# Patient Record
Sex: MALE | Race: WHITE | NOT HISPANIC OR LATINO | Employment: OTHER | URBAN - METROPOLITAN AREA
[De-identification: names, ages, dates, MRNs, and addresses within clinical notes are randomized per-mention and may not be internally consistent; named-entity substitution may affect disease eponyms.]

---

## 2017-05-02 ENCOUNTER — OFFICE VISIT (OUTPATIENT)
Dept: CARDIOLOGY | Facility: CLINIC | Age: 77
End: 2017-05-02

## 2017-05-02 VITALS
HEIGHT: 73 IN | DIASTOLIC BLOOD PRESSURE: 70 MMHG | SYSTOLIC BLOOD PRESSURE: 108 MMHG | WEIGHT: 229.6 LBS | BODY MASS INDEX: 30.43 KG/M2 | HEART RATE: 89 BPM

## 2017-05-02 DIAGNOSIS — I25.10 CORONARY ARTERY DISEASE INVOLVING NATIVE CORONARY ARTERY OF NATIVE HEART WITHOUT ANGINA PECTORIS: ICD-10-CM

## 2017-05-02 DIAGNOSIS — I48.0 PAF (PAROXYSMAL ATRIAL FIBRILLATION) (HCC): Primary | ICD-10-CM

## 2017-05-02 DIAGNOSIS — R00.2 PALPITATIONS: ICD-10-CM

## 2017-05-02 PROCEDURE — 99213 OFFICE O/P EST LOW 20 MIN: CPT | Performed by: INTERNAL MEDICINE

## 2017-05-02 RX ORDER — FLECAINIDE ACETATE 100 MG/1
100 TABLET ORAL 2 TIMES DAILY
Qty: 180 TABLET | Refills: 2 | Status: SHIPPED | OUTPATIENT
Start: 2017-05-02 | End: 2018-02-09 | Stop reason: SDUPTHER

## 2017-05-10 ENCOUNTER — HOSPITAL ENCOUNTER (OUTPATIENT)
Dept: GENERAL RADIOLOGY | Facility: HOSPITAL | Age: 77
Discharge: HOME | End: 2017-05-10
Attending: INTERNAL MEDICINE | Admitting: INTERNAL MEDICINE

## 2017-05-10 ENCOUNTER — TRANSCRIBE ORDERS (OUTPATIENT)
Dept: ADMINISTRATIVE | Facility: HOSPITAL | Age: 77
End: 2017-05-10

## 2017-05-10 DIAGNOSIS — M25.552 LEFT HIP PAIN: Primary | ICD-10-CM

## 2017-05-10 DIAGNOSIS — M25.552 LEFT HIP PAIN: ICD-10-CM

## 2017-05-10 PROCEDURE — 73502 X-RAY EXAM HIP UNI 2-3 VIEWS: CPT

## 2017-06-09 ENCOUNTER — TELEPHONE (OUTPATIENT)
Dept: URGENT CARE | Facility: CLINIC | Age: 77
End: 2017-06-09

## 2017-06-09 NOTE — TELEPHONE ENCOUNTER
Appointment made with [facility] on Monday June 12th At 8:45 AM with Dr. You Marrero.   Patient and his wife aware of appointment.

## 2017-06-26 ENCOUNTER — CONSULT (OUTPATIENT)
Dept: CARDIOLOGY | Facility: CLINIC | Age: 77
End: 2017-06-26

## 2017-06-26 VITALS
BODY MASS INDEX: 30.35 KG/M2 | SYSTOLIC BLOOD PRESSURE: 129 MMHG | WEIGHT: 229 LBS | HEIGHT: 73 IN | HEART RATE: 90 BPM | DIASTOLIC BLOOD PRESSURE: 96 MMHG

## 2017-06-26 DIAGNOSIS — I25.10 CORONARY ARTERY DISEASE INVOLVING NATIVE CORONARY ARTERY OF NATIVE HEART WITHOUT ANGINA PECTORIS: Primary | ICD-10-CM

## 2017-06-26 DIAGNOSIS — I48.0 PAF (PAROXYSMAL ATRIAL FIBRILLATION) (HCC): ICD-10-CM

## 2017-06-26 DIAGNOSIS — I10 ESSENTIAL HYPERTENSION: ICD-10-CM

## 2017-06-26 PROCEDURE — 99214 OFFICE O/P EST MOD 30 MIN: CPT | Performed by: INTERNAL MEDICINE

## 2017-06-26 RX ORDER — PRAVASTATIN SODIUM 20 MG
20 TABLET ORAL DAILY
Qty: 90 TABLET | Refills: 3 | Status: SHIPPED | OUTPATIENT
Start: 2017-06-26 | End: 2017-10-21 | Stop reason: HOSPADM

## 2017-06-26 RX ORDER — METOPROLOL SUCCINATE 25 MG/1
25 TABLET, EXTENDED RELEASE ORAL DAILY
Qty: 90 TABLET | Refills: 3 | Status: SHIPPED | OUTPATIENT
Start: 2017-06-26 | End: 2017-10-02

## 2017-06-26 NOTE — PROGRESS NOTES
[facility] AT [address], [address]  [place], [place], [address] [phone]  [email or web address]           OUTPATIENT CLINIC CONSULTATION NOTE    Encounter Date:06/26/2017    Patient Care Team:  Patient Care Team:  Mitchel Culver MD as PCP - General (Family Medicine)  Mitchel Culver MD as PCP - Family Medicine    Subjective:   Reason for consultation:   Chief Complaint   Patient presents with   • Coronary Artery Disease   • Hypertension       HPI:    Guicho Blanco is a 76 y.o. male.  History of Present Illness  The patient has a history of CAD with a history of severe anterior wall ischemia status post ostial RCA stenosis status post PTCA and placement of a Cypher drug-eluting stent by Dr. alejo in 2009, atrial fibrillation and atrial flutter status post ablation of the flutter and fibrillation in 3/2012 by Dr. Daley, hypertension, hyperlipidemia, smoking exposure due to being a  and having subsequent COPD, muscle cramps with statins, who presents to establish care.    The patient states he's been having leg weakness and muscle cramps primarily in the upper thighs.  He has sharp/tingling/numbness-like feelings in the lower legs.  He has a history of an amputated toe on the right foot after a prolonged infection.  He has switched his pravastatin to 40 mg every other day recently and has taken his alongside his coQ10, his cramps have improved.    He also endorses mild, chest tightness that is generally more left-sided than central.  Not necessarily associated with exertion or associated dyspnea.  The discomfort is chronic.    PFSH:  Patient Active Problem List   Diagnosis   • Osteomyelitis of toe of right foot   • Osteomyelitis of right foot   • PAF (paroxysmal atrial fibrillation)   • Coronary artery disease involving native coronary artery of native heart without angina pectoris   • Gastroesophageal reflux disease without esophagitis    • Benign non-nodular prostatic hyperplasia without lower urinary tract symptoms   • S/P appy   • Essential hypertension   • Simple chronic bronchitis   • Dyslipidemia   • Palpitations   • COPD (chronic obstructive pulmonary disease)   • BPH (benign prostatic hypertrophy)   • Hearing loss         Current Outpatient Prescriptions:   •  acetaminophen (TYLENOL) 500 MG tablet, Take 500 mg by mouth every 6 (six) hours as needed for mild pain (1-3)., Disp: , Rfl:   •  aspirin 81 MG EC tablet, Take 81 mg by mouth daily., Disp: , Rfl:   •  B Complex Vitamins (VITAMIN B COMPLEX) capsule capsule, Take  by mouth daily., Disp: , Rfl:   •  Cholecalciferol (VITAMIN D3) 5000 UNITS capsule capsule, Take 5,000 Units by mouth daily., Disp: , Rfl:   •  coenzyme Q10 100 MG capsule, Take 100 mg by mouth daily., Disp: , Rfl:   •  cycloSPORINE (RESTASIS) 0.05 % ophthalmic emulsion, Administer 1 drop to both eyes 2 (two) times a day., Disp: , Rfl:   •  flecainide (TAMBOCOR) 100 MG tablet, Take 1 tablet by mouth 2 (Two) Times a Day., Disp: 180 tablet, Rfl: 2  •  omeprazole (PriLOSEC) 20 MG capsule, Take 20 mg by mouth daily., Disp: , Rfl:   •  pravastatin (PRAVACHOL) 40 MG tablet, Take 40 mg by mouth daily., Disp: , Rfl:   •  traMADol (ULTRAM) 50 MG tablet, Take 50 mg by mouth 2 (Two) Times a Day As Needed for Moderate Pain (4-6)., Disp: , Rfl:     Allergies   Allergen Reactions   • Cymbalta [Duloxetine Hcl]        Social History     Social History   • Marital status:      Spouse name: N/A   • Number of children: N/A   • Years of education: N/A     Occupational History   • [employer]      Retired     Social History Main Topics   • Smoking status: Never Smoker   • Smokeless tobacco: Never Used   • Alcohol use No   • Drug use: No   • Sexual activity: Defer     Other Topics Concern   • None     Social History Narrative    Pt does not consume caffeine.      Family History   Problem Relation Age of Onset   • Atrial fibrillation  "Mother    • Cancer Father        Review of Systems:  Positive for muscle cramps, lower extremity discomfort, chest pain  Negative for dyspnea with exertion, orthopnea, PND, lower extremity edema, palpitations, lightheadedness, syncope.   Review of Systems  All other systems reviewed and are negative.    Objective:   Blood pressure 129/96, pulse 90, height 73\" (185.4 cm), weight 229 lb (104 kg).  CONSTITUTIONAL: Well-nourished. In no acute distress. Hard of hearing  SKIN: Warm and dry. No rashes noted  HEENT: Head is normocephalic and atraumatic.  Mucous membranes are pink and moist.   NECK: Supple without masses or thyromegaly. There is no jugular venous distention at 30°.  LUNGS: Normal effort. Clear to auscultation bilaterally without wheezing, rhonchi, or rales noted.   CARDIOVASCULAR: The heart has a regular rate with a normal S1 and S2. There is no murmur, gallop, rub, or click appreciated.   PERIPHERAL VASCULAR: Carotid upstroke is 2+ bilaterally and without bruits. Radial pulses are 2+ bilaterally.  2+ right lower extremity pulses, 0-1+ left lower extremity pulses There is no lower extremity edema.   ABDOMEN: Soft with no tenderness with palpitation. No hepatosplenomegaly  MUSCULOSKELETAL: No digital cyanosis abnormal gait due to left foot toe fracture.   NEUROLOGICAL: CN II - XII grossly intact, except for being hard of hearing  PSYCHIATRIC: Alert, orientated x 3, appropriate affect     Labs:  Lab Results   Component Value Date    ALT 19 05/03/2016    AST 19 05/03/2016     Lab Results   Component Value Date    CREATININE 1.10 08/26/2016       Diagnostic Data:    Procedures   TTE 9/2015  1. The estimated ejection fraction is 55-60%.   2. Mild aortic cusp sclerosis is present.  3. There is a trace of mitral regurgitation.  4. There is mild tricuspid regurgitation.       Assessment and Plan:   Guicho was seen today for coronary artery disease and hypertension.    Diagnoses and all orders for this " visit:    Coronary artery disease involving native coronary artery of native heart without angina pectoris  -History of TAMMI to ostial RCA by Dr. sapna Figueroa  -CCS class II currently if his chest tightness are manifestations of angina  -Recommend Stress Test With Myocardial Perfusion  -Continue aspirin, decreased dose of pravastatin to 20 mg daily (as opposed to 40 mg every other night), addition of Toprol 25 mg daily (no clear history of being on a beta blocker in the past nor known side effects from being on a beta blocker)  -LDL 87, AST 24, ALT 23, 11/2016 at PCP office    PAF (paroxysmal atrial fibrillation)  -Continue flecanide  -Has had multiple discussions with electrophysiology for anticoagulation.  He has a history of being on Coumadin.  He is declining anticoagulation at this time.  Continue aspirin  -We will consider increasing his aspirin to 325 mg daily at his next visit    Essential hypertension  -Mildly elevated diastolic pressure, recommend starting Toprol    Decreased left leg pulse  Peripheral neuropathy  Right toe infection/amputation  -Followed by Dr. Polanco  -No clear history of ABIs, CT of the legs or peripheral angiogram  -If the continued lower dose of the statin does not help, we'll evaluate for peripheral vascular disease with ABIs.    - Dietary and exercise counseling was provided during this visit  - Return in about 6 months (around 12/26/2017).    Hector Urrutia MD, MSc, [facility]  Interventional Cardiology  [facility] at [facility]

## 2017-07-07 ENCOUNTER — TELEPHONE (OUTPATIENT)
Dept: CARDIOLOGY | Facility: CLINIC | Age: 77
End: 2017-07-07

## 2017-07-07 NOTE — TELEPHONE ENCOUNTER
After speaking with MJS I advised that he has recommended the patient switch to isosorbide 30 mg daily.  Spouse was educated on the headache that may ensue, and that it may last up to a couple of weeks as it is due to the increased bloodflow to his brain.  She verbalized understanding and all questions and concerns were addressed at this time.

## 2017-07-07 NOTE — TELEPHONE ENCOUNTER
The patient's wife called stating that since starting metoprolol he has been lethargic, very fatigued, and dizzy.  I advised that I will ask MJS if he would like to change his medication, and that he should hold his metoprolol in the meantime as the patient is unsure if he wants to take a beta blocker anymore at all.  /54 HR 64.  Understanding verbalized.

## 2017-07-10 ENCOUNTER — HOSPITAL ENCOUNTER (OUTPATIENT)
Dept: CARDIOLOGY | Facility: HOSPITAL | Age: 77
Discharge: HOME | End: 2017-07-10
Attending: INTERNAL MEDICINE

## 2017-07-10 ENCOUNTER — HOSPITAL ENCOUNTER (OUTPATIENT)
Dept: CARDIOLOGY | Facility: HOSPITAL | Age: 77
Discharge: HOME | End: 2017-07-10
Attending: INTERNAL MEDICINE | Admitting: INTERNAL MEDICINE

## 2017-07-10 VITALS
WEIGHT: 225 LBS | SYSTOLIC BLOOD PRESSURE: 126 MMHG | HEART RATE: 73 BPM | DIASTOLIC BLOOD PRESSURE: 70 MMHG | BODY MASS INDEX: 29.82 KG/M2 | HEIGHT: 73 IN

## 2017-07-10 DIAGNOSIS — I10 ESSENTIAL HYPERTENSION: ICD-10-CM

## 2017-07-10 DIAGNOSIS — I48.0 PAF (PAROXYSMAL ATRIAL FIBRILLATION) (HCC): ICD-10-CM

## 2017-07-10 DIAGNOSIS — I25.10 CORONARY ARTERY DISEASE INVOLVING NATIVE CORONARY ARTERY OF NATIVE HEART WITHOUT ANGINA PECTORIS: ICD-10-CM

## 2017-07-10 LAB
BH CV STRESS BP STAGE 1: NORMAL
BH CV STRESS BP STAGE 3: NORMAL
BH CV STRESS BP STAGE 4: NORMAL
BH CV STRESS COMMENTS STAGE 1: NORMAL
BH CV STRESS DOSE REGADENOSON STAGE 1: 0.4
BH CV STRESS DURATION MIN STAGE 1: 1
BH CV STRESS DURATION MIN STAGE 2: 1
BH CV STRESS DURATION MIN STAGE 3: 1
BH CV STRESS DURATION MIN STAGE 4: 1
BH CV STRESS DURATION SEC STAGE 2: 0
BH CV STRESS HR STAGE 1: 83
BH CV STRESS HR STAGE 2: 88
BH CV STRESS HR STAGE 3: 83
BH CV STRESS HR STAGE 4: 82
BH CV STRESS PROTOCOL 1: NORMAL
BH CV STRESS RECOVERY BP: NORMAL MMHG
BH CV STRESS RECOVERY HR: 79 BPM
BH CV STRESS STAGE 1: 1
BH CV STRESS STAGE 2: 2
BH CV STRESS STAGE 3: 3
BH CV STRESS STAGE 4: 4
LV EF NUC BP: 74 %
MAXIMAL PREDICTED HEART RATE: 144 BPM
PERCENT MAX PREDICTED HR: 64.58 %
STRESS BASELINE BP: NORMAL MMHG
STRESS BASELINE HR: 72 BPM
STRESS PERCENT HR: 76 %
STRESS POST PEAK BP: NORMAL MMHG
STRESS POST PEAK HR: 93 BPM
STRESS TARGET HR: 122 BPM

## 2017-07-10 PROCEDURE — 93017 CV STRESS TEST TRACING ONLY: CPT

## 2017-07-10 PROCEDURE — A9500 TC99M SESTAMIBI: HCPCS | Performed by: INTERNAL MEDICINE

## 2017-07-10 PROCEDURE — 78452 HT MUSCLE IMAGE SPECT MULT: CPT

## 2017-07-10 PROCEDURE — 93018 CV STRESS TEST I&R ONLY: CPT | Performed by: INTERNAL MEDICINE

## 2017-07-10 PROCEDURE — 78452 HT MUSCLE IMAGE SPECT MULT: CPT | Performed by: INTERNAL MEDICINE

## 2017-07-10 PROCEDURE — 0 TECHNETIUM SESTAMIBI: Performed by: INTERNAL MEDICINE

## 2017-07-10 PROCEDURE — 25010000002 REGADENOSON 0.4 MG/5ML SOLUTION: Performed by: INTERNAL MEDICINE

## 2017-07-10 RX ADMIN — REGADENOSON 0.4 MG: 0.08 INJECTION, SOLUTION INTRAVENOUS at 14:42

## 2017-07-10 RX ADMIN — Medication 1 DOSE: at 12:15

## 2017-07-10 RX ADMIN — Medication 1 DOSE: at 14:45

## 2017-07-13 ENCOUNTER — TELEPHONE (OUTPATIENT)
Dept: CARDIOLOGY | Facility: CLINIC | Age: 77
End: 2017-07-13

## 2017-07-13 NOTE — TELEPHONE ENCOUNTER
Results of stress test reviewed with patient's spouse.  All questions and concerns addressed at this time.  Understanding verbalized.

## 2017-09-26 ENCOUNTER — APPOINTMENT (OUTPATIENT)
Dept: GENERAL RADIOLOGY | Facility: HOSPITAL | Age: 77
End: 2017-09-26

## 2017-09-26 ENCOUNTER — HOSPITAL ENCOUNTER (EMERGENCY)
Facility: HOSPITAL | Age: 77
Discharge: HOME | End: 2017-09-26
Attending: EMERGENCY MEDICINE | Admitting: EMERGENCY MEDICINE

## 2017-09-26 ENCOUNTER — APPOINTMENT (OUTPATIENT)
Dept: CT IMAGING | Facility: HOSPITAL | Age: 77
End: 2017-09-26

## 2017-09-26 VITALS
HEART RATE: 66 BPM | DIASTOLIC BLOOD PRESSURE: 79 MMHG | HEIGHT: 73 IN | SYSTOLIC BLOOD PRESSURE: 142 MMHG | WEIGHT: 225 LBS | RESPIRATION RATE: 16 BRPM | OXYGEN SATURATION: 100 % | BODY MASS INDEX: 29.82 KG/M2 | TEMPERATURE: 97.4 F

## 2017-09-26 DIAGNOSIS — R42 VERTIGO: Primary | ICD-10-CM

## 2017-09-26 DIAGNOSIS — R11.14 BILIOUS VOMITING WITH NAUSEA: ICD-10-CM

## 2017-09-26 LAB
ALBUMIN SERPL-MCNC: 4.3 G/DL (ref 3.2–4.8)
ALBUMIN/GLOB SERPL: 1.7 G/DL (ref 1.5–2.5)
ALP SERPL-CCNC: 113 U/L (ref 25–100)
ALT SERPL W P-5'-P-CCNC: 28 U/L (ref 7–40)
ANION GAP SERPL CALCULATED.3IONS-SCNC: 9 MMOL/L (ref 3–11)
AST SERPL-CCNC: 29 U/L (ref 0–33)
BASOPHILS # BLD AUTO: 0.03 10*3/MM3 (ref 0–0.2)
BASOPHILS NFR BLD AUTO: 0.3 % (ref 0–1)
BILIRUB SERPL-MCNC: 0.9 MG/DL (ref 0.3–1.2)
BILIRUB UR QL STRIP: NEGATIVE
BUN BLD-MCNC: 10 MG/DL (ref 9–23)
BUN/CREAT SERPL: 10 (ref 7–25)
CALCIUM SPEC-SCNC: 9.1 MG/DL (ref 8.7–10.4)
CHLORIDE SERPL-SCNC: 106 MMOL/L (ref 99–109)
CLARITY UR: CLEAR
CO2 SERPL-SCNC: 22 MMOL/L (ref 20–31)
COLOR UR: YELLOW
CREAT BLD-MCNC: 1 MG/DL (ref 0.6–1.3)
DEPRECATED RDW RBC AUTO: 41.7 FL (ref 37–54)
EOSINOPHIL # BLD AUTO: 0.16 10*3/MM3 (ref 0–0.3)
EOSINOPHIL NFR BLD AUTO: 1.6 % (ref 0–3)
ERYTHROCYTE [DISTWIDTH] IN BLOOD BY AUTOMATED COUNT: 13.2 % (ref 11.3–14.5)
GFR SERPL CREATININE-BSD FRML MDRD: 73 ML/MIN/1.73
GLOBULIN UR ELPH-MCNC: 2.5 GM/DL
GLUCOSE BLD-MCNC: 157 MG/DL (ref 70–100)
GLUCOSE UR STRIP-MCNC: NEGATIVE MG/DL
HCT VFR BLD AUTO: 41.4 % (ref 38.9–50.9)
HGB BLD-MCNC: 14 G/DL (ref 13.1–17.5)
HGB UR QL STRIP.AUTO: NEGATIVE
HOLD SPECIMEN: NORMAL
HOLD SPECIMEN: NORMAL
IMM GRANULOCYTES # BLD: 0.03 10*3/MM3 (ref 0–0.03)
IMM GRANULOCYTES NFR BLD: 0.3 % (ref 0–0.6)
KETONES UR QL STRIP: ABNORMAL
LEUKOCYTE ESTERASE UR QL STRIP.AUTO: NEGATIVE
LYMPHOCYTES # BLD AUTO: 1.97 10*3/MM3 (ref 0.6–4.8)
LYMPHOCYTES NFR BLD AUTO: 19.6 % (ref 24–44)
MAGNESIUM SERPL-MCNC: 2 MG/DL (ref 1.3–2.7)
MCH RBC QN AUTO: 29.2 PG (ref 27–31)
MCHC RBC AUTO-ENTMCNC: 33.8 G/DL (ref 32–36)
MCV RBC AUTO: 86.4 FL (ref 80–99)
MONOCYTES # BLD AUTO: 0.69 10*3/MM3 (ref 0–1)
MONOCYTES NFR BLD AUTO: 6.9 % (ref 0–12)
NEUTROPHILS # BLD AUTO: 7.16 10*3/MM3 (ref 1.5–8.3)
NEUTROPHILS NFR BLD AUTO: 71.3 % (ref 41–71)
NITRITE UR QL STRIP: NEGATIVE
PH UR STRIP.AUTO: 7 [PH] (ref 5–8)
PLATELET # BLD AUTO: 175 10*3/MM3 (ref 150–450)
PMV BLD AUTO: 9.8 FL (ref 6–12)
POTASSIUM BLD-SCNC: 3.9 MMOL/L (ref 3.5–5.5)
PROT SERPL-MCNC: 6.8 G/DL (ref 5.7–8.2)
PROT UR QL STRIP: NEGATIVE
RBC # BLD AUTO: 4.79 10*6/MM3 (ref 4.2–5.76)
SODIUM BLD-SCNC: 137 MMOL/L (ref 132–146)
SP GR UR STRIP: 1.02 (ref 1–1.03)
TROPONIN I SERPL-MCNC: 0 NG/ML (ref 0–0.07)
UROBILINOGEN UR QL STRIP: ABNORMAL
WBC NRBC COR # BLD: 10.04 10*3/MM3 (ref 3.5–10.8)
WHOLE BLOOD HOLD SPECIMEN: NORMAL
WHOLE BLOOD HOLD SPECIMEN: NORMAL

## 2017-09-26 PROCEDURE — 80053 COMPREHEN METABOLIC PANEL: CPT | Performed by: EMERGENCY MEDICINE

## 2017-09-26 PROCEDURE — 85025 COMPLETE CBC W/AUTO DIFF WBC: CPT | Performed by: EMERGENCY MEDICINE

## 2017-09-26 PROCEDURE — 84484 ASSAY OF TROPONIN QUANT: CPT

## 2017-09-26 PROCEDURE — 96374 THER/PROPH/DIAG INJ IV PUSH: CPT

## 2017-09-26 PROCEDURE — 83735 ASSAY OF MAGNESIUM: CPT | Performed by: EMERGENCY MEDICINE

## 2017-09-26 PROCEDURE — 93005 ELECTROCARDIOGRAM TRACING: CPT

## 2017-09-26 PROCEDURE — 96361 HYDRATE IV INFUSION ADD-ON: CPT

## 2017-09-26 PROCEDURE — 71010 HC CHEST PA OR AP: CPT

## 2017-09-26 PROCEDURE — 25010000002 ONDANSETRON PER 1 MG

## 2017-09-26 PROCEDURE — 70450 CT HEAD/BRAIN W/O DYE: CPT

## 2017-09-26 PROCEDURE — 99284 EMERGENCY DEPT VISIT MOD MDM: CPT

## 2017-09-26 PROCEDURE — 81003 URINALYSIS AUTO W/O SCOPE: CPT | Performed by: EMERGENCY MEDICINE

## 2017-09-26 RX ORDER — ONDANSETRON 2 MG/ML
4 INJECTION INTRAMUSCULAR; INTRAVENOUS ONCE
Status: COMPLETED | OUTPATIENT
Start: 2017-09-26 | End: 2017-09-26

## 2017-09-26 RX ORDER — MECLIZINE HYDROCHLORIDE 25 MG/1
25 TABLET ORAL ONCE
Status: COMPLETED | OUTPATIENT
Start: 2017-09-26 | End: 2017-09-26

## 2017-09-26 RX ORDER — MECLIZINE HYDROCHLORIDE 25 MG/1
25 TABLET ORAL 3 TIMES DAILY PRN
Qty: 12 TABLET | Refills: 0 | Status: ON HOLD | OUTPATIENT
Start: 2017-09-26 | End: 2017-10-05

## 2017-09-26 RX ORDER — MECLIZINE HYDROCHLORIDE 25 MG/1
25 TABLET ORAL ONCE
Status: DISCONTINUED | OUTPATIENT
Start: 2017-09-26 | End: 2017-09-26 | Stop reason: HOSPADM

## 2017-09-26 RX ORDER — ONDANSETRON 4 MG/1
4 TABLET, FILM COATED ORAL EVERY 6 HOURS PRN
Qty: 15 TABLET | Refills: 0 | Status: ON HOLD | OUTPATIENT
Start: 2017-09-26 | End: 2017-10-05

## 2017-09-26 RX ORDER — SODIUM CHLORIDE 0.9 % (FLUSH) 0.9 %
10 SYRINGE (ML) INJECTION AS NEEDED
Status: DISCONTINUED | OUTPATIENT
Start: 2017-09-26 | End: 2017-09-26 | Stop reason: HOSPADM

## 2017-09-26 RX ADMIN — SODIUM CHLORIDE 500 ML: 9 INJECTION, SOLUTION INTRAVENOUS at 12:35

## 2017-09-26 RX ADMIN — MECLIZINE 25 MG: 25 TABLET ORAL at 10:23

## 2017-09-26 RX ADMIN — SODIUM CHLORIDE 1000 ML: 9 INJECTION, SOLUTION INTRAVENOUS at 10:22

## 2017-09-26 RX ADMIN — MECLIZINE 25 MG: 25 TABLET ORAL at 12:35

## 2017-09-26 RX ADMIN — ONDANSETRON 4 MG: 2 INJECTION INTRAMUSCULAR; INTRAVENOUS at 09:19

## 2017-09-28 ENCOUNTER — TELEPHONE (OUTPATIENT)
Dept: CARDIOLOGY | Facility: CLINIC | Age: 77
End: 2017-09-28

## 2017-09-28 NOTE — TELEPHONE ENCOUNTER
Dr. Jimenez's office called requesting that the patient be seen today or tomorrow by YUNIER for CP.  As the patient has a recent negative stress test, negative troponin and NSR EKG 2 days ago, and a history of chronic CP I called the patient to see if his s/s had changed and how he is feeling.  His wife states that his CP is constant, radiates to his left shoulder and arm now, and is accompanied by dyspnea, the latter two being new for him.  She wanted the patient to be seen today or tomorrow by either YUNIER or TRINITY.  After speaking with them I advised that they are unable to see him in clinic today or tomorrow, but we can add him on to the schedule Monday with YUNIER at 1015.  She was amenable to this and verbalized understanding at this time.

## 2017-10-02 ENCOUNTER — OFFICE VISIT (OUTPATIENT)
Dept: CARDIOLOGY | Facility: CLINIC | Age: 77
End: 2017-10-02

## 2017-10-02 ENCOUNTER — PREP FOR SURGERY (OUTPATIENT)
Dept: OTHER | Facility: HOSPITAL | Age: 77
End: 2017-10-02

## 2017-10-02 VITALS
HEART RATE: 75 BPM | SYSTOLIC BLOOD PRESSURE: 120 MMHG | HEIGHT: 73 IN | WEIGHT: 230.6 LBS | BODY MASS INDEX: 30.56 KG/M2 | DIASTOLIC BLOOD PRESSURE: 74 MMHG

## 2017-10-02 DIAGNOSIS — I48.0 PAF (PAROXYSMAL ATRIAL FIBRILLATION) (HCC): ICD-10-CM

## 2017-10-02 DIAGNOSIS — R07.9 CHEST PAIN, UNSPECIFIED TYPE: ICD-10-CM

## 2017-10-02 DIAGNOSIS — E78.5 DYSLIPIDEMIA: ICD-10-CM

## 2017-10-02 DIAGNOSIS — R00.2 PALPITATIONS: ICD-10-CM

## 2017-10-02 DIAGNOSIS — R42 LIGHT HEADEDNESS: Primary | ICD-10-CM

## 2017-10-02 DIAGNOSIS — M79.603 PAIN OF UPPER EXTREMITY, UNSPECIFIED LATERALITY: ICD-10-CM

## 2017-10-02 DIAGNOSIS — I25.111 ATHEROSCLEROSIS OF NATIVE CORONARY ARTERY OF NATIVE HEART WITH ANGINA PECTORIS WITH DOCUMENTED SPASM (HCC): ICD-10-CM

## 2017-10-02 DIAGNOSIS — I25.10 CORONARY ARTERY DISEASE INVOLVING NATIVE CORONARY ARTERY OF NATIVE HEART WITHOUT ANGINA PECTORIS: ICD-10-CM

## 2017-10-02 DIAGNOSIS — I10 ESSENTIAL HYPERTENSION: ICD-10-CM

## 2017-10-02 DIAGNOSIS — I20.9 ANGINA PECTORIS (HCC): Primary | ICD-10-CM

## 2017-10-02 PROCEDURE — 99214 OFFICE O/P EST MOD 30 MIN: CPT | Performed by: INTERNAL MEDICINE

## 2017-10-02 RX ORDER — NITROGLYCERIN 0.4 MG/1
0.4 TABLET SUBLINGUAL
Status: CANCELLED | OUTPATIENT
Start: 2017-10-02

## 2017-10-02 RX ORDER — ONDANSETRON 2 MG/ML
4 INJECTION INTRAMUSCULAR; INTRAVENOUS EVERY 6 HOURS PRN
Status: CANCELLED | OUTPATIENT
Start: 2017-10-02

## 2017-10-02 RX ORDER — ACETAMINOPHEN 325 MG/1
650 TABLET ORAL EVERY 4 HOURS PRN
Status: CANCELLED | OUTPATIENT
Start: 2017-10-02

## 2017-10-02 RX ORDER — SODIUM CHLORIDE 0.9 % (FLUSH) 0.9 %
1-10 SYRINGE (ML) INJECTION AS NEEDED
Status: CANCELLED | OUTPATIENT
Start: 2017-10-02

## 2017-10-02 RX ORDER — ASPIRIN 325 MG
325 TABLET ORAL ONCE
Status: CANCELLED | OUTPATIENT
Start: 2017-10-02 | End: 2017-10-02

## 2017-10-02 RX ORDER — ASPIRIN 325 MG
325 TABLET, DELAYED RELEASE (ENTERIC COATED) ORAL DAILY
Status: CANCELLED | OUTPATIENT
Start: 2017-10-03

## 2017-10-02 NOTE — PROGRESS NOTES
[facility] AT [address], [address]  [place], [place], [address]    [phone]  WWW[facility]           OUTPATIENT CLINIC HISTORY AND PHYSICAL NOTE    Encounter Date:06/26/2017    Patient Care Team:  Patient Care Team:  Mitchel Culver MD as PCP - General (Family Medicine)  Mitchel Culver MD as PCP - Family Medicine    Subjective:   Reason for consultation:   Chief Complaint   Patient presents with   • Coronary Artery Disease       HPI:    Guicho Blanco is a 76 y.o. male.  Coronary Artery Disease       The patient has a history of CAD with a history of severe anterior wall ischemia status post ostial RCA stenosis status post PTCA and placement of a Cypher drug-eluting stent by Dr. Sue in 2009, atrial fibrillation and atrial flutter status post ablation of the flutter and fibrillation in 3/2012 by Dr. Daley, hypertension, hyperlipidemia, Prior infection and right toe amputation by Dr. Polanco, smoking exposure due to being a  and having subsequent COPD, muscle cramps with statins which has improved with lowering his pravastatin dose from 40 mg to 20 mg but is still persistent, who presents for follow-up.    Recently the patient had severe lightheadedness/dizziness with associated emesis and was treated in the emergency room.  He still has chronic mild persistent lightheadedness.  He has intermittent left-sided chest pain which can occur with exertion or rest that is sharp, lasts seconds to minutes, is not associated with dyspnea.  He has increasing exertional fatigue which may be an anginal equivalent.    The patient's leg weakness and upper thigh muscle cramps have improved partially with decreasing pravastatin from 40 mg to 20 mg daily.  He is a chronic persistent numbness of the lower legs.  Has been told previously that his pulses in his feet are normal    PFSH:  Patient Active Problem List   Diagnosis   • Osteomyelitis of toe of  right foot   • Osteomyelitis of right foot   • PAF (paroxysmal atrial fibrillation)   • Coronary artery disease involving native coronary artery of native heart without angina pectoris   • Gastroesophageal reflux disease without esophagitis   • Benign non-nodular prostatic hyperplasia without lower urinary tract symptoms   • S/P appy   • Essential hypertension   • Simple chronic bronchitis   • Dyslipidemia   • Palpitations   • COPD (chronic obstructive pulmonary disease)   • BPH (benign prostatic hypertrophy)   • Hearing loss         Current Outpatient Prescriptions:   •  acetaminophen (TYLENOL) 500 MG tablet, Take 500 mg by mouth every 6 (six) hours as needed for mild pain (1-3)., Disp: , Rfl:   •  aspirin 81 MG EC tablet, Take 325 mg by mouth Daily., Disp: , Rfl:   •  B Complex Vitamins (VITAMIN B COMPLEX) capsule capsule, Take  by mouth daily., Disp: , Rfl:   •  Cholecalciferol (VITAMIN D3) 5000 UNITS capsule capsule, Take 5,000 Units by mouth daily., Disp: , Rfl:   •  coenzyme Q10 100 MG capsule, Take 100 mg by mouth daily., Disp: , Rfl:   •  cycloSPORINE (RESTASIS) 0.05 % ophthalmic emulsion, Administer 1 drop to both eyes 2 (two) times a day., Disp: , Rfl:   •  flecainide (TAMBOCOR) 100 MG tablet, Take 1 tablet by mouth 2 (Two) Times a Day., Disp: 180 tablet, Rfl: 2  •  meclizine (ANTIVERT) 25 MG tablet, Take 1 tablet by mouth 3 (Three) Times a Day As Needed for dizziness., Disp: 12 tablet, Rfl: 0  •  omeprazole (PriLOSEC) 20 MG capsule, Take 20 mg by mouth daily., Disp: , Rfl:   •  ondansetron (ZOFRAN) 4 MG tablet, Take 1 tablet by mouth Every 6 (Six) Hours As Needed for Nausea or Vomiting., Disp: 15 tablet, Rfl: 0  •  pravastatin (PRAVACHOL) 20 MG tablet, Take 1 tablet by mouth Daily., Disp: 90 tablet, Rfl: 3  •  traMADol (ULTRAM) 50 MG tablet, Take 50 mg by mouth 2 (Two) Times a Day As Needed for Moderate Pain (4-6)., Disp: , Rfl:     Allergies   Allergen Reactions   • Cymbalta [Duloxetine Hcl]    •  "Metoprolol Other (See Comments)     Lethargy & fatigue   • Neurontin [Gabapentin]        Social History     Social History   • Marital status:      Spouse name: N/A   • Number of children: N/A   • Years of education: N/A     Occupational History   • [employer]      Retired     Social History Main Topics   • Smoking status: Never Smoker   • Smokeless tobacco: Never Used   • Alcohol use No   • Drug use: No   • Sexual activity: Defer     Other Topics Concern   • None     Social History Narrative    Pt does not consume caffeine.      Family History   Problem Relation Age of Onset   • Atrial fibrillation Mother    • Cancer Father        Review of Systems:  Positive for muscle cramps, lower extremity discomfort, chest pain, lightheadedness, palpitations  Negative for dyspnea with exertion, orthopnea, PND, lower extremity edema, syncope.   Review of Systems  All other systems reviewed and are negative.    Objective:   Blood pressure 120/74, pulse 75, height 73\" (185.4 cm), weight 230 lb 9.6 oz (105 kg).  CONSTITUTIONAL: Well-nourished. In no acute distress. Hard of hearing  LUNGS: Normal effort. Clear to auscultation bilaterally without wheezing, rhonchi, or rales noted.   CARDIOVASCULAR: The heart has a regular rate with a normal S1 and S2. There is no murmur, gallop, rub, or click appreciated.   PERIPHERAL VASCULAR: Carotid upstroke is 2+ bilaterally and without bruits. Radial pulses are 2+ bilaterally.  2+ right PT pulse, 1+ left lower PT pulse. There is no lower extremity edema.       Labs:  Lab Results   Component Value Date    ALT 28 09/26/2017    AST 29 09/26/2017     Lab Results   Component Value Date    CREATININE 1.00 09/26/2017       Diagnostic Data:    Procedures   TTE 9/2015  1. The estimated ejection fraction is 55-60%.   2. Mild aortic cusp sclerosis is present.  3. There is a trace of mitral regurgitation.  4. There is mild tricuspid regurgitation.     Stress test 7/2017  · Left ventricular " ejection fraction is hyperdynamic (Calculated EF > 70%).  · Myocardial perfusion imaging indicates a mild, very small-sized infarct located in the apex with no significant ischemia noted.  · Impressions are consistent with a low risk study.    Assessment and Plan:   Guicho was seen today for coronary artery disease and hypertension.    Diagnoses and all orders for this visit:    Coronary artery disease involving native coronary artery of native heart without angina pectoris  -History of TAMMI to ostial RCA by Dr. Sue 2009  -CCS class II currently if his chest tightness are manifestations of angina  -Despite low risk stress test, he has persistent anginal-like symptoms.  He has a mild fixed perfusion defect in the apex.  Prior anterior ischemia is what prompted his initial left heart catheterization which revealed an 80% RCA stenosis.  As such we'll repeat left heart catheterization via the right radial approach  -Continue aspirin; did not tolerate metoprolol   -Continue decreased dose of pravastatin at 20 mg daily.  This is helped his myalgias.  In the future can consider a trial of no statin to see if this helps.  If so, the risk of being on statin may outweigh the benefit.    -LDL 87, AST 24, ALT 23, 11/2016 at PCP office  -Repeat lipid panel soon with PCP    PAF (paroxysmal atrial fibrillation)  -Continue flecanide  -Increase aspirin to 325 mg daily  -He has rare occasional palpitations, we'll place a ZIO Patch after his heart catheter to look for breakthrough A. Fib    Essential hypertension  -At goal without metoprolol    Lightheadedness  -Carotid duplex ultrasound at time of left heart catheterization  -Arterial Duplex to rule out subclavian artery stenosis    Decreased left leg pulse  Peripheral neuropathy  Right toe infection/amputation ~2016  -Previously followed by Dr. Polanco  -2+ right PT pulse, 1+ left PT pulse  -No clear history of ABIs, CT of the legs or peripheral angiogram  -If the continued lower  dose of the statin does not help, we'll evaluate for peripheral vascular disease with ABIs.    - Dietary and exercise counseling was provided during this visit  - Return for Follow-up pending left heart catheterization results.    Hector Urrutia MD, MSc, [facility]  Interventional Cardiology  [facility] at [facility]

## 2017-10-04 PROBLEM — R07.9 CHEST PAIN: Status: ACTIVE | Noted: 2017-10-04

## 2017-10-05 ENCOUNTER — APPOINTMENT (OUTPATIENT)
Dept: CARDIOLOGY | Facility: HOSPITAL | Age: 77
End: 2017-10-05
Attending: INTERNAL MEDICINE

## 2017-10-05 ENCOUNTER — HOSPITAL ENCOUNTER (OUTPATIENT)
Facility: HOSPITAL | Age: 77
Discharge: HOME | End: 2017-10-05
Attending: INTERNAL MEDICINE | Admitting: INTERNAL MEDICINE

## 2017-10-05 VITALS
RESPIRATION RATE: 16 BRPM | BODY MASS INDEX: 30.15 KG/M2 | HEART RATE: 70 BPM | SYSTOLIC BLOOD PRESSURE: 122 MMHG | HEIGHT: 73 IN | WEIGHT: 227.51 LBS | TEMPERATURE: 97.6 F | OXYGEN SATURATION: 97 % | DIASTOLIC BLOOD PRESSURE: 68 MMHG

## 2017-10-05 DIAGNOSIS — R07.9 CHEST PAIN, UNSPECIFIED TYPE: ICD-10-CM

## 2017-10-05 LAB
ALBUMIN SERPL-MCNC: 4.2 G/DL (ref 3.2–4.8)
ALBUMIN/GLOB SERPL: 1.6 G/DL (ref 1.5–2.5)
ALP SERPL-CCNC: 109 U/L (ref 25–100)
ALT SERPL W P-5'-P-CCNC: 33 U/L (ref 7–40)
ANION GAP SERPL CALCULATED.3IONS-SCNC: 9 MMOL/L (ref 3–11)
ARTICHOKE IGE QN: 91 MG/DL (ref 0–130)
AST SERPL-CCNC: 34 U/L (ref 0–33)
BASOPHILS # BLD AUTO: 0.03 10*3/MM3 (ref 0–0.2)
BASOPHILS NFR BLD AUTO: 0.4 % (ref 0–1)
BH CV ECHO MEAS - AO ROOT AREA (BSA CORRECTED): 1.3
BH CV ECHO MEAS - AO ROOT AREA: 7.1 CM^2
BH CV ECHO MEAS - AO ROOT DIAM: 3 CM
BH CV ECHO MEAS - BSA(HAYCOCK): 2.3 M^2
BH CV ECHO MEAS - BSA: 2.3 M^2
BH CV ECHO MEAS - BZI_BMI: 29.9 KILOGRAMS/M^2
BH CV ECHO MEAS - BZI_METRIC_HEIGHT: 185.4 CM
BH CV ECHO MEAS - BZI_METRIC_WEIGHT: 103 KG
BH CV ECHO MEAS - CONTRAST EF (2CH): 54.7 ML/M^2
BH CV ECHO MEAS - CONTRAST EF 4CH: 73.2 ML/M^2
BH CV ECHO MEAS - EDV(CUBED): 57.1 ML
BH CV ECHO MEAS - EDV(MOD-SP2): 64 ML
BH CV ECHO MEAS - EDV(MOD-SP4): 41 ML
BH CV ECHO MEAS - EDV(TEICH): 63.9 ML
BH CV ECHO MEAS - EF(CUBED): 72 %
BH CV ECHO MEAS - EF(MOD-SP2): 54.7 %
BH CV ECHO MEAS - EF(MOD-SP4): 64 %
BH CV ECHO MEAS - EF(TEICH): 64.4 %
BH CV ECHO MEAS - ESV(CUBED): 16 ML
BH CV ECHO MEAS - ESV(MOD-SP2): 29 ML
BH CV ECHO MEAS - ESV(MOD-SP4): 11 ML
BH CV ECHO MEAS - ESV(TEICH): 22.8 ML
BH CV ECHO MEAS - FS: 34.5 %
BH CV ECHO MEAS - IVS/LVPW: 1
BH CV ECHO MEAS - IVSD: 1.2 CM
BH CV ECHO MEAS - LA DIMENSION: 3.5 CM
BH CV ECHO MEAS - LA/AO: 1.2
BH CV ECHO MEAS - LAT PEAK E' VEL: 8.9 CM/SEC
BH CV ECHO MEAS - LV DIASTOLIC VOL/BSA (35-75): 18.1 ML/M^2
BH CV ECHO MEAS - LV MASS(C)D: 158.2 GRAMS
BH CV ECHO MEAS - LV MASS(C)DI: 69.7 GRAMS/M^2
BH CV ECHO MEAS - LV SYSTOLIC VOL/BSA (12-30): 4.8 ML/M^2
BH CV ECHO MEAS - LVIDD: 3.9 CM
BH CV ECHO MEAS - LVIDS: 2.5 CM
BH CV ECHO MEAS - LVLD AP2: 6.4 CM
BH CV ECHO MEAS - LVLD AP4: 5.9 CM
BH CV ECHO MEAS - LVLS AP2: 6.2 CM
BH CV ECHO MEAS - LVLS AP4: 5.3 CM
BH CV ECHO MEAS - LVPWD: 1.2 CM
BH CV ECHO MEAS - MED PEAK E' VEL: 6.3 CM/SEC
BH CV ECHO MEAS - MV A MAX VEL: 56.8 CM/SEC
BH CV ECHO MEAS - MV DEC SLOPE: 191 CM/SEC^2
BH CV ECHO MEAS - MV DEC TIME: 0.23 SEC
BH CV ECHO MEAS - MV E MAX VEL: 53.3 CM/SEC
BH CV ECHO MEAS - MV E/A: 0.94
BH CV ECHO MEAS - PA ACC SLOPE: 579 CM/SEC^2
BH CV ECHO MEAS - PA ACC TIME: 0.13 SEC
BH CV ECHO MEAS - PA PR(ACCEL): 18.7 MMHG
BH CV ECHO MEAS - RAP SYSTOLE: 5 MMHG
BH CV ECHO MEAS - RVDD: 2.4 CM
BH CV ECHO MEAS - RVSP: 15.9 MMHG
BH CV ECHO MEAS - SI(CUBED): 18.1 ML/M^2
BH CV ECHO MEAS - SI(MOD-SP2): 15.4 ML/M^2
BH CV ECHO MEAS - SI(MOD-SP4): 13.2 ML/M^2
BH CV ECHO MEAS - SI(TEICH): 18.1 ML/M^2
BH CV ECHO MEAS - SV(CUBED): 41.1 ML
BH CV ECHO MEAS - SV(MOD-SP2): 35 ML
BH CV ECHO MEAS - SV(MOD-SP4): 30 ML
BH CV ECHO MEAS - SV(TEICH): 41.1 ML
BH CV ECHO MEAS - TAPSE (>1.6): 2.1 CM2
BH CV ECHO MEAS - TR MAX VEL: 165 CM/SEC
BH CV VAS BP RIGHT ARM: NORMAL MMHG
BH CV XLRA - RV BASE: 3.4 CM
BH CV XLRA - RV LENGTH: 5.9 CM
BH CV XLRA - RV MID: 3.3 CM
BH CV XLRA MEAS LEFT CCA RATIO VEL: 94.3 CM/SEC
BH CV XLRA MEAS LEFT DIST CCA EDV: 19.6 CM/SEC
BH CV XLRA MEAS LEFT DIST CCA PSV: 95.1 CM/SEC
BH CV XLRA MEAS LEFT DIST ICA EDV: 24.3 CM/SEC
BH CV XLRA MEAS LEFT DIST ICA PSV: 89.9 CM/SEC
BH CV XLRA MEAS LEFT ICA RATIO VEL: 77.2 CM/SEC
BH CV XLRA MEAS LEFT ICA/CCA RATIO: 0.82
BH CV XLRA MEAS LEFT MID ICA EDV: 29.8 CM/SEC
BH CV XLRA MEAS LEFT MID ICA PSV: 77.7 CM/SEC
BH CV XLRA MEAS LEFT PROX CCA EDV: 22 CM/SEC
BH CV XLRA MEAS LEFT PROX CCA PSV: 102.1 CM/SEC
BH CV XLRA MEAS LEFT PROX ECA PSV: 97 CM/SEC
BH CV XLRA MEAS LEFT PROX ICA EDV: 21 CM/SEC
BH CV XLRA MEAS LEFT PROX ICA PSV: 70.6 CM/SEC
BH CV XLRA MEAS LEFT PROX SCLA PSV: 146.9 CM/SEC
BH CV XLRA MEAS LEFT VERTEBRAL A EDV: 19.3 CM/SEC
BH CV XLRA MEAS LEFT VERTEBRAL A PSV: 64 CM/SEC
BH CV XLRA MEAS RIGHT CCA RATIO VEL: 89.6 CM/SEC
BH CV XLRA MEAS RIGHT DIST CCA EDV: 20.4 CM/SEC
BH CV XLRA MEAS RIGHT DIST CCA PSV: 90.4 CM/SEC
BH CV XLRA MEAS RIGHT DIST ICA EDV: 23.1 CM/SEC
BH CV XLRA MEAS RIGHT DIST ICA PSV: 91.7 CM/SEC
BH CV XLRA MEAS RIGHT ICA RATIO VEL: 75.1 CM/SEC
BH CV XLRA MEAS RIGHT ICA/CCA RATIO: 0.84
BH CV XLRA MEAS RIGHT MID ICA EDV: 24.9 CM/SEC
BH CV XLRA MEAS RIGHT MID ICA PSV: 75.5 CM/SEC
BH CV XLRA MEAS RIGHT PROX CCA EDV: 21.2 CM/SEC
BH CV XLRA MEAS RIGHT PROX CCA PSV: 95.9 CM/SEC
BH CV XLRA MEAS RIGHT PROX ECA EDV: 21.2 CM/SEC
BH CV XLRA MEAS RIGHT PROX ECA PSV: 88.8 CM/SEC
BH CV XLRA MEAS RIGHT PROX ICA EDV: 15.3 CM/SEC
BH CV XLRA MEAS RIGHT PROX ICA PSV: 68.1 CM/SEC
BH CV XLRA MEAS RIGHT PROX SCLA PSV: 129.6 CM/SEC
BILIRUB SERPL-MCNC: 0.7 MG/DL (ref 0.3–1.2)
BUN BLD-MCNC: 10 MG/DL (ref 9–23)
BUN/CREAT SERPL: 9.1 (ref 7–25)
CALCIUM SPEC-SCNC: 9.5 MG/DL (ref 8.7–10.4)
CHLORIDE SERPL-SCNC: 108 MMOL/L (ref 99–109)
CHOLEST SERPL-MCNC: 142 MG/DL (ref 0–200)
CO2 SERPL-SCNC: 21 MMOL/L (ref 20–31)
CREAT BLD-MCNC: 1.1 MG/DL (ref 0.6–1.3)
DEPRECATED RDW RBC AUTO: 41.8 FL (ref 37–54)
E/E' RATIO: 7.6
EOSINOPHIL # BLD AUTO: 0.2 10*3/MM3 (ref 0–0.3)
EOSINOPHIL NFR BLD AUTO: 2.8 % (ref 0–3)
ERYTHROCYTE [DISTWIDTH] IN BLOOD BY AUTOMATED COUNT: 13.1 % (ref 11.3–14.5)
GFR SERPL CREATININE-BSD FRML MDRD: 65 ML/MIN/1.73
GLOBULIN UR ELPH-MCNC: 2.7 GM/DL
GLUCOSE BLD-MCNC: 102 MG/DL (ref 70–100)
HBA1C MFR BLD: 5.3 % (ref 4.8–5.6)
HCT VFR BLD AUTO: 42.7 % (ref 38.9–50.9)
HDLC SERPL-MCNC: 43 MG/DL (ref 40–60)
HGB BLD-MCNC: 14 G/DL (ref 13.1–17.5)
IMM GRANULOCYTES # BLD: 0.04 10*3/MM3 (ref 0–0.03)
IMM GRANULOCYTES NFR BLD: 0.6 % (ref 0–0.6)
LEFT ATRIUM VOLUME INDEX: 17.2 ML/M2
LYMPHOCYTES # BLD AUTO: 2.27 10*3/MM3 (ref 0.6–4.8)
LYMPHOCYTES NFR BLD AUTO: 32.3 % (ref 24–44)
MCH RBC QN AUTO: 28.6 PG (ref 27–31)
MCHC RBC AUTO-ENTMCNC: 32.8 G/DL (ref 32–36)
MCV RBC AUTO: 87.3 FL (ref 80–99)
MONOCYTES # BLD AUTO: 0.8 10*3/MM3 (ref 0–1)
MONOCYTES NFR BLD AUTO: 11.4 % (ref 0–12)
NEUTROPHILS # BLD AUTO: 3.68 10*3/MM3 (ref 1.5–8.3)
NEUTROPHILS NFR BLD AUTO: 52.5 % (ref 41–71)
PLATELET # BLD AUTO: 169 10*3/MM3 (ref 150–450)
PMV BLD AUTO: 10.1 FL (ref 6–12)
POTASSIUM BLD-SCNC: 4.1 MMOL/L (ref 3.5–5.5)
PROT SERPL-MCNC: 6.9 G/DL (ref 5.7–8.2)
RBC # BLD AUTO: 4.89 10*6/MM3 (ref 4.2–5.76)
SODIUM BLD-SCNC: 138 MMOL/L (ref 132–146)
TRIGL SERPL-MCNC: 116 MG/DL (ref 0–150)
WBC NRBC COR # BLD: 7.02 10*3/MM3 (ref 3.5–10.8)

## 2017-10-05 PROCEDURE — C1894 INTRO/SHEATH, NON-LASER: HCPCS | Performed by: INTERNAL MEDICINE

## 2017-10-05 PROCEDURE — 25010000002 FENTANYL CITRATE (PF) 100 MCG/2ML SOLUTION: Performed by: INTERNAL MEDICINE

## 2017-10-05 PROCEDURE — 93880 EXTRACRANIAL BILAT STUDY: CPT | Performed by: INTERNAL MEDICINE

## 2017-10-05 PROCEDURE — 80053 COMPREHEN METABOLIC PANEL: CPT | Performed by: INTERNAL MEDICINE

## 2017-10-05 PROCEDURE — 93306 TTE W/DOPPLER COMPLETE: CPT

## 2017-10-05 PROCEDURE — 0 IOPAMIDOL PER 1 ML: Performed by: INTERNAL MEDICINE

## 2017-10-05 PROCEDURE — 80061 LIPID PANEL: CPT | Performed by: PHYSICIAN ASSISTANT

## 2017-10-05 PROCEDURE — 85025 COMPLETE CBC W/AUTO DIFF WBC: CPT | Performed by: INTERNAL MEDICINE

## 2017-10-05 PROCEDURE — 83036 HEMOGLOBIN GLYCOSYLATED A1C: CPT | Performed by: PHYSICIAN ASSISTANT

## 2017-10-05 PROCEDURE — 36415 COLL VENOUS BLD VENIPUNCTURE: CPT

## 2017-10-05 PROCEDURE — 93306 TTE W/DOPPLER COMPLETE: CPT | Performed by: INTERNAL MEDICINE

## 2017-10-05 PROCEDURE — 25010000002 HEPARIN (PORCINE) PER 1000 UNITS: Performed by: INTERNAL MEDICINE

## 2017-10-05 PROCEDURE — 93880 EXTRACRANIAL BILAT STUDY: CPT

## 2017-10-05 PROCEDURE — C1769 GUIDE WIRE: HCPCS | Performed by: INTERNAL MEDICINE

## 2017-10-05 PROCEDURE — 93458 L HRT ARTERY/VENTRICLE ANGIO: CPT | Performed by: INTERNAL MEDICINE

## 2017-10-05 PROCEDURE — 25010000002 MIDAZOLAM PER 1 MG: Performed by: INTERNAL MEDICINE

## 2017-10-05 RX ORDER — SODIUM CHLORIDE 9 MG/ML
INJECTION, SOLUTION INTRAVENOUS CONTINUOUS PRN
Status: DISCONTINUED | OUTPATIENT
Start: 2017-10-05 | End: 2017-10-05 | Stop reason: HOSPADM

## 2017-10-05 RX ORDER — NITROGLYCERIN 0.4 MG/1
0.4 TABLET SUBLINGUAL
Status: DISCONTINUED | OUTPATIENT
Start: 2017-10-05 | End: 2017-10-05 | Stop reason: HOSPADM

## 2017-10-05 RX ORDER — ASPIRIN 325 MG
325 TABLET, DELAYED RELEASE (ENTERIC COATED) ORAL DAILY
Status: DISCONTINUED | OUTPATIENT
Start: 2017-10-06 | End: 2017-10-05 | Stop reason: HOSPADM

## 2017-10-05 RX ORDER — MIDAZOLAM HYDROCHLORIDE 1 MG/ML
INJECTION INTRAMUSCULAR; INTRAVENOUS AS NEEDED
Status: DISCONTINUED | OUTPATIENT
Start: 2017-10-05 | End: 2017-10-05 | Stop reason: HOSPADM

## 2017-10-05 RX ORDER — ISOSORBIDE MONONITRATE 30 MG/1
30 TABLET, EXTENDED RELEASE ORAL DAILY
Qty: 30 TABLET | Refills: 11 | Status: SHIPPED | OUTPATIENT
Start: 2017-10-05 | End: 2018-09-16 | Stop reason: SDUPTHER

## 2017-10-05 RX ORDER — LIDOCAINE HYDROCHLORIDE 10 MG/ML
INJECTION, SOLUTION INFILTRATION; PERINEURAL AS NEEDED
Status: DISCONTINUED | OUTPATIENT
Start: 2017-10-05 | End: 2017-10-05 | Stop reason: HOSPADM

## 2017-10-05 RX ORDER — SODIUM CHLORIDE 0.9 % (FLUSH) 0.9 %
1-10 SYRINGE (ML) INJECTION AS NEEDED
Status: DISCONTINUED | OUTPATIENT
Start: 2017-10-05 | End: 2017-10-05 | Stop reason: HOSPADM

## 2017-10-05 RX ORDER — ONDANSETRON 2 MG/ML
4 INJECTION INTRAMUSCULAR; INTRAVENOUS EVERY 6 HOURS PRN
Status: DISCONTINUED | OUTPATIENT
Start: 2017-10-05 | End: 2017-10-05 | Stop reason: HOSPADM

## 2017-10-05 RX ORDER — FENTANYL CITRATE 50 UG/ML
INJECTION, SOLUTION INTRAMUSCULAR; INTRAVENOUS AS NEEDED
Status: DISCONTINUED | OUTPATIENT
Start: 2017-10-05 | End: 2017-10-05 | Stop reason: HOSPADM

## 2017-10-05 RX ORDER — ASPIRIN 325 MG
325 TABLET ORAL ONCE
Status: COMPLETED | OUTPATIENT
Start: 2017-10-05 | End: 2017-10-05

## 2017-10-05 RX ORDER — ACETAMINOPHEN 325 MG/1
650 TABLET ORAL EVERY 4 HOURS PRN
Status: DISCONTINUED | OUTPATIENT
Start: 2017-10-05 | End: 2017-10-05 | Stop reason: HOSPADM

## 2017-10-05 RX ADMIN — ASPIRIN 325 MG ORAL TABLET 325 MG: 325 PILL ORAL at 08:52

## 2017-10-05 NOTE — PROGRESS NOTES
Brief Progress Note  -There is mild diffuse atherosclerosis with some small vessel disease on his left heart catheterization but no flow-limiting stenoses  -Carotid Doppler ultrasound results pending, will also send patient home with in event monitor  -Orthostatic blood pressure measurements after the patient's TR band is removed and sedation has worn off  -If carotid Doppler ultrasound is negative and event monitor is unremarkable, trial of isosorbide mononitrate 30 mg daily for his chest pain  -If lightheadedness persists, will consider tilt table test  -Follow up with Dr. Urrutia in 6-8 weeks    Hector Urrutia MD, MSc, FACC  Interventional Cardiology  [facility] at [facility]

## 2017-10-05 NOTE — H&P (VIEW-ONLY)
[facility] AT [address], [address]  [place], [place], [address]    [phone]  WWW.[facility]           OUTPATIENT CLINIC HISTORY AND PHYSICAL NOTE    Encounter Date:06/26/2017    Patient Care Team:  Patient Care Team:  Mitchel Culver MD as PCP - General (Family Medicine)  Mitchel Culver MD as PCP - Family Medicine    Subjective:   Reason for consultation:   Chief Complaint   Patient presents with   • Coronary Artery Disease       HPI:    Guicho Blanco is a 76 y.o. male.  Coronary Artery Disease       The patient has a history of CAD with a history of severe anterior wall ischemia status post ostial RCA stenosis status post PTCA and placement of a Cypher drug-eluting stent by Dr. Sue in 2009, atrial fibrillation and atrial flutter status post ablation of the flutter and fibrillation in 3/2012 by Dr. Daley, hypertension, hyperlipidemia, Prior infection and right toe amputation by Dr. Polanco, smoking exposure due to being a  and having subsequent COPD, muscle cramps with statins which has improved with lowering his pravastatin dose from 40 mg to 20 mg but is still persistent, who presents for follow-up.    Recently the patient had severe lightheadedness/dizziness with associated emesis and was treated in the emergency room.  He still has chronic mild persistent lightheadedness.  He has intermittent left-sided chest pain which can occur with exertion or rest that is sharp, lasts seconds to minutes, is not associated with dyspnea.  He has increasing exertional fatigue which may be an anginal equivalent.    The patient's leg weakness and upper thigh muscle cramps have improved partially with decreasing pravastatin from 40 mg to 20 mg daily.  He is a chronic persistent numbness of the lower legs.  Has been told previously that his pulses in his feet are normal    PFSH:  Patient Active Problem List   Diagnosis   • Osteomyelitis of toe of  right foot   • Osteomyelitis of right foot   • PAF (paroxysmal atrial fibrillation)   • Coronary artery disease involving native coronary artery of native heart without angina pectoris   • Gastroesophageal reflux disease without esophagitis   • Benign non-nodular prostatic hyperplasia without lower urinary tract symptoms   • S/P appy   • Essential hypertension   • Simple chronic bronchitis   • Dyslipidemia   • Palpitations   • COPD (chronic obstructive pulmonary disease)   • BPH (benign prostatic hypertrophy)   • Hearing loss         Current Outpatient Prescriptions:   •  acetaminophen (TYLENOL) 500 MG tablet, Take 500 mg by mouth every 6 (six) hours as needed for mild pain (1-3)., Disp: , Rfl:   •  aspirin 81 MG EC tablet, Take 325 mg by mouth Daily., Disp: , Rfl:   •  B Complex Vitamins (VITAMIN B COMPLEX) capsule capsule, Take  by mouth daily., Disp: , Rfl:   •  Cholecalciferol (VITAMIN D3) 5000 UNITS capsule capsule, Take 5,000 Units by mouth daily., Disp: , Rfl:   •  coenzyme Q10 100 MG capsule, Take 100 mg by mouth daily., Disp: , Rfl:   •  cycloSPORINE (RESTASIS) 0.05 % ophthalmic emulsion, Administer 1 drop to both eyes 2 (two) times a day., Disp: , Rfl:   •  flecainide (TAMBOCOR) 100 MG tablet, Take 1 tablet by mouth 2 (Two) Times a Day., Disp: 180 tablet, Rfl: 2  •  meclizine (ANTIVERT) 25 MG tablet, Take 1 tablet by mouth 3 (Three) Times a Day As Needed for dizziness., Disp: 12 tablet, Rfl: 0  •  omeprazole (PriLOSEC) 20 MG capsule, Take 20 mg by mouth daily., Disp: , Rfl:   •  ondansetron (ZOFRAN) 4 MG tablet, Take 1 tablet by mouth Every 6 (Six) Hours As Needed for Nausea or Vomiting., Disp: 15 tablet, Rfl: 0  •  pravastatin (PRAVACHOL) 20 MG tablet, Take 1 tablet by mouth Daily., Disp: 90 tablet, Rfl: 3  •  traMADol (ULTRAM) 50 MG tablet, Take 50 mg by mouth 2 (Two) Times a Day As Needed for Moderate Pain (4-6)., Disp: , Rfl:     Allergies   Allergen Reactions   • Cymbalta [Duloxetine Hcl]    •  "Metoprolol Other (See Comments)     Lethargy & fatigue   • Neurontin [Gabapentin]        Social History     Social History   • Marital status:      Spouse name: N/A   • Number of children: N/A   • Years of education: N/A     Occupational History   • [employer]      Retired     Social History Main Topics   • Smoking status: Never Smoker   • Smokeless tobacco: Never Used   • Alcohol use No   • Drug use: No   • Sexual activity: Defer     Other Topics Concern   • None     Social History Narrative    Pt does not consume caffeine.      Family History   Problem Relation Age of Onset   • Atrial fibrillation Mother    • Cancer Father        Review of Systems:  Positive for muscle cramps, lower extremity discomfort, chest pain, lightheadedness, palpitations  Negative for dyspnea with exertion, orthopnea, PND, lower extremity edema, syncope.   Review of Systems  All other systems reviewed and are negative.    Objective:   Blood pressure 120/74, pulse 75, height 73\" (185.4 cm), weight 230 lb 9.6 oz (105 kg).  CONSTITUTIONAL: Well-nourished. In no acute distress. Hard of hearing  LUNGS: Normal effort. Clear to auscultation bilaterally without wheezing, rhonchi, or rales noted.   CARDIOVASCULAR: The heart has a regular rate with a normal S1 and S2. There is no murmur, gallop, rub, or click appreciated.   PERIPHERAL VASCULAR: Carotid upstroke is 2+ bilaterally and without bruits. Radial pulses are 2+ bilaterally.  2+ right PT pulse, 1+ left lower PT pulse. There is no lower extremity edema.       Labs:  Lab Results   Component Value Date    ALT 28 09/26/2017    AST 29 09/26/2017     Lab Results   Component Value Date    CREATININE 1.00 09/26/2017       Diagnostic Data:    Procedures   TTE 9/2015  1. The estimated ejection fraction is 55-60%.   2. Mild aortic cusp sclerosis is present.  3. There is a trace of mitral regurgitation.  4. There is mild tricuspid regurgitation.     Stress test 7/2017  · Left ventricular " ejection fraction is hyperdynamic (Calculated EF > 70%).  · Myocardial perfusion imaging indicates a mild, very small-sized infarct located in the apex with no significant ischemia noted.  · Impressions are consistent with a low risk study.    Assessment and Plan:   Guicho was seen today for coronary artery disease and hypertension.    Diagnoses and all orders for this visit:    Coronary artery disease involving native coronary artery of native heart without angina pectoris  -History of TAMMI to ostial RCA by Dr. Sue 2009  -CCS class II currently if his chest tightness are manifestations of angina  -Despite low risk stress test, he has persistent anginal-like symptoms.  He has a mild fixed perfusion defect in the apex.  Prior anterior ischemia is what prompted his initial left heart catheterization which revealed an 80% RCA stenosis.  As such we'll repeat left heart catheterization via the right radial approach  -Continue aspirin; did not tolerate metoprolol   -Continue decreased dose of pravastatin at 20 mg daily.  This is helped his myalgias.  In the future can consider a trial of no statin to see if this helps.  If so, the risk of being on statin may outweigh the benefit.    -LDL 87, AST 24, ALT 23, 11/2016 at PCP office  -Repeat lipid panel soon with PCP    PAF (paroxysmal atrial fibrillation)  -Continue flecanide  -Increase aspirin to 325 mg daily  -He has rare occasional palpitations, we'll place a ZIO Patch after his heart catheter to look for breakthrough A. Fib    Essential hypertension  -At goal without metoprolol    Lightheadedness  -Carotid duplex ultrasound at time of left heart catheterization  -Arterial Duplex to rule out subclavian artery stenosis    Decreased left leg pulse  Peripheral neuropathy  Right toe infection/amputation ~2016  -Previously followed by Dr. Polanco  -2+ right PT pulse, 1+ left PT pulse  -No clear history of ABIs, CT of the legs or peripheral angiogram  -If the continued lower  dose of the statin does not help, we'll evaluate for peripheral vascular disease with ABIs.    - Dietary and exercise counseling was provided during this visit  - Return for Follow-up pending left heart catheterization results.    Hector Urrutia MD, MSc, [facility]  Interventional Cardiology  [facility] at [facility]

## 2017-10-05 NOTE — INTERVAL H&P NOTE
H&P reviewed. The patient was examined and there are no changes to the H&P.     Hector Urrutia MD, MSc, [facility]  Interventional Cardiology  [facility] at [facility]

## 2017-10-13 ENCOUNTER — TELEPHONE (OUTPATIENT)
Dept: CARDIOLOGY | Facility: CLINIC | Age: 77
End: 2017-10-13

## 2017-10-13 NOTE — TELEPHONE ENCOUNTER
Results of carotid US reviewed with the patient's wife.  All questions and concerns addressed at this time.  Understanding verbalized.

## 2017-10-18 ENCOUNTER — HOSPITAL ENCOUNTER (INPATIENT)
Facility: HOSPITAL | Age: 77
LOS: 3 days | Discharge: HOME | End: 2017-10-21
Attending: EMERGENCY MEDICINE | Admitting: FAMILY MEDICINE

## 2017-10-18 ENCOUNTER — APPOINTMENT (OUTPATIENT)
Dept: MRI IMAGING | Facility: HOSPITAL | Age: 77
End: 2017-10-18

## 2017-10-18 ENCOUNTER — APPOINTMENT (OUTPATIENT)
Dept: CT IMAGING | Facility: HOSPITAL | Age: 77
End: 2017-10-18

## 2017-10-18 ENCOUNTER — APPOINTMENT (OUTPATIENT)
Dept: GENERAL RADIOLOGY | Facility: HOSPITAL | Age: 77
End: 2017-10-18

## 2017-10-18 DIAGNOSIS — Z74.09 IMPAIRED FUNCTIONAL MOBILITY, BALANCE, GAIT, AND ENDURANCE: ICD-10-CM

## 2017-10-18 DIAGNOSIS — I63.9 CEREBROVASCULAR ACCIDENT (CVA), UNSPECIFIED MECHANISM (HCC): Primary | ICD-10-CM

## 2017-10-18 DIAGNOSIS — Z74.09 IMPAIRED MOBILITY AND ADLS: ICD-10-CM

## 2017-10-18 DIAGNOSIS — Z78.9 IMPAIRED MOBILITY AND ADLS: ICD-10-CM

## 2017-10-18 PROBLEM — R42 VERTIGO: Status: ACTIVE | Noted: 2017-10-18

## 2017-10-18 PROBLEM — Z86.79 HISTORY OF ATRIAL FIBRILLATION: Status: ACTIVE | Noted: 2017-10-18

## 2017-10-18 LAB
ALBUMIN SERPL-MCNC: 4.3 G/DL (ref 3.2–4.8)
ALBUMIN/GLOB SERPL: 1.7 G/DL (ref 1.5–2.5)
ALP SERPL-CCNC: 114 U/L (ref 25–100)
ALT SERPL W P-5'-P-CCNC: 27 U/L (ref 7–40)
ANION GAP SERPL CALCULATED.3IONS-SCNC: 7 MMOL/L (ref 3–11)
AST SERPL-CCNC: 28 U/L (ref 0–33)
BASOPHILS # BLD AUTO: 0.03 10*3/MM3 (ref 0–0.2)
BASOPHILS NFR BLD AUTO: 0.4 % (ref 0–1)
BILIRUB SERPL-MCNC: 0.6 MG/DL (ref 0.3–1.2)
BILIRUB UR QL STRIP: NEGATIVE
BUN BLD-MCNC: 10 MG/DL (ref 9–23)
BUN/CREAT SERPL: 10 (ref 7–25)
CALCIUM SPEC-SCNC: 9.2 MG/DL (ref 8.7–10.4)
CHLORIDE SERPL-SCNC: 103 MMOL/L (ref 99–109)
CLARITY UR: CLEAR
CO2 SERPL-SCNC: 27 MMOL/L (ref 20–31)
COLOR UR: YELLOW
CREAT BLD-MCNC: 1 MG/DL (ref 0.6–1.3)
DEPRECATED RDW RBC AUTO: 41 FL (ref 37–54)
EOSINOPHIL # BLD AUTO: 0.15 10*3/MM3 (ref 0–0.3)
EOSINOPHIL NFR BLD AUTO: 1.9 % (ref 0–3)
ERYTHROCYTE [DISTWIDTH] IN BLOOD BY AUTOMATED COUNT: 13 % (ref 11.3–14.5)
GFR SERPL CREATININE-BSD FRML MDRD: 73 ML/MIN/1.73
GLOBULIN UR ELPH-MCNC: 2.6 GM/DL
GLUCOSE BLD-MCNC: 125 MG/DL (ref 70–100)
GLUCOSE UR STRIP-MCNC: NEGATIVE MG/DL
HCT VFR BLD AUTO: 42.1 % (ref 38.9–50.9)
HGB BLD-MCNC: 13.8 G/DL (ref 13.1–17.5)
HGB UR QL STRIP.AUTO: NEGATIVE
HOLD SPECIMEN: NORMAL
HOLD SPECIMEN: NORMAL
IMM GRANULOCYTES # BLD: 0.04 10*3/MM3 (ref 0–0.03)
IMM GRANULOCYTES NFR BLD: 0.5 % (ref 0–0.6)
KETONES UR QL STRIP: NEGATIVE
LEUKOCYTE ESTERASE UR QL STRIP.AUTO: NEGATIVE
LIPASE SERPL-CCNC: 36 U/L (ref 6–51)
LYMPHOCYTES # BLD AUTO: 1.88 10*3/MM3 (ref 0.6–4.8)
LYMPHOCYTES NFR BLD AUTO: 23.9 % (ref 24–44)
MAGNESIUM SERPL-MCNC: 1.9 MG/DL (ref 1.3–2.7)
MCH RBC QN AUTO: 28.2 PG (ref 27–31)
MCHC RBC AUTO-ENTMCNC: 32.8 G/DL (ref 32–36)
MCV RBC AUTO: 86.1 FL (ref 80–99)
MONOCYTES # BLD AUTO: 0.57 10*3/MM3 (ref 0–1)
MONOCYTES NFR BLD AUTO: 7.2 % (ref 0–12)
NEUTROPHILS # BLD AUTO: 5.2 10*3/MM3 (ref 1.5–8.3)
NEUTROPHILS NFR BLD AUTO: 66.1 % (ref 41–71)
NITRITE UR QL STRIP: NEGATIVE
PH UR STRIP.AUTO: 6 [PH] (ref 5–8)
PLATELET # BLD AUTO: 182 10*3/MM3 (ref 150–450)
PMV BLD AUTO: 10.1 FL (ref 6–12)
POTASSIUM BLD-SCNC: 4 MMOL/L (ref 3.5–5.5)
PROT SERPL-MCNC: 6.9 G/DL (ref 5.7–8.2)
PROT UR QL STRIP: NEGATIVE
RBC # BLD AUTO: 4.89 10*6/MM3 (ref 4.2–5.76)
SODIUM BLD-SCNC: 137 MMOL/L (ref 132–146)
SP GR UR STRIP: 1.02 (ref 1–1.03)
TROPONIN I SERPL-MCNC: 0 NG/ML (ref 0–0.07)
TROPONIN I SERPL-MCNC: 0 NG/ML (ref 0–0.07)
UROBILINOGEN UR QL STRIP: NORMAL
WBC NRBC COR # BLD: 7.87 10*3/MM3 (ref 3.5–10.8)
WHOLE BLOOD HOLD SPECIMEN: NORMAL
WHOLE BLOOD HOLD SPECIMEN: NORMAL

## 2017-10-18 PROCEDURE — 99285 EMERGENCY DEPT VISIT HI MDM: CPT

## 2017-10-18 PROCEDURE — 84484 ASSAY OF TROPONIN QUANT: CPT

## 2017-10-18 PROCEDURE — 25010000002 ONDANSETRON PER 1 MG: Performed by: NURSE PRACTITIONER

## 2017-10-18 PROCEDURE — 80053 COMPREHEN METABOLIC PANEL: CPT | Performed by: EMERGENCY MEDICINE

## 2017-10-18 PROCEDURE — 83690 ASSAY OF LIPASE: CPT | Performed by: NURSE PRACTITIONER

## 2017-10-18 PROCEDURE — G0378 HOSPITAL OBSERVATION PER HR: HCPCS

## 2017-10-18 PROCEDURE — 71010 HC CHEST PA OR AP: CPT

## 2017-10-18 PROCEDURE — 70549 MR ANGIOGRAPH NECK W/O&W/DYE: CPT

## 2017-10-18 PROCEDURE — 25010000002 LORAZEPAM PER 2 MG: Performed by: EMERGENCY MEDICINE

## 2017-10-18 PROCEDURE — 81003 URINALYSIS AUTO W/O SCOPE: CPT | Performed by: EMERGENCY MEDICINE

## 2017-10-18 PROCEDURE — 83735 ASSAY OF MAGNESIUM: CPT | Performed by: EMERGENCY MEDICINE

## 2017-10-18 PROCEDURE — 93005 ELECTROCARDIOGRAM TRACING: CPT

## 2017-10-18 PROCEDURE — 70551 MRI BRAIN STEM W/O DYE: CPT

## 2017-10-18 PROCEDURE — 85025 COMPLETE CBC W/AUTO DIFF WBC: CPT | Performed by: EMERGENCY MEDICINE

## 2017-10-18 PROCEDURE — 70450 CT HEAD/BRAIN W/O DYE: CPT

## 2017-10-18 PROCEDURE — 93005 ELECTROCARDIOGRAM TRACING: CPT | Performed by: EMERGENCY MEDICINE

## 2017-10-18 PROCEDURE — 99222 1ST HOSP IP/OBS MODERATE 55: CPT | Performed by: FAMILY MEDICINE

## 2017-10-18 RX ORDER — ASPIRIN 81 MG/1
324 TABLET, CHEWABLE ORAL ONCE
Status: COMPLETED | OUTPATIENT
Start: 2017-10-18 | End: 2017-10-18

## 2017-10-18 RX ORDER — LORAZEPAM 2 MG/ML
0.25 INJECTION INTRAMUSCULAR ONCE
Status: COMPLETED | OUTPATIENT
Start: 2017-10-18 | End: 2017-10-18

## 2017-10-18 RX ORDER — SODIUM CHLORIDE 0.9 % (FLUSH) 0.9 %
10 SYRINGE (ML) INJECTION AS NEEDED
Status: DISCONTINUED | OUTPATIENT
Start: 2017-10-18 | End: 2017-10-21 | Stop reason: HOSPADM

## 2017-10-18 RX ORDER — ASPIRIN 300 MG/1
300 SUPPOSITORY RECTAL DAILY
Status: DISCONTINUED | OUTPATIENT
Start: 2017-10-19 | End: 2017-10-21 | Stop reason: HOSPADM

## 2017-10-18 RX ORDER — ONDANSETRON 2 MG/ML
INJECTION INTRAMUSCULAR; INTRAVENOUS
Status: DISPENSED
Start: 2017-10-18 | End: 2017-10-19

## 2017-10-18 RX ORDER — ONDANSETRON 4 MG/1
4 TABLET, FILM COATED ORAL EVERY 8 HOURS PRN
COMMUNITY
End: 2019-02-04

## 2017-10-18 RX ORDER — ACETAMINOPHEN 325 MG/1
650 TABLET ORAL EVERY 4 HOURS PRN
Status: DISCONTINUED | OUTPATIENT
Start: 2017-10-18 | End: 2017-10-21 | Stop reason: HOSPADM

## 2017-10-18 RX ORDER — ASPIRIN 325 MG
325 TABLET ORAL DAILY
Status: DISCONTINUED | OUTPATIENT
Start: 2017-10-19 | End: 2017-10-21 | Stop reason: HOSPADM

## 2017-10-18 RX ORDER — MECLIZINE HYDROCHLORIDE 25 MG/1
25 TABLET ORAL ONCE
Status: COMPLETED | OUTPATIENT
Start: 2017-10-18 | End: 2017-10-18

## 2017-10-18 RX ORDER — ACETAMINOPHEN 650 MG/1
650 SUPPOSITORY RECTAL EVERY 4 HOURS PRN
Status: DISCONTINUED | OUTPATIENT
Start: 2017-10-18 | End: 2017-10-21 | Stop reason: HOSPADM

## 2017-10-18 RX ORDER — ONDANSETRON 2 MG/ML
4 INJECTION INTRAMUSCULAR; INTRAVENOUS ONCE
Status: DISCONTINUED | OUTPATIENT
Start: 2017-10-18 | End: 2017-10-18

## 2017-10-18 RX ORDER — MECLIZINE HYDROCHLORIDE 25 MG/1
25 TABLET ORAL 3 TIMES DAILY PRN
COMMUNITY
End: 2019-02-04

## 2017-10-18 RX ORDER — SODIUM CHLORIDE 0.9 % (FLUSH) 0.9 %
1-10 SYRINGE (ML) INJECTION AS NEEDED
Status: DISCONTINUED | OUTPATIENT
Start: 2017-10-18 | End: 2017-10-21 | Stop reason: HOSPADM

## 2017-10-18 RX ORDER — ATORVASTATIN CALCIUM 40 MG/1
80 TABLET, FILM COATED ORAL NIGHTLY
Status: DISCONTINUED | OUTPATIENT
Start: 2017-10-18 | End: 2017-10-20

## 2017-10-18 RX ADMIN — SODIUM CHLORIDE 500 ML: 9 INJECTION, SOLUTION INTRAVENOUS at 16:18

## 2017-10-18 RX ADMIN — MECLIZINE 25 MG: 25 TABLET ORAL at 17:13

## 2017-10-18 RX ADMIN — ASPIRIN 81 MG 324 MG: 81 TABLET ORAL at 20:49

## 2017-10-18 RX ADMIN — LORAZEPAM 0.25 MG: 2 INJECTION INTRAMUSCULAR; INTRAVENOUS at 18:24

## 2017-10-18 RX ADMIN — ONDANSETRON: 2 INJECTION INTRAMUSCULAR; INTRAVENOUS at 14:43

## 2017-10-18 RX ADMIN — MECLIZINE 25 MG: 25 TABLET ORAL at 16:15

## 2017-10-18 NOTE — ED PROVIDER NOTES
Subjective   HPI Comments: Pt is a 76-year-old white male that presents emergency Department with complaints of headache, dizziness.  Patient reports that the room is spinning.  Patient reports that he has a history of vertigo and believes that this is similar to his symptoms September 26,2017.   Pt was seen in our emergency department had a negative CAT scan at this time.  Patient was discharged home and encouraged to follow up with ENT.  Patient saw ENT on the 28th and was told everything looked good.  Patient since has had a cardiac catheterization.  Patient advises today that he woke up he was experiencing pain on the left side of his head as well as nausea, vomiting and the room spinning.  Patient denies any weakness at this time.  Patient does report he has been unsteady on his feet for the past few months.  Patient advises is not new.  Patient reports he has a history of neuropathy.  Patient denies any focal weakness.  Patient denies any numbness, tingling.  Pt denies any chest pain, shortness of breath.  Patient is currently vomiting.    Patient is a 76 y.o. male presenting with dizziness.   History provided by:  Patient  Dizziness   Quality:  Room spinning and head spinning  Severity:  Moderate  Timing:  Constant  Progression:  Worsening  Context: head movement and inactivity    Relieved by:  Nothing  Worsened by:  Nothing  Ineffective treatments:  None tried  Associated symptoms: headaches, nausea and vomiting    Associated symptoms: no chest pain and no diarrhea        Review of Systems   Constitutional: Negative for chills, fatigue and fever.   Eyes: Negative for visual disturbance.   Cardiovascular: Negative for chest pain.   Gastrointestinal: Positive for nausea and vomiting. Negative for diarrhea.   Neurological: Positive for dizziness and headaches.   All other systems reviewed and are negative.      Past Medical History:   Diagnosis Date   • Arthritis    • BPH (benign prostatic hypertrophy)    •  Cellulitis    • COPD (chronic obstructive pulmonary disease)    • Coronary artery disease    • GERD (gastroesophageal reflux disease)    • Hearing loss    • Hiatal hernia    • Neuropathy    • Peripheral neuropathy    • Peripheral vascular disease        Allergies   Allergen Reactions   • Cymbalta [Duloxetine Hcl] Mental Status Change     Depression, thoughts of Suicide.    • Metoprolol Other (See Comments)     UNKNOWN. Pt does not remember this being allergy   • Neurontin [Gabapentin] Other (See Comments)     Lethargy & fatigue       Past Surgical History:   Procedure Laterality Date   • ABLATION OF DYSRHYTHMIC FOCUS     • APPENDECTOMY     • CARDIAC CATHETERIZATION N/A 10/5/2017    Procedure: Left Heart Cath;  Surgeon: Hector Urrutia MD;  Location: [facility] CATH INVASIVE LOC[facility];  Service:    • CATARACT EXTRACTION Bilateral    • CHOLECYSTECTOMY      Remote   • CORONARY ANGIOPLASTY     • GALLBLADDER SURGERY     • KNEE ARTHROSCOPY Right     Right knee ACL repair.   • PROSTATE SURGERY     • SHOULDER SURGERY Right    • TOE AMPUTATION Right     2nd toe - Right Foot       Family History   Problem Relation Age of Onset   • Atrial fibrillation Mother    • Cancer Father        Social History     Social History   • Marital status:      Spouse name: N/A   • Number of children: N/A   • Years of education: N/A     Occupational History   • [employer]      Retired     Social History Main Topics   • Smoking status: Never Smoker   • Smokeless tobacco: Never Used   • Alcohol use No   • Drug use: No   • Sexual activity: Defer     Other Topics Concern   • None     Social History Narrative    Pt does not consume caffeine.            Objective   Physical Exam   Constitutional: He is oriented to person, place, and time. He appears well-developed and well-nourished.   Actively vomiting   HENT:   Head: Normocephalic and atraumatic.   Right Ear: External ear normal.   Left Ear: External ear normal.   Nose: Nose normal.    Mouth/Throat: Oropharynx is clear and moist. No oropharyngeal exudate.   Eyes: Conjunctivae and EOM are normal. Pupils are equal, round, and reactive to light.   Neck: Normal range of motion. Neck supple. No spinous process tenderness and no muscular tenderness present. Normal range of motion present.   Cardiovascular: Normal rate, regular rhythm, normal heart sounds and intact distal pulses.    Pulmonary/Chest: Effort normal and breath sounds normal. No respiratory distress. He has no wheezes.   Abdominal: Soft. Bowel sounds are normal. He exhibits no distension. There is no tenderness.   Musculoskeletal: Normal range of motion. He exhibits no edema or tenderness.   Neurological: He is alert and oriented to person, place, and time. He has normal strength. No cranial nerve deficit or sensory deficit. Coordination normal. GCS eye subscore is 4. GCS verbal subscore is 5. GCS motor subscore is 6.   Pt appears to have a facial droop, wife advises that she does not anything different    Skin: Skin is warm and dry.   Psychiatric: He has a normal mood and affect.       Procedures         ED Course  ED Course   Comment By Time   2040  spoke with Dr. Lock regarding patient presentation.  Pt will be admitted to the hospital for further evaluation, workup. Pt agrees and verb understanding. Ros Henry, LEEROY 10/18 2217   2100 hospitalist agrees to admit the patient to tele. Ros Henry, APRN 10/18 2217        Recent Results (from the past 24 hour(s))   Comprehensive Metabolic Panel    Collection Time: 10/18/17  1:51 PM   Result Value Ref Range    Glucose 125 (H) 70 - 100 mg/dL    BUN 10 9 - 23 mg/dL    Creatinine 1.00 0.60 - 1.30 mg/dL    Sodium 137 132 - 146 mmol/L    Potassium 4.0 3.5 - 5.5 mmol/L    Chloride 103 99 - 109 mmol/L    CO2 27.0 20.0 - 31.0 mmol/L    Calcium 9.2 8.7 - 10.4 mg/dL    Total Protein 6.9 5.7 - 8.2 g/dL    Albumin 4.30 3.20 - 4.80 g/dL    ALT (SGPT) 27 7 - 40 U/L    AST (SGOT) 28 0 -  33 U/L    Alkaline Phosphatase 114 (H) 25 - 100 U/L    Total Bilirubin 0.6 0.3 - 1.2 mg/dL    eGFR Non African Amer 73 >60 mL/min/1.73    Globulin 2.6 gm/dL    A/G Ratio 1.7 1.5 - 2.5 g/dL    BUN/Creatinine Ratio 10.0 7.0 - 25.0    Anion Gap 7.0 3.0 - 11.0 mmol/L   Magnesium    Collection Time: 10/18/17  1:51 PM   Result Value Ref Range    Magnesium 1.9 1.3 - 2.7 mg/dL   Light Blue Top    Collection Time: 10/18/17  1:51 PM   Result Value Ref Range    Extra Tube hold for add-on    Green Top (Gel)    Collection Time: 10/18/17  1:51 PM   Result Value Ref Range    Extra Tube Hold for add-ons.    Lavender Top    Collection Time: 10/18/17  1:51 PM   Result Value Ref Range    Extra Tube hold for add-on    Gold Top - SST    Collection Time: 10/18/17  1:51 PM   Result Value Ref Range    Extra Tube Hold for add-ons.    CBC Auto Differential    Collection Time: 10/18/17  1:51 PM   Result Value Ref Range    WBC 7.87 3.50 - 10.80 10*3/mm3    RBC 4.89 4.20 - 5.76 10*6/mm3    Hemoglobin 13.8 13.1 - 17.5 g/dL    Hematocrit 42.1 38.9 - 50.9 %    MCV 86.1 80.0 - 99.0 fL    MCH 28.2 27.0 - 31.0 pg    MCHC 32.8 32.0 - 36.0 g/dL    RDW 13.0 11.3 - 14.5 %    RDW-SD 41.0 37.0 - 54.0 fl    MPV 10.1 6.0 - 12.0 fL    Platelets 182 150 - 450 10*3/mm3    Neutrophil % 66.1 41.0 - 71.0 %    Lymphocyte % 23.9 (L) 24.0 - 44.0 %    Monocyte % 7.2 0.0 - 12.0 %    Eosinophil % 1.9 0.0 - 3.0 %    Basophil % 0.4 0.0 - 1.0 %    Immature Grans % 0.5 0.0 - 0.6 %    Neutrophils, Absolute 5.20 1.50 - 8.30 10*3/mm3    Lymphocytes, Absolute 1.88 0.60 - 4.80 10*3/mm3    Monocytes, Absolute 0.57 0.00 - 1.00 10*3/mm3    Eosinophils, Absolute 0.15 0.00 - 0.30 10*3/mm3    Basophils, Absolute 0.03 0.00 - 0.20 10*3/mm3    Immature Grans, Absolute 0.04 (H) 0.00 - 0.03 10*3/mm3   Lipase    Collection Time: 10/18/17  1:51 PM   Result Value Ref Range    Lipase 36 6 - 51 U/L   POC Troponin, Rapid    Collection Time: 10/18/17  1:54 PM   Result Value Ref Range    Troponin  I 0.00 0.00 - 0.07 ng/mL   POC Troponin, Rapid    Collection Time: 10/18/17  4:11 PM   Result Value Ref Range    Troponin I 0.00 0.00 - 0.07 ng/mL   Urinalysis With / Culture If Indicated - Urine, Clean Catch    Collection Time: 10/18/17  7:32 PM   Result Value Ref Range    Color, UA Yellow Yellow, Straw    Appearance, UA Clear Clear    pH, UA 6.0 5.0 - 8.0    Specific Gravity, UA 1.019 1.001 - 1.030    Glucose, UA Negative Negative    Ketones, UA Negative Negative    Bilirubin, UA Negative Negative    Blood, UA Negative Negative    Protein, UA Negative Negative    Leuk Esterase, UA Negative Negative    Nitrite, UA Negative Negative    Urobilinogen, UA 1.0 E.U./dL 0.2 - 1.0 E.U./dL     Note: In addition to lab results from this visit, the labs listed above may include labs taken at another facility or during a different encounter within the last 24 hours. Please correlate lab times with ED admission and discharge times for further clarification of the services performed during this visit.    MRI Brain Without Contrast   Final Result   Addendum 1 of 1   THIS REPORT CONTAINS FINDINGS THAT MAY BE CRITICAL TO PATIENT CARE. The    findings were verbally communicated via telephone conference with    [RIAZ PICKENS] at 8:24 PM EDT on 10/18/2017. The findings were acknowledged and    understood.      THIS DOCUMENT HAS BEEN ELECTRONICALLY SIGNED BY RADHA FERRIS MD      Final     ACUTE LACUNAR ISCHEMIC INFARCTS in the RIGHT centrum semiovale without    evidence of intracranial hemorrhage or hemorrhagic transformation.          THIS DOCUMENT HAS BEEN ELECTRONICALLY SIGNED BY RADHA FERRIS MD      CT Head Without Contrast   Final Result   Age-related findings. There are no acute abnormalities.       D:  10/18/2017   E:  10/18/2017               This report was finalized on 10/18/2017 4:17 PM by Dr. Long Waite MD.          XR Chest 1 View   Final Result   No active disease.       D:  10/18/2017   E:  10/18/2017       This  report was finalized on 10/18/2017 3:08 PM by Dr. Long Waite MD.            Vitals:    10/18/17 1800 10/18/17 2030 10/18/17 2046 10/18/17 2100   BP: 131/74 134/78  152/89   Patient Position:       Pulse: 72      Resp:       Temp:       TempSrc:       SpO2: 97% 96% 97% 96%   Weight:       Height:         Medications   sodium chloride 0.9 % flush 10 mL (not administered)   ondansetron (ZOFRAN) 8 mg in sodium chloride 0.9 % 50 mL ( Intravenous Stopped 10/18/17 1500)   meclizine (ANTIVERT) tablet 25 mg (25 mg Oral Given 10/18/17 1615)   sodium chloride 0.9 % bolus 500 mL (0 mL Intravenous Stopped 10/18/17 1645)   meclizine (ANTIVERT) tablet 25 mg (25 mg Oral Given 10/18/17 1713)   LORazepam (ATIVAN) injection 0.25 mg (0.25 mg Intravenous Given 10/18/17 1824)   aspirin chewable tablet 324 mg (324 mg Oral Given 10/18/17 2049)     ECG/EMG Results (last 24 hours)     Procedure Component Value Units Date/Time    ECG 12 Lead [[id number]] Collected:  10/18/17 1339     Updated:  10/18/17 1431    ECG 12 Lead [[id number]] Collected:  10/18/17 1644     Updated:  10/18/17 1701                NIHSS (NIH Stroke Scale/Score) reviewed and/or performed as part of the patient evaluation and treatment planning process.  The result associated with this review/performance is: 0       MDM    Final diagnoses:   Cerebrovascular accident (CVA), unspecified mechanism            Ros AGNIESZKA Henry, APRMICHAELLE  10/18/17 0655

## 2017-10-18 NOTE — ED NOTES
Patient states he is still unable to urinate due to the scar tissue     Rene Simmons  10/18/17 3910

## 2017-10-18 NOTE — ED NOTES
Helped situate patient with urinal between his legs.  Patient states he has scar tissue around his prostate and it takes him awhile to pee.  Will check with patient in 15 minutes per his request     Rene Simmons  10/18/17 0205

## 2017-10-18 NOTE — ED NOTES
Patient's family states patient's batteries on his hearing aids are low and he is struggling to hear.  Wife states she cannot make the long walk to the public pharmacy and doesn't understand why they cant just bring them here.  I offered to go get the batteries for her.  Wife gave me a $10 bill.  Returned with correct change, receipt and size 13 hearing aid batteries as requested.     Rene Simmons  10/18/17 7262

## 2017-10-19 ENCOUNTER — APPOINTMENT (OUTPATIENT)
Dept: GENERAL RADIOLOGY | Facility: HOSPITAL | Age: 77
End: 2017-10-19
Attending: INTERNAL MEDICINE

## 2017-10-19 ENCOUNTER — APPOINTMENT (OUTPATIENT)
Dept: MRI IMAGING | Facility: HOSPITAL | Age: 77
End: 2017-10-19
Attending: PSYCHIATRY & NEUROLOGY

## 2017-10-19 LAB
ALBUMIN SERPL-MCNC: 4.2 G/DL (ref 3.2–4.8)
ALBUMIN/GLOB SERPL: 1.7 G/DL (ref 1.5–2.5)
ALP SERPL-CCNC: 106 U/L (ref 25–100)
ALT SERPL W P-5'-P-CCNC: 31 U/L (ref 7–40)
ANION GAP SERPL CALCULATED.3IONS-SCNC: 7 MMOL/L (ref 3–11)
ARTICHOKE IGE QN: 83 MG/DL (ref 0–130)
AST SERPL-CCNC: 29 U/L (ref 0–33)
BILIRUB SERPL-MCNC: 1 MG/DL (ref 0.3–1.2)
BUN BLD-MCNC: 11 MG/DL (ref 9–23)
BUN/CREAT SERPL: 10 (ref 7–25)
CALCIUM SPEC-SCNC: 9.3 MG/DL (ref 8.7–10.4)
CHLORIDE SERPL-SCNC: 105 MMOL/L (ref 99–109)
CHOLEST SERPL-MCNC: 128 MG/DL (ref 0–200)
CO2 SERPL-SCNC: 28 MMOL/L (ref 20–31)
CREAT BLD-MCNC: 1.1 MG/DL (ref 0.6–1.3)
DEPRECATED RDW RBC AUTO: 41.4 FL (ref 37–54)
ERYTHROCYTE [DISTWIDTH] IN BLOOD BY AUTOMATED COUNT: 13 % (ref 11.3–14.5)
GFR SERPL CREATININE-BSD FRML MDRD: 65 ML/MIN/1.73
GLOBULIN UR ELPH-MCNC: 2.5 GM/DL
GLUCOSE BLD-MCNC: 97 MG/DL (ref 70–100)
HBA1C MFR BLD: 5.4 % (ref 4.8–5.6)
HCT VFR BLD AUTO: 39.7 % (ref 38.9–50.9)
HDLC SERPL-MCNC: 38 MG/DL (ref 40–60)
HGB BLD-MCNC: 12.9 G/DL (ref 13.1–17.5)
MCH RBC QN AUTO: 28 PG (ref 27–31)
MCHC RBC AUTO-ENTMCNC: 32.5 G/DL (ref 32–36)
MCV RBC AUTO: 86.3 FL (ref 80–99)
PLATELET # BLD AUTO: 169 10*3/MM3 (ref 150–450)
PMV BLD AUTO: 10 FL (ref 6–12)
POTASSIUM BLD-SCNC: 3.9 MMOL/L (ref 3.5–5.5)
PROT SERPL-MCNC: 6.7 G/DL (ref 5.7–8.2)
RBC # BLD AUTO: 4.6 10*6/MM3 (ref 4.2–5.76)
SODIUM BLD-SCNC: 140 MMOL/L (ref 132–146)
TRIGL SERPL-MCNC: 77 MG/DL (ref 0–150)
TSH SERPL DL<=0.05 MIU/L-ACNC: 1.74 MIU/ML (ref 0.35–5.35)
WBC NRBC COR # BLD: 8.03 10*3/MM3 (ref 3.5–10.8)

## 2017-10-19 PROCEDURE — A9577 INJ MULTIHANCE: HCPCS | Performed by: FAMILY MEDICINE

## 2017-10-19 PROCEDURE — 92611 MOTION FLUOROSCOPY/SWALLOW: CPT

## 2017-10-19 PROCEDURE — 0 GADOBENATE DIMEGLUMINE 529 MG/ML SOLUTION: Performed by: FAMILY MEDICINE

## 2017-10-19 PROCEDURE — 97165 OT EVAL LOW COMPLEX 30 MIN: CPT

## 2017-10-19 PROCEDURE — 25010000002 HEPARIN (PORCINE) PER 1000 UNITS: Performed by: INTERNAL MEDICINE

## 2017-10-19 PROCEDURE — G0378 HOSPITAL OBSERVATION PER HR: HCPCS

## 2017-10-19 PROCEDURE — 97162 PT EVAL MOD COMPLEX 30 MIN: CPT

## 2017-10-19 PROCEDURE — G8978 MOBILITY CURRENT STATUS: HCPCS

## 2017-10-19 PROCEDURE — 92523 SPEECH SOUND LANG COMPREHEN: CPT

## 2017-10-19 PROCEDURE — 97116 GAIT TRAINING THERAPY: CPT

## 2017-10-19 PROCEDURE — 74230 X-RAY XM SWLNG FUNCJ C+: CPT

## 2017-10-19 PROCEDURE — 80053 COMPREHEN METABOLIC PANEL: CPT | Performed by: FAMILY MEDICINE

## 2017-10-19 PROCEDURE — 99223 1ST HOSP IP/OBS HIGH 75: CPT | Performed by: PSYCHIATRY & NEUROLOGY

## 2017-10-19 PROCEDURE — 84443 ASSAY THYROID STIM HORMONE: CPT | Performed by: FAMILY MEDICINE

## 2017-10-19 PROCEDURE — 99233 SBSQ HOSP IP/OBS HIGH 50: CPT | Performed by: INTERNAL MEDICINE

## 2017-10-19 PROCEDURE — G8979 MOBILITY GOAL STATUS: HCPCS

## 2017-10-19 PROCEDURE — 85027 COMPLETE CBC AUTOMATED: CPT | Performed by: FAMILY MEDICINE

## 2017-10-19 PROCEDURE — 97110 THERAPEUTIC EXERCISES: CPT

## 2017-10-19 PROCEDURE — 99222 1ST HOSP IP/OBS MODERATE 55: CPT | Performed by: INTERNAL MEDICINE

## 2017-10-19 PROCEDURE — 83036 HEMOGLOBIN GLYCOSYLATED A1C: CPT | Performed by: FAMILY MEDICINE

## 2017-10-19 PROCEDURE — 92610 EVALUATE SWALLOWING FUNCTION: CPT

## 2017-10-19 PROCEDURE — 70544 MR ANGIOGRAPHY HEAD W/O DYE: CPT

## 2017-10-19 PROCEDURE — 80061 LIPID PANEL: CPT | Performed by: FAMILY MEDICINE

## 2017-10-19 RX ORDER — HEPARIN SODIUM 5000 [USP'U]/ML
5000 INJECTION, SOLUTION INTRAVENOUS; SUBCUTANEOUS EVERY 12 HOURS SCHEDULED
Status: DISCONTINUED | OUTPATIENT
Start: 2017-10-19 | End: 2017-10-21 | Stop reason: HOSPADM

## 2017-10-19 RX ORDER — ACETAMINOPHEN 500 MG
500 TABLET ORAL EVERY 6 HOURS PRN
Status: DISCONTINUED | OUTPATIENT
Start: 2017-10-19 | End: 2017-10-19 | Stop reason: SDUPTHER

## 2017-10-19 RX ORDER — ISOSORBIDE MONONITRATE 30 MG/1
30 TABLET, EXTENDED RELEASE ORAL DAILY
Status: DISCONTINUED | OUTPATIENT
Start: 2017-10-19 | End: 2017-10-21 | Stop reason: HOSPADM

## 2017-10-19 RX ORDER — FLECAINIDE ACETATE 50 MG/1
100 TABLET ORAL EVERY 12 HOURS SCHEDULED
Status: DISCONTINUED | OUTPATIENT
Start: 2017-10-19 | End: 2017-10-21 | Stop reason: HOSPADM

## 2017-10-19 RX ORDER — PANTOPRAZOLE SODIUM 40 MG/1
40 TABLET, DELAYED RELEASE ORAL EVERY MORNING
Status: DISCONTINUED | OUTPATIENT
Start: 2017-10-19 | End: 2017-10-21 | Stop reason: HOSPADM

## 2017-10-19 RX ORDER — CYCLOSPORINE 0.5 MG/ML
1 EMULSION OPHTHALMIC 2 TIMES DAILY
Status: DISCONTINUED | OUTPATIENT
Start: 2017-10-19 | End: 2017-10-21 | Stop reason: HOSPADM

## 2017-10-19 RX ORDER — TRAMADOL HYDROCHLORIDE 50 MG/1
50 TABLET ORAL EVERY 12 HOURS PRN
Status: DISCONTINUED | OUTPATIENT
Start: 2017-10-19 | End: 2017-10-21 | Stop reason: HOSPADM

## 2017-10-19 RX ADMIN — Medication 5000 UNITS: at 09:40

## 2017-10-19 RX ADMIN — ASPIRIN 325 MG ORAL TABLET 325 MG: 325 PILL ORAL at 09:40

## 2017-10-19 RX ADMIN — HEPARIN SODIUM 5000 UNITS: 5000 INJECTION, SOLUTION INTRAVENOUS; SUBCUTANEOUS at 20:40

## 2017-10-19 RX ADMIN — ISOSORBIDE MONONITRATE 30 MG: 30 TABLET, EXTENDED RELEASE ORAL at 09:40

## 2017-10-19 RX ADMIN — ATORVASTATIN CALCIUM 80 MG: 40 TABLET, FILM COATED ORAL at 02:12

## 2017-10-19 RX ADMIN — FLECAINIDE ACETATE 100 MG: 50 TABLET ORAL at 09:37

## 2017-10-19 RX ADMIN — Medication 5 MG: at 21:52

## 2017-10-19 RX ADMIN — PANTOPRAZOLE SODIUM 40 MG: 40 TABLET, DELAYED RELEASE ORAL at 09:40

## 2017-10-19 RX ADMIN — GADOBENATE DIMEGLUMINE 19 ML: 529 INJECTION, SOLUTION INTRAVENOUS at 01:00

## 2017-10-19 RX ADMIN — FLECAINIDE ACETATE 100 MG: 50 TABLET ORAL at 20:46

## 2017-10-19 RX ADMIN — ATORVASTATIN CALCIUM 80 MG: 40 TABLET, FILM COATED ORAL at 20:41

## 2017-10-19 RX ADMIN — BARIUM SULFATE 55 ML: 0.81 POWDER, FOR SUSPENSION ORAL at 11:37

## 2017-10-19 RX ADMIN — CYCLOSPORINE 1 DROP: 0.5 EMULSION OPHTHALMIC at 18:14

## 2017-10-19 RX ADMIN — BARIUM SULFATE 20 ML: 400 PASTE ORAL at 11:36

## 2017-10-19 RX ADMIN — CYCLOSPORINE 1 DROP: 0.5 EMULSION OPHTHALMIC at 09:41

## 2017-10-19 NOTE — THERAPY EVALUATION
Acute Care - Occupational Therapy Initial Evaluation  [facility]     Patient Name: Guicho Blanco  : 1940  MRN: [id number]  Today's Date: 10/19/2017  Onset of Illness/Injury or Date of Surgery Date: 10/18/17  Date of Referral to OT: 10/18/17  Referring Physician: LEEROY Bill    Admit Date: 10/18/2017       ICD-10-CM ICD-9-CM   1. Cerebrovascular accident (CVA), unspecified mechanism I63.9 434.91   2. Impaired mobility and ADLs Z74.09 799.89     Patient Active Problem List   Diagnosis   • Osteomyelitis of toe of right foot   • Osteomyelitis of right foot   • PAF (paroxysmal atrial fibrillation)   • Coronary artery disease involving native coronary artery of native heart without angina pectoris   • Gastroesophageal reflux disease without esophagitis   • Benign non-nodular prostatic hyperplasia without lower urinary tract symptoms   • S/P appy   • Essential hypertension   • Simple chronic bronchitis   • Dyslipidemia   • Palpitations   • COPD (chronic obstructive pulmonary disease)   • BPH (benign prostatic hypertrophy)   • Hearing loss   • Chest pain   • CVA (cerebral vascular accident)   • Vertigo     Past Medical History:   Diagnosis Date   • Arthritis    • BPH (benign prostatic hypertrophy)    • Cellulitis    • COPD (chronic obstructive pulmonary disease)    • Coronary artery disease    • GERD (gastroesophageal reflux disease)    • Hearing loss    • Hiatal hernia    • Neuropathy    • Peripheral neuropathy    • Peripheral vascular disease      Past Surgical History:   Procedure Laterality Date   • ABLATION OF DYSRHYTHMIC FOCUS     • APPENDECTOMY     • CARDIAC CATHETERIZATION N/A 10/5/2017    Procedure: Left Heart Cath;  Surgeon: Hector Urrutia MD;  Location: [facility] INVASIVE LOCATION;  Service:    • CATARACT EXTRACTION Bilateral    • CHOLECYSTECTOMY      Remote   • CORONARY ANGIOPLASTY     • GALLBLADDER SURGERY     • KNEE ARTHROSCOPY Right     Right knee ACL repair.   • PROSTATE SURGERY     •  SHOULDER SURGERY Right    • TOE AMPUTATION Right     2nd toe - Right Foot          OT ASSESSMENT FLOWSHEET (last 72 hours)      OT Evaluation       10/19/17 0908 10/19/17 0840 10/19/17 0100          Rehab Evaluation    Document Type evaluation  -AN evaluation  -SM       Subjective Information no complaints;agree to therapy  -AN agree to therapy  -SM       Patient Effort, Rehab Treatment good  -AN        Symptoms Noted During/After Treatment none  -AN        General Information    Patient Profile Review yes  -AN        Onset of Illness/Injury or Date of Surgery Date 10/18/17  -AN        Referring Physician LEEROY Bill  -AN        General Observations Pt. supine in bed, SCDS donned  -AN        Pertinent History Of Current Problem Pt admitted with L HA, dizziness, n/v. MRi shows R infarct of centrum semiovale. Pt weaing a heart monitoring system  -AN        Precautions/Limitations fall precautions;other (see comments)   monitor vitals  -AN        Prior Level of Function independent:;all household mobility;community mobility;gait;transfer;ADL's  -AN        Equipment Currently Used at Home cane, straight;prosthesis   [employer] hearing aides  -AN  cane, straight  -JT      Plans/Goals Discussed With patient;agreed upon  -AN        Risks Reviewed patient:;LOB;change in vital signs;increased discomfort;dizziness  -AN        Benefits Reviewed patient:;improve function;increase independence;increase balance;increase strength  -AN        Barriers to Rehab hearing deficit;medically complex  -AN        Living Environment    Lives With spouse  -AN  spouse  -JT      Living Arrangements house  -AN  house  -JT      Home Accessibility stairs to enter home;tub/shower is not walk in  -AN  no concerns  -JT      Number of Stairs to Enter Home 3  -AN        Stair Railings at Home none  -AN  none  -JT      Type of Financial/Environmental Concern   none  -JT      Transportation Available car;family or friend will provide  -AN  car  -JT       Living Environment Comment unsure of spouse's physical status  -AN        Clinical Impression    Date of Referral to OT 10/18/17  -AN        OT Diagnosis Decreased ADL I  -AN        Impairments Found (describe specific impairments) aerobic capacity/endurance;gait, locomotion, and balance;muscle performance  -AN        Patient/Family Goals Statement Pt. agreeable to eval and tx in hospital  -AN        Criteria for Skilled Therapeutic Interventions Met yes;treatment indicated  -AN        Rehab Potential good, to achieve stated therapy goals  -AN        Anticipated Equipment Needs At Discharge shower chair   grab bar  -AN        Anticipated Discharge Disposition home with assist;home with outpatient services  -AN        Functional Level Prior    Ambulation   0-->independent  -JT      Transferring   0-->independent  -JT      Toileting   0-->independent  -JT      Bathing   0-->independent  -JT      Dressing   0-->independent  -JT      Eating   0-->independent  -JT      Communication   0-->understands/communicates without difficulty  -JT      Swallowing   0-->swallows foods/liquids without difficulty  -JT      Vital Signs    Pre Systolic BP Rehab 141  -AN        Pre Treatment Diastolic BP 77  -AN        Post Systolic BP Rehab 140  -AN        Post Treatment Diastolic BP 84  -AN        Pretreatment Heart Rate (beats/min) 86  -AN        Intratreatment Heart Rate (beats/min) 126  -AN        Posttreatment Heart Rate (beats/min) 98  -AN        Pre SpO2 (%) 96  -AN        O2 Delivery Pre Treatment room air  -AN        Post SpO2 (%) 95  -AN        O2 Delivery Post Treatment room air  -AN        Pain Assessment    Pain Assessment 0-10  -AN No/denies pain  -SM       Pain Score 3  -AN        Post Pain Score 0  -AN        Pain Location Head  -AN        Pain Intervention(s) Ambulation/increased activity  -AN        Response to Interventions tolerated  -AN        Vision Assessment/Intervention    Visual Impairment WFL with corrective  lenses  -AN        Cognitive Assessment/Intervention    Current Cognitive/Communication Assessment functional  -AN functional  -SM       Orientation Status oriented x 4  -AN oriented x 4  -SM       Follows Commands/Answers Questions 100% of the time  -% of the time  -SM       Short/Long Term Memory  intact long term memory;mild impairment, short term memory   baseline  -       Additional Documentation  Cognitive Assessment Intervention (Group)  -       Cognitive Assessment Intervention    Behavior/Mood Observations  alert;cooperative  -       Attention  WNL/WFL  -SM       Pragmatics  WNL/WFL  -SM       Problem Solving  WNL/WFL  -SM       ROM (Range of Motion)    General ROM no range of motion deficits identified  -AN        MMT (Manual Muscle Testing)    General MMT Assessment Detail L 4/5, R 4+/5  -AN        Bed Mobility, Assessment/Treatment    Bed Mobility, Assistive Device head of bed elevated  -AN        Bed Mob, Supine to Sit, [place] conditional independence  -AN        Transfer Assessment/Treatment    Transfers, Sit-Stand [place] contact guard assist  -AN        Transfers, Stand-Sit [place] contact guard assist  -AN        Toilet Transfer, [place] supervision required  -AN        Transfer, Safety Issues weight-shifting ability decreased  -AN        Functional Mobility    Functional Mobility- Ind. Level contact guard assist  -AN        Functional Mobility-Distance (Feet) 200  -AN        Lower Body Bathing Assessment/Training    LB Bathing Assess/Train, Comment simulated set up sitting, min standing  -AN        Lower Body Dressing Assessment/Training    LB Dressing Assess/Train, Clothing Type donning:;shoes  -AN        LB Dressing Assess/Train, Position sitting;edge of bed  -AN        LB Dressing Assess/Train, [place] minimum assist (75% patient effort)  -AN        LB Dressing Assess/Train, Comment needed assist tying shoes  -AN        Grooming Assessment/Training     Grooming Assess/Train, Position standing;sink side  -AN        Grooming Assess/Train, Indepen Level set up required  -AN        Grooming Assess/Train, Comment wash hands  -AN        Motor Skills/Interventions    Additional Documentation Balance Skills Training (Group);Fine Motor Coordination Training (Group);Gross Motor Coordination Training (Group)  -AN        Balance Skills Training    Sitting-Level of Assistance Distant supervision  -AN        Sitting-Balance Support Feet supported  -AN        Standing-Level of Assistance Contact guard  -AN        Standing-Balance Activities Weight Shift R-L;Weight Shift A-P;Lateral lean;Tandem Stance  -AN        Gross Motor Coordination Training    Gross Motor Skill, Impairments Detail slight discrepancy L finger to nose  -AN        Fine Motor Coordination Training    Opposition Left:;impaired  -AN        Sensory Assessment/Intervention    Light Touch LUE;RUE  -AN        LUE Light Touch WNL  -AN        Positioning and Restraints    Pre-Treatment Position in bed  -AN        Post Treatment Position chair  -AN        In Chair sitting;call light within reach;encouraged to call for assist;exit alarm on;with PT  -AN          User Key  (r) = Recorded By, (t) = Taken By, (c) = Cosigned By    Initials Name Effective Dates    FREDY Fisher, MS CCC-SLP 06/22/15 -     LUIS Varghese OT 06/22/15 -     AGNIESZKA Fernando RN 06/16/16 -            Occupational Therapy Education     Title: PT OT SLP Therapies (Active)     Topic: Occupational Therapy (Active)     Point: ADL training (Done)    Description: Instruct learner(s) on proper safety adaptation and remediation techniques during self care or transfers.   Instruct in proper use of assistive devices.    Learning Progress Summary    Learner Readiness Method Response Comment Documented by Status   Patient Acceptance E,D GUY,NR,BRIGHT Educated pt on found deficits and assist needed currently for ADL's. AN 10/19/17 1026 Done                       User Key     Initials Effective Dates Name Provider Type Discipline    AN 06/22/15 -  Valeria Varghese OT Occupational Therapist OT                  OT Recommendation and Plan  Anticipated Equipment Needs At Discharge: shower chair (grab bar)  Anticipated Discharge Disposition: home with assist, home with outpatient services  Plan of Care Review  Plan Of Care Reviewed With: patient  Outcome Summary/Follow up Plan: Pt with minimal L sided strength and coordination deficits. Pt has decreased standing balance which affects ADL I. Pt would benefit from outpt services upon discharge.          OT Goals       10/19/17 1027          Transfer Training OT LTG    Transfer Training OT LTG, Date Established 10/19/17  -AN      Transfer Training OT LTG, Time to Achieve 1 wk  -AN      Transfer Training OT LTG, Activity Type all transfers  -AN      Transfer Training OT LTG, [place] Level set up required  -AN      Transfer Training OT LTG, Additional Goal with DME as needed.  -AN      Dynamic Standing Balance OT LTG    Dynamic Standing Balance OT LTG, Date Established 10/19/17  -AN      Dynamic Standing Balance OT LTG, Time to Achieve 1 wk  -AN      Dynamic Standing Balance OT LTG, [place] Level set up required  -AN      Dynamic Standing Balance OT LTG, Assist Device assistive Device  -AN      Bathing OT LTG    Bathing Goal OT LTG, Date Established 10/19/17  -AN      Bathing Goal OT LTG, Time to Achieve 1 wk  -AN      Bathing Goal OT LTG, Activity Type simulates;upper body bathing;lower body bathing  -AN      Bathing Goal OT LTG, [place] Level set up required  -AN      Bathing Goal OT LTG, Assist Device shower chair  -AN      Coordination OT LTG    Coordination OT LTG, Date Established 10/19/17  -AN      Coordination OT LTG, Time to Achieve 1 wk  -AN      Coordination OT LTG, Activity Type FM task;GM task  -AN      Coordination OT LTG, [place] Level independent  -AN        User Key  (r) = Recorded By, (t) =  Taken By, (c) = Cosigned By    Initials Name Provider Type    LUIS Varghese OT Occupational Therapist                Outcome Measures       10/19/17 0934          How much help from another is currently needed...    Putting on and taking off regular lower body clothing? 3  -AN      Bathing (including washing, rinsing, and drying) 3  -AN      Toileting (which includes using toilet bed pan or urinal) 4  -AN      Putting on and taking off regular upper body clothing 4  -AN      Taking care of personal grooming (such as brushing teeth) 4  -AN      Eating meals 4  -AN      Score 22  -AN      Modified [place] Scale    Modified [place] Scale 2 - Slight disability.  Unable to carry out all previous activities but able to look after own affairs without assistance.  -AN      Functional Assessment    Outcome Measure Options AM-PAC 6 Clicks Daily Activity (OT);Modified [place]  -AN        User Key  (r) = Recorded By, (t) = Taken By, (c) = Cosigned By    Initials Name Provider Type    LUIS Varghese OT Occupational Therapist          Time Calculation:   OT Start Time: 0834    Therapy Charges for Today     Code Description Service Date Service Provider Modifiers Qty    [id number]  OT EVAL LOW COMPLEXITY 4 10/19/2017 Valeria Varghese OT GO 1               Valeria Varghese OT  10/19/2017

## 2017-10-19 NOTE — CONSULTS
[facility]        Date of Hospital Visit: 10/19/17      Place of Service: [facility]    Patient Name: Guicho Blanco  :1940    Referral Provider: Hospitalist  Primary Care Provider: Mitchel Culver MD    Chief complaint/Reason for Consultation:  stroke         Problem List:  Problem   Cva (Cerebral Vascular Accident)   Paf (Paroxysmal Atrial Fibrillation)    s/p PVA typical flutter RFA 3/2012    1. Atrial fibrillation/atrial flutter:  a. Status post EP study and catheter ablation for typical atrial flutter and pulmonary vein ablation for atrial fibrillation, 2012.   b. History of diagnosis of atrial fibrillation, approximately  - initiation of Rythmol therapy, status post cardioversion with Dr. Sue.  c. Chronic Coumadin therapy.   d. Recent paroxysmal atrial flutter, 2011/titration of Rythmol therapy to 325 mg b.i.d.  e. ECV by Dr. PIERRE Vicente on 01/10/2012.   f. Return to atrial flutter, symptomatic with palpitations/dizziness.  g. EKG on 2012 revealing atrial flutter, most likely typical counter-clockwise, right atrial flutter.  h. Echo 10-5-17: Normal left atrial size and volume noted.       Coronary Artery Disease Involving Native Coronary Artery of Native Heart Without Angina Pectoris    2. Coronary artery disease:-  a. Chest pain x2-3 months.  b. Reported normal catheterization in  with no significant stenosis and ejection fraction of 45%.  c. Recurrent chest pain in 2009/abnormal Cardiolite stress test revealing moderate to severe anterior wall ischemia with ejection fraction of 60%.  d. Left heart catheterization by Dr. Sue on 2009 revealing ostial RCA stenosis treated with PTA and placement of CYPHER drug-eluting stent.  No significant stenosis of the left main, circumflex, or LAD.  Ejection fraction estimated at 60%.  e. Cardiac catheterization, 2010:  i. LVEF 55% with severe  focal mid-anterior wall hypokinesis; no MR/MVP.  ii. No obstructive CAD.-    F. [facility] 10-5-17: There is mild diffuse atherosclerosis with no flow limiting stenoses.       Essential Hypertension   Dyslipidemia       History of Present Illness:  This 76-year-old hypertensive dyslipidemic male with known coronary artery disease and history of atrial fibrillation.  He had recent cardiac catheterization showing patent stents and no significant stenosis with normal LV systolic function.  He also had a recent echocardiogram showing normal LV systolic function, normal valvular morphology and normal left atrial size.  He was discharged to home wearing a Zio to screen for occult arrhythmias.  He had been experiencing vertigo-like symptoms and have been previously been evaluated by ENT who recommended cardiac evaluation.  Patient was prescribed meclizine which she had not taken until yesterday's onset of symptoms.  He says that yesterday he had sudden onset of vertigo which persisted for several hours and was associated with nausea and vomiting.  Says not relieved by meclizine or Zofran.  He presented to the [facility] emergency Department for evaluation.  He had an MRI which was diagnostic for acute acute lacunar ischemic infarcts in the right centrum semiovale without evidence of intracranial hemorrhage or hemorrhagic transformation.  He denies any awareness of tachycardia arrhythmias.          Past Medical History:   Diagnosis Date   • Arthritis    • BPH (benign prostatic hypertrophy)    • Cellulitis    • COPD (chronic obstructive pulmonary disease)    • Coronary artery disease    • GERD (gastroesophageal reflux disease)    • Hearing loss    • Hiatal hernia    • Neuropathy    • Peripheral neuropathy    • Peripheral vascular disease        Past Surgical History:   Procedure Laterality Date   • ABLATION OF DYSRHYTHMIC FOCUS     • APPENDECTOMY     • CARDIAC CATHETERIZATION N/A 10/5/2017    Procedure: Left Heart Cath;  Surgeon:  Hector Urrutia MD;  Location:  [facility] INVASIVE LOCATION;  Service:    • CATARACT EXTRACTION Bilateral    • CHOLECYSTECTOMY      Remote   • CORONARY ANGIOPLASTY     • GALLBLADDER SURGERY     • KNEE ARTHROSCOPY Right     Right knee ACL repair.   • PROSTATE SURGERY     • SHOULDER SURGERY Right    • TOE AMPUTATION Right     2nd toe - Right Foot       Allergies   Allergen Reactions   • Cymbalta [Duloxetine Hcl] Mental Status Change     Depression, thoughts of Suicide.    • Metoprolol Other (See Comments)     UNKNOWN. Pt does not remember this being allergy   • Neurontin [Gabapentin] Other (See Comments)     Lethargy & fatigue     Prescriptions Prior to Admission   Medication Sig Dispense Refill Last Dose   • acetaminophen (TYLENOL) 500 MG tablet Take 500 mg by mouth every 6 (six) hours as needed for mild pain (1-3).   10/18/2017   • aspirin 81 MG EC tablet Take 81 mg by mouth Daily.   10/17/2017   • B Complex Vitamins (VITAMIN B COMPLEX) capsule capsule Take 1 capsule by mouth Daily.   10/18/2017   • Cholecalciferol (VITAMIN D3) 5000 UNITS capsule capsule Take 5,000 Units by mouth daily.   10/18/2017   • coenzyme Q10 100 MG capsule Take 100 mg by mouth daily.   10/18/2017   • cycloSPORINE (RESTASIS) 0.05 % ophthalmic emulsion Administer 1 drop to both eyes 2 (two) times a day.   10/18/2017   • flecainide (TAMBOCOR) 100 MG tablet Take 1 tablet by mouth 2 (Two) Times a Day. 180 tablet 2 10/18/2017   • isosorbide mononitrate (IMDUR) 30 MG 24 hr tablet Take 1 tablet by mouth Daily. 30 tablet 11 10/18/2017   • meclizine (ANTIVERT) 25 MG tablet Take 25 mg by mouth 3 (Three) Times a Day As Needed for dizziness.   10/18/2017   • omeprazole (PriLOSEC) 20 MG capsule Take 20 mg by mouth daily.   10/18/2017   • ondansetron (ZOFRAN) 4 MG tablet Take 4 mg by mouth Every 8 (Eight) Hours As Needed for Nausea or Vomiting.   10/18/2017   • pravastatin (PRAVACHOL) 20 MG tablet Take 1 tablet by mouth Daily. 90 tablet 3 10/17/2017   •  traMADol (ULTRAM) 50 MG tablet Take 50 mg by mouth 2 (Two) Times a Day As Needed for Moderate Pain (4-6).   Past Week at Unknown time       Current Facility-Administered Medications:   •  acetaminophen (TYLENOL) tablet 650 mg, 650 mg, Oral, Q4H PRN **OR** acetaminophen (TYLENOL) suppository 650 mg, 650 mg, Rectal, Q4H PRN, Cassidy Kumari MD  •  aspirin tablet 325 mg, 325 mg, Oral, Daily, 325 mg at 10/19/17 0940 **OR** aspirin suppository 300 mg, 300 mg, Rectal, Daily, Cassidy Kumari MD  •  atorvastatin (LIPITOR) tablet 80 mg, 80 mg, Oral, Nightly, Cassidy Kumari MD, 80 mg at 10/19/17 0212  •  cycloSPORINE (RESTASIS) 0.05 % ophthalmic emulsion 1 drop, 1 drop, Both Eyes, BID, Cassidy Kumari MD, 1 drop at 10/19/17 0941  •  flecainide (TAMBOCOR) tablet 100 mg, 100 mg, Oral, Q12H, Cassidy Kumari MD, 100 mg at 10/19/17 0937  •  isosorbide mononitrate (IMDUR) 24 hr tablet 30 mg, 30 mg, Oral, Daily, Cassidy Kumari MD, 30 mg at 10/19/17 0940  •  pantoprazole (PROTONIX) EC tablet 40 mg, 40 mg, Oral, QAM, Cassidy Kumari MD, 40 mg at 10/19/17 0940  •  sodium chloride 0.9 % flush 1-10 mL, 1-10 mL, Intravenous, PRN, Cassidy Kumari MD  •  sodium chloride 0.9 % flush 10 mL, 10 mL, Intravenous, PRN, Pablito Robbins MD  •  traMADol (ULTRAM) tablet 50 mg, 50 mg, Oral, Q12H PRN, Cassidy Kumari MD  •  vitamin D3 capsule 5,000 Units, 5,000 Units, Oral, Daily, Cassidy Kumari MD, 5,000 Units at 10/19/17 0940      Social History     Social History   • Marital status:      Spouse name: N/A   • Number of children: N/A   • Years of education: N/A     Occupational History   • [employer]      Retired     Social History Main Topics   • Smoking status: Never Smoker   • Smokeless tobacco: Never Used   • Alcohol use No   • Drug use: No   • Sexual activity: Defer     Other Topics Concern   • Not on file     Social History Narrative    Pt does not consume caffeine.        Family History   Problem Relation Age of  "Onset   • Atrial fibrillation Mother    • Cancer Father        REVIEW OF SYSTEMS:   Review of Systems   Constitution: Negative.   HENT: Negative.    Eyes: Negative.    Cardiovascular: Negative for chest pain, claudication, cyanosis, dyspnea on exertion, irregular heartbeat, leg swelling, near-syncope, orthopnea, palpitations, paroxysmal nocturnal dyspnea and syncope.   Respiratory: Negative.    Endocrine: Negative.    Hematologic/Lymphatic: Negative.    Skin: Negative.    Musculoskeletal: Negative.    Gastrointestinal: Positive for vomiting.   Genitourinary: Negative.    Neurological: Positive for dizziness.   Psychiatric/Behavioral: Negative.    Allergic/Immunologic: Negative.    All other systems reviewed and are negative.           Objective:     Vitals:    10/18/17 2334 10/19/17 0400 10/19/17 0737 10/19/17 0937   BP:  123/75 141/77 138/71   BP Location: Right arm Left arm Left arm    Patient Position: Lying Lying Lying    Pulse:  75 74 83   Resp:  16 18    Temp:  98.7 °F (37.1 °C) 98.2 °F (36.8 °C)    TempSrc:  Oral Oral    SpO2:   96%    Weight:       Height:         Body mass index is 29.88 kg/(m^2).  Flowsheet Rows         First Filed Value    Admission Height  73\" (185.4 cm) Documented at 10/18/2017 1342    Admission Weight  225 lb (102 kg) Documented at 10/18/2017 1342          Intake/Output Summary (Last 24 hours) at 10/19/17 0944  Last data filed at 10/18/17 1645   Gross per 24 hour   Intake              500 ml   Output                0 ml   Net              500 ml       Physical Exam   General: No acute distress, well-developed and well-nourished.    Skin: Skin is warm and dry. No obvious cyanosis, erythema or pallor.   HEENT: Atraumatic, normocephalic, no conjunctival pallor, no scleral icterus.   Neck: Supple, no JVD. Normal carotid upstrokes, no bruits.    Chest:No respiratory distres No chest wall tenderness. Breath sounds are normal. No wheezes,  rhonchi or rales.  Cardiovascular: Normal S1 and S2, " no murmer, gallop or rub. PMI is not displaced.    Pulses:Radial and pedal pulses are 2+ and symmetric.    Abdomen: Soft, non tender, normal bowel sounds.   Musculoskeletal/Extremities:  No clubbing, cyanosis or edema. No gross deformity.   Neurological: Alert and oriented to person, place, and time, no gross focal deficits.   Psychiatric: Normal mood and affect.Speech and behavior is normal.    Lab Review:                  Results from last 7 days  Lab Units 10/19/17  0741   SODIUM mmol/L 140   POTASSIUM mmol/L 3.9   CHLORIDE mmol/L 105   CO2 mmol/L 28.0   BUN mg/dL 11   CREATININE mg/dL 1.10   GLUCOSE mg/dL 97   CALCIUM mg/dL 9.3           Results from last 7 days  Lab Units 10/19/17  0536   WBC 10*3/mm3 8.03   HEMOGLOBIN g/dL 12.9*   HEMATOCRIT % 39.7   PLATELETS 10*3/mm3 169         Results from last 7 days  Lab Units 10/18/17  1351   MAGNESIUM mg/dL 1.9       Results from last 7 days  Lab Units 10/19/17  0741   CHOLESTEROL mg/dL 128   TRIGLYCERIDES mg/dL 77   HDL CHOL mg/dL 38*   LDL CHOL mg/dL 83       EKG: CARD EKG FINDINGS: normal EKG, normal sinus rhythm, unchanged from previous tracings      CHADS-VASc Risk Assessment            6       Total Score        1 Hypertension    2 Age >/= 75    1 Vascular Disease    2   cva    Criteria that do not apply:    CHF    DM        Age 65-74    Sex: Female        MRA:  IMPRESSION:    No significant narrowing, no evidence of occlusion, aneurysm or dissection of   the carotid and vertebral arteries in the neck.    MRI:  IMPRESSION:    ACUTE LACUNAR ISCHEMIC INFARCTS in the RIGHT centrum semiovale without   evidence of intracranial hemorrhage or hemorrhagic transformation.            Assessment:   · Acute ischemic CVA with no evidence of Carotid disease  · History of Atrial fibrillation s/p ablation with no clear evidence of recurrance  · CAD, stable      Plan:   -Patient's wife will bring in Zio patch; it was taken off for the MRI; patient's wife may have it returned to  our office. It will take some time for the company to interrogate and return results to us. In the meantime, will continue telemetry to monitor for recurrent AFib. With that said, there has not been recurrent evidence of AFib.  -Discussed CVA with neuro; concern more for small vessel disease and not a cardioembolic source. As such, will defer on a WILLIAM and anticoagulation for now.     Scribed for Hector Urrutia MD by Henry Alcantara PA-C. 10/19/2017  9:44 AM    I, Hector Urrutia MD, personally performed the services as scribed by the above named individual. I have made any necessary edits and it is both accurate and complete.     Hector Urrutia MD, MSc, Located within [facility]  Interventional Cardiology  [facility]

## 2017-10-19 NOTE — PLAN OF CARE
Problem: Patient Care Overview (Adult)  Goal: Plan of Care Review  Outcome: Ongoing (interventions implemented as appropriate)    10/19/17 6171   Outcome Evaluation   Outcome Summary/Follow up Plan MBS completed. Functional swallow. No penetration, aspiration, or significant residue. Pt c/o sticking/coating intermittently throughout - no residual coating noted. Pt feels likely related to drainage/mucus. Recommend: Continue regular diet and thin liquids, no dysphagia needs.    Coping/Psychosocial Response Interventions   Plan Of Care Reviewed With patient

## 2017-10-19 NOTE — THERAPY EVALUATION
Acute Care - Speech Language Pathology Initial Evaluation  [facility]   Cognitive-Communication Evaluation  Clinical Swallow Evaluation     Patient Name: Guicho Blanco  : 1940  MRN: [id number]  Today's Date: 10/19/2017               Admit Date: 10/18/2017     Visit Dx:    ICD-10-CM ICD-9-CM   1. Cerebrovascular accident (CVA), unspecified mechanism I63.9 434.91     Patient Active Problem List   Diagnosis   • Osteomyelitis of toe of right foot   • Osteomyelitis of right foot   • PAF (paroxysmal atrial fibrillation)   • Coronary artery disease involving native coronary artery of native heart without angina pectoris   • Gastroesophageal reflux disease without esophagitis   • Benign non-nodular prostatic hyperplasia without lower urinary tract symptoms   • S/P appy   • Essential hypertension   • Simple chronic bronchitis   • Dyslipidemia   • Palpitations   • COPD (chronic obstructive pulmonary disease)   • BPH (benign prostatic hypertrophy)   • Hearing loss   • Chest pain   • CVA (cerebral vascular accident)   • Vertigo     Past Medical History:   Diagnosis Date   • Arthritis    • BPH (benign prostatic hypertrophy)    • Cellulitis    • COPD (chronic obstructive pulmonary disease)    • Coronary artery disease    • GERD (gastroesophageal reflux disease)    • Hearing loss    • Hiatal hernia    • Neuropathy    • Peripheral neuropathy    • Peripheral vascular disease      Past Surgical History:   Procedure Laterality Date   • ABLATION OF DYSRHYTHMIC FOCUS     • APPENDECTOMY     • CARDIAC CATHETERIZATION N/A 10/5/2017    Procedure: Left Heart Cath;  Surgeon: Hector Urrutia MD;  Location: [facility] INVASIVE LOCATION;  Service:    • CATARACT EXTRACTION Bilateral    • CHOLECYSTECTOMY      Remote   • CORONARY ANGIOPLASTY     • GALLBLADDER SURGERY     • KNEE ARTHROSCOPY Right     Right knee ACL repair.   • PROSTATE SURGERY     • SHOULDER SURGERY Right    • TOE AMPUTATION Right     2nd toe - Right Foot          SLP  "EVALUATION (last 72 hours)      SLP Evaluation       10/19/17 0840                Cognitive Assessment/Intervention    Short/Long Term Memory intact long term memory;mild impairment, short term memory   baseline  -        Additional Documentation Cognitive Assessment Intervention (Group)  -        Cognitive Assessment Intervention    Behavior/Mood Observations alert;cooperative  -        Attention WNL/WFL  -        Pragmatics WNL/WFL  -        Problem Solving WNL/WFL  -        Communication Assessment/Intervention    Additional Documentation Auditory Comprehen Assess/Intervention (Group);Reading Assessment/Intervention (Group);Verbal Expression Assess/Intervention (Group);Motor Speech Assessment/Intervention (Group)  -        Auditory Comprehen Assess/Intervention    Auditory Comprehension WNL/WFL  -        Verbal Expression Assess/Intervention    Conversational Speech WNL/WFL  -        Reading Assessment/Intervention    Reading Skills WNL/WFL  -        Motor Speech Assess/Intervention    Motor Speech- Apraxia WNL/WFL  -        Motor Speech- Dysarthria WNL/WFL  -          User Key  (r) = Recorded By, (t) = Taken By, (c) = Cosigned By    Initials Name Effective Dates     Tiffany Fisher MS CCC-SLP 06/22/15 -            EDUCATION  The patient has been educated in the following areas:   Cognitive Impairment Communication Impairment.    SLP Recommendation and Plan                               Plan of Care Review  Plan Of Care Reviewed With: patient  Outcome Summary/Follow up Plan: Dysphagia Eval: Pt reports some chronic concern for dysphagia with occasional difficulty with food passing through throat, \"lots of drainage\", and wet/raspy vocal quality with meals, has worsened over past few months. Pt has assumed related to allergies, which very well could be though does cross over with aspiration signs.  Recommend: Continue diet as ordered for now given no acute change.  General aspiration " precautions.  MBS to further assess pharyngeal function.  Cog-Comm Eval:  Baseline mild decreased recall, though did quite well with today's assessment. Functional communication, functional simple level cognition.  Suspect no higher-level difficulties though will further assess (e.g., executive functioning, complex thought organization and reasoning) following MBS.             Time Calculation:         Time Calculation- SLP       10/19/17 0935          Time Calculation- SLP    SLP Start Time 0840  -      SLP Received On 10/19/17  -        User Key  (r) = Recorded By, (t) = Taken By, (c) = Cosigned By    Initials Name Provider Type     Tiffany Fisher MS CCC-SLP Speech and Language Pathologist          Therapy Charges for Today     Code Description Service Date Service Provider Modifiers Qty    60226360136 HC ST EVAL ORAL PHARYNG SWALLOW 3 10/19/2017 Tiffany Fisher MS CCC-SLP GN 1    81080901748 HC ST EVAL SPEECH AND PROD W LANG  2 10/19/2017 Tiffany Fisher MS CCC-SLP GN 1                     Tiffany Fisher MS CCC-SLP  10/19/2017 and Acute Care - Speech Language Pathology   Swallow Initial Evaluation [facility]     Patient Name: Guicho Blanco  : 1940  MRN: [id number]  Today's Date: 10/19/2017               Admit Date: 10/18/2017    Visit Dx:     ICD-10-CM ICD-9-CM   1. Cerebrovascular accident (CVA), unspecified mechanism I63.9 434.91     Patient Active Problem List   Diagnosis   • Osteomyelitis of toe of right foot   • Osteomyelitis of right foot   • PAF (paroxysmal atrial fibrillation)   • Coronary artery disease involving native coronary artery of native heart without angina pectoris   • Gastroesophageal reflux disease without esophagitis   • Benign non-nodular prostatic hyperplasia without lower urinary tract symptoms   • S/P appy   • Essential hypertension   • Simple chronic bronchitis   • Dyslipidemia   • Palpitations   • COPD (chronic obstructive pulmonary disease)    • BPH (benign prostatic hypertrophy)   • Hearing loss   • Chest pain   • CVA (cerebral vascular accident)   • Vertigo     Past Medical History:   Diagnosis Date   • Arthritis    • BPH (benign prostatic hypertrophy)    • Cellulitis    • COPD (chronic obstructive pulmonary disease)    • Coronary artery disease    • GERD (gastroesophageal reflux disease)    • Hearing loss    • Hiatal hernia    • Neuropathy    • Peripheral neuropathy    • Peripheral vascular disease      Past Surgical History:   Procedure Laterality Date   • ABLATION OF DYSRHYTHMIC FOCUS     • APPENDECTOMY     • CARDIAC CATHETERIZATION N/A 10/5/2017    Procedure: Left Heart Cath;  Surgeon: Hector Urrutia MD;  Location:  [facility] INVASIVE LOCATION;  Service:    • CATARACT EXTRACTION Bilateral    • CHOLECYSTECTOMY      Remote   • CORONARY ANGIOPLASTY     • GALLBLADDER SURGERY     • KNEE ARTHROSCOPY Right     Right knee ACL repair.   • PROSTATE SURGERY     • SHOULDER SURGERY Right    • TOE AMPUTATION Right     2nd toe - Right Foot          SWALLOW EVALUATION (last 72 hours)      Swallow Evaluation       10/19/17 0840                Rehab Evaluation    Document Type evaluation  -SM        Subjective Information agree to therapy  -SM        General Information    Patient Profile Review yes  -SM        Subjective Patient Observations Alert and cooperative  -SM        Pertinent History Of Current Problem R CVA  -SM        Current Diet Limitations regular solid;thin liquids  -SM        Precautions/Limitations, Vision WFL with corrective lenses  -SM        Precautions/Limitations, Hearing hearing aid, bilaterally  -SM        Prior Level of Function- Communication functional in all spheres  -SM        Prior Level of Function- Swallowing other (comment)   some chronic concern for dysphagia, coughing/sticking  -        Plans/Goals Discussed With patient;agreed upon  -        Barriers to Rehab none identified  -SM        Clinical Impression    Patient's Goals  "For Discharge return to all previous roles/activities  -        SLP Diet Recommendation regular textures;thin liquids   [place] [facility] [place]        Recommended Diagnostics VFSS (MBS)  -        Recommended Feeding/Eating Techniques maintain upright posture during/after eating for 30 mins;small sips/bites  -        SLP Rec. for Method of Medication Administration meds whole with thin liquid  -        Pain Assessment    Pain Assessment No/denies pain  -        Cognitive Assessment/Intervention    Current Cognitive/Communication Assessment functional  -        Orientation Status oriented x 4  -        Follows Commands/Answers Questions 100% of the time  -        Oral Motor Structure and Function    Oral Musculature General Assessment oral labial impairment  -        Oral Labial Strength and Mobility right labial droop;other (see comments)   though not appreciated ROM or strength difficulties  -        Clinical Swallow Exam    Oral Phase Results intact oral phase without signs of dysfunction  -        Pharyngeal Phase Results sign/symptoms of pharyngeal impairment;throat clear;susupected impaired pharyngeal phase of swallowing  -        Summary of Clinical Exam Dysphagia Eval: Pt reports some chronic concern for dysphagia with occasional difficulty with food passing through throat, \"lots of drainage\", and wet/raspy vocal quality with meals, has worsened over past few months. Pt has assumed related to allergies, which very well could be though does cross over with aspiration signs.  Recommend: Continue diet as ordered for now given no acute change.  General aspiration precautions.  MBS to further assess pharyngeal function.   -          User Key  (r) = Recorded By, (t) = Taken By, (c) = Cosigned By    Initials Name Effective Dates     Tiffany Fisher, MS CCC-SLP 06/22/15 -         EDUCATION  The patient has been educated in the following areas:   Dysphagia (Swallowing " "Impairment) Modified Diet Instruction.    SLP Recommendation and Plan     SLP Diet Recommendation: regular textures, thin liquids (may continue as ordered until MBS)  Recommended Feeding/Eating Techniques: maintain upright posture during/after eating for 30 mins, small sips/bites  SLP Rec. for Method of Medication Administration: meds whole with thin liquid     Recommended Diagnostics: VFSS (MBS)                         Plan of Care Review  Plan Of Care Reviewed With: patient  Outcome Summary/Follow up Plan: Dysphagia Eval: Pt reports some chronic concern for dysphagia with occasional difficulty with food passing through throat, \"lots of drainage\", and wet/raspy vocal quality with meals, has worsened over past few months. Pt has assumed related to allergies, which very well could be though does cross over with aspiration signs.  Recommend: Continue diet as ordered for now given no acute change.  General aspiration precautions.  MBS to further assess pharyngeal function.  Cog-Comm Eval:  Baseline mild decreased recall, though did quite well with today's assessment. Functional communication, functional simple level cognition.  Suspect no higher-level difficulties though will further assess (e.g., executive functioning, complex thought organization and reasoning) following MBS.              Time Calculation:         Time Calculation- SLP       10/19/17 0935          Time Calculation- SLP    SLP Start Time 0840  -      SLP Received On 10/19/17  -        User Key  (r) = Recorded By, (t) = Taken By, (c) = Cosigned By    Initials Name Provider Type     Tiffany Fisher MS CCC-SLP Speech and Language Pathologist          Therapy Charges for Today     Code Description Service Date Service Provider Modifiers Qty    67592619417 HC ST EVAL ORAL PHARYNG SWALLOW 3 10/19/2017 Tiffany Fisher MS CCC-SLP GN 1    [id number] HC ST EVAL SPEECH AND PROD W LANG  2 10/19/2017 Tiffany Fisher MS CCC-SLP GN 1           "     Tiffany Fisher, MS CCC-SLP  10/19/2017

## 2017-10-19 NOTE — MBS/VFSS/FEES
Acute Care - Speech Language Pathology   Swallow Initial Evaluation [facility]   Modified Barium Swallow Study (MBS)     Patient Name: Guicho Blanco  : 1940  MRN: [id number]  Today's Date: 10/19/2017  Onset of Illness/Injury or Date of Surgery Date: 10/18/17            Admit Date: 10/18/2017    Visit Dx:     ICD-10-CM ICD-9-CM   1. Cerebrovascular accident (CVA), unspecified mechanism I63.9 434.91   2. Impaired mobility and ADLs Z74.09 799.89   3. Impaired functional mobility, balance, gait, and endurance Z74.09 V49.89     Patient Active Problem List   Diagnosis   • Osteomyelitis of toe of right foot   • Osteomyelitis of right foot   • PAF (paroxysmal atrial fibrillation)   • Coronary artery disease involving native coronary artery of native heart without angina pectoris   • Gastroesophageal reflux disease without esophagitis   • Benign non-nodular prostatic hyperplasia without lower urinary tract symptoms   • S/P appy   • Essential hypertension   • Simple chronic bronchitis   • Dyslipidemia   • Palpitations   • COPD (chronic obstructive pulmonary disease)   • BPH (benign prostatic hypertrophy)   • Hearing loss   • Chest pain   • CVA (cerebral vascular accident)   • Vertigo     Past Medical History:   Diagnosis Date   • Arthritis    • BPH (benign prostatic hypertrophy)    • Cellulitis    • COPD (chronic obstructive pulmonary disease)    • Coronary artery disease    • GERD (gastroesophageal reflux disease)    • Hearing loss    • Hiatal hernia    • Neuropathy    • Peripheral neuropathy    • Peripheral vascular disease      Past Surgical History:   Procedure Laterality Date   • ABLATION OF DYSRHYTHMIC FOCUS     • APPENDECTOMY     • CARDIAC CATHETERIZATION N/A 10/5/2017    Procedure: Left Heart Cath;  Surgeon: Hector Urrutia MD;  Location: [facility] INVASIVE LOCATION;  Service:    • CATARACT EXTRACTION Bilateral    • CHOLECYSTECTOMY      Remote   • CORONARY ANGIOPLASTY     • GALLBLADDER SURGERY     •  KNEE ARTHROSCOPY Right     Right knee ACL repair.   • PROSTATE SURGERY     • SHOULDER SURGERY Right    • TOE AMPUTATION Right     2nd toe - Right Foot          SWALLOW EVALUATION (last 72 hours)      Swallow Evaluation       10/19/17 1100 10/19/17 0840             Rehab Evaluation    Document Type evaluation  - evaluation  -       Subjective Information no complaints  -SM agree to therapy  -       General Information    Patient Profile Review  yes  -SM       Subjective Patient Observations Alert and cooperative  -SM Alert and cooperative  -       Pertinent History Of Current Problem  R CVA  -SM       Current Diet Limitations regular solid;thin liquids  -SM regular solid;thin liquids  -SM       Precautions/Limitations, Vision  WFL with corrective lenses  -SM       Precautions/Limitations, Hearing  hearing aid, bilaterally  -SM       Prior Level of Function- Communication  functional in all spheres  -SM       Prior Level of Function- Swallowing  other (comment)   some chronic concern for dysphagia, coughing/sticking  -       Plans/Goals Discussed With patient;agreed upon  -SM patient;agreed upon  -       Barriers to Rehab none identified  -SM none identified  -       Clinical Impression    Patient's Goals For Discharge return to all previous roles/activities  - return to all previous roles/activities  -       SLP Diet Recommendation regular textures;thin liquids  - regular textures;thin liquids   may continue as ordered until MBS  -       [place] Diagn[place] [place]  -       Recommended Feeding/Eating Techniques  maintain upright posture during/after eating for 30 mins;small sips/bites  -       SLP Rec. for Method of Medication Administration meds whole with thin liquid  -SM meds whole with thin liquid  -SM       Pain Assessment    Pain Assessment No/denies pain  - No/denies pain  -       Cognitive Assessment/Intervention    Current Cognitive/Communication Assessment  functional   "-       Orientation Status  oriented x 4  -       Follows Commands/Answers Questions  100% of the time  -       Oral Motor Structure and Function    Oral Musculature General Assessment  oral labial impairment  -       Oral Labial Strength and Mobility  right labial droop;other (see comments)   though not appreciated ROM or strength difficulties  -       Clinical Swallow Exam    Oral Phase Results  intact oral phase without signs of dysfunction  -       Pharyngeal Phase Results  sign/symptoms of pharyngeal impairment;throat clear;susupected impaired pharyngeal phase of swallowing  -       Summary of Clinical Exam  Dysphagia Eval: Pt reports some chronic concern for dysphagia with occasional difficulty with food passing through throat, \"lots of drainage\", and wet/raspy vocal quality with meals, has worsened over past few months. Pt has assumed related to allergies, which very well could be though does cross over with aspiration signs.  Recommend: Continue diet as ordered for now given no acute change.  General aspiration precautions.  MBS to further assess pharyngeal function.   -       Videofluoroscopic Swallowing Exam    Risks/Benefits Reviewed risks/benefits explained;agreed to eval;patient  -        VFSS    Pharyngeal Phase Results functional swallow  -        Summary of VFSS MBS completed.  Functional swallow.  No penetration, aspiration, or significant residue. Pt c/o sticking/coating intermittently throughout - no residual coating noted.  Pt feels likely related to drainage/mucus.  Recommend: Continue regular diet and thin liquids, no dysphagia needs.   -          User Key  (r) = Recorded By, (t) = Taken By, (c) = Cosigned By    Initials Name Effective Dates     Tiffany Fisher, MS CCC-SLP 06/22/15 -         EDUCATION  The patient has been educated in the following areas:   Dysphagia (Swallowing Impairment).    SLP Recommendation and Plan     SLP Diet Recommendation: regular " textures, thin liquids  Recommended Feeding/Eating Techniques: maintain upright posture during/after eating for 30 mins, small sips/bites  SLP Rec. for Method of Medication Administration: meds whole with thin liquid                              Plan of Care Review  Plan Of Care Reviewed With: patient  Outcome Summary/Follow up Plan: MBS completed.  Functional swallow.  No penetration, aspiration, or significant residue. Pt c/o sticking/coating intermittently throughout - no residual coating noted.  Pt feels likely related to drainage/mucus.  Recommend: Continue regular diet and thin liquids, no dysphagia needs.              Time Calculation:         Time Calculation- SLP       10/19/17 1329 10/19/17 0935       Time Calculation- SLP    SLP Start Time 1100  - 0840  -     SLP Received On 10/19/17  - 10/19/17  -       User Key  (r) = Recorded By, (t) = Taken By, (c) = Cosigned By    Initials Name Provider Type     Tiffany Fisher MS CCC-SLP Speech and Language Pathologist          Therapy Charges for Today     Code Description Service Date Service Provider Modifiers Qty    54059532256 HC ST EVAL ORAL PHARYNG SWALLOW 3 10/19/2017 Tiffany Fisher MS CCC-SLP GN 1    [id number] HC ST EVAL SPEECH AND PROD W LANG  2 10/19/2017 Tiffany Fisher MS CCC-SLP GN 1    98125260807 HC ST MOTION FLUORO EVAL SWALLOW 4 10/19/2017 Tiffany Fisher MS CCC-SLP GN 1               Tiffany Fisher MS CCC-SLP  10/19/2017

## 2017-10-19 NOTE — PLAN OF CARE
Problem: Patient Care Overview (Adult)  Goal: Plan of Care Review  Outcome: Ongoing (interventions implemented as appropriate)    10/19/17 0100 10/19/17 0232   Patient Care Overview   Progress --  no change   Outcome Evaluation   Outcome Summary/Follow up Plan --  patient admitted with stroke diagnosis that was confirmed by mri. patient has no obvious deficits and has scored an 0 on the nihss.   Coping/Psychosocial Response Interventions   Plan Of Care Reviewed With patient;spouse --          Problem: Stroke (Ischemic) (Adult)  Goal: Signs and Symptoms of Listed Potential Problems Will be Absent or Manageable (Stroke)  Outcome: Ongoing (interventions implemented as appropriate)

## 2017-10-19 NOTE — PROGRESS NOTES
[facility] Medicine Services  INPATIENT PROGRESS NOTE    Date of Admission: 10/18/2017  Length of Stay: 1  Primary Care Physician: Mitchel Culver MD    Subjective   CC: cva  HPI: denies current focal weakness. Not currently dizzy. No chest pain      Review Of Systems:   Review of Systems   Constitutional: Negative.    HENT: Negative.    Eyes: Negative.    Respiratory: Negative.    Cardiovascular: Negative.    Gastrointestinal: Negative.    Endocrine: Negative.    Genitourinary: Negative.    Musculoskeletal: Negative.    Skin: Negative.    Allergic/Immunologic: Negative.    Neurological: Negative.    Hematological: Negative.    Psychiatric/Behavioral: Negative.        Otherwise 10 system ROS negative except as noted pertinent positives above in HPI.     Objective      Temp:  [98.2 °F (36.8 °C)-98.7 °F (37.1 °C)] 98.2 °F (36.8 °C)  Heart Rate:  [67-83] 83  Resp:  [16-18] 18  BP: (111-152)/(68-90) 138/71  Physical Exam     Constitutional -no acute distress, non toxic, in bed, hard of hearing  HEENT-NCAT, mucous membranes moist  CV-RRR, S1 S2 normal, no m/r/g  Resp-CTAB, no wheezes, rhonchi or rales  Abd-soft, non-tender, non-distended, normo active bowel sounds  Ext-No lower extremity cyanosis, clubbing or edema bilaterally  Neuro-alert and oriented x 3, face symmetric, speech clear, moves all extremities, no drift  Psych-normal affect   Skin- No rash on exposed UE or LE bilaterally      Results Review:    I have reviewed the labs, radiology results and diagnostic studies.      Results from last 7 days  Lab Units 10/19/17  0536   WBC 10*3/mm3 8.03   HEMOGLOBIN g/dL 12.9*   PLATELETS 10*3/mm3 169       Results from last 7 days  Lab Units 10/19/17  0741   SODIUM mmol/L 140   POTASSIUM mmol/L 3.9   CHLORIDE mmol/L 105   CO2 mmol/L 28.0   BUN mg/dL 11   CREATININE mg/dL 1.10   GLUCOSE mg/dL 97   CALCIUM mg/dL 9.3       Culture Data: Cultures:         Radiology Data:     I have reviewed  the medications.    aspirin 325 mg Oral Daily   Or      aspirin 300 mg Rectal Daily   atorvastatin 80 mg Oral Nightly   cycloSPORINE 1 drop Both Eyes BID   flecainide 100 mg Oral Q12H   isosorbide mononitrate 30 mg Oral Daily   pantoprazole 40 mg Oral QAM   vitamin D3 5,000 Units Oral Daily         Assessment/Plan     Problem List  Hospital Problem List     * (Principal)CVA (cerebral vascular accident)    Coronary artery disease involving native coronary artery of native heart without angina pectoris    Overview Addendum 10/19/2017  9:44 AM by FREDO Michael     1. Coronary artery disease:-  a. Chest pain x2-3 months.  b. Reported normal catheterization in 2007 with no significant stenosis and ejection fraction of 45%.  c. Recurrent chest pain in August 2009/abnormal Cardiolite stress test revealing moderate to severe anterior wall ischemia with ejection fraction of 60%.  d. Left heart catheterization by Dr. Sue on 08/27/2009 revealing ostial RCA stenosis treated with PTA and placement of CYPHER drug-eluting stent.  No significant stenosis of the left main, circumflex, or LAD.  Ejection fraction estimated at 60%.  e. Cardiac catheterization, 08/24/2010:  i. LVEF 55% with severe focal mid-anterior wall hypokinesis; no MR/MVP.  ii. No obstructive CAD.-    F. LHC 10-5-17: There is mild diffuse atherosclerosis with no flow limiting stenoses.           Gastroesophageal reflux disease without esophagitis    Essential hypertension    Dyslipidemia    COPD (chronic obstructive pulmonary disease)    BPH (benign prostatic hypertrophy)    Vertigo               Assessment/Plan:    Acute CVA  - lacunar  - prior history of Afib s/p ablation - in sinus rhythm here.  Recent Zio patch - interrogation may take several days.  - continue asa and statin (note, patient has not tolerated statins well in the past due to myalgias).  - PT/OT    CAD  - recent LHC, mild diffuse disease, 30% RCA mid stent stenosis no flow  limiting    Dyslipidemia    COPD  - secondary to smoke exposure as a     HTN    GERD    Vertigo  - no clear etiology, has been seen by ENT    VTE Proph    Discharge Planning: I expect patient to be discharged to home in 1 days with outpatient PT/OT    Hakeem Garcia MD   10/19/17   3:39 PM

## 2017-10-19 NOTE — PROGRESS NOTES
Discharge Planning Assessment  [facility]     Patient Name: Guicho Blanco  MRN: [id number]  Today's Date: 10/19/2017    Admit Date: 10/18/2017          Discharge Needs Assessment       10/19/17 1416    Living Environment    Lives With spouse    Living Arrangements house    Home Accessibility stairs to enter home;tub/shower is not walk in    Stair Railings at Home none    Type of Financial/Environmental Concern none    Transportation Available car;family or friend will provide    Living Environment    Provides Primary Care For no one, unable/limited ability to care for self    Quality Of Family Relationships supportive    Able to Return to Prior Living Arrangements yes    Discharge Needs Assessment    Concerns To Be Addressed no discharge needs identified    Readmission Within The Last 30 Days current reason for admission unrelated to previous admission    Anticipated Changes Related to Illness none    Equipment Currently Used at Home cane, straight    Equipment Needed After Discharge none    Discharge Disposition home or self-care    Discharge Planning Comments Sandra (spouse) [phone]            Discharge Plan       10/19/17 1418    Case Management/Social Work Plan    Plan IDP    Patient/Family In Agreement With Plan yes    Additional Comments CM met w pt who lives in [facility] w wife. Pt has transportation home. Pt uses a cane and has no other DME, pt stated he does not have a history w falls. Pt was not using HH or PT/OT. Pt goal is to return home in car w wife. No other d/c needs at this time. CM will continue to follow.        Discharge Placement     No information found        Expected Discharge Date and Time     Expected Discharge Date Expected Discharge Time    Oct 21, 2017               Demographic Summary       10/19/17 1415    Referral Information    Admission Type inpatient    Arrived From admitted as an inpatient    Referral Source physician;nursing    Reason For Consult discharge planning     Record Reviewed history and physical    Contact Information    Permission Granted to Share Information With     Primary Care Physician Information    Name MINDY HARLEY            Functional Status       10/19/17 5403    Functional Status Prior    Ambulation 0-->independent    Transferring 0-->independent    Toileting 0-->independent    Bathing 0-->independent    Dressing 0-->independent    Eating 0-->independent    Communication 0-->understands/communicates without difficulty    Swallowing 0-->swallows foods/liquids without difficulty    IADL    Medications independent    Meal Preparation assistive person    Housekeeping assistive person    Laundry assistive person    Shopping assistive person    Oral Care assistive person    Activity Tolerance    Usual Activity Tolerance moderate    Employment/Financial    Financial Concerns none    Employment/Finance Comments Pt verified his insurance as [employer] rep. and stated it covers his medications            Psychosocial     None            Abuse/Neglect     None            Legal     None            Substance Abuse     None            Patient Forms     None          Krystina Frederick, RN

## 2017-10-19 NOTE — PLAN OF CARE
Problem: Patient Care Overview (Adult)  Goal: Plan of Care Review  Outcome: Ongoing (interventions implemented as appropriate)    10/19/17 1628   Patient Care Overview   Progress progress toward functional goals as expected   Outcome Evaluation   Outcome Summary/Follow up Plan MBS completed, VSS   Coping/Psychosocial Response Interventions   Plan Of Care Reviewed With patient         Problem: Stroke (Ischemic) (Adult)  Goal: Signs and Symptoms of Listed Potential Problems Will be Absent or Manageable (Stroke)  Outcome: Ongoing (interventions implemented as appropriate)    10/19/17 1628   Stroke (Ischemic)   Problems Assessed (Stroke (Ischemic)/TIA) all   Problems Present (Stroke (Ischemic)/TIA) none

## 2017-10-19 NOTE — PLAN OF CARE
Problem: Patient Care Overview (Adult)  Goal: Plan of Care Review  Outcome: Ongoing (interventions implemented as appropriate)    10/19/17 1054   Patient Care Overview   Progress progress toward functional goals as expected   Outcome Evaluation   Outcome Summary/Follow up Plan Presents w/ evolving sympt, incl. elev BP & HR w/ mobil, HA pain, LLE weakness/instabil., impaired coord/balance,& neuropathy in B feet req. adapted shoes; able to amb 400 ft w/ CGA & ON 10 steps,as well as perform gt. coord activ. w/ cueing, but elev. HR to 126 & HBP noted; recommend OPPT f/u to meet all goals & ret. to PLOF   Coping/Psychosocial Response Interventions   Plan Of Care Reviewed With patient         Problem: Stroke (Ischemic) (Adult)  Goal: Signs and Symptoms of Listed Potential Problems Will be Absent or Manageable (Stroke)  Outcome: Ongoing (interventions implemented as appropriate)    10/19/17 1054   Stroke (Ischemic)   Problems Assessed (Stroke (Ischemic)/TIA) motor/sensory impairment   Problems Present (Stroke (Ischemic)/TIA) motor/sensory impairment         10/19/17 1054   Stroke (Ischemic)   Problems Assessed (Stroke (Ischemic)/TIA) motor/sensory impairment   Problems Present (Stroke (Ischemic)/TIA) motor/sensory impairment         Problem: Inpatient Physical Therapy  Goal: Bed Mobility Goal STG- PT  Outcome: Ongoing (interventions implemented as appropriate)    10/19/17 1054   Bed Mobility PT STG   Bed Mobility PT STG, Date Established 10/19/17   Bed Mobility PT STG, Time to Achieve 5 days   Bed Mobility PT STG, Activity Type all bed mobility   Bed Mobility PT STG, [place] Level independent       Goal: Transfer Training Goal 1 STG- PT  Outcome: Ongoing (interventions implemented as appropriate)    10/19/17 1054   Transfer Training PT STG   Transfer Training PT STG, Date Established 10/19/17   Transfer Training PT STG, Time to Achieve 5 days   Transfer Training PT STG, Activity Type bed to chair /chair to bed;sit to  stand/stand to sit   Transfer Training PT STG, [place] Level independent       Goal: Gait Training Goal STG- PT  Outcome: Ongoing (interventions implemented as appropriate)    10/19/17 1054   Gait Training PT STG   Gait Training Goal PT STG, Date Established 10/19/17   Gait Training Goal PT STG, Time to Achieve 5 days   Gait Training Goal PT STG, [place] Level independent  (stable VS, no LOB)   Gait Training Goal PT STG, Distance to Achieve 600       Goal: Stair Training Goal STG- PT  Outcome: Ongoing (interventions implemented as appropriate)    10/19/17 1054   Stair Training PT STG   Stair Training Goal PT STG, Date Established 10/19/17   Stair Training Goal PT STG, Time to Achieve 5 days   Stair Training Goal PT STG, Number of Steps 14   Stair Training Goal PT STG, [place] Level independent  (STABLE VS)   Stair Training Goal PT STG, Assist Device 1 handrail

## 2017-10-19 NOTE — PLAN OF CARE
Problem: Patient Care Overview (Adult)  Goal: Plan of Care Review  Outcome: Ongoing (interventions implemented as appropriate)    10/19/17 1027   Outcome Evaluation   Outcome Summary/Follow up Plan Pt with minimal L sided strength and coordination deficits. Pt has decreased standing balance which affects ADL I. Pt would benefit from outpt services upon discharge.   Coping/Psychosocial Response Interventions   Plan Of Care Reviewed With patient         Problem: Stroke (Ischemic) (Adult)  Goal: Signs and Symptoms of Listed Potential Problems Will be Absent or Manageable (Stroke)  Outcome: Ongoing (interventions implemented as appropriate)    10/19/17 1027   Stroke (Ischemic)   Problems Assessed (Stroke (Ischemic)/TIA) cognitive impairment;communication impairment;motor/sensory impairment   Problems Present (Stroke (Ischemic)/TIA) motor/sensory impairment         Problem: Inpatient Occupational Therapy  Goal: Transfer Training Goal 1 LTG- OT  Outcome: Ongoing (interventions implemented as appropriate)    10/19/17 1027   Transfer Training OT LTG   Transfer Training OT LTG, Date Established 10/19/17   Transfer Training OT LTG, Time to Achieve 1 wk   Transfer Training OT LTG, Activity Type all transfers   Transfer Training OT LTG, [place] Level set up required   Transfer Training OT LTG, Additional Goal with DME as needed.       Goal: Dynamic Standing Balance Goal LTG-OT  Outcome: Ongoing (interventions implemented as appropriate)    10/19/17 1027   Dynamic Standing Balance OT LTG   Dynamic Standing Balance OT LTG, Date Established 10/19/17   Dynamic Standing Balance OT LTG, Time to Achieve 1 wk   Dynamic Standing Balance OT LTG, [place] Level set up required   Dynamic Standing Balance OT LTG, Assist Device assistive Device       Goal: Bathing Goal LTG- OT  Outcome: Ongoing (interventions implemented as appropriate)    10/19/17 1027   Bathing OT LTG   Bathing Goal OT LTG, Date Established 10/19/17   Bathing Goal  OT LTG, Time to Achieve 1 wk   Bathing Goal OT LTG, Activity Type simulates;upper body bathing;lower body bathing   Bathing Goal OT LTG, [place] Level set up required   Bathing Goal OT LTG, Assist Device shower chair       Goal: Coordination Goal LTG- OT  Outcome: Ongoing (interventions implemented as appropriate)    10/19/17 1027   Coordination OT LTG   Coordination OT LTG, Date Established 10/19/17   Coordination OT LTG, Time to Achieve 1 wk   Coordination OT LTG, Activity Type FM task;GM task   Coordination OT LTG, [place] Level independent

## 2017-10-19 NOTE — PLAN OF CARE
"Problem: Patient Care Overview (Adult)  Goal: Plan of Care Review  Outcome: Ongoing (interventions implemented as appropriate)    10/19/17 0931   Outcome Evaluation   Outcome Summary/Follow up Plan Dysphagia Eval: Pt reports some chronic concern for dysphagia with occasional difficulty with food passing through throat, \"lots of drainage\", and wet/raspy vocal quality with meals, has worsened over past few months. Pt has assumed related to allergies, which very well could be though does cross over with aspiration signs. Recommend: Continue diet as ordered for now given no acute change. General aspiration precautions. MBS to further assess pharyngeal function.      Cog-Comm Eval: Baseline mild decreased recall, though did quite well with today's assessment. Functional communication.  Pt is hard of hearing but does well with face-to-face communication in quiet environment.  Functional simple level cognition. Ox4, good awareness and insight. Suspect no higher-level difficulties though will further assess (e.g., executive functioning, complex thought organization and reasoning) following MBS.    Coping/Psychosocial Response Interventions   Plan Of Care Reviewed With patient           "

## 2017-10-19 NOTE — CONSULTS
Neurology    Referring Provider: Dr. Kumari    Reason for Consultation: stroke      Chief complaint: dizziness    Subjective .     History of present illness:  Mr. Blanco is a pleasant 76-year-old male with a past medical history significant for COPD, CAD, peripheral neuropathy is admitted to the hospitalist service for complaint of dizziness.  He states that he was diagnosed with vertigo in September 2017 and was tried on meclizine but feels that this is not causing significant improvement.  He states that the episodes of dizziness are intermittent and described more as a lightheadedness sensation rather than room spinning.  On the morning of presentation he complained of acute onset of more severe dizziness with associated nausea and vomiting but no extremity weakness or visual disturbance.  Of note he does have a history of A. fib in the past status post cardiac ablation and recent EKG showed normal sinus rhythm.    Review of Systems: Positive for dizziness.Otherwise complete review of systems was discussed with the patient and found to be negative except for that mentioned in history of present illness or in the initial H&P dated 10/18/2017    History  Past Medical History:   Diagnosis Date   • Arthritis    • BPH (benign prostatic hypertrophy)    • Cellulitis    • COPD (chronic obstructive pulmonary disease)    • Coronary artery disease    • GERD (gastroesophageal reflux disease)    • Hearing loss    • Hiatal hernia    • Neuropathy    • Peripheral neuropathy    • Peripheral vascular disease    ,   Past Surgical History:   Procedure Laterality Date   • ABLATION OF DYSRHYTHMIC FOCUS     • APPENDECTOMY     • CARDIAC CATHETERIZATION N/A 10/5/2017    Procedure: Left Heart Cath;  Surgeon: Hector Urrutia MD;  [facility];  Service:    • CATARACT EXTRACTION Bilateral    • CHOLECYSTECTOMY      Remote   • CORONARY ANGIOPLASTY     • GALLBLADDER SURGERY     • KNEE ARTHROSCOPY Right     Right knee  ACL repair.   • PROSTATE SURGERY     • SHOULDER SURGERY Right    • TOE AMPUTATION Right     2nd toe - Right Foot   ,   Family History   Problem Relation Age of Onset   • Atrial fibrillation Mother    • Cancer Father    ,   Social History   Substance Use Topics   • Smoking status: Never Smoker   • Smokeless tobacco: Never Used   • Alcohol use No   ,   Prescriptions Prior to Admission   Medication Sig Dispense Refill Last Dose   • acetaminophen (TYLENOL) 500 MG tablet Take 500 mg by mouth every 6 (six) hours as needed for mild pain (1-3).   10/18/2017   • aspirin 81 MG EC tablet Take 81 mg by mouth Daily.   10/17/2017   • B Complex Vitamins (VITAMIN B COMPLEX) capsule capsule Take 1 capsule by mouth Daily.   10/18/2017   • Cholecalciferol (VITAMIN D3) 5000 UNITS capsule capsule Take 5,000 Units by mouth daily.   10/18/2017   • coenzyme Q10 100 MG capsule Take 100 mg by mouth daily.   10/18/2017   • cycloSPORINE (RESTASIS) 0.05 % ophthalmic emulsion Administer 1 drop to both eyes 2 (two) times a day.   10/18/2017   • flecainide (TAMBOCOR) 100 MG tablet Take 1 tablet by mouth 2 (Two) Times a Day. 180 tablet 2 10/18/2017   • isosorbide mononitrate (IMDUR) 30 MG 24 hr tablet Take 1 tablet by mouth Daily. 30 tablet 11 10/18/2017   • meclizine (ANTIVERT) 25 MG tablet Take 25 mg by mouth 3 (Three) Times a Day As Needed for dizziness.   10/18/2017   • omeprazole (PriLOSEC) 20 MG capsule Take 20 mg by mouth daily.   10/18/2017   • ondansetron (ZOFRAN) 4 MG tablet Take 4 mg by mouth Every 8 (Eight) Hours As Needed for Nausea or Vomiting.   10/18/2017   • pravastatin (PRAVACHOL) 20 MG tablet Take 1 tablet by mouth Daily. 90 tablet 3 10/17/2017   • traMADol (ULTRAM) 50 MG tablet Take 50 mg by mouth 2 (Two) Times a Day As Needed for Moderate Pain (4-6).   Past Week at Unknown time    and Allergies:  Cymbalta [duloxetine hcl]; Metoprolol; and Neurontin [gabapentin]    Objective     Vital Signs   Blood pressure 138/71, pulse 83,  "temperature 98.2 °F (36.8 °C), temperature source Oral, resp. rate 18, height 73\" (185.4 cm), weight 226 lb 8 oz (103 kg), SpO2 96 %.    Physical Exam:      Gen: Lying in bed with eyes open. In NAD. Appears stated age   Eyes: PERRL, conjuntivae/lids normal   ENT: External canals normal bilaterally. Oropharynx normal.    Neck: Supple. No thyroid enlargement noted   Respiratory: CTA bilaterally. Respirations unlabored   CV: RRR, S1 and S2 nml. Radial pulses 2+ bilaterally.    Skin: No rashes noted on exposed skin. Normal tugor.   MSK: Normal bulk and tone. Nml ROM     Neurologic:   Mental status: Awake, alert, oriented x4. Follows commands. Speech fluent.    CN: PERRL, EOM intact, sensation intact in upper/mid/lower face bilaterally, facial movements symmetric, hearing intact to finger rub bilaterally, palate elevates symmetrically, tongue movements and SCMs strong bilaterally    Motor: Strength full (5/5) throughout in BUE and BLE    Reflexes: 2+ throughout in the upper and lower extremities    Sensory: Intact to LT throughout   Coordination: No dysmetria noted   Gait: Not tested        Results Reviewed:     Labs reviewed  MRI brain personally reviewed.  Small areas of restricted diffusion seen in the right centrum semiovale.  Agree with report  MRA neck personally reviewed.  No significant stenosis or occlusion seen.  Agree with report  Carotid duplex and TTE reports (10/05/2017) reviewed  EKG report reviewed                 Assessment/Plan     1.  Acute ischemic stroke = mechanism suspicious for small vessel disease. Recommend increasing his home ASA 81mg to 325mg daily. Continue atorvastatin. Rehab eval. Recent carotid duplex and TTE reports unremarkable.    2.  Hypertension = continue meds    3.  Hyperlipidemia = on atorvastatin    4.  Dizziness = unclear if truly vertigo as the patient denies any room spinning sensation.  We will send him for an MRA head to evaluate the intracranial vasculature.        Jagruti RESENDEZ" MD Judy  10/19/17  1:27 PM

## 2017-10-19 NOTE — THERAPY EVALUATION
Acute Care - Physical Therapy Initial Evaluation  [facility]     Patient Name: Guicho Blanco  : 1940  MRN: [id number]  Today's Date: 10/19/2017   Onset of Illness/Injury or Date of Surgery Date: 10/18/17  Date of Referral to PT: 10/18/17  Referring Physician: LEEROY Estrada      Admit Date: 10/18/2017     Visit Dx:    ICD-10-CM ICD-9-CM   1. Cerebrovascular accident (CVA), unspecified mechanism I63.9 434.91   2. Impaired mobility and ADLs Z74.09 799.89   3. Impaired functional mobility, balance, gait, and endurance Z74.09 V49.89     Patient Active Problem List   Diagnosis   • Osteomyelitis of toe of right foot   • Osteomyelitis of right foot   • PAF (paroxysmal atrial fibrillation)   • Coronary artery disease involving native coronary artery of native heart without angina pectoris   • Gastroesophageal reflux disease without esophagitis   • Benign non-nodular prostatic hyperplasia without lower urinary tract symptoms   • S/P appy   • Essential hypertension   • Simple chronic bronchitis   • Dyslipidemia   • Palpitations   • COPD (chronic obstructive pulmonary disease)   • BPH (benign prostatic hypertrophy)   • Hearing loss   • Chest pain   • CVA (cerebral vascular accident)   • Vertigo     Past Medical History:   Diagnosis Date   • Arthritis    • BPH (benign prostatic hypertrophy)    • Cellulitis    • COPD (chronic obstructive pulmonary disease)    • Coronary artery disease    • GERD (gastroesophageal reflux disease)    • Hearing loss    • Hiatal hernia    • Neuropathy    • Peripheral neuropathy    • Peripheral vascular disease      Past Surgical History:   Procedure Laterality Date   • ABLATION OF DYSRHYTHMIC FOCUS     • APPENDECTOMY     • CARDIAC CATHETERIZATION N/A 10/5/2017    Procedure: Left Heart Cath;  Surgeon: Hector Urrutia MD;  Location: [facility] INVASIVE LOCATION;  Service:    • CATARACT EXTRACTION Bilateral    • CHOLECYSTECTOMY      Remote   • CORONARY ANGIOPLASTY     • GALLBLADDER  SURGERY     • KNEE ARTHROSCOPY Right     Right knee ACL repair.   • PROSTATE SURGERY     • SHOULDER SURGERY Right    • TOE AMPUTATION Right     2nd toe - Right Foot          PT ASSESSMENT (last 72 hours)      PT Evaluation       10/19/17 1416 10/19/17 1100    Rehab Evaluation    Document Type  evaluation  -SM    Subjective Information  no complaints  -SM    General Information    Equipment Currently Used at Home cane, straight  -RF     Living Environment    Lives With spouse  -RF     Living Arrangements house  -RF     Home Accessibility stairs to enter home;tub/shower is not walk in  -RF     Stair Railings at Home none  -RF     Type of Financial/Environmental Concern none  -RF     Transportation Available car;family or friend will provide  -RF     Pain Assessment    Pain Assessment  No/denies pain  -SM      10/19/17 0931 10/19/17 0908    Rehab Evaluation    Document Type evaluation  -DM evaluation  -AN    Subjective Information agree to therapy;complains of;pain;weakness   Weaker thighs;Neuropathy(feet);specialShoes;PAassess;Req.BR  -DM no complaints;agree to therapy  -AN    Patient Effort, Rehab Treatment poor  -DM good  -AN    Symptoms Noted During/After Treatment fatigue;significant change in vital signs   HR to 125,then baseline  -DM none  -AN    Symptoms Noted Comment sl. dizziness/HA discomfort  -DM     General Information    Patient Profile Review yes  -DM yes  -AN    Onset of Illness/Injury or Date of Surgery Date 10/18/17  -DM 10/18/17  -AN    Referring Physician LEEROY Estrada  -LEEROY Stover  -LUIS    General Observations SUP. in tele.bed; RA;IV hep locked;SCUDS;nsg gave mult pills (2 sessions);cardiol in/long assessment;donned special shoes for foot neuropathy;Holter monitor was removed p/t MRI   nsg req. chair alarm (issued)  -DM Pt. supine in bed, SCDS donned  -AN    Pertinent History Of Current Problem Adm w/ HA, dizziness, N/V;recent dx vertigo 9/'17 & Meclazine prescribed, but didn't take till  "10-18;had [facility] 10-5: 30 % RESTENOSIS OF STENT; recently placed on Holter monitor; to ER 10-18; TRIED zofran & meclazine;Holter removed for MRI (SHOWED lacunar infarct R centrum  semi ovale;adm w/ dx of acute CVA   -DM Pt admitted with L HA, dizziness, n/v. MRi shows R infarct of centrum semiovale. Pt weaing a heart monitoring system  -AN    Precautions/Limitations fall precautions;other (see comments)   B [place] despite H.AIDS;(\"I also have decr. comprehension\")  -DM fall precautions;other (see comments)   monitor vitals  -AN    Prior Level of Function independent:;all household mobility;community mobility;gait;transfer;ADL's   indep gt w/o A.D.  -DM independent:;all household mobility;community mobility;gait;transfer;ADL's  -AN    Equipment Currently Used at Home cane, straight   \"has patch for heart,in wife's purse\";B hear.aids  -DM cane, straight;prosthesis   [place] hearing aides  -AN    Plans/Goals Discussed With patient;agreed upon  -DM patient;agreed upon  -AN    Risks Reviewed patient:;LOB;dizziness;increased discomfort;change in vital signs;lines disloged  -DM patient:;LOB;change in vital signs;increased discomfort;dizziness  -AN    Benefits Reviewed patient:;improve function;increase independence;increase strength;increase balance;decrease pain;increase knowledge  -DM patient:;improve function;increase independence;increase balance;increase strength  -AN    Barriers to Rehab medically complex;cognitive status;hearing deficit   states he has both hearing & comprehension deficit   -DM hearing deficit;medically complex  -AN    Living Environment    Lives With spouse  -DM spouse  -AN    Living Arrangements house  -DM house  -AN    Home Accessibility tub/shower is not walk in;stairs to enter home  -DM stairs to enter home;tub/shower is not walk in  -AN    Number of Stairs to Enter Home 3  -DM 3  -AN    Stair Railings at Home none  -DM none  -AN    Type of Financial/Environmental Concern none  -DM     Transportation " Available car;family or friend will provide  -DM car;family or friend will provide  -AN    Living Environment Comment spouse avail.PRN  -DM unsure of spouse's physical status  -AN    Clinical Impression    Date of Referral to PT 10/18/17  -DM     PT Diagnosis impaired funct mobil  -DM     Criteria for Skilled Therapeutic Interventions Met yes;treatment indicated  -DM     Pathology/Pathophysiology Noted (Describe Specifically for Each System) neuromuscular  -DM     Impairments Found (describe specific impairments) gait, locomotion, and balance  -DM     Rehab Potential good, to achieve stated therapy goals  -DM     Vital Signs    Pre Systolic BP Rehab 141  -  -AN    Pre Treatment Diastolic BP 77  -DM 77  -AN    Intra Systolic BP Rehab 138  -DM     Intra Treatment Diastolic BP 71  -DM     Post Systolic BP Rehab 140  -  -AN    Post Treatment Diastolic BP 84  -DM 84  -AN    Pretreatment Heart Rate (beats/min) 87  -DM 86  -AN    Intratreatment Heart Rate (beats/min) 126  -  -AN    Posttreatment Heart Rate (beats/min) 98  -DM 98  -AN    Pre SpO2 (%) 96  -DM 96  -AN    O2 Delivery Pre Treatment room air  -DM room air  -AN    Post SpO2 (%) 95  -DM 95  -AN    O2 Delivery Post Treatment room air  -DM room air  -AN    Pre Patient Position Supine  -DM     Intra Patient Position Standing  -DM     Post Patient Position Sitting  -DM     Pain Assessment    Pain Assessment 0-10  -DM 0-10  -AN    Pain Score 3  -DM 3  -AN    Post Pain Score 2  -DM 0  -AN    Pain Type Acute pain  -DM     Pain Location Head  -DM Head  -AN    Pain Descriptors Aching  -DM     Pain Frequency Intermittent  -DM     Patient's Stated Pain Goal No pain  -DM     Pain Intervention(s) Repositioned;Ambulation/increased activity  -DM Ambulation/increased activity  -AN    Response to Interventions tolerated  -DM tolerated  -AN    Vision Assessment/Intervention    Visual Impairment WFL with corrective lenses  -DM WFL with corrective lenses  -AN     Cognitive Assessment/Intervention    Current Cognitive/Communication Assessment functional  -DM functional  -AN    Orientation Status oriented x 4  -DM oriented x 4  -AN    Follows Commands/Answers Questions 100% of the time;able to follow single-step instructions;needs cueing;needs increased time;needs repetition  -% of the time  -AN    Personal Safety WNL/WFL  -DM     Personal Safety Interventions fall prevention program maintained;gait belt;nonskid shoes/slippers when out of bed  -DM     ROM (Range of Motion)    General ROM no range of motion deficits identified  -DM no range of motion deficits identified  -AN    MMT (Manual Muscle Testing)    General MMT Assessment lower extremity strength deficits identified   Lhip flex 4/5  -DM     General MMT Assessment Detail  L 4/5, R 4+/5  -AN    Bed Mobility, Assessment/Treatment    Bed Mobility, Assistive Device head of bed elevated  -DM head of bed elevated  -AN    Bed Mobility, Scoot/Bridge, [place] conditional independence  -DM     Bed Mob, Supine to Sit, [place] conditional independence   PAassessed,thenNsg gave mult pills in 2 sessions, p/t EOB  -DM conditional independence  -AN    Transfer Assessment/Treatment    Transfers, Sit-Stand [place] contact guard assist;verbal cues required  -DM contact guard assist  -AN    Transfers, Stand-Sit [place] contact guard assist  -DM contact guard assist  -AN    Transfers, Sit-Stand-Sit, Assist Device --   gt belt  -DM     Toilet Transfer, [place] supervision required   toBR;[id number];to sink/hygiene;restedEOB;stood forDynBal  -DM supervision required  -AN    Toilet Transfer, Assistive Device other (see comments)   grab bar  -DM     Transfer, Safety Issues weight-shifting ability decreased  -DM weight-shifting ability decreased  -AN    Transfer, Impairments strength decreased;impaired balance  -DM     Transfer, Comment cues for HP, WS over C of G  -DM     Gait Assessment/Treatment    Gait,  [place] Level contact guard assist;2 person assist required;verbal cues required  -DM     Gait, Assistive Device --   gt belt  -DM     Gait, Distance (Feet) 400   1 sit.&4 STAND.rests (ADL's@sink,read signs/room nos.in [place]  -DM     Gait, Gait Pattern Analysis swing-through gait  -DM     Gait, Gait Deviations harleen decreased;decreased heel strike;narrow base;step length decreased  -DM     Gait, Safety Issues step length decreased  -DM     Gait, Impairments strength decreased;impaired balance  -DM     Gait, Comment cues for trunk ext,incr step length  -DM     Stairs Assessment/Treatment    Number of Stairs 10  -DM     Stairs, Handrail Location left side (ascending)  -DM     Stairs, [place] Level contact guard assist  -DM     Stairs, Assistive Device --   gt belt;P.T. gave CGA L forearm on descent  -DM     Stairs, Technique Used step over step (ascending);step to step (descending)  -DM     Stairs, Safety Issues weight-shifting ability decreased  -DM     Stairs, Impairments strength decreased;impaired balance  -DM     Stairs, Comment cues for seq.  -DM     Motor Skills/Interventions    Additional Documentation Balance Skills Training (Group)  -DM Balance Skills Training (Group);Fine Motor Coordination Training (Group);Gross Motor Coordination Training (Group)  -AN    Balance Skills Training    Sitting-Level of Assistance Distant supervision  -DM Distant supervision  -AN    Sitting-Balance Support Feet supported  -DM Feet supported  -AN    Sitting-Balance Activities Trunk control activities;Reaching for objects;Forward lean   attempted to reach to don shoes, torey P.T. had to complete  -DM     Sitting # of Minutes 10  -DM     Standing-Level of Assistance Contact guard  -DM Contact guard  -AN    Static Standing Balance Support No upper extremity supported  -DM     Standing-Balance Activities Weight Shift A-P;Weight Shift R-L  -DM Weight Shift R-L;Weight Shift A-P;Lateral lean;Tandem Stance  -AN    Standing  Balance # of Minutes 5   at sink  -DM     Gait Balance-Level of Assistance Contact guard;x2  -DM     Gait Balance Support No upper extremity supported  -DM     Gait Balance Activities scanning environment R/L;side-stepping  -DM     Therapy Exercises    Bilateral Lower Extremities AROM:;10 reps;15 reps;supine;sitting;standing;ankle pumps/circles;glut sets;heel slides;heel raises;hip abduction/adduction;hip ER;hip flexion;hip IR;LAQ;quad sets;SAQ;SLR   HScurls,BKFORecip.heel toshin,opp toe/heel taps,MIP,bridging  -DM     Fine Motor Coordination Training    Opposition  Left:;impaired  -AN    Gross Motor Coordination Training    Gross Motor Skill, Impairments Detail  slight discrepancy L finger to nose  -AN    Sensory Assessment/Intervention    Light Touch  LUE;RUE  -AN    LUE Light Touch  WNL  -AN    Positioning and Restraints    Pre-Treatment Position in bed  -DM in bed  -AN    Post Treatment Position chair  -DM chair  -AN    In Chair notified nsg;reclined;call light within reach;encouraged to call for assist;exit alarm on;LUE elevated;waffle cushion;legs elevated  -DM sitting;call light within reach;encouraged to call for assist;exit alarm on;with PT  -AN      10/19/17 0840 10/19/17 0100    Rehab Evaluation    Document Type evaluation  -SM     Subjective Information agree to therapy  -SM     General Information    Equipment Currently Used at Home  cane, straight  -JT    Living Environment    Lives With  spouse  -JT    Living Arrangements  house  -JT    Home Accessibility  no concerns  -JT    Stair Railings at Home  none  -JT    Type of Financial/Environmental Concern  none  -JT    Transportation Available  car  -JT    Pain Assessment    Pain Assessment No/denies pain  -SM     Cognitive Assessment/Intervention    Current Cognitive/Communication Assessment functional  -SM     Orientation Status oriented x 4  -SM     Follows Commands/Answers Questions 100% of the time  -SM     Short/Long Term Memory intact long term  memory;mild impairment, short term memory   baseline  -     Additional Documentation Cognitive Assessment Intervention (Group)  -     Cognitive Assessment Intervention    Behavior/Mood Observations alert;cooperative  -     Attention WNL/WFL  -     Pragmatics WNL/WFL  -     Problem Solving WNL/WFL  -       User Key  (r) = Recorded By, (t) = Taken By, (c) = Cosigned By    Initials Name Provider Type    DM Amira Morris, EL Physical Therapist    FREDY Fisher, MS CCC-SLP Speech and Language Pathologist    LUIS Varghese, OT Occupational Therapist    SHELLEY Frederick RN Case Manager    JANGELLA Fernando RN Registered Nurse          Physical Therapy Education     Title: PT OT SLP Therapies (Active)     Topic: Physical Therapy (Active)     Point: Mobility training (Active)    Learning Progress Summary    Learner Readiness Method Response Comment Documented by Status   Patient Eager E,D NR  DM 10/19/17 1054 Active               Point: Home exercise program (Active)    Learning Progress Summary    Learner Readiness Method Response Comment Documented by Status   Patient Eager E,D NR  DM 10/19/17 1054 Active               Point: Body mechanics (Active)    Learning Progress Summary    Learner Readiness Method Response Comment Documented by Status   Patient Eager E,D NR  DM 10/19/17 1054 Active               Point: Precautions (Active)    Learning Progress Summary    Learner Readiness Method Response Comment Documented by Status   Patient Eager E,D NR  DM 10/19/17 1054 Active                      User Key     Initials Effective Dates Name Provider Type Discipline     06/19/15 -  Amira Morris, PT Physical Therapist PT                PT Recommendation and Plan  Anticipated Discharge Disposition: home with home health  PT Frequency: daily  Plan of Care Review  Plan Of Care Reviewed With: patient  Progress: progress toward functional goals as expected  Outcome Summary/Follow up Plan: Presents w/  evolving sympt, incl. elev BP & HR w/ mobil, HA pain, LLE weakness/instabil.,  impaired coord/balance,& neuropathy in B feet req. adapted shoes; able to amb 400 ft w/ CGA & ON 10 steps,as well as perform gt. coord activ.  w/ cueing, but elev. HR to 126 & HBP noted; recommend OPPT f/u to meet all goals & ret. to PLOF          IP PT Goals       10/19/17 1054          Bed Mobility PT STG    Bed Mobility PT STG, Date Established 10/19/17  -DM      Bed Mobility PT STG, Time to Achieve 5 days  -DM      Bed Mobility PT STG, Activity Type all bed mobility  -DM      Bed Mobility PT STG, [place] Level independent  -DM      Transfer Training PT STG    Transfer Training PT STG, Date Established 10/19/17  -DM      Transfer Training PT STG, Time to Achieve 5 days  -DM      Transfer Training PT STG, Activity Type bed to chair /chair to bed;sit to stand/stand to sit  -DM      Transfer Training PT STG, [place] Level independent  -DM      Gait Training PT STG    Gait Training Goal PT STG, Date Established 10/19/17  -DM      Gait Training Goal PT STG, Time to Achieve 5 days  -DM      Gait Training Goal PT STG, [place] Level independent   stable VS, no LOB  -DM      Gait Training Goal PT STG, Distance to Achieve 600  -DM      Stair Training PT STG    Stair Training Goal PT STG, Date Established 10/19/17  -DM      Stair Training Goal PT STG, Time to Achieve 5 days  -DM      Stair Training Goal PT STG, Number of Steps 14  -DM      Stair Training Goal PT STG, [place] Level independent   STABLE VS  -DM      Stair Training Goal PT STG, Assist Device 1 handrail  -DM        User Key  (r) = Recorded By, (t) = Taken By, (c) = Cosigned By    Initials Name Provider Type    DM Amira Morris, PT Physical Therapist                Outcome Measures       10/19/17 0934 10/19/17 0931       How much help from another person do you currently need...    Turning from your back to your side while in flat bed without using bedrails?  4   -DM     Moving from lying on back to sitting on the side of a flat bed without bedrails?  4  -DM     Moving to and from a bed to a chair (including a wheelchair)?  3  -DM     Standing up from a chair using your arms (e.g., wheelchair, bedside chair)?  3  -DM     Climbing 3-5 steps with a railing?  3  -DM     To walk in hospital room?  3  -DM     AM-PAC 6 Clicks Score  20  -DM     How much help from another is currently needed...    Putting on and taking off regular lower body clothing? 3  -AN      Bathing (including washing, rinsing, and drying) 3  -AN      Toileting (which includes using toilet bed pan or urinal) 4  -AN      Putting on and taking off regular upper body clothing 4  -AN      Taking care of personal grooming (such as brushing teeth) 4  -AN      Eating meals 4  -AN      Score 22  -AN      Modified [place] Scale    Modified [place] Scale 2 - Slight disability.  Unable to carry out all previous activities but able to look after own affairs without assistance.  -AN 2 - Slight disability.  Unable to carry out all previous activities but able to look after own affairs without assistance.  -DM     Functional Assessment    Outcome Measure Options AM-PAC 6 Clicks Daily Activity (OT);Modified [place]  -AN AM-PAC 6 Clicks Basic Mobility (PT);Modified [place]  -DM       User Key  (r) = Recorded By, (t) = Taken By, (c) = Cosigned By    Initials Name Provider Type    TRACY Morris, PT Physical Therapist    LUIS Varghese, OT Occupational Therapist           Time Calculation:         PT Charges       10/19/17 1054          Time Calculation    Start Time 0931  -DM      PT Received On 10/19/17  -DM      PT Goal Re-Cert Due Date 10/29/17  -DM      Time Calculation- PT    Total Timed Code Minutes- PT 23 minute(s)  -DM        User Key  (r) = Recorded By, (t) = Taken By, (c) = Cosigned By    Initials Name Provider Type    TRACY Morris, PT Physical Therapist          Therapy Charges for Today     Code Description  Service Date Service Provider Modifiers Qty    03627658156 HC PT MOBILITY CURRENT 10/19/2017 Amira Morris, PT GP, CJ 1    42740183381 HC PT MOBILITY PROJECTED 10/19/2017 Amira Morris, PT GP,  1    85274963996 HC PT EVAL MOD COMPLEXITY 4 10/19/2017 Amira Morris, PT GP 1    76876066520 HC PT THER PROC EA 15 MIN 10/19/2017 Amira Morris, PT GP 1    25904563226 HC GAIT TRAINING EA 15 MIN 10/19/2017 Amira Morris, PT GP 1          PT G-Codes  PT Professional Judgement Used?: Yes  Outcome Measure Options: AM-PAC 6 Clicks Basic Mobility (PT), Modified [place]  Score: 20  Functional Limitation: Mobility: Walking and moving around  Mobility: Walking and Moving Around Current Status (): At least 20 percent but less than 40 percent impaired, limited or restricted  Mobility: Walking and Moving Around Goal Status (): 0 percent impaired, limited or restricted      Amira Morris, PT  10/19/2017

## 2017-10-19 NOTE — H&P
"    [facility] Medicine Services  HISTORY AND PHYSICAL    Primary Care Physician: Mitchel Culver MD    Subjective     Chief Complaint:  dizziness    History of Present Illness:   This is a pleasant 76 year old male with a past medical history significant for newly diagnosed vertigo, essential hypertension, dyslipidemia, CAD, COPD, BPH and GERD who presents to the ED with complaints of dizziness.  Patient was diagnosed with vertigo in September of 2017 and was started on Meclizine but according to the patient he has only taken this medication once and this attempt was today.  Patient states he has seen ENT, Dr. RODRIGUEZ who ended up referring the patient to cardiology.  On 10-5-2017 patient underwent cardiac catheterization with no intervention. It was noted that there is a 30% discrete in-stent restenosis in the stents mid segment. Carotid duplex and ECHO subsequently performed. Patient has been wearing a holter monitor since.  Tonight patient states around 0900 this morning he had acute onset of dizziness with associated nausea, vomiting, and headache. His wife states that she called his PCP office whom advised to give zofran tablet and meclizine tablet but unfortunately that patient was unable to keep down. Wife proceeded to call EMS and the patient was transported to [facility] ED.  Patient tells me that additionally he had a headache in which he describes as sharp and around the back of his head.  He denies any new or worsening numbness, tingling, chest pain, shortness of air, fever, chills, changes in vision, changes in speech, syncope, or weakness. He does however note that his gait has been \"off\" for several days but denies any falls.   MRI performed showed acute lacunar ischemic infarcts in the right centrum semiovale.  Patient will be admitted to [facility] under the care of the Hospitalist for further evaluation and treatment.        Review of Systems   Constitutional: Negative for activity " change, appetite change, chills, diaphoresis, fatigue, fever and unexpected weight change.   Eyes: Negative for photophobia and visual disturbance.   Respiratory: Negative for cough, shortness of breath and wheezing.    Cardiovascular: Negative for chest pain, palpitations and leg swelling.   Gastrointestinal: Positive for nausea and vomiting. Negative for abdominal distention, abdominal pain, blood in stool, constipation and diarrhea.   Endocrine: Negative.    Genitourinary: Negative for difficulty urinating, dysuria, flank pain, frequency and hematuria.   Musculoskeletal: Positive for gait problem. Negative for arthralgias, back pain, myalgias, neck pain and neck stiffness.   Skin: Negative.    Allergic/Immunologic: Negative.    Neurological: Positive for dizziness, light-headedness and headaches. Negative for tremors, seizures, syncope, facial asymmetry, speech difficulty, weakness and numbness.   Psychiatric/Behavioral: Negative for agitation, behavioral problems and confusion. The patient is not nervous/anxious.       Otherwise complete 10 system ROS performed and negative except as mentioned in the HPI.    Past Medical History:   Diagnosis Date   • Arthritis    • BPH (benign prostatic hypertrophy)    • Cellulitis    • COPD (chronic obstructive pulmonary disease)    • Coronary artery disease    • GERD (gastroesophageal reflux disease)    • Hearing loss    • Hiatal hernia    • Neuropathy    • Peripheral neuropathy    • Peripheral vascular disease        Past Surgical History:   Procedure Laterality Date   • ABLATION OF DYSRHYTHMIC FOCUS     • APPENDECTOMY     • CARDIAC CATHETERIZATION N/A 10/5/2017    Procedure: Left Heart Cath;  Surgeon: Hector Urrutia MD;  Location: [facility] INVASIVE LOCATION;  Service:    • CATARACT EXTRACTION Bilateral    • CHOLECYSTECTOMY      Remote   • CORONARY ANGIOPLASTY     • GALLBLADDER SURGERY     • KNEE ARTHROSCOPY Right     Right knee ACL repair.   • PROSTATE SURGERY     •  "SHOULDER SURGERY Right    • TOE AMPUTATION Right     2nd toe - Right Foot       Family History   Problem Relation Age of Onset   • Atrial fibrillation Mother    • Cancer Father        Social History     Social History   • Marital status:      Spouse name: N/A   • Number of children: N/A   • Years of education: N/A     Occupational History   • [employer]      Retired     Social History Main Topics   • Smoking status: Never Smoker   • Smokeless tobacco: Never Used   • Alcohol use No   • Drug use: No   • Sexual activity: Defer     Other Topics Concern   • Not on file     Social History Narrative    Pt does not consume caffeine.        Medications:    (Not in a hospital admission)    Allergies:  Allergies   Allergen Reactions   • Cymbalta [Duloxetine Hcl] Mental Status Change     Depression, thoughts of Suicide.    • Metoprolol Other (See Comments)     UNKNOWN. Pt does not remember this being allergy   • Neurontin [Gabapentin] Other (See Comments)     Lethargy & fatigue         Objective     Physical Exam:  Vital Signs: /89  Pulse 72  Temp 97.5 °F (36.4 °C) (Oral)   Resp 18  Ht 73\" (185.4 cm)  Wt 225 lb (102 kg)  SpO2 96%  BMI 29.69 kg/m2  Physical Exam   Constitutional: He is oriented to person, place, and time. He appears well-developed and well-nourished. No distress.   Alert and oriented x3 pleasant cooperative.  [place].  Family at bedside.  Wife at bedside and is tearful. Patient appears comfortable and in no acute distress.    HENT:   Head: Normocephalic and atraumatic.   Eyes: Conjunctivae are normal. Pupils are equal, round, and reactive to light. Right eye exhibits no discharge. Left eye exhibits no discharge. No scleral icterus.   Mild nystagmus noted to right eye   Neck: Normal range of motion. Neck supple. No JVD present. No tracheal deviation present. No thyromegaly present.   Cardiovascular: Normal rate, regular rhythm, normal heart sounds and intact distal pulses.  Exam reveals " no gallop and no friction rub.    No murmur heard.  Pulmonary/Chest: Effort normal and breath sounds normal. No stridor. No respiratory distress. He has no wheezes. He has no rales. He exhibits no tenderness.   Abdominal: Soft. Bowel sounds are normal. He exhibits no distension and no mass. There is no tenderness. There is no rebound and no guarding. No hernia.   Musculoskeletal: Normal range of motion. He exhibits no edema, tenderness or deformity.   Lymphadenopathy:     He has no cervical adenopathy.   Neurological: He is alert and oriented to person, place, and time. He has normal reflexes.   Right facial droop noted.  equal, strength equal throughout.  Nystagmus noted to right eye. Speech clear.     Skin: Skin is warm and dry. No rash noted. He is not diaphoretic. No erythema. No pallor.   Psychiatric: He has a normal mood and affect. His behavior is normal. Judgment and thought content normal.   Vitals reviewed.          Results Reviewed:    Results from last 7 days  Lab Units 10/18/17  1351   WBC 10*3/mm3 7.87   HEMOGLOBIN g/dL 13.8   PLATELETS 10*3/mm3 182       Results from last 7 days  Lab Units 10/18/17  1351   SODIUM mmol/L 137   POTASSIUM mmol/L 4.0   CO2 mmol/L 27.0   CREATININE mg/dL 1.00   GLUCOSE mg/dL 125*   CALCIUM mg/dL 9.2     Imaging Results (last 24 hours)     Procedure Component Value Units Date/Time    XR Chest 1 View [[id number]] Collected:  10/18/17 1454     Updated:  10/18/17 1510    Narrative:       EXAMINATION: XR CHEST 1 VW-10/18/2017:      INDICATION: Weak/Dizzy/AMS, triage protocol.      COMPARISON: 09/26/2017.     FINDINGS: The cardiac silhouette is normal. There are stable chronic  pulmonary changes with no acute process.           Impression:       No active disease.     D:  10/18/2017  E:  10/18/2017     This report was finalized on 10/18/2017 3:08 PM by Dr. Long Waite MD.       CT Head Without Contrast [[id number]] Collected:  10/18/17 1608     Updated:  10/18/17 1613     Narrative:       EXAMINATION: CT HEAD WO CONTRAST-10/18/2017:      INDICATION: Dizziness, headache.         TECHNIQUE: CT scan of the head was performed without contrast.     The radiation dose reduction device was turned on for each scan per the  ALARA (As Low as Reasonably Achievable) protocol.     COMPARISON: 09/26/2017.     FINDINGS: There is no intracranial mass. There is no hemorrhage. There  is no midline shift or extra-axial fluid collection. There are age-  related changes.       Impression:       Age-related findings. There are no acute abnormalities.     D:  10/18/2017  E:  10/18/2017           This report was finalized on 10/18/2017 4:17 PM by Dr. Long Waite MD.       MRI Brain Without Contrast [[id number]] Collected:  10/18/17 1714     Updated:  10/18/17 2025    Addenda:        THIS REPORT CONTAINS FINDINGS THAT MAY BE CRITICAL TO PATIENT CARE. The   findings were verbally communicated via telephone conference with   [RIAZ PICKENS] at 8:24 PM EDT on 10/18/2017. The findings were acknowledged and   understood.    THIS DOCUMENT HAS BEEN ELECTRONICALLY SIGNED BY RADHA FERRIS MD  Signed:  10/18/17 2025 by Radha Ferris MD    Narrative:       EXAM:    MR Head Without Intravenous Contrast    CLINICAL HISTORY:    76 years old, male; Signs and symptoms; Dizziness; Patient HX: Headache,   dizziness. Patient reports that the room is spinning. Patient reports that he   has a history of vertigo and believes that this is similar to his symptoms   september 26,2017.    TECHNIQUE:    Magnetic resonance images of the head/brain without intravenous contrast in   multiple planes.    COMPARISON:    MR BRAIN WO CONTRAST 2003-12-02 11:17    FINDINGS:    Two small focal areas of restricted diffusion in the RIGHT centrum   semiovale/paramedian frontal lobe subcortical white matter measuring   approximately 8 mm and 5 mm without surrounding edema or mass effect and no   evidence of hemorrhage.        Confluent and focal  areas of abnormal T2 prolongation within the   periventricular and subcortical white matter of the supratentorial brain   without any other region of restricted diffusion.      Midline brain structures including the corpus callosum, pituitary gland,   pineal region, and craniovertebral junction are unremarkable. Ventricles and   sulci are mildly dilated without evidence of hydrocephalus. Intracranial   vessels demonstrate a normal flow-void.       Impression:         ACUTE LACUNAR ISCHEMIC INFARCTS in the RIGHT centrum semiovale without   evidence of intracranial hemorrhage or hemorrhagic transformation.       THIS DOCUMENT HAS BEEN ELECTRONICALLY SIGNED BY RADHA FERRIS MD          I have personally reviewed and interpreted available lab data, radiology studies and ECG obtained at time of admission.     Assessment / Plan     Problem List:   Hospital Problem List     * (Principal)CVA (cerebral vascular accident)    Coronary artery disease involving native coronary artery of native heart without angina pectoris    Overview Addendum 5/2/2017 11:03 AM by FREDO Ricks     1. Coronary artery disease:-  a. Chest pain x2-3 months.  b. Reported normal catheterization in 2007 with no significant stenosis and ejection fraction of 45%.  c. Recurrent chest pain in August 2009/abnormal Cardiolite stress test revealing moderate to severe anterior wall ischemia with ejection fraction of 60%.  d. Left heart catheterization by Dr. Sue on 08/27/2009 revealing ostial RCA stenosis treated with PTA and placement of CYPHER drug-eluting stent.  No significant stenosis of the left main, circumflex, or LAD.  Ejection fraction estimated at 60%.  e. Cardiac catheterization, 08/24/2010:  i. LVEF 55% with severe focal mid-anterior wall hypokinesis; no MR/MVP.  ii. No obstructive CAD.-         Gastroesophageal reflux disease without esophagitis    Essential hypertension    Dyslipidemia    COPD (chronic obstructive pulmonary disease)     BPH (benign prostatic hypertrophy)    History of atrial fibrillation          Assessment: 76 year old male who presents to the ED secondary to dizziness with associated headache, nausea and vomiting.     Plan:  1) Acute lacunar ischemic infarcts  -noted on MRI in right centrum semiovale with no evidence of intracranial hemorrhage or hemorrhagic transformation.   -CT of head negative  -patient recently had carotid duplex and echo on 10-5-2017.  Carotid duplex noted 0-49% stenosis of LCA with normal RCA.  Echo showed EF of 61-65% with grade 1 diastolic dysfunction consistent with impaired relaxation.  -will obtain MRA of neck tonight.    -NIH 0, repeat daily   -consult neurology to see in am.  -was given full dose ASA in ED  -obtain results of holter monitor recordings, ? Underlying arrhythmia  -start high dose statin  -check FLP, hgb a1c in am  -hold BP medications unless SBP > 200 or DBP > 110    2) History of atrial fibrillation   -s/p ablation with Dr. Daley 3-2012  -will consult cardiology to see in a m  -obtain results from holter monitor  -previously on coumadin, DC'd after ablation    3) CAD  -severe anterior wall ischemia s/p ostial RCA stenosis s/p PTCA and placement cypher TAMMI by Dr Gibbs in 2009.    -Cardiac catheterization 10-5-2017 with Dr. Urrutia with noted 30% discrete in-stent restenosis in the stents mid segment.   -consult cardiology to see in am    4) Non-oxygen dependent COPD  -life-long non smoker  -occupation:   -continue home inhalers  -prn duo-nebs  -continuous pulse ox   -keep sats ?> 90%    5) Essential Hypertension  -holding home medication due to #1  -monitor BP    6) GERD  -continue PPI    7) Vertigo  -diagnosed 9-2017  -has followed up with ENT who per patient, has no underlying cause of vertigo, was referred to cardiology  -will hold off on antivert for now  -unknown if truly has underlying vertigo or if this was all related to #1        DVT prophylaxis:teds/scds,  hold anticoagulation due to #1  Code Status: full code   Admission Status: INPATIENT status due to the need for care which can only be reasonably provided in an hospital setting such as aggressive/expedited ancillary services and/or consultation services, the necessity for IV medications, close physician monitoring and/or the possible need for procedures.  In such, I feel patient’s risk for adverse outcomes and need for care warrant INPATIENT evaluation and predict the patient’s care encounter to likely last beyond 2 midnights.       LEEROY Schmitt 10/18/17 9:42 PM      Brief Attending Admission Attestation     I have seen and examined the patient, performing an independent face-to-face diagnostic evaluation with plan of care reviewed and developed with the advanced practice clinician LEEROY Starks.      Brief Summary Statement/HPI:   Guicho Blanco is a 76 y.o. male presents to [facility] with acute vertigo, N/V today.  He has a PMH of essential hypertension, dyslipidemia, CAD, COPD, BPH, atrial fib s/p ablation in 2011, and GERD.  Last month the patient was treated for acute vertigo here in the ER and released home feeling much better.  However, he had an unrevealing workup at ENT and was further sent to follow up with his cardiologist.  He had LHC without intervention, ECHO and carotid duplex studies.  He has also been wearing a Holter monitor since then.  Then at 0900 today the patient experienced acute vertigo, N/V and headache.  Here in the ED, MRI shows 2 small acute right frontal infarcts.    Attending Physical Exam:  Constitutional: No acute distress, awake, alert, states his vertigo and nausea are improved after treatment  Eyes: PERRLA, sclerae anicteric, no conjunctival injection  HENT: NCAT, mucous membranes moist, [place]  Neck: Supple, no thyromegaly, no lymphadenopathy, trachea midline  Respiratory: Clear to auscultation bilaterally, nonlabored respirations   Cardiovascular: RRR (NSR on bedside  monitor) no murmurs, rubs, or gallops, palpable pedal pulses bilaterally  Gastrointestinal: Positive bowel sounds, soft, nontender, nondistended  Musculoskeletal: No bilateral ankle edema, no clubbing or cyanosis to bilateral lower extremities  Psychiatric: Oriented x 3, appropriate affect, cooperative  Neurologic: Strength symmetric in all extremities, Cranial Nerves grossly intact to confrontation, speech clear.  Really no neuro deficits at this time.  Skin: No rashes      Brief Assessment/Plan :  See above for further detailed assessment and plan developed with APC which I have reviewed and/or edited.    Acute right frontal stroke:  --ASA  --ECHO recent  --Carotid duplex recent, Added MRA head and neck  --Neuro consult in AM  --Q4 hour neuro checks  --Cardio consultation in AM given reasonable suspicion for atrial fib (PAF) - patient brought Holtor monitor for interrogation.    I believe this patient meets LEXINPT: Patient with acute stroke requiring advanced workup and multispecialty consultation.  His diabetes complicates his care.    Cassidy Kumari MD  10/19/17  1:23 AM

## 2017-10-20 ENCOUNTER — APPOINTMENT (OUTPATIENT)
Dept: NEUROLOGY | Facility: HOSPITAL | Age: 77
End: 2017-10-20
Attending: PSYCHIATRY & NEUROLOGY

## 2017-10-20 PROCEDURE — G9175 SPEECH LANG GOAL STATUS: HCPCS

## 2017-10-20 PROCEDURE — 99232 SBSQ HOSP IP/OBS MODERATE 35: CPT | Performed by: NURSE PRACTITIONER

## 2017-10-20 PROCEDURE — 25010000002 HEPARIN (PORCINE) PER 1000 UNITS: Performed by: INTERNAL MEDICINE

## 2017-10-20 PROCEDURE — 99232 SBSQ HOSP IP/OBS MODERATE 35: CPT | Performed by: INTERNAL MEDICINE

## 2017-10-20 PROCEDURE — 99232 SBSQ HOSP IP/OBS MODERATE 35: CPT | Performed by: PSYCHIATRY & NEUROLOGY

## 2017-10-20 PROCEDURE — 95819 EEG AWAKE AND ASLEEP: CPT

## 2017-10-20 PROCEDURE — 92523 SPEECH SOUND LANG COMPREHEN: CPT

## 2017-10-20 PROCEDURE — G9174 SPEECH LANG CURRENT STATUS: HCPCS

## 2017-10-20 PROCEDURE — 95819 EEG AWAKE AND ASLEEP: CPT | Performed by: PSYCHIATRY & NEUROLOGY

## 2017-10-20 PROCEDURE — G9176 SPEECH LANG D/C STATUS: HCPCS

## 2017-10-20 RX ORDER — ATORVASTATIN CALCIUM 10 MG/1
10 TABLET, FILM COATED ORAL NIGHTLY
Status: DISCONTINUED | OUTPATIENT
Start: 2017-10-20 | End: 2017-10-21 | Stop reason: HOSPADM

## 2017-10-20 RX ORDER — ATORVASTATIN CALCIUM 10 MG/1
10 TABLET, FILM COATED ORAL DAILY
Status: DISCONTINUED | OUTPATIENT
Start: 2017-10-20 | End: 2017-10-20

## 2017-10-20 RX ADMIN — ASPIRIN 325 MG ORAL TABLET 325 MG: 325 PILL ORAL at 08:27

## 2017-10-20 RX ADMIN — CYCLOSPORINE 1 DROP: 0.5 EMULSION OPHTHALMIC at 08:29

## 2017-10-20 RX ADMIN — Medication 5 MG: at 22:12

## 2017-10-20 RX ADMIN — HEPARIN SODIUM 5000 UNITS: 5000 INJECTION, SOLUTION INTRAVENOUS; SUBCUTANEOUS at 08:27

## 2017-10-20 RX ADMIN — FLECAINIDE ACETATE 100 MG: 50 TABLET ORAL at 20:13

## 2017-10-20 RX ADMIN — CYCLOSPORINE 1 DROP: 0.5 EMULSION OPHTHALMIC at 18:32

## 2017-10-20 RX ADMIN — ATORVASTATIN CALCIUM 10 MG: 10 TABLET, FILM COATED ORAL at 20:13

## 2017-10-20 RX ADMIN — HEPARIN SODIUM 5000 UNITS: 5000 INJECTION, SOLUTION INTRAVENOUS; SUBCUTANEOUS at 22:13

## 2017-10-20 RX ADMIN — PANTOPRAZOLE SODIUM 40 MG: 40 TABLET, DELAYED RELEASE ORAL at 05:43

## 2017-10-20 RX ADMIN — Medication 5000 UNITS: at 08:27

## 2017-10-20 RX ADMIN — ISOSORBIDE MONONITRATE 30 MG: 30 TABLET, EXTENDED RELEASE ORAL at 08:27

## 2017-10-20 RX ADMIN — FLECAINIDE ACETATE 100 MG: 50 TABLET ORAL at 08:27

## 2017-10-20 NOTE — PROGRESS NOTES
[facility] at [facility]    Inpatient Progress Note      Chief Complaint/Reason for service:    · Dizziness         Subjective:       No chest pain or palpitations overnight. Did have recurrent dizziness on way to MRI yesterday.    Past medical, surgical, social and family history reviewed in the patient's electronic medical record.    Review of Systems:   Positive for Dizziness  Negative for exertional chest pain, dyspnea with exertion, orthopnea, PND, lower extremity edema, palpitations, syncope.     Problem List  Active Hospital Problems (** Indicates Principal Problem)    Diagnosis Date Noted   • **CVA (cerebral vascular accident) [I63.9] 10/18/2017   • Vertigo [R42] 10/18/2017   • COPD (chronic obstructive pulmonary disease) [J44.9]    • BPH (benign prostatic hypertrophy) [N40.0]    • Coronary artery disease involving native coronary artery of native heart without angina pectoris [I25.10] 07/26/2016   • Dyslipidemia [E78.5] 07/26/2016   • Essential hypertension [I10] 07/26/2016   • Gastroesophageal reflux disease without esophagitis [K21.9] 07/26/2016      Resolved Hospital Problems    Diagnosis Date Noted Date Resolved   No resolved problems to display.            Objective:      Current Facility-Administered Medications:   •  acetaminophen (TYLENOL) tablet 650 mg, 650 mg, Oral, Q4H PRN **OR** acetaminophen (TYLENOL) suppository 650 mg, 650 mg, Rectal, Q4H PRN, Cassidy Kumari MD  •  aspirin tablet 325 mg, 325 mg, Oral, Daily, 325 mg at 10/20/17 0827 **OR** aspirin suppository 300 mg, 300 mg, Rectal, Daily, Cassidy Kumari MD  •  atorvastatin (LIPITOR) tablet 10 mg, 10 mg, Oral, Nightly, Hector Urrutia MD  •  cycloSPORINE (RESTASIS) 0.05 % ophthalmic emulsion 1 drop, 1 drop, Both Eyes, BID, Cassidy Kumari MD, 1 drop at 10/20/17 0829  •  flecainide (TAMBOCOR) tablet 100 mg, 100 mg, Oral, Q12H, Cassidy Kumari MD, 100 mg at 10/20/17 0827  •  heparin (porcine) 5000 UNIT/ML injection  5,000 Units, 5,000 Units, Subcutaneous, Q12H, Hakeem Garcia MD, 5,000 Units at 10/20/17 0827  •  isosorbide mononitrate (IMDUR) 24 hr tablet 30 mg, 30 mg, Oral, Daily, Cassidy Kumari MD, 30 mg at 10/20/17 0827  •  melatonin sublingual tablet 5 mg, 5 mg, Sublingual, Nightly PRN, Veda Pan, APRN, 5 mg at 10/19/17 2152  •  pantoprazole (PROTONIX) EC tablet 40 mg, 40 mg, Oral, QAM, Cassidy Kumari MD, 40 mg at 10/20/17 0543  •  sodium chloride 0.9 % flush 1-10 mL, 1-10 mL, Intravenous, PRN, Cassidy Kumari MD  •  sodium chloride 0.9 % flush 10 mL, 10 mL, Intravenous, PRN, Pablito Robbins MD  •  traMADol (ULTRAM) tablet 50 mg, 50 mg, Oral, Q12H PRN, Cassidy Kumair MD  •  vitamin D3 capsule 5,000 Units, 5,000 Units, Oral, Daily, Cassidy Kumari MD, 5,000 Units at 10/20/17 0827      Vital Sign Min/Max for last 24 hours  Temp  Min: 97.7 °F (36.5 °C)  Max: 98.1 °F (36.7 °C)   BP  Min: 114/57  Max: 145/77   Pulse  Min: 65  Max: 88   Resp  Min: 18  Max: 20   SpO2  Min: 94 %  Max: 96 %   No Data Recorded      Intake/Output Summary (Last 24 hours) at 10/20/17 1004  Last data filed at 10/19/17 1300   Gross per 24 hour   Intake              120 ml   Output                0 ml   Net              120 ml           CONSTITUTIONAL: No acute distress, normal affect  RESPIRATORY: Normal effort. Clear to auscultation bilaterally without wheezing or rales  CARDIOVASCULAR: Jugular venous pressure within normal limits. Regular rate and rhythm with normal S1 and S2. Without murmur, gallop or rub.  PERIPHERAL VASCULAR: Normal radial pulses bilaterally. There is no peripheral edema bilaterally.    Results Review:   I reviewed the patient's recent labs in the electronic medical record.      Tele:  NSR, no SVT    TTE 10/2017  · Left ventricular systolic function is normal. Calculated EF = 64%. Estimated EF appears to be in the range of 61 - 65%.  · Left ventricular wall thickness is consistent with mild concentric  hypertrophy.  · Left ventricular diastolic dysfunction (grade I) consistent with impaired relaxation.    [facility] 10/2017  · There is mild diffuse atherosclerosis with no flow limiting stenoses.  The previously placed stent in the ostial to proximal RCA is widely patent with [place]-3 distal flow.  There is a 30% discrete in-stent restenosis in the sten'ts mid segment.  · Normal left ventricular ejection fraction with mild hypokinesis of the basal anterolateral segment.    Carotid Duplex 10/2017  · Proximal right internal carotid artery is normal.  · Left internal carotid artery stenosis of 0-49%.          Assessment:   · Lacunar infarcts   · History of Atrial fibrillation s/p ablation with no clear evidence of recurrance  · CAD, stable        Plan:   -Event Monitor collected for processing; data will not be immediately available. That said, no AFib since admission. Low suspicion for recurrence as of now.   -Agree with increased ASA dose  -Decreased statin back to home dosing (equivalent Lipitor to home pravastatin) due to intolerance to high dose statins, unfortunately. Has myalgias. In fact, may have a component of myalgias already with pravastatin 20mg QHS.  -Will sign off for now  -Follow up with cardiology in clinic in 3months; if Event Monitor has a notable finding, we will contact patient sooner    Hector Urrutia MD, MSc, [facility]  Interventional Cardiology  [facility]  10/20/2017

## 2017-10-20 NOTE — THERAPY EVALUATION
Acute Care - Speech Language Pathology Initial Evaluation  [facility]   Cognitive-Communication Evaluation Continued     Patient Name: Guicho Blanco  : 1940  MRN: [id number]  Today's Date: 10/20/2017  Onset of Illness/Injury or Date of Surgery Date: 10/18/17            Admit Date: 10/18/2017     Visit Dx:    ICD-10-CM ICD-9-CM   1. Cerebrovascular accident (CVA), unspecified mechanism I63.9 434.91   2. Impaired mobility and ADLs Z74.09 799.89   3. Impaired functional mobility, balance, gait, and endurance Z74.09 V49.89     Patient Active Problem List   Diagnosis   • Osteomyelitis of toe of right foot   • Osteomyelitis of right foot   • PAF (paroxysmal atrial fibrillation)   • Coronary artery disease involving native coronary artery of native heart without angina pectoris   • Gastroesophageal reflux disease without esophagitis   • Benign non-nodular prostatic hyperplasia without lower urinary tract symptoms   • S/P appy   • Essential hypertension   • Simple chronic bronchitis   • Dyslipidemia   • Palpitations   • COPD (chronic obstructive pulmonary disease)   • BPH (benign prostatic hypertrophy)   • Hearing loss   • Chest pain   • CVA (cerebral vascular accident)   • Vertigo     Past Medical History:   Diagnosis Date   • Arthritis    • BPH (benign prostatic hypertrophy)    • Cellulitis    • COPD (chronic obstructive pulmonary disease)    • Coronary artery disease    • GERD (gastroesophageal reflux disease)    • Hearing loss    • Hiatal hernia    • Neuropathy    • Peripheral neuropathy    • Peripheral vascular disease      Past Surgical History:   Procedure Laterality Date   • ABLATION OF DYSRHYTHMIC FOCUS     • APPENDECTOMY     • CARDIAC CATHETERIZATION N/A 10/5/2017    Procedure: Left Heart Cath;  Surgeon: Hector Urrutia MD;  Location: [facility] INVASIVE LOCATION;  Service:    • CATARACT EXTRACTION Bilateral    • CHOLECYSTECTOMY      Remote   • CORONARY ANGIOPLASTY     • GALLBLADDER SURGERY     •  KNEE ARTHROSCOPY Right     Right knee ACL repair.   • PROSTATE SURGERY     • SHOULDER SURGERY Right    • TOE AMPUTATION Right     2nd toe - Right Foot          SLP EVALUATION (last 72 hours)      SLP Evaluation       10/20/17 0910 10/19/17 0840             Rehab Evaluation    Document Type evaluation   continued from yesterday  -        Subjective Information no complaints  -        General Information    Subjective Patient Observations Alert and cooperative  -        Plans/Goals Discussed With patient and family;agreed upon  -        Barriers to Rehab none identified  -        Clinical Impression    Criteria for Skilled Therapeutic Interventions Met no problems identified which require skilled intervention  -        Pain Assessment    Pain Assessment No/denies pain  -        Cognitive Assessment/Intervention    Current Cognitive/Communication Assessment functional  -        Short/Long Term Memory  intact long term memory;mild impairment, short term memory   baseline  -       Additional Documentation  Cognitive Assessment Intervention (Group)  -       Cognitive Assessment Intervention    Behavior/Mood Observations  alert;cooperative  -       Attention  WNL/WFL  -       Pragmatics  WNL/WFL  -       Problem Solving  WNL/WFL  -       Executive Function Skills WNL/WFL  -        Reasoning WNL/WFL  -        Organization WNL/WFL  -        Communication Assessment/Intervention    Additional Documentation Writing Assessment/Intervention (Group)  - Auditory Comprehen Assess/Intervention (Group);Reading Assessment/Intervention (Group);Verbal Expression Assess/Intervention (Group);Motor Speech Assessment/Intervention (Group)  -       Auditory Comprehen Assess/Intervention    Auditory Comprehension  WNL/WFL  -       Verbal Expression Assess/Intervention    Conversational Speech  WNL/WFL  -       Reading Assessment/Intervention    Reading Skills  WNL/WFL  -       Writing  Assessment/Intervention    Writing Skills WNL/WFL  -        Motor Speech Assess/Intervention    Motor Speech- Apraxia  WNL/WFL  -SM       Motor Speech- Dysarthria WNL/WFL  -SM WNL/WFL  -         User Key  (r) = Recorded By, (t) = Taken By, (c) = Cosigned By    Initials Name Effective Dates    FREDY FREITAS Hannaryder MS CCC-SLP 06/22/15 -            EDUCATION  The patient has been educated in the following areas:   Cognitive Impairment Communication Impairment.    SLP Recommendation and Plan           Criteria for Skilled Therapeutic Interventions Met: no problems identified which require skilled intervention                   Plan of Care Review  Plan Of Care Reviewed With: patient, family  Outcome Summary/Follow up Plan: Finished high-level cognitive eval (executive funcitoning, complex reasoning, thought organization).  All areas WFL.  No SLP needs.             SLP Outcome Measures (last 72 hours)      SLP Outcome Measures       10/20/17 0900          SLP Outcome Measures    Outcome Measure Used? Adult NOMS  -      FCM Scores    FCM Chosen Problem Solving  -      Problem Solving FCM Score 7  -        User Key  (r) = Recorded By, (t) = Taken By, (c) = Cosigned By    Initials Name Effective Dates    FREDY FREITAS Natalia MS CCC-SLP 06/22/15 -           Time Calculation:         Time Calculation- SLP       10/20/17 0937          Time Calculation- [facility]    SLP Start Time 0910  -      SLP Received On 10/20/17  -        User Key  (r) = Recorded By, (t) = Taken By, (c) = Cosigned By    Initials Name Provider Type     Tiffany Fisher MS CCC-SLP Speech and Language Pathologist          Therapy Charges for Today     Code Description Service Date Service Provider Modifiers Qty    49877235693 HC ST EVAL ORAL PHARYNG SWALLOW 3 10/19/2017 Tiffnay Fisher MS CCC-SLP GN 1    91583314978 HC ST EVAL SPEECH AND PROD W LANG  2 10/19/2017 Tiffany Fisher MS CCC-SLP GN 1    63606664312 HC ST MOTION FLUORO  EVAL SWALLOW 4 10/19/2017 Tiffany ZENA Natalia MS CCC-SLP GN 1    57611783107 HC ST OTHER FUNCT LIMIT CURRENT 10/20/2017 Tiffany M Natalia MS CCC-SLP GN,  1    82053168851 HC ST OTHER FUNCT LIMIT PROJECTED 10/20/2017 Tiffany ZENA Natalia MS CCC-SLP GN, CH 1    26868751123 HC ST OTHER FUNCT LIMIT DISCHARGE 10/20/2017 Tiffany M Natalia MS CCC-SLP GN,  1    94020747792 HC ST EVAL SPEECH AND PROD W LANG  3 10/20/2017 Tiffany ZENA Natalia, MS CCC-SLP GN 1             ADULT NOMS (last 72 hours)      Adult NOMS       10/20/17 0900                FCM Scores    FCM Chosen Problem Solving  -        Problem Solving FCM Score 7  -SM          User Key  (r) = Recorded By, (t) = Taken By, (c) = Cosigned By    Initials Name Effective Dates     Tiffany Fisher MS CCC-SLP 06/22/15 -         SLP G-Codes  SLP NOMS Used?: Yes  Functional Limitations: Other Speech Language Pathology (Problem Solving)  Other Speech-Language Pathology Functional Limitation Current Status (): 0 percent impaired, limited or restricted  Other Speech-Language Pathology Functional Limitation Goal Status (): 0 percent impaired, limited or restricted  Other Speech-Language Pathology Functional Limitation Discharge Status (): 0 percent impaired, limited or restricted      Tiffany Fisher MS CCC-SLP  10/20/2017

## 2017-10-20 NOTE — PROGRESS NOTES
"    [facility] Medicine Services  PROGRESS NOTE    Patient Name: Guicho Blanco  : 1940  MRN: [id number]    Date of Admission: 10/18/2017  Length of Stay: 2  Primary Care Physician: Mitchel Culver MD    Subjective   Subjective     CC: Intermittent dizziness with assoc nausea     HPI:  Pt is seen at 1000 am resting upright in bed with wife and daughter at bs.  Pt is very [place].  Having occasional dizziness spells with associated nausea.  Had associated vomiting outpt. Today pt reports tolerating diet well.  Currently denies n/v, abd pain, cp or soa. Wife and pt with lots of questions regarding pts s/s.  Insisted on full history of all of pts recent outpt/inpt symptoms being explained to me on attempted exam.  Wife very intense.  Not ready for dc home.  Wants more workup and answers.  No acute current distress noted. Very lengthy discussion.      Review of Systems   Gen- No fevers, chills  CV- No chest pain, palpitations  Resp- No cough, dyspnea  GI- No N/V/D, abd pain  Neuro- occ dizziness (with associated nausea and often vomitting).      Otherwise ROS is negative except as mentioned in the HPI.    Objective   Objective     Vital Signs: Temp:  [97.7 °F (36.5 °C)-98.1 °F (36.7 °C)] 98.1 °F (36.7 °C)  Heart Rate:  [65-88] 88  Resp:  [18-20] 18  BP: (114-145)/(57-77) 145/77     Physical Exam:  Constitutional: No acute distress, awake, alert, sitting upright in bed with wife and daughter at bs.  HENT: NCAT, mucous membranes moist  Respiratory: Clear to auscultation bilaterally A/P, respiratory effort normal.  No cough  Cardiovascular: RRR, no murmurs, rubs, or gallops, palpable pedal pulses bilaterally  Gastrointestinal: Positive bowel sounds, soft, nontender, nondistended  Musculoskeletal: No bilateral ankle edema. YUEN spont  Psychiatric: Oriented x 3, appropriate affect, cooperative, mild anxious.  Pt and glynn Wife at bs \"intense with questions\".  Neurologic: Strength symmetric " in all extremities, CN grossly intact to confrontation, speech is clear.  Very mild nystagmus noted to EOM exam to extreme peripheral gaze movements.  No reproduced nausea/vomiting or dizziness associated.   Skin: No rashes, intact.      Results Reviewed:  I have personally reviewed current lab, radiology, and data and agree.      Results from last 7 days  Lab Units 10/19/17  0536 10/18/17  1351   WBC 10*3/mm3 8.03 7.87   HEMOGLOBIN g/dL 12.9* 13.8   HEMATOCRIT % 39.7 42.1   PLATELETS 10*3/mm3 169 182       Results from last 7 days  Lab Units 10/19/17  0741 10/18/17  1351   SODIUM mmol/L 140 137   POTASSIUM mmol/L 3.9 4.0   CHLORIDE mmol/L 105 103   CO2 mmol/L 28.0 27.0   BUN mg/dL 11 10   CREATININE mg/dL 1.10 1.00   GLUCOSE mg/dL 97 125*   CALCIUM mg/dL 9.3 9.2   ALT (SGPT) U/L 31 27   AST (SGOT) U/L 29 28     No results found for: BNP  No results found for: PHART    Cultures:       Imaging Results (last 24 hours)     ** No results found for the last 24 hours. **        Results for orders placed during the hospital encounter of 10/05/17   Adult Transthoracic Echo Complete W/ Cont if Necessary Per Protocol    Addendum · Left ventricular systolic function is normal. Calculated EF = 64%.  Estimated EF appears to be in the range of 61 - 65%. · Left ventricular wall thickness is consistent with mild concentric  hypertrophy. · Left ventricular diastolic dysfunction (grade I) consistent with  impaired relaxation.        Hector Urrutia MD 10/5/2017  5:43 PM          Narrative · Left ventricular systolic function is normal. Calculated EF = 64%.   Estimated EF appears to be in the range of 61 - 65%.  · Left ventricular diastolic dysfunction (grade I) consistent with   impaired relaxation.          I have reviewed the medications.    Assessment/Plan   Assessment / Plan     Hospital Problem List     * (Principal)CVA (cerebral vascular accident)    Coronary artery disease involving native coronary artery of native heart without  angina pectoris    Overview Addendum 10/19/2017  9:44 AM by FREDO Michael     1. Coronary artery disease:-  a. Chest pain x2-3 months.  b. Reported normal catheterization in 2007 with no significant stenosis and ejection fraction of 45%.  c. Recurrent chest pain in August 2009/abnormal Cardiolite stress test revealing moderate to severe anterior wall ischemia with ejection fraction of 60%.  d. Left heart catheterization by Dr. Sue on 08/27/2009 revealing ostial RCA stenosis treated with PTA and placement of CYPHER drug-eluting stent.  No significant stenosis of the left main, circumflex, or LAD.  Ejection fraction estimated at 60%.  e. Cardiac catheterization, 08/24/2010:  i. LVEF 55% with severe focal mid-anterior wall hypokinesis; no MR/MVP.  ii. No obstructive CAD.-    F. [facility] 10-5-17: There is mild diffuse atherosclerosis with no flow limiting stenoses.           Gastroesophageal reflux disease without esophagitis    Essential hypertension    Dyslipidemia    COPD (chronic obstructive pulmonary disease)    BPH (benign prostatic hypertrophy)    Vertigo             Brief Hospital Course to date:  Guicho Blanco is a 76 y.o. male 's medical history significant for essential hypertension, dyslipidemia, CAD, COPD, BPH, GERD and newly diagnosed vertigo.  Diagnosed in September of this year and started on meclizine.  Seen by ENT, Dr. RODRIGUEZ outpatient.  Underwent heart catheter 10/5 to rule out cardiac event due to history of A. fib.  Prevention.  Outpt Holter monitor.  Removed this hospital course for interrogation/evaluation which is currently pending.  Since to ER with dizziness associated headache, nausea and vomiting.  Found to have acute lacunar ischemic infarcts.  Neurology and cardiology following.  Continues to have intermittent dizziness with associated nausea.      Assessment & Plan:    1) Acute lacunar ischemic infarcts  Right centrum semiovale lacunar infarct. Mechanism more suspicious for small  vessel disease per neuro, less likely embolic.   On ASA 325mg daily and atorvastatin.        2) History of atrial fibrillation   -s/p ablation with Dr. Daley 3-2012  -consulted cardiology   -obtain results from holter monitor (processing event monitor data- cards will notify pt)  -previously on coumadin, DC'd after ablation  -Dr Urrutia was following but today signed off.  No further recs, will f/u with pt in clinic in 3 months ---notes NO afib since admission.  Low suspicion for recurrence as of now.   -agree with continued ASA at full dose  -decreased stating back to home dosing due to intolerance of high dose statins unfortunately.  Has myalgias.        3) CAD  -severe anterior wall ischemia s/p ostial RCA stenosis s/p PTCA and placement cypher TAMMI by Dr Gibbs in 2009.    -Cardiac catheterization 10-5-2017 with Dr. Urrutia with noted 30% discrete in-stent restenosis in the stents mid segment.   -consulted cardiology      4) Non-oxygen dependent COPD  -life-long non smoker  -occupation:   -continue home inhalers  -prn duo-nebs  -continuous pulse ox   -keep sats ?> 90%     5) Essential Hypertension  -holding home medication due to #1  -monitor BP     6) GERD  -continue PPI     7) Vertigo/Dizziness  -diagnosed 9-2017  -has followed up with ENT (Dr Manley outpt) who per patient, has found no underlying cause of vertigo, was referred to cardiology  -will hold off on antivert for now  -unknown if truly has underlying vertigo or if this was all related to #1  -MRA head from last night unremarkable.  Neurology checked EEG today to rule out epileptiform activity which was reportedly negative.  Patient with continued nausea experiences, GI evaluation consideration per neuro (will consider if no improvement)  --due to patient's ongoing dizziness/vertigo, mild nystagmus noted on exam this morning, and associated nausea and vomiting, patient and wife feel further workup is indicated.  They were seen by ENT  "outpatient treated with meclizine and Zofran.  Requesting further workup at this time.  -will consult Dr Saldaña in am--r/o BPPV underlying issue/ vs s/e from CVA.  -PT consult/ ??consider vestibular rehab if indicated.   --consider nasal steroid     NOTE:   Spent numerous episodes with patient and family today.  Numerous telephone calls regarding questions.  Wife is insisting on further workup.  Cardiology and neurology following.  No further cardiac recommendations.  Wife was discussing possibility of WILLIAM for which cardiology is declining at this time.  Neurology check EEG to rule out seizure activity today which was reportedly negative.  Wife does NOT want patient to go home until we have a clear source for his symptoms.   --Reports of \"lots of fluid in his ears\" reported by patient and wife outpatient, as well as muffled hearing, and associated current intermittent dizziness with associated nausea, fill it is best to consult ENT to further evaluate patient versus GI at this time.  Symptoms appear likely more related to side effect from his stroke versus BPPV.  Await ENT and neuro for further eval.  --PT following.      DVT prophylaxis:teds/scds, hold anticoagulation due to #1  Code Status: full code     Disposition: I expect the patient to be discharged TBD    Janie Kelley, APRN  10/20/17  4:21 PM        "

## 2017-10-20 NOTE — PLAN OF CARE
Problem: Patient Care Overview (Adult)  Goal: Plan of Care Review  Outcome: Ongoing (interventions implemented as appropriate)    10/20/17 0222   Patient Care Overview   Progress progress toward functional goals as expected   Outcome Evaluation   Outcome Summary/Follow up Plan VSS, pt resting well without complaints   Coping/Psychosocial Response Interventions   Plan Of Care Reviewed With patient         Problem: Stroke (Ischemic) (Adult)  Goal: Signs and Symptoms of Listed Potential Problems Will be Absent or Manageable (Stroke)  Outcome: Ongoing (interventions implemented as appropriate)

## 2017-10-20 NOTE — PLAN OF CARE
Problem: Patient Care Overview (Adult)  Goal: Plan of Care Review  Outcome: Ongoing (interventions implemented as appropriate)    10/20/17 0935   Outcome Evaluation   Outcome Summary/Follow up Plan Finished high-level cognitive eval (executive funcitoning, complex reasoning, thought organization). All areas WFL. No SLP needs.    Coping/Psychosocial Response Interventions   Plan Of Care Reviewed With patient;family         Problem: Stroke (Ischemic) (Adult)  Goal: Signs and Symptoms of Listed Potential Problems Will be Absent or Manageable (Stroke)    10/20/17 0935   Stroke (Ischemic)   Problems Assessed (Stroke (Ischemic)/TIA) cognitive impairment;communication impairment;eating/swallowing impairment   Problems Present (Stroke (Ischemic)/TIA) none

## 2017-10-20 NOTE — PROGRESS NOTES
Daily Progress Note  Neurology     LOS: 2 days     Subjective     Chief Complaint: Dizziness    Interval History:  Patient had recurrent brief episode of dizziness on the way to his MRA yesterday afternoon.  Doing well this morning and eating lunch today.    ROS: Negative for fever    Objective     Vital signs in last 24 hours:  Temp:  [97.7 °F (36.5 °C)-98.1 °F (36.7 °C)] 98.1 °F (36.7 °C)  Heart Rate:  [65-88] 88  Resp:  [18-20] 18  BP: (114-145)/(57-77) 145/77      Physical Exam:   General: Lying in bed with eyes open. In NAD.     Respiratory: Respirations unlabored   CV: RRR       Neurologic Exam:   Mental status: Awake, alert, Follows commands. Speech fluent   CN: II-XII intact                       Results from last 7 days  Lab Units 10/19/17  0536   WBC 10*3/mm3 8.03   HEMOGLOBIN g/dL 12.9*   HEMATOCRIT % 39.7   PLATELETS 10*3/mm3 169           Results Review:  Labs reviewed  MRA head personally reviewed.  No significant stenosis or occlusion seen.  Agree with the report      Assessment/Plan     Principal Problem:    CVA (cerebral vascular accident)  Active Problems:    Coronary artery disease involving native coronary artery of native heart without angina pectoris    Gastroesophageal reflux disease without esophagitis    Essential hypertension    Dyslipidemia    COPD (chronic obstructive pulmonary disease)    BPH (benign prostatic hypertrophy)    Vertigo        1.  Acute ischemic stroke = right centrum semiovale lacunar infarct. Mechanism more suspicious for small vessel disease, less likely embolic. On ASA 325mg daily and atorvastatin.      2.  Hypertension = continue meds     3.  Hyperlipidemia = on atorvastatin     4.  Dizziness = unclear if truly vertigo as the patient denies any room spinning sensation. His MRA head was unremarkable. No evidence of posterior circulation insufficiency. Will check routine EEG to eval for any epileptiform activity, though suspect this will be unremarkable. Given the  associated nausea he experiences, recommend GI evaluation.       Addendum =  Routine EEG negative for epileptiform activity.     Okay from neuro standpoint for discharge home when okay with primary team. Follow up in neurology clinic with Veda Ryan PA-C, first available appointment      Jagruti Kim MD  10/20/17  2:22 PM

## 2017-10-21 VITALS
DIASTOLIC BLOOD PRESSURE: 81 MMHG | HEIGHT: 73 IN | WEIGHT: 226.8 LBS | HEART RATE: 93 BPM | SYSTOLIC BLOOD PRESSURE: 134 MMHG | BODY MASS INDEX: 30.06 KG/M2 | TEMPERATURE: 98.5 F | OXYGEN SATURATION: 97 % | RESPIRATION RATE: 16 BRPM

## 2017-10-21 LAB
ANION GAP SERPL CALCULATED.3IONS-SCNC: 4 MMOL/L (ref 3–11)
BASOPHILS # BLD AUTO: 0.03 10*3/MM3 (ref 0–0.2)
BASOPHILS NFR BLD AUTO: 0.4 % (ref 0–1)
BUN BLD-MCNC: 11 MG/DL (ref 9–23)
BUN/CREAT SERPL: 9.2 (ref 7–25)
CALCIUM SPEC-SCNC: 9.5 MG/DL (ref 8.7–10.4)
CHLORIDE SERPL-SCNC: 110 MMOL/L (ref 99–109)
CO2 SERPL-SCNC: 25 MMOL/L (ref 20–31)
CREAT BLD-MCNC: 1.2 MG/DL (ref 0.6–1.3)
CRP SERPL-MCNC: 0.77 MG/DL (ref 0–1)
DEPRECATED RDW RBC AUTO: 41.8 FL (ref 37–54)
EOSINOPHIL # BLD AUTO: 0.31 10*3/MM3 (ref 0–0.3)
EOSINOPHIL NFR BLD AUTO: 4.1 % (ref 0–3)
ERYTHROCYTE [DISTWIDTH] IN BLOOD BY AUTOMATED COUNT: 13 % (ref 11.3–14.5)
ERYTHROCYTE [SEDIMENTATION RATE] IN BLOOD: 24 MM/HR (ref 0–20)
GFR SERPL CREATININE-BSD FRML MDRD: 59 ML/MIN/1.73
GLUCOSE BLD-MCNC: 104 MG/DL (ref 70–100)
HCT VFR BLD AUTO: 40.9 % (ref 38.9–50.9)
HGB BLD-MCNC: 13.8 G/DL (ref 13.1–17.5)
IMM GRANULOCYTES # BLD: 0.03 10*3/MM3 (ref 0–0.03)
IMM GRANULOCYTES NFR BLD: 0.4 % (ref 0–0.6)
LYMPHOCYTES # BLD AUTO: 2.74 10*3/MM3 (ref 0.6–4.8)
LYMPHOCYTES NFR BLD AUTO: 36.7 % (ref 24–44)
MCH RBC QN AUTO: 29.4 PG (ref 27–31)
MCHC RBC AUTO-ENTMCNC: 33.7 G/DL (ref 32–36)
MCV RBC AUTO: 87 FL (ref 80–99)
MONOCYTES # BLD AUTO: 0.8 10*3/MM3 (ref 0–1)
MONOCYTES NFR BLD AUTO: 10.7 % (ref 0–12)
NEUTROPHILS # BLD AUTO: 3.56 10*3/MM3 (ref 1.5–8.3)
NEUTROPHILS NFR BLD AUTO: 47.7 % (ref 41–71)
PLATELET # BLD AUTO: 174 10*3/MM3 (ref 150–450)
PMV BLD AUTO: 10.1 FL (ref 6–12)
POTASSIUM BLD-SCNC: 4.1 MMOL/L (ref 3.5–5.5)
RBC # BLD AUTO: 4.7 10*6/MM3 (ref 4.2–5.76)
SODIUM BLD-SCNC: 139 MMOL/L (ref 132–146)
WBC NRBC COR # BLD: 7.47 10*3/MM3 (ref 3.5–10.8)

## 2017-10-21 PROCEDURE — 80048 BASIC METABOLIC PNL TOTAL CA: CPT | Performed by: NURSE PRACTITIONER

## 2017-10-21 PROCEDURE — 86140 C-REACTIVE PROTEIN: CPT | Performed by: NURSE PRACTITIONER

## 2017-10-21 PROCEDURE — 99239 HOSP IP/OBS DSCHRG MGMT >30: CPT | Performed by: INTERNAL MEDICINE

## 2017-10-21 PROCEDURE — 97530 THERAPEUTIC ACTIVITIES: CPT

## 2017-10-21 PROCEDURE — 25010000002 HEPARIN (PORCINE) PER 1000 UNITS: Performed by: INTERNAL MEDICINE

## 2017-10-21 PROCEDURE — 85025 COMPLETE CBC W/AUTO DIFF WBC: CPT | Performed by: NURSE PRACTITIONER

## 2017-10-21 PROCEDURE — 85652 RBC SED RATE AUTOMATED: CPT | Performed by: NURSE PRACTITIONER

## 2017-10-21 RX ORDER — ONDANSETRON 4 MG/1
4 TABLET, FILM COATED ORAL EVERY 6 HOURS PRN
Status: DISCONTINUED | OUTPATIENT
Start: 2017-10-21 | End: 2017-10-21 | Stop reason: HOSPADM

## 2017-10-21 RX ORDER — ATORVASTATIN CALCIUM 10 MG/1
10 TABLET, FILM COATED ORAL NIGHTLY
Qty: 30 TABLET | Refills: 0 | Status: SHIPPED | OUTPATIENT
Start: 2017-10-21 | End: 2017-11-20

## 2017-10-21 RX ORDER — ASPIRIN 325 MG
325 TABLET ORAL DAILY
Qty: 30 TABLET | Refills: 2 | Status: SHIPPED | OUTPATIENT
Start: 2017-10-22 | End: 2018-01-20

## 2017-10-21 RX ADMIN — PANTOPRAZOLE SODIUM 40 MG: 40 TABLET, DELAYED RELEASE ORAL at 05:25

## 2017-10-21 RX ADMIN — FLECAINIDE ACETATE 100 MG: 50 TABLET ORAL at 08:50

## 2017-10-21 RX ADMIN — Medication 5000 UNITS: at 08:50

## 2017-10-21 RX ADMIN — CYCLOSPORINE 1 DROP: 0.5 EMULSION OPHTHALMIC at 08:50

## 2017-10-21 RX ADMIN — ASPIRIN 325 MG ORAL TABLET 325 MG: 325 PILL ORAL at 08:50

## 2017-10-21 RX ADMIN — ISOSORBIDE MONONITRATE 30 MG: 30 TABLET, EXTENDED RELEASE ORAL at 08:50

## 2017-10-21 RX ADMIN — HEPARIN SODIUM 5000 UNITS: 5000 INJECTION, SOLUTION INTRAVENOUS; SUBCUTANEOUS at 08:50

## 2017-10-21 NOTE — PLAN OF CARE
Problem: Patient Care Overview (Adult)  Goal: Plan of Care Review  Outcome: Outcome(s) achieved Date Met:  10/21/17    10/21/17 1600   Outcome Evaluation   Outcome Summary/Follow up Plan Pt met 3 of 4 LTGs this stay; set to d/c home with family support. Pt strength and FMC fx appears to have returned to baseline. OT to d/c at this time.   Coping/Psychosocial Response Interventions   Plan Of Care Reviewed With patient;spouse;daughter         Problem: Inpatient Occupational Therapy  Goal: Transfer Training Goal 1 LTG- OT  Outcome: Outcome(s) achieved Date Met:  10/21/17    10/19/17 1027 10/21/17 1600   Transfer Training OT LTG   Transfer Training OT LTG, Date Established 10/19/17 --    Transfer Training OT LTG, Time to Achieve 1 wk --    Transfer Training OT LTG, Activity Type all transfers --    Transfer Training OT LTG, [place] Level set up required --    Transfer Training OT LTG, Additional Goal with DME as needed. --    Transfer Training OT LTG, Outcome --  goal met       Goal: Dynamic Standing Balance Goal LTG-OT  Outcome: Outcome(s) achieved Date Met:  10/21/17    10/19/17 1027 10/21/17 1600   Dynamic Standing Balance OT LTG   Dynamic Standing Balance OT LTG, Date Established 10/19/17 --    Dynamic Standing Balance OT LTG, Time to Achieve 1 wk --    Dynamic Standing Balance OT LTG, [place] Level set up required --    Dynamic Standing Balance OT LTG, Assist Device assistive Device --    Dynamic Standing Balance OT LTG, Outcome --  goal met       Goal: Bathing Goal LTG- OT  Outcome: Unable to achieve outcome(s) by discharge Date Met:  10/21/17    10/19/17 1027 10/21/17 1600   Bathing OT LTG   Bathing Goal OT LTG, Date Established 10/19/17 --    Bathing Goal OT LTG, Time to Achieve 1 wk --    Bathing Goal OT LTG, Activity Type simulates;upper body bathing;lower body bathing --    Bathing Goal OT LTG, [place] Level set up required --    Bathing Goal OT LTG, Assist Device shower chair --    Bathing  Goal OT LTG, Outcome --  goal not met  (Not assessed day of d/c)       Goal: Coordination Goal LTG- OT  Outcome: Outcome(s) achieved Date Met:  10/21/17    10/19/17 1027 10/21/17 1600   Coordination OT LTG   Coordination OT LTG, Date Established 10/19/17 --    Coordination OT LTG, Time to Achieve 1 wk --    Coordination OT LTG, Activity Type FM task;GM task --    Coordination OT LTG, [place] Level independent --    Coordination OT LTG, Outcome --  goal met

## 2017-10-21 NOTE — PLAN OF CARE
Problem: Patient Care Overview (Adult)  Goal: Plan of Care Review  Outcome: Ongoing (interventions implemented as appropriate)    10/21/17 7031   Patient Care Overview   Progress improving   Outcome Evaluation   Outcome Summary/Follow up Plan Pt asking about dc. pt ready to go home. No complaints at this time. pt ambulated 750 ft in [place] today with assistance    Coping/Psychosocial Response Interventions   Plan Of Care Reviewed With patient         Problem: Stroke (Ischemic) (Adult)  Goal: Signs and Symptoms of Listed Potential Problems Will be Absent or Manageable (Stroke)  Outcome: Ongoing (interventions implemented as appropriate)

## 2017-10-21 NOTE — PROGRESS NOTES
[facility] Medicine Services  PROGRESS NOTE    Patient Name: Guicho Blanco  : 1940  MRN: [id number]    Date of Admission: 10/18/2017  Length of Stay: 3  Primary Care Physician: Mitchel Culver MD    Subjective   Subjective     CC: dizziness with nausea    HPI: No acute events overnight, patient slept well, did not have any nausea or dizziness    Review of Systems  Gen- No fevers, chills  CV- No chest pain, palpitations  Resp- No cough, dyspnea  GI- No N/V/D, abd pain    Otherwise ROS is negative except as mentioned in the HPI.    Objective   Objective     Vital Signs: Temp:  [97.9 °F (36.6 °C)-98.1 °F (36.7 °C)] 97.9 °F (36.6 °C)  Heart Rate:  [74-91] 75  Resp:  [18] 18  BP: (114-145)/(63-85) 139/85     Physical Exam:  Constitutional: No acute distress, awake, alert  Eyes: PERRLA, sclerae anicteric, no conjunctival injection  HENT: NCAT, mucous membranes moist  Neck: Supple, no thyromegaly, no lymphadenopathy, trachea midline  Respiratory: Clear to auscultation bilaterally, nonlabored respirations   Cardiovascular: RRR, no murmurs, rubs, or gallops, palpable pedal pulses bilaterally  Gastrointestinal: Positive bowel sounds, soft, nontender, nondistended  Musculoskeletal: No bilateral ankle edema, no clubbing or cyanosis to bilateral lower extremities  Psychiatric: Oriented x 3, appropriate affect, cooperative  Neurologic: Strength symmetric in all extremities, Cranial Nerves grossly intact to confrontation, speech clear  Skin: No rashes    Results Reviewed:  I have personally reviewed current lab, radiology, and data and agree.      Results from last 7 days  Lab Units 10/21/17  0506 10/19/17  0536 10/18/17  1351   WBC 10*3/mm3 7.47 8.03 7.87   HEMOGLOBIN g/dL 13.8 12.9* 13.8   HEMATOCRIT % 40.9 39.7 42.1   PLATELETS 10*3/mm3 174 169 182       Results from last 7 days  Lab Units 10/21/17  0506 10/19/17  0741 10/18/17  1351   SODIUM mmol/L 139 140 137   POTASSIUM mmol/L  4.1 3.9 4.0   CHLORIDE mmol/L 110* 105 103   CO2 mmol/L 25.0 28.0 27.0   BUN mg/dL 11 11 10   CREATININE mg/dL 1.20 1.10 1.00   GLUCOSE mg/dL 104* 97 125*   CALCIUM mg/dL 9.5 9.3 9.2   ALT (SGPT) U/L  --  31 27   AST (SGOT) U/L  --  29 28     No results found for: BNP  No results found for: PHART       Results for orders placed during the hospital encounter of 10/05/17   Adult Transthoracic Echo Complete W/ Cont if Necessary Per Protocol    Addendum · Left ventricular systolic function is normal. Calculated EF = 64%.  Estimated EF appears to be in the range of 61 - 65%. · Left ventricular wall thickness is consistent with mild concentric  hypertrophy. · Left ventricular diastolic dysfunction (grade I) consistent with  impaired relaxation.        Hector Urrutia MD 10/5/2017  5:43 PM          Narrative · Left ventricular systolic function is normal. Calculated EF = 64%.   Estimated EF appears to be in the range of 61 - 65%.  · Left ventricular diastolic dysfunction (grade I) consistent with   impaired relaxation.        I have reviewed the medications.    Assessment/Plan   Assessment / Plan     Hospital Problem List     * (Principal)CVA (cerebral vascular accident)    Coronary artery disease involving native coronary artery of native heart without angina pectoris    Overview Addendum 10/19/2017  9:44 AM by FREDO Michael     1. Coronary artery disease:-  a. Chest pain x2-3 months.  b. Reported normal catheterization in 2007 with no significant stenosis and ejection fraction of 45%.  c. Recurrent chest pain in August 2009/abnormal Cardiolite stress test revealing moderate to severe anterior wall ischemia with ejection fraction of 60%.  d. Left heart catheterization by Dr. Sue on 08/27/2009 revealing ostial RCA stenosis treated with PTA and placement of CYPHER drug-eluting stent.  No significant stenosis of the left main, circumflex, or LAD.  Ejection fraction estimated at 60%.  e. Cardiac catheterization,  08/24/2010:  i. LVEF 55% with severe focal mid-anterior wall hypokinesis; no MR/MVP.  ii. No obstructive CAD.-    F. [facility] 10-5-17: There is mild diffuse atherosclerosis with no flow limiting stenoses.           Gastroesophageal reflux disease without esophagitis    Essential hypertension    Dyslipidemia    COPD (chronic obstructive pulmonary disease)    BPH (benign prostatic hypertrophy)    Vertigo             Brief Hospital Course to date:  Guicho Blanco is a 76 y.o. male with a history significant for essential hypertension, dyslipidemia, CAD, COPD, BPH, GERD and newly diagnosed vertigo.  Diagnosed in September of this year and started on meclizine.  Seen by ENT, Dr. RODRIGUEZ outpatient.  Underwent heart catheter 10/5 to rule out cardiac event due to history of A. fib.  Prevention.  Outpt Holter monitor.  Removed this hospital course for interrogation/evaluation which is currently pending.  Since to ER with dizziness associated headache, nausea and vomiting.  Found to have acute lacunar ischemic infarcts.  Neurology and cardiology following.  Continues to have intermittent dizziness with associated nausea.    Assessment & Plan:    1) Acute lacunar ischemic infarcts  Right centrum semiovale lacunar infarct. Mechanism more suspicious for small vessel disease per neuro, less likely embolic.   On ASA 325mg daily and atorvastatin he is intolerant to high dose statins       2) History of atrial fibrillation   -s/p ablation with Dr. Daley 3-2012  -consulted cardiology   -obtain results from holter monitor (processing event monitor data- cards will notify pt)  -previously on coumadin, DC'd after ablation  -Dr Urrutia was following but today signed off.  No further recs, will f/u with pt in clinic in 3 months ---notes NO afib since admission.  Low suspicion for recurrence as of now, no plan for WILLIAM  -agree with continued ASA at full dose      3) CAD  -severe anterior wall ischemia s/p ostial RCA stenosis s/p PTCA and  placement cypher TAMMI by Dr Gibbs in 2009.    -Cardiac catheterization 10-5-2017 with Dr. Urrutia with noted 30% discrete in-stent restenosis in the stents mid segment.       4) Non-oxygen dependent COPD  -life-long non smoker, former   -continue home inhalers, prn duo-nebs  -keep sats ?> 90%      5) Essential Hypertension  -holding home medication due to #1  -monitor BP      6) GERD- continue PPI      7) Vertigo/Dizziness  -chronic issue since 9-2017, followed by ENT (Dr Manley outpt) who per patient, has found no underlying cause of vertigo, was referred to cardiology  -will hold off on antivert for now  -unknown if truly has underlying vertigo or if this was all related to #1  -MRA head from last night unremarkable.  Neurology checked EEG today to rule out epileptiform activity which was reportedly negative.  Patient with continued nausea experiences, GI evaluation consideration per neuro (will consider if no improvement)  - due to patient's ongoing dizziness/vertigo, mild nystagmus noted on exam this morning, and associated nausea and vomiting, patient and wife feel further workup is indicated.  They were seen by ENT outpatient treated with meclizine and Zofran, will consult Dr Saldaña in am--r/o BPPV underlying issue/ vs s/e from CVA.  -PT consult/ ??consider vestibular rehab if indicated.   --consider nasal steroid     DVT Prophylaxis:TEDs/SCDs, hold AC sec to #1    CODE STATUS: Full    Disposition:anticiptate d/c in 1 day, awaiting ENT input.    Hugo Duncan MD  10/21/17  7:18 AM

## 2017-10-21 NOTE — THERAPY DISCHARGE NOTE
Acute Care - Occupational Therapy Treatment Note/Discharge  [facility]     Patient Name: Guicho Blanco  : 1940  MRN: [id number]  Today's Date: 10/21/2017  Onset of Illness/Injury or Date of Surgery Date: 10/18/17  Date of Referral to OT: 10/18/17  Referring Physician: LEEROY Estrada      Admit Date: 10/18/2017    Visit Dx:     ICD-10-CM ICD-9-CM   1. Cerebrovascular accident (CVA), unspecified mechanism I63.9 434.91   2. Impaired mobility and ADLs Z74.09 799.89   3. Impaired functional mobility, balance, gait, and endurance Z74.09 V49.89     Patient Active Problem List   Diagnosis   • Osteomyelitis of toe of right foot   • Osteomyelitis of right foot   • PAF (paroxysmal atrial fibrillation)   • Coronary artery disease involving native coronary artery of native heart without angina pectoris   • Gastroesophageal reflux disease without esophagitis   • Benign non-nodular prostatic hyperplasia without lower urinary tract symptoms   • S/P appy   • Essential hypertension   • Simple chronic bronchitis   • Dyslipidemia   • Palpitations   • COPD (chronic obstructive pulmonary disease)   • BPH (benign prostatic hypertrophy)   • Hearing loss   • Chest pain   • CVA (cerebral vascular accident)   • Vertigo             Adult Rehabilitation Note       10/21/17 1500          Rehab Assessment/Intervention    Discipline occupational therapist  -TB      Document Type therapy note (daily note)  -TB      Subjective Information no complaints;agree to therapy  -TB      Patient Effort, Rehab Treatment excellent  -TB      Symptoms Noted During/After Treatment none  -TB      Precautions/Limitations fall precautions  -TB      Specific Treatment Considerations L side weakness and FMC deficits appear to have resolved  -TB      Recorded by [TB] Keesha Melo OT      Vital Signs    Pre Systolic BP Rehab --   stable  -TB      O2 Delivery Post Treatment room air  -TB      Pre Patient Position Sitting  -TB      Intra Patient  Position Standing  -TB      Post Patient Position Sitting  -TB      Recorded by [TB] Keesha Melo OT      Pain Assessment    Pain Assessment No/denies pain  -TB      Recorded by [TB] Keesha Melo OT      Cognitive Assessment/Intervention    Current Cognitive/Communication Assessment functional  -TB      Orientation Status oriented x 4  -TB      Follows Commands/Answers Questions 100% of the time  -TB      Personal Safety WNL/WFL  -TB      Personal Safety Interventions fall prevention program maintained  -TB      Recorded by [TB] Keesha Melo OT      ROM (Range of Motion)    General ROM no range of motion deficits identified  -TB      General ROM Detail B UE WNL  -TB      Recorded by [TB] Keesha Melo OT      MMT (Manual Muscle Testing)    General MMT Assessment no strength deficits identified  -TB      General MMT Assessment Detail R 4+/5 to 5/5; L 4+/5  -TB      Recorded by [TB] Keesha Melo OT      Bed Mobility, Assessment/Treatment    Bed Mobility, Scoot/Bridge, [place] not tested  -TB      Bed Mobility, Comment Pt rec'vd UIC  -TB      Recorded by [TB] Keesha Melo OT      Transfer Assessment/Treatment    Transfers, Sit-Stand [place] supervision required  -TB      Transfers, Stand-Sit [place] supervision required  -TB      Toilet Transfer, [place] conditional independence  -TB      Recorded by [TB] Keesha Melo OT      Functional Mobility    Functional Mobility- Ind. Level supervision required  -TB      Functional Mobility- Comment No AD, good safety, No LOB  -TB      Recorded by [TB] Keesha Melo OT      Upper Body Dressing Assessment/Training    UB Dressing Assess/Train, Comment Pt dressed for d/c - pt/family report independence  -TB      Recorded by [TB] Keesha Melo OT      Lower Body Dressing Assessment/Training    LB Dressing Assess/Train, Comment Pt dressed for d/c - pt/family report independence   -TB      Recorded by [TB] Keesha Melo, MICA      Balance Skills Training    Sitting-Level of Assistance Independent  -TB      Standing-Level of Assistance Close supervision  -TB      Recorded by [TB] Keesha Melo, MICA      Positioning and Restraints    Pre-Treatment Position sitting in chair/recliner  -TB      Post Treatment Position chair  -TB      In Chair sitting;call light within reach;encouraged to call for assist;with family/caregiver;with nsg  -TB      Recorded by [TB] Keesha Melo, OT        User Key  (r) = Recorded By, (t) = Taken By, (c) = Cosigned By    Initials Name Effective Dates    TB Keesha Melo OT 06/22/15 -                 OT Goals       10/21/17 1600 10/19/17 1027       Transfer Training OT LTG    Transfer Training OT LTG, Date Established  10/19/17  -AN     Transfer Training OT LTG, Time to Achieve  1 wk  -AN     Transfer Training OT LTG, Activity Type  all transfers  -AN     Transfer Training OT LTG, [place] Level  set up required  -AN     Transfer Training OT LTG, Additional Goal  with DME as needed.  -AN     Transfer Training OT LTG, Outcome goal met  -TB      Dynamic Standing Balance OT LTG    Dynamic Standing Balance OT LTG, Date Established  10/19/17  -AN     Dynamic Standing Balance OT LTG, Time to Achieve  1 wk  -AN     Dynamic Standing Balance OT LTG, [place] Level  set up required  -AN     Dynamic Standing Balance OT LTG, Assist Device  assistive Device  -AN     Dynamic Standing Balance OT LTG, Outcome goal met  -TB      Bathing OT LTG    Bathing Goal OT LTG, Date Established  10/19/17  -AN     Bathing Goal OT LTG, Time to Achieve  1 wk  -AN     Bathing Goal OT LTG, Activity Type  simulates;upper body bathing;lower body bathing  -AN     Bathing Goal OT LTG, [place] Level  set up required  -AN     Bathing Goal OT LTG, Assist Device  shower chair  -AN     Bathing Goal OT LTG, Outcome goal not met   Not assessed day of d/c  -TB       Coordination OT LTG    Coordination OT LTG, Date Established  10/19/17  -AN     Coordination OT LTG, Time to Achieve  1 wk  -AN     Coordination OT LTG, Activity Type  FM task;GM task  -AN     Coordination OT LTG, [place] Level  independent  -AN     Coordination OT LTG, Outcome goal met  -        User Key  (r) = Recorded By, (t) = Taken By, (c) = Cosigned By    Initials Name Provider Type    TB Keesha Melo, OT Occupational Therapist    LUIS Varghese, OT Occupational Therapist          Occupational Therapy Education     Title: PT OT SLP Therapies (Active)     Topic: Occupational Therapy (Active)     Point: ADL training (Done)    Description: Instruct learner(s) on proper safety adaptation and remediation techniques during self care or transfers.   Instruct in proper use of assistive devices.    Learning Progress Summary    Learner Readiness Method Response Comment Documented by Status   Patient Acceptance E VU,DU Education reinforced for home safety, HEP and awareness of neuro deficits and need for immediate medical attention TB 10/21/17 1559 Done    Acceptance E,D VU,NR,DU Educated pt on found deficits and assist needed currently for ADL's. AN 10/19/17 1026 Done   Family Acceptance E VU,DU Education reinforced for home safety, HEP and awareness of neuro deficits and need for immediate medical attention TB 10/21/17 1559 Done                      User Key     Initials Effective Dates Name Provider Type Discipline     06/22/15 -  Keesha Melo, OT Occupational Therapist OT    AN 06/22/15 -  Valeria Varghese OT Occupational Therapist OT                OT Recommendation and Plan  Anticipated Equipment Needs At Discharge: shower chair (grab bar)  Anticipated Discharge Disposition: home  Plan of Care Review  Plan Of Care Reviewed With: patient, spouse, daughter  Outcome Summary/Follow up Plan: Pt met 3 of 4 LTGs this stay; set to d/c home with family support. Pt strength and FMC fx appears to  have returned to baseline.  OT to d/c at this time.          Outcome Measures       10/21/17 1500 10/19/17 0934 10/19/17 0931    How much help from another person do you currently need...    Turning from your back to your side while in flat bed without using bedrails?   4  -DM    Moving from lying on back to sitting on the side of a flat bed without bedrails?   4  -DM    Moving to and from a bed to a chair (including a wheelchair)?   3  -DM    Standing up from a chair using your arms (e.g., wheelchair, bedside chair)?   3  -DM    Climbing 3-5 steps with a railing?   3  -DM    To walk in hospital room?   3  -DM    AM-PAC 6 Clicks Score   20  -DM    How much help from another is currently needed...    Putting on and taking off regular lower body clothing? 4  -TB 3  -AN     Bathing (including washing, rinsing, and drying) 3  -TB 3  -AN     Toileting (which includes using toilet bed pan or urinal) 4  -TB 4  -AN     Putting on and taking off regular upper body clothing 4  -TB 4  -AN     Taking care of personal grooming (such as brushing teeth) 4  -TB 4  -AN     Eating meals 4  -TB 4  -AN     Score 23  -TB 22  -AN     Modified Gil Scale    Modified [place] Scale 1 - No significant disability despite symptoms.  Able to carry out all usual duties and activities.  -TB 2 - Slight disability.  Unable to carry out all previous activities but able to look after own affairs without assistance.  -AN 2 - Slight disability.  Unable to carry out all previous activities but able to look after own affairs without assistance.  -DM    Functional Assessment    Outcome Measure Options AM-PAC 6 Clicks Daily Activity (OT)  -TB AM-PAC 6 Clicks Daily Activity (OT);Modified Gil  -AN AM-PAC 6 Clicks Basic Mobility (PT);Modified [place]  -DM      User Key  (r) = Recorded By, (t) = Taken By, (c) = Cosigned By    Initials Name Provider Type    KAREN Melo, OT Occupational Therapist    TRACY Morris, PT Physical Therapist    AN  Valeria Varghese, OT Occupational Therapist           Time Calculation:          Time Calculation- OT       10/21/17 1604          Time Calculation- OT    OT Start Time 1500  -TB      Total Timed Code Minutes- OT 15 minute(s)  -TB      OT Received On 10/21/17  -TB        User Key  (r) = Recorded By, (t) = Taken By, (c) = Cosigned By    Initials Name Provider Type    TB Keesha Melo OT Occupational Therapist          Therapy Charges for Today     Code Description Service Date Service Provider Modifiers Qty    [id number]  OT THERAPEUTIC ACT EA 15 MIN 10/21/2017 Keesha Melo OT GO 1               OT Discharge Summary  Anticipated Discharge Disposition: home  Reason for Discharge: Discharge from facility, At baseline function  Outcomes Achieved: Able to achieve all goals within established timeline  Discharge Destination: Home    Keesha Melo OT  10/21/2017

## 2017-10-21 NOTE — CONSULTS
"                                                             ENT Consult Note    Guicho Blanco  [id number]  1940  Date of Consult: 10/21/2017    Referring Provider:   Reason for Consultation: Dizziness        Chief complaint: I had a stroke      History of present illness:   I was asked to see this pleasant 76-year-old gentleman for the evaluation of dizziness associated with a severe headache upon admission.  He describes the dizziness as his head was expanding and felt that it \"was about to bust.\"  This was associated with nausea and vomiting not controlled by Zofran.  He has a history of hearing loss and wears hearing aids.  He denies any acute changes in his hearing or tinnitus during the spells.  He had a prior episode of headache along with nausea and vomiting in September.  He was told he had fluid in his ears and was treated with meclizine.  He was seen as an outpatient where no fluid was identified and there was no evidence of nystagmus or vertigo.  He does note that when he has the headache if he moves his head quickly it will make him feel lightheaded and under pressure.  He has had problems with peripheral neuropathy and a retinal issue on the left eye.  At this time, he denies any nausea vomiting or vertigo; his headache is much improved as well.  His MRI on admission showed 2 small focal areas of infarct.  An MRA was done that showed no compromise of the intra- cranial vessels.      Review of Systems  Review of Systems - History obtained from the patient    Pertinent items are noted in HPI    Neurologic: No numbness, tingling or weakness of extremities.  Cardiac: No chest pain,  shortness of breath or dyspnea on exertion    History  Past Medical History:   Diagnosis Date   • Arthritis    • BPH (benign prostatic hypertrophy)    • Cellulitis    • COPD (chronic obstructive pulmonary disease)    • Coronary artery disease    • GERD (gastroesophageal reflux disease)    • Hearing loss    • Hiatal " hernia    • Neuropathy    • Peripheral neuropathy    • Peripheral vascular disease    , Past Surgical History:   Procedure Laterality Date   • ABLATION OF DYSRHYTHMIC FOCUS     • APPENDECTOMY     • CARDIAC CATHETERIZATION N/A 10/5/2017    Procedure: Left Heart Cath;  Surgeon: Hector Urrutia MD;  Location: [facility] INVASIVE LOCATION;  Service:    • CATARACT EXTRACTION Bilateral    • CHOLECYSTECTOMY      Remote   • CORONARY ANGIOPLASTY     • GALLBLADDER SURGERY     • KNEE ARTHROSCOPY Right     Right knee ACL repair.   • PROSTATE SURGERY     • SHOULDER SURGERY Right    • TOE AMPUTATION Right     2nd toe - Right Foot   , Family History   Problem Relation Age of Onset   • Atrial fibrillation Mother    • Cancer Father    , Social History   Substance Use Topics   • Smoking status: Never Smoker   • Smokeless tobacco: Never Used   • Alcohol use No   , Prescriptions Prior to Admission   Medication Sig Dispense Refill Last Dose   • acetaminophen (TYLENOL) 500 MG tablet Take 500 mg by mouth every 6 (six) hours as needed for mild pain (1-3).   10/18/2017   • aspirin 81 MG EC tablet Take 81 mg by mouth Daily.   10/17/2017   • B Complex Vitamins (VITAMIN B COMPLEX) capsule capsule Take 1 capsule by mouth Daily.   10/18/2017   • Cholecalciferol (VITAMIN D3) 5000 UNITS capsule capsule Take 5,000 Units by mouth daily.   10/18/2017   • coenzyme Q10 100 MG capsule Take 100 mg by mouth daily.   10/18/2017   • cycloSPORINE (RESTASIS) 0.05 % ophthalmic emulsion Administer 1 drop to both eyes 2 (two) times a day.   10/18/2017   • flecainide (TAMBOCOR) 100 MG tablet Take 1 tablet by mouth 2 (Two) Times a Day. 180 tablet 2 10/18/2017   • isosorbide mononitrate (IMDUR) 30 MG 24 hr tablet Take 1 tablet by mouth Daily. 30 tablet 11 10/18/2017   • meclizine (ANTIVERT) 25 MG tablet Take 25 mg by mouth 3 (Three) Times a Day As Needed for dizziness.   10/18/2017   • omeprazole (PriLOSEC) 20 MG capsule Take 20 mg by mouth daily.   10/18/2017   •  "ondansetron (ZOFRAN) 4 MG tablet Take 4 mg by mouth Every 8 (Eight) Hours As Needed for Nausea or Vomiting.   10/18/2017   • pravastatin (PRAVACHOL) 20 MG tablet Take 1 tablet by mouth Daily. 90 tablet 3 10/17/2017   • traMADol (ULTRAM) 50 MG tablet Take 50 mg by mouth 2 (Two) Times a Day As Needed for Moderate Pain (4-6).   Past Week at Unknown time    and Allergies:  Cymbalta [duloxetine hcl]; Metoprolol; and Neurontin [gabapentin]        Physical Exam:    Vital Signs   /81  Pulse 93  Temp 98.5 °F (36.9 °C) (Oral)   Resp 16  Ht 73\" (185.4 cm)  Wt 226 lb 12.8 oz (103 kg)  SpO2 97%  BMI 29.92 kg/m2        General Appearance: Alert, cooperative, in no acute distress, poor hearing but he does wear hearing aids   Head:    Normocephalic, without obvious abnormality, atraumatic   Eyes:     Conjunctivae and sclerae normal, PERRLA,No nystagmus- patient does have a history of a retinal problem with the left eye   Ears:    Ears appear intact with no abnormalities noted   Nose:  Nasal septum straight, mucosa normal, turbinates normal   Throat:  No oral lesions, no thrush, oral mucosa moist   Neck: No adenopathy, supple, trachea midline, no thyromegaly, no carotid bruit   Pulses:  Neck pulses palpable and equal bilaterally   Skin:  No bleeding, bruising or rash   Lymph nodes:  No palpable adenopathy   Neurologic:  Cranial nerves II - XII grossly intact       Results Review:   I reviewed the patient's new clinical results.    Results from last 7 days  Lab Units 10/21/17  0506   WBC 10*3/mm3 7.47   HEMOGLOBIN g/dL 13.8   HEMATOCRIT % 40.9   PLATELETS 10*3/mm3 174       Results from last 7 days  Lab Units 10/21/17  0506 10/19/17  0741   SODIUM mmol/L 139 140   POTASSIUM mmol/L 4.1 3.9   CHLORIDE mmol/L 110* 105   CO2 mmol/L 25.0 28.0   BUN mg/dL 11 11   CREATININE mg/dL 1.20 1.10   CALCIUM mg/dL 9.5 9.3   BILIRUBIN mg/dL  --  1.0   ALK PHOS U/L  --  106*   ALT (SGPT) U/L  --  31   AST (SGOT) U/L  --  29   GLUCOSE " mg/dL 104* 97       Imaging:  Imaging Results (last 72 hours)     Procedure Component Value Units Date/Time    XR Chest 1 View [[id number]] Collected:  10/18/17 1454     Updated:  10/18/17 1510    Narrative:       EXAMINATION: XR CHEST 1 VW-10/18/2017:      INDICATION: Weak/Dizzy/AMS, triage protocol.      COMPARISON: 09/26/2017.     FINDINGS: The cardiac silhouette is normal. There are stable chronic  pulmonary changes with no acute process.           Impression:       No active disease.     D:  10/18/2017  E:  10/18/2017     This report was finalized on 10/18/2017 3:08 PM by Dr. Long aWite MD.       CT Head Without Contrast [[id number]] Collected:  10/18/17 1608     Updated:  10/18/17 1619    Narrative:       EXAMINATION: CT HEAD WO CONTRAST-10/18/2017:      INDICATION: Dizziness, headache.         TECHNIQUE: CT scan of the head was performed without contrast.     The radiation dose reduction device was turned on for each scan per the  ALARA (As Low as Reasonably Achievable) protocol.     COMPARISON: 09/26/2017.     FINDINGS: There is no intracranial mass. There is no hemorrhage. There  is no midline shift or extra-axial fluid collection. There are age-  related changes.       Impression:       Age-related findings. There are no acute abnormalities.     D:  10/18/2017  E:  10/18/2017           This report was finalized on 10/18/2017 4:17 PM by Dr. Long Waite MD.       MRI Brain Without Contrast [[id number]] Collected:  10/18/17 1714     Updated:  10/18/17 2025    Addenda:        THIS REPORT CONTAINS FINDINGS THAT MAY BE CRITICAL TO PATIENT CARE. The   findings were verbally communicated via telephone conference with   [RIAZ PICKENS] at 8:24 PM EDT on 10/18/2017. The findings were acknowledged and   understood.    THIS DOCUMENT HAS BEEN ELECTRONICALLY SIGNED BY RADHA FERRIS MD  Signed:  10/18/17 2025 by Radha Ferris MD    Narrative:       EXAM:    MR Head Without Intravenous Contrast    CLINICAL HISTORY:     76 years old, male; Signs and symptoms; Dizziness; Patient HX: Headache,   dizziness. Patient reports that the room is spinning. Patient reports that he   has a history of vertigo and believes that this is similar to his symptoms   september 26,2017.    TECHNIQUE:    Magnetic resonance images of the head/brain without intravenous contrast in   multiple planes.    COMPARISON:    MR BRAIN WO CONTRAST 2003-12-02 11:17    FINDINGS:    Two small focal areas of restricted diffusion in the RIGHT centrum   semiovale/paramedian frontal lobe subcortical white matter measuring   approximately 8 mm and 5 mm without surrounding edema or mass effect and no   evidence of hemorrhage.        Confluent and focal areas of abnormal T2 prolongation within the   periventricular and subcortical white matter of the supratentorial brain   without any other region of restricted diffusion.      Midline brain structures including the corpus callosum, pituitary gland,   pineal region, and craniovertebral junction are unremarkable. Ventricles and   sulci are mildly dilated without evidence of hydrocephalus. Intracranial   vessels demonstrate a normal flow-void.       Impression:         ACUTE LACUNAR ISCHEMIC INFARCTS in the RIGHT centrum semiovale without   evidence of intracranial hemorrhage or hemorrhagic transformation.       THIS DOCUMENT HAS BEEN ELECTRONICALLY SIGNED BY RADHA FERRIS MD    MRI Angiogram Neck With & Without Contrast [[id number]] Collected:  10/18/17 2310     Updated:  10/19/17 0035    Narrative:       EXAM:    MR Angiography Neck Without and With Intravenous Contrast    CLINICAL HISTORY:    76 years old, male; Cerebral infarction, unspecified; Signs and symptoms;   Other: Headache, dizziness; Patient HX: Stroke headache, dizziness 1.0 creat 72   gfr 19 ml multihance administered    TECHNIQUE:    Magnetic resonance angiography images of the neck without and with   intravenous contrast.    CONTRAST:    19 mL of Multihance  administered intravenously.    COMPARISON:      No relevant comparison exams are available.    FINDINGS:    THORACIC VESSELS: Visualized aortic arch is unremarkable.  No significant   narrowing of the origin of the neck vessels.           CAROTID VESSELS:       Common carotid arteries: Common carotid arteries are without flow limiting   stenosis.      Cervical internal CAROTID arteries: No flow-limiting stenosis of the cervical   internal carotid arteries. No evidence of an aneurysm or a dissection.            VERTEBRAL VESSELS:       VERTEBRAL arteries: Cervical vertebral arteries are without flow limiting   stenosis.      Impression:         No significant narrowing, no evidence of occlusion, aneurysm or dissection of   the carotid and vertebral arteries in the neck.      NOTE: Carotid stenosis evaluation is based on NASCET (North American   Symptomatic Carotid Endarterectomy Trial).    THIS DOCUMENT HAS BEEN ELECTRONICALLY SIGNED BY RADHA FERRIS MD    FL Video Swallow With Speech [[id number]] Collected:  10/19/17 1456     Updated:  10/19/17 1610    Narrative:       EXAMINATION: FL VIDEO SWALLOW W SPEECH-     INDICATION: dysphagia; I63.9-Cerebral infarction, unspecified;  Z74.09-Other reduced mobility; Z74.09-Other reduced mobility         TECHNIQUE: 42 seconds of fluoroscopic time was used for this exam. 1  associated image was saved. The patient was evaluated in the seated  lateral position while taking a variety of consistencies of barium by  mouth under the direction of speech pathology.     COMPARISON: NONE     FINDINGS: There was no penetration and no aspiration with any of the  media ingested.          Impression:       Fluoroscopy provided for a modified barium swallow. Please  see speech therapy report for full details and recommendations.         This report was finalized on 10/19/2017 4:08 PM by Dr. Christina Mittal MD.       MRI Angiogram Head Without Contrast [[id number]] Collected:  10/19/17 8172      Updated:  10/19/17 1612    Narrative:       EXAMINATION: MRI ANGIOGRAM HEAD WO CONTRAST-10/19/2017:      INDICATION: Stroke, dizziness; I63.9-Cerebral infarction, unspecified;  Z74.09-Other reduced mobility; Z74.09-Other reduced mobility, dizziness,  stroke.     TECHNIQUE: 3-D time-of-flight MR angiographic imaging was obtained of  the intracranial vessels centered at the [place] without the  administration of Gadolinium contrast. Source and maximum intensity  projection images are available for evaluation.     Stenosis measurement was performed by the NASCET or similar method.     The radiation dose reduction device was turned on for each scan per the  ALARA (As Low as Reasonably Achievable) protocol.     COMPARISON: NONE     FINDINGS: Anterior posterior circulation is intact and unremarkable in  appearance. No aneurysmal dilatation, vascular malformation or  significant stenosis. There is a slightly dominant left vertebral  artery. The visualized parenchyma is grossly unremarkable.       Impression:       Unremarkable MRA of the brain.     D:  10/19/2017  E:  10/19/2017     This report was finalized on 10/19/2017 4:09 PM by Dr. Christina Mittal MD.                 Assessment:  Dizziness associated with headache and CVA changes on MRI-The presentation is almost that of a vascular headache with associated dizziness but there has been some changes on the recent MRI.  Fortunately, his MRA suggested no evidence of any vascular occlusion.      Principal Problem:    CVA (cerebral vascular accident)  Active Problems:    Coronary artery disease involving native coronary artery of native heart without angina pectoris    Gastroesophageal reflux disease without esophagitis    Essential hypertension    Dyslipidemia    COPD (chronic obstructive pulmonary disease)    BPH (benign prostatic hypertrophy)   Hearing loss-aided      Plan:  Supportive care as prescribed  Agree that vestibular rehabilitation may be helpful.  " This would help normally from  recovery from a stroke but also would help with his balance issues.  As he has never had \"true vertigo\", I feel that the increased intracranial pressure from the headache seems to be causative of the balance issue.  Neurology follow-up will be important to monitor this issue.    I discussed the patients findings and my recommendations with patient and family.    Duncan Gilliam MD  10/21/17  11:25 AM        "

## 2017-10-21 NOTE — NURSING NOTE
Dr. Duncan notified regarding Stroke Quality Measure requirements regarding intensive high dose statin at discharge and regarding anticoagulantation therapy when pt has a PMH of Atrial fibrillation. Dr. Duncan stated this was documented in the notes.

## 2017-10-21 NOTE — PLAN OF CARE
Problem: Patient Care Overview (Adult)  Goal: Plan of Care Review  Outcome: Ongoing (interventions implemented as appropriate)    10/21/17 0304   Patient Care Overview   Progress progress toward functional goals as expected   Coping/Psychosocial Response Interventions   Plan Of Care Reviewed With patient         Problem: Stroke (Ischemic) (Adult)  Goal: Signs and Symptoms of Listed Potential Problems Will be Absent or Manageable (Stroke)  Outcome: Ongoing (interventions implemented as appropriate)

## 2017-10-21 NOTE — DISCHARGE SUMMARY
[facility] Medicine Services  DISCHARGE SUMMARY    Patient Name: Guicho Blanco  : 1940  MRN: [id number]    Date of Admission: 10/18/2017  Date of Discharge:    Length of Stay: 3  Primary Care Physician: Mitchel Culver MD    Consults     Date and Time Order Name Status Description    10/20/2017 1704 Inpatient Consult to ENT Completed     10/18/2017 2309 Inpatient Consult to Neurology Completed     10/18/2017 2309 Inpatient Consult to Cardiology Completed           Hospital Course     Presenting Problem:   Cerebrovascular accident (CVA), unspecified mechanism [I63.9]      Active Hospital Problems (** Indicates Principal Problem)    Diagnosis Date Noted   • **CVA (cerebral vascular accident) [I63.9] 10/18/2017   • Vertigo [R42] 10/18/2017   • COPD (chronic obstructive pulmonary disease) [J44.9]    • BPH (benign prostatic hypertrophy) [N40.0]    • Coronary artery disease involving native coronary artery of native heart without angina pectoris [I25.10] 2016   • Dyslipidemia [E78.5] 2016   • Essential hypertension [I10] 2016   • Gastroesophageal reflux disease without esophagitis [K21.9] 2016      Resolved Hospital Problems    Diagnosis Date Noted Date Resolved   No resolved problems to display.      Hospital Course:  Guicho Blanco is a 76 y.o. male past medical history significant for newly diagnosed vertigo, essential hypertension, dyslipidemia, CAD, COPD, BPH and GERD who presents to the ED with complaints of dizziness associated with HA n/v.  Patient was diagnosed with vertigo in 2017 and was started on Meclizine for which according to the patient he had only taken this medication once prior to the day of admission. On arrival here, w/u included a MRI that noted acute lacunar ischemic infarcts in right centrum semi ovale with no evidence of intracranial hemorrhage. Neurology was consulted recommended increasing home asa to 325mg,  and statin. Patient had a hx of a fib s/p ablation, he had recently had a Holter monitor this was collected for data processing, cardiology was consulted, no further w/u was indicated, he was asked to f/u in cardiology clinic in 3 months.  In regards to his dizziness it was unclear if it was truly vertigo, MRA head  And EEG was obtained that were all unrevealing. Patient had previously been seen by  with ENT, we asked ENT to re-evaluate and  Their impression was that his presentation was that likely that of a vascular HA with associated dizziness, though MRA showed no vascular occlusion. They agree with vestibular rehab and neurology f/u.        Day of Discharge     HPI: dizziness    Review of Systems  Gen- No fevers, chills  CV- No chest pain, palpitations  Resp- No cough, dyspnea  GI- No N/V/D, abd pain    Otherwise complete ROS is negative except as mentioned in the HPI.    Vital Signs: Temp:  [97.9 °F (36.6 °C)-98.5 °F (36.9 °C)] 98.5 °F (36.9 °C)  Heart Rate:  [74-93] 93  Resp:  [16-18] 16  BP: (114-139)/(63-85) 134/81     Physical Exam:  Constitutional: No acute distress, awake, alert  HENT: NCAT, mucous membranes moist  Respiratory: Clear to auscultation bilaterally, respiratory effort normal   Cardiovascular: RRR, no murmurs, rubs, or gallops, palpable pedal pulses bilaterally  Gastrointestinal: Positive bowel sounds, soft, nontender, nondistended  Musculoskeletal: No bilateral ankle edema  Psychiatric: Oriented x 3, appropriate affect, cooperative  Neurologic: Strength symmetric in all extremities, CN grossly intact to confrontation, speech is clear  Skin: No rashes      Pertinent  and/or Most Recent Results         Results from last 7 days  Lab Units 10/21/17  0506 10/19/17  0741 10/19/17  0536 10/18/17  1351   WBC 10*3/mm3 7.47  --  8.03 7.87   HEMOGLOBIN g/dL 13.8  --  12.9* 13.8   HEMATOCRIT % 40.9  --  39.7 42.1   PLATELETS 10*3/mm3 174  --  169 182   SODIUM mmol/L 139 140  --  137   POTASSIUM  mmol/L 4.1 3.9  --  4.0   CHLORIDE mmol/L 110* 105  --  103   CO2 mmol/L 25.0 28.0  --  27.0   BUN mg/dL 11 11  --  10   CREATININE mg/dL 1.20 1.10  --  1.00   GLUCOSE mg/dL 104* 97  --  125*   CALCIUM mg/dL 9.5 9.3  --  9.2       Results from last 7 days  Lab Units 10/19/17  0741 10/18/17  1351   BILIRUBIN mg/dL 1.0 0.6   ALK PHOS U/L 106* 114*   ALT (SGPT) U/L 31 27   AST (SGOT) U/L 29 28        Results from last 7 days  Lab Units 10/19/17  0741   CHOLESTEROL mg/dL 128   TRIGLYCERIDES mg/dL 77   HDL CHOL mg/dL 38*   LDL CHOL mg/dL 83       Results from last 7 days  Lab Units 10/19/17  0741 10/19/17  0536   TSH mIU/mL 1.742  --    HEMOGLOBIN A1C %  --  5.40     Brief Urine Lab Results  (Last result in the past 365 days)      Color   Clarity   Blood   Leuk Est   Nitrite   Protein   CREAT   Urine HCG        10/18/17 1932 Yellow Clear Negative Negative Negative Negative                 Imaging Results (all)     Procedure Component Value Units Date/Time    XR Chest 1 View [[id number]] Collected:  10/18/17 1454     Updated:  10/18/17 1510    Narrative:       EXAMINATION: XR CHEST 1 VW-10/18/2017:      INDICATION: Weak/Dizzy/AMS, triage protocol.      COMPARISON: 09/26/2017.     FINDINGS: The cardiac silhouette is normal. There are stable chronic  pulmonary changes with no acute process.           Impression:       No active disease.     D:  10/18/2017  E:  10/18/2017     This report was finalized on 10/18/2017 3:08 PM by Dr. Long Waite MD.       CT Head Without Contrast [[id number]] Collected:  10/18/17 1608     Updated:  10/18/17 1619    Narrative:       EXAMINATION: CT HEAD WO CONTRAST-10/18/2017:      INDICATION: Dizziness, headache.         TECHNIQUE: CT scan of the head was performed without contrast.     The radiation dose reduction device was turned on for each scan per the  ALARA (As Low as Reasonably Achievable) protocol.     COMPARISON: 09/26/2017.     FINDINGS: There is no intracranial mass. There is no  hemorrhage. There  is no midline shift or extra-axial fluid collection. There are age-  related changes.       Impression:       Age-related findings. There are no acute abnormalities.     D:  10/18/2017  E:  10/18/2017           This report was finalized on 10/18/2017 4:17 PM by Dr. Long Waite MD.       MRI Brain Without Contrast [[id number]] Collected:  10/18/17 1714     Updated:  10/18/17 2025    Addenda:        THIS REPORT CONTAINS FINDINGS THAT MAY BE CRITICAL TO PATIENT CARE. The   findings were verbally communicated via telephone conference with   [RIAZ PICKENS] at 8:24 PM EDT on 10/18/2017. The findings were acknowledged and   understood.    THIS DOCUMENT HAS BEEN ELECTRONICALLY SIGNED BY RADHA FERRIS MD  Signed:  10/18/17 2025 by Radha Ferris MD    Narrative:       EXAM:    MR Head Without Intravenous Contrast    CLINICAL HISTORY:    76 years old, male; Signs and symptoms; Dizziness; Patient HX: Headache,   dizziness. Patient reports that the room is spinning. Patient reports that he   has a history of vertigo and believes that this is similar to his symptoms   september 26,2017.    TECHNIQUE:    Magnetic resonance images of the head/brain without intravenous contrast in   multiple planes.    COMPARISON:    MR BRAIN WO CONTRAST 2003-12-02 11:17    FINDINGS:    Two small focal areas of restricted diffusion in the RIGHT centrum   semiovale/paramedian frontal lobe subcortical white matter measuring   approximately 8 mm and 5 mm without surrounding edema or mass effect and no   evidence of hemorrhage.        Confluent and focal areas of abnormal T2 prolongation within the   periventricular and subcortical white matter of the supratentorial brain   without any other region of restricted diffusion.      Midline brain structures including the corpus callosum, pituitary gland,   pineal region, and craniovertebral junction are unremarkable. Ventricles and   sulci are mildly dilated without evidence of  hydrocephalus. Intracranial   vessels demonstrate a normal flow-void.       Impression:         ACUTE LACUNAR ISCHEMIC INFARCTS in the RIGHT centrum semiovale without   evidence of intracranial hemorrhage or hemorrhagic transformation.       THIS DOCUMENT HAS BEEN ELECTRONICALLY SIGNED BY RADHA FERRIS MD    MRI Angiogram Neck With & Without Contrast [[id number]] Collected:  10/18/17 2310     Updated:  10/19/17 0035    Narrative:       EXAM:    MR Angiography Neck Without and With Intravenous Contrast    CLINICAL HISTORY:    76 years old, male; Cerebral infarction, unspecified; Signs and symptoms;   Other: Headache, dizziness; Patient HX: Stroke headache, dizziness 1.0 creat 72   gfr 19 ml multihance administered    TECHNIQUE:    Magnetic resonance angiography images of the neck without and with   intravenous contrast.    CONTRAST:    19 mL of Multihance administered intravenously.    COMPARISON:      No relevant comparison exams are available.    FINDINGS:    THORACIC VESSELS: Visualized aortic arch is unremarkable.  No significant   narrowing of the origin of the neck vessels.           CAROTID VESSELS:       Common carotid arteries: Common carotid arteries are without flow limiting   stenosis.      Cervical internal CAROTID arteries: No flow-limiting stenosis of the cervical   internal carotid arteries. No evidence of an aneurysm or a dissection.            VERTEBRAL VESSELS:       VERTEBRAL arteries: Cervical vertebral arteries are without flow limiting   stenosis.      Impression:         No significant narrowing, no evidence of occlusion, aneurysm or dissection of   the carotid and vertebral arteries in the neck.      NOTE: Carotid stenosis evaluation is based on NASCET (North American   Symptomatic Carotid Endarterectomy Trial).    THIS DOCUMENT HAS BEEN ELECTRONICALLY SIGNED BY RADHA FERRIS MD    FL Video Swallow With Speech [[id number]] Collected:  10/19/17 1456     Updated:  10/19/17 1610    Narrative:        EXAMINATION: FL VIDEO SWALLOW W SPEECH-     INDICATION: dysphagia; I63.9-Cerebral infarction, unspecified;  Z74.09-Other reduced mobility; Z74.09-Other reduced mobility         TECHNIQUE: 42 seconds of fluoroscopic time was used for this exam. 1  associated image was saved. The patient was evaluated in the seated  lateral position while taking a variety of consistencies of barium by  mouth under the direction of speech pathology.     COMPARISON: NONE     FINDINGS: There was no penetration and no aspiration with any of the  media ingested.          Impression:       Fluoroscopy provided for a modified barium swallow. Please  see speech therapy report for full details and recommendations.         This report was finalized on 10/19/2017 4:08 PM by Dr. Christina Mittal MD.       MRI Angiogram Head Without Contrast [[id number]] Collected:  10/19/17 1544     Updated:  10/19/17 1612    Narrative:       EXAMINATION: MRI ANGIOGRAM HEAD WO CONTRAST-10/19/2017:      INDICATION: Stroke, dizziness; I63.9-Cerebral infarction, unspecified;  Z74.09-Other reduced mobility; Z74.09-Other reduced mobility, dizziness,  stroke.     TECHNIQUE: 3-D time-of-flight MR angiographic imaging was obtained of  the intracranial vessels centered at the [place] without the  administration of Gadolinium contrast. Source and maximum intensity  projection images are available for evaluation.     Stenosis measurement was performed by the NASCET or similar method.     The radiation dose reduction device was turned on for each scan per the  ALARA (As Low as Reasonably Achievable) protocol.     COMPARISON: NONE     FINDINGS: Anterior posterior circulation is intact and unremarkable in  appearance. No aneurysmal dilatation, vascular malformation or  significant stenosis. There is a slightly dominant left vertebral  artery. The visualized parenchyma is grossly unremarkable.       Impression:       Unremarkable MRA of the brain.     D:   10/19/2017  E:  10/19/2017     This report was finalized on 10/19/2017 4:09 PM by Dr. Christina Mittal MD.             Results for orders placed during the hospital encounter of 10/05/17   Adult Transthoracic Echo Complete W/ Cont if Necessary Per Protocol    Addendum · Left ventricular systolic function is normal. Calculated EF = 64%.  Estimated EF appears to be in the range of 61 - 65%. · Left ventricular wall thickness is consistent with mild concentric  hypertrophy. · Left ventricular diastolic dysfunction (grade I) consistent with  impaired relaxation.        Hector Urrutia MD 10/5/2017  5:43 PM          Narrative · Left ventricular systolic function is normal. Calculated EF = 64%.   Estimated EF appears to be in the range of 61 - 65%.  · Left ventricular diastolic dysfunction (grade I) consistent with   impaired relaxation.              Discharge Details      Guicho Blanco   Home Medication Instructions JOSE:[id number]    Printed on:10/21/17 1213   Medication Information                      acetaminophen (TYLENOL) 500 MG tablet  Take 500 mg by mouth every 6 (six) hours as needed for mild pain (1-3).             aspirin 325 MG tablet  Take 1 tablet by mouth Daily for 90 days.             atorvastatin (LIPITOR) 10 MG tablet  Take 1 tablet by mouth Every Night for 30 days.             B Complex Vitamins (VITAMIN B COMPLEX) capsule capsule  Take 1 capsule by mouth Daily.             Cholecalciferol (VITAMIN D3) 5000 UNITS capsule capsule  Take 5,000 Units by mouth daily.             coenzyme Q10 100 MG capsule  Take 100 mg by mouth daily.             cycloSPORINE (RESTASIS) 0.05 % ophthalmic emulsion  Administer 1 drop to both eyes 2 (two) times a day.             flecainide (TAMBOCOR) 100 MG tablet  Take 1 tablet by mouth 2 (Two) Times a Day.             isosorbide mononitrate (IMDUR) 30 MG 24 hr tablet  Take 1 tablet by mouth Daily.             meclizine (ANTIVERT) 25 MG tablet  Take 25 mg by mouth 3  (Three) Times a Day As Needed for dizziness.             omeprazole (PriLOSEC) 20 MG capsule  Take 20 mg by mouth daily.             ondansetron (ZOFRAN) 4 MG tablet  Take 4 mg by mouth Every 8 (Eight) Hours As Needed for Nausea or Vomiting.             traMADol (ULTRAM) 50 MG tablet  Take 50 mg by mouth 2 (Two) Times a Day As Needed for Moderate Pain (4-6).                 Discharge Disposition:Home    Discharge Diet:  Diet Instructions     Advance Diet As Tolerated                   Discharge Activity:    Special Instructions:  Activity Instructions     Activity as Tolerated                   Future Appointments  Date Time Provider Department Center   11/7/2017 9:45 AM Arnel Daley MD [facility] None   11/20/2017 3:15 PM Hector Urrutia MD [facility] None   Neurology clinic 1st available    Additional Instructions for the Follow-ups that You Need to Schedule     Ambulatory Referral to Physical Therapy Evaluate and treat    As directed    Specialty modality needed?:  Evaluate and treat               Time Spent on Discharge:  40 minutes    Hugo Duncan MD  10/21/17  12:13 PM

## 2017-10-30 ENCOUNTER — OFFICE VISIT (OUTPATIENT)
Dept: NEUROLOGY | Facility: CLINIC | Age: 77
End: 2017-10-30

## 2017-10-30 VITALS
BODY MASS INDEX: 29.95 KG/M2 | SYSTOLIC BLOOD PRESSURE: 130 MMHG | WEIGHT: 226 LBS | DIASTOLIC BLOOD PRESSURE: 80 MMHG | HEIGHT: 73 IN

## 2017-10-30 DIAGNOSIS — I63.9 CEREBROVASCULAR ACCIDENT (CVA), UNSPECIFIED MECHANISM (HCC): Primary | ICD-10-CM

## 2017-10-30 PROCEDURE — 99214 OFFICE O/P EST MOD 30 MIN: CPT | Performed by: PHYSICIAN ASSISTANT

## 2017-10-30 NOTE — PROGRESS NOTES
"Subjective     Chief Complaint: stroke      History of Present Illness   Guicho Blanco is a 76 y.o. male with a history of atrial fibrillation who comes to clinic today following a hospitalization at [facility] from 10/18-10/21/17 for stroke. He noted a headache and lightheadedness prior to his hospitalization. He noted associated nausea and vomiting. He denied any additional associated neurological symptoms including focal weakness, numbness, paresthesias, slurred speech, dysphagia, or changes in vision. He states that he had similar somewhat symptoms of dizziness in 9/17 when he was seen at [facility] ED and diagnosed and treated for vertigo. An MRI during this hospitalization was notable for acute lacunar infarcts in the right centrum semiovale. An MRA of the head and neck were unremarkable. An echo and carotids earlier in 10/17 were unremarkable.     Since his hospitalization, he notes that his headache and dizziness have essentially resolved.       I have reviewed and confirmed the past family, social and medical history as accurate on 10/30/17.     Review of Systems   Constitutional: Negative.    HENT: Negative.    Eyes: Negative.    Respiratory: Negative.    Cardiovascular: Negative.    Gastrointestinal: Negative.    Endocrine: Negative.    Genitourinary: Negative.    Musculoskeletal: Negative.    Skin: Negative.    Allergic/Immunologic: Negative.    Hematological: Negative.    Psychiatric/Behavioral: Negative.        Objective     /80  Ht 73\" (185.4 cm)  Wt 226 lb (103 kg)  BMI 29.82 kg/m2    General appearance today is normal.       Physical Exam   Neurological: He has normal strength. He has a normal Finger-Nose-Finger Test. Gait normal.   Psychiatric: His speech is normal.        Neurologic Exam     Mental Status   Speech: speech is normal   Level of consciousness: alert  Normal comprehension.     Cranial Nerves   Cranial nerves II through XII intact.     Motor Exam   Muscle bulk: normal  Overall muscle " tone: normal    Strength   Strength 5/5 throughout.     Sensory Exam   Light touch normal.     Gait, Coordination, and Reflexes     Gait  Gait: normal    Coordination   Finger to nose coordination: normal    Tremor   Resting tremor: absent          Assessment/Plan   Guicho was seen today for stroke.    Diagnoses and all orders for this visit:    Cerebrovascular accident (CVA), unspecified mechanism          Discussion/Summary   Guicho Blanco comes to clinic today with a history of stroke. His workup has been complete and appropriate. Therefore, I do not have any additional recommendations concerning this. It was elected to continue on ASA 325mg daily for now, though I may consider decreasing the dose to 81mg after the initial 90 day period following his stroke. He will discuss this further with his PCP. As for his atorvastatin, I recommended increasing the dose to 40mg daily. He will also discuss this further with his PCP, which is quite reasonable. He will then follow up in our clinic on an as needed basis.      I spent 20 minutes out of 30  minutes face to face with the patient and family and discussing diagnosis, prognosis, diagnostic testing, evaluation, current status, treatment options and management.    As part of this visit I reviewed radiology results, reviewed radiology images, obtained additional history from the family which is incorporated in the HPI and reviewed records from prior hospitalizations which is incorporated in the HPI.      Veda Ryan PA-C

## 2017-12-27 ENCOUNTER — TRANSCRIBE ORDERS (OUTPATIENT)
Dept: ADMINISTRATIVE | Facility: HOSPITAL | Age: 77
End: 2017-12-27

## 2017-12-27 ENCOUNTER — HOSPITAL ENCOUNTER (OUTPATIENT)
Dept: GENERAL RADIOLOGY | Facility: HOSPITAL | Age: 77
Discharge: HOME | End: 2017-12-27
Attending: FAMILY MEDICINE | Admitting: FAMILY MEDICINE

## 2017-12-27 DIAGNOSIS — N43.3 HYDROCELE, UNSPECIFIED HYDROCELE TYPE: Primary | ICD-10-CM

## 2017-12-27 DIAGNOSIS — M51.36 DEGENERATIVE DISC DISEASE, LUMBAR: Primary | ICD-10-CM

## 2017-12-27 PROCEDURE — 72110 X-RAY EXAM L-2 SPINE 4/>VWS: CPT

## 2018-01-02 ENCOUNTER — HOSPITAL ENCOUNTER (OUTPATIENT)
Dept: ULTRASOUND IMAGING | Facility: HOSPITAL | Age: 78
Discharge: HOME | End: 2018-01-02
Attending: FAMILY MEDICINE | Admitting: FAMILY MEDICINE

## 2018-01-02 DIAGNOSIS — N43.3 HYDROCELE, UNSPECIFIED HYDROCELE TYPE: ICD-10-CM

## 2018-01-02 PROCEDURE — 76870 US EXAM SCROTUM: CPT

## 2018-01-12 ENCOUNTER — CONSULT (OUTPATIENT)
Dept: SLEEP MEDICINE | Facility: HOSPITAL | Age: 78
End: 2018-01-12

## 2018-01-12 VITALS
OXYGEN SATURATION: 93 % | BODY MASS INDEX: 29.8 KG/M2 | HEIGHT: 73 IN | HEART RATE: 87 BPM | SYSTOLIC BLOOD PRESSURE: 106 MMHG | WEIGHT: 224.87 LBS | DIASTOLIC BLOOD PRESSURE: 66 MMHG

## 2018-01-12 DIAGNOSIS — G47.33 OBSTRUCTIVE SLEEP APNEA, ADULT: ICD-10-CM

## 2018-01-12 DIAGNOSIS — G47.61 PLMD (PERIODIC LIMB MOVEMENT DISORDER): ICD-10-CM

## 2018-01-12 DIAGNOSIS — E66.3 OVERWEIGHT: ICD-10-CM

## 2018-01-12 DIAGNOSIS — R06.83 SNORING: Primary | ICD-10-CM

## 2018-01-12 PROCEDURE — 99204 OFFICE O/P NEW MOD 45 MIN: CPT | Performed by: INTERNAL MEDICINE

## 2018-01-14 NOTE — PROGRESS NOTES
Subjective   Guicho Blanco is a 77 y.o. male is being seen for consultation today at the request of RUI Culver for the evaluation of possible sleep disordered breathing.  He is also seen by Dr. Neetu Wyatt and Dr. Urrutia.    History of Present Illness  He was noted to have a retinal occlusion about one year ago and is referred here for evaluation of possible DAREK as a potential cause.  He states that he sleeps okay and is up only once during the night. He complains of having pain in his feet at night related to neuropathy.  His wife states that he has been snoring for 25 years, but she has  Not noted apneas. He denies awakening gasping and occasionally is not rested. He denies morning headaches.  He occasionally naps during the day.    He snores loudly,but is worse on his back.  He has awakened with a dry mouth.  He denies ever breaking his nose. He does have acid reflux and is on medications.  He denies hypnagogic hallucinations or sleep paralysis.  He has occasional kicking at night.  He has gained 10 pounds in the past year.    He goes to bed between 9 and 10 PM and falls to sleep in about 15 minutes.  He awakens 1-2 times and gets about 7-8 hours of sleep and usually feels rested.  He denies a history of hypertension, or diabetes.  He does have a history of CAD, with previous stents.  He has a history of atrial fibrillation.  Allergies   Allergen Reactions   • Cymbalta [Duloxetine Hcl] Mental Status Change     Depression, thoughts of Suicide.    • Metoprolol Other (See Comments)     UNKNOWN. Pt does not remember this being allergy   • Neurontin [Gabapentin] Other (See Comments)     Lethargy & fatigue          Current Outpatient Prescriptions:   •  aspirin 325 MG tablet, Take 1 tablet by mouth Daily for 90 days., Disp: 30 tablet, Rfl: 2  •  Cholecalciferol (VITAMIN D3) 5000 UNITS capsule capsule, Take 5,000 Units by mouth daily., Disp: , Rfl:   •  coenzyme Q10 100 MG capsule, Take 100 mg by  mouth daily., Disp: , Rfl:   •  cycloSPORINE (RESTASIS) 0.05 % ophthalmic emulsion, Administer 1 drop to both eyes 2 (two) times a day., Disp: , Rfl:   •  flecainide (TAMBOCOR) 100 MG tablet, Take 1 tablet by mouth 2 (Two) Times a Day., Disp: 180 tablet, Rfl: 2  •  isosorbide mononitrate (IMDUR) 30 MG 24 hr tablet, Take 1 tablet by mouth Daily., Disp: 30 tablet, Rfl: 11  •  meclizine (ANTIVERT) 25 MG tablet, Take 25 mg by mouth 3 (Three) Times a Day As Needed for dizziness., Disp: , Rfl:   •  omeprazole (PriLOSEC) 20 MG capsule, Take 20 mg by mouth daily., Disp: , Rfl:   •  ondansetron (ZOFRAN) 4 MG tablet, Take 4 mg by mouth Every 8 (Eight) Hours As Needed for Nausea or Vomiting., Disp: , Rfl:   •  traMADol (ULTRAM) 50 MG tablet, Take 50 mg by mouth 2 (Two) Times a Day As Needed for Moderate Pain (4-6)., Disp: , Rfl:   •  acetaminophen (TYLENOL) 500 MG tablet, Take 500 mg by mouth every 6 (six) hours as needed for mild pain (1-3)., Disp: , Rfl:   •  B Complex Vitamins (VITAMIN B COMPLEX) capsule capsule, Take 1 capsule by mouth Daily., Disp: , Rfl:     Smoking status: Never Smoker                                                              Smokeless status: Never Used                           History   Alcohol Use No       Caffeine: He drinks one cup of decaf coffee and 1-2 glasses of decaf tea    Past Medical History:   Diagnosis Date   • Arthritis    • BPH (benign prostatic hypertrophy)    • Cellulitis    • COPD (chronic obstructive pulmonary disease)    • Coronary artery disease    • GERD (gastroesophageal reflux disease)    • Hearing loss    • Hiatal hernia    • Neuropathy    • Peripheral neuropathy    • Peripheral vascular disease        Past Surgical History:   Procedure Laterality Date   • ABLATION OF DYSRHYTHMIC FOCUS     • APPENDECTOMY     • CARDIAC CATHETERIZATION N/A 10/5/2017    Procedure: Left Heart Cath;  Surgeon: Hector Urrutia MD;  Location: [facility] CATH INVASIVE LOCATION;  Service:    • CATARACT  "EXTRACTION Bilateral    • CHOLECYSTECTOMY      Remote   • CORONARY ANGIOPLASTY     • GALLBLADDER SURGERY     • KNEE ARTHROSCOPY Right     Right knee ACL repair.   • PROSTATE SURGERY     • SHOULDER SURGERY Right    • TOE AMPUTATION Right     2nd toe - Right Foot   • TONSILLECTOMY         Family History   Problem Relation Age of Onset   • Atrial fibrillation Mother    • Cancer Father    Family history is also positive for COPD.    The following portions of the patient's history were reviewed and updated as appropriate: allergies, current medications, past family history, past medical history, past social history, past surgical history and problem list.    Review of Systems   Constitutional: Positive for unexpected weight change.   HENT: Positive for hearing loss and sinus pressure.    Eyes: Positive for visual disturbance.   Respiratory: Negative.    Cardiovascular: Positive for chest pain.   Gastrointestinal:        He complains of frequent heartburn.   Endocrine: Negative.    Genitourinary: Negative.    Musculoskeletal: Positive for arthralgias, back pain, joint swelling and myalgias.   Skin: Negative.    Allergic/Immunologic: Negative.    Neurological:        Has hx of retinal occlusion   Hematological: Negative.    Psychiatric/Behavioral: Negative.    [place] score is 3/24    Objective     /66  Pulse 87  Ht 185.4 cm (72.99\")  Wt 102 kg (224 lb 13.9 oz)  SpO2 93%  BMI 29.67 kg/m2     Physical Exam   Constitutional: He is oriented to person, place, and time. He appears well-developed and well-nourished.   He is overweight.   HENT:   Head: Normocephalic and atraumatic.   He has Mallampati class III anatomy   Eyes: EOM are normal. Pupils are equal, round, and reactive to light.   Neck: Normal range of motion. Neck supple.   Cardiovascular: Normal rate, regular rhythm and normal heart sounds.    Pulmonary/Chest: Effort normal and breath sounds normal.   Abdominal: Soft. Bowel sounds are normal. "   Musculoskeletal: Normal range of motion. He exhibits no edema.   Neurological: He is alert and oriented to person, place, and time.   Skin: Skin is warm and dry.   Psychiatric: He has a normal mood and affect. His behavior is normal.         Assessment/Plan   Guicho was seen today for sleeping problem.    Diagnoses and all orders for this visit:    Snoring  -     Polysomnography 4 or More Parameters; Future    Obstructive sleep apnea, adult  -     Polysomnography 4 or More Parameters; Future    Overweight    PLMD (periodic limb movement disorder)    He has a history of snoring and a retinal vein occlusion.  I think he gives a history suggestive of DAREK. He also has a history of leg movements at night. We will plan to proceed to polysomnogram.We have discussed possible therapy including CPAP, weight control, oral appliances and surgery.  We also discussed the possible complications of untreated DAREK.  He is to return roughly two weeks after the study and we will reassess the situation.         Gregg Luque MD [facility]  Sleep Medicine  Pulmonary and Critical Care Medicine

## 2018-01-17 ENCOUNTER — HOSPITAL ENCOUNTER (OUTPATIENT)
Dept: GENERAL RADIOLOGY | Facility: HOSPITAL | Age: 78
Discharge: HOME | End: 2018-01-17
Attending: FAMILY MEDICINE | Admitting: FAMILY MEDICINE

## 2018-01-17 ENCOUNTER — TRANSCRIBE ORDERS (OUTPATIENT)
Dept: ADMINISTRATIVE | Facility: HOSPITAL | Age: 78
End: 2018-01-17

## 2018-01-17 DIAGNOSIS — Z01.818 PREOP EXAMINATION: Primary | ICD-10-CM

## 2018-01-17 PROCEDURE — 71046 X-RAY EXAM CHEST 2 VIEWS: CPT

## 2018-01-18 ENCOUNTER — TRANSCRIBE ORDERS (OUTPATIENT)
Dept: ADMINISTRATIVE | Facility: HOSPITAL | Age: 78
End: 2018-01-18

## 2018-01-18 DIAGNOSIS — R91.1 LUNG NODULE: Primary | ICD-10-CM

## 2018-01-22 ENCOUNTER — HOSPITAL ENCOUNTER (OUTPATIENT)
Dept: CT IMAGING | Facility: HOSPITAL | Age: 78
Discharge: HOME | End: 2018-01-22
Attending: FAMILY MEDICINE | Admitting: FAMILY MEDICINE

## 2018-01-22 DIAGNOSIS — R91.1 LUNG NODULE: ICD-10-CM

## 2018-01-22 PROCEDURE — 71250 CT THORAX DX C-: CPT

## 2018-01-29 ENCOUNTER — OFFICE VISIT (OUTPATIENT)
Dept: CARDIOLOGY | Facility: CLINIC | Age: 78
End: 2018-01-29

## 2018-01-29 ENCOUNTER — HOSPITAL ENCOUNTER (OUTPATIENT)
Dept: SLEEP MEDICINE | Facility: HOSPITAL | Age: 78
Discharge: HOME | End: 2018-01-29
Attending: INTERNAL MEDICINE | Admitting: INTERNAL MEDICINE

## 2018-01-29 VITALS
OXYGEN SATURATION: 92 % | DIASTOLIC BLOOD PRESSURE: 75 MMHG | WEIGHT: 239 LBS | HEIGHT: 73 IN | SYSTOLIC BLOOD PRESSURE: 148 MMHG | HEART RATE: 82 BPM | BODY MASS INDEX: 31.68 KG/M2

## 2018-01-29 VITALS
WEIGHT: 236 LBS | HEART RATE: 73 BPM | SYSTOLIC BLOOD PRESSURE: 132 MMHG | BODY MASS INDEX: 31.28 KG/M2 | HEIGHT: 73 IN | DIASTOLIC BLOOD PRESSURE: 70 MMHG

## 2018-01-29 DIAGNOSIS — I25.10 CORONARY ARTERY DISEASE INVOLVING NATIVE CORONARY ARTERY OF NATIVE HEART WITHOUT ANGINA PECTORIS: ICD-10-CM

## 2018-01-29 DIAGNOSIS — I10 ESSENTIAL HYPERTENSION: ICD-10-CM

## 2018-01-29 DIAGNOSIS — I73.9 CLAUDICATION OF LOWER EXTREMITY (HCC): Primary | ICD-10-CM

## 2018-01-29 DIAGNOSIS — R06.83 SNORING: ICD-10-CM

## 2018-01-29 DIAGNOSIS — G47.33 OBSTRUCTIVE SLEEP APNEA, ADULT: ICD-10-CM

## 2018-01-29 DIAGNOSIS — I48.0 PAF (PAROXYSMAL ATRIAL FIBRILLATION) (HCC): ICD-10-CM

## 2018-01-29 PROCEDURE — 95810 POLYSOM 6/> YRS 4/> PARAM: CPT

## 2018-01-29 PROCEDURE — 99214 OFFICE O/P EST MOD 30 MIN: CPT | Performed by: INTERNAL MEDICINE

## 2018-01-29 PROCEDURE — 93000 ELECTROCARDIOGRAM COMPLETE: CPT | Performed by: INTERNAL MEDICINE

## 2018-01-29 PROCEDURE — 95810 POLYSOM 6/> YRS 4/> PARAM: CPT | Performed by: INTERNAL MEDICINE

## 2018-01-29 RX ORDER — PRAVASTATIN SODIUM 20 MG
20 TABLET ORAL DAILY
COMMUNITY
End: 2019-02-04 | Stop reason: SDUPTHER

## 2018-01-29 RX ORDER — ASPIRIN 325 MG
325 TABLET ORAL DAILY
COMMUNITY

## 2018-01-29 NOTE — PROGRESS NOTES
[facility] AT [address], [address]  [place], [place], [address] [phone]  [email or web address]           OUTPATIENT CLINIC FOLLOW UP NOTE      Patient Care Team:  Patient Care Team:  Mitchel Culver MD as PCP - General (Family Medicine)  Mitchel Culver MD as PCP - Family Medicine    Subjective:   Reason for consultation:   Chief Complaint   Patient presents with   • Chest Pain   • Atrial Fibrillation   • Palpitations   • Hypertension       HPI:    Guicho Blanco is a 77 y.o. male.  Chest Pain      His past medical history is significant for CAD.   Atrial Fibrillation   Past medical history includes atrial fibrillation and CAD.   Palpitations      Hypertension     Coronary Artery Disease       The patient has a history of CAD with a history of severe anterior wall ischemia status post ostial RCA stenosis status post PTCA and placement of a Cypher drug-eluting stent by Dr. Sue in 2009, atrial fibrillation and atrial flutter status post ablation of the flutter and fibrillation in 3/2012 by Dr. Daley, hypertension, hyperlipidemia, prior infection and right toe amputation by Dr. Polanco, smoking exposure due to being a  and having subsequent COPD, muscle cramps with statins which has improved with lowering his pravastatin dose from 40 mg to 20 mg but is still persistent, small lacunar infarcts 10/2017, dizziness, Large hiatal hernia, retinal artery occlusion, ongoing sleep apnea evaluation with Dr. Luque, who presents for follow-up.      The patient denies exertional chest pain.  No recent palpitations.  Has chronic exertional dyspnea that is moderate in severity, worse with exertion, resolves with rest.  No associated lower extremity swelling.    The patient's leg weakness and upper thigh muscle cramps have improved partially with decreasing pravastatin from 40 mg to 20 mg daily.  He is a chronic persistent numbness of the lower legs.   Has been told previously that his pulses in his feet are normal.  Still has exertional discomfort in his knees and calves that may suggest both arthritis and claudication.    PFSH:  Patient Active Problem List   Diagnosis   • Osteomyelitis of toe of right foot   • Osteomyelitis of right foot   • PAF (paroxysmal atrial fibrillation)   • Coronary artery disease involving native coronary artery of native heart without angina pectoris   • Gastroesophageal reflux disease without esophagitis   • Benign non-nodular prostatic hyperplasia without lower urinary tract symptoms   • S/P appy   • Essential hypertension   • Simple chronic bronchitis   • Dyslipidemia   • Palpitations   • COPD (chronic obstructive pulmonary disease)   • BPH (benign prostatic hypertrophy)   • Hearing loss   • Chest pain   • CVA (cerebral vascular accident)   • Vertigo   • Snoring   • Overweight   • PLMD (periodic limb movement disorder)         Current Outpatient Prescriptions:   •  acetaminophen (TYLENOL) 500 MG tablet, Take 500 mg by mouth every 6 (six) hours as needed for mild pain (1-3)., Disp: , Rfl:   •  aspirin 325 MG tablet, Take 325 mg by mouth Daily., Disp: , Rfl:   •  B Complex Vitamins (VITAMIN B COMPLEX) capsule capsule, Take 1 capsule by mouth Daily., Disp: , Rfl:   •  Cholecalciferol (VITAMIN D3) 5000 UNITS capsule capsule, Take 5,000 Units by mouth daily., Disp: , Rfl:   •  coenzyme Q10 100 MG capsule, Take 100 mg by mouth daily., Disp: , Rfl:   •  cycloSPORINE (RESTASIS) 0.05 % ophthalmic emulsion, Administer 1 drop to both eyes 2 (two) times a day., Disp: , Rfl:   •  flecainide (TAMBOCOR) 100 MG tablet, Take 1 tablet by mouth 2 (Two) Times a Day., Disp: 180 tablet, Rfl: 2  •  isosorbide mononitrate (IMDUR) 30 MG 24 hr tablet, Take 1 tablet by mouth Daily., Disp: 30 tablet, Rfl: 11  •  meclizine (ANTIVERT) 25 MG tablet, Take 25 mg by mouth 3 (Three) Times a Day As Needed for dizziness., Disp: , Rfl:   •  omeprazole (PriLOSEC) 20 MG  "capsule, Take 20 mg by mouth daily., Disp: , Rfl:   •  ondansetron (ZOFRAN) 4 MG tablet, Take 4 mg by mouth Every 8 (Eight) Hours As Needed for Nausea or Vomiting., Disp: , Rfl:   •  pravastatin (PRAVACHOL) 20 MG tablet, Take 20 mg by mouth Daily., Disp: , Rfl:   •  traMADol (ULTRAM) 50 MG tablet, Take 50 mg by mouth 2 (Two) Times a Day As Needed for Moderate Pain (4-6)., Disp: , Rfl:     Allergies   Allergen Reactions   • Cymbalta [Duloxetine Hcl] Mental Status Change     Depression, thoughts of Suicide.    • Metoprolol Other (See Comments)     UNKNOWN. Pt does not remember this being allergy   • Neurontin [Gabapentin] Other (See Comments)     Lethargy & fatigue       Social History     Social History   • Marital status:      Spouse name: N/A   • Number of children: N/A   • Years of education: N/A     Occupational History   • [employer]      Retired     Social History Main Topics   • Smoking status: Never Smoker   • Smokeless tobacco: Never Used   • Alcohol use No   • Drug use: No   • Sexual activity: Defer     Other Topics Concern   • None     Social History Narrative    Pt does not consume caffeine.      Family History   Problem Relation Age of Onset   • Atrial fibrillation Mother    • Cancer Father        Review of Systems:  Positive for muscle cramps, lower extremity discomfort, lightheadedness  Negative for dyspnea with exertion, orthopnea, PND, lower extremity edema, syncope, Chest pain, palpitations ,   Review of Systems     All other systems reviewed and are negative.    Objective:   Blood pressure 132/70, pulse 73, height 185.4 cm (73\"), weight 107 kg (236 lb).  CONSTITUTIONAL: Well-nourished. In no acute distress. Hard of hearing  LUNGS: Normal effort. Clear to auscultation bilaterally without wheezing, rhonchi, or rales noted.   CARDIOVASCULAR: The heart has a regular rate with a normal S1 and S2. There is no murmur, gallop, rub, or click appreciated.   PERIPHERAL VASCULAR: Carotid " upstroke is 2+ bilaterally and without bruits. Radial pulses are 2+ bilaterally.  2+ right PT pulse, 1+ left lower PT puls In 10/2017 e. There is no lower extremity edema.       Labs:  Lab Results   Component Value Date    ALT 31 10/19/2017    AST 29 10/19/2017     Lab Results   Component Value Date    CHOL 128 10/19/2017    TRIG 77 10/19/2017    HDL 38 (L) 10/19/2017    LDLDIRECT 83 10/19/2017    CREATININE 1.20 10/21/2017       Diagnostic Data:      ECG 12 Lead  Date/Time: 1/29/2018 10:31 AM  Performed by: CHERYL DILLON  Authorized by: CHERYL DILLON   Rhythm: sinus rhythm  Comments:  ms,  ms, no significant change when compared to ECG on 10/2/17           TTE 9/2015  1. The estimated ejection fraction is 55-60%.   2. Mild aortic cusp sclerosis is present.  3. There is a trace of mitral regurgitation.  4. There is mild tricuspid regurgitation.     Carotid Ultrasound 10/2017  · Proximal right internal carotid artery is normal.  · Left internal carotid artery stenosis of 0-49%.      Stress test 7/2017  · Left ventricular ejection fraction is hyperdynamic (Calculated EF > 70%).  · Myocardial perfusion imaging indicates a mild, very small-sized infarct located in the apex with no significant ischemia noted.  · Impressions are consistent with a low risk study.    [facility] 10/2017  · There is mild diffuse atherosclerosis with no flow limiting stenoses.  The previously placed stent in the ostial to proximal RCA is widely patent with [place]-3 distal flow.  There is a 30% discrete in-stent restenosis in the sten'ts mid segment.  · Normal left ventricular ejection fraction with mild hypokinesis of the basal anterolateral segment.    Assessment and Plan:   Guicho was seen today for coronary artery disease and hypertension.    Diagnoses and all orders for this visit:    Coronary artery disease involving native coronary artery of native heart without angina pectoris  -History of TAMMI to ostial RCA by Dr. Sue  2009  -CCS class I, prior chest pain possibly due to large hiatal hernia and recently noted on CT scan   -Continue aspirin; did not tolerate metoprolol In the past   -Continue decreased dose of pravastatin at 20 mg daily.  This has helped his myalgias.    -Lipid panel in 10/2017 as above  -Patient to [facility] to undergo more cardio    PAF (paroxysmal atrial fibrillation)  Lacunar infarcts  -Continue flecanide, ECG acceptable today  -Aspirin 325 mg daily    Essential hypertension  -At goal     Lightheadedness  -Mild carotid Stenosis on Left  -Clinical monitoring    Decreased left leg pulse  Peripheral neuropathy  Right toe infection/amputation ~2016  -Previously followed by Dr. Polanco  -2+ right PT pulse, 1+ left PT pulse  -No clear history of ABIs, CT of the legs or peripheral angiogram  -ABIs and arterial Duplex of the legs     Hiatal hernia  -Patient recommended to discuss management of this with his PCP     Upcoming prostate surgery with Dr. Lizama  -The patient is at acceptable cardiac risk for prostate surgery.  Recommend continuing aspirin perioperatively.  Lowering to 81 mg daily during this period of time is reasonable.  Would resume at 325 mg daily when acceptable from a surgical standpoint    - Return in about 6 months (around 7/29/2018).    Hector Urrutia MD, MSc, [facility]  Interventional Cardiology  [facility]

## 2018-02-06 DIAGNOSIS — I73.9 CLAUDICATION OF BOTH LOWER EXTREMITIES (HCC): Primary | ICD-10-CM

## 2018-02-09 RX ORDER — FLECAINIDE ACETATE 100 MG/1
100 TABLET ORAL EVERY 12 HOURS SCHEDULED
Qty: 180 TABLET | Refills: 3 | Status: SHIPPED | OUTPATIENT
Start: 2018-02-09 | End: 2019-02-04 | Stop reason: SDUPTHER

## 2018-02-20 ENCOUNTER — APPOINTMENT (OUTPATIENT)
Dept: ULTRASOUND IMAGING | Facility: HOSPITAL | Age: 78
End: 2018-02-20
Attending: INTERNAL MEDICINE

## 2018-02-20 ENCOUNTER — APPOINTMENT (OUTPATIENT)
Dept: CARDIOLOGY | Facility: HOSPITAL | Age: 78
End: 2018-02-20
Attending: INTERNAL MEDICINE

## 2018-03-02 ENCOUNTER — HOSPITAL ENCOUNTER (OUTPATIENT)
Dept: CARDIOLOGY | Facility: HOSPITAL | Age: 78
Discharge: HOME | End: 2018-03-02
Attending: INTERNAL MEDICINE | Admitting: INTERNAL MEDICINE

## 2018-03-02 ENCOUNTER — APPOINTMENT (OUTPATIENT)
Dept: CARDIOLOGY | Facility: HOSPITAL | Age: 78
End: 2018-03-02
Attending: INTERNAL MEDICINE

## 2018-03-02 DIAGNOSIS — I73.9 CLAUDICATION OF BOTH LOWER EXTREMITIES (HCC): ICD-10-CM

## 2018-03-02 LAB
BH CV LEA LEFT ANT TIBIAL A DISTAL PSV: 53.3 CM/S
BH CV LEA LEFT ANT TIBIAL A MID PSV: 32.7 CM/S
BH CV LEA LEFT ANT TIBIAL A PROX PSV: 78.6 CM/S
BH CV LEA LEFT CFA PROX PSV: 121 CM/S
BH CV LEA LEFT DFA PROX PSV: 73.1 CM/S
BH CV LEA LEFT PERONEAL  PROX PSV: 46.3 CM/S
BH CV LEA LEFT POPITEAL A  DISTAL PSV: 79.8 CM/S
BH CV LEA LEFT POPITEAL A  PROX PSV: 56.6 CM/S
BH CV LEA LEFT PTA DISTAL PSV: 58.1 CM/S
BH CV LEA LEFT PTA MID PSV: 69.8 CM/S
BH CV LEA LEFT PTA PROX PSV: 61.6 CM/S
BH CV LEA LEFT SFA DISTAL PSV: 77.8 CM/S
BH CV LEA LEFT SFA MID PSV: 77.8 CM/S
BH CV LEA LEFT SFA PROX PSV: 99.8 CM/S
BH CV LEA RIGHT ANT TIBIAL A DISTAL PSV: 35.4 CM/S
BH CV LEA RIGHT ANT TIBIAL A MID PSV: 44.6 CM/S
BH CV LEA RIGHT ANT TIBIAL A PROX PSV: 74.8 CM/S
BH CV LEA RIGHT CFA PROX PSV: 127.5 CM/S
BH CV LEA RIGHT DFA PROX PSV: 132.7 CM/S
BH CV LEA RIGHT PERONEAL  MID PSV: 35.8 CM/S
BH CV LEA RIGHT POPITEAL A  DISTAL PSV: 66.6 CM/S
BH CV LEA RIGHT POPITEAL A  PROX PSV: 55.9 CM/S
BH CV LEA RIGHT PTA DISTAL PSV: 62.4 CM/S
BH CV LEA RIGHT PTA MID PSV: 78.6 CM/S
BH CV LEA RIGHT PTA PROX PSV: 58.4 CM/S
BH CV LEA RIGHT SFA DISTAL PSV: 83.8 CM/S
BH CV LEA RIGHT SFA MID PSV: 118.7 CM/S
BH CV LEA RIGHT SFA PROX PSV: 106.5 CM/S

## 2018-03-02 PROCEDURE — 93925 LOWER EXTREMITY STUDY: CPT | Performed by: INTERNAL MEDICINE

## 2018-03-02 PROCEDURE — 93925 LOWER EXTREMITY STUDY: CPT

## 2018-03-05 ENCOUNTER — TELEPHONE (OUTPATIENT)
Dept: CARDIOLOGY | Facility: CLINIC | Age: 78
End: 2018-03-05

## 2018-03-05 NOTE — TELEPHONE ENCOUNTER
Results of lower extremity duplex reviewed with the patient's spouse.  All questions and concerns addressed at this time.  Understanding verbalized.

## 2018-03-19 ENCOUNTER — TELEPHONE (OUTPATIENT)
Dept: CARDIOLOGY | Facility: CLINIC | Age: 78
End: 2018-03-19

## 2018-03-19 NOTE — TELEPHONE ENCOUNTER
The patient's spouse called stating that over the weekend the patient experienced a run of atrial fibrillation that lasted several hours, but has since been in NSR.  She states also that his BP was between 133-166/72-94 during this time.  This is significantly longer than any episode he has experienced since his ablation.  I advised that if this continues to occur, or does not resolve on it's own, to please call me right away.  She verbalized understanding and agreement at this time.

## 2018-04-05 ENCOUNTER — OFFICE VISIT (OUTPATIENT)
Dept: SLEEP MEDICINE | Facility: HOSPITAL | Age: 78
End: 2018-04-05

## 2018-04-05 VITALS
DIASTOLIC BLOOD PRESSURE: 74 MMHG | SYSTOLIC BLOOD PRESSURE: 140 MMHG | OXYGEN SATURATION: 96 % | HEART RATE: 90 BPM | WEIGHT: 233 LBS | BODY MASS INDEX: 30.88 KG/M2 | HEIGHT: 73 IN

## 2018-04-05 DIAGNOSIS — E66.3 OVERWEIGHT: ICD-10-CM

## 2018-04-05 DIAGNOSIS — R06.83 SNORING: Primary | ICD-10-CM

## 2018-04-05 DIAGNOSIS — G47.61 PLMD (PERIODIC LIMB MOVEMENT DISORDER): ICD-10-CM

## 2018-04-05 PROCEDURE — 99214 OFFICE O/P EST MOD 30 MIN: CPT | Performed by: INTERNAL MEDICINE

## 2018-04-05 NOTE — PROGRESS NOTES
Subjective   Guicho Blanco is a 77 y.o. male is here today for follow-up.  He is seen follow-up of snoring and nonrestorative sleep.  His primary care physician is Dr. Mitchel Culver.  He's also seen by Dr. Neetu Wyatt and .    History of Present Illness  Patient was last seen in clinic on January 12.  He had a history of snoring and nonrestorative sleep.  He did history also of frequent leg movements at night.  He had a history of retinal occlusion and also atrial fibrillation and was overweight.  He had sleep study on January 29.  He thought he slept as he usually did.  He says he's had prostate surgery since then also had some problems with sciatica pain.  Says he usually sleeps on either his left side or his right side.  He does not sleep on his back.  He did not find a comfortable.  He has not been noted by his wife to have significant snoring.  Past Medical History:   Diagnosis Date   • Arthritis    • BPH (benign prostatic hypertrophy)    • Cellulitis    • COPD (chronic obstructive pulmonary disease)    • Coronary artery disease    • GERD (gastroesophageal reflux disease)    • Hearing loss    • Hiatal hernia    • Neuropathy    • Peripheral neuropathy    • Peripheral vascular disease        Past Surgical History:   Procedure Laterality Date   • ABLATION OF DYSRHYTHMIC FOCUS     • APPENDECTOMY     • CARDIAC CATHETERIZATION N/A 10/5/2017    Procedure: Left Heart Cath;  Surgeon: Hector Urrutia MD;  Location: [facility] INVASIVE LOCATION;  Service:    • CATARACT EXTRACTION Bilateral    • CHOLECYSTECTOMY      Remote   • CORONARY ANGIOPLASTY     • GALLBLADDER SURGERY     • KNEE ARTHROSCOPY Right     Right knee ACL repair.   • PROSTATE SURGERY     • SHOULDER SURGERY Right    • TOE AMPUTATION Right     2nd toe - Right Foot   • TONSILLECTOMY             Current Outpatient Prescriptions:   •  acetaminophen (TYLENOL) 500 MG tablet, Take 500 mg by mouth every 6 (six) hours as needed for mild pain  (1-3)., Disp: , Rfl:   •  aspirin 325 MG tablet, Take 325 mg by mouth Daily., Disp: , Rfl:   •  B Complex Vitamins (VITAMIN B COMPLEX) capsule capsule, Take 1 capsule by mouth Daily., Disp: , Rfl:   •  Cholecalciferol (VITAMIN D3) 5000 UNITS capsule capsule, Take 5,000 Units by mouth daily., Disp: , Rfl:   •  coenzyme Q10 100 MG capsule, Take 100 mg by mouth daily., Disp: , Rfl:   •  cycloSPORINE (RESTASIS) 0.05 % ophthalmic emulsion, Administer 1 drop to both eyes 2 (two) times a day., Disp: , Rfl:   •  flecainide (TAMBOCOR) 100 MG tablet, Take 1 tablet by mouth Every 12 (Twelve) Hours., Disp: 180 tablet, Rfl: 3  •  isosorbide mononitrate (IMDUR) 30 MG 24 hr tablet, Take 1 tablet by mouth Daily., Disp: 30 tablet, Rfl: 11  •  meclizine (ANTIVERT) 25 MG tablet, Take 25 mg by mouth 3 (Three) Times a Day As Needed for dizziness., Disp: , Rfl:   •  omeprazole (PriLOSEC) 20 MG capsule, Take 20 mg by mouth daily., Disp: , Rfl:   •  ondansetron (ZOFRAN) 4 MG tablet, Take 4 mg by mouth Every 8 (Eight) Hours As Needed for Nausea or Vomiting., Disp: , Rfl:   •  pravastatin (PRAVACHOL) 20 MG tablet, Take 20 mg by mouth Daily., Disp: , Rfl:   •  traMADol (ULTRAM) 50 MG tablet, Take 50 mg by mouth 2 (Two) Times a Day As Needed for Moderate Pain (4-6)., Disp: , Rfl:     Allergies   Allergen Reactions   • Cymbalta [Duloxetine Hcl] Mental Status Change     Depression, thoughts of Suicide.    • Metoprolol Other (See Comments)     UNKNOWN. Pt does not remember this being allergy   • Neurontin [Gabapentin] Other (See Comments)     Lethargy & fatigue       The following portions of the patient's history were reviewed and updated as appropriate: allergies, current medications and problem list.    Review of Systems   Constitutional: Negative.    HENT: Positive for sinus pressure.    Eyes: Positive for visual disturbance.   Respiratory: Negative.    Cardiovascular: Positive for palpitations.   Gastrointestinal: Positive for constipation.  "  Endocrine: Negative.    Genitourinary: Positive for difficulty urinating.   Musculoskeletal: Positive for arthralgias and joint swelling.   Skin: Negative.    Allergic/Immunologic: Positive for environmental allergies.   Neurological: Positive for dizziness.   Hematological: Negative.    Psychiatric/Behavioral: Negative.    [place] scores 3/24    Objective     /74   Pulse 90   Ht 185.4 cm (72.99\")   Wt 106 kg (233 lb)   SpO2 96%   BMI 30.75 kg/m²     Physical Exam   Constitutional: He is oriented to person, place, and time. He appears well-developed and well-nourished.   He is obese.   HENT:   Head: Normocephalic and atraumatic.   He has Mallampati class III anatomy.   Eyes: EOM are normal. Pupils are equal, round, and reactive to light.   Neck: Normal range of motion. Neck supple.   Cardiovascular: Normal rate, regular rhythm and normal heart sounds.    Pulmonary/Chest: Effort normal and breath sounds normal.   Abdominal: Soft. Bowel sounds are normal.   Musculoskeletal: Normal range of motion. He exhibits no edema.   Neurological: He is alert and oriented to person, place, and time.   Skin: Skin is warm and dry.   Psychiatric: He has a normal mood and affect. His behavior is normal.    sleep study on January 29 showed overall no apneas or hypopneas.  He had slightly diminished sleep efficiency of 76%.  Not have any supine sleep.  He had many periodic limb movements but only 38 associated with arousal however this gave him a significant index of 6.9.  He was noted to have some snoring.      Assessment/Plan   Guicho was seen today for follow-up.    Diagnoses and all orders for this visit:    Snoring    PLMD (periodic limb movement disorder)    Overweight    Patient does have snoring and mild periodic limb movement disorder.  He is encouraged to maintain ideal body weight.  He is encouraged practice lateral position sleep.  He is encouraged to avoid alcohol and sedatives close to bedtime.  We discussed " possible medical therapy for his periodic limb movement disorder but he does not wish to add any additional medications at this time.  He apparently he had reaction to Neurontin in the past.  He is to return in follow-up here as needed.  I be happy to see him if situation changes.  He wished to follow-up with Dr. Culver currently.             Gregg Luque MD [facility]  Sleep Medicine  Pulmonary and Critical Care Medicine      04/05/18  9:45 AM

## 2018-05-16 ENCOUNTER — HOSPITAL ENCOUNTER (OUTPATIENT)
Dept: GENERAL RADIOLOGY | Facility: HOSPITAL | Age: 78
Discharge: HOME | End: 2018-05-16
Attending: FAMILY MEDICINE | Admitting: FAMILY MEDICINE

## 2018-05-16 ENCOUNTER — TRANSCRIBE ORDERS (OUTPATIENT)
Dept: ADMINISTRATIVE | Facility: HOSPITAL | Age: 78
End: 2018-05-16

## 2018-05-16 DIAGNOSIS — M54.5 LOW BACK PAIN, UNSPECIFIED BACK PAIN LATERALITY, UNSPECIFIED CHRONICITY, WITH SCIATICA PRESENCE UNSPECIFIED: Primary | ICD-10-CM

## 2018-05-16 PROCEDURE — 72100 X-RAY EXAM L-S SPINE 2/3 VWS: CPT

## 2018-07-24 NOTE — PROGRESS NOTES
[facility] AT [address], [address]  [place], [place], [id number] [phone]  [email or web address].[facility]           OUTPATIENT CLINIC FOLLOW UP NOTE      Patient Care Team:  Patient Care Team:  Mitchel Culver MD as PCP - General (Family Medicine)  Mitchel Culver MD as PCP - Family Medicine    Subjective:   Reason for consultation:   Chief Complaint   Patient presents with   • Atrial Fibrillation       HPI:    Guicho Blanco is a 77 y.o. male.  Chest Pain    Associated symptoms include palpitations.   His past medical history is significant for CAD.   Atrial Fibrillation   Symptoms include chest pain and palpitations. Past medical history includes atrial fibrillation and CAD.   Palpitations    Associated symptoms include chest pain.   Hypertension   Associated symptoms include chest pain and palpitations.   Coronary Artery Disease   Symptoms include chest pain and palpitations.     The patient has a history of CAD with a history of severe anterior wall ischemia status post ostial RCA stenosis status post PTCA and placement of a Cypher drug-eluting stent by Dr. Sue in 2009, atrial fibrillation and atrial flutter status post ablation of the flutter and fibrillation in 3/2012 by Dr. Daley, hypertension, hyperlipidemia, prior infection and right toe amputation by Dr. Polanco, smoking exposure due to being a  and having subsequent COPD, muscle cramps with statins which has improved with lowering his pravastatin dose from 40 mg to 20 mg but is still persistent, small lacunar infarcts 10/2017, dizziness, Large hiatal hernia, retinal artery occlusion, ongoing sleep apnea evaluation with Dr. Luque, who presents for follow-up.    Patient continues to have exertional dyspnea and fatigue.  Denies exertional leg pain but at rest does have restless leg and at times discomfort in the legs associated with numbness and tingling.    PFSH:  Patient Active Problem  List   Diagnosis   • Osteomyelitis of toe of right foot (CMS/HCC)   • Osteomyelitis of right foot (CMS/HCC)   • PAF (paroxysmal atrial fibrillation) (CMS/[facility])   • Coronary artery disease involving native coronary artery of native heart without angina pectoris   • Gastroesophageal reflux disease without esophagitis   • Benign non-nodular prostatic hyperplasia without lower urinary tract symptoms   • S/P appy   • Essential hypertension   • Simple chronic bronchitis (CMS/[facility])   • Dyslipidemia   • COPD (chronic obstructive pulmonary disease) (CMS/[facility])   • BPH (benign prostatic hypertrophy)   • Hearing loss   • Chest pain   • CVA (cerebral vascular accident) (CMS/[facility])   • Vertigo   • Snoring   • Overweight   • PLMD (periodic limb movement disorder)         Current Outpatient Prescriptions:   •  acetaminophen (TYLENOL) 500 MG tablet, Take 500 mg by mouth every 6 (six) hours as needed for mild pain (1-3)., Disp: , Rfl:   •  aspirin 325 MG tablet, Take 325 mg by mouth Daily., Disp: , Rfl:   •  B Complex Vitamins (VITAMIN B COMPLEX) capsule capsule, Take 1 capsule by mouth Daily., Disp: , Rfl:   •  Cholecalciferol (VITAMIN D3) 5000 UNITS capsule capsule, Take 5,000 Units by mouth daily., Disp: , Rfl:   •  coenzyme Q10 100 MG capsule, Take 100 mg by mouth daily., Disp: , Rfl:   •  cycloSPORINE (RESTASIS) 0.05 % ophthalmic emulsion, Administer 1 drop to both eyes 2 (two) times a day., Disp: , Rfl:   •  flecainide (TAMBOCOR) 100 MG tablet, Take 1 tablet by mouth Every 12 (Twelve) Hours., Disp: 180 tablet, Rfl: 3  •  isosorbide mononitrate (IMDUR) 30 MG 24 hr tablet, Take 1 tablet by mouth Daily., Disp: 30 tablet, Rfl: 11  •  meclizine (ANTIVERT) 25 MG tablet, Take 25 mg by mouth 3 (Three) Times a Day As Needed for dizziness., Disp: , Rfl:   •  omeprazole (PriLOSEC) 20 MG capsule, Take 20 mg by mouth daily., Disp: , Rfl:   •  ondansetron (ZOFRAN) 4 MG tablet, Take 4 mg by mouth Every 8 (Eight) Hours As Needed for Nausea or  "Vomiting., Disp: , Rfl:   •  pravastatin (PRAVACHOL) 20 MG tablet, Take 20 mg by mouth Daily., Disp: , Rfl:   •  traMADol (ULTRAM) 50 MG tablet, Take 50 mg by mouth 2 (Two) Times a Day As Needed for Moderate Pain (4-6)., Disp: , Rfl:     Allergies   Allergen Reactions   • Cymbalta [Duloxetine Hcl] Mental Status Change     Depression, thoughts of Suicide.    • Metoprolol Other (See Comments)     UNKNOWN. Pt does not remember this being allergy   • Neurontin [Gabapentin] Other (See Comments)     Lethargy & fatigue       Social History     Social History   • Marital status:      Occupational History   • [employer]      Retired     Social History Main Topics   • Smoking status: Never Smoker   • Smokeless tobacco: Never Used   • Alcohol use No   • Drug use: No   • Sexual activity: Defer     Other Topics Concern   • Not on file     Social History Narrative    Pt does not consume caffeine.      Family History   Problem Relation Age of Onset   • Atrial fibrillation Mother    • Cancer Father        Review of Systems:  Positive for muscle cramps, lower extremity discomfort, lightheadedness  Negative for dyspnea with exertion, orthopnea, PND, lower extremity edema, syncope, Chest pain, palpitations ,   Review of Systems   Cardiovascular: Positive for chest pain and palpitations.       Objective:   Blood pressure 118/68, pulse 68, height 185.4 cm (73\"), weight 104 kg (230 lb 3.2 oz).  CONSTITUTIONAL: Well-nourished. In no acute distress. Hard of hearing  LUNGS: Normal effort. Clear to auscultation bilaterally without wheezing, rhonchi, or rales noted.   CARDIOVASCULAR: The heart has a regular rate with a normal S1 and S2. There is no murmur, gallop, rub, or click appreciated.   PERIPHERAL VASCULAR: Carotid upstroke is 2+ bilaterally and without bruits. Radial pulses are 2+ bilaterally.  2+ right PT pulse, 1+ left lower PT puls In 10/2017 e. There is no lower extremity edema.       Labs:  Lab Results   Component " Value Date    ALT 31 10/19/2017    AST 29 10/19/2017     Lab Results   Component Value Date    CHOL 128 10/19/2017    TRIG 77 10/19/2017    HDL 38 (L) 10/19/2017    CREATININE 1.20 10/21/2017       Diagnostic Data:      ECG 12 Lead  Date/Time: 7/30/2018 12:34 PM  Performed by: CHERYL DILLON  Authorized by: CHERYL DILLON   Rhythm: sinus rhythm  Comments: Heart rate 68, ,            TTE 9/2015  1. The estimated ejection fraction is 55-60%.   2. Mild aortic cusp sclerosis is present.  3. There is a trace of mitral regurgitation.  4. There is mild tricuspid regurgitation.     Carotid Ultrasound 10/2017  · Proximal right internal carotid artery is normal.  · Left internal carotid artery stenosis of 0-49%.      Stress test 7/2017  · Left ventricular ejection fraction is hyperdynamic (Calculated EF > 70%).  · Myocardial perfusion imaging indicates a mild, very small-sized infarct located in the apex with no significant ischemia noted.  · Impressions are consistent with a low risk study.    [facility] 10/2017  · There is mild diffuse atherosclerosis with no flow limiting stenoses.  The previously placed stent in the ostial to proximal RCA is widely patent with FRANCE-3 distal flow.  There is a 30% discrete in-stent restenosis in the sten'ts mid segment.  · Normal left ventricular ejection fraction with mild hypokinesis of the basal anterolateral segment.    Assessment and Plan:   Guicho was seen today for coronary artery disease and hypertension.    Diagnoses and all orders for this visit:    Coronary artery disease involving native coronary artery of native heart without angina pectoris  -History of TAMMI to ostial RCA by Dr. Sue 2009  -CCS class I, prior chest pain possibly due to large hiatal hernia and recently noted on CT scan   -Continue aspirin; did not tolerate metoprolol In the past   -Continue decreased dose of pravastatin at 20 mg daily.  This has helped his myalgias.    -Lipid panel in 10/2017 as above.   Recheck at next visit.  -Patient plans to exercise more.  Has access to a [employer].    PAF (paroxysmal atrial fibrillation)  Lacunar infarcts  -Continue flecanide, ECG acceptable today  -Aspirin 325 mg daily    Essential hypertension  -At goal     Lightheadedness  -Mild carotid Stenosis on Left  -Clinical monitoring    Hiatal hernia  -Patient recommended to discuss management of this with his PCP     Decreased left leg pulse  Peripheral neuropathy  Right toe infection/amputation ~2016  -Previously followed by Dr. Polanco  -2+ right PT pulse, 1+ left PT pulse  -No clear history of ABIs, CT of the legs or peripheral angiogram  -ABIs and   -Arterial duplex scan acceptable.  Symptoms seem more consistent with neuropathy and arthritis.    -Reasonable to continue tramadol as needed for his above pains.  This is what the patient currently wishes to do.  -Should the patient have more arthritic pains, consider Celebrex instead of other NSAIDs due to their increased risk of cardiac events.  -If the patient has ongoing neuropathy, consider gabapentin or Lyrica.  If restless leg seems to be his most profound symptom, consider Requip.  Had a prior intolerance to Cymbalta  -At the current time the patient would just like to continue tramadol.    - Return in about 6 months (around 1/30/2019).    I, Hector Urrutia MD, personally performed the services as scribed by the above named individual. I have made any necessary edits and it is both accurate and complete.     Hector Urrutia MD, MSc, [facility]  Interventional Cardiology  [facility] at [facility]

## 2018-07-30 ENCOUNTER — OFFICE VISIT (OUTPATIENT)
Dept: CARDIOLOGY | Facility: CLINIC | Age: 78
End: 2018-07-30

## 2018-07-30 VITALS
SYSTOLIC BLOOD PRESSURE: 118 MMHG | DIASTOLIC BLOOD PRESSURE: 68 MMHG | WEIGHT: 230.2 LBS | HEIGHT: 73 IN | HEART RATE: 68 BPM | BODY MASS INDEX: 30.51 KG/M2

## 2018-07-30 DIAGNOSIS — I48.0 PAF (PAROXYSMAL ATRIAL FIBRILLATION) (HCC): ICD-10-CM

## 2018-07-30 DIAGNOSIS — I63.9 CEREBROVASCULAR ACCIDENT (CVA), UNSPECIFIED MECHANISM (HCC): ICD-10-CM

## 2018-07-30 DIAGNOSIS — I10 ESSENTIAL HYPERTENSION: ICD-10-CM

## 2018-07-30 DIAGNOSIS — G62.9 NEUROPATHY: ICD-10-CM

## 2018-07-30 DIAGNOSIS — I25.10 CORONARY ARTERY DISEASE INVOLVING NATIVE CORONARY ARTERY OF NATIVE HEART WITHOUT ANGINA PECTORIS: Primary | ICD-10-CM

## 2018-07-30 PROCEDURE — 93000 ELECTROCARDIOGRAM COMPLETE: CPT | Performed by: INTERNAL MEDICINE

## 2018-07-30 PROCEDURE — 99214 OFFICE O/P EST MOD 30 MIN: CPT | Performed by: INTERNAL MEDICINE

## 2018-09-03 ENCOUNTER — APPOINTMENT (OUTPATIENT)
Dept: CT IMAGING | Facility: HOSPITAL | Age: 78
End: 2018-09-03

## 2018-09-03 ENCOUNTER — HOSPITAL ENCOUNTER (EMERGENCY)
Facility: HOSPITAL | Age: 78
Discharge: HOME | End: 2018-09-03
Attending: EMERGENCY MEDICINE | Admitting: EMERGENCY MEDICINE

## 2018-09-03 ENCOUNTER — APPOINTMENT (OUTPATIENT)
Dept: GENERAL RADIOLOGY | Facility: HOSPITAL | Age: 78
End: 2018-09-03

## 2018-09-03 VITALS
DIASTOLIC BLOOD PRESSURE: 70 MMHG | BODY MASS INDEX: 30.75 KG/M2 | HEIGHT: 72 IN | HEART RATE: 62 BPM | TEMPERATURE: 98 F | WEIGHT: 227 LBS | RESPIRATION RATE: 18 BRPM | OXYGEN SATURATION: 99 % | SYSTOLIC BLOOD PRESSURE: 130 MMHG

## 2018-09-03 DIAGNOSIS — R10.13 EPIGASTRIC ABDOMINAL PAIN: ICD-10-CM

## 2018-09-03 DIAGNOSIS — R07.2 SUBSTERNAL CHEST PAIN: Primary | ICD-10-CM

## 2018-09-03 DIAGNOSIS — Z87.19 HISTORY OF HIATAL HERNIA: ICD-10-CM

## 2018-09-03 DIAGNOSIS — Z86.79 HISTORY OF CORONARY ARTERY DISEASE: ICD-10-CM

## 2018-09-03 DIAGNOSIS — Z02.89 REFERRED BY HEALTH CARE PROFESSIONAL: ICD-10-CM

## 2018-09-03 LAB
ALBUMIN SERPL-MCNC: 4.53 G/DL (ref 3.2–4.8)
ALBUMIN/GLOB SERPL: 1.9 G/DL (ref 1.5–2.5)
ALP SERPL-CCNC: 114 U/L (ref 25–100)
ALT SERPL W P-5'-P-CCNC: 27 U/L (ref 7–40)
ANION GAP SERPL CALCULATED.3IONS-SCNC: 8 MMOL/L (ref 3–11)
AST SERPL-CCNC: 26 U/L (ref 0–33)
BASOPHILS # BLD AUTO: 0.03 10*3/MM3 (ref 0–0.2)
BASOPHILS NFR BLD AUTO: 0.3 % (ref 0–1)
BILIRUB SERPL-MCNC: 0.8 MG/DL (ref 0.3–1.2)
BNP SERPL-MCNC: 81 PG/ML (ref 0–100)
BUN BLD-MCNC: 11 MG/DL (ref 9–23)
BUN/CREAT SERPL: 9.9 (ref 7–25)
CALCIUM SPEC-SCNC: 9.1 MG/DL (ref 8.7–10.4)
CHLORIDE SERPL-SCNC: 107 MMOL/L (ref 99–109)
CO2 SERPL-SCNC: 25 MMOL/L (ref 20–31)
CREAT BLD-MCNC: 1.11 MG/DL (ref 0.6–1.3)
D DIMER PPP FEU-MCNC: 0.82 MG/L (FEU) (ref 0–0.5)
DEPRECATED RDW RBC AUTO: 41.5 FL (ref 37–54)
EOSINOPHIL # BLD AUTO: 0.18 10*3/MM3 (ref 0–0.3)
EOSINOPHIL NFR BLD AUTO: 1.9 % (ref 0–3)
ERYTHROCYTE [DISTWIDTH] IN BLOOD BY AUTOMATED COUNT: 13 % (ref 11.3–14.5)
GFR SERPL CREATININE-BSD FRML MDRD: 64 ML/MIN/1.73
GLOBULIN UR ELPH-MCNC: 2.4 GM/DL
GLUCOSE BLD-MCNC: 111 MG/DL (ref 70–100)
HCT VFR BLD AUTO: 42.5 % (ref 38.9–50.9)
HGB BLD-MCNC: 14.2 G/DL (ref 13.1–17.5)
HOLD SPECIMEN: NORMAL
HOLD SPECIMEN: NORMAL
IMM GRANULOCYTES # BLD: 0.02 10*3/MM3 (ref 0–0.03)
IMM GRANULOCYTES NFR BLD: 0.2 % (ref 0–0.6)
LIPASE SERPL-CCNC: 90 U/L (ref 6–51)
LYMPHOCYTES # BLD AUTO: 1.96 10*3/MM3 (ref 0.6–4.8)
LYMPHOCYTES NFR BLD AUTO: 20.4 % (ref 24–44)
MCH RBC QN AUTO: 29.2 PG (ref 27–31)
MCHC RBC AUTO-ENTMCNC: 33.4 G/DL (ref 32–36)
MCV RBC AUTO: 87.4 FL (ref 80–99)
MONOCYTES # BLD AUTO: 0.9 10*3/MM3 (ref 0–1)
MONOCYTES NFR BLD AUTO: 9.4 % (ref 0–12)
NEUTROPHILS # BLD AUTO: 6.55 10*3/MM3 (ref 1.5–8.3)
NEUTROPHILS NFR BLD AUTO: 68 % (ref 41–71)
PLATELET # BLD AUTO: 174 10*3/MM3 (ref 150–450)
PMV BLD AUTO: 10.3 FL (ref 6–12)
POTASSIUM BLD-SCNC: 3.6 MMOL/L (ref 3.5–5.5)
PROT SERPL-MCNC: 6.9 G/DL (ref 5.7–8.2)
RBC # BLD AUTO: 4.86 10*6/MM3 (ref 4.2–5.76)
SODIUM BLD-SCNC: 140 MMOL/L (ref 132–146)
TROPONIN I SERPL-MCNC: 0 NG/ML (ref 0–0.07)
WBC NRBC COR # BLD: 9.62 10*3/MM3 (ref 3.5–10.8)
WHOLE BLOOD HOLD SPECIMEN: NORMAL
WHOLE BLOOD HOLD SPECIMEN: NORMAL

## 2018-09-03 PROCEDURE — 93005 ELECTROCARDIOGRAM TRACING: CPT | Performed by: EMERGENCY MEDICINE

## 2018-09-03 PROCEDURE — 36415 COLL VENOUS BLD VENIPUNCTURE: CPT

## 2018-09-03 PROCEDURE — 93005 ELECTROCARDIOGRAM TRACING: CPT

## 2018-09-03 PROCEDURE — 80053 COMPREHEN METABOLIC PANEL: CPT

## 2018-09-03 PROCEDURE — 99285 EMERGENCY DEPT VISIT HI MDM: CPT

## 2018-09-03 PROCEDURE — 84484 ASSAY OF TROPONIN QUANT: CPT

## 2018-09-03 PROCEDURE — 85379 FIBRIN DEGRADATION QUANT: CPT | Performed by: EMERGENCY MEDICINE

## 2018-09-03 PROCEDURE — 83690 ASSAY OF LIPASE: CPT

## 2018-09-03 PROCEDURE — 71275 CT ANGIOGRAPHY CHEST: CPT

## 2018-09-03 PROCEDURE — 96374 THER/PROPH/DIAG INJ IV PUSH: CPT

## 2018-09-03 PROCEDURE — 71045 X-RAY EXAM CHEST 1 VIEW: CPT

## 2018-09-03 PROCEDURE — 83880 ASSAY OF NATRIURETIC PEPTIDE: CPT

## 2018-09-03 PROCEDURE — 85025 COMPLETE CBC W/AUTO DIFF WBC: CPT

## 2018-09-03 PROCEDURE — 0 IOPAMIDOL PER 1 ML: Performed by: EMERGENCY MEDICINE

## 2018-09-03 RX ORDER — SODIUM CHLORIDE 0.9 % (FLUSH) 0.9 %
10 SYRINGE (ML) INJECTION AS NEEDED
Status: DISCONTINUED | OUTPATIENT
Start: 2018-09-03 | End: 2018-09-03 | Stop reason: HOSPADM

## 2018-09-03 RX ORDER — ALUMINA, MAGNESIA, AND SIMETHICONE 2400; 2400; 240 MG/30ML; MG/30ML; MG/30ML
15 SUSPENSION ORAL ONCE
Status: COMPLETED | OUTPATIENT
Start: 2018-09-03 | End: 2018-09-03

## 2018-09-03 RX ORDER — FAMOTIDINE 10 MG/ML
20 INJECTION, SOLUTION INTRAVENOUS ONCE
Status: COMPLETED | OUTPATIENT
Start: 2018-09-03 | End: 2018-09-03

## 2018-09-03 RX ADMIN — LIDOCAINE HYDROCHLORIDE 15 ML: 20 SOLUTION ORAL; TOPICAL at 10:09

## 2018-09-03 RX ADMIN — IOPAMIDOL 80 ML: 755 INJECTION, SOLUTION INTRAVENOUS at 11:39

## 2018-09-03 RX ADMIN — ALUMINUM HYDROXIDE, MAGNESIUM HYDROXIDE, AND DIMETHICONE 15 ML: 400; 400; 40 SUSPENSION ORAL at 10:08

## 2018-09-03 RX ADMIN — NITROGLYCERIN 1 INCH: 20 OINTMENT TOPICAL at 12:32

## 2018-09-03 RX ADMIN — FAMOTIDINE 20 MG: 10 INJECTION, SOLUTION INTRAVENOUS at 12:32

## 2018-09-03 NOTE — ED PROVIDER NOTES
Subjective   Mr. Guicho Blanco is a 77 y.o. male who presents to the ED with c/o CP. He reports that 4 days ago she had an episode of sharp CP that lasted around 6 minutes and afterwards he felt weak, lethargic, and still had some dull aching CP. He then felt better 3 days ago but has continued to have intermittent aching CP since then. He states that his pain was a 7/10 at its worse and 3/10 right now. He also complains of intermittent nausea but denies sweating and SoA. His wife was diagnosed with a sinus infection a week ago. He has a hx of COPD, CAD, and neuropathy. Dr. Urrutia is his cardiologist, who he last saw around a month ago and had a normal exam at that time. No other acute complaints at this time.         History provided by:  Patient and medical records  Chest Pain   Pain quality: aching, dull and sharp    Pain severity:  Moderate  Onset quality:  Gradual  Duration:  4 days  Timing:  Intermittent  Progression:  Waxing and waning  Chronicity:  New  Associated symptoms: nausea    Associated symptoms: no diaphoresis and no shortness of breath        Review of Systems   Constitutional: Negative for diaphoresis.   Respiratory: Negative for shortness of breath.    Cardiovascular: Positive for chest pain.   Gastrointestinal: Positive for nausea.   All other systems reviewed and are negative.      Past Medical History:   Diagnosis Date   • Arthritis    • BPH (benign prostatic hypertrophy)    • Cellulitis    • COPD (chronic obstructive pulmonary disease) (CMS/HCC)    • Coronary artery disease    • GERD (gastroesophageal reflux disease)    • Hearing loss    • Hiatal hernia    • Neuropathy    • Peripheral neuropathy    • Peripheral vascular disease (CMS/HCC)        Allergies   Allergen Reactions   • Cymbalta [Duloxetine Hcl] Mental Status Change     Depression, thoughts of Suicide.    • Metoprolol Other (See Comments)     UNKNOWN. Pt does not remember this being allergy   • Neurontin [Gabapentin] Other (See  Comments)     Lethargy & fatigue       Past Surgical History:   Procedure Laterality Date   • ABLATION OF DYSRHYTHMIC FOCUS     • APPENDECTOMY     • CARDIAC CATHETERIZATION N/A 10/5/2017    Procedure: Left Heart Cath;  Surgeon: Hector Urrutia MD;  Location: [facility] INVASIVE LOCATION;  Service:    • CATARACT EXTRACTION Bilateral    • CHOLECYSTECTOMY      Remote   • CORONARY ANGIOPLASTY     • GALLBLADDER SURGERY     • KNEE ARTHROSCOPY Right     Right knee ACL repair.   • PROSTATE SURGERY     • SHOULDER SURGERY Right    • TOE AMPUTATION Right     2nd toe - Right Foot   • TONSILLECTOMY         Family History   Problem Relation Age of Onset   • Atrial fibrillation Mother    • Cancer Father        Social History     Social History   • Marital status:      Occupational History   • [employer]      Retired     Social History Main Topics   • Smoking status: Never Smoker   • Smokeless tobacco: Never Used   • Alcohol use No   • Drug use: No   • Sexual activity: Defer     Other Topics Concern   • Not on file     Social History Narrative    Pt does not consume caffeine.          Objective   Physical Exam   Constitutional: He is oriented to person, place, and time. He appears well-developed and well-nourished. No distress.   HENT:   Head: Normocephalic and atraumatic.   Nose: Nose normal.   Eyes: Conjunctivae are normal. No scleral icterus.   Neck: Normal range of motion. Neck supple.   Cardiovascular: Normal rate, regular rhythm, normal heart sounds and intact distal pulses.  Exam reveals no gallop and no friction rub.    No murmur heard.  Equal radial pulses bilaterally.   Pulmonary/Chest: Effort normal and breath sounds normal. No respiratory distress.   Musculoskeletal: Normal range of motion. He exhibits no edema.   No edema to BLE.   Neurological: He is alert and oriented to person, place, and time.   Skin: Skin is warm and dry. He is not diaphoretic.   Psychiatric: He has a normal mood and affect. His  behavior is normal.   Nursing note and vitals reviewed.      Procedures         ED Course  ED Course as of Sep 03 1539   Mon Sep 03, 2018   1041 D-dimer, Quant: (!) 0.82 [RS]   1146 Troponin I: 0.00 [RS]   1221 I discussed the case with Dr. Donovan.  He requested one more troponin draw at 4 hours after arrival.  He states then if that is normal, the patient can go home to follow up with cardiology.  The patient's symptoms are most consistent with a noncardiac etiology.  I discussed the findings and the plan with the patient.  He voices understanding and agrees.  [RS]   1422 Troponin I: 0.00 [RS]   1422 Patient has a third enzyme which is negative.  We will discharge the patient home with symptomatic management to follow-up with his primary care physician as well as a cardiology.  This point, I don't believe the pain is cardiac in nature as the pain has continued and unchanged even with nitroglycerin and interventions.  The patient also had a very reassuring cardiac catheterization 11 months ago.  I discussed the findings and the plan with the patient.  They voice understanding and are happy with the plan.  [RS]      ED Course User Index  [RS] Roge Edmond MD       Recent Results (from the past 24 hour(s))   BNP    Collection Time: 09/03/18  9:34 AM   Result Value Ref Range    BNP 81.0 0.0 - 100.0 pg/mL   Comprehensive Metabolic Panel    Collection Time: 09/03/18  9:35 AM   Result Value Ref Range    Glucose 111 (H) 70 - 100 mg/dL    BUN 11 9 - 23 mg/dL    Creatinine 1.11 0.60 - 1.30 mg/dL    Sodium 140 132 - 146 mmol/L    Potassium 3.6 3.5 - 5.5 mmol/L    Chloride 107 99 - 109 mmol/L    CO2 25.0 20.0 - 31.0 mmol/L    Calcium 9.1 8.7 - 10.4 mg/dL    Total Protein 6.9 5.7 - 8.2 g/dL    Albumin 4.53 3.20 - 4.80 g/dL    ALT (SGPT) 27 7 - 40 U/L    AST (SGOT) 26 0 - 33 U/L    Alkaline Phosphatase 114 (H) 25 - 100 U/L    Total Bilirubin 0.8 0.3 - 1.2 mg/dL    eGFR Non African Amer 64 >60 mL/min/1.73    Globulin 2.4  gm/dL    A/G Ratio 1.9 1.5 - 2.5 g/dL    BUN/Creatinine Ratio 9.9 7.0 - 25.0    Anion Gap 8.0 3.0 - 11.0 mmol/L   Lipase    Collection Time: 09/03/18  9:35 AM   Result Value Ref Range    Lipase 90 (H) 6 - 51 U/L   Light Blue Top    Collection Time: 09/03/18  9:35 AM   Result Value Ref Range    Extra Tube hold for add-on    Green Top (Gel)    Collection Time: 09/03/18  9:35 AM   Result Value Ref Range    Extra Tube Hold for add-ons.    Lavender Top    Collection Time: 09/03/18  9:35 AM   Result Value Ref Range    Extra Tube hold for add-on    Gold Top - SST    Collection Time: 09/03/18  9:35 AM   Result Value Ref Range    Extra Tube Hold for add-ons.    CBC Auto Differential    Collection Time: 09/03/18  9:35 AM   Result Value Ref Range    WBC 9.62 3.50 - 10.80 10*3/mm3    RBC 4.86 4.20 - 5.76 10*6/mm3    Hemoglobin 14.2 13.1 - 17.5 g/dL    Hematocrit 42.5 38.9 - 50.9 %    MCV 87.4 80.0 - 99.0 fL    MCH 29.2 27.0 - 31.0 pg    MCHC 33.4 32.0 - 36.0 g/dL    RDW 13.0 11.3 - 14.5 %    RDW-SD 41.5 37.0 - 54.0 fl    MPV 10.3 6.0 - 12.0 fL    Platelets 174 150 - 450 10*3/mm3    Neutrophil % 68.0 41.0 - 71.0 %    Lymphocyte % 20.4 (L) 24.0 - 44.0 %    Monocyte % 9.4 0.0 - 12.0 %    Eosinophil % 1.9 0.0 - 3.0 %    Basophil % 0.3 0.0 - 1.0 %    Immature Grans % 0.2 0.0 - 0.6 %    Neutrophils, Absolute 6.55 1.50 - 8.30 10*3/mm3    Lymphocytes, Absolute 1.96 0.60 - 4.80 10*3/mm3    Monocytes, Absolute 0.90 0.00 - 1.00 10*3/mm3    Eosinophils, Absolute 0.18 0.00 - 0.30 10*3/mm3    Basophils, Absolute 0.03 0.00 - 0.20 10*3/mm3    Immature Grans, Absolute 0.02 0.00 - 0.03 10*3/mm3   D-dimer, Quantitative    Collection Time: 09/03/18  9:35 AM   Result Value Ref Range    D-Dimer, Quantitative 0.82 (H) 0.00 - 0.50 mg/L (FEU)   POC Troponin, Rapid    Collection Time: 09/03/18  9:41 AM   Result Value Ref Range    Troponin I 0.00 0.00 - 0.07 ng/mL   POC Troponin, Rapid    Collection Time: 09/03/18 11:30 AM   Result Value Ref Range     Troponin I 0.00 0.00 - 0.07 ng/mL   POC Troponin, Rapid    Collection Time: 09/03/18  1:56 PM   Result Value Ref Range    Troponin I 0.00 0.00 - 0.07 ng/mL     Note: In addition to lab results from this visit, the labs listed above may include labs taken at another facility or during a different encounter within the last 24 hours. Please correlate lab times with ED admission and discharge times for further clarification of the services performed during this visit.    CT Angiogram Chest With Contrast   Final Result   No evidence for pulmonary embolism or other acute finding.       D:  09/03/2018   E:  09/03/2018       This report was finalized on 9/3/2018 2:07 PM by Deep Gonzalez.          XR Chest 1 View   Final Result   No acute cardiopulmonary abnormality.       D:  09/03/2018   E:  09/03/2018       This report was finalized on 9/3/2018 11:07 AM by Deep Gonzalez.            Vitals:    09/03/18 1300 09/03/18 1301 09/03/18 1330 09/03/18 1455   BP: 125/72  130/70    BP Location:       Patient Position:       Pulse:  62 63 62   Resp:    18   Temp:       TempSrc:       SpO2:  99% 100% 99%   Weight:       Height:         Medications   sodium chloride 0.9 % flush 10 mL (not administered)   aluminum-magnesium hydroxide-simethicone (MAALOX MAX) 400-400-40 MG/5ML suspension 15 mL (15 mL Oral Given 9/3/18 1008)   lidocaine viscous (XYLOCAINE) 2 % mouth solution 15 mL (15 mL Mouth/Throat Given 9/3/18 1009)   iopamidol (ISOVUE-370) 76 % injection 100 mL (80 mL Intravenous Given 9/3/18 1139)   nitroglycerin (NITROSTAT) ointment 1 inch (1 inch Topical Given 9/3/18 1232)   famotidine (PEPCID) injection 20 mg (20 mg Intravenous Given 9/3/18 1232)     ECG/EMG Results (last 24 hours)     Procedure Component Value Units Date/Time    ECG 12 Lead [[id number]] Collected:  09/03/18 0935     Updated:  09/03/18 0936                      MDM  Number of Diagnoses or Management Options  Epigastric abdominal pain:   History of coronary artery  disease:   History of hiatal hernia:   Referred by health care professional:   Substernal chest pain:   Diagnosis management comments: ECG/EMG Results (last 24 hours)     Procedure Component Value Units Date/Time    ECG 12 Lead ([id number]) Collected:  09/03/18 0935     Updated:  09/03/18 0936    ECG 12 Lead ([id number]) Collected:  09/03/18 1202     Updated:  09/03/18 1251    Narrative:       Test Reason : 2nd set  Blood Pressure : **/** mmHG  Vent. Rate : 062 BPM     Atrial Rate : 062 BPM     P-R Int : 172 ms          QRS Dur : 102 ms      QT Int : 434 ms       P-R-T Axes : 128 -22 012 degrees     QTc Int : 440 ms    Unusual P axis, possible ectopic atrial rhythm  Nonspecific T wave abnormality  Abnormal ECG  When compared with ECG of 03-SEP-2018 09:35, (Unconfirmed)  Ectopic atrial rhythm has replaced Sinus rhythm  Nonspecific T wave abnormality now evident in Lateral leads  Confirmed by LETY EDMOND MD ([id number]) on 9/3/2018 12:51:29 PM    Referred By:  ANGELI           Confirmed By:LETY EDMOND MD    ECG 12 Lead ([id number]) Collected:  09/03/18 1330     Updated:  09/03/18 1331             Amount and/or Complexity of Data Reviewed  Clinical lab tests: reviewed  Tests in the radiology section of CPT®: reviewed  Review and summarize past medical records: yes  Discuss the patient with other providers: yes  Independent visualization of images, tracings, or specimens: yes        Final diagnoses:   Substernal chest pain   Epigastric abdominal pain   History of hiatal hernia   History of coronary artery disease   Referred by health care professional       Documentation assistance provided by te Woodard.  Information recorded by the te was done at my direction and has been verified and validated by me.     Kristal Woodard  09/03/18 [id number]       Lety Edmond MD  09/03/18 [id number]

## 2018-09-17 RX ORDER — ISOSORBIDE MONONITRATE 30 MG/1
TABLET, EXTENDED RELEASE ORAL
Qty: 30 TABLET | Refills: 0 | Status: SHIPPED | OUTPATIENT
Start: 2018-09-17 | End: 2018-09-18 | Stop reason: SDUPTHER

## 2018-09-18 RX ORDER — ISOSORBIDE MONONITRATE 30 MG/1
30 TABLET, EXTENDED RELEASE ORAL DAILY
Qty: 90 TABLET | Refills: 1 | Status: SHIPPED | OUTPATIENT
Start: 2018-09-18 | End: 2019-02-04 | Stop reason: SDUPTHER

## 2019-02-04 ENCOUNTER — OFFICE VISIT (OUTPATIENT)
Dept: CARDIOLOGY | Facility: CLINIC | Age: 79
End: 2019-02-04

## 2019-02-04 VITALS
HEIGHT: 73 IN | HEART RATE: 88 BPM | DIASTOLIC BLOOD PRESSURE: 68 MMHG | BODY MASS INDEX: 30.75 KG/M2 | SYSTOLIC BLOOD PRESSURE: 128 MMHG | WEIGHT: 232 LBS

## 2019-02-04 DIAGNOSIS — I48.0 PAF (PAROXYSMAL ATRIAL FIBRILLATION) (HCC): ICD-10-CM

## 2019-02-04 DIAGNOSIS — I10 ESSENTIAL HYPERTENSION: ICD-10-CM

## 2019-02-04 DIAGNOSIS — E78.5 DYSLIPIDEMIA: ICD-10-CM

## 2019-02-04 DIAGNOSIS — I25.10 CORONARY ARTERY DISEASE INVOLVING NATIVE CORONARY ARTERY OF NATIVE HEART WITHOUT ANGINA PECTORIS: Primary | ICD-10-CM

## 2019-02-04 PROCEDURE — 99214 OFFICE O/P EST MOD 30 MIN: CPT | Performed by: INTERNAL MEDICINE

## 2019-02-04 PROCEDURE — 93000 ELECTROCARDIOGRAM COMPLETE: CPT | Performed by: INTERNAL MEDICINE

## 2019-02-04 RX ORDER — ISOSORBIDE MONONITRATE 30 MG/1
30 TABLET, EXTENDED RELEASE ORAL DAILY
Qty: 90 TABLET | Refills: 3 | Status: SHIPPED | OUTPATIENT
Start: 2019-02-04 | End: 2019-04-15 | Stop reason: SDUPTHER

## 2019-02-04 RX ORDER — FLECAINIDE ACETATE 100 MG/1
100 TABLET ORAL EVERY 12 HOURS SCHEDULED
Qty: 180 TABLET | Refills: 3 | Status: SHIPPED | OUTPATIENT
Start: 2019-02-04 | End: 2020-02-07

## 2019-02-04 RX ORDER — PRAVASTATIN SODIUM 20 MG
20 TABLET ORAL DAILY
Qty: 90 TABLET | Refills: 3 | Status: SHIPPED | OUTPATIENT
Start: 2019-02-04 | End: 2020-03-02 | Stop reason: SDUPTHER

## 2019-02-04 RX ORDER — NITROGLYCERIN 0.4 MG/1
TABLET SUBLINGUAL
Qty: 25 TABLET | Refills: 2 | Status: SHIPPED | OUTPATIENT
Start: 2019-02-04

## 2019-02-13 RX ORDER — FLECAINIDE ACETATE 100 MG/1
TABLET ORAL
Qty: 180 TABLET | Refills: 3 | Status: SHIPPED | OUTPATIENT
Start: 2019-02-13 | End: 2020-03-02

## 2019-04-15 RX ORDER — ISOSORBIDE MONONITRATE 30 MG/1
30 TABLET, EXTENDED RELEASE ORAL DAILY
Qty: 90 TABLET | Refills: 3 | Status: SHIPPED | OUTPATIENT
Start: 2019-04-15 | End: 2019-08-12 | Stop reason: SDUPTHER

## 2019-08-12 ENCOUNTER — OFFICE VISIT (OUTPATIENT)
Dept: CARDIOLOGY | Facility: CLINIC | Age: 79
End: 2019-08-12

## 2019-08-12 VITALS
WEIGHT: 222.6 LBS | HEART RATE: 88 BPM | DIASTOLIC BLOOD PRESSURE: 62 MMHG | SYSTOLIC BLOOD PRESSURE: 138 MMHG | BODY MASS INDEX: 29.5 KG/M2 | HEIGHT: 73 IN | OXYGEN SATURATION: 97 %

## 2019-08-12 DIAGNOSIS — E78.2 MIXED HYPERLIPIDEMIA: ICD-10-CM

## 2019-08-12 DIAGNOSIS — I63.9 CEREBROVASCULAR ACCIDENT (CVA), UNSPECIFIED MECHANISM (HCC): ICD-10-CM

## 2019-08-12 DIAGNOSIS — I48.0 PAF (PAROXYSMAL ATRIAL FIBRILLATION) (HCC): ICD-10-CM

## 2019-08-12 DIAGNOSIS — I25.10 CORONARY ARTERY DISEASE INVOLVING NATIVE CORONARY ARTERY OF NATIVE HEART WITHOUT ANGINA PECTORIS: Primary | ICD-10-CM

## 2019-08-12 DIAGNOSIS — I10 ESSENTIAL HYPERTENSION: ICD-10-CM

## 2019-08-12 PROCEDURE — 99213 OFFICE O/P EST LOW 20 MIN: CPT | Performed by: INTERNAL MEDICINE

## 2019-08-12 PROCEDURE — 93000 ELECTROCARDIOGRAM COMPLETE: CPT | Performed by: INTERNAL MEDICINE

## 2019-08-12 RX ORDER — ISOSORBIDE MONONITRATE 30 MG/1
30 TABLET, EXTENDED RELEASE ORAL DAILY
Qty: 90 TABLET | Refills: 3 | Status: SHIPPED | OUTPATIENT
Start: 2019-08-12 | End: 2020-03-02 | Stop reason: SDUPTHER

## 2019-08-12 NOTE — PROGRESS NOTES
[facility] AT [address], [address]  [place], [place], [address]    [phone]  WWW.[facility]           OUTPATIENT CLINIC FOLLOW UP NOTE      Patient Care Team:  Patient Care Team:  Mitchel Culver MD as PCP - General (Family Medicine)  Mitchel Culver MD as PCP - Family Medicine    Subjective:   Reason for consultation:   Chief Complaint   Patient presents with   • Coronary Artery Disease       HPI:    Guicho Blanco is a 78 y.o. male.  The patient has a history of CAD with a history of severe anterior wall ischemia status post ostial RCA stenosis status post PTCA and placement of a Cypher drug-eluting stent by Dr. Sue in 2009, atrial fibrillation and atrial flutter status post ablation of the flutter and fibrillation in 3/2012 by Dr. Daley, hypertension, hyperlipidemia, prior infection and right toe amputation by Dr. Polanco, smoking exposure due to being a  and having subsequent COPD, muscle cramps with statins which has improved with lowering his pravastatin dose from 40 mg to 20 mg but is still persistent, small lacunar infarcts 10/2017, dizziness, Large hiatal hernia, retinal artery occlusion, ongoing sleep apnea evaluation with Dr. Luque, who presents for follow-up.    Since his last visit the patient has done well clinically.  He chest pain, dyspnea, lower extremity swelling, palpitations.  He had a cochlear implant that was complicated by inflammation/swelling that led to a 8-day hospitalization that included dizziness, vertigo, hypertension.  This is for the most part all improved.  He is seeing a retinal specialist for retinal occlusion.    Review of Systems:  Negative for chest pain, palpitations, dyspnea with exertion, orthopnea, PND, lower extremity edema, syncope    PFSH:  Patient Active Problem List   Diagnosis   • Osteomyelitis of toe of right foot (CMS/HCC)   • Osteomyelitis of right foot (CMS/HCC)   • PAF  (paroxysmal atrial fibrillation) (CMS/[facility])   • Coronary artery disease involving native coronary artery of native heart without angina pectoris   • Gastroesophageal reflux disease without esophagitis   • Benign non-nodular prostatic hyperplasia without lower urinary tract symptoms   • S/P appy   • Essential hypertension   • Simple chronic bronchitis (CMS/[facility])   • Mixed hyperlipidemia   • COPD (chronic obstructive pulmonary disease) (CMS/[facility])   • BPH (benign prostatic hypertrophy)   • Hearing loss   • Chest pain   • CVA (cerebral vascular accident) (CMS/[facility])   • Vertigo   • Snoring   • Overweight   • PLMD (periodic limb movement disorder)   • Neuropathy         Current Outpatient Medications:   •  acetaminophen (TYLENOL) 500 MG tablet, Take 500 mg by mouth every 6 (six) hours as needed for mild pain (1-3)., Disp: , Rfl:   •  aspirin 325 MG tablet, Take 325 mg by mouth Daily., Disp: , Rfl:   •  B Complex Vitamins (VITAMIN B COMPLEX) capsule capsule, Take 1 capsule by mouth Daily., Disp: , Rfl:   •  Cholecalciferol (VITAMIN D3) 5000 UNITS capsule capsule, Take 5,000 Units by mouth daily., Disp: , Rfl:   •  coenzyme Q10 100 MG capsule, Take 100 mg by mouth daily., Disp: , Rfl:   •  cycloSPORINE (RESTASIS) 0.05 % ophthalmic emulsion, Administer 1 drop to both eyes 2 (two) times a day., Disp: , Rfl:   •  flecainide (TAMBOCOR) 100 MG tablet, Take 1 tablet by mouth Every 12 (Twelve) Hours., Disp: 180 tablet, Rfl: 3  •  flecainide (TAMBOCOR) 100 MG tablet, TAKE 1 TABLET BY MOUTH EVERY 12 HOURS, Disp: 180 tablet, Rfl: 3  •  isosorbide mononitrate (IMDUR) 30 MG 24 hr tablet, Take 1 tablet by mouth Daily., Disp: 90 tablet, Rfl: 3  •  nitroglycerin (NITROSTAT) 0.4 MG SL tablet, 1 under the tongue as needed for angina, may repeat q5mins for up three doses, Disp: 25 tablet, Rfl: 2  •  omeprazole (PriLOSEC) 20 MG capsule, Take 20 mg by mouth daily., Disp: , Rfl:   •  pravastatin (PRAVACHOL) 20 MG tablet, Take 1 tablet by mouth  "Daily., Disp: 90 tablet, Rfl: 3  •  traMADol (ULTRAM) 50 MG tablet, Take 50 mg by mouth 2 (Two) Times a Day As Needed for Moderate Pain (4-6)., Disp: , Rfl:     Allergies   Allergen Reactions   • Cymbalta [Duloxetine Hcl] Mental Status Change     Depression, thoughts of Suicide.    • Metoprolol Other (See Comments)     UNKNOWN. Pt does not remember this being allergy   • Neurontin [Gabapentin] Other (See Comments)     Lethargy & fatigue       Social History     Socioeconomic History   • Marital status:      Spouse name: Not on file   • Number of children: Not on file   • Years of education: Not on file   • Highest education level: Not on file   Occupational History   • Occupation: [employer]     Comment: Retired   Tobacco Use   • Smoking status: Never Smoker   • Smokeless tobacco: Never Used   Substance and Sexual Activity   • Alcohol use: No   • Drug use: No   • Sexual activity: Defer   Social History Narrative    Pt does not consume caffeine.      Family History   Problem Relation Age of Onset   • Atrial fibrillation Mother    • Cancer Father              Objective:   Blood pressure 138/62, pulse 88, height 185.4 cm (73\"), weight 101 kg (222 lb 9.6 oz), SpO2 97 %.  CONSTITUTIONAL: Well-nourished. In no acute distress. Hard of hearing  LUNGS: Normal effort. Clear to auscultation bilaterally without wheezing, rhonchi, or rales noted.   CARDIOVASCULAR: The heart has a regular rate with a normal S1 and S2. There is no murmur, gallop, rub, or click appreciated.   PERIPHERAL VASCULAR: Carotid upstroke is 2+ bilaterally and without bruits. Radial pulses are 2+ bilaterally. There is no lower extremity edema.     10/2017 - 2+ right PT pulse, 1+ left lower PT pulse     Labs:  Lab Results   Component Value Date    ALT 27 09/03/2018    AST 26 09/03/2018     Lab Results   Component Value Date    CHOL 128 10/19/2017    TRIG 77 10/19/2017    HDL 38 (L) 10/19/2017    CREATININE 1.11 09/03/2018     FPA labs " 4/2019: Creatinine 1.1, AST 23, ALT 20, , , HDL 40, LDL 57    Diagnostic Data:      ECG 12 Lead  Date/Time: 8/12/2019 3:04 PM  Performed by: Hector Urrutia MD  Authorized by: Hector Urrutia MD   Comparison: compared with previous ECG from 8/12/2019  Similar to previous ECG  Rhythm: sinus rhythm  Comments: Inferior Q waves, , QTc 442, heart rate 88             Lower Extremity Arterial Duplex 3/2018  · No significant stenosis detected bilaterally.  · CHRISTINE not obtained due to non compressible arteries.    TTE 9/2015  1. The estimated ejection fraction is 55-60%.   2. Mild aortic cusp sclerosis is present.  3. There is a trace of mitral regurgitation.  4. There is mild tricuspid regurgitation.     Carotid Ultrasound 10/2017  · Proximal right internal carotid artery is normal.  · Left internal carotid artery stenosis of 0-49%.      Stress test 7/2017  · Left ventricular ejection fraction is hyperdynamic (Calculated EF > 70%).  · Myocardial perfusion imaging indicates a mild, very small-sized infarct located in the apex with no significant ischemia noted.  · Impressions are consistent with a low risk study.    [facility] 10/2017  · There is mild diffuse atherosclerosis with no flow limiting stenoses.  The previously placed stent in the ostial to proximal RCA is widely patent with FRANCE-3 distal flow.  There is a 30% discrete in-stent restenosis in the sten'ts mid segment.  · Normal left ventricular ejection fraction with mild hypokinesis of the basal anterolateral segment.    Assessment and Plan:   Guicho was seen today for coronary artery disease and hypertension.    Diagnoses and all orders for this visit:    Coronary artery disease involving native coronary artery of native heart without angina pectoris  -History of TAMMI to ostial RCA by Dr. Sue 2009  -CCS class I, prior chest pain possibly due to large hiatal hernia noted on CT scan.  Symptoms are atypical  -Continue aspirin and Imdur; SL Nitro PRN, did  not tolerate metoprolol in the past   -Continue decreased dose of pravastatin at 20 mg daily.  This has helped his myalgias.    -4/2019 lipid panel acceptable  -Patient plans to exercise more.      PAF (paroxysmal atrial fibrillation)  Lacunar infarcts/TIAs  -Continue flecanide, ECG acceptable today  -Aspirin 325 mg daily  -Previously on Coumadin which was stopped after his ablations by EP.  Currently CHADSVASc score is 4.  6 if including his TIAs.  Multiple conversations regarding anticoagulation in the past.  Has a retinal occlusion.  We will investigate whether or not this is an arterial occlusion possibly due to to a cardioembolic source.  If so, would definitely anticoagulate.  No bleeding diathesis in the past on Coumadin. Addendum: Occlusion is a retinal vein occlusion.    Essential hypertension  -At goal     Lightheadedness  -Mild carotid stenosis on Left  -Clinical monitoring    Hiatal hernia  -Patient recommended to discuss management of this with his PCP     Decreased left leg pulse  Peripheral neuropathy  Right toe infection/amputation ~2016  -Previously followed by Dr. Polanco  -2+ right PT pulse, 1+ left PT pulse  -Arterial duplex scan with normal velocities. Unable to calculate ABIs due to non-compressible vessels.   -Symptoms seem more consistent with neuropathy and arthritis.  -Reasonable to continue tramadol as needed for his above pains.    -If the patient has ongoing neuropathy, consider gabapentin or Lyrica.  If restless leg seems to be his most profound symptom, consider Requip.  Had a prior intolerance to Cymbalta    - Return in about 6 months (around 2/12/2020).     Hector Urrutia MD, MSc, FACC  Interventional Cardiology  [facility] at [facility]

## 2020-02-07 RX ORDER — FLECAINIDE ACETATE 100 MG/1
TABLET ORAL
Qty: 180 TABLET | Refills: 3 | Status: SHIPPED | OUTPATIENT
Start: 2020-02-07 | End: 2020-03-02 | Stop reason: SDUPTHER

## 2020-03-02 ENCOUNTER — OFFICE VISIT (OUTPATIENT)
Dept: CARDIOLOGY | Facility: CLINIC | Age: 80
End: 2020-03-02

## 2020-03-02 VITALS
HEIGHT: 73 IN | BODY MASS INDEX: 29.87 KG/M2 | WEIGHT: 225.4 LBS | OXYGEN SATURATION: 98 % | SYSTOLIC BLOOD PRESSURE: 124 MMHG | HEART RATE: 81 BPM | DIASTOLIC BLOOD PRESSURE: 62 MMHG

## 2020-03-02 DIAGNOSIS — I48.0 PAF (PAROXYSMAL ATRIAL FIBRILLATION) (HCC): ICD-10-CM

## 2020-03-02 DIAGNOSIS — I25.10 CORONARY ARTERY DISEASE INVOLVING NATIVE CORONARY ARTERY OF NATIVE HEART WITHOUT ANGINA PECTORIS: Primary | ICD-10-CM

## 2020-03-02 DIAGNOSIS — I10 ESSENTIAL HYPERTENSION: ICD-10-CM

## 2020-03-02 DIAGNOSIS — I63.9 CEREBROVASCULAR ACCIDENT (CVA), UNSPECIFIED MECHANISM (HCC): ICD-10-CM

## 2020-03-02 DIAGNOSIS — E78.2 MIXED HYPERLIPIDEMIA: ICD-10-CM

## 2020-03-02 DIAGNOSIS — Z01.811 PRE-OP CHEST EXAM: ICD-10-CM

## 2020-03-02 PROCEDURE — 93000 ELECTROCARDIOGRAM COMPLETE: CPT | Performed by: INTERNAL MEDICINE

## 2020-03-02 PROCEDURE — 99214 OFFICE O/P EST MOD 30 MIN: CPT | Performed by: INTERNAL MEDICINE

## 2020-03-02 RX ORDER — PRAVASTATIN SODIUM 20 MG
20 TABLET ORAL DAILY
Qty: 90 TABLET | Refills: 3 | Status: SHIPPED | OUTPATIENT
Start: 2020-03-02 | End: 2021-03-15 | Stop reason: SDUPTHER

## 2020-03-02 RX ORDER — ISOSORBIDE MONONITRATE 30 MG/1
30 TABLET, EXTENDED RELEASE ORAL DAILY
Qty: 90 TABLET | Refills: 3 | Status: SHIPPED | OUTPATIENT
Start: 2020-03-02 | End: 2021-03-11 | Stop reason: SDUPTHER

## 2020-03-02 RX ORDER — FLECAINIDE ACETATE 100 MG/1
100 TABLET ORAL EVERY 12 HOURS
Qty: 180 TABLET | Refills: 3 | Status: SHIPPED | OUTPATIENT
Start: 2020-03-02 | End: 2021-05-11 | Stop reason: SDUPTHER

## 2020-03-02 NOTE — PROGRESS NOTES
[facility] AT [address], [address]  [place], [place], [address] [phone]  WWW.[facility]           OUTPATIENT CLINIC FOLLOW UP NOTE      Patient Care Team:  Patient Care Team:  Mitchel Culver MD as PCP - General (Family Medicine)  Mitchel Culver MD as PCP - Family Medicine    Subjective:   Reason for consultation:   Chief Complaint   Patient presents with   • Coronary Artery Disease       HPI:    Guicho Blanco is a 79 y.o. male.  The patient has a history of CAD with a history of severe anterior wall ischemia status post ostial RCA stenosis status post PTCA and placement of a Cypher drug-eluting stent by Dr. Sue in 2009, atrial fibrillation and atrial flutter status post ablation of the flutter and fibrillation in 3/2012 by Dr. Daley, hypertension, hyperlipidemia, prior infection and right toe amputation by Dr. Polanco, smoking exposure due to being a  and having subsequent COPD, muscle cramps with statins which has improved with lowering his pravastatin dose from 40 mg to 20 mg but is still persistent, small lacunar infarcts 10/2017, dizziness, large hiatal hernia, retinal vein occlusion, prior sleep apnea evaluation with Dr. Luque     Today he presents for follow-up.    Since his last visit the patient has done well clinically.  He chest pain, dyspnea, lower extremity swelling, palpitations.  He hopes to undergo a second cochlear implant in the next few months.      Review of Systems:  Negative for chest pain, palpitations, dyspnea with exertion, orthopnea, PND, lower extremity edema, syncope    PFSH:  Patient Active Problem List   Diagnosis   • Osteomyelitis of toe of right foot (CMS/HCC)   • Osteomyelitis of right foot (CMS/HCC)   • PAF (paroxysmal atrial fibrillation) (CMS/HCC)   • Coronary artery disease involving native coronary artery of native heart without angina pectoris   • Gastroesophageal reflux disease without  esophagitis   • Benign non-nodular prostatic hyperplasia without lower urinary tract symptoms   • S/P appy   • Essential hypertension   • Simple chronic bronchitis (CMS/HCC)   • Mixed hyperlipidemia   • COPD (chronic obstructive pulmonary disease) (CMS/HCC)   • BPH (benign prostatic hypertrophy)   • Hearing loss   • Chest pain   • CVA (cerebral vascular accident) (CMS/HCC)   • Vertigo   • Snoring   • Overweight   • PLMD (periodic limb movement disorder)   • Neuropathy         Current Outpatient Medications:   •  acetaminophen (TYLENOL) 500 MG tablet, Take 500 mg by mouth every 6 (six) hours as needed for mild pain (1-3)., Disp: , Rfl:   •  aspirin 325 MG tablet, Take 325 mg by mouth Daily., Disp: , Rfl:   •  B Complex Vitamins (VITAMIN B COMPLEX) capsule capsule, Take 1 capsule by mouth Daily., Disp: , Rfl:   •  Cholecalciferol (VITAMIN D3) 5000 UNITS capsule capsule, Take 5,000 Units by mouth daily., Disp: , Rfl:   •  coenzyme Q10 100 MG capsule, Take 100 mg by mouth daily., Disp: , Rfl:   •  cycloSPORINE (RESTASIS) 0.05 % ophthalmic emulsion, Administer 1 drop to both eyes 2 (two) times a day., Disp: , Rfl:   •  flecainide (TAMBOCOR) 100 MG tablet, Take 1 tablet by mouth Every 12 (Twelve) Hours., Disp: 180 tablet, Rfl: 3  •  isosorbide mononitrate (IMDUR) 30 MG 24 hr tablet, Take 1 tablet by mouth Daily., Disp: 90 tablet, Rfl: 3  •  nitroglycerin (NITROSTAT) 0.4 MG SL tablet, 1 under the tongue as needed for angina, may repeat q5mins for up three doses, Disp: 25 tablet, Rfl: 2  •  omeprazole (PriLOSEC) 20 MG capsule, Take 20 mg by mouth daily., Disp: , Rfl:   •  pravastatin (PRAVACHOL) 20 MG tablet, Take 1 tablet by mouth Daily., Disp: 90 tablet, Rfl: 3  •  traMADol (ULTRAM) 50 MG tablet, Take 50 mg by mouth 2 (Two) Times a Day As Needed for Moderate Pain (4-6)., Disp: , Rfl:     Allergies   Allergen Reactions   • Cymbalta [Duloxetine Hcl] Mental Status Change     Depression, thoughts of Suicide.    • Metoprolol  "Other (See Comments)     UNKNOWN. Pt does not remember this being allergy   • Neurontin [Gabapentin] Other (See Comments)     Lethargy & fatigue       Social History     Socioeconomic History   • Marital status:      Spouse name: Not on file   • Number of children: Not on file   • Years of education: Not on file   • Highest education level: Not on file   Occupational History   • Occupation: [employer]     Comment: Retired   Tobacco Use   • Smoking status: Never Smoker   • Smokeless tobacco: Never Used   Substance and Sexual Activity   • Alcohol use: No   • Drug use: No   • Sexual activity: Defer   Social History Narrative    Pt does not consume caffeine.      Family History   Problem Relation Age of Onset   • Atrial fibrillation Mother    • Cancer Father          Objective:   Blood pressure 124/62, pulse 81, height 185.4 cm (73\"), weight 102 kg (225 lb 6.4 oz), SpO2 98 %.  CONSTITUTIONAL: Well-nourished. In no acute distress. Hard of hearing  LUNGS: Normal effort. Clear to auscultation bilaterally without wheezing, rhonchi, or rales noted.   CARDIOVASCULAR: The heart has a regular rate with a normal S1 and S2. There is no murmur, gallop, rub, or click appreciated.   PERIPHERAL VASCULAR: Carotid upstroke is 2+ bilaterally and without bruits.      10/2017 - 2+ right PT pulse, 1+ left lower PT pulse     Labs:  Lab Results   Component Value Date    ALT 27 09/03/2018    AST 26 09/03/2018     Lab Results   Component Value Date    CHOL 128 10/19/2017    TRIG 77 10/19/2017    HDL 38 (L) 10/19/2017    CREATININE 1.11 09/03/2018     FPA labs 4/2019: Creatinine 1.1, AST 23, ALT 20, , , HDL 40, LDL 57    Outside facility labs 12/2019: Normal TSH, borderline low free thyroxine, hemoglobin 14.3, platelets 165, creatinine 1.1, AST 23, ALT 20, , , HDL 40, LDL 57    Diagnostic Data:      ECG 12 Lead  Date/Time: 3/2/2020 10:25 AM  Performed by: Hector Urrutia MD  Authorized by: Hector Urrutia, " MD   Comparison: compared with previous ECG from 8/12/2019  Similar to previous ECG  Rhythm: sinus rhythm  Comments: Heart rate 87, , QTc 447, inferior infarct age undetermined             Lower Extremity Arterial Duplex 3/2018  · No significant stenosis detected bilaterally.  · CHRISTINE not obtained due to non compressible arteries.    TTE 9/2015  1. The estimated ejection fraction is 55-60%.   2. Mild aortic cusp sclerosis is present.  3. There is a trace of mitral regurgitation.  4. There is mild tricuspid regurgitation.     Carotid Ultrasound 10/2017  · Proximal right internal carotid artery is normal.  · Left internal carotid artery stenosis of 0-49%.      Stress test 7/2017  · Left ventricular ejection fraction is hyperdynamic (Calculated EF > 70%).  · Myocardial perfusion imaging indicates a mild, very small-sized infarct located in the apex with no significant ischemia noted.  · Impressions are consistent with a low risk study.    [facility] 10/2017  · There is mild diffuse atherosclerosis with no flow limiting stenoses.  The previously placed stent in the ostial to proximal RCA is widely patent with FRANCE-3 distal flow.  There is a 30% discrete in-stent restenosis in the sten'ts mid segment.  · Normal left ventricular ejection fraction with mild hypokinesis of the basal anterolateral segment.    Assessment and Plan:   Guicho was seen today for coronary artery disease and hypertension.    Diagnoses and all orders for this visit:    Coronary artery disease involving native coronary artery of native heart without angina pectoris  -History of TAMMI to ostial RCA by Dr. Sue 2009  -CCS class I, prior chest pain possibly due to large hiatal hernia noted on CT scan.  Symptoms are atypical  -Continue aspirin and Imdur; SL Nitro PRN, did not tolerate metoprolol in the past   -Continue decreased dose of pravastatin at 20 mg daily.  This has helped his myalgias.      PAF (paroxysmal atrial fibrillation)  Lacunar  infarcts/TIAs  -Continue flecanide, ECG acceptable today  -Aspirin 325 mg daily  -Previously on Coumadin which was stopped after his ablations by EP.  Currently CHADSVASc score is 6 including his TIAs.  Multiple conversations regarding anticoagulation in the past.  Has a retinal vein (not arterial) occlusion.      Essential hypertension  -At goal     Lightheadedness  Mild carotid stenosis  -Mild carotid stenosis on left  -Clinical monitoring    Decreased left pedal pulse  Peripheral neuropathy  Right toe infection/amputation ~2016  -Previously followed by Dr. Polanco  -2+ right PT pulse, 1+ left PT pulse  -2018 arterial duplex scan with normal velocities. Unable to calculate ABIs due to non-compressible vessels.   -Symptoms seem more consistent with neuropathy and arthritis.  -Reasonable to continue tramadol as needed for his above pains.    -If the patient has ongoing neuropathy, consider gabapentin or Lyrica.  If restless leg seems to be his most profound symptom, consider Requip.  Had a prior intolerance to Cymbalta    Preoperative cardiac risk assessment  -The patient wishes to undergo a second cochlear implant.  Tolerated the first 1 without cardiopulmonary problems.  Okay to proceed from my standpoint.  Would continue aspirin judy-procedurally without discontinuation.    - Return in about 6 months (around 9/2/2020) for Patricia, EKG.     Hector Urrutia MD, MSc, [facility]  Interventional Cardiology  [facility] at [facility]

## 2020-09-30 ENCOUNTER — OFFICE VISIT (OUTPATIENT)
Dept: CARDIOLOGY | Facility: CLINIC | Age: 80
End: 2020-09-30

## 2020-09-30 VITALS
TEMPERATURE: 97.8 F | WEIGHT: 217 LBS | BODY MASS INDEX: 29.39 KG/M2 | DIASTOLIC BLOOD PRESSURE: 58 MMHG | SYSTOLIC BLOOD PRESSURE: 108 MMHG | HEART RATE: 91 BPM | HEIGHT: 72 IN

## 2020-09-30 DIAGNOSIS — E78.2 MIXED HYPERLIPIDEMIA: ICD-10-CM

## 2020-09-30 DIAGNOSIS — I48.0 PAF (PAROXYSMAL ATRIAL FIBRILLATION) (HCC): ICD-10-CM

## 2020-09-30 DIAGNOSIS — I25.10 CORONARY ARTERY DISEASE INVOLVING NATIVE CORONARY ARTERY OF NATIVE HEART WITHOUT ANGINA PECTORIS: Primary | ICD-10-CM

## 2020-09-30 DIAGNOSIS — I10 ESSENTIAL HYPERTENSION: ICD-10-CM

## 2020-09-30 PROCEDURE — 99213 OFFICE O/P EST LOW 20 MIN: CPT | Performed by: INTERNAL MEDICINE

## 2020-09-30 PROCEDURE — 93000 ELECTROCARDIOGRAM COMPLETE: CPT | Performed by: INTERNAL MEDICINE

## 2020-09-30 NOTE — PROGRESS NOTES
[facility] AT [address], [address]  [place], [place], [address] [phone]  WWW.[facility]           OUTPATIENT CLINIC FOLLOW UP NOTE      Patient Care Team:  Patient Care Team:  Mitchel Culver MD as PCP - General (Family Medicine)  Mitchel Culver MD as PCP - Family Medicine    Subjective:   Reason for consultation:   Chief Complaint   Patient presents with   • Coronary Artery Disease       HPI:    Guicho Blanco is a 79 y.o. male.  Cardiac problem list:  1. CAD  1. Severe anterior wall ischemia status post ostial RCA PTCA and placement of a Cypher drug-eluting stent by Dr. alejo in 2009  2. Paroxysmal atrial fibrillation/atrial flutter  1. Status post ablation with Dr. Sanchez in 3/2012  3. Hypertension  4. Hyperlipidemia  5. Prior infection right toe amputation with Dr. Polanco  6. COPD  1. Due to smoke exposure from being a    7. Small lacunar infarcts 10/2017  8. Retinal vein occlusion  9. Prior sleep evaluation with Dr. Luque    Today he presents for follow-up.    Since the patient was last seen he reports that he has been doing well from a cardiac standpoint.  He continues to have pleuritic-like brief, sharp chest pains.  He also notes intermittent lower extremity edema, however he was recently admitted for orthotic shoes and has seen some improvement.  He denies dyspnea with exertion, palpitations, lightheadedness, syncope, orthopnea, or PND.    Review of Systems:  Positive for sharp chest pain no lower extremity edema  Negative for exertional chest pain, dyspnea with exertion, orthopnea, PND, palpitations, lightheadedness, syncope.    PFSH:  Patient Active Problem List   Diagnosis   • Osteomyelitis of toe of right foot (CMS/HCC)   • Osteomyelitis of right foot (CMS/HCC)   • PAF (paroxysmal atrial fibrillation) (CMS/HCC)   • Coronary artery disease involving native coronary artery of native heart without angina pectoris   •  Gastroesophageal reflux disease without esophagitis   • Benign non-nodular prostatic hyperplasia without lower urinary tract symptoms   • S/P appy   • Essential hypertension   • Simple chronic bronchitis (CMS/HCC)   • Mixed hyperlipidemia   • COPD (chronic obstructive pulmonary disease) (CMS/HCC)   • BPH (benign prostatic hypertrophy)   • Hearing loss   • Chest pain   • CVA (cerebral vascular accident) (CMS/HCC)   • Vertigo   • Snoring   • Overweight   • PLMD (periodic limb movement disorder)   • Neuropathy         Current Outpatient Medications:   •  acetaminophen (TYLENOL) 500 MG tablet, Take 500 mg by mouth every 6 (six) hours as needed for mild pain (1-3)., Disp: , Rfl:   •  aspirin 325 MG tablet, Take 325 mg by mouth Daily., Disp: , Rfl:   •  B Complex Vitamins (VITAMIN B COMPLEX) capsule capsule, Take 1 capsule by mouth Daily., Disp: , Rfl:   •  Cholecalciferol (VITAMIN D3) 5000 UNITS capsule capsule, Take 5,000 Units by mouth daily., Disp: , Rfl:   •  coenzyme Q10 100 MG capsule, Take 100 mg by mouth daily., Disp: , Rfl:   •  cycloSPORINE (RESTASIS) 0.05 % ophthalmic emulsion, Administer 1 drop to both eyes 2 (two) times a day., Disp: , Rfl:   •  flecainide (TAMBOCOR) 100 MG tablet, Take 1 tablet by mouth Every 12 (Twelve) Hours., Disp: 180 tablet, Rfl: 3  •  isosorbide mononitrate (IMDUR) 30 MG 24 hr tablet, Take 1 tablet by mouth Daily., Disp: 90 tablet, Rfl: 3  •  nitroglycerin (NITROSTAT) 0.4 MG SL tablet, 1 under the tongue as needed for angina, may repeat q5mins for up three doses, Disp: 25 tablet, Rfl: 2  •  omeprazole (PriLOSEC) 20 MG capsule, Take 20 mg by mouth daily., Disp: , Rfl:   •  pravastatin (PRAVACHOL) 20 MG tablet, Take 1 tablet by mouth Daily., Disp: 90 tablet, Rfl: 3  •  traMADol (ULTRAM) 50 MG tablet, Take 50 mg by mouth 2 (Two) Times a Day As Needed for Moderate Pain (4-6)., Disp: , Rfl:     Allergies   Allergen Reactions   • Cymbalta [Duloxetine Hcl] Mental Status Change      "Depression, thoughts of Suicide.    • Metoprolol Other (See Comments)     UNKNOWN. Pt does not remember this being allergy   • Neurontin [Gabapentin] Other (See Comments)     Lethargy & fatigue       Social History     Socioeconomic History   • Marital status:      Spouse name: Not on file   • Number of children: Not on file   • Years of education: Not on file   • Highest education level: Not on file   Occupational History   • Occupation: [employer]     Comment: Retired   Tobacco Use   • Smoking status: Never Smoker   • Smokeless tobacco: Never Used   Substance and Sexual Activity   • Alcohol use: No   • Drug use: No   • Sexual activity: Defer   Social History Narrative    Pt does not consume caffeine.      Family History   Problem Relation Age of Onset   • Atrial fibrillation Mother    • Cancer Father          Objective:   Blood pressure 108/58, pulse 91, temperature 97.8 °F (36.6 °C), height 182.9 cm (72\"), weight 98.4 kg (217 lb).  CONSTITUTIONAL: No acute distress  RESPIRATORY: Normal effort. Clear to auscultation bilaterally without wheezing or rales  CARDIOVASCULAR: Regular rate and rhythm with normal S1 and S2. Without murmur, gallop or rub.  PERIPHERAL VASCULAR: Normal radial pulse. There is no lower extremity edema bilaterally.  PSYCH: Normal affect and mood      10/2017 - 2+ right PT pulse, 1+ left lower PT pulse     Labs:  Lab Results   Component Value Date    ALT 27 09/03/2018    AST 26 09/03/2018     Lab Results   Component Value Date    CHOL 128 10/19/2017    TRIG 77 10/19/2017    HDL 38 (L) 10/19/2017    CREATININE 1.11 09/03/2018     FPA labs 4/2019: Creatinine 1.1, AST 23, ALT 20, , , HDL 40, LDL 57    Outside facility labs 12/2019: Normal TSH, borderline low free thyroxine, hemoglobin 14.3, platelets 165, creatinine 1.1, AST 23, ALT 20, , , HDL 40, LDL 57    Diagnostic Data:      ECG 12 Lead    Date/Time: 9/30/2020 1:18 PM  Performed by: Hector Urrutia, " MD  Authorized by: Hector Urrutia MD   Comparison: compared with previous ECG from 3/2/2020  Similar to previous ECG  Rhythm: sinus rhythm  Rate: normal  BPM: 91  Comments:  ms    ms  Inferior infarct             Lower Extremity Arterial Duplex 3/2018  · No significant stenosis detected bilaterally.  · CHRISTINE not obtained due to non compressible arteries.    TTE 9/2015  1. The estimated ejection fraction is 55-60%.   2. Mild aortic cusp sclerosis is present.  3. There is a trace of mitral regurgitation.  4. There is mild tricuspid regurgitation.     Carotid Ultrasound 10/2017  · Proximal right internal carotid artery is normal.  · Left internal carotid artery stenosis of 0-49%.      Stress test 7/2017  · Left ventricular ejection fraction is hyperdynamic (Calculated EF > 70%).  · Myocardial perfusion imaging indicates a mild, very small-sized infarct located in the apex with no significant ischemia noted.  · Impressions are consistent with a low risk study.    [facility] 10/2017  · There is mild diffuse atherosclerosis with no flow limiting stenoses.  The previously placed stent in the ostial to proximal RCA is widely patent with FRANCE-3 distal flow.  There is a 30% discrete in-stent restenosis in the sten'ts mid segment.  · Normal left ventricular ejection fraction with mild hypokinesis of the basal anterolateral segment.    Assessment and Plan:   Guicho was seen today for coronary artery disease and hypertension.    Diagnoses and all orders for this visit:    Coronary artery disease involving native coronary artery of native heart without angina pectoris  -History of TAMMI to ostial RCA by Dr. Sue 2009  -CCS class I, prior chest pain possibly due to large hiatal hernia noted on CT scan.  Symptoms are atypical  -Has not tolerated metoprolol in the past due to lightheadedness.  -Continue aspirin, pravastatin, and Imdur; SL Nitro PRN    PAF (paroxysmal atrial fibrillation)  Lacunar infarcts/TIAs  -Continue  flecainide.  EKG acceptable today.  -Continue aspirin 325 mg daily  -Previously on Coumadin which was stopped after his ablations by EP.  Currently CHADSVASc score is 6 including his TIAs.  Multiple conversations regarding anticoagulation in the past.  Has a retinal vein (not arterial) occlusion.      Essential hypertension  -At goal, continue current related medications.    Lightheadedness  Mild carotid stenosis  -Mild carotid stenosis on left  -Continue clinical monitoring    Decreased left pedal pulse  Peripheral neuropathy  Right toe infection/amputation ~2016  -Previously followed by Dr. Polanco  -2+ right PT pulse, 1+ left PT pulse  -2018 arterial duplex scan with normal velocities. Unable to calculate ABIs due to non-compressible vessels.   -Symptoms seem more consistent with neuropathy and arthritis.  -Reasonable to continue tramadol as needed for his above pains.    -If the patient has ongoing neuropathy, consider gabapentin or Lyrica.  If restless leg seems to be his most profound symptom, consider Requip.  Had a prior intolerance to Cymbalta      - Return in about 6 months (around 3/30/2021).       Scribed for Hector Urrutia MD by Patricia Houser, APRN   9/30/2020  13:26 EDT    I, Hector Urrutia MD, personally performed the services as scribed by the above named individual. I have made any necessary edits and it is both accurate and complete.     Hector Urrutia MD, MSc, [facility]  Interventional Cardiology  [facility] at [facility]

## 2020-10-15 ENCOUNTER — TRANSCRIBE ORDERS (OUTPATIENT)
Dept: ADMINISTRATIVE | Facility: HOSPITAL | Age: 80
End: 2020-10-15

## 2020-10-15 ENCOUNTER — HOSPITAL ENCOUNTER (OUTPATIENT)
Dept: GENERAL RADIOLOGY | Facility: HOSPITAL | Age: 80
Discharge: HOME | End: 2020-10-15
Admitting: FAMILY MEDICINE

## 2020-10-15 DIAGNOSIS — M17.11 ARTHRITIS OF RIGHT KNEE: Primary | ICD-10-CM

## 2020-10-15 DIAGNOSIS — M51.36 DDD (DEGENERATIVE DISC DISEASE), LUMBAR: ICD-10-CM

## 2020-10-15 DIAGNOSIS — M16.0 OSTEOARTHRITIS OF BOTH HIPS, UNSPECIFIED OSTEOARTHRITIS TYPE: ICD-10-CM

## 2020-10-15 DIAGNOSIS — M17.11 ARTHRITIS OF RIGHT KNEE: ICD-10-CM

## 2020-10-15 PROCEDURE — 72110 X-RAY EXAM L-2 SPINE 4/>VWS: CPT

## 2020-10-15 PROCEDURE — 73521 X-RAY EXAM HIPS BI 2 VIEWS: CPT

## 2020-10-15 PROCEDURE — 73562 X-RAY EXAM OF KNEE 3: CPT

## 2020-11-09 ENCOUNTER — TELEPHONE (OUTPATIENT)
Dept: CARDIOLOGY | Facility: CLINIC | Age: 80
End: 2020-11-09

## 2020-11-09 NOTE — TELEPHONE ENCOUNTER
Patient was prescribed Meloxicam daily for arthritis by his PCP. He was told to check with his cardiologist before beginning this medication. He is unsure of the dosing.     Please advise.

## 2020-11-10 NOTE — TELEPHONE ENCOUNTER
Patient's wife notified of recommendations per MJS. Patient to call MD office today in reference to medication.

## 2021-03-11 RX ORDER — ISOSORBIDE MONONITRATE 30 MG/1
30 TABLET, EXTENDED RELEASE ORAL DAILY
Qty: 90 TABLET | Refills: 3 | Status: SHIPPED | OUTPATIENT
Start: 2021-03-11 | End: 2022-04-21

## 2021-03-15 RX ORDER — PRAVASTATIN SODIUM 20 MG
20 TABLET ORAL DAILY
Qty: 90 TABLET | Refills: 3 | Status: SHIPPED | OUTPATIENT
Start: 2021-03-15 | End: 2022-02-25

## 2021-04-19 ENCOUNTER — OFFICE VISIT (OUTPATIENT)
Dept: CARDIOLOGY | Facility: CLINIC | Age: 81
End: 2021-04-19

## 2021-04-19 VITALS
HEART RATE: 77 BPM | OXYGEN SATURATION: 97 % | HEIGHT: 72 IN | BODY MASS INDEX: 28.44 KG/M2 | SYSTOLIC BLOOD PRESSURE: 140 MMHG | DIASTOLIC BLOOD PRESSURE: 62 MMHG | WEIGHT: 210 LBS

## 2021-04-19 DIAGNOSIS — I25.10 CORONARY ARTERY DISEASE INVOLVING NATIVE CORONARY ARTERY OF NATIVE HEART WITHOUT ANGINA PECTORIS: ICD-10-CM

## 2021-04-19 DIAGNOSIS — I10 ESSENTIAL HYPERTENSION: ICD-10-CM

## 2021-04-19 DIAGNOSIS — I48.0 PAF (PAROXYSMAL ATRIAL FIBRILLATION) (HCC): Primary | ICD-10-CM

## 2021-04-19 DIAGNOSIS — E78.2 MIXED HYPERLIPIDEMIA: ICD-10-CM

## 2021-04-19 PROCEDURE — 93000 ELECTROCARDIOGRAM COMPLETE: CPT | Performed by: INTERNAL MEDICINE

## 2021-04-19 PROCEDURE — 99214 OFFICE O/P EST MOD 30 MIN: CPT | Performed by: INTERNAL MEDICINE

## 2021-04-19 RX ORDER — DOCUSATE SODIUM 100 MG/1
200 CAPSULE, LIQUID FILLED ORAL DAILY
COMMUNITY
End: 2021-06-28

## 2021-04-19 NOTE — PROGRESS NOTES
[facility]   [address] [place] [address], [address]  [place], [place], [address] [phone]  WWW.[facility]           OUTPATIENT CLINIC FOLLOW UP NOTE      Patient Care Team:  Patient Care Team:  Celestina Dumont MD as PCP - General (General Practice)  Mitchel Culver MD as PCP - Family Medicine    Subjective:   Reason for consultation:   Chief Complaint   Patient presents with   • Coronary artery disease involving native coronary artery of        HPI:    Guicho Blanco is a 80 y.o. male.  Cardiac problem list:  1. CAD  1. Severe anterior wall ischemia status post ostial RCA PTCA and placement of a Cypher drug-eluting stent by Dr. Sue in 2009  2. Paroxysmal atrial fibrillation/atrial flutter  1. Status post ablation with Dr. Daley in 3/2012  3. Hypertension  4. Hyperlipidemia  5. Prior infection right toe amputation with Dr. Polanco  6. COPD  1. Due to smoke exposure from being a    7. Small lacunar infarcts 10/2017  8. Retinal vein occlusion  9. Prior sleep evaluation with Dr. Luque  10. Peripheral neuropathy  1. Prior nerve conduction studies by neurology.  Saw different neurologists historically  2. Hospitalized after use of Cymbalta, data deficit.  Gabapentin did not help in the past.    Today he presents for follow-up.    Since his last visit the patient has not been as active.  Partially related to weather, Covid, individual motivation level.  Describes increased exertional fatigue and dyspnea.  Had a limited episode of chest pains that were bilateral, sharp, fleeting, that occurred a couple months ago and has not recurred.  Was seen by his primary care team who did an evaluation including an EKG.  This was unremarkable.  Denies palpitations.  Has had multiple musculoskeletal complaints and peripheral neuropathy.  Physical therapy did not help his back pain.    Review of Systems:  Positive for fatigue, dyspnea, musculoskeletal discomfort,  peripheral neuropathy  Negative for exertional chest pain, orthopnea, PND, palpitations, lightheadedness, syncope.    PFSH:  Patient Active Problem List   Diagnosis   • Osteomyelitis of toe of right foot (CMS/HCC)   • Osteomyelitis of right foot (CMS/HCC)   • PAF (paroxysmal atrial fibrillation) (CMS/[facility])   • Coronary artery disease involving native coronary artery of native heart without angina pectoris   • Gastroesophageal reflux disease without esophagitis   • Benign non-nodular prostatic hyperplasia without lower urinary tract symptoms   • S/P appy   • Essential hypertension   • Simple chronic bronchitis (CMS/HCC)   • Mixed hyperlipidemia   • COPD (chronic obstructive pulmonary disease) (CMS/HCC)   • BPH (benign prostatic hypertrophy)   • Hearing loss   • Chest pain   • CVA (cerebral vascular accident) (CMS/[facility])   • Vertigo   • Snoring   • Overweight   • PLMD (periodic limb movement disorder)   • Neuropathy         Current Outpatient Medications:   •  acetaminophen (TYLENOL) 500 MG tablet, Take 500 mg by mouth every 6 (six) hours as needed for mild pain (1-3)., Disp: , Rfl:   •  aspirin 325 MG tablet, Take 325 mg by mouth Daily., Disp: , Rfl:   •  B Complex Vitamins (VITAMIN B COMPLEX) capsule capsule, Take 1 capsule by mouth Daily., Disp: , Rfl:   •  Cholecalciferol (VITAMIN D3) 5000 UNITS capsule capsule, Take 5,000 Units by mouth daily., Disp: , Rfl:   •  coenzyme Q10 100 MG capsule, Take 100 mg by mouth daily., Disp: , Rfl:   •  cycloSPORINE (RESTASIS) 0.05 % ophthalmic emulsion, Administer 1 drop to both eyes 2 (two) times a day., Disp: , Rfl:   •  docusate sodium (COLACE) 100 MG capsule, Take 200 mg by mouth Daily., Disp: , Rfl:   •  flecainide (TAMBOCOR) 100 MG tablet, Take 1 tablet by mouth Every 12 (Twelve) Hours., Disp: 180 tablet, Rfl: 3  •  isosorbide mononitrate (IMDUR) 30 MG 24 hr tablet, Take 1 tablet by mouth Daily., Disp: 90 tablet, Rfl: 3  •  nitroglycerin (NITROSTAT) 0.4 MG SL tablet, 1 under  "the tongue as needed for angina, may repeat q5mins for up three doses, Disp: 25 tablet, Rfl: 2  •  omeprazole (PriLOSEC) 20 MG capsule, Take 20 mg by mouth daily., Disp: , Rfl:   •  pravastatin (PRAVACHOL) 20 MG tablet, Take 1 tablet by mouth Daily., Disp: 90 tablet, Rfl: 3  •  traMADol (ULTRAM) 50 MG tablet, Take 50 mg by mouth 2 (Two) Times a Day As Needed for Moderate Pain (4-6)., Disp: , Rfl:     Allergies   Allergen Reactions   • Cymbalta [Duloxetine Hcl] Mental Status Change     Depression, thoughts of Suicide.    • Metoprolol Other (See Comments)     UNKNOWN. Pt does not remember this being allergy   • Neurontin [Gabapentin] Other (See Comments)     Lethargy & fatigue       Social History     Socioeconomic History   • Marital status:      Spouse name: Not on file   • Number of children: Not on file   • Years of education: Not on file   • Highest education level: Not on file   Tobacco Use   • Smoking status: Never Smoker   • Smokeless tobacco: Never Used   Substance and Sexual Activity   • Alcohol use: No   • Drug use: No   • Sexual activity: Defer     Family History   Problem Relation Age of Onset   • Atrial fibrillation Mother    • Cancer Father          Objective:   Blood pressure 140/62, pulse 77, height 182.9 cm (72\"), weight 95.3 kg (210 lb), SpO2 97 %.  CONSTITUTIONAL: No acute distress  RESPIRATORY: Normal effort. Clear to auscultation bilaterally without wheezing or rales  CARDIOVASCULAR: Regular rate and rhythm with normal S1 and S2. Without murmur, gallop or rub.  PERIPHERAL VASCULAR: Normal radial pulse. There is no lower extremity edema bilaterally.  PSYCH: Normal affect and mood    4/2021 exam: 2+ PT pulses bilaterally  10/2017 - 2+ right PT pulse, 1+ left lower PT pulse     Labs:  Lab Results   Component Value Date    ALT 27 09/03/2018    AST 26 09/03/2018     Lab Results   Component Value Date    CHOL 128 10/19/2017    TRIG 77 10/19/2017    HDL 38 (L) 10/19/2017    CREATININE 1.11 " 09/03/2018     FPA labs 4/2019: Creatinine 1.1, AST 23, ALT 20, , , HDL 40, LDL 57    Outside facility labs 12/2019: Normal TSH, borderline low free thyroxine, hemoglobin 14.3, platelets 165, creatinine 1.1, AST 23, ALT 20, , , HDL 40, LDL 57    Diagnostic Data:      ECG 12 Lead    Date/Time: 4/19/2021 12:39 PM  Performed by: Hector Urrutia MD  Authorized by: Hector Urrutia MD   Comparison: compared with previous ECG from 9/30/2020  Similar to previous ECG  Rhythm: sinus rhythm  Comments: Inferior Q's, heart rate 77, , QTc 443             Lower Extremity Arterial Duplex 3/2018  · No significant stenosis detected bilaterally.  · CHRISTINE not obtained due to non compressible arteries.    TTE 9/2015  1. The estimated ejection fraction is 55-60%.   2. Mild aortic cusp sclerosis is present.  3. There is a trace of mitral regurgitation.  4. There is mild tricuspid regurgitation.     Carotid Ultrasound 10/2017  · Proximal right internal carotid artery is normal.  · Left internal carotid artery stenosis of 0-49%.      Stress test 7/2017  · Left ventricular ejection fraction is hyperdynamic (Calculated EF > 70%).  · Myocardial perfusion imaging indicates a mild, very small-sized infarct located in the apex with no significant ischemia noted.  · Impressions are consistent with a low risk study.    [facility] 10/2017  · There is mild diffuse atherosclerosis with no flow limiting stenoses.  The previously placed stent in the ostial to proximal RCA is widely patent with FRANCE-3 distal flow.  There is a 30% discrete in-stent restenosis in the sten'ts mid segment.  · Normal left ventricular ejection fraction with mild hypokinesis of the basal anterolateral segment.    Assessment and Plan:   Guicho was seen today for coronary artery disease and hypertension.    Diagnoses and all orders for this visit:    Coronary artery disease involving native coronary artery of native heart without angina pectoris  Mixed  hyperlipidemia  -Recent chest pain syndrome atypical for angina and has resolved on its own  -Not on a beta-blocker due to prior intolerance/lightheadedness.  -Continue aspirin, pravastatin, and Imdur; SL Nitro PRN  -We will try to obtain last lipid panel from primary care office for review    PAF (paroxysmal atrial fibrillation)  Lacunar infarcts/TIAs  -Continue flecainide.  EKG acceptable today.  -Continue aspirin 325 mg daily  -Previously on Coumadin which was stopped after his ablations by EP.  Currently CHADSVASc score is 6 including his TIAs.  Multiple conversations regarding anticoagulation in the past.  Has a retinal vein (not arterial) occlusion.      Essential hypertension  -Continue current related medications.    Lightheadedness  Mild carotid stenosis  -Mild carotid stenosis on left  -Continue clinical monitoring    Decreased left pedal pulse  Peripheral neuropathy  Right toe infection/amputation ~2016  -Previously followed by Dr. Polanco and various neurologists  -Palpable pulses, persistent neuropathy symptoms  -Prominent restless leg syndrome.  Reluctant to try Requip.  Discussed today    - No follow-ups on file.     Hector Urrutia MD, MSc, [facility]  Interventional Cardiology  [facility] at [facility]

## 2021-05-11 RX ORDER — FLECAINIDE ACETATE 100 MG/1
100 TABLET ORAL EVERY 12 HOURS
Qty: 180 TABLET | Refills: 3 | Status: SHIPPED | OUTPATIENT
Start: 2021-05-11 | End: 2022-04-21

## 2021-06-18 ENCOUNTER — APPOINTMENT (OUTPATIENT)
Dept: GENERAL RADIOLOGY | Facility: HOSPITAL | Age: 81
End: 2021-06-18

## 2021-06-18 ENCOUNTER — APPOINTMENT (OUTPATIENT)
Dept: CARDIOLOGY | Facility: HOSPITAL | Age: 81
End: 2021-06-18

## 2021-06-18 ENCOUNTER — HOSPITAL ENCOUNTER (OUTPATIENT)
Facility: HOSPITAL | Age: 81
Setting detail: OBSERVATION
Discharge: HOME | End: 2021-06-19
Attending: EMERGENCY MEDICINE | Admitting: INTERNAL MEDICINE

## 2021-06-18 ENCOUNTER — APPOINTMENT (OUTPATIENT)
Dept: CT IMAGING | Facility: HOSPITAL | Age: 81
End: 2021-06-18

## 2021-06-18 DIAGNOSIS — R29.810 FACIAL DROOP: Primary | ICD-10-CM

## 2021-06-18 DIAGNOSIS — Z96.21 USES COCHLEAR IMPLANT: ICD-10-CM

## 2021-06-18 DIAGNOSIS — R13.14 PHARYNGOESOPHAGEAL DYSPHAGIA: ICD-10-CM

## 2021-06-18 DIAGNOSIS — R47.9 DIFFICULTY WITH SPEECH: ICD-10-CM

## 2021-06-18 DIAGNOSIS — R11.2 NON-INTRACTABLE VOMITING WITH NAUSEA, UNSPECIFIED VOMITING TYPE: ICD-10-CM

## 2021-06-18 LAB
ALBUMIN SERPL-MCNC: 3.7 G/DL (ref 3.5–5.2)
ALBUMIN/GLOB SERPL: 1.5 G/DL
ALP SERPL-CCNC: 107 U/L (ref 39–117)
ALT SERPL W P-5'-P-CCNC: 18 U/L (ref 1–41)
ALT SERPL W P-5'-P-CCNC: 18 U/L (ref 1–41)
ANION GAP SERPL CALCULATED.3IONS-SCNC: 12 MMOL/L (ref 5–15)
APTT PPP: 28.2 SECONDS (ref 22–39)
AST SERPL-CCNC: 25 U/L (ref 1–40)
AST SERPL-CCNC: 25 U/L (ref 1–40)
BASE EXCESS BLDA CALC-SCNC: -2 MMOL/L (ref -5–5)
BASOPHILS # BLD AUTO: 0.05 10*3/MM3 (ref 0–0.2)
BASOPHILS NFR BLD AUTO: 0.7 % (ref 0–1.5)
BH CV ECHO MEAS - AO MAX PG (FULL): 3.4 MMHG
BH CV ECHO MEAS - AO MAX PG: 5.9 MMHG
BH CV ECHO MEAS - AO MEAN PG (FULL): 1.8 MMHG
BH CV ECHO MEAS - AO MEAN PG: 3.3 MMHG
BH CV ECHO MEAS - AO ROOT AREA (BSA CORRECTED): 1.7
BH CV ECHO MEAS - AO ROOT AREA: 10.7 CM^2
BH CV ECHO MEAS - AO ROOT DIAM: 3.7 CM
BH CV ECHO MEAS - AO V2 MAX: 121.1 CM/SEC
BH CV ECHO MEAS - AO V2 MEAN: 83.8 CM/SEC
BH CV ECHO MEAS - AO V2 VTI: 27.8 CM
BH CV ECHO MEAS - AVA(I,A): 2.1 CM^2
BH CV ECHO MEAS - AVA(I,D): 2.1 CM^2
BH CV ECHO MEAS - AVA(V,A): 2.1 CM^2
BH CV ECHO MEAS - AVA(V,D): 2.1 CM^2
BH CV ECHO MEAS - BSA(HAYCOCK): 2.2 M^2
BH CV ECHO MEAS - BSA: 2.2 M^2
BH CV ECHO MEAS - BZI_BMI: 28.5 KILOGRAMS/M^2
BH CV ECHO MEAS - BZI_METRIC_HEIGHT: 182.9 CM
BH CV ECHO MEAS - BZI_METRIC_WEIGHT: 95.3 KG
BH CV ECHO MEAS - EDV(CUBED): 43.8 ML
BH CV ECHO MEAS - EDV(MOD-SP2): 152 ML
BH CV ECHO MEAS - EDV(MOD-SP4): 155 ML
BH CV ECHO MEAS - EDV(TEICH): 51.7 ML
BH CV ECHO MEAS - EF(CUBED): 63 %
BH CV ECHO MEAS - EF(MOD-BP): 65 %
BH CV ECHO MEAS - EF(MOD-SP2): 68.4 %
BH CV ECHO MEAS - EF(MOD-SP4): 76.1 %
BH CV ECHO MEAS - EF(TEICH): 55.5 %
BH CV ECHO MEAS - ESV(CUBED): 16.2 ML
BH CV ECHO MEAS - ESV(MOD-SP2): 48 ML
BH CV ECHO MEAS - ESV(MOD-SP4): 37 ML
BH CV ECHO MEAS - ESV(TEICH): 23 ML
BH CV ECHO MEAS - FS: 28.2 %
BH CV ECHO MEAS - IVS/LVPW: 0.8
BH CV ECHO MEAS - IVSD: 0.92 CM
BH CV ECHO MEAS - LA DIMENSION: 3.5 CM
BH CV ECHO MEAS - LA/AO: 0.95
BH CV ECHO MEAS - LAD MAJOR: 5.5 CM
BH CV ECHO MEAS - LAT PEAK E' VEL: 9.2 CM/SEC
BH CV ECHO MEAS - LATERAL E/E' RATIO: 5.5
BH CV ECHO MEAS - LV DIASTOLIC VOL/BSA (35-75): 71.3 ML/M^2
BH CV ECHO MEAS - LV MASS(C)D: 109 GRAMS
BH CV ECHO MEAS - LV MASS(C)DI: 50.1 GRAMS/M^2
BH CV ECHO MEAS - LV MAX PG: 2.6 MMHG
BH CV ECHO MEAS - LV MEAN PG: 1.5 MMHG
BH CV ECHO MEAS - LV SYSTOLIC VOL/BSA (12-30): 17 ML/M^2
BH CV ECHO MEAS - LV V1 MAX: 79.9 CM/SEC
BH CV ECHO MEAS - LV V1 MEAN: 57.4 CM/SEC
BH CV ECHO MEAS - LV V1 VTI: 18.6 CM
BH CV ECHO MEAS - LVIDD: 3.5 CM
BH CV ECHO MEAS - LVIDS: 2.5 CM
BH CV ECHO MEAS - LVLD AP2: 8.9 CM
BH CV ECHO MEAS - LVLD AP4: 8.4 CM
BH CV ECHO MEAS - LVLS AP2: 7.2 CM
BH CV ECHO MEAS - LVLS AP4: 6.6 CM
BH CV ECHO MEAS - LVOT AREA (M): 3.1 CM^2
BH CV ECHO MEAS - LVOT AREA: 3.2 CM^2
BH CV ECHO MEAS - LVOT DIAM: 2 CM
BH CV ECHO MEAS - LVPWD: 1.1 CM
BH CV ECHO MEAS - MED PEAK E' VEL: 7.8 CM/SEC
BH CV ECHO MEAS - MEDIAL E/E' RATIO: 6.4
BH CV ECHO MEAS - MV A MAX VEL: 44.7 CM/SEC
BH CV ECHO MEAS - MV DEC TIME: 0.13 SEC
BH CV ECHO MEAS - MV E MAX VEL: 51.2 CM/SEC
BH CV ECHO MEAS - MV E/A: 1.1
BH CV ECHO MEAS - MV MAX PG: 2.3 MMHG
BH CV ECHO MEAS - MV MEAN PG: 0.96 MMHG
BH CV ECHO MEAS - MV V2 MAX: 76.2 CM/SEC
BH CV ECHO MEAS - MV V2 MEAN: 46 CM/SEC
BH CV ECHO MEAS - MV V2 VTI: 26.7 CM
BH CV ECHO MEAS - MVA(VTI): 2.2 CM^2
BH CV ECHO MEAS - PA ACC SLOPE: 540.3 CM/SEC^2
BH CV ECHO MEAS - PA ACC TIME: 0.11 SEC
BH CV ECHO MEAS - PA MAX PG: 3.1 MMHG
BH CV ECHO MEAS - PA PR(ACCEL): 31.5 MMHG
BH CV ECHO MEAS - PA V2 MAX: 87.6 CM/SEC
BH CV ECHO MEAS - SI(AO): 136 ML/M^2
BH CV ECHO MEAS - SI(CUBED): 12.7 ML/M^2
BH CV ECHO MEAS - SI(LVOT): 27.2 ML/M^2
BH CV ECHO MEAS - SI(MOD-SP2): 47.8 ML/M^2
BH CV ECHO MEAS - SI(MOD-SP4): 54.3 ML/M^2
BH CV ECHO MEAS - SI(TEICH): 13.2 ML/M^2
BH CV ECHO MEAS - SV(AO): 295.7 ML
BH CV ECHO MEAS - SV(CUBED): 27.6 ML
BH CV ECHO MEAS - SV(LVOT): 59.1 ML
BH CV ECHO MEAS - SV(MOD-SP2): 104 ML
BH CV ECHO MEAS - SV(MOD-SP4): 118 ML
BH CV ECHO MEAS - SV(TEICH): 28.7 ML
BH CV ECHO MEAS - TAPSE (>1.6): 2.3 CM
BH CV ECHO MEASUREMENTS AVERAGE E/E' RATIO: 6.02
BH CV XLRA - RV BASE: 4 CM
BH CV XLRA - RV LENGTH: 7.6 CM
BH CV XLRA - RV MID: 2.7 CM
BH CV XLRA - TDI S': 10.6 CM/SEC
BILIRUB SERPL-MCNC: 0.6 MG/DL (ref 0–1.2)
BUN SERPL-MCNC: 15 MG/DL (ref 8–23)
BUN/CREAT SERPL: 14.7 (ref 7–25)
CA-I BLDA-SCNC: 1.18 MMOL/L (ref 1.2–1.32)
CALCIUM SPEC-SCNC: 8.6 MG/DL (ref 8.6–10.5)
CHLORIDE SERPL-SCNC: 105 MMOL/L (ref 98–107)
CO2 BLDA-SCNC: 23 MMOL/L (ref 24–29)
CO2 SERPL-SCNC: 20 MMOL/L (ref 22–29)
CREAT BLDA-MCNC: 1.2 MG/DL (ref 0.6–1.3)
CREAT SERPL-MCNC: 1.02 MG/DL (ref 0.76–1.27)
DEPRECATED RDW RBC AUTO: 43.1 FL (ref 37–54)
EOSINOPHIL # BLD AUTO: 0.11 10*3/MM3 (ref 0–0.4)
EOSINOPHIL NFR BLD AUTO: 1.6 % (ref 0.3–6.2)
ERYTHROCYTE [DISTWIDTH] IN BLOOD BY AUTOMATED COUNT: 14.1 % (ref 12.3–15.4)
GFR SERPL CREATININE-BSD FRML MDRD: 70 ML/MIN/1.73
GLOBULIN UR ELPH-MCNC: 2.5 GM/DL
GLUCOSE BLDC GLUCOMTR-MCNC: 110 MG/DL (ref 70–130)
GLUCOSE BLDC GLUCOMTR-MCNC: 117 MG/DL (ref 70–130)
GLUCOSE BLDC GLUCOMTR-MCNC: 126 MG/DL (ref 70–130)
GLUCOSE SERPL-MCNC: 118 MG/DL (ref 65–99)
HCO3 BLDA-SCNC: 22.2 MMOL/L (ref 22–26)
HCT VFR BLD AUTO: 38.7 % (ref 37.5–51)
HCT VFR BLDA CALC: 37 % (ref 38–51)
HGB BLD-MCNC: 12.4 G/DL (ref 13–17.7)
HGB BLDA-MCNC: 12.6 G/DL (ref 12–17)
HOLD SPECIMEN: NORMAL
IMM GRANULOCYTES # BLD AUTO: 0.03 10*3/MM3 (ref 0–0.05)
IMM GRANULOCYTES NFR BLD AUTO: 0.4 % (ref 0–0.5)
LEFT ATRIUM VOLUME INDEX: 24.4 ML/M^2
LEFT ATRIUM VOLUME: 53 ML
LYMPHOCYTES # BLD AUTO: 1.87 10*3/MM3 (ref 0.7–3.1)
LYMPHOCYTES NFR BLD AUTO: 27.3 % (ref 19.6–45.3)
MAXIMAL PREDICTED HEART RATE: 140 BPM
MCH RBC QN AUTO: 27 PG (ref 26.6–33)
MCHC RBC AUTO-ENTMCNC: 32 G/DL (ref 31.5–35.7)
MCV RBC AUTO: 84.3 FL (ref 79–97)
MONOCYTES # BLD AUTO: 0.62 10*3/MM3 (ref 0.1–0.9)
MONOCYTES NFR BLD AUTO: 9 % (ref 5–12)
NEUTROPHILS NFR BLD AUTO: 4.18 10*3/MM3 (ref 1.7–7)
NEUTROPHILS NFR BLD AUTO: 61 % (ref 42.7–76)
NRBC BLD AUTO-RTO: 0 /100 WBC (ref 0–0.2)
PCO2 BLDA: 33 MM HG (ref 35–45)
PH BLDA: 7.44 PH UNITS (ref 7.35–7.6)
PLATELET # BLD AUTO: 168 10*3/MM3 (ref 140–450)
PMV BLD AUTO: 10.7 FL (ref 6–12)
PO2 BLDA: 27 MMHG (ref 80–105)
POTASSIUM BLDA-SCNC: 3.7 MMOL/L (ref 3.5–4.9)
POTASSIUM SERPL-SCNC: 3.8 MMOL/L (ref 3.5–5.2)
PROT SERPL-MCNC: 6.2 G/DL (ref 6–8.5)
QT INTERVAL: 436 MS
QTC INTERVAL: 467 MS
RBC # BLD AUTO: 4.59 10*6/MM3 (ref 4.14–5.8)
SAO2 % BLDA: 55 % (ref 95–98)
SODIUM BLD-SCNC: 144 MMOL/L (ref 138–146)
SODIUM SERPL-SCNC: 137 MMOL/L (ref 136–145)
STRESS TARGET HR: 119 BPM
TROPONIN T SERPL-MCNC: <0.01 NG/ML (ref 0–0.03)
WBC # BLD AUTO: 6.86 10*3/MM3 (ref 3.4–10.8)
WHOLE BLOOD HOLD SPECIMEN: NORMAL

## 2021-06-18 PROCEDURE — G0378 HOSPITAL OBSERVATION PER HR: HCPCS

## 2021-06-18 PROCEDURE — 93005 ELECTROCARDIOGRAM TRACING: CPT | Performed by: EMERGENCY MEDICINE

## 2021-06-18 PROCEDURE — 84450 TRANSFERASE (AST) (SGOT): CPT | Performed by: EMERGENCY MEDICINE

## 2021-06-18 PROCEDURE — 97116 GAIT TRAINING THERAPY: CPT | Performed by: PHYSICAL THERAPIST

## 2021-06-18 PROCEDURE — 84484 ASSAY OF TROPONIN QUANT: CPT | Performed by: EMERGENCY MEDICINE

## 2021-06-18 PROCEDURE — 80053 COMPREHEN METABOLIC PANEL: CPT | Performed by: EMERGENCY MEDICINE

## 2021-06-18 PROCEDURE — 82330 ASSAY OF CALCIUM: CPT

## 2021-06-18 PROCEDURE — 99285 EMERGENCY DEPT VISIT HI MDM: CPT

## 2021-06-18 PROCEDURE — 85014 HEMATOCRIT: CPT

## 2021-06-18 PROCEDURE — 74240 X-RAY XM UPR GI TRC 1CNTRST: CPT

## 2021-06-18 PROCEDURE — 84295 ASSAY OF SERUM SODIUM: CPT

## 2021-06-18 PROCEDURE — 71045 X-RAY EXAM CHEST 1 VIEW: CPT

## 2021-06-18 PROCEDURE — 96374 THER/PROPH/DIAG INJ IV PUSH: CPT

## 2021-06-18 PROCEDURE — 70496 CT ANGIOGRAPHY HEAD: CPT

## 2021-06-18 PROCEDURE — 99204 OFFICE O/P NEW MOD 45 MIN: CPT | Performed by: PSYCHIATRY & NEUROLOGY

## 2021-06-18 PROCEDURE — 25010000002 ONDANSETRON PER 1 MG

## 2021-06-18 PROCEDURE — 92526 ORAL FUNCTION THERAPY: CPT

## 2021-06-18 PROCEDURE — 82565 ASSAY OF CREATININE: CPT

## 2021-06-18 PROCEDURE — 82803 BLOOD GASES ANY COMBINATION: CPT

## 2021-06-18 PROCEDURE — 92610 EVALUATE SWALLOWING FUNCTION: CPT

## 2021-06-18 PROCEDURE — 74230 X-RAY XM SWLNG FUNCJ C+: CPT

## 2021-06-18 PROCEDURE — 92523 SPEECH SOUND LANG COMPREHEN: CPT

## 2021-06-18 PROCEDURE — 99220 PR INITIAL OBSERVATION CARE/DAY 70 MINUTES: CPT | Performed by: INTERNAL MEDICINE

## 2021-06-18 PROCEDURE — 93306 TTE W/DOPPLER COMPLETE: CPT

## 2021-06-18 PROCEDURE — 82947 ASSAY GLUCOSE BLOOD QUANT: CPT

## 2021-06-18 PROCEDURE — 84460 ALANINE AMINO (ALT) (SGPT): CPT | Performed by: EMERGENCY MEDICINE

## 2021-06-18 PROCEDURE — 0 IOPAMIDOL PER 1 ML: Performed by: EMERGENCY MEDICINE

## 2021-06-18 PROCEDURE — 70498 CT ANGIOGRAPHY NECK: CPT

## 2021-06-18 PROCEDURE — 0042T HC CT CEREBRAL PERFUSION W/WO CONTRAST: CPT

## 2021-06-18 PROCEDURE — 84132 ASSAY OF SERUM POTASSIUM: CPT

## 2021-06-18 PROCEDURE — 70450 CT HEAD/BRAIN W/O DYE: CPT

## 2021-06-18 PROCEDURE — 25010000002 PROCHLORPERAZINE 10 MG/2ML SOLUTION: Performed by: INTERNAL MEDICINE

## 2021-06-18 PROCEDURE — 96375 TX/PRO/DX INJ NEW DRUG ADDON: CPT

## 2021-06-18 PROCEDURE — 97162 PT EVAL MOD COMPLEX 30 MIN: CPT | Performed by: PHYSICAL THERAPIST

## 2021-06-18 PROCEDURE — 85025 COMPLETE CBC W/AUTO DIFF WBC: CPT | Performed by: EMERGENCY MEDICINE

## 2021-06-18 PROCEDURE — 92611 MOTION FLUOROSCOPY/SWALLOW: CPT

## 2021-06-18 PROCEDURE — 25010000002 PROMETHAZINE PER 50 MG: Performed by: EMERGENCY MEDICINE

## 2021-06-18 PROCEDURE — 85730 THROMBOPLASTIN TIME PARTIAL: CPT | Performed by: EMERGENCY MEDICINE

## 2021-06-18 PROCEDURE — 93306 TTE W/DOPPLER COMPLETE: CPT | Performed by: INTERNAL MEDICINE

## 2021-06-18 RX ORDER — BISACODYL 10 MG
10 SUPPOSITORY, RECTAL RECTAL DAILY PRN
Status: DISCONTINUED | OUTPATIENT
Start: 2021-06-18 | End: 2021-06-19 | Stop reason: HOSPADM

## 2021-06-18 RX ORDER — AMOXICILLIN 250 MG
2 CAPSULE ORAL 2 TIMES DAILY
Status: DISCONTINUED | OUTPATIENT
Start: 2021-06-18 | End: 2021-06-19 | Stop reason: HOSPADM

## 2021-06-18 RX ORDER — SODIUM CHLORIDE 0.9 % (FLUSH) 0.9 %
10 SYRINGE (ML) INJECTION EVERY 12 HOURS SCHEDULED
Status: DISCONTINUED | OUTPATIENT
Start: 2021-06-18 | End: 2021-06-19 | Stop reason: HOSPADM

## 2021-06-18 RX ORDER — POLYETHYLENE GLYCOL 3350 17 G/17G
17 POWDER, FOR SOLUTION ORAL DAILY PRN
Status: DISCONTINUED | OUTPATIENT
Start: 2021-06-18 | End: 2021-06-19 | Stop reason: HOSPADM

## 2021-06-18 RX ORDER — PROCHLORPERAZINE EDISYLATE 5 MG/ML
5 INJECTION INTRAMUSCULAR; INTRAVENOUS EVERY 6 HOURS PRN
Status: DISCONTINUED | OUTPATIENT
Start: 2021-06-18 | End: 2021-06-19 | Stop reason: HOSPADM

## 2021-06-18 RX ORDER — SODIUM CHLORIDE 0.9 % (FLUSH) 0.9 %
10 SYRINGE (ML) INJECTION AS NEEDED
Status: DISCONTINUED | OUTPATIENT
Start: 2021-06-18 | End: 2021-06-19 | Stop reason: HOSPADM

## 2021-06-18 RX ORDER — ACETAMINOPHEN 325 MG/1
650 TABLET ORAL EVERY 6 HOURS PRN
Status: DISCONTINUED | OUTPATIENT
Start: 2021-06-18 | End: 2021-06-19 | Stop reason: HOSPADM

## 2021-06-18 RX ORDER — ASPIRIN 300 MG/1
300 SUPPOSITORY RECTAL DAILY
Status: DISCONTINUED | OUTPATIENT
Start: 2021-06-19 | End: 2021-06-19 | Stop reason: HOSPADM

## 2021-06-18 RX ORDER — ONDANSETRON 2 MG/ML
INJECTION INTRAMUSCULAR; INTRAVENOUS
Status: COMPLETED
Start: 2021-06-18 | End: 2021-06-18

## 2021-06-18 RX ORDER — ONDANSETRON 2 MG/ML
4 INJECTION INTRAMUSCULAR; INTRAVENOUS ONCE
Status: COMPLETED | OUTPATIENT
Start: 2021-06-18 | End: 2021-06-18

## 2021-06-18 RX ORDER — BISACODYL 5 MG/1
5 TABLET, DELAYED RELEASE ORAL DAILY PRN
Status: DISCONTINUED | OUTPATIENT
Start: 2021-06-18 | End: 2021-06-19 | Stop reason: HOSPADM

## 2021-06-18 RX ORDER — TRAMADOL HYDROCHLORIDE 50 MG/1
50 TABLET ORAL 2 TIMES DAILY PRN
Status: DISCONTINUED | OUTPATIENT
Start: 2021-06-18 | End: 2021-06-19 | Stop reason: HOSPADM

## 2021-06-18 RX ORDER — ONDANSETRON 2 MG/ML
4 INJECTION INTRAMUSCULAR; INTRAVENOUS EVERY 6 HOURS PRN
Status: DISCONTINUED | OUTPATIENT
Start: 2021-06-18 | End: 2021-06-19 | Stop reason: HOSPADM

## 2021-06-18 RX ORDER — ATORVASTATIN CALCIUM 40 MG/1
80 TABLET, FILM COATED ORAL NIGHTLY
Status: DISCONTINUED | OUTPATIENT
Start: 2021-06-18 | End: 2021-06-19 | Stop reason: HOSPADM

## 2021-06-18 RX ORDER — ASPIRIN 325 MG
325 TABLET ORAL DAILY
Status: DISCONTINUED | OUTPATIENT
Start: 2021-06-19 | End: 2021-06-19 | Stop reason: HOSPADM

## 2021-06-18 RX ORDER — FLECAINIDE ACETATE 50 MG/1
100 TABLET ORAL EVERY 12 HOURS
Status: DISCONTINUED | OUTPATIENT
Start: 2021-06-18 | End: 2021-06-19 | Stop reason: HOSPADM

## 2021-06-18 RX ORDER — SODIUM CHLORIDE 9 MG/ML
100 INJECTION, SOLUTION INTRAVENOUS CONTINUOUS
Status: DISCONTINUED | OUTPATIENT
Start: 2021-06-18 | End: 2021-06-19 | Stop reason: HOSPADM

## 2021-06-18 RX ADMIN — ONDANSETRON 4 MG: 2 INJECTION INTRAMUSCULAR; INTRAVENOUS at 08:27

## 2021-06-18 RX ADMIN — FLECAINIDE ACETATE 100 MG: 50 TABLET ORAL at 22:05

## 2021-06-18 RX ADMIN — DOCUSATE SODIUM 50MG AND SENNOSIDES 8.6MG 2 TABLET: 8.6; 5 TABLET, FILM COATED ORAL at 20:23

## 2021-06-18 RX ADMIN — BARIUM SULFATE 20 ML: 400 PASTE ORAL at 14:24

## 2021-06-18 RX ADMIN — ACETAMINOPHEN 650 MG: 325 TABLET ORAL at 22:05

## 2021-06-18 RX ADMIN — IOPAMIDOL 100 ML: 755 INJECTION, SOLUTION INTRAVENOUS at 08:10

## 2021-06-18 RX ADMIN — BARIUM SULFATE 100 ML: 0.81 POWDER, FOR SUSPENSION ORAL at 14:25

## 2021-06-18 RX ADMIN — SODIUM CHLORIDE 100 ML/HR: 9 INJECTION, SOLUTION INTRAVENOUS at 11:03

## 2021-06-18 RX ADMIN — TRAMADOL HYDROCHLORIDE 50 MG: 50 TABLET, FILM COATED ORAL at 20:22

## 2021-06-18 RX ADMIN — ATORVASTATIN CALCIUM 80 MG: 40 TABLET, FILM COATED ORAL at 20:22

## 2021-06-18 RX ADMIN — PROMETHAZINE HYDROCHLORIDE 12.5 MG: 25 INJECTION INTRAMUSCULAR; INTRAVENOUS at 08:50

## 2021-06-18 RX ADMIN — SODIUM CHLORIDE, PRESERVATIVE FREE 10 ML: 5 INJECTION INTRAVENOUS at 11:03

## 2021-06-18 RX ADMIN — PROCHLORPERAZINE EDISYLATE 5 MG: 5 INJECTION INTRAMUSCULAR; INTRAVENOUS at 12:39

## 2021-06-18 NOTE — CONSULTS
"Stroke Consult Note    Patient Name: Guicho Blanco   MRN: [id number]  Age: 80 y.o.  Sex: male  : 1940    Primary Care Physician: Celestina Dumont MD  Referring Physician:  Dr Figueroa    TIME STROKE TEAM CALLED: 725 EST     TIME PATIENT SEEN: 732 EST    Handedness: Right    Race: white    Chief Complaint/Reason for Consultation: right facial droop, headache, dizziness, nausea    HPI: Mr Blanco is an 80 year old white, right handed male with known diagnosis of HTN, HLD, BPH, Afib s/p ablation x 2, CAD s/p stents, CVA 2017 (right centrum semiovale), TIA, vertigo and COPD that presents with complaints of right side headache, right facial droop, nausea and dizziness that was present upon waking at 3am.  Wife also reports he was \"cold and clammy\" at that time as well. Symptoms improved after 30 minutes but did not completely resolve. He was able to go back to sleep and had another episode when he woke up at 6am. EMS was called twice and noted a right facial droop, however wife does not appreciate this.  No unilateral weakness or speech changes. Reports he did feel off balance when walking.     Initial /84. On ASA 325mg daily at home.     Last Known Normal Date/Time: 21 EST     Review of Systems   Constitutional: Negative for fever.   HENT: Negative for trouble swallowing.    Eyes: Negative for visual disturbance.   Respiratory: Negative for shortness of breath.    Cardiovascular: Negative for palpitations.   Gastrointestinal: Positive for nausea and vomiting.   Endocrine: Negative for cold intolerance and heat intolerance.   Genitourinary: Negative for dysuria.   Musculoskeletal: Positive for arthralgias, back pain and gait problem.   Skin: Negative.    Allergic/Immunologic: Negative for immunocompromised state.   Neurological: Positive for dizziness, facial asymmetry and headaches. Negative for tremors, seizures, syncope, speech difficulty, weakness, light-headedness and numbness. "   Hematological: Negative.    Psychiatric/Behavioral: Negative.         Temp:  [97.7 °F (36.5 °C)] 97.7 °F (36.5 °C)  Heart Rate:  [69-76] 76  Resp:  [18] 18  BP: (141-155)/(72-84) 155/84    Neurological Exam  Mental Status  Awake, alert and oriented to person, place and time.Alert. Speech is normal. Language is fluent with no aphasia. Attention and concentration are normal.    Cranial Nerves  CN II: Visual fields full to confrontation.  CN III, IV, VI: Extraocular movements intact bilaterally.  CN V:  Right: Facial sensation is normal.  Left: Facial sensation is normal on the left.  CN VII:  Right: There is central facial weakness. Slight right facial droop.  Left: There is no facial weakness.  CN IX, X: Palate elevates symmetrically  CN XI: Shoulder shrug strength is normal.    Motor  Normal muscle bulk throughout. No fasciculations present. Normal muscle tone. Strength is 5/5 throughout all four extremities.    Sensory  Light touch is normal in upper and lower extremities.     Coordination  Right: Finger-to-nose normal. Heel-to-shin normal.  Left: Finger-to-nose normal.    Gait  Not observed.      Physical Exam  Vitals reviewed.   HENT:      Head: Normocephalic and atraumatic.      Comments: [place] without cochlear implants     Mouth/Throat:      Mouth: Mucous membranes are moist.   Eyes:      Extraocular Movements: Extraocular movements intact.   Cardiovascular:      Rate and Rhythm: Normal rate.   Pulmonary:      Effort: Pulmonary effort is normal.   Skin:     General: Skin is warm and dry.   Neurological:      Mental Status: He is alert and oriented to person, place, and time.      Deep Tendon Reflexes: Strength normal.   Psychiatric:         Mood and Affect: Mood normal.         Speech: Speech normal.         Behavior: Behavior normal.         Thought Content: Thought content normal.         Judgment: Judgment normal.         Acute Stroke Data    Alteplase (tPA) Inclusion / Exclusion Criteria    Time:  0732  Person Administering Scale: LEEROY Cornejo    Inclusion Criteria  [x]   18 years of age or greater   []   Onset of symptoms < 4.5 hours before beginning treatment (stroke onset = time patient was last seen well or without symptoms).   []   Diagnosis of acute ischemic stroke causing measurable disabling deficit (Complete Hemianopia, Any Aphasia, Visual or Sensory Extinction, Any weakness limiting sustained effort against gravity)   []   Any remaining deficit considered potentially disabling in view of patient and practitioner   Exclusion criteria (Do not proceed with Alteplase if any are checked under exclusion criteria)  [x]   Onset unknown or GREATER than 4.5 hours   []   ICH on CT/MRI   []   CT demonstrates hypodensity representing acute or subacute infarct   []   Significant head trauma or prior stroke in the previous 3 months   []   Symptoms suggestive of subarachnoid hemorrhage   []   History of un-ruptured intracranial aneurysm GREATER than 10 mm   []   Recent intracranial or intraspinal surgery within the last 3 months   []   Arterial puncture at a non-compressible site in the previous 7 days   []   Active internal bleeding   []   Acute bleeding tendency   []   Platelet count LESS than 100,000 for known hematological diseases such as leukemia, thrombocytopenia or chronic cirrhosis   []   Current use of anticoagulant with INR GREATER than 1.7 or PT GREATER than 15 seconds, aPTT GREATER than 40 seconds   []   Heparin received within 48 hours, resulting in abnormally elevated aPTT GREATER than upper limit of normal   []   Current use of direct thrombin inhibitors or direct factor Xa inhibitors in the past 48 hours   []   Elevated blood pressure refractory to treatment (systolic GREATER than 185 mm/Hg or diastolic  GREATER than 110 mm/Hg   []   Suspected infective endocarditis and aortic arch dissection   []   Current use of therapeutic treatment dose of low-molecular-weight heparin (LMWH) within  the previous 24 hours   []   Structural GI malignancy or bleed   Relative exclusion for all patients  []   Only minor non-disabling symptoms   []   Pregnancy   []   Seizure at onset with postictal residual neurological impairments   []   Major surgery or previous trauma within past 14 days   []   History of previous spontaneous ICH, intracranial neoplasm, or AV malformation   []   Postpartum (within previous 14 days)   []   Recent GI or urinary tract hemorrhage (within previous 21 days)   []   Recent acute MI (within previous 3 months)   []   History of un-ruptured intracranial aneurysm LESS than 10 mm   []   History of ruptured intracranial aneurysm   []   Blood glucose LESS than 50 mg/dL (2.7 mmol/L)   []   Dural puncture within the last 7 days   []   Known GREATER than 10 cerebral microbleeds   Additional exclusions for patients with symptoms onset between 3 and 4.5 hours.  []   Age > 80.   []   On any anticoagulants regardless of INR  >>> Warfarin (Coumadin), Heparin, Enoxaparin (Lovenox), fondaparinux (Arixtra), bivalirudin (Angiomax), Argatroban, dabigatran (Pradaxa), rivaroxaban (Xarelto), or apixaban (Eliquis)   []   Severe stroke (NIHSS > 25).   []   History of BOTH diabetes and previous ischemic stroke.   []   The risks and benefits have been discussed with the patient or family related to the administration of IV Alteplase for stroke symptoms.   []   I have discussed and reviewed the patient's case and imaging with the attending prior to IV Alteplase.   Not administered Time Alteplase administered       Past Medical History:   Diagnosis Date   • Arthritis    • BPH (benign prostatic hypertrophy)    • Cellulitis    • COPD (chronic obstructive pulmonary disease) (CMS/HCC)    • Coronary artery disease    • GERD (gastroesophageal reflux disease)    • Hearing loss    • Hiatal hernia    • Neuropathy    • Peripheral neuropathy    • Peripheral vascular disease (CMS/HCC)      Past Surgical History:   Procedure  Laterality Date   • ABLATION OF DYSRHYTHMIC FOCUS     • APPENDECTOMY     • CARDIAC CATHETERIZATION N/A 10/5/2017    Procedure: Left Heart Cath;  Surgeon: Hector Urrutia MD;  Location: [facility] CATH INVASIVE LOCATION;  Service:    • CATARACT EXTRACTION Bilateral    • CHOLECYSTECTOMY      Remote   • COCHLEAR IMPLANT REVISION  05/2019   • CORONARY ANGIOPLASTY     • GALLBLADDER SURGERY     • KNEE ARTHROSCOPY Right     Right knee ACL repair.   • PROSTATE SURGERY     • SHOULDER SURGERY Right    • TOE AMPUTATION Right     2nd toe - Right Foot   • TONSILLECTOMY       Family History   Problem Relation Age of Onset   • Atrial fibrillation Mother    • Cancer Father      Social History     Socioeconomic History   • Marital status:      Spouse name: Not on file   • Number of children: Not on file   • Years of education: Not on file   • Highest education level: Not on file   Tobacco Use   • Smoking status: Never Smoker   • Smokeless tobacco: Never Used   Substance and Sexual Activity   • Alcohol use: No   • Drug use: No   • Sexual activity: Defer     Allergies   Allergen Reactions   • Cymbalta [Duloxetine Hcl] Mental Status Change     Depression, thoughts of Suicide.    • Metoprolol Other (See Comments)     UNKNOWN. Pt does not remember this being allergy   • Neurontin [Gabapentin] Other (See Comments)     Lethargy & fatigue     Prior to Admission medications    Medication Sig Start Date End Date Taking? Authorizing Provider   acetaminophen (TYLENOL) 500 MG tablet Take 500 mg by mouth every 6 (six) hours as needed for mild pain (1-3).    Mykel Sanches MD   aspirin 325 MG tablet Take 325 mg by mouth Daily.    Mykel Sanches MD   B Complex Vitamins (VITAMIN B COMPLEX) capsule capsule Take 1 capsule by mouth Daily.    Mykel Sanches MD   Cholecalciferol (VITAMIN D3) 5000 UNITS capsule capsule Take 5,000 Units by mouth daily.    Mykel Sanches MD   coenzyme Q10 100 MG capsule Take 100 mg by mouth  daily.    Mykel Sanches MD   cycloSPORINE (RESTASIS) 0.05 % ophthalmic emulsion Administer 1 drop to both eyes 2 (two) times a day.    Mykel Sanches MD   docusate sodium (COLACE) 100 MG capsule Take 200 mg by mouth Daily.    Mykel Sanches MD   flecainide (TAMBOCOR) 100 MG tablet Take 1 tablet by mouth Every 12 (Twelve) Hours. 21   Patricia Houser APRN   isosorbide mononitrate (IMDUR) 30 MG 24 hr tablet Take 1 tablet by mouth Daily. 3/11/21   Patricia Houser APRN   nitroglycerin (NITROSTAT) 0.4 MG SL tablet 1 under the tongue as needed for angina, may repeat q5mins for up three doses 19   Hector Urrutia MD   omeprazole (PriLOSEC) 20 MG capsule Take 20 mg by mouth daily.    Mykel Sanches MD   pravastatin (PRAVACHOL) 20 MG tablet Take 1 tablet by mouth Daily. 3/15/21   Hector Urrutia MD   traMADol (ULTRAM) 50 MG tablet Take 50 mg by mouth 2 (Two) Times a Day As Needed for Moderate Pain (4-6).    Mykel Sanches MD       [facility] Meds:  Scheduled-    Infusions-     PRNs- •  promethazine  •  sodium chloride    Functional Status Prior to Current Stroke/Gil Score: 0    NIH Stroke Scale  Time: 0735  Person Administering Scale: LEEROY Cornejo    1a  Level of consciousness: 0=alert; keenly responsive   1b. LOC questions:  0=Performs both tasks correctly   1c. LOC commands: 0=Performs both tasks correctly   2.  Best Gaze: 0=normal   3.  Visual: 0=No visual loss   4. Facial Palsy: 1=Minor paralysis (flattened nasolabial fold, asymmetric on smiling)   5a.  Motor left arm: 0=No drift, limb holds 90 (or 45) degrees for full 10 seconds   5b.  Motor right arm: 0=No drift, limb holds 90 (or 45) degrees for full 10 seconds   6a. motor left le=No drift, limb holds 90 (or 45) degrees for full 10 seconds   6b  Motor right le=No drift, limb holds 90 (or 45) degrees for full 10 seconds   7. Limb Ataxia: 0=Absent   8.  Sensory: 0=Normal; no sensory loss   9. Best Language:   0=No aphasia, normal   10. Dysarthria: 0=Normal   11. Extinction and Inattention: 0=No abnormality    Total:   1       Results Reviewed:  I have personally reviewed current lab, radiology, and data and agree with results.  Lab Results (last 24 hours)     Procedure Component Value Units Date/Time    aPTT [[id number]]  (Normal) Collected: 06/18/21 0759    Specimen: Blood Updated: 06/18/21 0835     PTT 28.2 seconds     Narrative:      PTT = The equivalent PTT values for the therapeutic range of heparin levels at 0.3 to 0.5 U/ml are 55 to 70 seconds.    Comprehensive Metabolic Panel [[id number]] Collected: 06/18/21 0759    Specimen: Blood Updated: 06/18/21 0826    CBC & Differential [[id number]]  (Abnormal) Collected: 06/18/21 0800    Specimen: Blood Updated: 06/18/21 0817    Narrative:      The following orders were created for panel order CBC & Differential.  Procedure                               Abnormality         Status                     ---------                               -----------         ------                     CBC Auto Differential[[id number]]        Abnormal            Final result                 Please view results for these tests on the individual orders.    CBC Auto Differential [[id number]]  (Abnormal) Collected: 06/18/21 0800    Specimen: Blood Updated: 06/18/21 0817     WBC 6.86 10*3/mm3      RBC 4.59 10*6/mm3      Hemoglobin 12.4 g/dL      Hematocrit 38.7 %      MCV 84.3 fL      MCH 27.0 pg      MCHC 32.0 g/dL      RDW 14.1 %      RDW-SD 43.1 fl      MPV 10.7 fL      Platelets 168 10*3/mm3      Neutrophil % 61.0 %      Lymphocyte % 27.3 %      Monocyte % 9.0 %      Eosinophil % 1.6 %      Basophil % 0.7 %      Immature Grans % 0.4 %      Neutrophils, Absolute 4.18 10*3/mm3      Lymphocytes, Absolute 1.87 10*3/mm3      Monocytes, Absolute 0.62 10*3/mm3      Eosinophils, Absolute 0.11 10*3/mm3      Basophils, Absolute 0.05 10*3/mm3      Immature Grans, Absolute 0.03 10*3/mm3      nRBC 0.0 /100  WBC     POC Creatinine [[id number]]  (Normal) Collected: 06/18/21 0755    Specimen: Blood Updated: 06/18/21 0813     Creatinine 1.20 mg/dL      Comment: Serial Number: [id number]:  [id number]       Gold Top - SST [[id number]] Collected: 06/18/21 0759    Specimen: Blood Updated: 06/18/21 0813    Troponin [[id number]] Collected: 06/18/21 0759    Specimen: Blood Updated: 06/18/21 0813    AST [[id number]] Collected: 06/18/21 0759    Specimen: Blood Updated: 06/18/21 0813    ALT [[id number]] Collected: 06/18/21 0759    Specimen: Blood Updated: 06/18/21 0813    Green Top (Gel) [[id number]] Collected: 06/18/21 0759    Specimen: Blood Updated: 06/18/21 0813    Lavender Top [[id number]] Collected: 06/18/21 0800    Specimen: Blood Updated: 06/18/21 0813    Leo Draw [[id number]] Collected: 06/18/21 0759    Specimen: Blood Updated: 06/18/21 0812    Narrative:      The following orders were created for panel order Leo Draw.  Procedure                               Abnormality         Status                     ---------                               -----------         ------                     Green Top (Gel)[[id number]]                                  In process                 Lavender Top[[id number]]                                     In process                 Gold Top - SST[[id number]]                                   In process                 Toussaint Top[[id number]]                                         In process                   Please view results for these tests on the individual orders.    Toussaint Top [[id number]] Collected: 06/18/21 0800    Specimen: Blood Updated: 06/18/21 0812    POC Surgery Labs [[id number]]  (Abnormal) Collected: 06/18/21 0748    Specimen: Blood Updated: 06/18/21 0803     Ionized Calcium 1.18 mmol/L      POC Potassium 3.7 mmol/L      Sodium 144 mmol/L      Total CO2 23 mmol/L      Hemoglobin 12.6 g/dL      Hematocrit 37 %      pCO2, Arterial 33.0 mm Hg      pO2, Arterial 27 mmHg      Comment: Serial  Number: [id number]:  [id number]        Base Excess -2.0000 mmol/L      O2 Saturation, Arterial 55 %      pH, Arterial 7.44 pH units      HCO3, Arterial 22.2 mmol/L      Glucose 117 mg/dL         Imaging Results (Last 24 Hours)     Procedure Component Value Units Date/Time    XR Chest 1 View [[id number]] Resulted: 06/18/21 0836     Updated: 06/18/21 0836    CT Angiogram Neck [[id number]] Collected: 06/18/21 0823     Updated: 06/18/21 0829    Narrative:      EXAMINATION: CT ANGIOGRAM HEAD W AI ANALYSIS OF LVO-, CT ANGIOGRAM NECK-        INDICATION: stroke sx      TECHNIQUE: CT angiogram head and neck with and without intravenous  contrast administration. 2-D and 3-D reconstructions performed.     The radiation dose reduction device was turned on for each scan per the  ALARA (As Low as Reasonably Achievable) protocol.     AI analysis of LVO was utilized.     COMPARISON: Concurrent CT head noncontrast and perfusion     FINDINGS:      CTA NECK: Normal 3 vessel arch with patent great vessel origins.  Proximal subclavian arteries are patent. Vertebral arteries demonstrate  left-sided dominance of caliber without focal severe stenosis aneurysm  or occlusion. Carotids demonstrate grossly normal course and branching  pattern without atherosclerotic disease or calcific disease involvement  as there is 0% right and 0% left luminal narrowing as measured by NASCET  criteria with patency of the distal internal carotid arteries through  the intracranial portions discussed below. Cervical soft tissues  unremarkable. Thyroid homogeneous. Lung apices grossly clear.     CTA HEAD: Distal internal carotid arteries demonstrate mild to moderate  atherosclerotic calcific disease and stenoses associated without  high-grade stenosis aneurysm or occlusion with patency through the  [place]. Anterior cerebral arteries with mild irregularities of  atherosclerotic disease involvement however no focal severe stenosis  aneurysm or occlusion.  Middle cerebral arteries are patent without  hemodynamically significant stenosis, aneurysm or occlusion.  Vertebrobasilar system and posterior cerebral arteries are patent  without hemodynamically significant stenosis, aneurysm or occlusion.  Partially visualized venous structures unremarkable with superior  sagittal sinus patent.             Impression:      No hemodynamically significant stenosis aneurysm or  occlusion within the CTA head and neck. Mild to moderate atherosclerotic  involvement distal internal carotid arteries along with minimal  atherosclerotic disease of the anterior cerebral arteries are small             CT Angiogram Head w AI Analysis of LVO [[id number]] Collected: 06/18/21 0823     Updated: 06/18/21 0829    Narrative:      EXAMINATION: CT ANGIOGRAM HEAD W AI ANALYSIS OF LVO-, CT ANGIOGRAM NECK-        INDICATION: stroke sx      TECHNIQUE: CT angiogram head and neck with and without intravenous  contrast administration. 2-D and 3-D reconstructions performed.     The radiation dose reduction device was turned on for each scan per the  ALARA (As Low as Reasonably Achievable) protocol.     AI analysis of LVO was utilized.     COMPARISON: Concurrent CT head noncontrast and perfusion     FINDINGS:      CTA NECK: Normal 3 vessel arch with patent great vessel origins.  Proximal subclavian arteries are patent. Vertebral arteries demonstrate  left-sided dominance of caliber without focal severe stenosis aneurysm  or occlusion. Carotids demonstrate grossly normal course and branching  pattern without atherosclerotic disease or calcific disease involvement  as there is 0% right and 0% left luminal narrowing as measured by NASCET  criteria with patency of the distal internal carotid arteries through  the intracranial portions discussed below. Cervical soft tissues  unremarkable. Thyroid homogeneous. Lung apices grossly clear.     CTA HEAD: Distal internal carotid arteries demonstrate mild to  moderate  atherosclerotic calcific disease and stenoses associated without  high-grade stenosis aneurysm or occlusion with patency through the  [place]. Anterior cerebral arteries with mild irregularities of  atherosclerotic disease involvement however no focal severe stenosis  aneurysm or occlusion. Middle cerebral arteries are patent without  hemodynamically significant stenosis, aneurysm or occlusion.  Vertebrobasilar system and posterior cerebral arteries are patent  without hemodynamically significant stenosis, aneurysm or occlusion.  Partially visualized venous structures unremarkable with superior  sagittal sinus patent.             Impression:      No hemodynamically significant stenosis aneurysm or  occlusion within the CTA head and neck. Mild to moderate atherosclerotic  involvement distal internal carotid arteries along with minimal  atherosclerotic disease of the anterior cerebral arteries are small             CT CEREBRAL PERFUSION WITH & WITHOUT CONTRAST [[id number]] Collected: 06/18/21 0820     Updated: 06/18/21 0821    Narrative:      EXAMINATION: CT CEREBRAL PERFUSION W WO CONTRAST-      INDICATION: stroke sx      TECHNIQUE: CT cerebral perfusion with and without intervenous contrast.  Multiple parametric maps performed including mean transit time, time to  drain, cerebral blood flow and cerebral blood volume performed     The radiation dose reduction device was turned on for each scan per the  ALARA (As Low as Reasonably Achievable) protocol.     COMPARISON: Earlier CT noncontrast head     FINDINGS: Symmetric correlating parametric maps without perfusion area  of large defect or reversible ischemia within a specific vascular  territory             Impression:      No reversible ischemia within a specific vascular territory          CT Head Without Contrast Stroke Protocol [[id number]] Collected: 06/18/21 0747     Updated: 06/18/21 0749    Narrative:      HISTORY:   Code stroke. Right-sided  facial droop. Scan time 7:35 AM. Preliminary report provided by myself to CT/the stroke team 7:41 AM.    TECHNIQUE:   CT head without contrast. Radiation dose reduction techniques included automated exposure control or exposure modulation based on body size. Radiation audit for number of CT and nuclear cardiology exams performed in the last year: 0.      COMPARISON:   There is a previous head CT from 10/18/2017.    FINDINGS:   This patient has had interval bilateral cochlear implants placed and they result in considerable metal artifact.    Where not obscured by the metal artifact, there is no extra-axial fluid collection or acute intracranial hemorrhage appreciated. There is no midline shift. The basilar cisterns are patent. There is a chronic appearing but interval lacunar insult in the  right anterior basal ganglia and chronic appearing but interval lacunar insults posterior limb left internal capsule. There is also moderate white matter low-attenuation that is nonspecific but likely due to small vessel disease showing interval  progression since 2017 as well. Please correlate for risk factors. There our atherosclerotic vascular calcifications at the base the brain. There is no intracranial mass effect. There is nothing to suggest acute cortical infarct on this examination.    There are bilateral cochlear implants with bilateral mastoidectomies. The mastoidectomy bowl and remaining mastoid air cells are clear on the right. On the left side, the mastoidectomy bowl essentially clear but there is opacification of the majority of  the remaining mastoid air cells. Paranasal sinuses are clear.      Impression:      #1 there is a large amount of metal artifact from bilateral cochlear implants. Allowing for this no acute intracranial abnormality is appreciated. There is moderate probable sequelae of small vessel disease showing interval progression since 2017. No  intracranial mass effect or acute cortical infarct is  suspected. If there is clinical concern for acute CVA, follow-up imaging is recommended.    Signer Name: Kerri Ledezma MD   Signed: 6/18/2021 7:47 AM   Workstation Name: [id number]    [facility]        Results for orders placed during the hospital encounter of 10/05/17    Adult Transthoracic Echo Complete W/ Cont if Necessary Per Protocol    Interpretation Summary  · Left ventricular systolic function is normal. Calculated EF = 64%. Estimated EF appears to be in the range of 61 - 65%.  · Left ventricular wall thickness is consistent with mild concentric hypertrophy.  · Left ventricular diastolic dysfunction (grade I) consistent with impaired relaxation.      Assessment/Plan:  Mr Blanco is an 80 year old white, right handed male with known diagnosis of HTN, HLD, bilateral cochlear implants, BPH, Afib s/p ablation x 2, CAD s/p stents, CVA 2017 (right centrum semiovale), TIA, vertigo and COPD that presents with complaints of right side headache, nausea and dizziness that was present upon waking. EMS noted right facial droop although wife does not appreciate this. Was not a candidate for TPA or thrombectomy.     Imaging/Labs reviewed  -CT head no acute change  -CTA head/neck no significant stenosis or occlusion  - CTP negative perfusion scan      1. Dizziness  - initiate TIA/AIs order set  - continue ASA 325mg daily  - echo  - lipid panel and a1c in am  - high intensity statin  - PT/OT/SLP  - cannot have MRI due to cochlear implants    Thank you for the consult. Case discussed with Dr Joyce and Dr Figueroa as well as patient and family. Stroke Neurology will follow.     Donna Green, LEEROY  June 18, 2021  08:37 EDT

## 2021-06-18 NOTE — ED NOTES
Pt took his morning medications per OK by Dr. Figueroa, ER provider and Donna, Stroke navigator. Performed a swallow study on pt which he passed. Pt took Aspirin 325mg, Flecainide 100mg, Isosorbide 30mg-24hr, and Prilosec 20mg.     Deya Lynch, RN  06/18/21 5023

## 2021-06-18 NOTE — PLAN OF CARE
Goal Outcome Evaluation:  Plan of Care Reviewed With: patient           Outcome Summary: Pt able to amb 350' with no AD CGA with step through gait mechanics. Pt does have slight unsteadiness with gait but no LOB. Gait limited due to fatigue. Pt is mod indep with all bed mobility. Recommend patient home with assist at discharge.

## 2021-06-18 NOTE — ED PROVIDER NOTES
[facility]    EMERGENCY DEPARTMENT ENCOUNTER      Pt Name: Guicho Blanco  MRN: [id number]  YOB: 1940  Date of evaluation: 6/18/2021  Provider: Kojo Figueroa DO    CHIEF COMPLAINT       Chief Complaint   Patient presents with   • Facial Droop         HISTORY OF PRESENT ILLNESS  (Location/Symptom, Timing/Onset, Context/Setting, Quality, Duration, Modifying Factors, Severity.)   Guicho Blanco is a 80 y.o. male who presents to the emergency department for evaluation of few different complaints.  He arrives via EMS with a complaint of generalized nausea in addition to right-sided facial droop as he awoke about 330 this morning just felt unwell in general was evaluated by EMS and notes that at that time he had a normal EKG states did not really feel bad after come the hospital at that time, then had to call 911 again as he noticed around 630 7:00 this morning he had some difficulties with his speech and right-sided facial droop which was an acute change for him.  He does note a history of bilateral cochlear implants, remote history of 2 TIAs in the past but no significant stroke or unilateral weakness.  He denies any weakness in his upper or lower extremities at this time, no fever, chills nausea vomiting chest pain or difficulty breathing.  He does state his speech still seems slightly off but not by much, states that he feels like his facial droop is still present but not as bad as it was earlier today.  EMS also agrees that his facial droop earlier was much more significant than it is at this time.  He does endorse a generalized headache as well, not sudden onset nature, no fall injury or head trauma.  No other acute systemic complaints.      Nursing notes were reviewed.    REVIEW OF SYSTEMS    (2-9 systems for level 4, 10 or more for level 5)   ROS:  General:  No fevers, no chills, no weakness  Cardiovascular:  No chest pain, no palpitations  Respiratory:  No shortness of breath, no cough,  no wheezing  Gastrointestinal:  No pain, positive nausea, vomiting, no diarrhea  Musculoskeletal:  No muscle pain, no joint pain  Neurologic: Positive right sided facial droop + speech problems, + generalized headache, no extremity numbness, no extremity tingling, no extremity weakness  Psychiatric:  No anxiety  Genitourinary:  No dysuria, no hematuria    Except as noted above the remainder of the review of systems was reviewed and negative.       PAST MEDICAL HISTORY     Past Medical History:   Diagnosis Date   • Arthritis    • BPH (benign prostatic hypertrophy)    • Cellulitis    • COPD (chronic obstructive pulmonary disease) (CMS/HCC)    • Coronary artery disease    • GERD (gastroesophageal reflux disease)    • Hearing loss    • Hiatal hernia    • Neuropathy    • Peripheral neuropathy    • Peripheral vascular disease (CMS/HCC)          SURGICAL HISTORY       Past Surgical History:   Procedure Laterality Date   • ABLATION OF DYSRHYTHMIC FOCUS     • APPENDECTOMY     • CARDIAC CATHETERIZATION N/A 10/5/2017    Procedure: Left Heart Cath;  Surgeon: Hector Urrutia MD;  Location: [facility] CATH INVASIVE LOCATION;  Service:    • CATARACT EXTRACTION Bilateral    • CHOLECYSTECTOMY      Remote   • COCHLEAR IMPLANT REVISION  05/2019   • CORONARY ANGIOPLASTY     • GALLBLADDER SURGERY     • KNEE ARTHROSCOPY Right     Right knee ACL repair.   • PROSTATE SURGERY     • SHOULDER SURGERY Right    • TOE AMPUTATION Right     2nd toe - Right Foot   • TONSILLECTOMY           CURRENT MEDICATIONS       Current Facility-Administered Medications:   •  [START ON 6/19/2021] aspirin tablet 325 mg, 325 mg, Oral, Daily **OR** [START ON 6/19/2021] aspirin suppository 300 mg, 300 mg, Rectal, Daily, Donna Green, APRN  •  atorvastatin (LIPITOR) tablet 80 mg, 80 mg, Oral, Nightly, Donna Green, APRN  •  sennosides-docusate (PERICOLACE) 8.6-50 MG per tablet 2 tablet, 2 tablet, Oral, BID **AND** polyethylene glycol (MIRALAX) packet 17 g, 17 g,  Oral, Daily PRN **AND** bisacodyl (DULCOLAX) EC tablet 5 mg, 5 mg, Oral, Daily PRN **AND** bisacodyl (DULCOLAX) suppository 10 mg, 10 mg, Rectal, Daily PRN, Hugo Duncan MD  •  flecainide (TAMBOCOR) tablet 100 mg, 100 mg, Oral, Q12H, Hugo Duncan MD  •  ondansetron (ZOFRAN) injection 4 mg, 4 mg, Intravenous, Q6H PRN, Hugo Duncan MD  •  prochlorperazine (COMPAZINE) injection 5 mg, 5 mg, Intravenous, Q6H PRN, Hugo Duncan MD, 5 mg at 06/18/21 1239  •  promethazine (PHENERGAN) IVPB 12.5 mg, 12.5 mg, Intravenous, Q6H PRN, Kojo Figueroa DO, 12.5 mg at 06/18/21 0850  •  sodium chloride 0.9 % flush 10 mL, 10 mL, Intravenous, PRN, Kojo Figueroa DO  •  sodium chloride 0.9 % flush 10 mL, 10 mL, Intravenous, Q12H, Hugo Duncan MD, 10 mL at 06/18/21 1103  •  sodium chloride 0.9 % flush 10 mL, 10 mL, Intravenous, PRN, Hugo Duncan MD  •  sodium chloride 0.9 % flush 10 mL, 10 mL, Intravenous, Q12H, Donna Green APRN  •  sodium chloride 0.9 % flush 10 mL, 10 mL, Intravenous, PRN, Donna Green APRN  •  sodium chloride 0.9 % infusion, 100 mL/hr, Intravenous, Continuous, Hugo Duncan MD, Last Rate: 100 mL/hr at 06/18/21 1103, 100 mL/hr at 06/18/21 1103  •  traMADol (ULTRAM) tablet 50 mg, 50 mg, Oral, BID PRN, Hugo Duncan MD    ALLERGIES     Cymbalta [duloxetine hcl], Metoprolol, and Neurontin [gabapentin]    FAMILY HISTORY       Family History   Problem Relation Age of Onset   • Atrial fibrillation Mother    • Cancer Father           SOCIAL HISTORY       Social History     Socioeconomic History   • Marital status:      Spouse name: Not on file   • Number of children: Not on file   • Years of education: Not on file   • Highest education level: Not on file   Tobacco Use   • Smoking status: Never Smoker   • Smokeless tobacco: Never Used   Substance and Sexual Activity   • Alcohol use: No   • Drug use: No   • Sexual activity: Defer         PHYSICAL EXAM    (up to 7 for level  4, 8 or more for level 5)     Vitals:    06/18/21 0947 06/18/21 1216 06/18/21 1457 06/18/21 1544   BP: 158/70 109/62  110/60   BP Location: Left arm Right arm  Right arm   Patient Position: Lying Lying  Lying   Pulse: 66 61 63    Resp: 18 18 18   Temp: 98.6 °F (37 °C) 97.5 °F (36.4 °C)  97.5 °F (36.4 °C)   TempSrc: Axillary Axillary  Axillary   SpO2: 98% 98%     Weight:       Height:           Physical Exam  General : Patient is awake, alert, oriented, in no acute distress, nontoxic appearing, GCS 15  HEENT: Pupils are equally round and reactive to light, EOMI, conjunctivae clear, sclerae white, there is no injection no icterus.  Oral mucosa is moist, no exudate. Uvula is midline.  Cochlear implants bilateral  Neck: Neck is supple, full range of motion, trachea midline  Cardiac: Heart regular rate, rhythm, no murmurs, rubs, or gallops  Lungs: Lungs are clear to auscultation, there is no wheezing, rhonchi, or rales. There is no use of accessory muscles  Chest wall: There is no tenderness to palpation over the chest wall or over ribs  Abdomen: Abdomen is soft, nontender, nondistended. There are no firm or pulsatile masses, no rebound rigidity or guarding.   Musculoskeletal: 5 out of 5 strength in all 4 extremities.  No focal muscle deficits are appreciated  Neuro: Motor intact, sensory intact, level of consciousness is normal, cerebellar function is normal, 5 and 5 strength bilateral upper and lower extremities, normal finger-to-nose, no dysdiadochokinesia, no pronator drift.  Very mild right-sided facial droop, no slurred speech, no difficulties with speech comprehension.  NIH of 1 for the right-sided facial droop.  Please refer to stroke navigator for full NIH scale.  Dermatology: Skin is warm and dry  Psych: Mentation is grossly normal, cognition is grossly normal. Affect is appropriate.      DIAGNOSTIC RESULTS     EKG: All EKG's are interpreted by the Emergency Department Physician who either signs or Co-signs  this chart in the absence of a cardiologist.    ECG 12 Lead   Final Result   Test Reason : Acute Stroke Protocol (onset < 12 hrs)   Blood Pressure :   */*   mmHG   Vent. Rate :  69 BPM     Atrial Rate :  69 BPM      P-R Int : 192 ms          QRS Dur : 104 ms       QT Int : 436 ms       P-R-T Axes :  83  28  44 degrees      QTc Int : 467 ms      Normal sinus rhythm   Normal ECG   When compared with ECG of 03-SEP-2018 13:30,   No significant change was found   Confirmed by RAVI SEGOVIA MD ([id number]) on 6/18/2021 8:29:20 AM      Referred By: EDMD           Confirmed By: RAVI SEGOVIA MD          RADIOLOGY:   Non-plain film images such as CT, Ultrasound and MRI are read by the radiologist. Plain radiographic images are visualized and preliminarily interpreted by the emergency physician with the below findings:      [] Radiologist's Report Reviewed:  FL Limited Ugi For Mbs Reflux Single-Contrast   Final Result   Fluoroscopy provided for a modified barium swallow, and   limited upper GI series. Please see speech therapy report for full   details and recommendations. Limited upper GI series demonstrated   moderate sized osteophytes visible in the cervical spine, which do not   appear to significantly impede swallowing.            This report was finalized on 6/18/2021 3:47 PM by Henry Rubio.          FL Video Swallow With Speech Single Contrast   Final Result   Fluoroscopy provided for a modified barium swallow, and   limited upper GI series. Please see speech therapy report for full   details and recommendations. Limited upper GI series demonstrated   moderate sized osteophytes visible in the cervical spine, which do not   appear to significantly impede swallowing.            This report was finalized on 6/18/2021 3:47 PM by Henry Rubio.          XR Chest 1 View   Preliminary Result   No acute cardiopulmonary process.       D:  06/18/2021   E:  06/18/2021                  CT Angiogram Neck   Preliminary Result    No hemodynamically significant stenosis, aneurysm or   occlusion within the CTA head and neck. Mild to moderate atherosclerotic   involvement distal internal carotid arteries along with minimal   atherosclerotic disease of the anterior cerebral arteries proximally.        D:  06/18/2021   E:  06/18/2021                  CT CEREBRAL PERFUSION WITH & WITHOUT CONTRAST   Preliminary Result   No reversible ischemia within a specific vascular territory.       D:  06/18/2021   E:  06/18/2021              CT Angiogram Head w AI Analysis of LVO   Preliminary Result   No hemodynamically significant stenosis, aneurysm or   occlusion within the CTA head and neck. Mild to moderate atherosclerotic   involvement distal internal carotid arteries along with minimal   atherosclerotic disease of the anterior cerebral arteries proximally.        D:  06/18/2021   E:  06/18/2021                  CT Head Without Contrast Stroke Protocol   Final Result   #1 there is a large amount of metal artifact from bilateral cochlear implants. Allowing for this no acute intracranial abnormality is appreciated. There is moderate probable sequelae of small vessel disease showing interval progression since 2017. No   intracranial mass effect or acute cortical infarct is suspected. If there is clinical concern for acute CVA, follow-up imaging is recommended.      Signer Name: Kerri Ledezma MD    Signed: 6/18/2021 7:47 AM    Workstation Name: [id number]     [facility]            ED BEDSIDE ULTRASOUND:   Performed by ED Physician - none    LABS:    I have reviewed and interpreted all of the currently available lab results from this visit (if applicable):  Results for orders placed or performed during the hospital encounter of 06/18/21   Troponin    Specimen: Blood   Result Value Ref Range    Troponin T <0.010 0.000 - 0.030 ng/mL   aPTT    Specimen: Blood   Result Value Ref Range    PTT 28.2 22.0 - 39.0 seconds   AST    Specimen:  Blood   Result Value Ref Range    AST (SGOT) 25 1 - 40 U/L   ALT    Specimen: Blood   Result Value Ref Range    ALT (SGPT) 18 1 - 41 U/L   CBC Auto Differential    Specimen: Blood   Result Value Ref Range    WBC 6.86 3.40 - 10.80 10*3/mm3    RBC 4.59 4.14 - 5.80 10*6/mm3    Hemoglobin 12.4 (L) 13.0 - 17.7 g/dL    Hematocrit 38.7 37.5 - 51.0 %    MCV 84.3 79.0 - 97.0 fL    MCH 27.0 26.6 - 33.0 pg    MCHC 32.0 31.5 - 35.7 g/dL    RDW 14.1 12.3 - 15.4 %    RDW-SD 43.1 37.0 - 54.0 fl    MPV 10.7 6.0 - 12.0 fL    Platelets 168 140 - 450 10*3/mm3    Neutrophil % 61.0 42.7 - 76.0 %    Lymphocyte % 27.3 19.6 - 45.3 %    Monocyte % 9.0 5.0 - 12.0 %    Eosinophil % 1.6 0.3 - 6.2 %    Basophil % 0.7 0.0 - 1.5 %    Immature Grans % 0.4 0.0 - 0.5 %    Neutrophils, Absolute 4.18 1.70 - 7.00 10*3/mm3    Lymphocytes, Absolute 1.87 0.70 - 3.10 10*3/mm3    Monocytes, Absolute 0.62 0.10 - 0.90 10*3/mm3    Eosinophils, Absolute 0.11 0.00 - 0.40 10*3/mm3    Basophils, Absolute 0.05 0.00 - 0.20 10*3/mm3    Immature Grans, Absolute 0.03 0.00 - 0.05 10*3/mm3    nRBC 0.0 0.0 - 0.2 /100 WBC   Comprehensive Metabolic Panel    Specimen: Blood   Result Value Ref Range    Glucose 118 (H) 65 - 99 mg/dL    BUN 15 8 - 23 mg/dL    Creatinine 1.02 0.76 - 1.27 mg/dL    Sodium 137 136 - 145 mmol/L    Potassium 3.8 3.5 - 5.2 mmol/L    Chloride 105 98 - 107 mmol/L    CO2 20.0 (L) 22.0 - 29.0 mmol/L    Calcium 8.6 8.6 - 10.5 mg/dL    Total Protein 6.2 6.0 - 8.5 g/dL    Albumin 3.70 3.50 - 5.20 g/dL    ALT (SGPT) 18 1 - 41 U/L    AST (SGOT) 25 1 - 40 U/L    Alkaline Phosphatase 107 39 - 117 U/L    Total Bilirubin 0.6 0.0 - 1.2 mg/dL    eGFR Non African Amer 70 >60 mL/min/1.73    Globulin 2.5 gm/dL    A/G Ratio 1.5 g/dL    BUN/Creatinine Ratio 14.7 7.0 - 25.0    Anion Gap 12.0 5.0 - 15.0 mmol/L   POC Surgery Labs    Specimen: Blood   Result Value Ref Range    Ionized Calcium 1.18 (L) 1.20 - 1.32 mmol/L    POC Potassium 3.7 3.5 - 4.9 mmol/L    Sodium 144 138  - 146 mmol/L    Total CO2 23 (L) 24 - 29 mmol/L    Hemoglobin 12.6 12.0 - 17.0 g/dL    Hematocrit 37 (L) 38 - 51 %    pCO2, Arterial 33.0 (L) 35 - 45 mm Hg    pO2, Arterial 27 (L) 80 - 105 mmHg    Base Excess -2.0000 -5 - 5 mmol/L    O2 Saturation, Arterial 55 (L) 95 - 98 %    pH, Arterial 7.44 7.35 - 7.6 pH units    HCO3, Arterial 22.2 22 - 26 mmol/L    Glucose 117 70 - 130 mg/dL   POC Creatinine    Specimen: Blood   Result Value Ref Range    Creatinine 1.20 0.60 - 1.30 mg/dL   POC Glucose Once    Specimen: Blood   Result Value Ref Range    Glucose 126 70 - 130 mg/dL   ECG 12 Lead   Result Value Ref Range    QT Interval 436 ms    QTC Interval 467 ms   Green Top (Gel)   Result Value Ref Range    Extra Tube Hold for add-ons.    Lavender Top   Result Value Ref Range    Extra Tube hold for add-on    Gold Top - SST   Result Value Ref Range    Extra Tube Hold for add-ons.    Gray Top   Result Value Ref Range    Extra Tube Hold for add-ons.         All other labs were within normal range or not returned as of this dictation.      EMERGENCY DEPARTMENT COURSE and DIFFERENTIAL DIAGNOSIS/MDM:   Vitals:    Vitals:    06/18/21 0947 06/18/21 1216 06/18/21 1457 06/18/21 1544   BP: 158/70 109/62  110/60   BP Location: Left arm Right arm  Right arm   Patient Position: Lying Lying  Lying   Pulse: 66 61 63    Resp: 18 18  18   Temp: 98.6 °F (37 °C) 97.5 °F (36.4 °C)  97.5 °F (36.4 °C)   TempSrc: Axillary Axillary  Axillary   SpO2: 98% 98%     Weight:       Height:                Patient with difficulty with speech, right-sided facial droop original onset around 2330 and 7:00 this morning.  Since appear to be waxing waning consistency, and arrival saw some very mild right-sided facial droop, but no other acute focal neurological deficit examination.  Code 19 stroke was initiated on arrival to the ED.  Patient seen by myself and stroke navigator and a CT scan on arrival.  He does have bilateral cochlear implants which does limit our  neuro imaging but nothing obvious on initial CT head, perfusion or angiography studies.  Patient not a candidate for MRI secondary to the metal implants.  He does have episodes of nausea and vomiting which we will treat symptomatically, neurology does recommend continuing with neurochecks, admission to the hospital for further work-up and evaluation as the patient has had prior TIAs in the past.  We will plan on admission, discussion with the hospitalist team for admission.    MEDICATIONS ADMINISTERED IN ED:  Medications   sodium chloride 0.9 % flush 10 mL (has no administration in time range)   promethazine (PHENERGAN) IVPB 12.5 mg (12.5 mg Intravenous New Bag 6/18/21 0850)   ondansetron (ZOFRAN) injection 4 mg (has no administration in time range)   sodium chloride 0.9 % infusion (100 mL/hr Intravenous New Bag 6/18/21 1103)   flecainide (TAMBOCOR) tablet 100 mg (100 mg Oral Not Given 6/18/21 1100)   traMADol (ULTRAM) tablet 50 mg (has no administration in time range)   sodium chloride 0.9 % flush 10 mL (10 mL Intravenous Given 6/18/21 1103)   sodium chloride 0.9 % flush 10 mL (has no administration in time range)   sennosides-docusate (PERICOLACE) 8.6-50 MG per tablet 2 tablet (2 tablets Oral Not Given 6/18/21 1100)     And   polyethylene glycol (MIRALAX) packet 17 g (has no administration in time range)     And   bisacodyl (DULCOLAX) EC tablet 5 mg (has no administration in time range)     And   bisacodyl (DULCOLAX) suppository 10 mg (has no administration in time range)   sodium chloride 0.9 % flush 10 mL (10 mL Intravenous Not Given 6/18/21 1205)   sodium chloride 0.9 % flush 10 mL (has no administration in time range)   atorvastatin (LIPITOR) tablet 80 mg (has no administration in time range)   aspirin tablet 325 mg (has no administration in time range)     Or   aspirin suppository 300 mg (has no administration in time range)   prochlorperazine (COMPAZINE) injection 5 mg (5 mg Intravenous Given 6/18/21 1239)    iopamidol (ISOVUE-370) 76 % injection 100 mL (100 mL Intravenous Given 6/18/21 0810)   ondansetron (ZOFRAN) injection 4 mg (4 mg Intravenous Given 6/18/21 0827)   barium sulfate (VARIBAR THIN LIQUID) oral suspension 100 mL (100 mL Oral Given 6/18/21 1425)   barium sulfate (VARIBAR PUDDING) oral paste 20 mL (20 mL Oral Given 6/18/21 1424)       PROCEDURES:  Procedures    CRITICAL CARE TIME    Total Critical Care time was 35 minutes, excluding separately reportable procedures.  Acute CVA stroke, right-sided facial droop with speech difficulties require multiple reevaluation's, discussions with consultants, monitoring.  There was a high probability of clinically significant/life threatening deterioration in the patient's condition which required my urgent intervention.      FINAL IMPRESSION      1. Facial droop    2. Non-intractable vomiting with nausea, unspecified vomiting type    3. Uses cochlear implant    4. Difficulty with speech    5. Pharyngoesophageal dysphagia          DISPOSITION/PLAN     ED Disposition     ED Disposition Condition Comment    Decision to Admit  Level of Care: Telemetry [5]   Diagnosis: Facial droop [174070]              Comment: Please note this report has been produced using speech recognition software.      Kojo Figueroa DO  Attending Emergency Physician               Kojo Figueroa DO  06/18/21 0726

## 2021-06-18 NOTE — PLAN OF CARE
Goal Outcome Evaluation:  Plan of Care Reviewed With: patient, son, daughter        Progress: no change  Outcome Summary: Pt arrived on floor from ED at approximately 0945. Vitals WNL. NSR on telemetry. NIHSS of 2 for slight right facial droop and numbness in bilateral feet. Pt denies pain. He tolerates ambulation with assist X 1 and a walker. Voids spontaneously. Buttocks is noted to be red but blanchable. Waffle mattress placed on bed. Chief complaint this shift has been nausea. Pt did not have much relief with Zofran and phenergan in ED. Dr. Duncan notified and PRN compazine was ordered. No further complaints of nausea. Pt cleared for a regular diet and thin liquids by SLP. MBS completed. TTE currently in progress.

## 2021-06-18 NOTE — MBS/VFSS/FEES
Acute Care - Speech Language Pathology   Swallow Initial Evaluation  [place]   Modified Barium Swallow Study (MBS)     Patient Name: Guicho Blanco  : 1940  MRN: [id number]  Today's Date: 2021               Admit Date: 2021    Visit Dx:     ICD-10-CM ICD-9-CM   1. Facial droop  R29.810 781.94   2. Non-intractable vomiting with nausea, unspecified vomiting type  R11.2 787.01   3. Uses cochlear implant  Z96.21 V43.89   4. Difficulty with speech  R47.9 784.59   5. Pharyngoesophageal dysphagia  R13.14 787.24     Patient Active Problem List   Diagnosis   • Osteomyelitis of toe of right foot (CMS/HCC)   • Osteomyelitis of right foot (CMS/[facility])   • PAF (paroxysmal atrial fibrillation) (CMS/[facility])   • Coronary artery disease involving native coronary artery of native heart without angina pectoris   • Gastroesophageal reflux disease without esophagitis   • Benign non-nodular prostatic hyperplasia without lower urinary tract symptoms   • S/P appy   • Essential hypertension   • Simple chronic bronchitis (CMS/[facility])   • Mixed hyperlipidemia   • COPD (chronic obstructive pulmonary disease) (CMS/[facility])   • BPH (benign prostatic hypertrophy)   • Hearing loss   • Chest pain   • CVA (cerebral vascular accident) (CMS/[facility])   • Vertigo   • Snoring   • Overweight   • PLMD (periodic limb movement disorder)   • Neuropathy   • Facial droop     Past Medical History:   Diagnosis Date   • Arthritis    • BPH (benign prostatic hypertrophy)    • Cellulitis    • COPD (chronic obstructive pulmonary disease) (CMS/[facility])    • Coronary artery disease    • GERD (gastroesophageal reflux disease)    • Hearing loss    • Hiatal hernia    • Neuropathy    • Peripheral neuropathy    • Peripheral vascular disease (CMS/[facility])      Past Surgical History:   Procedure Laterality Date   • ABLATION OF DYSRHYTHMIC FOCUS     • APPENDECTOMY     • CARDIAC CATHETERIZATION N/A 10/5/2017    Procedure: Left Heart Cath;  Surgeon: Hector Urrutai MD;  Location:  "[facility];  Service:    • CATARACT EXTRACTION Bilateral    • CHOLECYSTECTOMY      Remote   • COCHLEAR IMPLANT REVISION  05/2019   • CORONARY ANGIOPLASTY     • GALLBLADDER SURGERY     • KNEE ARTHROSCOPY Right     Right knee ACL repair.   • PROSTATE SURGERY     • SHOULDER SURGERY Right    • TOE AMPUTATION Right     2nd toe - Right Foot   • TONSILLECTOMY          SWALLOW EVALUATION (last 72 hours)      SLP Adult Swallow Evaluation     Row Name 06/18/21 1400 06/18/21 1100                Rehab Evaluation    Document Type  evaluation  -  evaluation  -SM       Subjective Information  no complaints  -SM  no complaints  -SM       Patient Observations  alert;cooperative  -SM  alert;cooperative  -SM          General Information    Patient Profile Reviewed  --  yes  -SM       Pertinent History Of Current Problem  --  Adm R droop, dizziness, nausea, w/u pending. Hx R CVA 2017, B cochlear implants.   -SM       Current Method of Nutrition  regular textures;thin liquids  -SM  NPO  -SM       Precautions/Limitations, Hearing  --  hearing impairment, bilaterally;other (see comments) B cochlear implants  -SM       Prior Level of Function-Communication  --  WFL  -SM       Prior Level of Function-Swallowing  --  other (see comments) \"sticking\" and difficulties with swallowing, R > L  -SM       Plans/Goals Discussed with  --  patient;agreed upon  -       Barriers to Rehab  --  none identified  -       Patient's Goals for Discharge  --  eat/drink without coughing/choking  -SM          Pain    Additional Documentation  --  Pain Scale: Numbers Pre/Post-Treatment (Group)  -SM          Pain Scale: Numbers Pre/Post-Treatment    Pretreatment Pain Rating  0/10 - no pain  -SM  0/10 - no pain  -SM       Posttreatment Pain Rating  0/10 - no pain  -SM  0/10 - no pain  -SM          Oral Musculature and Cranial Nerve Assessment    Oral Labial or Buccal Impairment, Detail, Cranial Nerve VII (Facial):  --  right labial " droop;other (see comments) minimal  -SM          Clinical Swallow Eval    Oral Prep Phase  --  WFL  -SM       Pharyngeal Phase  --  suspected pharyngeal impairment  -SM       Esophageal Phase  --  suspected esophageal impairment  -       Clinical Swallow Evaluation Summary  --  No acute change detected in swallow function. for past few fews/month, pt reports difficulty swallowing, mainly on R side, with frequent sticking and discomfort. Swallow effortful and requires 2 swallows per both liquid and solid bolus. No coughing, choking, or wet vocal quality detected. Appears safe to resume PO diet though would benefit from MBS to further assess pharyngoesophageal swallow function  -          MBS/VFSS    Utensils Used  spoon;cup;straw  -SM  --       Consistencies Trialed  thin liquids;pureed;regular textures  -SM  --          MBS/VFSS Interpretation    Oral Prep Phase  WFL  -SM  --       Oral Transit Phase  WFL  -SM  --       Oral Residue  WFL  -SM  --          Initiation of Pharyngeal Swallow    Pharyngeal Phase  impaired pharyngeal phase of swallowing  -SM  --       Pharyngeal Residue  pudding/puree;regular textures;valleculae;other (see comments) mild, did not sensate  -SM  --          Esophageal Phase    Esophageal Phase  other (see comments)  -SM  --       Esophageal Phase, Comment  Osteophyte at C6, reducing bolus pathway through/past UES. Majority of bolus passed without difficulty, min retrograde flow to back to pharynx. Likely culprit to pt's sticking and intermittent discomfort with PO intake. Provided pt with education materials and discussed with pt, specifically info on osteophytes, ways to manage/modify, swallow precautions, and to notify MD if any worsening. Answered all ?s Pt verbalized and demonstrated understanding of all discussed. Also provided in written form.   -SM  --          Clinical Impression    SLP Swallowing Diagnosis  mild;pharyngeal dysphagia;esophageal dysphagia;other (see comments)  related to cervical osteophyte  -  other (see comments) suspected pharyngoesophageal dysphagia  -SM       Functional Impact  --  risk of aspiration/pneumonia  -       Swallow Criteria for Skilled Therapeutic Interventions Met  no problems identified which require skilled intervention  -  --          Recommendations    SLP Diet Recommendation  regular textures;thin liquids  -  regular textures;thin liquids  -       Recommended Diagnostics  --  VFSS (MBS);other (see comments) + limited UGI  -       Recommended Precautions and Strategies  upright posture during/after eating;small bites of food and sips of liquid;other (see comments) alternate solids/liquids prn, slow rate of intake  -  upright posture during/after eating;general aspiration precautions;reflux precautions  -       Oral Care Recommendations  Oral Care BID/PRN  -SM  Oral Care BID/PRN  -       SLP Rec. for Method of Medication Administration  as tolerated suggest trial in applesauce or even ice cream  -  as tolerated  -       Monitor for Signs of Aspiration  --  yes;notify SLP if any concerns  -         User Key  (r) = Recorded By, (t) = Taken By, (c) = Cosigned By    Initials Name Effective Dates    Tiffany Prieto MS CCC-SLP 06/16/21 -           EDUCATION  The patient has been educated in the following areas:   Dysphagia (Swallowing Impairment) Modified Diet Instruction.    SLP Recommendation and Plan  SLP Swallowing Diagnosis: mild, pharyngeal dysphagia, esophageal dysphagia, other (see comments) (related to cervical osteophyte)  SLP Diet Recommendation: regular textures, thin liquids  Recommended Precautions and Strategies: upright posture during/after eating, small bites of food and sips of liquid, other (see comments) (alternate solids/liquids prn, slow rate of intake)  SLP Rec. for Method of Medication Administration: as tolerated (suggest trial in applesauce or even ice cream)     Monitor for Signs of Aspiration:  yes, notify SLP if any concerns    Swallow Criteria for Skilled Therapeutic Interventions Met: no problems identified which require skilled intervention  Anticipated Discharge Disposition (SLP): unknown        Predicted Duration Therapy Intervention (Days): until discharge                         Plan of Care Reviewed With: patient    SLP GOALS     Row Name 06/18/21 1100             Additional Goal 1 (SLP)    Additional Goal 1, SLP  STG: Participate in dx tx of reading, writing, and higher-level cog-comm skills without cues  -      Time Frame (Additional Goal 1, SLP)  short term goal (STG)  -SM         Additional Goal 2 (SLP)    Additional Goal 2, SLP  LTG: Improve communication in order to participate in care while in hospital setting with 100% accuracy and no cues  -      Time Frame (Additional Goal 2, SLP)  by discharge  -        User Key  (r) = Recorded By, (t) = Taken By, (c) = Cosigned By    Initials Name Provider Type    Tiffany Prieto MS CCC-SLP Speech and Language Pathologist             Time Calculation:   Time Calculation- SLP     Row Name 06/18/21 1606 06/18/21 1148          Time Calculation- SLP    SLP Start Time  1400  -SM  1100  -SM     SLP Received On  06/18/21  -  06/18/21  -        Untimed Charges    58648-RP Eval Speech and Production w/ Language Minutes  --  25  -SM     04600-WW Eval Oral Pharyng Swallow Minutes  --  28  -SM     28198-[place] [employer] Fluoro Eval Swallow Minutes  55  -SM  --     42397-LG Treatment Swallow Minutes  25  -SM  --        Total Minutes    Untimed Charges Total Minutes  80  -SM  53  -SM      Total Minutes  80  -SM  53  -SM       User Key  (r) = Recorded By, (t) = Taken By, (c) = Cosigned By    Initials Name Provider Type    Tiffany Prieto MS CCC-SLP Speech and Language Pathologist          Therapy Charges for Today     Code Description Service Date Service Provider Modifiers Qty    48374517185 HC ST EVAL ORAL PHARYNG SWALLOW 2 6/18/2021 Natalia  Tiffany FREITAS, MS CCC-SLP GN 1    45488779885 HC ST EVAL SPEECH AND PROD W LANG  2 6/18/2021 Tiffany Fisher, MS CCC-SLP GN 1    97340309185 HC ST MOTION FLUORO EVAL SWALLOW 4 6/18/2021 Tiffany Fisher, MS CCC-SLP GN 1    35930049396 HC ST TREATMENT SWALLOW 2 6/18/2021 Tiffany Fisher, MS [facility]-SLP GN 1            Patient was not wearing a face mask and did not exhibit coughing during this therapy encounter.  Procedure performed was aerosolizing, involved close contact (within 6 feet for at least 15 minutes or longer), and did not involve contact with infectious secretions or specimens.  Therapist used appropriate personal protective equipment including gloves, standard procedure mask and eye protection.  Appropriate PPE was worn during the entire therapy session.  Hand hygiene was completed before and after therapy session.       Tiffany Fisher, MS BHARDWAJ-SLP  6/18/2021

## 2021-06-18 NOTE — PLAN OF CARE
Goal Outcome Evaluation:  Plan of Care Reviewed With: patient         SLP evaluation completed. Will continue to address dysphagia with MBS to follow today (pt has been experiencing difficulties over the past few weeks). Will also continue higher level cog-comm dx tx. Please see note for further details and recommendations.

## 2021-06-18 NOTE — CONSULTS
Diabetes Education    Patient Name:  Guicho Blanco  YOB: 1940  MRN: [id number]  Admit Date:  6/18/2021        Diabetes education consult per stroke protocol noted. Chart reviewed, noted pt does not have history of diabetes and no home meds. Awaiting A1c result. Thank you.      Electronically signed by:  Ebony Marvin RN, [facility]  06/18/21 10:52 EDT

## 2021-06-18 NOTE — THERAPY EVALUATION
Patient Name: Guicho Blanco  : 1940    MRN: [id number]                              Today's Date: 2021       Admit Date: 2021    Visit Dx:     ICD-10-CM ICD-9-CM   1. Facial droop  R29.810 781.94   2. Non-intractable vomiting with nausea, unspecified vomiting type  R11.2 787.01   3. Uses cochlear implant  Z96.21 V43.89   4. Difficulty with speech  R47.9 784.59   5. Pharyngoesophageal dysphagia  R13.14 787.24     Patient Active Problem List   Diagnosis   • Osteomyelitis of toe of right foot (CMS/Formerly [facility])   • Osteomyelitis of right foot (CMS/Formerly [facility])   • PAF (paroxysmal atrial fibrillation) (CMS/Formerly [facility])   • Coronary artery disease involving native coronary artery of native heart without angina pectoris   • Gastroesophageal reflux disease without esophagitis   • Benign non-nodular prostatic hyperplasia without lower urinary tract symptoms   • S/P appy   • Essential hypertension   • Simple chronic bronchitis (CMS/Formerly [facility])   • Mixed hyperlipidemia   • COPD (chronic obstructive pulmonary disease) (CMS/Formerly [facility])   • BPH (benign prostatic hypertrophy)   • Hearing loss   • Chest pain   • CVA (cerebral vascular accident) (CMS/Formerly [facility])   • Vertigo   • Snoring   • Overweight   • PLMD (periodic limb movement disorder)   • Neuropathy   • Facial droop     Past Medical History:   Diagnosis Date   • Arthritis    • BPH (benign prostatic hypertrophy)    • Cellulitis    • COPD (chronic obstructive pulmonary disease) (CMS/Formerly [facility])    • Coronary artery disease    • GERD (gastroesophageal reflux disease)    • Hearing loss    • Hiatal hernia    • Neuropathy    • Peripheral neuropathy    • Peripheral vascular disease (CMS/Formerly [facility])      Past Surgical History:   Procedure Laterality Date   • ABLATION OF DYSRHYTHMIC FOCUS     • APPENDECTOMY     • CARDIAC CATHETERIZATION N/A 10/5/2017    Procedure: Left Heart Cath;  Surgeon: Hector Urrutia MD;  Location: [facility] INVASIVE LOCATION;  Service:    • CATARACT EXTRACTION Bilateral    • CHOLECYSTECTOMY       Remote   • COCHLEAR IMPLANT REVISION  05/2019   • CORONARY ANGIOPLASTY     • GALLBLADDER SURGERY     • KNEE ARTHROSCOPY Right     Right knee ACL repair.   • PROSTATE SURGERY     • SHOULDER SURGERY Right    • TOE AMPUTATION Right     2nd toe - Right Foot   • TONSILLECTOMY       General Information     Row Name 06/18/21 1545          Physical Therapy Time and Intention    Document Type  evaluation  -CS     Mode of Treatment  physical therapy;individual therapy  -CS     Row Name 06/18/21 1545          General Information    Patient Profile Reviewed  yes  -CS     Prior Level of Function  independent:;bed mobility;cooking;ADL's;cleaning;community mobility;gait;all household mobility;transfer  -CS     Existing Precautions/Restrictions  fall;other (see comments) Cochlear implants  -CS     Barriers to Rehab  none identified  -CS     Row Name 06/18/21 1545          Living Environment    Lives With  spouse  -CS     Row Name 06/18/21 1545          Home Main Entrance    Number of Stairs, Main Entrance  one  -CS     Stair Railings, Main Entrance  none  -CS     Row Name 06/18/21 1545          Stairs Within Home, Primary    Stairs, Within Home, Primary  1 story house  -CS     Number of Stairs, Within Home, Primary  none  -CS     Row Name 06/18/21 1545          Cognition    Orientation Status (Cognition)  oriented x 4  -CS     Row Name 06/18/21 1545          Safety Issues, Functional Mobility    Safety Issues Affecting Function (Mobility)  safety precautions follow-through/compliance;safety precaution awareness  -CS     Impairments Affecting Function (Mobility)  balance;endurance/activity tolerance  -CS       User Key  (r) = Recorded By, (t) = Taken By, (c) = Cosigned By    Initials Name Provider Type    CS Mariaa Jacobs PT Physical Therapist        Mobility     Row Name 06/18/21 1545          Bed Mobility    Bed Mobility  sit-supine;scooting/bridging  -CS     Scooting/Bridging [place] (Bed Mobility)  modified  independence  -CS     Sit-Supine [place] (Bed Mobility)  modified independence  -CS     Assistive Device (Bed Mobility)  head of bed elevated;bed rails  -CS     Comment (Bed Mobility)  Pt UIC at start of session. Pt returned to bed at end of eval and able to demonstrate safely with no cues.  -CS     Row Name 06/18/21 1545          Transfers    Comment (Transfers)  VC's to push off of chair to stand and to reach back for bed to sit. Pt stood before PT was ready.  -CS     Row Name 06/18/21 1545          Sit-Stand Transfer    Sit-Stand [place] (Transfers)  verbal cues;supervision  -CS     Assistive Device (Sit-Stand Transfers)  other (see comments) no AD  -CS     Row Name 06/18/21 1545          Gait/Stairs (Locomotion)    [place] Level (Gait)  verbal cues;contact guard  -CS     Assistive Device (Gait)  other (see comments) no AD  -CS     Distance in Feet (Gait)  350'  -CS     Deviations/Abnormal Patterns (Gait)  bilateral deviations;harleen decreased;gait speed decreased;stride length decreased  -CS     Bilateral Gait Deviations  forward flexed posture;heel strike decreased  -CS     [place] Level (Stairs)  not tested  -CS     Comment (Gait/Stairs)  Pt able to amb with slow, step through gait mechanics with no AD. Pt required cues to maintain upright posture and increase stride length. Pt had unsteadiness with gait, attempted to perform SBA but patient had increase in unsteadiness, therefore CGA. Gait limited due to fatigue.  -CS       User Key  (r) = Recorded By, (t) = Taken By, (c) = Cosigned By    Initials Name Provider Type    Mariaa Tijerina PT Physical Therapist        Obj/Interventions     Row Name 06/18/21 154          Range of Motion Comprehensive    General Range of Motion  bilateral lower extremity ROM WNL  -CS     Row Name 06/18/21 1545          Strength Comprehensive (MMT)    General Manual Muscle Testing (MMT) Assessment  no strength deficits identified  -CS     Comment,  General Manual Muscle Testing (MMT) Assessment  B LE MMT grossly 5/5  -CS     Row Name 06/18/21 1545          Motor Skills    Motor Skills  coordination  -CS     Coordination  bilateral;heel to shin;WNL  -CS     Row Name 06/18/21 1545          Balance    Balance Assessment  sitting static balance;standing static balance;standing dynamic balance  -CS     Static Sitting Balance  WFL;unsupported;sitting in chair;sitting, edge of bed  -     Static Standing Balance  WFL;unsupported;standing  -CS     Dynamic Standing Balance  mild impairment;unsupported;standing  -CS     Balance Interventions  sitting;standing;sit to stand;supported;static;dynamic  -     Row Name 06/18/21 1545          Sensory Assessment (Somatosensory)    Sensory Assessment (Somatosensory)  LE sensation intact B LE sensation equal and intact. Pt does report he has peripheral neuropathy  -       User Key  (r) = Recorded By, (t) = Taken By, (c) = Cosigned By    Initials Name Provider Type    CS Mariaa Jacobs, PT Physical Therapist        Goals/Plan     Row Name 06/18/21 1545          Bed Mobility Goal 1 (PT)    Activity/Assistive Device (Bed Mobility Goal 1, PT)  bed mobility activities, all  -CS     [place] Level/Cues Needed (Bed Mobility Goal 1, PT)  independent  -CS     Time Frame (Bed Mobility Goal 1, PT)  long term goal (LTG);5 days  -CS     Progress/Outcomes (Bed Mobility Goal 1, PT)  goal ongoing  -     Row Name 06/18/21 1545          Transfer Goal 1 (PT)    Activity/Assistive Device (Transfer Goal 1, PT)  transfers, all;sit-to-stand/stand-to-sit  -CS     [place] Level/Cues Needed (Transfer Goal 1, PT)  independent  -CS     Time Frame (Transfer Goal 1, PT)  long term goal (LTG);5 days  -CS     Progress/Outcome (Transfer Goal 1, PT)  goal ongoing  -     Row Name 06/18/21 1545          Gait Training Goal 1 (PT)    Activity/Assistive Device (Gait Training Goal 1, PT)  gait (walking locomotion)  -CS     [place] Level (Gait  Training Goal 1, PT)  independent  -CS     Distance (Gait Training Goal 1, PT)  500'  -CS     Time Frame (Gait Training Goal 1, PT)  long term goal (LTG);5 days  -CS     Progress/Outcome (Gait Training Goal 1, PT)  goal ongoing  -CS     Row Name 06/18/21 1545          Stairs Goal 1 (PT)    Activity/Assistive Device (Stairs Goal 1, PT)  stairs, all skills  -CS     [place] Level/Cues Needed (Stairs Goal 1, PT)  contact guard assist  -CS     Number of Stairs (Stairs Goal 1, PT)  1  -CS     Time Frame (Stairs Goal 1, PT)  long term goal (LTG);5 days  -CS     Progress/Outcome (Stairs Goal 1, PT)  goal ongoing  -CS       User Key  (r) = Recorded By, (t) = Taken By, (c) = Cosigned By    Initials Name Provider Type    CS Mariaa Jacobs, PT Physical Therapist        Clinical Impression     Row Name 06/18/21 1549          Pain    Additional Documentation  Pain Scale: Numbers Pre/Post-Treatment (Group)  -CS     [facility] Name 06/18/21 1540          Pain Scale: Numbers Pre/Post-Treatment    Pretreatment Pain Rating  0/10 - no pain  -CS     Posttreatment Pain Rating  0/10 - no pain  -CS     Pain Intervention(s)  Repositioned;Ambulation/increased activity  -CS     Row Name 06/18/21 1548          Plan of Care Review    Plan of Care Reviewed With  patient  -CS     Outcome Summary  Pt able to amb 350' with no AD CGA with step through gait mechanics. Pt does have slight unsteadiness with gait but no LOB. Gait limited due to fatigue. Pt is mod indep with all bed mobility. Recommend patient home with assist at discharge.  -CS     Row Name 06/18/21 1540          Therapy Assessment/Plan (PT)    Patient/Family Therapy Goals Statement (PT)  To go home  -CS     Rehab Potential (PT)  good, to achieve stated therapy goals  -CS     Criteria for Skilled Interventions Met (PT)  yes;meets criteria  -CS     Predicted Duration of Therapy Intervention (PT)  5 days  -CS     Row Name 06/18/21 1540          Vital Signs    Pre Systolic BP Rehab  112   -CS     Pre Treatment Diastolic BP  61  -CS     Post Systolic BP Rehab  129  -CS     Post Treatment Diastolic BP  69  -CS     Pre SpO2 (%)  96  -CS     O2 Delivery Pre Treatment  room air  -CS     Post SpO2 (%)  99  -CS     O2 Delivery Post Treatment  room air  -CS     Row Name 06/18/21 1545          Positioning and Restraints    Pre-Treatment Position  sitting in chair/recliner  -CS     Post Treatment Position  bed  -CS     In Bed  notified nsg;fowlers;call light within reach;encouraged to call for assist;exit alarm on;side rails up x2  -CS       User Key  (r) = Recorded By, (t) = Taken By, (c) = Cosigned By    Initials Name Provider Type    CS Mariaa Jacobs PT Physical Therapist        Outcome Measures     Row Name 06/18/21 1545          How much help from another person do you currently need...    Turning from your back to your side while in flat bed without using bedrails?  4  -CS     Moving from lying on back to sitting on the side of a flat bed without bedrails?  4  -CS     Moving to and from a bed to a chair (including a wheelchair)?  3  -CS     Standing up from a chair using your arms (e.g., wheelchair, bedside chair)?  4  -CS     Climbing 3-5 steps with a railing?  3  -CS     To walk in hospital room?  3  -CS     AM-PAC 6 Clicks Score (PT)  21  -CS     Row Name 06/18/21 1545          Modified Gil Scale    Modified Marshall Scale  1 - No significant disability despite symptoms.  Able to carry out all usual duties and activities.  -CS     Row Name 06/18/21 1545          Functional Assessment    Outcome Measure Options  AM-PAC 6 Clicks Basic Mobility (PT);Modified Marshall  -CS       User Key  (r) = Recorded By, (t) = Taken By, (c) = Cosigned By    Initials Name Provider Type    CS Mariaa Jacobs PT Physical Therapist        Physical Therapy Education                 Title: PT OT SLP Therapies (In Progress)     Topic: Physical Therapy (Done)     Point: Mobility training (Done)     Learning  Progress Summary           Patient Acceptance, E,D, VU,DU by  at 6/18/2021 1545    Comment: Educated patient on safe hand placement on transfers, gait mechanics, role of PT, and plan for POC.                   Point: Home exercise program (Done)     Learning Progress Summary           Patient Acceptance, E,D, VU,DU by  at 6/18/2021 1545    Comment: Educated patient on safe hand placement on transfers, gait mechanics, role of PT, and plan for POC.                   Point: Body mechanics (Done)     Learning Progress Summary           Patient Acceptance, E,D, VU,DU by  at 6/18/2021 1545    Comment: Educated patient on safe hand placement on transfers, gait mechanics, role of PT, and plan for POC.                   Point: Precautions (Done)     Learning Progress Summary           Patient Acceptance, E,D, VU,DU by  at 6/18/2021 1545    Comment: Educated patient on safe hand placement on transfers, gait mechanics, role of PT, and plan for POC.                               User Key     Initials Effective Dates Name Provider Type Discipline     06/16/21 -  Mariaa Jacobs PT Physical Therapist PT              PT Recommendation and Plan  Planned Therapy Interventions (PT): balance training, bed mobility training, gait training, home exercise program, neuromuscular re-education, patient/family education, ROM (range of motion), strengthening, stair training, transfer training  Plan of Care Reviewed With: patient  Outcome Summary: Pt able to amb 350' with no AD CGA with step through gait mechanics. Pt does have slight unsteadiness with gait but no LOB. Gait limited due to fatigue. Pt is mod indep with all bed mobility. Recommend patient home with assist at discharge.     Time Calculation:   PT Charges     Row Name 06/18/21 1545             Time Calculation    Start Time  1545  -      PT Received On  06/18/21  -      PT Goal Re-Cert Due Date  06/28/21  -         Timed Charges    19864 - Gait Training  Minutes   10  -CS         Untimed Charges    PT Eval/Re-eval Minutes  35  -CS         Total Minutes    Timed Charges Total Minutes  10  -CS      Untimed Charges Total Minutes  35  -CS       Total Minutes  45  -CS        User Key  (r) = Recorded By, (t) = Taken By, (c) = Cosigned By    Initials Name Provider Type    CS Mariaa Jacobs, EL Physical Therapist        Therapy Charges for Today     Code Description Service Date Service Provider Modifiers Qty    89860229808 HC GAIT TRAINING EA 15 MIN 6/18/2021 Mariaa Jacobs, PT GP 1    [id number] HC PT EVAL MOD COMPLEXITY 3 6/18/2021 Mariaa Jacobs, PT GP 1          PT G-Codes  Outcome Measure Options: AM-PAC 6 Clicks Basic Mobility (PT), Modified Reeves  AM-PAC 6 Clicks Score (PT): 21  Modified Reeves Scale: 1 - No significant disability despite symptoms.  Able to carry out all usual duties and activities.    Mariaa Jacobs, EL  6/18/2021

## 2021-06-18 NOTE — THERAPY EVALUATION
Acute Care - Speech Language Pathology Initial Evaluation  [facility]   Cognitive-Communication Evaluation  Clinical Swallow Evaluation     Patient Name: Guicho Blanco  : 1940  MRN: [id number]  Today's Date: 2021               Admit Date: 2021     Visit Dx:    ICD-10-CM ICD-9-CM   1. Facial droop  R29.810 781.94   2. Non-intractable vomiting with nausea, unspecified vomiting type  R11.2 787.01   3. Uses cochlear implant  Z96.21 V43.89   4. Difficulty with speech  R47.9 784.59   5. Dysphagia, unspecified type  R13.10 787.20     Patient Active Problem List   Diagnosis   • Osteomyelitis of toe of right foot (CMS/[facility])   • Osteomyelitis of right foot (CMS/[facility])   • PAF (paroxysmal atrial fibrillation) (CMS/[facility])   • Coronary artery disease involving native coronary artery of native heart without angina pectoris   • Gastroesophageal reflux disease without esophagitis   • Benign non-nodular prostatic hyperplasia without lower urinary tract symptoms   • S/P appy   • Essential hypertension   • Simple chronic bronchitis (CMS/[facility])   • Mixed hyperlipidemia   • COPD (chronic obstructive pulmonary disease) (CMS/[facility])   • BPH (benign prostatic hypertrophy)   • Hearing loss   • Chest pain   • CVA (cerebral vascular accident) (CMS/[facility])   • Vertigo   • Snoring   • Overweight   • PLMD (periodic limb movement disorder)   • Neuropathy   • Facial droop     Past Medical History:   Diagnosis Date   • Arthritis    • BPH (benign prostatic hypertrophy)    • Cellulitis    • COPD (chronic obstructive pulmonary disease) (CMS/[facility])    • Coronary artery disease    • GERD (gastroesophageal reflux disease)    • Hearing loss    • Hiatal hernia    • Neuropathy    • Peripheral neuropathy    • Peripheral vascular disease (CMS/[facility])      Past Surgical History:   Procedure Laterality Date   • ABLATION OF DYSRHYTHMIC FOCUS     • APPENDECTOMY     • CARDIAC CATHETERIZATION N/A 10/5/2017    Procedure: Left Heart Cath;  Surgeon: Hector  MD Renan;  Location:  [facility] [employer] INVASIVE LOCATION;  Service:    • CATARACT EXTRACTION Bilateral    • CHOLECYSTECTOMY      Remote   • COCHLEAR IMPLANT REVISION  05/2019   • CORONARY ANGIOPLASTY     • GALLBLADDER SURGERY     • KNEE ARTHROSCOPY Right     Right knee ACL repair.   • PROSTATE SURGERY     • SHOULDER SURGERY Right    • TOE AMPUTATION Right     2nd toe - Right Foot   • TONSILLECTOMY          SLP EVALUATION (last 72 hours)      SLP SLC Evaluation     Row Name 06/18/21 1100                   Comprehension Assessment/Intervention    Comprehension Assessment/Intervention  Auditory Comprehension  -           Auditory Comprehension Assessment/Intervention    Answers Questions (Communication)  yes/no;wh questions;personal;simple;concrete;complex;L  -        Able to Follow Commands (Communication)  1-step;2-step;L  -SM        Narrative Discourse  conversational level;[facility]  -        Successful Auditory Strategies (Communication)  repetition;other (see comments)  -        Auditory Comprehension Communication, Comment  2' decreased hearing (would benefit from visual feedback, which is limited 2' mask wearing).   -           Expression Assessment/Intervention    Expression Assessment/Intervention  verbal expression  -           Verbal Expression Assessment/Intervention    Conversational Discourse/Fluency  WFL  -SM           Motor Speech Assessment/Intervention    Motor Speech Function  [facility]  -           Cognitive Assessment Intervention- SLP    Orientation Status (Cognition)  [facility]  -        Attention (Cognitive)  selective;sustained;[facility]  -        Problem Solving (Cognitive)  simple;[facility]  -        Cognition, Comment  Will continue further higher-level cog-comm assessment at next session  -           SLP Clinical Impressions    SLP Diagnosis  Functional simple cog-comm skills, will continue higher-level assessment at next session  -        Rehab Potential/Prognosis  excellent  -        SLC  Criteria for Skilled Therapy Interventions Met  yes  -SM           Recommendations    Therapy Frequency (SLP SLC)  PRN;5 days per week  -SM        Predicted Duration Therapy Intervention (Days)  until discharge  -SM        Anticipated Discharge Disposition (SLP)  unknown  -SM          User Key  (r) = Recorded By, (t) = Taken By, (c) = Cosigned By    Initials Name Effective Dates    Tiffany Prieto MS CCC-SLP 06/16/21 -              EDUCATION  The patient has been educated in the following areas:     Cognitive Impairment Communication Impairment.    SLP Recommendation and Plan  SLP Diagnosis: Functional simple cog-comm skills, will continue higher-level assessment at next session        SLC Criteria for Skilled Therapy Interventions Met: yes  Anticipated Discharge Disposition (SLP): unknown        Predicted Duration Therapy Intervention (Days): until discharge                   Plan of Care Reviewed With: patient      SLP GOALS     Row Name 06/18/21 1100             Additional Goal 1 (SLP)    Additional Goal 1, SLP  STG: Participate in dx tx of reading, writing, and higher-level cog-comm skills without cues  -SM      Time Frame (Additional Goal 1, SLP)  short term goal (STG)  -SM         Additional Goal 2 (SLP)    Additional Goal 2, SLP  LTG: Improve communication in order to participate in care while in hospital setting with 100% accuracy and no cues  -SM      Time Frame (Additional Goal 2, SLP)  by discharge  -SM        User Key  (r) = Recorded By, (t) = Taken By, (c) = Cosigned By    Initials Name Provider Type    Tiffany Prieto MS CCC-SLP Speech and Language Pathologist                  Time Calculation:     Time Calculation- SLP     Row Name 06/18/21 1148             Time Calculation- SLP    SLP Start Time  1100  -SM      SLP Received On  06/18/21  -         Untimed Charges    17684-NM Eval Speech and Production w/ Language Minutes  25  -SM      65771-UW Eval Oral Pharyng Swallow Minutes  28   -SM         Total Minutes    Untimed Charges Total Minutes  53  -SM       Total Minutes  53  -SM        User Key  (r) = Recorded By, (t) = Taken By, (c) = Cosigned By    Initials Name Provider Type    FREDY Natalia Tiffanyanton FREITAS MS CCC-SLP Speech and Language Pathologist          Therapy Charges for Today     Code Description Service Date Service Provider Modifiers Qty    18657226514 HC ST EVAL ORAL PHARYNG SWALLOW 2 2021 NataliaTiffany, MS CCC-SLP GN 1    56724702597 HC ST EVAL SPEECH AND PROD W LANG  2 2021 Tiffany Fisher, MS CCC-SLP GN 1                     Tiffany FREITAS. MS BENTON Fisher-SLP  2021 and Acute Care - Speech Language Pathology   Swallow Initial Evaluation [facility]     Patient Name: Guicho Blanco  : 1940  MRN: [id number]  Today's Date: 2021               Admit Date: 2021    Visit Dx:     ICD-10-CM ICD-9-CM   1. Facial droop  R29.810 781.94   2. Non-intractable vomiting with nausea, unspecified vomiting type  R11.2 787.01   3. Uses cochlear implant  Z96.21 V43.89   4. Difficulty with speech  R47.9 784.59   5. Dysphagia, unspecified type  R13.10 787.20     Patient Active Problem List   Diagnosis   • Osteomyelitis of toe of right foot (CMS/[facility])   • Osteomyelitis of right foot (CMS/[facility])   • PAF (paroxysmal atrial fibrillation) (CMS/[facility])   • Coronary artery disease involving native coronary artery of native heart without angina pectoris   • Gastroesophageal reflux disease without esophagitis   • Benign non-nodular prostatic hyperplasia without lower urinary tract symptoms   • S/P appy   • Essential hypertension   • Simple chronic bronchitis (CMS/[facility])   • Mixed hyperlipidemia   • COPD (chronic obstructive pulmonary disease) (CMS/[facility])   • BPH (benign prostatic hypertrophy)   • Hearing loss   • Chest pain   • CVA (cerebral vascular accident) (CMS/[facility])   • Vertigo   • Snoring   • Overweight   • PLMD (periodic limb movement disorder)   • Neuropathy   • Facial  "droop     Past Medical History:   Diagnosis Date   • Arthritis    • BPH (benign prostatic hypertrophy)    • Cellulitis    • COPD (chronic obstructive pulmonary disease) (CMS/[facility])    • Coronary artery disease    • GERD (gastroesophageal reflux disease)    • Hearing loss    • Hiatal hernia    • Neuropathy    • Peripheral neuropathy    • Peripheral vascular disease (CMS/[facility])      Past Surgical History:   Procedure Laterality Date   • ABLATION OF DYSRHYTHMIC FOCUS     • APPENDECTOMY     • CARDIAC CATHETERIZATION N/A 10/5/2017    Procedure: Left Heart Cath;  Surgeon: Hector Urrutia MD;  Location: [facility] CATH INVASIVE LOCATION;  Service:    • CATARACT EXTRACTION Bilateral    • CHOLECYSTECTOMY      Remote   • COCHLEAR IMPLANT REVISION  05/2019   • CORONARY ANGIOPLASTY     • GALLBLADDER SURGERY     • KNEE ARTHROSCOPY Right     Right knee ACL repair.   • PROSTATE SURGERY     • SHOULDER SURGERY Right    • TOE AMPUTATION Right     2nd toe - Right Foot   • TONSILLECTOMY          SWALLOW EVALUATION (last 72 hours)      SLP Adult Swallow Evaluation     Row Name 06/18/21 1100                   Rehab Evaluation    Document Type  evaluation  -SM        Subjective Information  no complaints  -SM        Patient Observations  alert;cooperative  -SM           General Information    Patient Profile Reviewed  yes  -SM        Pertinent History Of Current Problem  Adm R droop, dizziness, nausea, w/u pending. Hx R CVA 2017, B cochlear implants.   -SM        Current Method of Nutrition  NPO  -SM        Precautions/Limitations, Hearing  hearing impairment, bilaterally;other (see comments) B cochlear implants  -SM        Prior Level of Function-Communication  WFL  -SM        Prior Level of Function-Swallowing  other (see comments) \"sticking\" and difficulties with swallowing, R > L  -SM        Plans/Goals Discussed with  patient;agreed upon  -SM        Barriers to Rehab  none identified  -SM        Patient's Goals for Discharge  eat/drink without " coughing/choking  -           Pain    Additional Documentation  Pain Scale: Numbers Pre/Post-Treatment (Group)  -           Pain Scale: Numbers Pre/Post-Treatment    Pretreatment Pain Rating  0/10 - no pain  -SM        Posttreatment Pain Rating  0/10 - no pain  -SM           Oral Musculature and Cranial Nerve Assessment    Oral Labial or Buccal Impairment, Detail, Cranial Nerve VII (Facial):  right labial droop;other (see comments) minimal  -SM           Clinical Swallow Eval    Oral Prep Phase  WFL  -SM        Pharyngeal Phase  suspected pharyngeal impairment  -SM        Esophageal Phase  suspected esophageal impairment  -        Clinical Swallow Evaluation Summary  No acute change detected in swallow function. for past few fews/month, pt reports difficulty swallowing, mainly on R side, with frequent sticking and discomfort. Swallow effortful and requires 2 swallows per both liquid and solid bolus. No coughing, choking, or wet vocal quality detected. Appears safe to resume PO diet though would benefit from MBS to further assess pharyngoesophageal swallow function  -           Clinical Impression    SLP Swallowing Diagnosis  other (see comments) suspected pharyngoesophageal dysphagia  -        Functional Impact  risk of aspiration/pneumonia  -           Recommendations    SLP Diet Recommendation  regular textures;thin liquids  -        Recommended Diagnostics  VFSS (MBS);other (see comments) + limited UGI  -        Recommended Precautions and Strategies  upright posture during/after eating;general aspiration precautions;reflux precautions  -        Oral Care Recommendations  Oral Care BID/PRN  -        SLP Rec. for Method of Medication Administration  as tolerated  -        Monitor for Signs of Aspiration  yes;notify SLP if any concerns  -          User Key  (r) = Recorded By, (t) = Taken By, (c) = Cosigned By    Initials Name Effective Dates    Tiffany Prieto MS CCC-SLP 06/16/21 -            EDUCATION  The patient has been educated in the following areas:   Dysphagia (Swallowing Impairment) Modified Diet Instruction.    SLP Recommendation and Plan  SLP Swallowing Diagnosis: other (see comments) (suspected pharyngoesophageal dysphagia)  SLP Diet Recommendation: regular textures, thin liquids  Recommended Precautions and Strategies: upright posture during/after eating, general aspiration precautions, reflux precautions  SLP Rec. for Method of Medication Administration: as tolerated     Monitor for Signs of Aspiration: yes, notify SLP if any concerns  Recommended Diagnostics: VFSS (MBS), other (see comments) (+ limited UGI)     Anticipated Discharge Disposition (SLP): unknown        Predicted Duration Therapy Intervention (Days): until discharge                         Plan of Care Reviewed With: patient    SLP GOALS     Row Name 06/18/21 1100             Additional Goal 1 (SLP)    Additional Goal 1, SLP  STG: Participate in dx tx of reading, writing, and higher-level cog-comm skills without cues  -SM      Time Frame (Additional Goal 1, SLP)  short term goal (STG)  -SM         Additional Goal 2 (SLP)    Additional Goal 2, SLP  LTG: Improve communication in order to participate in care while in hospital setting with 100% accuracy and no cues  -SM      Time Frame (Additional Goal 2, SLP)  by discharge  -        User Key  (r) = Recorded By, (t) = Taken By, (c) = Cosigned By    Initials Name Provider Type    Tiffany Prieto MS CCC-SLP Speech and Language Pathologist             Time Calculation:   Time Calculation- SLP     Row Name 06/18/21 1148             Time Calculation- SLP    SLP Start Time  1100  -SM      SLP Received On  06/18/21  -SM         Untimed Charges    98676-FE Eval Speech and Production w/ Language Minutes  25  -SM      38019-DC Eval Oral Pharyng Swallow Minutes  28  -SM         Total Minutes    Untimed Charges Total Minutes  53  -SM       Total Minutes  53  -SM         User Key  (r) = Recorded By, (t) = Taken By, (c) = Cosigned By    Initials Name Provider Type    Tiffany Prieto MS CCC-SLP Speech and Language Pathologist          Therapy Charges for Today     Code Description Service Date Service Provider Modifiers Qty    84471429256  ST EVAL ORAL PHARYNG SWALLOW 2 6/18/2021 Tiffany Fisher MS CCC-SLP GN 1    [id number] HC ST EVAL SPEECH AND PROD W LANG  2 6/18/2021 Tiffany Fisher, MS BHARDWAJ-SLP GN 1            Patient was not wearing a face mask and did not exhibit coughing during this therapy encounter.  Procedure performed was aerosolizing, involved close contact (within 6 feet for at least 15 minutes or longer), and did not involve contact with infectious secretions or specimens.  Therapist used appropriate personal protective equipment including gloves, standard procedure mask and eye protection.  Appropriate PPE was worn during the entire therapy session.  Hand hygiene was completed before and after therapy session.       MS RYAN Morillo  6/18/2021

## 2021-06-18 NOTE — H&P
[facility] Medicine Services  HISTORY AND PHYSICAL    Patient Name: Guicho Blanco  : 1940  MRN: [id number]  Primary Care Physician: Celestina Dumont MD  Date of admission: 2021      Subjective   Subjective     Chief Complaint: Headache, dizziness, nausea    HPI:  Guicho Blanco is a 80 y.o. male  with history of hypertension, hyperlipidemia, CAD s/p stent, COPD, prior CVA with no residual deficits, vertigo, BPH, GERD, paroxysmal atrial fibrillation s/p ablation.  Patient presents to the ED with 3 episodes of acute onset headaches associated with nausea, dizziness and right facial droop. On  Arrival to the ED patient is hemodynamically stable, initial work-up included CT head, CTA head and neck, CTP all unremarkable, lab work with CBC, CMP were essentially.  Stroke navigators were consulted, also medicine was asked to admit.  At the time of my evaluation patient continues to have nausea, he mentions not feeling well the past 2 days, having generalized weakness, denies any fevers or chills, no SOA.  He is currently without any facial droop, no deficits elicited.    COVID Details:    Symptoms:    [] NONE [] Fever []  Cough [] Shortness of breath [] Change in taste/smell      The patient has a COVID HM Topic on their chart, and they are fully vaccinated.      Review of Systems   Gen- No fevers, chills  CV- No chest pain, palpitations  Resp- No cough, dyspnea  GI- + nausea , + vomiting,  no D, abd pain    All other systems reviewed and are negative.     Personal History     Past Medical History:   Diagnosis Date   • Arthritis    • BPH (benign prostatic hypertrophy)    • Cellulitis    • COPD (chronic obstructive pulmonary disease) (CMS/HCC)    • Coronary artery disease    • GERD (gastroesophageal reflux disease)    • Hearing loss    • Hiatal hernia    • Neuropathy    • Peripheral neuropathy    • Peripheral vascular disease (CMS/HCC)        Past Surgical History:    Procedure Laterality Date   • ABLATION OF DYSRHYTHMIC FOCUS     • APPENDECTOMY     • CARDIAC CATHETERIZATION N/A 10/5/2017    Procedure: Left Heart Cath;  Surgeon: Hector Urrutia MD;  Location: [facility] CATH INVASIVE LOCATION;  Service:    • CATARACT EXTRACTION Bilateral    • CHOLECYSTECTOMY      Remote   • COCHLEAR IMPLANT REVISION  05/2019   • CORONARY ANGIOPLASTY     • GALLBLADDER SURGERY     • KNEE ARTHROSCOPY Right     Right knee ACL repair.   • PROSTATE SURGERY     • SHOULDER SURGERY Right    • TOE AMPUTATION Right     2nd toe - Right Foot   • TONSILLECTOMY         Family History:  family history includes Atrial fibrillation in his mother; Cancer in his father. Otherwise pertinent FHx was reviewed and unremarkable.     Social History:  reports that he has never smoked. He has never used smokeless tobacco. He reports that he does not drink alcohol and does not use drugs.  Social History     Social History Narrative    Pt does not consume caffeine.        Medications:  Available home medication information reviewed.  Medications Prior to Admission   Medication Sig Dispense Refill Last Dose   • acetaminophen (TYLENOL) 500 MG tablet Take 500 mg by mouth every 6 (six) hours as needed for mild pain (1-3).      • aspirin 325 MG tablet Take 325 mg by mouth Daily.      • B Complex Vitamins (VITAMIN B COMPLEX) capsule capsule Take 1 capsule by mouth Daily.      • Cholecalciferol (VITAMIN D3) 5000 UNITS capsule capsule Take 5,000 Units by mouth daily.      • coenzyme Q10 100 MG capsule Take 100 mg by mouth daily.      • cycloSPORINE (RESTASIS) 0.05 % ophthalmic emulsion Administer 1 drop to both eyes 2 (two) times a day.      • docusate sodium (COLACE) 100 MG capsule Take 200 mg by mouth Daily.      • flecainide (TAMBOCOR) 100 MG tablet Take 1 tablet by mouth Every 12 (Twelve) Hours. 180 tablet 3    • isosorbide mononitrate (IMDUR) 30 MG 24 hr tablet Take 1 tablet by mouth Daily. 90 tablet 3    • nitroglycerin (NITROSTAT)  0.4 MG SL tablet 1 under the tongue as needed for angina, may repeat q5mins for up three doses 25 tablet 2    • omeprazole (PriLOSEC) 20 MG capsule Take 20 mg by mouth daily.      • pravastatin (PRAVACHOL) 20 MG tablet Take 1 tablet by mouth Daily. 90 tablet 3    • traMADol (ULTRAM) 50 MG tablet Take 50 mg by mouth 2 (Two) Times a Day As Needed for Moderate Pain (4-6).          Allergies   Allergen Reactions   • Cymbalta [Duloxetine Hcl] Mental Status Change     Depression, thoughts of Suicide.    • Metoprolol Other (See Comments)     UNKNOWN. Pt does not remember this being allergy   • Neurontin [Gabapentin] Other (See Comments)     Lethargy & fatigue       Objective   Objective     Vital Signs:   Temp:  [97.7 °F (36.5 °C)] 97.7 °F (36.5 °C)  Heart Rate:  [68-76] 68  Resp:  [18] 18  BP: (129-155)/(69-84) 133/69  Total (NIH Stroke Scale): 0    Physical Exam   Constitutional: Elderly male, in no acute distress, awake, alert  Eyes: PERRLA, sclerae anicteric, no conjunctival injection  HENT: NCAT, mucous membranes moist  Neck: Supple, no thyromegaly, no lymphadenopathy, trachea midline  Respiratory: Clear to auscultation bilaterally, nonlabored respirations   Cardiovascular: RRR, no murmurs, rubs, or gallops, palpable pedal pulses bilaterally  Gastrointestinal: Positive bowel sounds, soft, nontender, nondistended  Musculoskeletal: No bilateral ankle edema, no clubbing or cyanosis to extremities  Psychiatric: Appropriate affect, cooperative  Neurologic: Oriented x 3, strength symmetric in all extremities, Cranial Nerves grossly intact to confrontation, speech clear, hard of hearing  Skin: No rashes    Result Review:  I have personally reviewed the results from the time of this admission to 6/18/2021 09:59 EDT and agree with these findings:  [x]  Laboratory  []  Microbiology  [x]  Radiology  [x]  EKG/Telemetry   []  Cardiology/Vascular   []  Pathology  []  Old records  []  Other:  Most notable findings include:       LAB  RESULTS:      Lab 06/18/21  0800 06/18/21  0759 06/18/21  0748   WBC 6.86  --   --    HEMOGLOBIN 12.4*  --   --    HEMOGLOBIN, POC  --   --  12.6   HEMATOCRIT 38.7  --   --    HEMATOCRIT POC  --   --  37*   PLATELETS 168  --   --    NEUTROS ABS 4.18  --   --    IMMATURE GRANS (ABS) 0.03  --   --    LYMPHS ABS 1.87  --   --    MONOS ABS 0.62  --   --    EOS ABS 0.11  --   --    MCV 84.3  --   --    APTT  --  28.2  --          Lab 06/18/21  0759 06/18/21  0755   SODIUM 137  --    POTASSIUM 3.8  --    CHLORIDE 105  --    CO2 20.0*  --    ANION GAP 12.0  --    BUN 15  --    CREATININE 1.02 1.20   GLUCOSE 118*  --    CALCIUM 8.6  --          Lab 06/18/21  0759   TOTAL PROTEIN 6.2   ALBUMIN 3.70   GLOBULIN 2.5   ALT (SGPT) 18  18   AST (SGOT) 25  25   BILIRUBIN 0.6   ALK PHOS 107         Lab 06/18/21  0759   TROPONIN T <0.010                 Lab 06/18/21  0748   PH, ARTERIAL 7.44         Microbiology Results (last 10 days)     ** No results found for the last 240 hours. **          CT Angiogram Neck    Result Date: 6/18/2021  EXAMINATION: CT ANGIOGRAM HEAD W AI ANALYSIS OF LVO-, CT ANGIOGRAM NECK-  06/18/2021  INDICATION: stroke symptoms  TECHNIQUE: CT angiogram head and neck with and without intravenous contrast administration. 2-D and 3-D reconstructions performed.  The radiation dose reduction device was turned on for each scan per the ALARA (As Low as Reasonably Achievable) protocol.  AI analysis of LVO was utilized.  COMPARISON: Concurrent CT head noncontrast and perfusion  FINDINGS:  CTA NECK: Normal 3 vessel arch with patent great vessel origins. Proximal subclavian arteries are patent. Vertebral arteries demonstrate left-sided dominance of caliber without focal severe stenosis, aneurysm or occlusion. Carotids demonstrate grossly normal course and branching pattern without atherosclerotic disease or calcific disease involvement as there is 0% right and 0% left luminal narrowing as measured by NASCET criteria with  patency of the distal internal carotid arteries through the intracranial portions discussed below. Cervical soft tissues unremarkable. Thyroid homogeneous. Lung apices grossly clear.  CTA HEAD: Distal internal carotid arteries demonstrate mild to moderate atherosclerotic calcific disease and stenoses associated without high-grade stenosis, aneurysm or occlusion with patency through the [place]. Anterior cerebral arteries with mild irregularities of atherosclerotic disease involvement however no focal severe stenosis, aneurysm or occlusion. Middle cerebral arteries are patent without hemodynamically significant stenosis, aneurysm or occlusion. Vertebrobasilar system and posterior cerebral arteries are patent without hemodynamically significant stenosis, aneurysm or occlusion. Partially visualized venous structures unremarkable with superior sagittal sinus patent.      Impression: No hemodynamically significant stenosis, aneurysm or occlusion within the CTA head and neck. Mild to moderate atherosclerotic involvement distal internal carotid arteries along with minimal atherosclerotic disease of the anterior cerebral arteries proximally.  D:  06/18/2021 E:  06/18/2021        XR Chest 1 View    Result Date: 6/18/2021  EXAMINATION: XR CHEST 1 VW-06/18/2021:  INDICATION: Acute Stroke Protocol (onset < 12 hrs); R29.810-Facial weakness; R11.2-Nausea with vomiting, unspecified; Z96.21-Cochlear implant status; R47.9-Unspecified speech disturbances.  COMPARISON: Chest x-ray 09/03/2018.  FINDINGS: AP view of the chest reveals cardiac size potentially borderline enlarged without overt edema. No pneumothorax or pleural effusion. Degenerative changes of the spine.      Impression: No acute cardiopulmonary process.  D:  06/18/2021 E:  06/18/2021        CT Head Without Contrast Stroke Protocol    Result Date: 6/18/2021  HISTORY: Code stroke. Right-sided facial droop. Scan time 7:35 AM. Preliminary report provided by myself  to CT/the stroke team 7:41 AM. TECHNIQUE: CT head without contrast. Radiation dose reduction techniques included automated exposure control or exposure modulation based on body size. Radiation audit for number of CT and nuclear cardiology exams performed in the last year: 0.  COMPARISON: There is a previous head CT from 10/18/2017. FINDINGS: This patient has had interval bilateral cochlear implants placed and they result in considerable metal artifact. Where not obscured by the metal artifact, there is no extra-axial fluid collection or acute intracranial hemorrhage appreciated. There is no midline shift. The basilar cisterns are patent. There is a chronic appearing but interval lacunar insult in the right anterior basal ganglia and chronic appearing but interval lacunar insults posterior limb left internal capsule. There is also moderate white matter low-attenuation that is nonspecific but likely due to small vessel disease showing interval progression since 2017 as well. Please correlate for risk factors. There our atherosclerotic vascular calcifications at the base the brain. There is no intracranial mass effect. There is nothing to suggest acute cortical infarct on this examination. There are bilateral cochlear implants with bilateral mastoidectomies. The mastoidectomy bowl and remaining mastoid air cells are clear on the right. On the left side, the mastoidectomy bowl essentially clear but there is opacification of the majority of the remaining mastoid air cells. Paranasal sinuses are clear.     Impression: #1 there is a large amount of metal artifact from bilateral cochlear implants. Allowing for this no acute intracranial abnormality is appreciated. There is moderate probable sequelae of small vessel disease showing interval progression since 2017. No intracranial mass effect or acute cortical infarct is suspected. If there is clinical concern for acute CVA, follow-up imaging is recommended. Signer Name: Kerri  Brisa MARTINEZ  Signed: 6/18/2021 7:47 AM  Workstation Name: [id number]    CT Angiogram Head w AI Analysis of LVO    Result Date: 6/18/2021  EXAMINATION: CT ANGIOGRAM HEAD W AI ANALYSIS OF LVO-, CT ANGIOGRAM NECK-  06/18/2021  INDICATION: stroke symptoms  TECHNIQUE: CT angiogram head and neck with and without intravenous contrast administration. 2-D and 3-D reconstructions performed.  The radiation dose reduction device was turned on for each scan per the ALARA (As Low as Reasonably Achievable) protocol.  AI analysis of LVO was utilized.  COMPARISON: Concurrent CT head noncontrast and perfusion  FINDINGS:  CTA NECK: Normal 3 vessel arch with patent great vessel origins. Proximal subclavian arteries are patent. Vertebral arteries demonstrate left-sided dominance of caliber without focal severe stenosis, aneurysm or occlusion. Carotids demonstrate grossly normal course and branching pattern without atherosclerotic disease or calcific disease involvement as there is 0% right and 0% left luminal narrowing as measured by NASCET criteria with patency of the distal internal carotid arteries through the intracranial portions discussed below. Cervical soft tissues unremarkable. Thyroid homogeneous. Lung apices grossly clear.  CTA HEAD: Distal internal carotid arteries demonstrate mild to moderate atherosclerotic calcific disease and stenoses associated without high-grade stenosis, aneurysm or occlusion with patency through the [place]. Anterior cerebral arteries with mild irregularities of atherosclerotic disease involvement however no focal severe stenosis, aneurysm or occlusion. Middle cerebral arteries are patent without hemodynamically significant stenosis, aneurysm or occlusion. Vertebrobasilar system and posterior cerebral arteries are patent without hemodynamically significant stenosis, aneurysm or occlusion. Partially visualized venous structures unremarkable with superior  sagittal sinus patent.      Impression: No hemodynamically significant stenosis, aneurysm or occlusion within the CTA head and neck. Mild to moderate atherosclerotic involvement distal internal carotid arteries along with minimal atherosclerotic disease of the anterior cerebral arteries proximally.  D:  06/18/2021 E:  06/18/2021        CT CEREBRAL PERFUSION WITH & WITHOUT CONTRAST    Result Date: 6/18/2021  EXAMINATION: CT CEREBRAL PERFUSION W WO CONTRAST- 06/18/2021  INDICATION: stroke symptoms  TECHNIQUE: CT cerebral perfusion with and without intravenous contrast. Multiparametric maps performed including mean transit time, time to drain, cerebral blood flow and cerebral blood volume performed.  The radiation dose reduction device was turned on for each scan per the ALARA (As Low as Reasonably Achievable) protocol.  COMPARISON: Earlier CT noncontrast head.  FINDINGS: Symmetric correlating parametric maps without perfusional area of large defect or reversible ischemia within a specific vascular territory.      Impression: No reversible ischemia within a specific vascular territory.  D:  06/18/2021 E:  06/18/2021         Results for orders placed during the hospital encounter of 10/05/17    Adult Transthoracic Echo Complete W/ Cont if Necessary Per Protocol    Interpretation Summary  · Left ventricular systolic function is normal. Calculated EF = 64%. Estimated EF appears to be in the range of 61 - 65%.  · Left ventricular wall thickness is consistent with mild concentric hypertrophy.  · Left ventricular diastolic dysfunction (grade I) consistent with impaired relaxation.      Assessment/Plan   Assessment & Plan     Active Hospital Problems    Diagnosis  POA   • **Facial droop [R29.810]  Yes   • CVA (cerebral vascular accident) (CMS/HCC) [I63.9]  Yes   • COPD (chronic obstructive pulmonary disease) (CMS/[facility]) [J44.9]  Yes   • BPH (benign prostatic hypertrophy) [N40.0]  Yes   • Essential hypertension [I10]  Yes   •  PAF (paroxysmal atrial fibrillation) (CMS/HCC) [I48.0]  Yes     s/p PVA typical flutter RFA 3/2012    1. Atrial fibrillation/atrial flutter:  a. Status post EP study and catheter ablation for typical atrial flutter and pulmonary vein ablation for atrial fibrillation, March 2012.   b. History of diagnosis of atrial fibrillation, approximately 2007 - initiation of Rythmol therapy, status post cardioversion with Dr. Mccabe  c. Chronic Coumadin therapy.   d. Recent paroxysmal atrial flutter, November 2011/titration of Rythmol therapy to 325 mg b.i.d.  e. ECV by Dr. PIERRE Vicente on 01/10/2012.   f. Return to atrial flutter, symptomatic with palpitations/dizziness.  g. EKG on 02/02/2012 revealing atrial flutter, most likely typical counter-clockwise, right atrial flutter.  h. Echo 10-5-17: Normal left atrial size and volume noted.         80 y.o. male  with history of hypertension, hyperlipidemia, CAD s/p stent, COPD, prior CVA with no residual deficits, vertigo, BPH, GERD, paroxysmal atrial fibrillation s/p ablation.  Patient presents to the ED with two episodes of acute onset headache, nausea, dizziness and right facial droop, admitted with likely TIA    Headache, nausea and dizziness  -Etiology of above symptoms unknown, ?TIA, patient with no history of migraines, r/o TIA  -Stroke work-up ongoing per protocol, thus far CTA head and neck, CT head, CTP all unremarkable  -Stroke navigators following, unable to get MRI head secondary to cochlear implants, follow-up echo, continue aspirin, statin  -Continue supportive therapy, IV fluids, pain control, antiemetics  -PT/OT to evaluate    History of CVA  -Previously with lacunar ischemic infarct, no residual deficits  -On aspirin and statin    History of atrial fibrillation  -s/p ablation with Dr. Daley 3/2012  -Continue flecainide, not on any anticoagulation    COPD  -Not in exacerbation, continue prn duo nebs    CAD s/p stent placement  -Continue aspirin and  statin    Hypertension  -BP currently stable, allow permissive hypertension secondary to above    DVT prophylaxis: Mechanical    CODE STATUS: Full  Code Status and Medical Interventions:   Ordered at: 06/18/21 0957     Level Of Support Discussed With:    Patient     Code Status:    CPR     Medical Interventions (Level of Support Prior to Arrest):    Full       Admission Status:  I believe this patient meets OBSERVATION status, however if further evaluation or treatment plans warrant, status may change.  Based upon current information, I predict patient's care encounter to be less than or equal to 2 midnights.    Hugo Duncan MD  06/18/21

## 2021-06-18 NOTE — PLAN OF CARE
Goal Outcome Evaluation:  Plan of Care Reviewed With: patient            SLP evaluation with MBS completed. Cervical osteophyte likely contributing to dysphagia. Education and dietary/behavioral modifications discussed. No further dysphagia needs. Please see note for further details and recommendations.

## 2021-06-18 NOTE — CASE MANAGEMENT/SOCIAL WORK
Discharge Planning Assessment  [facility]     Patient Name: Guicho Blanco  MRN: [id number]  Today's Date: 6/18/2021    Admit Date: 6/18/2021    Discharge Needs Assessment     Row Name 06/18/21 0926       Living Environment    Lives With  spouse    Name(s) of Who Lives With Patient  Sandra Blanco (Power of ) [phone] (Mobile)    Current Living Arrangements  home/apartment/condo    Potentially Unsafe Housing Conditions  unable to assess    Primary Care Provided by  self    Provides Primary Care For  no one    Family Caregiver if Needed  spouse;child(arsalan), adult    Family Caregiver Names  Sandra Blanco (Power of ) [phone] (Mobile)   Sylwia Kuo, daughter,[phone]    Quality of Family Relationships  helpful;involved;supportive       Resource/Environmental Concerns    Resource/Environmental Concerns  none    Transportation Concerns  car, none       Transition Planning    Patient/Family Anticipates Transition to  home    Patient/Family Anticipated Services at Transition  none    Transportation Anticipated  family or friend will provide       Discharge Needs Assessment    Readmission Within the Last 30 Days  no previous admission in last 30 days    Equipment Currently Used at Home  none has a walker    Concerns to be Addressed  denies needs/concerns at this time    Anticipated Changes Related to Illness  none    Equipment Needed After Discharge  none    Provided Post Acute Provider List?  N/A    N/A Provider List Comment  denies need for outpt services    Provided Post Acute Provider Quality & Resource List?  N/A        Discharge Plan     Row Name 06/18/21 0928       Plan    Plan  initial    Plan Comments  Pt lives with his wife in [facility]. He is independent with his care. Spouse is legal POA; hx includes Afib, HTN, COPD, BPH, and Stroke. PCP is Celestina Dumont/Sonido Culver. Plan is home (appropriate) at [place].        Continued Care and Services - Admitted Since 6/18/2021     Coordination has not been started for this encounter.         Demographic Summary    No documentation.       Functional Status     Row Name 06/18/21 0925       Functional Status    Usual Activity Tolerance  good       Functional Status, IADL    Medications  independent    Meal Preparation  assistive person    Housekeeping  assistive person    Laundry  assistive person    Shopping  assistive person       Mental Status    General Appearance WDL  WDL       Mental Status Summary    Recent Changes in Mental Status/Cognitive Functioning  no changes       Employment/    Employment Status  retired        Psychosocial    No documentation.       Abuse/Neglect    No documentation.       Legal    No documentation.       Substance Abuse    No documentation.       Patient Forms    No documentation.           Ivy Morrow RN

## 2021-06-19 VITALS
SYSTOLIC BLOOD PRESSURE: 135 MMHG | DIASTOLIC BLOOD PRESSURE: 72 MMHG | WEIGHT: 210 LBS | OXYGEN SATURATION: 96 % | BODY MASS INDEX: 28.44 KG/M2 | HEART RATE: 67 BPM | RESPIRATION RATE: 15 BRPM | HEIGHT: 72 IN | TEMPERATURE: 97.9 F

## 2021-06-19 PROBLEM — R29.810 FACIAL DROOP: Status: RESOLVED | Noted: 2021-06-18 | Resolved: 2021-06-19

## 2021-06-19 LAB
ANION GAP SERPL CALCULATED.3IONS-SCNC: 7 MMOL/L (ref 5–15)
BUN SERPL-MCNC: 12 MG/DL (ref 8–23)
BUN/CREAT SERPL: 11.5 (ref 7–25)
CALCIUM SPEC-SCNC: 8.7 MG/DL (ref 8.6–10.5)
CHLORIDE SERPL-SCNC: 108 MMOL/L (ref 98–107)
CHOLEST SERPL-MCNC: 97 MG/DL (ref 0–200)
CO2 SERPL-SCNC: 25 MMOL/L (ref 22–29)
CREAT SERPL-MCNC: 1.04 MG/DL (ref 0.76–1.27)
GFR SERPL CREATININE-BSD FRML MDRD: 69 ML/MIN/1.73
GLUCOSE SERPL-MCNC: 84 MG/DL (ref 65–99)
HBA1C MFR BLD: 5.2 % (ref 4.8–5.6)
HDLC SERPL-MCNC: 36 MG/DL (ref 40–60)
LDLC SERPL CALC-MCNC: 45 MG/DL (ref 0–100)
LDLC/HDLC SERPL: 1.28 {RATIO}
POTASSIUM SERPL-SCNC: 4.3 MMOL/L (ref 3.5–5.2)
SODIUM SERPL-SCNC: 140 MMOL/L (ref 136–145)
TRIGL SERPL-MCNC: 75 MG/DL (ref 0–150)
VLDLC SERPL-MCNC: 16 MG/DL (ref 5–40)

## 2021-06-19 PROCEDURE — 99217 PR OBSERVATION CARE DISCHARGE MANAGEMENT: CPT | Performed by: INTERNAL MEDICINE

## 2021-06-19 PROCEDURE — 97165 OT EVAL LOW COMPLEX 30 MIN: CPT

## 2021-06-19 PROCEDURE — 80048 BASIC METABOLIC PNL TOTAL CA: CPT | Performed by: INTERNAL MEDICINE

## 2021-06-19 PROCEDURE — G0378 HOSPITAL OBSERVATION PER HR: HCPCS

## 2021-06-19 PROCEDURE — 80061 LIPID PANEL: CPT | Performed by: NURSE PRACTITIONER

## 2021-06-19 PROCEDURE — 83036 HEMOGLOBIN GLYCOSYLATED A1C: CPT | Performed by: NURSE PRACTITIONER

## 2021-06-19 RX ORDER — PROMETHAZINE HYDROCHLORIDE 12.5 MG/1
12.5 TABLET ORAL EVERY 6 HOURS PRN
Qty: 12 TABLET | Refills: 0 | Status: SHIPPED | OUTPATIENT
Start: 2021-06-19

## 2021-06-19 RX ORDER — PANTOPRAZOLE SODIUM 40 MG/1
40 TABLET, DELAYED RELEASE ORAL
Status: DISCONTINUED | OUTPATIENT
Start: 2021-06-19 | End: 2021-06-19 | Stop reason: HOSPADM

## 2021-06-19 RX ADMIN — FLECAINIDE ACETATE 100 MG: 50 TABLET ORAL at 08:06

## 2021-06-19 RX ADMIN — ASPIRIN 325 MG ORAL TABLET 325 MG: 325 PILL ORAL at 08:06

## 2021-06-19 RX ADMIN — PANTOPRAZOLE SODIUM 40 MG: 40 TABLET, DELAYED RELEASE ORAL at 09:04

## 2021-06-19 RX ADMIN — DOCUSATE SODIUM 50MG AND SENNOSIDES 8.6MG 2 TABLET: 8.6; 5 TABLET, FILM COATED ORAL at 08:06

## 2021-06-19 NOTE — DISCHARGE SUMMARY
[facility] Medicine Services  DISCHARGE SUMMARY    Patient Name: Guicho Blanco  : 1940  MRN: [id number]    Date of Admission: 2021  7:32 AM  Date of Discharge:  2021  Primary Care Physician: Celestina Dumont MD    Consults     Date and Time Order Name Status Description    2021  7:36 AM Inpatient Neurology Consult Stroke Completed           Hospital Course     Presenting Problem:   Facial droop [R29.810]    Active Hospital Problems    Diagnosis  POA   • CVA (cerebral vascular accident) (CMS/[facility]) [I63.9]  Yes   • COPD (chronic obstructive pulmonary disease) (CMS/[facility]) [J44.9]  Yes   • BPH (benign prostatic hypertrophy) [N40.0]  Yes   • Essential hypertension [I10]  Yes   • PAF (paroxysmal atrial fibrillation) (CMS/[facility]) [I48.0]  Yes      Resolved Hospital Problems    Diagnosis Date Resolved POA   • **Facial droop [R29.810] 2021 Yes     Priority: High          Hospital Course:  Guicho Blanco is a 80 y.o. male with history of hypertension, hyperlipidemia, CAD s/p stent, COPD, prior CVA with no residual deficits, vertigo, BPH, GERD, paroxysmal atrial fibrillation s/p ablation not on any anticoagulation presented to the ED with two episodes of acute onset headache, nausea, dizziness and right facial droop, admitted with likely TIA.       Vertigo  -Stroke work-up unremarkable: CTA head and neck, CT head, CTP all unremarkable  -Stroke navigators followed during admission,  unable to get MRI head secondary to cochlear implants  --continue aspirin, statin  -- TTE with normal EF, no evidence of PFO  -Continue antiemetics  -PT/OT recommend home     History of CVA  -Previously with lacunar ischemic infarct, no residual deficits  -On aspirin and statin     History of atrial fibrillation  -s/p ablation with Dr. Daley 3/2012  -Continue flecainide, not on any anticoagulation  --Dr. Joyce with Stroke Neuro recommends outpatient monitor to evaluate for any episodes  of Afib.  Pt will need to follow up with Cardiology as outpatient     COPD  -Not in exacerbation, resume home inhalers     CAD s/p stent placement  -Continue aspirin and statin     Hypertension  -BP currently stable  --resume home meds upon d/c                 Discharge Follow Up Recommendations for outpatient labs/diagnostics:  1. Follow up with PCP within one week   2. Follow up with Cardiology in 1-2 weeks, pt needs outpatient cardiac monitor arranged      Day of Discharge     HPI:  No issues overnight, symptoms have resolved. Anxious to go home today.     Review of Systems  Gen- No fevers, chills  CV- No chest pain, palpitations  Resp- No cough, dyspnea  GI- No N/V/D, abd pain        Vital Signs:   Temp:  [97.5 °F (36.4 °C)-98 °F (36.7 °C)] 98 °F (36.7 °C)  Heart Rate:  [61-72] 67  Resp:  [14-18] 15  BP: (106-129)/(50-72) 129/72     Physical Exam:  Constitutional: No acute distress, awake, alert  HENT: NCAT, mucous membranes moist  Respiratory: Clear to auscultation bilaterally, respiratory effort normal   Cardiovascular: RRR, no murmurs, rubs, or gallops  Gastrointestinal: Positive bowel sounds, soft, nontender, nondistended  Musculoskeletal: No bilateral ankle edema  Psychiatric: Appropriate affect, cooperative  Neurologic: Oriented x 3, strength symmetric in all extremities, Cranial Nerves grossly intact to confrontation, speech clear  Skin: No rashes    Pertinent  and/or Most Recent Results     LAB RESULTS:      Lab 06/18/21  0800 06/18/21  0759 06/18/21  0748   WBC 6.86  --   --    HEMOGLOBIN 12.4*  --   --    HEMOGLOBIN, POC  --   --  12.6   HEMATOCRIT 38.7  --   --    HEMATOCRIT POC  --   --  37*   PLATELETS 168  --   --    NEUTROS ABS 4.18  --   --    IMMATURE GRANS (ABS) 0.03  --   --    LYMPHS ABS 1.87  --   --    MONOS ABS 0.62  --   --    EOS ABS 0.11  --   --    MCV 84.3  --   --    APTT  --  28.2  --          Lab 06/18/21  0759 06/18/21  0755   SODIUM 137  --    POTASSIUM 3.8  --    CHLORIDE 105   --    CO2 20.0*  --    ANION GAP 12.0  --    BUN 15  --    CREATININE 1.02 1.20   GLUCOSE 118*  --    CALCIUM 8.6  --          Lab 06/18/21  0759   TOTAL PROTEIN 6.2   ALBUMIN 3.70   GLOBULIN 2.5   ALT (SGPT) 18  18   AST (SGOT) 25  25   BILIRUBIN 0.6   ALK PHOS 107         Lab 06/18/21  0759   TROPONIN T <0.010                 Lab 06/18/21  0748   PH, ARTERIAL 7.44     Brief Urine Lab Results     None        Microbiology Results (last 10 days)     ** No results found for the last 240 hours. **          Adult Transthoracic Echo Complete W/ Cont if Necessary Per Protocol (With Agitated Saline)    Result Date: 6/18/2021  · Left ventricular ejection fraction appears to be 61 - 65%. Left ventricular systolic function is normal. · The cardiac valves are structurally and functionally within normal limits.      CT Angiogram Neck    Result Date: 6/18/2021  EXAMINATION: CT ANGIOGRAM HEAD W AI ANALYSIS OF LVO-, CT ANGIOGRAM NECK-  06/18/2021  INDICATION: stroke symptoms  TECHNIQUE: CT angiogram head and neck with and without intravenous contrast administration. 2-D and 3-D reconstructions performed.  The radiation dose reduction device was turned on for each scan per the ALARA (As Low as Reasonably Achievable) protocol.  AI analysis of LVO was utilized.  COMPARISON: Concurrent CT head noncontrast and perfusion  FINDINGS:  CTA NECK: Normal 3 vessel arch with patent great vessel origins. Proximal subclavian arteries are patent. Vertebral arteries demonstrate left-sided dominance of caliber without focal severe stenosis, aneurysm or occlusion. Carotids demonstrate grossly normal course and branching pattern without atherosclerotic disease or calcific disease involvement as there is 0% right and 0% left luminal narrowing as measured by NASCET criteria with patency of the distal internal carotid arteries through the intracranial portions discussed below. Cervical soft tissues unremarkable. Thyroid homogeneous. Lung apices  grossly clear.  CTA HEAD: Distal internal carotid arteries demonstrate mild to moderate atherosclerotic calcific disease and stenoses associated without high-grade stenosis, aneurysm or occlusion with patency through the [place]. Anterior cerebral arteries with mild irregularities of atherosclerotic disease involvement however no focal severe stenosis, aneurysm or occlusion. Middle cerebral arteries are patent without hemodynamically significant stenosis, aneurysm or occlusion. Vertebrobasilar system and posterior cerebral arteries are patent without hemodynamically significant stenosis, aneurysm or occlusion. Partially visualized venous structures unremarkable with superior sagittal sinus patent.      No hemodynamically significant stenosis, aneurysm or occlusion within the CTA head and neck. Mild to moderate atherosclerotic involvement distal internal carotid arteries along with minimal atherosclerotic disease of the anterior cerebral arteries proximally.  D:  06/18/2021 E:  06/18/2021   This report was finalized on 6/18/2021 5:17 PM by Dr. Will Burden.      FL Video Swallow With Speech Single Contrast    Result Date: 6/18/2021  EXAMINATION: FL VIDEO SWALLOW W SPEECH SINGLE-CONTRAST-, FL LIMITED UGI FOR MBS REFLUX SINGLE-CONTRAST-  INDICATION: dysphagia; R29.810-Facial weakness; R11.2-Nausea with vomiting, unspecified; Z96.21-Cochlear implant status; R47.9-Unspecified speech disturbances; R13.10-Dysphagia, unspecified  TECHNIQUE: 1 minute of fluoroscopic time was used for this exam. 5 associated fluoroscopic loops were saved. Patient was evaluated in the seated lateral position while taking a variety of barium by mouth under the direction of speech pathology.  COMPARISON: NONE  FINDINGS: 1 minute of fluoroscopy provided for a modified barium swallow, and limited upper GI series. Please see speech therapy report for full details and recommendations. Limited upper GI series demonstrated no signs of a focal  esophageal stricture, or gastroesophageal reflux. There are moderate-sized osteophytes visible in the cervical spine. These osteophytes do not appear to significantly impede swallowing barium.       Fluoroscopy provided for a modified barium swallow, and limited upper GI series. Please see speech therapy report for full details and recommendations. Limited upper GI series demonstrated moderate sized osteophytes visible in the cervical spine, which do not appear to significantly impede swallowing.    This report was finalized on 6/18/2021 3:47 PM by Henry Rubio.      XR Chest 1 View    Result Date: 6/18/2021  EXAMINATION: XR CHEST 1 VW-06/18/2021:  INDICATION: Acute Stroke Protocol (onset < 12 hrs); R29.810-Facial weakness; R11.2-Nausea with vomiting, unspecified; Z96.21-Cochlear implant status; R47.9-Unspecified speech disturbances.  COMPARISON: Chest x-ray 09/03/2018.  FINDINGS: AP view of the chest reveals cardiac size potentially borderline enlarged without overt edema. No pneumothorax or pleural effusion. Degenerative changes of the spine.      No acute cardiopulmonary process.  D:  06/18/2021 E:  06/18/2021  This report was finalized on 6/18/2021 5:17 PM by Dr. Will Burden.      FL Limited Ugi For Mbs Reflux Single-Contrast    Result Date: 6/18/2021  EXAMINATION: FL VIDEO SWALLOW W SPEECH SINGLE-CONTRAST-, FL LIMITED UGI FOR MBS REFLUX SINGLE-CONTRAST-  INDICATION: dysphagia; R29.810-Facial weakness; R11.2-Nausea with vomiting, unspecified; Z96.21-Cochlear implant status; R47.9-Unspecified speech disturbances; R13.10-Dysphagia, unspecified  TECHNIQUE: 1 minute of fluoroscopic time was used for this exam. 5 associated fluoroscopic loops were saved. Patient was evaluated in the seated lateral position while taking a variety of barium by mouth under the direction of speech pathology.  COMPARISON: NONE  FINDINGS: 1 minute of fluoroscopy provided for a modified barium swallow, and limited upper GI series.  Please see speech therapy report for full details and recommendations. Limited upper GI series demonstrated no signs of a focal esophageal stricture, or gastroesophageal reflux. There are moderate-sized osteophytes visible in the cervical spine. These osteophytes do not appear to significantly impede swallowing barium.       Fluoroscopy provided for a modified barium swallow, and limited upper GI series. Please see speech therapy report for full details and recommendations. Limited upper GI series demonstrated moderate sized osteophytes visible in the cervical spine, which do not appear to significantly impede swallowing.    This report was finalized on 6/18/2021 3:47 PM by Henry Rubio.      CT Head Without Contrast Stroke Protocol    Result Date: 6/18/2021  HISTORY: Code stroke. Right-sided facial droop. Scan time 7:35 AM. Preliminary report provided by myself to CT/the stroke team 7:41 AM. TECHNIQUE: CT head without contrast. Radiation dose reduction techniques included automated exposure control or exposure modulation based on body size. Radiation audit for number of CT and nuclear cardiology exams performed in the last year: 0.  COMPARISON: There is a previous head CT from 10/18/2017. FINDINGS: This patient has had interval bilateral cochlear implants placed and they result in considerable metal artifact. Where not obscured by the metal artifact, there is no extra-axial fluid collection or acute intracranial hemorrhage appreciated. There is no midline shift. The basilar cisterns are patent. There is a chronic appearing but interval lacunar insult in the right anterior basal ganglia and chronic appearing but interval lacunar insults posterior limb left internal capsule. There is also moderate white matter low-attenuation that is nonspecific but likely due to small vessel disease showing interval progression since 2017 as well. Please correlate for risk factors. There our atherosclerotic vascular  calcifications at the base the brain. There is no intracranial mass effect. There is nothing to suggest acute cortical infarct on this examination. There are bilateral cochlear implants with bilateral mastoidectomies. The mastoidectomy bowl and remaining mastoid air cells are clear on the right. On the left side, the mastoidectomy bowl essentially clear but there is opacification of the majority of the remaining mastoid air cells. Paranasal sinuses are clear.     #1 there is a large amount of metal artifact from bilateral cochlear implants. Allowing for this no acute intracranial abnormality is appreciated. There is moderate probable sequelae of small vessel disease showing interval progression since 2017. No intracranial mass effect or acute cortical infarct is suspected. If there is clinical concern for acute CVA, follow-up imaging is recommended. Signer Name: Kerri Ledezma MD  Signed: 6/18/2021 7:47 AM  Workstation Name: [id number]  [facility]    CT Angiogram Head w AI Analysis of LVO    Result Date: 6/18/2021  EXAMINATION: CT ANGIOGRAM HEAD W AI ANALYSIS OF LVO-, CT ANGIOGRAM NECK-  06/18/2021  INDICATION: stroke symptoms  TECHNIQUE: CT angiogram head and neck with and without intravenous contrast administration. 2-D and 3-D reconstructions performed.  The radiation dose reduction device was turned on for each scan per the ALARA (As Low as Reasonably Achievable) protocol.  AI analysis of LVO was utilized.  COMPARISON: Concurrent CT head noncontrast and perfusion  FINDINGS:  CTA NECK: Normal 3 vessel arch with patent great vessel origins. Proximal subclavian arteries are patent. Vertebral arteries demonstrate left-sided dominance of caliber without focal severe stenosis, aneurysm or occlusion. Carotids demonstrate grossly normal course and branching pattern without atherosclerotic disease or calcific disease involvement as there is 0% right and 0% left luminal narrowing as measured by  NASCET criteria with patency of the distal internal carotid arteries through the intracranial portions discussed below. Cervical soft tissues unremarkable. Thyroid homogeneous. Lung apices grossly clear.  CTA HEAD: Distal internal carotid arteries demonstrate mild to moderate atherosclerotic calcific disease and stenoses associated without high-grade stenosis, aneurysm or occlusion with patency through the [place]. Anterior cerebral arteries with mild irregularities of atherosclerotic disease involvement however no focal severe stenosis, aneurysm or occlusion. Middle cerebral arteries are patent without hemodynamically significant stenosis, aneurysm or occlusion. Vertebrobasilar system and posterior cerebral arteries are patent without hemodynamically significant stenosis, aneurysm or occlusion. Partially visualized venous structures unremarkable with superior sagittal sinus patent.      No hemodynamically significant stenosis, aneurysm or occlusion within the CTA head and neck. Mild to moderate atherosclerotic involvement distal internal carotid arteries along with minimal atherosclerotic disease of the anterior cerebral arteries proximally.  D:  06/18/2021 E:  06/18/2021   This report was finalized on 6/18/2021 5:17 PM by Dr. Will Burden.      CT CEREBRAL PERFUSION WITH & WITHOUT CONTRAST    Result Date: 6/18/2021  EXAMINATION: CT CEREBRAL PERFUSION W WO CONTRAST- 06/18/2021  INDICATION: stroke symptoms  TECHNIQUE: CT cerebral perfusion with and without intravenous contrast. Multiparametric maps performed including mean transit time, time to drain, cerebral blood flow and cerebral blood volume performed.  The radiation dose reduction device was turned on for each scan per the ALARA (As Low as Reasonably Achievable) protocol.  COMPARISON: Earlier CT noncontrast head.  FINDINGS: Symmetric correlating parametric maps without perfusional area of large defect or reversible ischemia within a specific vascular  territory.      No reversible ischemia within a specific vascular territory.  D:  06/18/2021 E:  06/18/2021   This report was finalized on 6/18/2021 5:17 PM by Dr. Will Burden.        Results for orders placed during the hospital encounter of 03/02/18    Duplex Lower Extremity Art / Grafts - Bilateral CAR    Interpretation Summary  · No significant stenosis detected bilaterally.  · CHRISTINE not obtained due to non compressible arteries.      Results for orders placed during the hospital encounter of 03/02/18    Duplex Lower Extremity Art / Grafts - Bilateral CAR    Interpretation Summary  · No significant stenosis detected bilaterally.  · CHRISTINE not obtained due to non compressible arteries.      Results for orders placed during the hospital encounter of 06/18/21    Adult Transthoracic Echo Complete W/ Cont if Necessary Per Protocol (With Agitated Saline)    Interpretation Summary  · Left ventricular ejection fraction appears to be 61 - 65%. Left ventricular systolic function is normal.  · The cardiac valves are structurally and functionally within normal limits.      Plan for Follow-up of Pending Labs/Results:   Pending Labs     Order Current Status    Basic Metabolic Panel Collected (06/19/21 1109)    Hemoglobin A1c Collected (06/19/21 1109)    Lipid Panel Collected (06/19/21 1109)        Discharge Details        Discharge Medications      New Medications      Instructions Start Date   promethazine 12.5 MG tablet  Commonly known as: PHENERGAN   12.5 mg, Oral, Every 6 Hours PRN         Changes to Medications      Instructions Start Date   pravastatin 20 MG tablet  Commonly known as: PRAVACHOL  What changed: additional instructions   20 mg, Oral, Daily         Continue These Medications      Instructions Start Date   acetaminophen 500 MG tablet  Commonly known as: TYLENOL   500 mg, Oral, Every 6 Hours PRN      aspirin 325 MG tablet   325 mg, Oral, Daily      coenzyme Q10 100 MG capsule   100 mg, Oral, Daily       cycloSPORINE 0.05 % ophthalmic emulsion  Commonly known as: RESTASIS   1 drop, Both Eyes, 2 Times Daily      docusate sodium 100 MG capsule  Commonly known as: COLACE   200 mg, Oral, Daily      flecainide 100 MG tablet  Commonly known as: TAMBOCOR   100 mg, Oral, Every 12 Hours      isosorbide mononitrate 30 MG 24 hr tablet  Commonly known as: IMDUR   30 mg, Oral, Daily      nitroglycerin 0.4 MG SL tablet  Commonly known as: NITROSTAT   1 under the tongue as needed for angina, may repeat q5mins for up three doses      omeprazole 20 MG capsule  Commonly known as: priLOSEC   20 mg, Oral, Daily      traMADol 50 MG tablet  Commonly known as: ULTRAM   50 mg, Oral, 2 Times Daily PRN      vitamin b complex capsule capsule   1 capsule, Oral, Daily, Takes in evening      vitamin D3 125 MCG (5000 UT) capsule capsule   5,000 Units, Oral, Daily             Allergies   Allergen Reactions   • Cymbalta [Duloxetine Hcl] Mental Status Change     Depression, thoughts of Suicide.    • Metoprolol Other (See Comments)     UNKNOWN. Pt does not remember this being allergy   • Neurontin [Gabapentin] Other (See Comments)     Lethargy & fatigue         Discharge Disposition:  Home or Self Care    Diet:  Hospital:  Diet Order   Procedures   • Diet Regular; Thin; Cardiac       Activity:      Restrictions or Other Recommendations:         CODE STATUS:    Code Status and Medical Interventions:   Ordered at: 06/18/21 0957     Level Of Support Discussed With:    Patient     Code Status:    CPR     Medical Interventions (Level of Support Prior to Arrest):    Full       Future Appointments   Date Time Provider Department Center   6/28/2021  3:00 PM Antoine Aranda MD [facility]   11/22/2021 10:15 AM Hector Urrutia MD [facility]       Additional Instructions for the Follow-ups that You Need to Schedule     Discharge Follow-up with PCP   As directed       Currently Documented PCP:    Celestina Dumont MD    PCP Phone Number:     [phone]     Follow Up Details: in one week with PCP         Discharge Follow-up with Specified Provider: Cardiology/Dr. Urrutia; 2 Weeks   As directed      To: Cardiology/Dr. Urrutia    Follow Up: 2 Weeks                     Sandra Olivier MD  06/19/21      Time Spent on Discharge:  I spent  35  minutes on this discharge activity which included: face-to-face encounter with the patient, reviewing the data in the system, coordination of the care with the nursing staff as well as consultants, documentation, and entering orders.

## 2021-06-19 NOTE — PLAN OF CARE
Goal Outcome Evaluation:  Plan of Care Reviewed With: patient        Progress: improving  Outcome Summary: OT eval complete. Pt completes all bed mobility with CI and transfers independently. Pt ambulates independently in room with no AE. Pt demonstrates WNL B UE ROM and strength 5/5. Pt demonstrates intact FM and GM coordination and denies all numbess and tingling. No deficits noted that require skilled OT services. Recommend d/c home.

## 2021-06-19 NOTE — CASE MANAGEMENT/SOCIAL WORK
Case Management Discharge Note      Final Note: Plan is home with family. Family will transport.    Provided Post Acute Provider List?: N/A  N/A Provider List Comment: denies need for outpt services  Provided Post Acute Provider Quality & Resource List?: N/A    Selected Continued Care - Admitted Since 6/18/2021     Destination    No services have been selected for the patient.              Durable Medical Equipment    No services have been selected for the patient.              Dialysis/Infusion    No services have been selected for the patient.              Home Medical Care    No services have been selected for the patient.              Therapy    No services have been selected for the patient.              Community Resources    No services have been selected for the patient.              Community & DME    No services have been selected for the patient.                       Final Discharge Disposition Code: 01 - home or self-care

## 2021-06-19 NOTE — THERAPY DISCHARGE NOTE
Acute Care - Occupational Therapy Discharge  [facility]    Patient Name: Guicho Blanco  : 1940    MRN: [id number]                              Today's Date: 2021       Admit Date: 2021    Visit Dx:     ICD-10-CM ICD-9-CM   1. Facial droop  R29.810 781.94   2. Non-intractable vomiting with nausea, unspecified vomiting type  R11.2 787.01   3. Uses cochlear implant  Z96.21 V43.89   4. Difficulty with speech  R47.9 784.59   5. Pharyngoesophageal dysphagia  R13.14 787.24     Patient Active Problem List   Diagnosis   • Osteomyelitis of toe of right foot (CMS/[facility])   • Osteomyelitis of right foot (CMS/[facility])   • PAF (paroxysmal atrial fibrillation) (CMS/[facility])   • Coronary artery disease involving native coronary artery of native heart without angina pectoris   • Gastroesophageal reflux disease without esophagitis   • Benign non-nodular prostatic hyperplasia without lower urinary tract symptoms   • S/P appy   • Essential hypertension   • Simple chronic bronchitis (CMS/[facility])   • Mixed hyperlipidemia   • COPD (chronic obstructive pulmonary disease) (CMS/[facility])   • BPH (benign prostatic hypertrophy)   • Hearing loss   • Chest pain   • CVA (cerebral vascular accident) (CMS/[facility])   • Vertigo   • Snoring   • Overweight   • PLMD (periodic limb movement disorder)   • Neuropathy     Past Medical History:   Diagnosis Date   • Arthritis    • BPH (benign prostatic hypertrophy)    • Cellulitis    • COPD (chronic obstructive pulmonary disease) (CMS/[facility])    • Coronary artery disease    • GERD (gastroesophageal reflux disease)    • Hearing loss    • Hiatal hernia    • Neuropathy    • Peripheral neuropathy    • Peripheral vascular disease (CMS/[facility])      Past Surgical History:   Procedure Laterality Date   • ABLATION OF DYSRHYTHMIC FOCUS     • APPENDECTOMY     • CARDIAC CATHETERIZATION N/A 10/5/2017    Procedure: Left Heart Cath;  Surgeon: Hector Urrutia MD;  Location: [facility] INVASIVE LOCATION;  Service:    • CATARACT  EXTRACTION Bilateral    • CHOLECYSTECTOMY      Remote   • COCHLEAR IMPLANT REVISION  05/2019   • CORONARY ANGIOPLASTY     • GALLBLADDER SURGERY     • KNEE ARTHROSCOPY Right     Right knee ACL repair.   • PROSTATE SURGERY     • SHOULDER SURGERY Right    • TOE AMPUTATION Right     2nd toe - Right Foot   • TONSILLECTOMY       General Information     Row Name 06/19/21 1128          OT Time and Intention    Document Type  discharge evaluation/summary;evaluation  -HK     Mode of Treatment  occupational therapy  -     Row Name 06/19/21 1128          General Information    Patient Profile Reviewed  yes  -HK     Prior Level of Function  independent:;all household mobility;community mobility;gait;transfer;bed mobility;ADL's;driving  -HK     Existing Precautions/Restrictions  fall;other (see comments) cochlear implants; must speak clear and slow.  -HK     Barriers to Rehab  none identified  -HK     Row Name 06/19/21 1128          Living Environment    Lives With  spouse  -HK     Row Name 06/19/21 1128          Home Main Entrance    Number of Stairs, Main Entrance  one  -HK     Stair Railings, Main Entrance  none  -HK     Row Name 06/19/21 1128          Stairs Within Home, Primary    Number of Stairs, Within Home, Primary  none  -HK     Stair Railings, Within Home, Primary  none  -HK     Stairs Comment, Within Home, Primary  Walk in shower with shower seat.  -HK     Row Name 06/19/21 1128          Cognition    Orientation Status (Cognition)  oriented x 4  -HK     Row Name 06/19/21 1128          Safety Issues, Functional Mobility    Safety Issues Affecting Function (Mobility)  safety precautions follow-through/compliance;safety precaution awareness  -HK     Impairments Affecting Function (Mobility)  endurance/activity tolerance;balance  -HK       User Key  (r) = Recorded By, (t) = Taken By, (c) = Cosigned By    Initials Name Provider Type    HK Deanna Bergeron, OT Occupational Therapist        Mobility/ADL's     Row Name  06/19/21 1130          Bed Mobility    Bed Mobility  sit-supine;scooting/bridging;supine-sit-supine  -HK     Scooting/Bridging [place] (Bed Mobility)  modified independence  -HK     Sit-Supine [place] (Bed Mobility)  modified independence  -HK     Supine-Sit-Supine [place] (Bed Mobility)  modified independence  -HK     Assistive Device (Bed Mobility)  head of bed elevated;bed rails  -HK     Comment (Bed Mobility)  No assist from OT.  -     Row Name 06/19/21 1130          Transfers    Transfers  sit-stand transfer  -HK     Sit-Stand Sterling (Transfers)  independent  -HK     Row Name 06/19/21 1130          Sit-Stand Transfer    Assistive Device (Sit-Stand Transfers)  -- none  -HK     Row Name 06/19/21 1130          Functional Mobility    Functional Mobility- Ind. Level  independent  -HK     Functional Mobility- Device  -- none  -HK     Functional Mobility-Distance (Feet)  20  -     Functional Mobility- Comment  Pt ambulating independently in room.  -     Row Name 06/19/21 1130          Activities of Daily Living    BADL Assessment/Intervention  lower body dressing  -     Row Name 06/19/21 1130          Lower Body Dressing Assessment/Training    [place]terling Level (Lower Body Dressing)  doff;don;socks;independent  -HK     Position (Lower Body Dressing)  edge of bed sitting  -       User Key  (r) = Recorded By, (t) = Taken By, (c) = Cosigned By    Initials Name Provider Type     Deanna Bergeron, OT Occupational Therapist        Obj/Interventions     Row Name 06/19/21 1130          Sensory Assessment (Somatosensory)    Sensory Assessment (Somatosensory)  sensation intact denies all numbess and tingling.  -     Row Name 06/19/21 1130          Vision Assessment/Intervention    Visual Impairment/Limitations  corrective lenses full-time  -     Row Name 06/19/21 1130          Range of Motion Comprehensive    General Range of Motion  no range of motion deficits identified  -     Comment,  General Range of Motion  B UE WNL  -     Row Name 06/19/21 1130          Strength Comprehensive (MMT)    General Manual Muscle Testing (MMT) Assessment  no strength deficits identified  -HK     Comment, General Manual Muscle Testing (MMT) Assessment  B UE 5/5  -[facility] Name 06/19/21 1130          Motor Skills    Motor Skills  coordination  -     Coordination  WNL;bilateral;finger to nose;other (see comments) finger opposition  -     Row Name 06/19/21 1130          Balance    Balance Assessment  sitting static balance;sitting dynamic balance;standing static balance;standing dynamic balance  -     Static Sitting Balance  WNL;unsupported;sitting, edge of bed  -HK     Dynamic Sitting Balance  WNL;unsupported;sitting, edge of bed  -HK     Static Standing Balance  WNL;unsupported;standing  -HK     Dynamic Standing Balance  WFL;unsupported;standing  -HK     Balance Interventions  sitting;occupation based/functional task  -       User Key  (r) = Recorded By, (t) = Taken By, (c) = Cosigned By    Initials Name Provider Type    Deanna Cruz, OT Occupational Therapist        Goals/Plan    No documentation.       Clinical Impression     [facility] Name 06/19/21 1132          Pain Assessment    Additional Documentation  Pain Scale: Numbers Pre/Post-Treatment (Group)  -HK     Row Name 06/19/21 1132          Pain Scale: Numbers Pre/Post-Treatment    Pretreatment Pain Rating  0/10 - no pain  -     Posttreatment Pain Rating  0/10 - no pain  -HK     Row Name 06/19/21 1132          Plan of Care Review    Plan of Care Reviewed With  patient  -HK     Progress  improving  -     Outcome Summary  OT eval complete. Pt completes all bed mobility with CI and transfers independently. Pt ambulates independently in room with no AE. Pt demonstrates WNL B UE ROM and strength 5/5. Pt demonstrates intact FM and GM coordination and denies all numbess and tingling. No deficits noted that require skilled OT services. Recommend d/c home.   -HK     Row Name 06/19/21 1132          Therapy Assessment/Plan (OT)    Criteria for Skilled Therapeutic Interventions Met (OT)  no problems identified which require skilled intervention  -HK     Therapy Frequency (OT)  evaluation only  -HK     Row Name 06/19/21 1132          Therapy Plan Review/Discharge Plan (OT)    Anticipated Discharge Disposition (OT)  home  -HK     Row Name 06/19/21 1132          Vital Signs    Pre Systolic BP Rehab  129  -HK     Pre Treatment Diastolic BP  72  -HK     Post Systolic BP Rehab  143  -HK     Post Treatment Diastolic BP  81  -HK     O2 Delivery Pre Treatment  room air  -HK     O2 Delivery Intra Treatment  room air  -HK     O2 Delivery Post Treatment  room air  -HK     Pre Patient Position  Supine  -HK     Intra Patient Position  Standing  -HK     Post Patient Position  Supine  -HK     Row Name 06/19/21 1132          Positioning and Restraints    Pre-Treatment Position  in bed  -HK     Post Treatment Position  bed  -HK     In Bed  notified nsg;supine;call light within reach;encouraged to call for assist;exit alarm on  -HK       User Key  (r) = Recorded By, (t) = Taken By, (c) = Cosigned By    Initials Name Provider Type    HK Deanna Bergeron, OT Occupational Therapist        Outcome Measures     Row Name 06/19/21 1133          How much help from another is currently needed...    Putting on and taking off regular lower body clothing?  4  -HK     Bathing (including washing, rinsing, and drying)  4  -HK     Toileting (which includes using toilet bed pan or urinal)  4  -HK     Putting on and taking off regular upper body clothing  4  -HK     Taking care of personal grooming (such as brushing teeth)  4  -HK     Eating meals  4  -HK     AM-PAC 6 Clicks Score (OT)  24  -HK     Row Name 06/19/21 1133          Modified Wilson Scale    Modified Gil Scale  1 - No significant disability despite symptoms.  Able to carry out all usual duties and activities.  -HK     Row Name 06/19/21 1132           Functional Assessment    Outcome Measure Options  AM-PAC 6 Clicks Daily Activity (OT);Modified Gil  -       User Key  (r) = Recorded By, (t) = Taken By, (c) = Cosigned By    Initials Name Provider Type     Deanna Bergeron OT Occupational Therapist        Occupational Therapy Education                 Title: PT OT SLP Therapies (In Progress)     Topic: Occupational Therapy (In Progress)     Point: ADL training (Done)     Description:   Instruct learner(s) on proper safety adaptation and remediation techniques during self care or transfers.   Instruct in proper use of assistive devices.              Learning Progress Summary           Patient Acceptance, E,TB,D, VU,DU by  at 6/19/2021 1134                   Point: Home exercise program (Not Started)     Description:   Instruct learner(s) on appropriate technique for monitoring, assisting and/or progressing therapeutic exercises/activities.              Learner Progress:  Not documented in this visit.          Point: Precautions (Done)     Description:   Instruct learner(s) on prescribed precautions during self-care and functional transfers.              Learning Progress Summary           Patient Acceptance, E,TB,D, VU,DU by  at 6/19/2021 1134                   Point: Body mechanics (Done)     Description:   Instruct learner(s) on proper positioning and spine alignment during self-care, functional mobility activities and/or exercises.              Learning Progress Summary           Patient Acceptance, E,TB,D, VU,DU by  at 6/19/2021 1134                               User Key     Initials Effective Dates Name Provider Type [facility] 06/16/21 -  Deanna Bergeron OT Occupational Therapist OT              OT Recommendation and Plan  Retired Outcome Summary/Treatment Plan (OT)  Anticipated Discharge Disposition (OT): home  Therapy Frequency (OT): evaluation only  Plan of Care Review  Plan of Care Reviewed With: patient  Progress: improving  Outcome  Summary: OT eval complete. Pt completes all bed mobility with CI and transfers independently. Pt ambulates independently in room with no AE. Pt demonstrates WNL B UE ROM and strength 5/5. Pt demonstrates intact FM and GM coordination and denies all numbess and tingling. No deficits noted that require skilled OT services. Recommend d/c home.  Plan of Care Reviewed With: patient  Outcome Summary: OT eval complete. Pt completes all bed mobility with CI and transfers independently. Pt ambulates independently in room with no AE. Pt demonstrates WNL B UE ROM and strength 5/5. Pt demonstrates intact FM and GM coordination and denies all numbess and tingling. No deficits noted that require skilled OT services. Recommend d/c home.     Time Calculation:   Time Calculation- OT     Row Name 06/19/21 0921             Time Calculation- OT    OT Start Time  0921  -HK      OT Received On  06/19/21  -HK      OT Goal Re-Cert Due Date  06/29/21  -HK         Untimed Charges    OT Eval/Re-eval Minutes  56  -HK         Total Minutes    Untimed Charges Total Minutes  56  -HK       Total Minutes  56  -HK        User Key  (r) = Recorded By, (t) = Taken By, (c) = Cosigned By    Initials Name Provider Type    HK Deanna Bergeron, OT Occupational Therapist        Therapy Charges for Today     Code Description Service Date Service Provider Modifiers Qty    73182577623 HC OT EVAL LOW COMPLEXITY 4 6/19/2021 Deanna Bergeron OT GO 1               Deanna Bergeron OT  6/19/2021

## 2021-06-19 NOTE — PLAN OF CARE
Goal Outcome Evaluation:  Plan of Care Reviewed With: patient        Progress: no change  Outcome Summary: Pt VSS. A&O x4. NIH 1. Waffle. Refusing SCDS. NSR on tele. Slept well. Awaiting d/c planning. Will continue to monitor.

## 2021-06-20 ENCOUNTER — READMISSION MANAGEMENT (OUTPATIENT)
Dept: CALL CENTER | Facility: HOSPITAL | Age: 81
End: 2021-06-20

## 2021-06-20 NOTE — OUTREACH NOTE
Prep Survey      Responses   [facility] facility patient discharged from?  [place]   Is LACE score < 7 ?  Yes   Emergency Room discharge w/ pulse ox?  No   Eligibility  Readm Mgmt   Discharge diagnosis  Vertigo   Does the patient have one of the following disease processes/diagnoses(primary or secondary)?  Other   Does the patient have Home health ordered?  No   Is there a DME ordered?  No   Prep survey completed?  Yes          Cassidy Hu RN

## 2021-06-21 ENCOUNTER — TELEPHONE (OUTPATIENT)
Dept: CARDIOLOGY | Facility: CLINIC | Age: 81
End: 2021-06-21

## 2021-06-21 DIAGNOSIS — I48.0 PAF (PAROXYSMAL ATRIAL FIBRILLATION) (HCC): Primary | ICD-10-CM

## 2021-06-21 NOTE — TELEPHONE ENCOUNTER
----- Message from Hector Urrutia MD sent at 6/19/2021 12:00 PM EDT -----  Can you set up Mr. Blanco with a 30-day monitor? [facility] if able. Thanks  ----- Message -----  From: Shane Joyce MD  Sent: 6/19/2021  10:55 AM EDT  To: MD Manpreet Arredondo,    You have been following this patient.  This patient has history of atrial fibrillation, and has had ablation x2, and it seems that he has not been in atrial fibrillation.  He is on aspirin 325 mg at this time.  He had a stroke in 2017, and he has been admitted again today, but his symptoms are more like peripheral vertigo.     If you could get a 30-day event monitor, and if he still goes into atrial fibrillation, I would recommend to start anticoagulation.  Patient and his wife are agreeable to the plan.    Thank you for your help.  Please call me with questions.    Shane.

## 2021-06-28 ENCOUNTER — OFFICE VISIT (OUTPATIENT)
Dept: NEUROLOGY | Facility: CLINIC | Age: 81
End: 2021-06-28

## 2021-06-28 VITALS
OXYGEN SATURATION: 99 % | BODY MASS INDEX: 28.1 KG/M2 | HEIGHT: 73 IN | SYSTOLIC BLOOD PRESSURE: 112 MMHG | WEIGHT: 212 LBS | HEART RATE: 87 BPM | RESPIRATION RATE: 16 BRPM | DIASTOLIC BLOOD PRESSURE: 62 MMHG

## 2021-06-28 DIAGNOSIS — R25.1 TREMOR: ICD-10-CM

## 2021-06-28 DIAGNOSIS — R42 VERTIGO: Primary | ICD-10-CM

## 2021-06-28 DIAGNOSIS — I48.0 PAF (PAROXYSMAL ATRIAL FIBRILLATION) (HCC): ICD-10-CM

## 2021-06-28 DIAGNOSIS — G43.C0 PERIODIC HEADACHE SYNDROME, NOT INTRACTABLE: ICD-10-CM

## 2021-06-28 PROBLEM — I63.9 CVA (CEREBRAL VASCULAR ACCIDENT): Status: RESOLVED | Noted: 2017-10-18 | Resolved: 2021-06-28

## 2021-06-28 PROBLEM — Z89.421 ACQUIRED ABSENCE OF OTHER RIGHT TOE(S) (HCC): Status: ACTIVE | Noted: 2021-06-28

## 2021-06-28 PROCEDURE — 99214 OFFICE O/P EST MOD 30 MIN: CPT | Performed by: PSYCHIATRY & NEUROLOGY

## 2021-06-28 RX ORDER — TOPIRAMATE 25 MG/1
TABLET ORAL
Qty: 120 TABLET | Refills: 3 | Status: SHIPPED | OUTPATIENT
Start: 2021-06-28 | End: 2021-06-29 | Stop reason: SDUPTHER

## 2021-06-28 NOTE — PROGRESS NOTES
"Chief Complaint  Establish Care (Tremor/Neuropathy)    Subjective          Guicho Blanco presents to [facility] NEUROLOGY     History of Present Illness    80 y.o. male returns in follow up.  Admitted [facility] 6/18/21 - 6/19/21 for dizziness and unsteady gait.  Dx with BPPV.       HCT/CTA H&N/CTP, my review of films, atrophy, parafalcine  meningioma, metal artifact from cochlear implants. Minimal athero    Echo - EF 61- 65%     Intermittent dizziness, nausea, and HA.  Frequency is 1 - 2 times a week.  Located on right occiput.  Quality is dull ache.  Intensity is moderate.      Sx of numbness in feet and pain in LE.  Quality is tingling, sharp shooting pains.  Intensity is 5 - 6/10.    Dx with peripheral neuropathy by EMG/NCS.    Trials of Cymbalta, GBP caused side effects.     Tremor worsening in hands.      Objective   Vital Signs:   /62   Pulse 87   Resp 16   Ht 185.4 cm (73\")   Wt 96.2 kg (212 lb)   SpO2 99%   BMI 27.97 kg/m²       Physical Exam  Eyes:      Extraocular Movements: EOM normal.      Pupils: Pupils are equal, round, and reactive to light.   Neurological:      Mental Status: He is oriented to person, place, and time.      Coordination: Finger-Nose-Finger Test normal.      Gait: Gait is intact.      Deep Tendon Reflexes: Strength normal.   Psychiatric:         Speech: Speech normal.          Neurologic Exam     Mental Status   Oriented to person, place, and time.   Attention: normal. Concentration: normal.   Speech: speech is normal   Level of consciousness: alert  Knowledge: good and consistent with education.   Normal comprehension.     Cranial Nerves     CN II   Visual fields full to confrontation.   Visual acuity: normal  Right visual field deficit: none  Left visual field deficit: none     CN III, IV, VI   Pupils are equal, round, and reactive to light.  Extraocular motions are normal.   Nystagmus: none   Diplopia: none  Ophthalmoparesis: none  Upgaze: " normal  Downgaze: normal  Conjugate gaze: present    CN V   Facial sensation intact.   Right corneal reflex: normal  Left corneal reflex: normal    CN VII   Right facial weakness: none  Left facial weakness: none    CN VIII   Hearing: impaired    CN IX, X   Palate: symmetric  Right gag reflex: normal  Left gag reflex: normal    CN XI   Right sternocleidomastoid strength: normal  Left sternocleidomastoid strength: normal    CN XII   Tongue: not atrophic  Fasciculations: absent  Tongue deviation: none    Motor Exam   Muscle bulk: normal  Overall muscle tone: normal  Right arm tone: normal  Left arm tone: normal  Right leg tone: normal  Left leg tone: normal    Strength   Strength 5/5 throughout.     Sensory Exam   Right leg light touch: decreased from knee  Left leg light touch: decreased from knee  Right leg pinprick: decreased from knee  Left leg pinprick: decreased from knee    Gait, Coordination, and Reflexes     Gait  Gait: normal    Coordination   Finger to nose coordination: normal    Tremor   Resting tremor: absent  Intention tremor: absent  Action tremor: absent    Reflexes   Reflexes 2+ except as noted.      Result Review :   The following data was reviewed by: Antoine Aranda MD on 06/28/2021:  Common labs    Common Labsle 6/18/21 6/18/21 6/18/21 6/18/21 6/18/21 6/18/21 6/19/21 6/19/21 6/19/21    0748 0755 0759 0759 0759 0800 1109 1109 1109   Glucose     118 (A)   84    BUN     15   12    Creatinine  1.20   1.02   1.04    eGFR Non African Am     70   69    Sodium     137   140    Potassium     3.8   4.3    Chloride     105   108 (A)    Calcium     8.6   8.7    Albumin     3.70       Total Bilirubin     0.6       Alkaline Phosphatase     107       AST (SGOT)    25 25       ALT (SGPT)   18  18       WBC      6.86      Hemoglobin 12.6     12.4 (A)      Hematocrit 37 (A)     38.7      Platelets      168      Total Cholesterol       97     Triglycerides       75     HDL Cholesterol       36 (A)     LDL  Cholesterol        45     Hemoglobin A1C         5.20   (A) Abnormal value       Comments are available for some flowsheets but are not being displayed.           Data reviewed: Radiologic studies HCT/CTP/TCA, Cardiology studies Echo and Recent hospitalization notes [facility]          Assessment and Plan    Diagnoses and all orders for this visit:    1. Vertigo (Primary)    2. PAF (paroxysmal atrial fibrillation) (CMS/HCC)    3. Periodic headache syndrome, not intractable  Assessment & Plan:  Headaches are worsening.  Medication changes per orders.     Start TPM           4. Tremor  Assessment & Plan:  Start TPM 50 mg BID       Other orders  -     topiramate (Topamax) 25 MG tablet; Take one tablet twice a day for one week, then two tablets twice a day  Dispense: 120 tablet; Refill: 3      Follow Up   No follow-ups on file.  Patient was given instructions and counseling regarding his condition or for health maintenance advice. Please see specific information pulled into the AVS if appropriate.

## 2021-06-29 ENCOUNTER — TELEPHONE (OUTPATIENT)
Dept: NEUROLOGY | Facility: CLINIC | Age: 81
End: 2021-06-29

## 2021-06-29 DIAGNOSIS — G43.C0 PERIODIC HEADACHE SYNDROME, NOT INTRACTABLE: Primary | ICD-10-CM

## 2021-06-29 DIAGNOSIS — I48.0 PAROXYSMAL ATRIAL FIBRILLATION (HCC): Primary | ICD-10-CM

## 2021-06-29 RX ORDER — TOPIRAMATE 25 MG/1
25 TABLET ORAL 2 TIMES DAILY
Qty: 60 TABLET | Refills: 3 | Status: SHIPPED | OUTPATIENT
Start: 2021-06-29 | End: 2021-08-31

## 2021-06-29 NOTE — TELEPHONE ENCOUNTER
PT'S WIFE STATES THAT PAIN HAS INTENSIFIED FEET AND LEGS AND BACK, HE'S DROWSY, FOGGY HEADED, SHAKINESS, AND HE'S ANXIOUS. PT'S WIFE STATES THAT HE WON'T BE TAKING THIS MEDICATION. SHE STATES THAT THIS IS 1 TABLET THAT HE TOOK TODAY. SHE WOULD LIKE TO SPEAK TO THE NURSE.

## 2021-06-29 NOTE — TELEPHONE ENCOUNTER
Earlier, I sent message about sending in TPM a different way in order for INS to cover. Now, I have spoken with patient and his wife. She described having pain intensifying all over, excess shaking, drowsy, and some brain fog. He has only taken just the morning dose and expressed that he will not take it again. Are the alternatives? Please advise.

## 2021-06-29 NOTE — TELEPHONE ENCOUNTER
Per pharmacy, insurance will only pay for 2 tablets a day. Can you send in a different way? Maybe 25 mg and 50 mg?

## 2021-06-29 NOTE — TELEPHONE ENCOUNTER
Provider: DR PATEL    Caller: PETER    Relationship to Patient: WIFE    Pharmacy: [facility]    Phone Number: [phone]    Reason for Call: PETER IS CALLING BECAUSE THE INSURANCE IS NOT WANTING TO PAY FOR THE topiramate (Topamax) 25 MG tabletSig: Take one tablet twice a day for one week, then two tablets twice a day    THE PRESCRIPTION WILL NEED TO BE CHANGED.    PETER HAS ALREADY STARTED SULY ON THE MEDICATION. THE PHARMACY DID FILL FOR THE FIRST WEEK. THE SECOND WEEK THEY WILL COVER 50 MG NOT THE TWO  25 MG.    PLEASE CALL PHARMACY AND PETER TO ADVISE.

## 2021-06-30 RX ORDER — PREGABALIN 25 MG/1
25 CAPSULE ORAL 2 TIMES DAILY
Qty: 60 CAPSULE | Refills: 2 | Status: SHIPPED | OUTPATIENT
Start: 2021-06-30 | End: 2021-11-22 | Stop reason: SDDI

## 2021-06-30 NOTE — TELEPHONE ENCOUNTER
I spoke with patient wife. I informed her that Dr. Aranda has called in Lyrica to replace the TPM. She advised that she'll talk with her  on whether of not he wants to take it. Advised that she'll our office back after speaking with him.  -TMT

## 2021-07-27 ENCOUNTER — TELEPHONE (OUTPATIENT)
Dept: CARDIOLOGY | Facility: CLINIC | Age: 81
End: 2021-07-27

## 2021-07-27 NOTE — TELEPHONE ENCOUNTER
Spoke with patient who states he was unable to wear the monitor for the full length of time due to having a significant skin reaction to the adhesive. Patient took the first monitor off and sent back in. At that time, holter company sent a new monitor for him to try. Patient attempted to wear this monitor as well, only to have yet another skin reaction and have to take off prematurely. Patient endorses no significant new or worsening of his symptoms at this time. Please advise with any further recommendations.

## 2021-07-27 NOTE — TELEPHONE ENCOUNTER
----- Message from Hector Urrutia MD sent at 7/26/2021  5:48 PM EDT -----  Please let the patient know I reviewed his heart monitor.  Only 20 hours of monitoring.  Please ask if he had issues with the monitor.  It was supposed to be a 30-day monitor.  If correctable, please have him wear another one.  For the 28 hours that were monitored, there was no arrhythmia.

## 2021-08-30 ENCOUNTER — TELEPHONE (OUTPATIENT)
Dept: NEUROLOGY | Facility: CLINIC | Age: 81
End: 2021-08-30

## 2021-08-30 NOTE — TELEPHONE ENCOUNTER
Provider: DR. PATEL   Caller: PETER   Relationship to Patient: PTS WIFE   Phone Number: [phone]  Reason for Call: PTS WIFE WOULD LIKE TO KNOW IF SHE CAN GIVE PT promethazine (PHENERGAN) 12.5 MG tablet     EVERY 6 HOURS WHETHER HE'S SHOWING SYMPTOMS OF NAUSEA OR NOT, PTS WIFE STATES THAT PT HAS BEEN WAKING UP MOST MORNINGS FEELING SICK AND SHE JUST WANTS TO ENSURE THAT IN THE MORNING HE WILL BE OKAY TO TRAVEL TO THE OFFICE FOR HIS APPT. PLEASE ADVISE ON IF IT'S OKAY FOR HIM TO TAKE THIS EVERY 6 HOURS.

## 2021-08-30 NOTE — TELEPHONE ENCOUNTER
Notified Sandra who states understanding and will see us tomm morning for his appt with Manoj. THanks!

## 2021-08-31 ENCOUNTER — OFFICE VISIT (OUTPATIENT)
Dept: NEUROLOGY | Facility: CLINIC | Age: 81
End: 2021-08-31

## 2021-08-31 VITALS
OXYGEN SATURATION: 97 % | HEIGHT: 73 IN | BODY MASS INDEX: 27.83 KG/M2 | HEART RATE: 94 BPM | WEIGHT: 210 LBS | SYSTOLIC BLOOD PRESSURE: 106 MMHG | DIASTOLIC BLOOD PRESSURE: 62 MMHG

## 2021-08-31 DIAGNOSIS — G43.C0 PERIODIC HEADACHE SYNDROME, NOT INTRACTABLE: Chronic | ICD-10-CM

## 2021-08-31 DIAGNOSIS — R42 VERTIGO: Primary | ICD-10-CM

## 2021-08-31 PROCEDURE — 99214 OFFICE O/P EST MOD 30 MIN: CPT | Performed by: PSYCHIATRY & NEUROLOGY

## 2021-08-31 RX ORDER — DIVALPROEX SODIUM 500 MG/1
500 TABLET, EXTENDED RELEASE ORAL DAILY
Qty: 30 TABLET | Refills: 5 | Status: SHIPPED | OUTPATIENT
Start: 2021-08-31 | End: 2021-11-22 | Stop reason: SDDI

## 2021-08-31 NOTE — PROGRESS NOTES
"Chief Complaint  Migraine    Subjective          Guicho Blanco presents to [facility] NEUROLOGY     History of Present Illness    80 y.o. male returns in follow up.  Last visit on 6/28/21 started TPM.      Could not tolerate TPM after one dosage.      Continued episodes of vertigo with nausea, vomiting.      Discomfort on left side of head.  Constant on left side of head.  Worsened with movement     Problem history:    Admitted [facility] 6/18/21 - 6/19/21 for dizziness and unsteady gait.  Dx with BPPV.        HCT/CTA H&N/CTP, my review of films, atrophy, parafalcine  meningioma, metal artifact from cochlear implants. Minimal athero     Echo - EF 61- 65%      Intermittent dizziness, nausea, and HA.  Frequency is 1 - 2 times a week.  Located on left side of head.  Quality is dull ache.  Intensity is moderate.       Sx of numbness in feet and pain in LE.  Quality is tingling, sharp shooting pains.  Intensity is 5 - 6/10.     Dx with peripheral neuropathy by EMG/NCS.     Trials of Cymbalta, GBP caused side effects.      Tremor worsening in hands.         Objective   Vital Signs:   /62   Pulse 94   Ht 185.4 cm (72.99\")   Wt 95.3 kg (210 lb)   SpO2 97%   BMI 27.71 kg/m²     Physical Exam  Eyes:      Extraocular Movements: EOM normal.      Pupils: Pupils are equal, round, and reactive to light.   Neurological:      Mental Status: He is oriented to person, place, and time.      Gait: Gait is intact.      Deep Tendon Reflexes: Strength normal.   Psychiatric:         Speech: Speech normal.          Neurologic Exam     Mental Status   Oriented to person, place, and time.   Speech: speech is normal   Level of consciousness: alert  Knowledge: good and consistent with education.   Normal comprehension.     Cranial Nerves   Cranial nerves II through XII intact.     CN II   Visual fields full to confrontation.   Visual acuity: normal  Right visual field deficit: none  Left visual field deficit: none "     CN III, IV, VI   Pupils are equal, round, and reactive to light.  Extraocular motions are normal.   Nystagmus: none   Diplopia: none  Ophthalmoparesis: none  Upgaze: normal  Downgaze: normal  Conjugate gaze: present    CN V   Facial sensation intact.   Right corneal reflex: normal  Left corneal reflex: normal    CN VII   Right facial weakness: none  Left facial weakness: none    CN VIII   Hearing: intact    CN IX, X   Palate: symmetric  Right gag reflex: normal  Left gag reflex: normal    CN XI   Right sternocleidomastoid strength: normal  Left sternocleidomastoid strength: normal    CN XII   Tongue: not atrophic  Fasciculations: absent  Tongue deviation: none    Motor Exam   Muscle bulk: normal  Overall muscle tone: normal    Strength   Strength 5/5 throughout.     Sensory Exam   Light touch normal.     Gait, Coordination, and Reflexes     Gait  Gait: normal    Tremor   Resting tremor: absent  Intention tremor: absent  Action tremor: absent    Reflexes   Reflexes 2+ except as noted.      Result Review :   The following data was reviewed by: Antoine Aranda MD on 08/31/2021:  Common labs    Common Labsle 6/18/21 6/18/21 6/18/21 6/18/21 6/18/21 6/18/21 6/19/21 6/19/21 6/19/21    0748 0755 0759 0759 0759 0800 1109 1109 1109   Glucose     118 (A)   84    BUN     15   12    Creatinine  1.20   1.02   1.04    eGFR Non African Am     70   69    Sodium     137   140    Potassium     3.8   4.3    Chloride     105   108 (A)    Calcium     8.6   8.7    Albumin     3.70       Total Bilirubin     0.6       Alkaline Phosphatase     107       AST (SGOT)    25 25       ALT (SGPT)   18  18       WBC      6.86      Hemoglobin 12.6     12.4 (A)      Hematocrit 37 (A)     38.7      Platelets      168      Total Cholesterol       97     Triglycerides       75     HDL Cholesterol       36 (A)     LDL Cholesterol        45     Hemoglobin A1C         5.20   (A) Abnormal value       Comments are available for some flowsheets but are  not being displayed.                     Assessment and Plan    Diagnoses and all orders for this visit:    1. Vertigo (Primary)  -     Ambulatory Referral to Physical Therapy Evaluate and treat    2. Periodic headache syndrome, not intractable    Other orders  -     divalproex (DEPAKOTE ER) 500 MG 24 hr tablet; Take 1 tablet by mouth Daily for 30 days. Take one tablet at bedtime  Dispense: 30 tablet; Refill: 5        Follow Up   No follow-ups on file.  Patient was given instructions and counseling regarding his condition or for health maintenance advice. Please see specific information pulled into the AVS if appropriate.

## 2021-09-03 ENCOUNTER — TREATMENT (OUTPATIENT)
Dept: PHYSICAL THERAPY | Facility: CLINIC | Age: 81
End: 2021-09-03

## 2021-09-03 DIAGNOSIS — R42 VERTIGO: Primary | ICD-10-CM

## 2021-09-03 PROCEDURE — 97110 THERAPEUTIC EXERCISES: CPT | Performed by: PHYSICAL THERAPIST

## 2021-09-03 PROCEDURE — 97161 PT EVAL LOW COMPLEX 20 MIN: CPT | Performed by: PHYSICAL THERAPIST

## 2021-09-03 NOTE — PROGRESS NOTES
"  Physical Therapy Initial Evaluation and Plan of Care      Patient: Guicho Blanco   : 1940  Diagnosis/ICD-10 Code:  Vertigo [R42]  Referring practitioner: Antoine Aranda MD  Date of Initial Visit: 9/3/2021  Today's Date: 9/3/2021  Patient seen for 1 sessions           Subjective Questionnaire: DHI: 40      Subjective Evaluation    History of Present Illness  Mechanism of injury: Pt reports being dizzy and lightheaded that started getting more intense for the past month or so.  Pt reports that it started to come on about 2-3 Years ago and they said he had vertigo.  Pt had gone to the ED and they stated it was just vertigo.  Symptoms continued so pt went back to the hospital and they said he was having TIAs.  Pt has B cochlear implants and he had another \"attack\" after having the first surgery. Pt was vomiting and had trouble sitting up and getting up.  Pt was in the hospital for five days and when he got home he started having headaches.  Pt reports that in  he went to the ER for dizziness and was kept overnight for observation and they did a CT scan, bloodwork and they don't have any real answers as to why this keeps happening.  Pt will take phenergren when he gets to feeling lightheaded and dizzy.  Dr. Weldon did B cochlear implant surgeries and they think it might be vestibular migraines.         Patient Occupation: retired  Quality of life: good    Pain  No pain reported    Social Support  Lives with: spouse    Diagnostic Tests  CT scan: normal    Patient Goals  Patient goal: improve dizziness, reduce symtoms           Objective    Rounded fwd shoulders with fwd head; cervical lordosis       Tenderness     Additional Tenderness Details  Tenderness along B upper traps, scalenes, cervical mm with tender points    Ambulation     Observational Gait   Decreased walking speed.   Left arm swing: decreased  Right arm swing: decreased  Base of support: normal    Additional Observational " Gait Details  Ambulation with SC    Quality of Movement During Gait   Trunk  Forward lean.         PT Neuro         Assessment & Plan     Assessment  Impairments: abnormal gait, impaired balance, lacks appropriate home exercise program and safety issue  Other impairment: dizziness  Assessment details: Pt presents with stable impairments following dizziness diagnosis.  Pt does not have symptoms consistent with BPPV.  Pts symptoms are currently consistent with migraine and possible cervicogenic dizziness.  Pt issued HEP for visual exercises and educated wife and pt to make appointment with ENT () who they have seen in the past.  Pt to benefit from PT services to improve cervical issues and habituation exercises.  Pt also demonstrates impaired gait and balance.  Prognosis: fair  Functional Limitations: walking, moving in bed and stooping  Goals  Plan Goals: STG (5 visits)    1. Pt. to report a Dizziness Handicap Score to be no >/= 28  for improved functional mobility.  2.  Pt. to report motion sensitivity quotient score to be no greater than 10 for improved activity tolerance.  3.  Client will get in and out of bed without dizziness or difficulty.      LTG (10 visits)    1.  Pt. to report a Dizziness Handicap Score to be no >/= 15  for improved functional mobility.  4.  Pt. to report motion sensitivity quotient score to be no greater than 7 for improved activity tolerance.  5.  Patient will be able to independently walk in the supermarket with mild difficulty or less.  6.  Patient reports no significant exacerbation of vertigo with bending to  items from the floor.  7.  Patient reports no significant exacerbation of vertigo with walking with head movements.  8.  Client will get in and out of bed without dizziness or difficulty.  9.  Patient independent with vestibular rehab program for home.    Plan  Therapy options: will be seen for skilled physical therapy services  Planned therapy interventions: abdominal  trunk stabilization, balance/weight-bearing training, flexibility, gait training, home exercise program, manual therapy, neuromuscular re-education, soft tissue mobilization, strengthening and stretching  Frequency: 1x week  Duration in visits: 10  Plan details: Pt to benefit from skilled PT services to improve gait, balance, strength, dizziness, and overall functional mobility.          Timed:  Manual Therapy:    0     mins  99922;  Therapeutic Exercise:    10     mins  64600;     Neuromuscular Buddy:    0    mins  87305;    Therapeutic Activity:     0     mins  74069;     Gait Trainin     mins  94752;     Ultrasound:     0     mins  89515;    Electrical Stimulation:    0     mins  11642 ( );    Untimed:  Electrical Stimulation:    0     mins  22273 ( );  Mechanical Traction:    0     mins  33752;     Timed Treatment:   10   mins   Total Treatment:     45   mins    PT SIGNATURE: Angela Cloud, PT   DATE TREATMENT INITIATED: 9/3/2021    Initial Certification  Certification Period: 2021  I certify that the therapy services are furnished while this patient is under my care.  The services outlined above are required by this patient, and will be reviewed every 90 days.     PHYSICIAN: Antoine Aranda MD      DATE:     Please sign and return via fax to [phone].. Thank you, [facility].

## 2021-09-15 ENCOUNTER — TELEPHONE (OUTPATIENT)
Dept: NEUROLOGY | Facility: CLINIC | Age: 81
End: 2021-09-15

## 2021-09-15 ENCOUNTER — TELEPHONE (OUTPATIENT)
Dept: PHYSICAL THERAPY | Facility: CLINIC | Age: 81
End: 2021-09-15

## 2021-09-15 NOTE — TELEPHONE ENCOUNTER
Called Sandra who immediately stated she was expecting a call from their provider and needs to talk to  TODAY. Notified he is at the hospital but I can relay a message to him. Wife states she will only speak with the doctor. Notified I can schedule a video visit. Sandra states she does not have the capability for a video visit on her phone and does not need a visit, but needs to speak with  and only him.  Again I asked if I can at least let him know what this in regards to and she inquired if he is able to pull up Kayleyhailey's last visit and see what medication he last prescribed for him then noted if he can look in his chart, he should be able to speak with her directly.    Wife also notes he sees his GP in the morning supposed to have therapy @ 10am but she will be cancelling this for tomorrow.     Please advise.

## 2021-09-15 NOTE — TELEPHONE ENCOUNTER
WIFE CANCELLED REMAINING VISITS.  STATES PATIENT ISN'T GETTING ANY BETTER.  I TRIED TO ADVISE THAT SHE CHECK WITH PATIENT'S PHYSICIAN FIRST.  SHE IS UPSET WITH SOME MEDICATION THAT ISN'T HELPING, BUT TOLD ME TO CANCEL THE VISITS.

## 2021-09-15 NOTE — TELEPHONE ENCOUNTER
Notified Wife, Sandra who states understanding and as he has been taking the Depakote for 15 days and not only not helped but sx have gotten worse, she will have him stop this.  She was told by PT that his crystals are not out of place-try going to see ENT as he had not seen  in awhile.  ruled out an inner ear issue and said he may not be getting enough Oxygen/lack of profusion of blood to the brain.  As he has CAD as well, wife would like a Carotid artery test and an Angiogram,    Seeing Dr.Joe Culver in the morning and as there is a diff. In opinion, no one is certain what is going on with him/his many symptoms or how to treat them she will ask him to order these to rule out carotid artery.

## 2021-09-16 NOTE — PROGRESS NOTES
Discharge Summary  Discharge Summary from Physical Therapy Report      Dates  PT visit: 9/3/2021  Number of Visits: eval only     Discharge Status of Patient: See MD Note dated n/a    Goals: Not Met    Discharge Plan: Patient to return to referring/providing physician    Comments Pts wife called to cancel all appointments.    Date of Discharge 9/16/2021        Angela Cloud, PT  Physical Therapist

## 2021-09-17 ENCOUNTER — TRANSCRIBE ORDERS (OUTPATIENT)
Dept: ADMINISTRATIVE | Facility: HOSPITAL | Age: 81
End: 2021-09-17

## 2021-09-17 DIAGNOSIS — R07.9 CHEST PAIN, UNSPECIFIED TYPE: Primary | ICD-10-CM

## 2021-11-08 ENCOUNTER — HOSPITAL ENCOUNTER (OUTPATIENT)
Dept: CARDIOLOGY | Facility: HOSPITAL | Age: 81
Discharge: HOME | End: 2021-11-08
Admitting: FAMILY MEDICINE

## 2021-11-08 VITALS
HEART RATE: 74 BPM | WEIGHT: 210 LBS | SYSTOLIC BLOOD PRESSURE: 110 MMHG | DIASTOLIC BLOOD PRESSURE: 78 MMHG | BODY MASS INDEX: 27.83 KG/M2 | HEIGHT: 73 IN

## 2021-11-08 DIAGNOSIS — R07.9 CHEST PAIN, UNSPECIFIED TYPE: ICD-10-CM

## 2021-11-08 LAB
BH CV REST NUCLEAR ISOTOPE DOSE: 9.8 MCI
BH CV STRESS BP STAGE 2: NORMAL
BH CV STRESS BP STAGE 4: NORMAL
BH CV STRESS COMMENTS STAGE 1: NORMAL
BH CV STRESS DOSE REGADENOSON STAGE 1: 0.4
BH CV STRESS DURATION MIN STAGE 1: 1
BH CV STRESS DURATION MIN STAGE 2: 1
BH CV STRESS DURATION MIN STAGE 3: 1
BH CV STRESS DURATION MIN STAGE 4: 1
BH CV STRESS DURATION SEC STAGE 1: 0
BH CV STRESS DURATION SEC STAGE 2: 0
BH CV STRESS DURATION SEC STAGE 3: 0
BH CV STRESS DURATION SEC STAGE 4: 0
BH CV STRESS HR STAGE 1: 84
BH CV STRESS HR STAGE 2: 100
BH CV STRESS HR STAGE 3: 92
BH CV STRESS HR STAGE 4: 90
BH CV STRESS NUCLEAR ISOTOPE DOSE: 32.1 MCI
BH CV STRESS O2 STAGE 1: 99
BH CV STRESS O2 STAGE 2: 99
BH CV STRESS O2 STAGE 3: 98
BH CV STRESS O2 STAGE 4: 99
BH CV STRESS PROTOCOL 1: NORMAL
BH CV STRESS RECOVERY BP: NORMAL MMHG
BH CV STRESS RECOVERY HR: 88 BPM
BH CV STRESS RECOVERY O2: 99 %
BH CV STRESS STAGE 1: 1
BH CV STRESS STAGE 2: 2
BH CV STRESS STAGE 3: 3
BH CV STRESS STAGE 4: 4
LV EF NUC BP: 64 %
MAXIMAL PREDICTED HEART RATE: 140 BPM
PERCENT MAX PREDICTED HR: 71.43 %
STRESS BASELINE BP: NORMAL MMHG
STRESS BASELINE HR: 77 BPM
STRESS O2 SAT REST: 100 %
STRESS PERCENT HR: 84 %
STRESS POST ESTIMATED WORKLOAD: 1 METS
STRESS POST EXERCISE DUR MIN: 4 MIN
STRESS POST EXERCISE DUR SEC: 0 SEC
STRESS POST O2 SAT PEAK: 99 %
STRESS POST PEAK BP: NORMAL MMHG
STRESS POST PEAK HR: 100 BPM
STRESS TARGET HR: 119 BPM

## 2021-11-08 PROCEDURE — 0 TECHNETIUM SESTAMIBI: Performed by: FAMILY MEDICINE

## 2021-11-08 PROCEDURE — 78452 HT MUSCLE IMAGE SPECT MULT: CPT

## 2021-11-08 PROCEDURE — 78452 HT MUSCLE IMAGE SPECT MULT: CPT | Performed by: INTERNAL MEDICINE

## 2021-11-08 PROCEDURE — 25010000002 REGADENOSON 0.4 MG/5ML SOLUTION: Performed by: FAMILY MEDICINE

## 2021-11-08 PROCEDURE — A9500 TC99M SESTAMIBI: HCPCS | Performed by: FAMILY MEDICINE

## 2021-11-08 PROCEDURE — 93018 CV STRESS TEST I&R ONLY: CPT | Performed by: INTERNAL MEDICINE

## 2021-11-08 PROCEDURE — 93017 CV STRESS TEST TRACING ONLY: CPT

## 2021-11-08 RX ADMIN — TECHNETIUM TC 99M SESTAMIBI 1 DOSE: 1 INJECTION INTRAVENOUS at 08:36

## 2021-11-08 RX ADMIN — REGADENOSON 0.4 MG: 0.08 INJECTION, SOLUTION INTRAVENOUS at 10:34

## 2021-11-08 RX ADMIN — TECHNETIUM TC 99M SESTAMIBI 1 DOSE: 1 INJECTION INTRAVENOUS at 10:59

## 2021-11-22 ENCOUNTER — OFFICE VISIT (OUTPATIENT)
Dept: CARDIOLOGY | Facility: CLINIC | Age: 81
End: 2021-11-22

## 2021-11-22 VITALS
WEIGHT: 208 LBS | OXYGEN SATURATION: 98 % | HEART RATE: 84 BPM | BODY MASS INDEX: 27.57 KG/M2 | HEIGHT: 73 IN | DIASTOLIC BLOOD PRESSURE: 72 MMHG | SYSTOLIC BLOOD PRESSURE: 113 MMHG

## 2021-11-22 DIAGNOSIS — I48.0 PAROXYSMAL ATRIAL FIBRILLATION (HCC): Primary | ICD-10-CM

## 2021-11-22 DIAGNOSIS — I10 ESSENTIAL HYPERTENSION: ICD-10-CM

## 2021-11-22 DIAGNOSIS — I25.10 CORONARY ARTERY DISEASE INVOLVING NATIVE CORONARY ARTERY OF NATIVE HEART WITHOUT ANGINA PECTORIS: ICD-10-CM

## 2021-11-22 DIAGNOSIS — E78.2 MIXED HYPERLIPIDEMIA: ICD-10-CM

## 2021-11-22 PROCEDURE — 99214 OFFICE O/P EST MOD 30 MIN: CPT | Performed by: INTERNAL MEDICINE

## 2021-11-22 PROCEDURE — 93000 ELECTROCARDIOGRAM COMPLETE: CPT | Performed by: INTERNAL MEDICINE

## 2021-11-22 RX ORDER — PROMETHAZINE HYDROCHLORIDE 12.5 MG/1
SUPPOSITORY RECTAL
COMMUNITY
End: 2021-11-22 | Stop reason: SDDI

## 2021-11-22 RX ORDER — CYCLOSPORINE 0.5 MG/ML
EMULSION OPHTHALMIC
COMMUNITY

## 2021-11-22 NOTE — PROGRESS NOTES
[facility] AT [address], [address]  [place], [place], [address] [phone]  WWW[facility]           OUTPATIENT CLINIC FOLLOW UP NOTE      Patient Care Team:  Patient Care Team:  Celestina Dumont MD as PCP - General (General Practice)  Mitchel Culver MD as PCP - Family Medicine    Subjective:   Reason for consultation:   Chief Complaint   Patient presents with   • Coronary Artery Disease   • PAF       HPI:    Guicho Blanco is a 80 y.o. male.  Cardiac problem list:  1. CAD  1. Severe ischemia on stress test, status post ostial RCA PTCA and placement of a Cypher drug-eluting stent by Dr. Sue in 2009  2. Paroxysmal atrial fibrillation/atrial flutter  1. Status post ablation with Dr. Daley in 3/2012  3. Hypertension  4. Hyperlipidemia  5. Prior infection right toe amputation with Dr. Polanco  6. COPD  1. Due to smoke exposure from being a    7. Small lacunar infarcts 10/2017  8. Retinal vein occlusion  9. Prior sleep evaluation with Dr. Luque  10. Lightheadedness/dizziness, nausea, emesis  1. Unclear etiology of symptoms.  6/2020, 4/2021   2. heart tests and during those times were unremarkable (stress test, echo, heart monitor, EKGs, etc.).  ENT and neuro work-ups in the past.    3. CT angiogram of the neck arteries unremarkable 6/2021  11. Peripheral neuropathy  1. Prior nerve conduction studies by neurology.  Saw different neurologists historically  2. Hospitalized after use of Cymbalta, data deficit.  Gabapentin did not help in the past.    Today he presents for follow-up.    Since his last visit he had another significant episode of dizziness/lightheadedness associated with headaches, nausea, emesis.  Phenergan helps.  Has not had recurrent episodes over the last 2 months    Random fleeting chest pains that are not associated with those dizziness episodes nor associated with exertional activities.  Patient does not describe this as  palpitations    Not very active due to back pain.    Review of Systems:  Positive for atypical chest pains, musculoskeletal discomfort, peripheral neuropathy, lightheadedness, dizziness, nausea, emesis  Negative for exertional chest pain, palpitations, syncope.    PFSH:  Patient Active Problem List   Diagnosis   • Osteomyelitis of toe of right foot (HCC)   • Osteomyelitis of right foot (HCC)   • PAF (paroxysmal atrial fibrillation) ([facility])   • Coronary artery disease involving native coronary artery of native heart without angina pectoris   • Gastroesophageal reflux disease without esophagitis   • Benign non-nodular prostatic hyperplasia without lower urinary tract symptoms   • S/P appy   • Essential hypertension   • Simple chronic bronchitis ([facility])   • Mixed hyperlipidemia   • COPD (chronic obstructive pulmonary disease) ([facility])   • BPH (benign prostatic hypertrophy)   • Hearing loss   • Chest pain   • Vertigo   • Snoring   • Overweight   • PLMD (periodic limb movement disorder)   • Neuropathy   • Periodic headache syndrome, not intractable   • Acquired absence of other right toe(s) ([facility])   • Tremor         Current Outpatient Medications:   •  acetaminophen (TYLENOL) 500 MG tablet, Take 500 mg by mouth every 6 (six) hours as needed for mild pain (1-3)., Disp: , Rfl:   •  aspirin 325 MG tablet, Take 325 mg by mouth Daily., Disp: , Rfl:   •  B Complex Vitamins (VITAMIN B COMPLEX) capsule capsule, Take 1 capsule by mouth Daily. Takes in evening, Disp: , Rfl:   •  Cholecalciferol (VITAMIN D3) 5000 UNITS capsule capsule, Take 5,000 Units by mouth daily., Disp: , Rfl:   •  coenzyme Q10 100 MG capsule, Take 100 mg by mouth daily., Disp: , Rfl:   •  cycloSPORINE (RESTASIS) 0.05 % ophthalmic emulsion, Administer 1 drop to both eyes 2 (two) times a day., Disp: , Rfl:   •  cycloSPORINE (Restasis) 0.05 % ophthalmic emulsion, Restasis 0.05 % eye drops in a dropperette  INSTILL 1 DROP BOTH EYES TWICE DAILY, Disp: , Rfl:   •   flecainide (TAMBOCOR) 100 MG tablet, Take 1 tablet by mouth Every 12 (Twelve) Hours., Disp: 180 tablet, Rfl: 3  •  isosorbide mononitrate (IMDUR) 30 MG 24 hr tablet, Take 1 tablet by mouth Daily., Disp: 90 tablet, Rfl: 3  •  nitroglycerin (NITROSTAT) 0.4 MG SL tablet, 1 under the tongue as needed for angina, may repeat q5mins for up three doses, Disp: 25 tablet, Rfl: 2  •  omeprazole (PriLOSEC) 20 MG capsule, Take 20 mg by mouth daily., Disp: , Rfl:   •  pravastatin (PRAVACHOL) 20 MG tablet, Take 1 tablet by mouth Daily. (Patient taking differently: Take 20 mg by mouth Daily. Patient takes in evening), Disp: 90 tablet, Rfl: 3  •  promethazine (PHENERGAN) 12.5 MG tablet, Take 1 tablet by mouth Every 6 (Six) Hours As Needed for Nausea or Vomiting., Disp: 12 tablet, Rfl: 0  •  traMADol (ULTRAM) 50 MG tablet, Take 50 mg by mouth 2 (Two) Times a Day As Needed for Moderate Pain (4-6)., Disp: , Rfl:   •  divalproex (DEPAKOTE ER) 500 MG 24 hr tablet, Take 1 tablet by mouth Daily for 30 days. Take one tablet at bedtime, Disp: 30 tablet, Rfl: 5  •  pregabalin (Lyrica) 25 MG capsule, Take 1 capsule by mouth 2 (Two) Times a Day., Disp: 60 capsule, Rfl: 2  •  promethazine (Phenergan) 12.5 MG suppository, Phenergan, Disp: , Rfl:     Allergies   Allergen Reactions   • Divalproex Sodium (Migraine) [Valproic Acid] Other (See Comments)     Weak, lethargy and got worse   • Cymbalta [Duloxetine Hcl] Mental Status Change     Depression, thoughts of Suicide.    • Metoprolol Other (See Comments)     UNKNOWN. Pt does not remember this being allergy   • Neurontin [Gabapentin] Other (See Comments)     Lethargy & fatigue       Social History     Socioeconomic History   • Marital status:    Tobacco Use   • Smoking status: Never Smoker   • Smokeless tobacco: Never Used   Vaping Use   • Vaping Use: Never used   Substance and Sexual Activity   • Alcohol use: No   • Drug use: No   • Sexual activity: Defer     Family History   Problem  "Relation Age of Onset   • Atrial fibrillation Mother    • Dementia Mother    • Lung cancer Father          Objective:   Blood pressure 107/68, pulse 85, height 185.4 cm (72.99\"), weight 94.3 kg (208 lb), SpO2 98 %.  CONSTITUTIONAL: No acute distress  RESPIRATORY: Normal effort. Clear to auscultation bilaterally without wheezing or rales  CARDIOVASCULAR: Regular rate and rhythm with normal S1 and S2. Without murmur, gallop or rub.  PERIPHERAL VASCULAR: Normal radial pulse. There is no lower extremity edema bilaterally.  PSYCH: Normal affect and mood    10/2017 exam: 2+ right PT pulse, 1+ left lower PT pulse   4/2021 exam: 2+ PT pulses bilaterally  11/2021 exam: 2+ radial pulses bilaterally. Arm BPs Right 113/72, 84bpm; Left 107/68, 85bpm    Labs:  Lab Results   Component Value Date    ALT 18 06/18/2021    ALT 18 06/18/2021    AST 25 06/18/2021    AST 25 06/18/2021     Lab Results   Component Value Date    CHOL 97 06/19/2021    TRIG 75 06/19/2021    HDL 36 (L) 06/19/2021    CREATININE 1.04 06/19/2021     [facility] labs 4/2019: Creatinine 1.1, AST 23, ALT 20, , , HDL 40, LDL 57    Outside facility labs 12/2019: Normal TSH, borderline low free thyroxine, hemoglobin 14.3, platelets 165, creatinine 1.1, AST 23, ALT 20, , , HDL 40, LDL 57    Diagnostic Data:      ECG 12 Lead    Date/Time: 11/22/2021 11:12 AM  Performed by: Hector Urrutia MD  Authorized by: Hector Urrutia MD   Comparison: compared with previous ECG from 6/18/2021  Similar to previous ECG  Rhythm: sinus rhythm             Lower Extremity Arterial Duplex 3/2018  · No significant stenosis detected bilaterally.  · CHRISTINE not obtained due to non compressible arteries.     Results for orders placed during the hospital encounter of 06/18/21    Adult Transthoracic Echo Complete W/ Cont if Necessary Per Protocol (With Agitated Saline)    Interpretation Summary  · Left ventricular ejection fraction appears to be 61 - 65%. Left ventricular systolic " function is normal.  · The cardiac valves are structurally and functionally within normal limits.    Carotid Ultrasound 10/2017  · Proximal right internal carotid artery is normal.  · Left internal carotid artery stenosis of 0-49%.      Stress test 7/2017  · Left ventricular ejection fraction is hyperdynamic (Calculated EF > 70%).  · Myocardial perfusion imaging indicates a mild, very small-sized infarct located in the apex with no significant ischemia noted.  · Impressions are consistent with a low risk study.    [facility] 10/2017  · There is mild diffuse atherosclerosis with no flow limiting stenoses.  The previously placed stent in the ostial to proximal RCA is widely patent with FR[place]-3 distal flow.  There is a 30% discrete in-stent restenosis in the sten'ts mid segment.  · Normal left ventricular ejection fraction with mild hypokinesis of the basal anterolateral segment.    Stress test 11/2021  -Left ventricular ejection fraction is normal. (Calculated EF = 64%).  -Myocardial perfusion imaging indicates a normal myocardial perfusion study with no evidence of ischemia.  -There is coronary artery calcification noted on the CT portion of the stress test.  -Impressions are consistent with a low risk study.      Assessment and Plan:     Coronary artery disease involving native coronary artery of native heart without angina pectoris  Mixed hyperlipidemia  -Not on a beta-blocker due to prior intolerance/lightheadedness.  -Continue aspirin, pravastatin, and Imdur; SL Nitro PRN    PAF (paroxysmal atrial fibrillation)  Lacunar infarcts/TIAs  -Continue flecainide.  EKG acceptable today.  -Continue aspirin 325 mg daily    -Previously on Coumadin which was stopped after his ablations by EP.  Currently CHADSVASc score is 6 including his TIAs.  Multiple conversations regarding anticoagulation in the past.  Has a retinal vein (not arterial) occlusion.      Essential hypertension  -Continue current related  medications.    Lightheadedness, dizziness  Mild carotid stenosis  -Acceptable carotid and vertebral arteries on CT angiogram in 6/2021.  Normal radial artery pulses, no significant blood pressure difference in the arms  -Continue clinical monitoring    Decreased left pedal pulse  Peripheral neuropathy  Right toe infection/amputation ~2016  -Previously followed by Dr. Polanco and various neurologists  -Palpable pulses, persistent neuropathy symptoms  -Prominent restless leg syndrome.  No longer on Lyrica    - Return in about 6 months (around 5/22/2022) for Next scheduled follow-up with an ECG.     Hector Urrutia MD, MSc, [facility]  Interventional Cardiology  [facility] at [facility]

## 2022-02-25 RX ORDER — PRAVASTATIN SODIUM 20 MG
20 TABLET ORAL DAILY
Qty: 90 TABLET | Refills: 0 | Status: SHIPPED | OUTPATIENT
Start: 2022-02-25 | End: 2022-04-25

## 2022-04-21 RX ORDER — FLECAINIDE ACETATE 100 MG/1
TABLET ORAL
Qty: 180 TABLET | Refills: 3 | Status: SHIPPED | OUTPATIENT
Start: 2022-04-21 | End: 2023-03-09 | Stop reason: SDUPTHER

## 2022-04-21 RX ORDER — ISOSORBIDE MONONITRATE 30 MG/1
30 TABLET, EXTENDED RELEASE ORAL DAILY
Qty: 90 TABLET | Refills: 3 | Status: SHIPPED | OUTPATIENT
Start: 2022-04-21 | End: 2023-03-09 | Stop reason: SDUPTHER

## 2022-04-25 RX ORDER — PRAVASTATIN SODIUM 20 MG
TABLET ORAL
Qty: 90 TABLET | Refills: 0 | Status: SHIPPED | OUTPATIENT
Start: 2022-04-25 | End: 2022-09-13 | Stop reason: SDUPTHER

## 2022-05-26 NOTE — PROGRESS NOTES
Headache New Office Visit      Encounter Date: 2022   Patient Name: Guicho Blanco  : 1940   MRN: [id number]   PCP: Dr Dumont  Chief Complaint:    Chief Complaint   Patient presents with   • Dizziness       History of Present Illness: Guicho Blanco is a 81 y.o. male who is here today with his wife and daughter for evaluation of dizziness and headaches.    Last visit 2020 w Dr Aranda-refer to PT, depakote    See note Dr Urrutia-cont flecainide, Asa 325, pravastatin, imdur  See note Dr Wyatt-no new findings  See GXT-low risk study    Dizziness  Pt is frustrated and very concerned regarding persistent dizziness and headache ongoing since 2017.  Symptoms are intermittent with no pattern. It can wake him from sleep or occur multiple times during the day up to 10 times a day. Duration 15-30 min. No vertigo. Assoc w nausea which is treated w phenergan. Headache that occurs w the dizziness will last about 30 minutes after the dizziness subsides.    He is a former EMT and believes his brain is not getting enough oxygen during these episodes.    Has been seen by ENT and told dizziness is not due to inner ear.    Has been seen by Cardiology and GXT was a low risk study. Previous history a-fib/flutter status post ablation by Dr Daley. Now on Asa 325 and pravastatin 20mg. Cannot tolerate high intensity statin due to myalgia.    History of stroke 2017 after onset of dizziness. On Asa 325 and statin  Treated w Depakote and TPM for possible migraine but could not tolerate side effects. EEG neg 10/20/2017. Sleep eval with Pulm-no DAREK.    Admitted [facility] 2021-2021 for dizziness, and unsteady gait. Dx w BPPV.   CTH/CTA-atrophy, parafalcine meningioma, metal artifact from cochlear implant and minimal athero.    Had balance training w PT with no improvement of symptoms.    Has cochlear implants which are not functioning 100% but assured by Dr Alvarez was not the cause of his  dizziness.    Symptoms are not positional. Denies diplopia, loss of vision, slurred speech, dysphagia, paresthesia or unilateral weakness. Denies cognitive decline, hallucinations. Walks w a walker.     Headache Symptoms:   Days per month: with dizziness  Location: left temporal area      Quality: Aching        Duration: 30-60 min  Severity: 5/10  Triggers: dizziness  Associated Symptoms: Nausea, Vomiting and Dizziness    Abortives  Preventives:TPM-SE w one dose, Depakote- side effects                                                                              Peripheral neuropathy  Numbness in feet and pain in LE with tingling quality w sharp shooting pains 6/10.  Diagnosed with EMG. Cymbalta and GBP had side effects.      PMH:A-fib, CAD w stent s/p ablation, htn, hld, toe amputation, history of small lacunar stroke 2017, retinal vein occlusion, dizziness, nausea, emesis.  6/2021 CT angio of neck arteries unremarkable  Subjective      Past Medical History:   Past Medical History:   Diagnosis Date   • Arthritis    • Atrial fibrillation (HCC)    • BPH (benign prostatic hypertrophy)    • Cataract    • Cellulitis    • COPD (chronic obstructive pulmonary disease) (HCC)    • Coronary artery disease    • Gall stones    • GERD (gastroesophageal reflux disease)    • Hearing loss    • Hiatal hernia    • Neuropathy    • Peripheral neuropathy    • Peripheral vascular disease (HCC)    • Seasonal allergies    • Skin cancer    • TIA (transient ischemic attack)        Past Surgical History:   Past Surgical History:   Procedure Laterality Date   • ABLATION OF DYSRHYTHMIC FOCUS     • APPENDECTOMY     • CARDIAC CATHETERIZATION N/A 10/5/2017    Procedure: Left Heart Cath;  Surgeon: Hector Urrutia MD;  Location: [facility] INVASIVE LOCATION;  Service:    • CATARACT EXTRACTION Bilateral    • CHOLECYSTECTOMY      Remote   • COCHLEAR IMPLANT REVISION  05/2019   • CORONARY ANGIOPLASTY     • GALLBLADDER SURGERY     • KNEE ARTHROSCOPY Right      Right knee ACL repair.   • PROSTATE SURGERY     • SHOULDER SURGERY Right    • TOE AMPUTATION Right     2nd toe - Right Foot   • TONSILLECTOMY         Family History:   Family History   Problem Relation Age of Onset   • Atrial fibrillation Mother    • Dementia Mother    • Lung cancer Father        Social History:   Social History     Socioeconomic History   • Marital status:    Tobacco Use   • Smoking status: Never Smoker   • Smokeless tobacco: Never Used   Vaping Use   • Vaping Use: Never used   Substance and Sexual Activity   • Alcohol use: No   • Drug use: No   • Sexual activity: Defer       Medications:     Current Outpatient Medications:   •  acetaminophen (TYLENOL) 500 MG tablet, Take 500 mg by mouth every 6 (six) hours as needed for mild pain (1-3)., Disp: , Rfl:   •  aspirin 325 MG tablet, Take 325 mg by mouth Daily., Disp: , Rfl:   •  B Complex Vitamins (VITAMIN B COMPLEX) capsule capsule, Take 1 capsule by mouth Daily. Takes in evening, Disp: , Rfl:   •  Cholecalciferol (VITAMIN D3) 5000 UNITS capsule capsule, Take 5,000 Units by mouth daily., Disp: , Rfl:   •  coenzyme Q10 100 MG capsule, Take 100 mg by mouth daily., Disp: , Rfl:   •  cycloSPORINE (Restasis) 0.05 % ophthalmic emulsion, Restasis 0.05 % eye drops in a dropperette  INSTILL 1 DROP BOTH EYES TWICE DAILY, Disp: , Rfl:   •  docusate sodium (COLACE) 100 MG capsule, Take 100 mg by mouth 2 (Two) Times a Day., Disp: , Rfl:   •  flecainide (TAMBOCOR) 100 MG tablet, TAKE 1 TABLET BY MOUTH EVERY 12 HOURS, Disp: 180 tablet, Rfl: 3  •  isosorbide mononitrate (IMDUR) 30 MG 24 hr tablet, TAKE 1 TABLET BY MOUTH DAILY, Disp: 90 tablet, Rfl: 3  •  nitroglycerin (NITROSTAT) 0.4 MG SL tablet, 1 under the tongue as needed for angina, may repeat q5mins for up three doses, Disp: 25 tablet, Rfl: 2  •  omeprazole (PriLOSEC) 20 MG capsule, Take 20 mg by mouth daily., Disp: , Rfl:   •  pravastatin (PRAVACHOL) 20 MG tablet, TAKE 1 TABLET BY MOUTH DAILY.  PATIENT TAKE IN THE EVENING, Disp: 90 tablet, Rfl: 0  •  promethazine (PHENERGAN) 12.5 MG tablet, Take 1 tablet by mouth Every 6 (Six) Hours As Needed for Nausea or Vomiting., Disp: 12 tablet, Rfl: 0  •  traMADol (ULTRAM) 50 MG tablet, Take 50 mg by mouth 2 (Two) Times a Day As Needed for Moderate Pain (4-6)., Disp: , Rfl:     Allergies:   Allergies   Allergen Reactions   • Divalproex Sodium (Migraine) [Valproic Acid] Other (See Comments)     Weak, lethargy and got worse   • Cymbalta [Duloxetine Hcl] Mental Status Change     Depression, thoughts of Suicide.    • Duloxetine Unknown (See Comments)   • Metoprolol Other (See Comments)     UNKNOWN. Pt does not remember this being allergy   • Neurontin [Gabapentin] Other (See Comments)     Lethargy & fatigue       PHQ-9 Total Score:     STEADI Fall Risk Assessment was completed, and patient is at LOW risk for falls.Assessment completed on:5/27/2022    Objective     Physical Exam:   Physical Exam  Eyes:      Pupils: Pupils are equal, round, and reactive to light.   Neurological:      Mental Status: He is oriented to person, place, and time.      Coordination: Heel to Shin Test abnormal. Romberg Test normal.      Gait: Tandem walk abnormal.      Deep Tendon Reflexes:      Reflex Scores:       Tricep reflexes are 1+ on the right side and 1+ on the left side.       Bicep reflexes are 2+ on the right side and 2+ on the left side.       Brachioradialis reflexes are 2+ on the right side and 2+ on the left side.       Patellar reflexes are 1+ on the right side and 1+ on the left side.       Achilles reflexes are 1+ on the right side and 1+ on the left side.  Psychiatric:         Speech: Speech normal.         Neurologic Exam     Mental Status   Oriented to person, place, and time.   Follows 3 step commands.   Attention: normal. Concentration: normal.   Speech: speech is normal   Level of consciousness: alert  Knowledge: consistent with education.   Normal comprehension.  "    Cranial Nerves     CN III, IV, VI   Pupils are equal, round, and reactive to light.  Right pupil: Accommodation: intact.   Left pupil: Accommodation: intact.   CN III: no CN III palsy  CN VI: no CN VI palsy  Nystagmus: none   Diplopia: none  Upgaze: normal  Downgaze: normal  Conjugate gaze: present    CN VII   Facial expression full, symmetric.     CN VIII   Hearing: intact    CN XII   CN XII normal.     Motor Exam   Muscle bulk: normal  Overall muscle tone: normal    Strength   Right biceps: 5/5  Left biceps: 5/5  Right triceps: 5/5  Left triceps: 5/5  Right interossei: 5/5  Left interossei: 5/5  Right quadriceps: 5/5  Left quadriceps: 5/5  Right anterior tibial: 5/5  Left anterior tibial: 5/5  Right posterior tibial: 5/5  Left posterior tibial: 5/5    Sensory Exam   Light touch normal.     Gait, Coordination, and Reflexes     Gait  Gait: shuffling    Coordination   Romberg: negative  Heel to shin coordination: abnormal  Tandem walking coordination: abnormal    Tremor   Resting tremor: absent  Action tremor: absent    Reflexes   Right brachioradialis: 2+  Left brachioradialis: 2+  Right biceps: 2+  Left biceps: 2+  Right triceps: 1+  Left triceps: 1+  Right patellar: 1+  Left patellar: 1+  Right achilles: 1+  Left achilles: 1+  Right : 2+  Left : 2+Kyphosis and shuffling gait with assist of cane        Vital Signs:   Vitals:    05/27/22 1517   BP: 120/70   Pulse: 73   Temp: 97.3 °F (36.3 °C)   SpO2: 97%   Weight: 97.3 kg (214 lb 6.4 oz)   Height: 185.4 cm (72.99\")     Body mass index is 28.29 kg/m².         Assessment / Plan      Assessment/Plan:   Diagnoses and all orders for this visit:    1. Dizziness (Primary)  -     Cancel: Ambulatory Referral to Cardiology  -     Ambulatory Referral to Cardiology  -     MRI Brain With & Without Contrast; Future  -     MRI Angiogram Venogram Head; Future  -     CBC Auto Differential; Future  -     Comprehensive Metabolic Panel; Future  -     T4; Future  -     TSH; " Future  -     Urinalysis without microscopic (no culture) - Urine, Clean Catch; Future  -     Vitamin B12 & Folate; Future  -     C-reactive Protein; Future  -     CK; Future  -     DAVID + PE; Future    2. Abnormal findings on diagnostic imaging of other specified body structures   Comments:  MRI 2017 w lacunar infarcts.  Now has cochlear implants but are MRI compliant  Orders:  -     T4; Future  -     TSH; Future    3. PAF (paroxysmal atrial fibrillation) ([facility])  Comments:  Pt desires to see Dr Daley at  for second opinion  Orders:  -     Cancel: Ambulatory Referral to Cardiology  -     Ambulatory Referral to Cardiology    4. History of stroke  Comments:  Cont Asa 325 and pravastatin 20mg. Cannot tolerate high doses due to myalgia     I spent over an hour with pt and family discussing his symptoms and differential. Will follow up after labs and MRI.    Patient Education:   I have discussed with the patient today the causes and overview of headaches. We discussed the different types of headaches to include tension-type headaches, Migraine headaches and Cluster headaches. We also discussed when headaches could or would be of a more serious condition such as brain infection, inflammation or bleeding within or around the brain. When to seek immediate medical attention or call 911.     Reviewed medications, potential side effects and signs and symptoms to report. Discussed risk versus benefits of treatment plan with patient and/or family-including medications, labs and radiology that may be ordered. Addressed questions and concerns during visit. Patient and/or family verbalized understanding and agree with plan. Instructed to call the office with any questions and report to ER with any life-threatening symptoms.     Follow Up:   PRN  During this visit the following were done:  Labs Reviewed [x]    Labs Ordered [x]    Radiology Reports Reviewed [x]    Radiology Ordered [x]    PCP Records Reviewed []    Referring  Provider Records Reviewed []    ER Records Reviewed []    Hospital Records Reviewed [x]    History Obtained From Family [x]    Radiology Images Reviewed [x]    Other Reviewed []    Records Requested []      Partha Matias, ТАТЬЯНА, APRN

## 2022-05-27 ENCOUNTER — OFFICE VISIT (OUTPATIENT)
Dept: NEUROLOGY | Facility: CLINIC | Age: 82
End: 2022-05-27

## 2022-05-27 ENCOUNTER — LAB (OUTPATIENT)
Dept: LAB | Facility: HOSPITAL | Age: 82
End: 2022-05-27

## 2022-05-27 VITALS
BODY MASS INDEX: 28.41 KG/M2 | WEIGHT: 214.4 LBS | HEART RATE: 73 BPM | TEMPERATURE: 97.3 F | SYSTOLIC BLOOD PRESSURE: 120 MMHG | OXYGEN SATURATION: 97 % | DIASTOLIC BLOOD PRESSURE: 70 MMHG | HEIGHT: 73 IN

## 2022-05-27 DIAGNOSIS — R93.89 ABNORMAL FINDINGS ON DIAGNOSTIC IMAGING OF OTHER SPECIFIED BODY STRUCTURES: ICD-10-CM

## 2022-05-27 DIAGNOSIS — Z86.73 HISTORY OF STROKE: ICD-10-CM

## 2022-05-27 DIAGNOSIS — R42 DIZZINESS: ICD-10-CM

## 2022-05-27 DIAGNOSIS — R42 DIZZINESS: Primary | ICD-10-CM

## 2022-05-27 DIAGNOSIS — I48.0 PAF (PAROXYSMAL ATRIAL FIBRILLATION): ICD-10-CM

## 2022-05-27 PROBLEM — N50.89 SWELLING OF TESTICLE: Status: ACTIVE | Noted: 2018-01-18

## 2022-05-27 PROBLEM — N52.9 ED (ERECTILE DYSFUNCTION) OF ORGANIC ORIGIN: Status: ACTIVE | Noted: 2017-07-24

## 2022-05-27 PROBLEM — N40.1 BENIGN PROSTATIC HYPERPLASIA WITH URINARY OBSTRUCTION: Status: ACTIVE | Noted: 2017-12-14

## 2022-05-27 PROBLEM — N13.8 BENIGN PROSTATIC HYPERPLASIA WITH URINARY OBSTRUCTION: Status: ACTIVE | Noted: 2017-12-14

## 2022-05-27 PROBLEM — H61.23 BILATERAL IMPACTED CERUMEN: Status: ACTIVE | Noted: 2018-12-19

## 2022-05-27 PROBLEM — N43.3 HYDROCELE OF TESTIS: Status: ACTIVE | Noted: 2019-12-09

## 2022-05-27 PROBLEM — N43.3 HYDROCELE: Status: ACTIVE | Noted: 2017-07-24

## 2022-05-27 PROCEDURE — 82607 VITAMIN B-12: CPT

## 2022-05-27 PROCEDURE — 82550 ASSAY OF CK (CPK): CPT

## 2022-05-27 PROCEDURE — 85025 COMPLETE CBC W/AUTO DIFF WBC: CPT

## 2022-05-27 PROCEDURE — 86334 IMMUNOFIX E-PHORESIS SERUM: CPT

## 2022-05-27 PROCEDURE — 82746 ASSAY OF FOLIC ACID SERUM: CPT

## 2022-05-27 PROCEDURE — 81003 URINALYSIS AUTO W/O SCOPE: CPT

## 2022-05-27 PROCEDURE — 86140 C-REACTIVE PROTEIN: CPT

## 2022-05-27 PROCEDURE — 99214 OFFICE O/P EST MOD 30 MIN: CPT | Performed by: NURSE PRACTITIONER

## 2022-05-27 PROCEDURE — 84436 ASSAY OF TOTAL THYROXINE: CPT

## 2022-05-27 PROCEDURE — 80053 COMPREHEN METABOLIC PANEL: CPT

## 2022-05-27 PROCEDURE — 84443 ASSAY THYROID STIM HORMONE: CPT

## 2022-05-27 PROCEDURE — 36415 COLL VENOUS BLD VENIPUNCTURE: CPT

## 2022-05-27 PROCEDURE — 82784 ASSAY IGA/IGD/IGG/IGM EACH: CPT

## 2022-05-27 PROCEDURE — 84165 PROTEIN E-PHORESIS SERUM: CPT

## 2022-05-27 RX ORDER — DOCUSATE SODIUM 100 MG/1
100 CAPSULE, LIQUID FILLED ORAL 2 TIMES DAILY
COMMUNITY

## 2022-05-29 LAB
ALBUMIN SERPL-MCNC: 4.4 G/DL (ref 3.6–4.6)
ALBUMIN/GLOB SERPL: 1.8 {RATIO} (ref 1.2–2.2)
ALP SERPL-CCNC: 115 IU/L (ref 44–121)
ALT SERPL-CCNC: 18 IU/L (ref 0–44)
APPEARANCE UR: CLEAR
AST SERPL-CCNC: 25 IU/L (ref 0–40)
BASOPHILS # BLD AUTO: 0.1 X10E3/UL (ref 0–0.2)
BASOPHILS NFR BLD AUTO: 1 %
BILIRUB SERPL-MCNC: 0.2 MG/DL (ref 0–1.2)
BILIRUB UR QL STRIP: NEGATIVE
BUN SERPL-MCNC: 13 MG/DL (ref 8–27)
BUN/CREAT SERPL: 11 (ref 10–24)
CALCIUM SERPL-MCNC: 9.6 MG/DL (ref 8.6–10.2)
CHLORIDE SERPL-SCNC: 104 MMOL/L (ref 96–106)
CK SERPL-CCNC: 63 U/L (ref 30–208)
CO2 SERPL-SCNC: 23 MMOL/L (ref 20–29)
COLOR UR: YELLOW
CREAT SERPL-MCNC: 1.2 MG/DL (ref 0.76–1.27)
CRP SERPL-MCNC: 3 MG/L (ref 0–10)
EGFRCR SERPLBLD CKD-EPI 2021: 61 ML/MIN/1.73
EOSINOPHIL # BLD AUTO: 0.3 X10E3/UL (ref 0–0.4)
EOSINOPHIL NFR BLD AUTO: 4 %
ERYTHROCYTE [DISTWIDTH] IN BLOOD BY AUTOMATED COUNT: 13.7 % (ref 11.6–15.4)
FOLATE SERPL-MCNC: >20 NG/ML
GLOBULIN SER CALC-MCNC: 2.4 G/DL (ref 1.5–4.5)
GLUCOSE SERPL-MCNC: 89 MG/DL (ref 65–99)
GLUCOSE UR QL STRIP: NEGATIVE
HCT VFR BLD AUTO: 43.5 % (ref 37.5–51)
HGB BLD-MCNC: 13.8 G/DL (ref 13–17.7)
HGB UR QL STRIP: NEGATIVE
IMM GRANULOCYTES # BLD AUTO: 0 X10E3/UL (ref 0–0.1)
IMM GRANULOCYTES NFR BLD AUTO: 0 %
KETONES UR QL STRIP: NEGATIVE
LEUKOCYTE ESTERASE UR QL STRIP: NEGATIVE
LYMPHOCYTES # BLD AUTO: 2.1 X10E3/UL (ref 0.7–3.1)
LYMPHOCYTES NFR BLD AUTO: 27 %
MCH RBC QN AUTO: 27.1 PG (ref 26.6–33)
MCHC RBC AUTO-ENTMCNC: 31.7 G/DL (ref 31.5–35.7)
MCV RBC AUTO: 85 FL (ref 79–97)
MONOCYTES # BLD AUTO: 0.7 X10E3/UL (ref 0.1–0.9)
MONOCYTES NFR BLD AUTO: 10 %
NEUTROPHILS # BLD AUTO: 4.4 X10E3/UL (ref 1.4–7)
NEUTROPHILS NFR BLD AUTO: 58 %
NITRITE UR QL STRIP: NEGATIVE
PH UR STRIP: 5.5 [PH] (ref 5–7.5)
PLATELET # BLD AUTO: 199 X10E3/UL (ref 150–450)
POTASSIUM SERPL-SCNC: 4.4 MMOL/L (ref 3.5–5.2)
PROT SERPL-MCNC: 6.8 G/DL (ref 6–8.5)
PROT UR QL STRIP: NORMAL
RBC # BLD AUTO: 5.1 X10E6/UL (ref 4.14–5.8)
SODIUM SERPL-SCNC: 141 MMOL/L (ref 134–144)
SP GR UR STRIP: 1.02 (ref 1–1.03)
T4 SERPL-MCNC: 6 UG/DL (ref 4.5–12)
TSH SERPL DL<=0.005 MIU/L-ACNC: 1.82 UIU/ML (ref 0.45–4.5)
UROBILINOGEN UR STRIP-MCNC: 0.2 MG/DL (ref 0.2–1)
VIT B12 SERPL-MCNC: 535 PG/ML (ref 232–1245)
WBC # BLD AUTO: 7.6 X10E3/UL (ref 3.4–10.8)

## 2022-05-31 LAB
ALBUMIN SERPL ELPH-MCNC: 3.8 G/DL (ref 2.9–4.4)
ALBUMIN/GLOB SERPL: 1.4 {RATIO} (ref 0.7–1.7)
ALPHA1 GLOB SERPL ELPH-MCNC: 0.2 G/DL (ref 0–0.4)
ALPHA2 GLOB SERPL ELPH-MCNC: 0.9 G/DL (ref 0.4–1)
B-GLOBULIN SERPL ELPH-MCNC: 1.2 G/DL (ref 0.7–1.3)
GAMMA GLOB SERPL ELPH-MCNC: 0.5 G/DL (ref 0.4–1.8)
GLOBULIN SER-MCNC: 2.9 G/DL (ref 2.2–3.9)
IGA SERPL-MCNC: 322 MG/DL (ref 61–437)
IGG SERPL-MCNC: 558 MG/DL (ref 603–1613)
IGM SERPL-MCNC: 18 MG/DL (ref 15–143)
INTERPRETATION SERPL IEP-IMP: ABNORMAL
LABORATORY COMMENT REPORT: ABNORMAL
M PROTEIN SERPL ELPH-MCNC: ABNORMAL G/DL
PROT SERPL-MCNC: 6.7 G/DL (ref 6–8.5)

## 2022-06-01 ENCOUNTER — TELEPHONE (OUTPATIENT)
Dept: NEUROLOGY | Facility: CLINIC | Age: 82
End: 2022-06-01

## 2022-06-01 NOTE — TELEPHONE ENCOUNTER
Called patient and spoke with patient's wife Sylwia.  Gave Sylwia the results and she was understanding and appreciative.    ----- Message from Partha Matias DNP, LEEROY sent at 5/31/2022  4:44 PM EDT -----  Please notify pt his labs were reassuring. Only abnormal lab was a slightly low IgG which can vary but he may be more prone to infections. No muscle breakdown, vit b12 and folate, thyroid, electrolytes, renal and liver function, white blood cell counts, hemoglobin, platelets, urine were all normal.

## 2022-06-07 ENCOUNTER — TELEPHONE (OUTPATIENT)
Dept: NEUROLOGY | Facility: CLINIC | Age: 82
End: 2022-06-07

## 2022-06-07 NOTE — TELEPHONE ENCOUNTER
Provider: MONIQUE  Caller: PETER FRENCH  Relationship to Patient: WIFE  Pharmacy: N/A  Phone Number: [phone]  Reason for Call: PATIENT WIFE TELEPHONED WOULD LIKE TO S/W PROVIDER RE: TREATMENT PLANS PROVIDER MENTIONED DURING HIS LAST VISIT.    PATIENT HAD AUDIOLOGY APPOINTMENT AT  YESTERDAY; THEY REPORT THE GNB TEST CAN BE DONE (W/COCHLEAR IMPLANT); BUT  HAS TO DO THIS & THEY HAVE ALREADY STARTED WORKING ON THIS FOR PATIENT.    PATIENT WAS PUT ON STEROID TO GET SYMPTOMS UNDER CONTROL.    PLEASE CALL TO DISCUSS WITH PATIENT WIFE.    THANK YOU.

## 2022-06-08 ENCOUNTER — TELEPHONE (OUTPATIENT)
Dept: NEUROLOGY | Facility: CLINIC | Age: 82
End: 2022-06-08

## 2022-06-08 NOTE — TELEPHONE ENCOUNTER
Wife called back and reports pt to have VNG test and MRI at . They are very happy with our visit and plan of care.

## 2022-09-13 RX ORDER — PRAVASTATIN SODIUM 20 MG
20 TABLET ORAL DAILY
Qty: 90 TABLET | Refills: 0 | Status: SHIPPED | OUTPATIENT
Start: 2022-09-13 | End: 2022-11-29

## 2022-11-29 RX ORDER — PRAVASTATIN SODIUM 20 MG
20 TABLET ORAL DAILY
Qty: 90 TABLET | Refills: 0 | Status: SHIPPED | OUTPATIENT
Start: 2022-11-29 | End: 2023-03-08

## 2023-03-08 RX ORDER — PRAVASTATIN SODIUM 20 MG
20 TABLET ORAL DAILY
Qty: 90 TABLET | Refills: 0 | Status: SHIPPED | OUTPATIENT
Start: 2023-03-08

## 2023-03-09 RX ORDER — ISOSORBIDE MONONITRATE 30 MG/1
30 TABLET, EXTENDED RELEASE ORAL DAILY
Qty: 90 TABLET | Refills: 3 | Status: SHIPPED | OUTPATIENT
Start: 2023-03-09

## 2023-03-09 RX ORDER — FLECAINIDE ACETATE 100 MG/1
100 TABLET ORAL EVERY 12 HOURS
Qty: 180 TABLET | Refills: 3 | Status: SHIPPED | OUTPATIENT
Start: 2023-03-09

## 2023-06-09 RX ORDER — PRAVASTATIN SODIUM 20 MG
20 TABLET ORAL DAILY
Qty: 90 TABLET | Refills: 3 | Status: SHIPPED | OUTPATIENT
Start: 2023-06-09

## 2023-06-12 ENCOUNTER — OFFICE VISIT (OUTPATIENT)
Dept: CARDIOLOGY | Facility: CLINIC | Age: 83
End: 2023-06-12
Payer: MEDICARE

## 2023-06-12 VITALS
WEIGHT: 202 LBS | BODY MASS INDEX: 26.77 KG/M2 | HEART RATE: 89 BPM | DIASTOLIC BLOOD PRESSURE: 66 MMHG | HEIGHT: 73 IN | SYSTOLIC BLOOD PRESSURE: 126 MMHG | OXYGEN SATURATION: 97 %

## 2023-06-12 DIAGNOSIS — I10 ESSENTIAL HYPERTENSION: ICD-10-CM

## 2023-06-12 DIAGNOSIS — I25.10 CORONARY ARTERY DISEASE INVOLVING NATIVE CORONARY ARTERY OF NATIVE HEART WITHOUT ANGINA PECTORIS: ICD-10-CM

## 2023-06-12 DIAGNOSIS — E78.2 MIXED HYPERLIPIDEMIA: ICD-10-CM

## 2023-06-12 DIAGNOSIS — I20.8 ANGINAL EQUIVALENT: ICD-10-CM

## 2023-06-12 DIAGNOSIS — I48.0 PAROXYSMAL ATRIAL FIBRILLATION: Primary | ICD-10-CM

## 2023-06-12 RX ORDER — KETOCONAZOLE 20 MG/G
CREAM TOPICAL
COMMUNITY
Start: 2023-04-11

## 2023-06-12 RX ORDER — NORTRIPTYLINE HYDROCHLORIDE 10 MG/1
20 CAPSULE ORAL NIGHTLY
COMMUNITY
Start: 2023-04-12

## 2023-06-12 NOTE — PROGRESS NOTES
[facility]   [address] [place] [address], [address]  [place], [place], [id number] [phone]  [email or web address]           OUTPATIENT CLINIC FOLLOW UP NOTE      Patient Care Team:  Patient Care Team:  Celestina Dumont MD as PCP - General (General Practice)  Mitchel Culver MD as PCP - Family Medicine  RenanHector le MD as Consulting Physician (Cardiology)    Subjective:   Reason for consultation:   Chief Complaint   Patient presents with    Coronary Artery Disease       HPI:    Guicho Blanco is a 82 y.o. male.  Cardiac problem list:  CAD  Severe ischemia on stress test, status post ostial RCA PTCA and placement of a Cypher drug-eluting stent by Dr. Sue in 2009  Paroxysmal atrial fibrillation/atrial flutter  Status post ablation with Dr. Daley in 3/2012  Hypertension  Hyperlipidemia  Prior infection right toe amputation with Dr. Polanco  COPD  Due to smoke exposure from being a    Small lacunar infarcts 10/2017  Retinal vein occlusion  Prior sleep evaluation with Dr. Luque  Lightheadedness/dizziness, nausea, emesis  Unclear etiology of symptoms.  6/2020, 4/2021   heart tests and during those times were unremarkable (stress test, echo, heart monitor, EKGs, etc.).  ENT and neuro work-ups in the past.    CT angiogram of the neck arteries unremarkable 6/2021  Peripheral neuropathy  Prior nerve conduction studies by neurology.  Saw different neurologists historically  Hospitalized after use of Cymbalta, data deficit.  Gabapentin did not help in the past.    Today he presents for follow-up.    Over the last year and a half since his last follow-up with the patient has had decreased exertional activities.  Has persistent exertional shortness of breath.  Has mild fleeting, chronic left-sided chest pains.  Has a new right pleuritic-like chest pain.  Also with a new osteophyte that has affected some of his swallowing.  Overall, activity level has decreased, per his  daughter, Sylwia, POA    Review of Systems:  As noted in the HPI    PFSH:  Patient Active Problem List   Diagnosis    Osteomyelitis of toe of right foot    Osteomyelitis of right foot    PAF (paroxysmal atrial fibrillation)    Coronary artery disease involving native coronary artery of native heart without angina pectoris    Gastroesophageal reflux disease without esophagitis    Benign non-nodular prostatic hyperplasia without lower urinary tract symptoms    S/P appy    Essential hypertension    Simple chronic bronchitis    Mixed hyperlipidemia    COPD (chronic obstructive pulmonary disease)    BPH (benign prostatic hypertrophy)    Hearing loss    Chest pain    Vertigo    Snoring    Overweight    PLMD (periodic limb movement disorder)    Neuropathy    Periodic headache syndrome, not intractable    Acquired absence of other right toe(s)    Tremor    Bilateral impacted cerumen    Bilateral sensorineural hearing loss    Chronic otitis externa    Deviated nasal septum    Disorder of joint of foot    ED (erectile dysfunction) of organic origin    Hydrocele    Hydrocele of testis    Impacted cerumen    Mononeuropathy    Swelling of testicle    Venous retinal branch occlusion    Vitreous degeneration    Benign prostatic hyperplasia with urinary obstruction         Current Outpatient Medications:     acetaminophen (TYLENOL) 500 MG tablet, Take 1 tablet by mouth Every 6 (Six) Hours As Needed for Mild Pain., Disp: , Rfl:     aspirin 325 MG tablet, Take 1 tablet by mouth Daily., Disp: , Rfl:     B Complex Vitamins (VITAMIN B COMPLEX) capsule capsule, Take 1 capsule by mouth Daily. Takes in evening, Disp: , Rfl:     Cholecalciferol (VITAMIN D3) 5000 UNITS capsule capsule, Take 1 capsule by mouth Daily., Disp: , Rfl:     coenzyme Q10 100 MG capsule, Take 1 capsule by mouth Daily., Disp: , Rfl:     cycloSPORINE (Restasis) 0.05 % ophthalmic emulsion, Restasis 0.05 % eye drops in a dropperette  INSTILL 1 DROP BOTH EYES TWICE DAILY,  Disp: , Rfl:     docusate sodium (COLACE) 100 MG capsule, Take 1 capsule by mouth 2 (Two) Times a Day., Disp: , Rfl:     flecainide (TAMBOCOR) 100 MG tablet, Take 1 tablet by mouth Every 12 (Twelve) Hours., Disp: 180 tablet, Rfl: 3    hydrocortisone 2.5 % cream, APPLY 1 APPLICATION TWICE DAILY TO FACE AND EARS FOR UP TO 2 WEEKS. TAKE 2 WEEKS OFF THEN REPEAT IF NEEDED, Disp: , Rfl:     isosorbide mononitrate (IMDUR) 30 MG 24 hr tablet, Take 1 tablet by mouth Daily., Disp: 90 tablet, Rfl: 3    ketoconazole (NIZORAL) 2 % cream, APPLY TOPICALLY TO FACE AND EARS TWICE DAILY AS NEEDED, Disp: , Rfl:     nitroglycerin (NITROSTAT) 0.4 MG SL tablet, 1 under the tongue as needed for angina, may repeat q5mins for up three doses, Disp: 25 tablet, Rfl: 2    nortriptyline (PAMELOR) 10 MG capsule, 2 capsules Every Night., Disp: , Rfl:     omeprazole (PriLOSEC) 20 MG capsule, Take 1 capsule by mouth Daily., Disp: , Rfl:     pravastatin (PRAVACHOL) 20 MG tablet, Take 1 tablet by mouth Daily., Disp: 90 tablet, Rfl: 3    promethazine (PHENERGAN) 12.5 MG tablet, Take 1 tablet by mouth Every 6 (Six) Hours As Needed for Nausea or Vomiting., Disp: 12 tablet, Rfl: 0    traMADol (ULTRAM) 50 MG tablet, Take 1 tablet by mouth 2 (Two) Times a Day As Needed for Moderate Pain., Disp: , Rfl:     Allergies   Allergen Reactions    Divalproex Sodium (Migraine) [Valproic Acid] Other (See Comments)     Weak, lethargy and got worse    Cymbalta [Duloxetine Hcl] Mental Status Change     Depression, thoughts of Suicide.     Duloxetine Unknown (See Comments)    Metoprolol Other (See Comments)     UNKNOWN. Pt does not remember this being allergy    Neurontin [Gabapentin] Other (See Comments)     Lethargy & fatigue       Social History     Socioeconomic History    Marital status:    Tobacco Use    Smoking status: Never    Smokeless tobacco: Never   Vaping Use    Vaping Use: Never used   Substance and Sexual Activity    Alcohol use: No    Drug use: No  "   Sexual activity: Defer         Objective:   Blood pressure 126/66, pulse 89, height 185.4 cm (73\"), weight 91.6 kg (202 lb), SpO2 97 %.  CONSTITUTIONAL: No acute distress  RESPIRATORY: Normal effort. Clear to auscultation bilaterally without wheezing or rales  CARDIOVASCULAR: Regular rate and rhythm with normal S1 and S2. Without murmur  PERIPHERAL VASCULAR: Normal radial pulse.     10/2017 exam: 2+ right PT pulse, 1+ left lower PT pulse   4/2021 exam: 2+ PT pulses bilaterally  11/2021 exam: 2+ radial pulses bilaterally. Arm BPs Right 113/72, 84bpm; Left 107/68, 85bpm    Labs:  Lab Results   Component Value Date    ALT 18 05/27/2022    AST 25 05/27/2022     Lab Results   Component Value Date    CHOL 97 06/19/2021    TRIG 75 06/19/2021    HDL 36 (L) 06/19/2021    CREATININE 1.20 05/27/2022     [facility] labs 4/2019: Creatinine 1.1, AST 23, ALT 20, , , HDL 40, LDL 57    Outside facility labs 12/2019: Normal TSH, borderline low free thyroxine, hemoglobin 14.3, platelets 165, creatinine 1.1, AST 23, ALT 20, , , HDL 40, LDL 57    Diagnostic Data:      ECG 12 Lead    Date/Time: 6/12/2023 3:33 PM  Performed by: Hector Urrutia MD  Authorized by: Hector Urrutia MD   Comparison: compared with previous ECG from 11/22/2021  Similar to previous ECG  Rhythm: sinus rhythm         Lower Extremity Arterial Duplex 3/2018  No significant stenosis detected bilaterally.  CHRISTINE not obtained due to non compressible arteries.     Results for orders placed during the hospital encounter of 06/18/21    Adult Transthoracic Echo Complete W/ Cont if Necessary Per Protocol (With Agitated Saline)    Interpretation Summary  · Left ventricular ejection fraction appears to be 61 - 65%. Left ventricular systolic function is normal.  · The cardiac valves are structurally and functionally within normal limits.    Carotid Ultrasound 10/2017  Proximal right internal carotid artery is normal.  Left internal carotid artery stenosis of " 0-49%.      Stress test 7/2017  Left ventricular ejection fraction is hyperdynamic (Calculated EF > 70%).  Myocardial perfusion imaging indicates a mild, very small-sized infarct located in the apex with no significant ischemia noted.  Impressions are consistent with a low risk study.    [facility] 10/2017  There is mild diffuse atherosclerosis with no flow limiting stenoses.  The previously placed stent in the ostial to proximal RCA is widely patent with FRANCE-3 distal flow.  There is a 30% discrete in-stent restenosis in the sten'ts mid segment.  Normal left ventricular ejection fraction with mild hypokinesis of the basal anterolateral segment.    Stress test 11/2021  -Left ventricular ejection fraction is normal. (Calculated EF = 64%).  -Myocardial perfusion imaging indicates a normal myocardial perfusion study with no evidence of ischemia.  -There is coronary artery calcification noted on the CT portion of the stress test.  -Impressions are consistent with a low risk study.      Assessment and Plan:     Coronary artery disease   Mixed hyperlipidemia  -Possible ongoing anginal-like symptoms.  Activity level is decreased.  Currently it still seems within the patient's goals of care to pursue further evaluation.  Recommend Lexiscan nuclear stress test.  Patient's daughter, SAMEERA Dao, requests to be the one called for scheduling, which we will delineate in the order  -Not on a beta-blocker due to prior intolerance/lightheadedness.  -Continue aspirin, pravastatin, and Imdur; SL Nitro PRN    Paroxysmal atrial fibrillation  Lacunar infarcts/TIAs  -Followed by Dr Daley at   -Continue flecainide.    -Continue aspirin 325 mg daily  -Previously on Coumadin which was stopped after his ablations by EP.  Currently CHADSVASc score is 6 including his TIAs.  Multiple conversations regarding anticoagulation in the past.  Has a retinal vein (not arterial) occlusion.      Essential hypertension  -Continue current related  medications.    Lightheadedness, dizziness  Mild carotid stenosis  -Acceptable carotid and vertebral arteries on CT angiogram in 6/2021.  Normal radial artery pulses, no significant blood pressure difference in the arms  -Continue clinical monitoring    Decreased left pedal pulse  Peripheral neuropathy  Right toe infection/amputation ~2016  -Previously followed by Dr. Polanco and various neurologists  -History of subsequent palpable pulses, persistent neuropathy symptoms  -History of restless leg syndrome.  No longer on Lyrica    - Return in about 1 year (around 6/12/2024) for Next scheduled follow-up with an ECG.     Hector Urrutia MD, MSc, [facility]  Interventional Cardiology  [facility] at [facility]

## 2023-08-08 ENCOUNTER — HOSPITAL ENCOUNTER (OUTPATIENT)
Dept: CARDIOLOGY | Facility: HOSPITAL | Age: 83
Discharge: HOME | End: 2023-08-08
Admitting: INTERNAL MEDICINE
Payer: MEDICARE

## 2023-08-08 VITALS
DIASTOLIC BLOOD PRESSURE: 71 MMHG | BODY MASS INDEX: 26.77 KG/M2 | HEIGHT: 73 IN | OXYGEN SATURATION: 99 % | SYSTOLIC BLOOD PRESSURE: 125 MMHG | HEART RATE: 74 BPM | WEIGHT: 202 LBS

## 2023-08-08 DIAGNOSIS — I20.8 ANGINAL EQUIVALENT: ICD-10-CM

## 2023-08-08 LAB
BH CV REST NUCLEAR ISOTOPE DOSE: 9.9 MCI
BH CV STRESS BP STAGE 1: NORMAL
BH CV STRESS BP STAGE 3: NORMAL
BH CV STRESS BP STAGE 4: NORMAL
BH CV STRESS COMMENTS STAGE 1: NORMAL
BH CV STRESS DOSE REGADENOSON STAGE 1: 0.4
BH CV STRESS DURATION MIN STAGE 1: 1
BH CV STRESS DURATION MIN STAGE 2: 1
BH CV STRESS DURATION MIN STAGE 3: 1
BH CV STRESS DURATION MIN STAGE 4: 1
BH CV STRESS DURATION SEC STAGE 1: 10
BH CV STRESS DURATION SEC STAGE 2: 0
BH CV STRESS HR STAGE 1: 79
BH CV STRESS HR STAGE 2: 92
BH CV STRESS HR STAGE 3: 88
BH CV STRESS HR STAGE 4: 86
BH CV STRESS NUCLEAR ISOTOPE DOSE: 32.8 MCI
BH CV STRESS O2 STAGE 1: 100
BH CV STRESS O2 STAGE 2: 100
BH CV STRESS O2 STAGE 3: 100
BH CV STRESS O2 STAGE 4: 100
BH CV STRESS PROTOCOL 1: NORMAL
BH CV STRESS RECOVERY BP: NORMAL MMHG
BH CV STRESS RECOVERY HR: 86 BPM
BH CV STRESS RECOVERY O2: 100 %
BH CV STRESS STAGE 1: 1
BH CV STRESS STAGE 2: 2
BH CV STRESS STAGE 3: 3
BH CV STRESS STAGE 4: 4
LV EF NUC BP: 66 %
MAXIMAL PREDICTED HEART RATE: 138 BPM
PERCENT MAX PREDICTED HR: 67.39 %
STRESS BASELINE BP: NORMAL MMHG
STRESS BASELINE HR: 72 BPM
STRESS O2 SAT REST: 99 %
STRESS PERCENT HR: 79 %
STRESS POST ESTIMATED WORKLOAD: 1 METS
STRESS POST EXERCISE DUR MIN: 4 MIN
STRESS POST EXERCISE DUR SEC: 0 SEC
STRESS POST O2 SAT PEAK: 100 %
STRESS POST PEAK BP: NORMAL MMHG
STRESS POST PEAK HR: 93 BPM
STRESS TARGET HR: 117 BPM

## 2023-08-08 PROCEDURE — 25010000002 REGADENOSON 0.4 MG/5ML SOLUTION: Performed by: INTERNAL MEDICINE

## 2023-08-08 PROCEDURE — 93018 CV STRESS TEST I&R ONLY: CPT | Performed by: INTERNAL MEDICINE

## 2023-08-08 PROCEDURE — 0 TECHNETIUM SESTAMIBI: Performed by: INTERNAL MEDICINE

## 2023-08-08 PROCEDURE — 78452 HT MUSCLE IMAGE SPECT MULT: CPT | Performed by: INTERNAL MEDICINE

## 2023-08-08 PROCEDURE — 93017 CV STRESS TEST TRACING ONLY: CPT

## 2023-08-08 PROCEDURE — 78452 HT MUSCLE IMAGE SPECT MULT: CPT

## 2023-08-08 PROCEDURE — A9500 TC99M SESTAMIBI: HCPCS | Performed by: INTERNAL MEDICINE

## 2023-08-08 RX ORDER — REGADENOSON 0.08 MG/ML
0.4 INJECTION, SOLUTION INTRAVENOUS ONCE
Status: COMPLETED | OUTPATIENT
Start: 2023-08-08 | End: 2023-08-08

## 2023-08-08 RX ADMIN — TECHNETIUM TC 99M SESTAMIBI 1 DOSE: 1 INJECTION INTRAVENOUS at 10:12

## 2023-08-08 RX ADMIN — REGADENOSON 0.4 MG: 0.08 INJECTION, SOLUTION INTRAVENOUS at 12:09

## 2023-08-08 RX ADMIN — TECHNETIUM TC 99M SESTAMIBI 1 DOSE: 1 INJECTION INTRAVENOUS at 12:10

## 2023-08-09 ENCOUNTER — TELEPHONE (OUTPATIENT)
Dept: CARDIOLOGY | Facility: CLINIC | Age: 83
End: 2023-08-09
Payer: MEDICARE

## 2023-08-09 NOTE — TELEPHONE ENCOUNTER
----- Message from Hector Urrutia MD sent at 8/8/2023  5:22 PM EDT -----  Stress test looks okay.  Continue current cardiac plan  ----- Message -----  From: Hector Urrutia MD  Sent: 8/8/2023   4:34 PM EDT  To: Hector Urrutia MD

## 2024-01-31 NOTE — ADDENDUM NOTE
Addended by: MONICA PATEL on: 6/30/2021 08:25 AM     Modules accepted: Orders     Med Reconciliation

## 2024-04-08 RX ORDER — FLECAINIDE ACETATE 100 MG/1
100 TABLET ORAL EVERY 12 HOURS
Qty: 180 TABLET | Refills: 3 | Status: SHIPPED | OUTPATIENT
Start: 2024-04-08

## 2024-04-08 RX ORDER — ISOSORBIDE MONONITRATE 30 MG/1
30 TABLET, EXTENDED RELEASE ORAL DAILY
Qty: 90 TABLET | Refills: 3 | Status: SHIPPED | OUTPATIENT
Start: 2024-04-08

## 2024-06-11 ENCOUNTER — TRANSCRIBE ORDERS (OUTPATIENT)
Dept: ADMINISTRATIVE | Facility: HOSPITAL | Age: 84
End: 2024-06-11
Payer: MEDICARE

## 2024-06-11 ENCOUNTER — HOSPITAL ENCOUNTER (OUTPATIENT)
Dept: GENERAL RADIOLOGY | Facility: HOSPITAL | Age: 84
Discharge: HOME | End: 2024-06-11
Admitting: FAMILY MEDICINE
Payer: MEDICARE

## 2024-06-11 DIAGNOSIS — M17.9 OSTEOARTHRITIS OF KNEE, UNSPECIFIED LATERALITY, UNSPECIFIED OSTEOARTHRITIS TYPE: ICD-10-CM

## 2024-06-11 DIAGNOSIS — M51.36 DEGENERATION OF LUMBAR INTERVERTEBRAL DISC: ICD-10-CM

## 2024-06-11 DIAGNOSIS — M51.36 DEGENERATION OF LUMBAR INTERVERTEBRAL DISC: Primary | ICD-10-CM

## 2024-06-11 PROCEDURE — 73562 X-RAY EXAM OF KNEE 3: CPT

## 2024-06-11 PROCEDURE — 72100 X-RAY EXAM L-S SPINE 2/3 VWS: CPT

## 2024-06-25 ENCOUNTER — OFFICE VISIT (OUTPATIENT)
Dept: ORTHOPEDIC SURGERY | Facility: CLINIC | Age: 84
End: 2024-06-25
Payer: MEDICARE

## 2024-06-25 ENCOUNTER — PREP FOR SURGERY (OUTPATIENT)
Dept: OTHER | Facility: HOSPITAL | Age: 84
End: 2024-06-25
Payer: MEDICARE

## 2024-06-25 VITALS
DIASTOLIC BLOOD PRESSURE: 72 MMHG | WEIGHT: 206 LBS | BODY MASS INDEX: 28.84 KG/M2 | SYSTOLIC BLOOD PRESSURE: 116 MMHG | HEIGHT: 71 IN

## 2024-06-25 DIAGNOSIS — M17.11 PRIMARY OSTEOARTHRITIS OF RIGHT KNEE: Primary | ICD-10-CM

## 2024-06-25 DIAGNOSIS — M25.561 RIGHT KNEE PAIN, UNSPECIFIED CHRONICITY: Primary | ICD-10-CM

## 2024-06-25 DIAGNOSIS — M17.11 PRIMARY OSTEOARTHRITIS OF RIGHT KNEE: ICD-10-CM

## 2024-06-25 PROBLEM — M17.9 OA (OSTEOARTHRITIS) OF KNEE: Status: ACTIVE | Noted: 2024-06-25

## 2024-06-25 PROCEDURE — 3074F SYST BP LT 130 MM HG: CPT | Performed by: ORTHOPAEDIC SURGERY

## 2024-06-25 PROCEDURE — 99204 OFFICE O/P NEW MOD 45 MIN: CPT | Performed by: ORTHOPAEDIC SURGERY

## 2024-06-25 PROCEDURE — 3078F DIAST BP <80 MM HG: CPT | Performed by: ORTHOPAEDIC SURGERY

## 2024-06-25 RX ORDER — ACETAMINOPHEN 500 MG
1000 TABLET ORAL ONCE
OUTPATIENT
Start: 2024-06-25 | End: 2024-06-25

## 2024-06-25 RX ORDER — TRANEXAMIC ACID 10 MG/ML
1000 INJECTION, SOLUTION INTRAVENOUS ONCE
OUTPATIENT
Start: 2024-06-25 | End: 2024-06-25

## 2024-06-25 RX ORDER — OXYCODONE HCL 10 MG/1
10 TABLET, FILM COATED, EXTENDED RELEASE ORAL ONCE
OUTPATIENT
Start: 2024-06-25 | End: 2024-06-25

## 2024-06-25 RX ORDER — CHLORHEXIDINE GLUCONATE 40 MG/ML
236 SOLUTION TOPICAL DAILY
Qty: 236 ML | Refills: 0 | Status: SHIPPED | OUTPATIENT
Start: 2024-06-25

## 2024-06-25 NOTE — PROGRESS NOTES
[facility] Note        Subjective     Pain of the Right Knee      HPI    Guicho Blanco is a 83 y.o. male.  Patient is here today with his daughter for evaluation of his right knee.  This has bothered him for several years.  He says he has a dull aching pain that he rates as 6 out of 10.  His symptoms include pain, swelling, popping, grinding, stiffness and the sensation that the knee is giving way.  He has had some falling episodes.  He was given cortisone and some gel injections at [facility] which helped only for short time.  He has pain at rest and particularly at night.  He is retired.  He is very active however.  He has gotten to the point where he has to use a cane or walker at his baseline.  Pain is anterior to anterior lateral location.  He is here for further evaluation and treatment.        Past Medical History:   Diagnosis Date   • Arthritis    • Atrial fibrillation    • BPH (benign prostatic hypertrophy)    • Cataract    • Cellulitis    • COPD (chronic obstructive pulmonary disease)    • Coronary artery disease    • Gall stones    • GERD (gastroesophageal reflux disease)    • Hearing loss    • Hiatal hernia    • Neuropathy    • Peripheral neuropathy    • Peripheral vascular disease    • Seasonal allergies    • Skin cancer    • TIA (transient ischemic attack)       Past Surgical History:   Procedure Laterality Date   • ABLATION OF DYSRHYTHMIC FOCUS     • APPENDECTOMY     • CARDIAC CATHETERIZATION N/A 10/5/2017    Procedure: Left Heart Cath;  Surgeon: Hector Urrutia MD;  Location: [facility] INVASIVE LOCATION;  Service:    • CATARACT EXTRACTION Bilateral    • CHOLECYSTECTOMY      Remote   • COCHLEAR IMPLANT REVISION  05/2019   • CORONARY ANGIOPLASTY     • GALLBLADDER SURGERY     • KNEE ARTHROSCOPY Right     Right knee ACL repair.   • PROSTATE SURGERY     • SHOULDER SURGERY Right    • TOE AMPUTATION Right     2nd toe - Right Foot   • TONSILLECTOMY        Family History   Problem  Relation Age of Onset   • Atrial fibrillation Mother    • Dementia Mother    • Lung cancer Father      Social History     Socioeconomic History   • Marital status:    Tobacco Use   • Smoking status: Never     Passive exposure: Past   • Smokeless tobacco: Never   Vaping Use   • Vaping status: Never Used   Substance and Sexual Activity   • Alcohol use: No   • Drug use: No   • Sexual activity: Defer      Current Outpatient Medications on File Prior to Visit   Medication Sig Dispense Refill   • acetaminophen (TYLENOL) 500 MG tablet Take 1 tablet by mouth Every 6 (Six) Hours As Needed for Mild Pain.     • aspirin 325 MG tablet Take 1 tablet by mouth Daily.     • B Complex Vitamins (VITAMIN B COMPLEX) capsule capsule Take 1 capsule by mouth Daily. Takes in evening     • Cholecalciferol (VITAMIN D3) 5000 UNITS capsule capsule Take 1 capsule by mouth Daily.     • coenzyme Q10 100 MG capsule Take 1 capsule by mouth Daily.     • cycloSPORINE (Restasis) 0.05 % ophthalmic emulsion Restasis 0.05 % eye drops in a dropperette   INSTILL 1 DROP BOTH EYES TWICE DAILY     • docusate sodium (COLACE) 100 MG capsule Take 1 capsule by mouth 2 (Two) Times a Day.     • flecainide (TAMBOCOR) 100 MG tablet Take 1 tablet by mouth Every 12 (Twelve) Hours. 180 tablet 3   • hydrocortisone 2.5 % cream APPLY 1 APPLICATION TWICE DAILY TO FACE AND EARS FOR UP TO 2 WEEKS. TAKE 2 WEEKS OFF THEN REPEAT IF NEEDED     • isosorbide mononitrate (IMDUR) 30 MG 24 hr tablet Take 1 tablet by mouth Daily. 90 tablet 3   • ketoconazole (NIZORAL) 2 % cream APPLY TOPICALLY TO FACE AND EARS TWICE DAILY AS NEEDED     • nitroglycerin (NITROSTAT) 0.4 MG SL tablet 1 under the tongue as needed for angina, may repeat q5mins for up three doses 25 tablet 2   • nortriptyline (PAMELOR) 10 MG capsule 2 capsules Every Night.     • omeprazole (PriLOSEC) 20 MG capsule Take 1 capsule by mouth Daily.     • pravastatin (PRAVACHOL) 20 MG tablet Take 1 tablet by mouth Daily. 90  "tablet 3   • promethazine (PHENERGAN) 12.5 MG tablet Take 1 tablet by mouth Every 6 (Six) Hours As Needed for Nausea or Vomiting. 12 tablet 0   • traMADol (ULTRAM) 50 MG tablet Take 1 tablet by mouth 2 (Two) Times a Day As Needed for Moderate Pain.       No current facility-administered medications on file prior to visit.      Allergies   Allergen Reactions   • Divalproex Sodium (Migraine) [Valproic Acid] Other (See Comments)     Weak, lethargy and got worse   • Cymbalta [Duloxetine Hcl] Mental Status Change     Depression, thoughts of Suicide.    • Duloxetine Unknown (See Comments)   • Metoprolol Other (See Comments)     UNKNOWN. Pt does not remember this being allergy   • Neurontin [Gabapentin] Other (See Comments)     Lethargy & fatigue          Review of Systems     I reviewed the patient's chief complaint, history of present illness, review of systems, past medical history, surgical history, family history, social history, medications and allergy list.        Objective      Physical Exam  /72   Ht 180.3 cm (71\")   Wt 93.4 kg (206 lb)   BMI 28.73 kg/m²     Body mass index is 28.73 kg/m².    General  Mental Status - alert  General Appearance - cooperative, well groomed, not in acute distress  Orientation - Oriented X3  Build & Nutrition - well developed and well nourished  Posture - normal posture  Gait -deliberate and mildly antalgic gait with standard cane       Ortho Exam  Peripheral Vascular:    Upper Extremity:   Inspection:  Left--no cyanotic nail beds Right--no cyanotic nail beds   Bilateral:  Pink nail beds with brisk capillary refill   Palpation:  Bilateral radial pulse normal    Musculoskeletal:  Global Assessment:  Overall assessment of Lower Extremity Muscle Strength and Tone:  Right quadriceps--5/5   Right hamstrings--5/5       Right tibialis anterior--5/5  Right gastroc-soleus--5/5  Right EHL --5/5    Lower Extremity:  Knee/Patella:  No digital clubbing or cyanosis.    Examination of left " knee reveals:  Normal deep tendon reflexes, coordination, strength, tone, sensation.  No known fractures or deformities.    Inspection and Palpation:  Right knee:  Tenderness:  Over the lateral joint line and moderate severity  Effusion:  1+  Crepitus:  Positive  Pulses:  2+  Ecchymosis:  None  Warmth:  None     ROM:  Right:  Extension:5    Flexion:120  Left:  Extension:5    Flexion:120    Instability:    Right:  Lachman Test:  Negative, Varus stress test negative, Valgus stress test negative    Deformities/Malalignments/Discrepancies:    Left:  No deformity   Right:  Genu valgum    Functional Testing:  Norm's test:  Negative  Patella grind test:  Positive  Q-angle:  normal     Imaging/Studies Reviewed and Interpreted:  Imaging Results (Last 24 Hours)       Procedure Component Value Units Date/Time    XR Knee 4+ View Right [[id number]] Resulted: 06/25/24 1414     Updated: 06/25/24 1416    Narrative:      Knee X-Ray    Indication: Pain    Study:  Upright AP, Skiers, Lateral, and Sunrise views of Right knee(s)    Comparison: Right knee 6/11/2024    Findings:    Patient appears to have mild degenerative changes in the medial   compartment.    There are end-stage degenerative changes in the lateral compartment.    There are moderate to early severe changes in the patellofemoral   compartment.    Patient has overall valgus alignment.    Kellgren Waylon rdgrdrrdarddrderd:rd rd3rd Impression:   End-stage lateral compartment and moderate to early severe patellofemoral   compartment degenerative changes of the right knee                 Assessment    Assessment:  1. Right knee pain, unspecified chronicity    2. Primary osteoarthritis of right knee        Plan:  Continue over-the-counter medication as needed for discomfort  Right knee arthritis--end-stage valgus pattern.  We had lengthy discussion with the patient and his daughter who is accompanied him today.  He has failed activity modification, anti-inflammatory medication, and  injections.  He feels unstable on his feet and seems to me to be quite spry and has every intention to be as active as possible and prevent a catastrophic fall.  I believe he does understand what is going to take postoperatively to restore his functionality is much as possible.  He does understand that his baseline use of a cane and a walker and instability will worsen for about 6 weeks after surgery while his quadricep is waking up.  The risk, benefits, potential hazards, typical recovery and rehab as well as reasonable alternatives to cemented right robot-assisted TKA were discussed with the 2 of them.  The surgery will be done with a 23-hour stay.  Patient will need postop placement at the [facility].  Full dose aspirin for DVT prophylaxis.  [facility] re[facility] at home program will also be needed after he comes home from rehab.  They are already scheduled to see [facility] for his low back and I will go ahead and prescribe prehab for his right knee today.  I will see him back preoperatively to answer any final questions and make final plans.        Christian Schwartz MD  06/25/24  16:33 EDT      Dictated Utilizing Dragon Dictation.

## 2024-06-26 ENCOUNTER — TELEPHONE (OUTPATIENT)
Dept: ORTHOPEDIC SURGERY | Facility: CLINIC | Age: 84
End: 2024-06-26
Payer: MEDICARE

## 2024-06-26 ENCOUNTER — TELEPHONE (OUTPATIENT)
Dept: CARDIOLOGY | Facility: CLINIC | Age: 84
End: 2024-06-26
Payer: MEDICARE

## 2024-06-26 NOTE — TELEPHONE ENCOUNTER
Caller: Sylwia Cee    Relationship: Emergency Contact    Best call back number: [phone]    What was the call regarding: PT WILL BE HAVING RIGHT KNEE REPLACEMENT BY DR. RANGEL ON 8/7/24 AND WILL BE NEEDING CC. WOULD LIEK TO KNOW IF THEY SHOULD MOVE PTS 1 YEAR FU TO GET IN HIM IN TO BE CLEARED. PLEASE CALL DAUGHTER BACK TO ADVISE, THANK YOU.

## 2024-06-26 NOTE — TELEPHONE ENCOUNTER
Called patient's daughter and informed her that it would be okay for her father to have his toe nails trimmed before surgery.    Sylwia verbalized understanding.

## 2024-06-26 NOTE — TELEPHONE ENCOUNTER
Caller: TORITO SIFUENTES    Relationship to Patient: DAUGHTER    Phone Number: [phone]    Reason for Call:   PATIENT HAS AN APPOINTMENT TO GET TOE NAILS CLIPPED THE DAY BEFORE HIS SURGERY WANTED TO MAKE SURE THAT IS OKAY OR WILL HE NEED TO RESCHEDULE HIS PODIATRY APPOINTMENT

## 2024-07-23 DIAGNOSIS — M17.11 PRIMARY OSTEOARTHRITIS OF RIGHT KNEE: Primary | ICD-10-CM

## 2024-07-25 ENCOUNTER — OFFICE VISIT (OUTPATIENT)
Dept: CARDIOLOGY | Facility: CLINIC | Age: 84
End: 2024-07-25
Payer: MEDICARE

## 2024-07-25 VITALS
OXYGEN SATURATION: 98 % | HEIGHT: 72 IN | HEART RATE: 91 BPM | WEIGHT: 205 LBS | SYSTOLIC BLOOD PRESSURE: 118 MMHG | DIASTOLIC BLOOD PRESSURE: 70 MMHG | BODY MASS INDEX: 27.77 KG/M2

## 2024-07-25 DIAGNOSIS — I25.10 CORONARY ARTERY DISEASE INVOLVING NATIVE CORONARY ARTERY OF NATIVE HEART WITHOUT ANGINA PECTORIS: Primary | ICD-10-CM

## 2024-07-25 DIAGNOSIS — I10 ESSENTIAL HYPERTENSION: ICD-10-CM

## 2024-07-25 DIAGNOSIS — I48.0 PAF (PAROXYSMAL ATRIAL FIBRILLATION): ICD-10-CM

## 2024-07-25 DIAGNOSIS — E78.2 MIXED HYPERLIPIDEMIA: ICD-10-CM

## 2024-07-25 RX ORDER — OMEPRAZOLE 20 MG/1
20 TABLET, DELAYED RELEASE ORAL DAILY
COMMUNITY

## 2024-07-25 RX ORDER — ONDANSETRON 4 MG/1
4 TABLET, ORALLY DISINTEGRATING ORAL EVERY 8 HOURS PRN
COMMUNITY

## 2024-07-25 RX ORDER — MECLIZINE HYDROCHLORIDE 25 MG/1
25 TABLET ORAL NIGHTLY
COMMUNITY

## 2024-07-25 NOTE — PROGRESS NOTES
[facility]   [address], [address]  [place], [place], [address] [phone]  WWW.[email or web address]           OUTPATIENT CLINIC FOLLOW UP NOTE      Patient Care Team:  Patient Care Team:  Mitchel Culver MD as PCP - General (Family Medicine)  Mitchel Culver MD as PCP - Family Medicine  Hector Urrutia MD as Consulting Physician (Cardiology)    Subjective:   Reason for consultation:   Chief Complaint   Patient presents with   • Surgical Clearance   • Paroxysmal atrial fibrillation       HPI:    Guicho Blanco is a 83 y.o. male.  Cardiac problem list:  CAD  Severe ischemia on stress test, status post ostial RCA PTCA and placement of a Cypher drug-eluting stent by Dr. Sue in 2009 2017 heart cath 30% ISR RCA stent  Stress test 2017, 2021, 2023 low risk  Paroxysmal atrial fibrillation/atrial flutter  Status post ablation with Dr. Daley in 3/2012  Hypertension  Hyperlipidemia  Prior infection right toe amputation with Dr. Polanco  COPD  Due to smoke exposure from being a    Small lacunar infarcts 10/2017  Retinal vein occlusion  Prior sleep evaluation with Dr. Luque  Lightheadedness/dizziness, nausea, emesis  Unclear etiology of symptoms.  6/2020, 4/2021   heart tests and during those times were unremarkable (stress test, echo, heart monitor, EKGs, etc.).  ENT and neuro work-ups in the past.    CT angiogram of the neck arteries unremarkable 6/2021  Peripheral neuropathy  Prior nerve conduction studies by neurology.  Saw different neurologists historically  Hospitalized after use of Cymbalta, data deficit.  Gabapentin did not help in the past.    Today he presents for follow-up.    Patient anticipates right knee replacement in a couple weeks at [facility].  Cardiac symptoms are stable and unchanged.  Has mild fleeting chest pains that is unchanged compared to last year.  Stress test in the interim was low risk.  Denies palpitations.    Daughter,  Sylwia, POA    Review of Systems:  As noted in the HPI    PFSH:  Patient Active Problem List   Diagnosis   • Osteomyelitis of toe of right foot   • Osteomyelitis of right foot   • PAF (paroxysmal atrial fibrillation)   • Coronary artery disease involving native coronary artery of native heart without angina pectoris   • Gastroesophageal reflux disease without esophagitis   • Benign non-nodular prostatic hyperplasia without lower urinary tract symptoms   • S/P appy   • Essential hypertension   • Simple chronic bronchitis   • Mixed hyperlipidemia   • COPD (chronic obstructive pulmonary disease)   • BPH (benign prostatic hypertrophy)   • Hearing loss   • Chest pain   • Vertigo   • Snoring   • Overweight   • PLMD (periodic limb movement disorder)   • Neuropathy   • Periodic headache syndrome, not intractable   • Acquired absence of other right toe(s)   • Tremor   • Bilateral impacted cerumen   • Bilateral sensorineural hearing loss   • Chronic otitis externa   • Deviated nasal septum   • Disorder of joint of foot   • ED (erectile dysfunction) of organic origin   • Hydrocele   • Hydrocele of testis   • Impacted cerumen   • Mononeuropathy   • Swelling of testicle   • Venous retinal branch occlusion   • Vitreous degeneration   • Benign prostatic hyperplasia with urinary obstruction   • OA (osteoarthritis) of knee         Current Outpatient Medications:   •  acetaminophen (TYLENOL) 500 MG tablet, Take 1 tablet by mouth Every 6 (Six) Hours As Needed for Mild Pain., Disp: , Rfl:   •  aspirin 325 MG tablet, Take 1 tablet by mouth Daily., Disp: , Rfl:   •  B Complex Vitamins (VITAMIN B COMPLEX) capsule capsule, Take 1 capsule by mouth Daily. Takes in evening, Disp: , Rfl:   •  Chlorhexidine Gluconate 4 % solution, Apply 7.8667 Applications topically to the appropriate area as directed Daily. Shower w/ solution for 5 days before surgery. [facility] Disp: 236 mL, Rfl: 0  •  Cholecalciferol (VITAMIN D3) 5000  UNITS capsule capsule, Take 1 capsule by mouth Daily., Disp: , Rfl:   •  coenzyme Q10 100 MG capsule, Take 1 capsule by mouth Daily., Disp: , Rfl:   •  cycloSPORINE (Restasis) 0.05 % ophthalmic emulsion, Restasis 0.05 % eye drops in a dropperette  INSTILL 1 DROP BOTH EYES TWICE DAILY, Disp: , Rfl:   •  docusate sodium (COLACE) 100 MG capsule, Take 1 capsule by mouth 2 (Two) Times a Day., Disp: , Rfl:   •  flecainide (TAMBOCOR) 100 MG tablet, Take 1 tablet by mouth Every 12 (Twelve) Hours., Disp: 180 tablet, Rfl: 3  •  glucose (DEX4) 4 GM chewable tablet, Chew 4 tablets As Needed., Disp: , Rfl:   •  hydrocortisone 2.5 % cream, APPLY 1 APPLICATION TWICE DAILY TO FACE AND EARS FOR UP TO 2 WEEKS. TAKE 2 WEEKS OFF THEN REPEAT IF NEEDED, Disp: , Rfl:   •  isosorbide mononitrate (IMDUR) 30 MG 24 hr tablet, Take 1 tablet by mouth Daily., Disp: 90 tablet, Rfl: 3  •  ketoconazole (NIZORAL) 2 % cream, APPLY TOPICALLY TO FACE AND EARS TWICE DAILY AS NEEDED, Disp: , Rfl:   •  meclizine (ANTIVERT) 25 MG tablet, Take 1 tablet by mouth Every Night., Disp: , Rfl:   •  nitroglycerin (NITROSTAT) 0.4 MG SL tablet, 1 under the tongue as needed for angina, may repeat q5mins for up three doses, Disp: 25 tablet, Rfl: 2  •  nortriptyline (PAMELOR) 10 MG capsule, 2 capsules Every Night., Disp: , Rfl:   •  omeprazole OTC (PrilOSEC OTC) 20 MG EC tablet, Take 1 tablet by mouth Daily., Disp: , Rfl:   •  ondansetron ODT (ZOFRAN-ODT) 4 MG disintegrating tablet, Place 1 tablet on the tongue Every 8 (Eight) Hours As Needed., Disp: , Rfl:   •  pravastatin (PRAVACHOL) 20 MG tablet, Take 1 tablet by mouth Daily., Disp: 90 tablet, Rfl: 3  •  promethazine (PHENERGAN) 12.5 MG tablet, Take 1 tablet by mouth Every 6 (Six) Hours As Needed for Nausea or Vomiting., Disp: 12 tablet, Rfl: 0  •  traMADol (ULTRAM) 50 MG tablet, Take 1 tablet by mouth 2 (Two) Times a Day As Needed for Moderate Pain., Disp: , Rfl:     Allergies   Allergen Reactions   • Divalproex  "Sodium (Migraine) [Valproic Acid] Other (See Comments)     Weak, lethargy and got worse   • Cymbalta [Duloxetine Hcl] Mental Status Change     Depression, thoughts of Suicide.    • Duloxetine Unknown (See Comments)   • Metoprolol Other (See Comments)     UNKNOWN. Pt does not remember this being allergy   • Neurontin [Gabapentin] Other (See Comments)     Lethargy & fatigue       Social History     Socioeconomic History   • Marital status:    Tobacco Use   • Smoking status: Never     Passive exposure: Past   • Smokeless tobacco: Never   Vaping Use   • Vaping status: Never Used   Substance and Sexual Activity   • Alcohol use: No   • Drug use: No   • Sexual activity: Defer         Objective:   Blood pressure 118/70, pulse 91, height 182.9 cm (72\"), weight 93 kg (205 lb), SpO2 98%.  CONSTITUTIONAL: No acute distress  CARDIOVASCULAR: Regular rate and rhythm with normal S1 and S2. Without murmur  PERIPHERAL VASCULAR: Normal radial pulse.  No carotid bruit bilaterally    10/2017 exam: 2+ right PT pulse, 1+ left lower PT pulse   4/2021 exam: 2+ PT pulses bilaterally  11/2021 exam: 2+ radial pulses bilaterally. Arm BPs Right 113/72, 84bpm; Left 107/68, 85bpm    Labs:  Lab Results   Component Value Date    ALT 18 05/27/2022    AST 25 05/27/2022     Lab Results   Component Value Date    CHOL 97 06/19/2021    TRIG 75 06/19/2021    HDL 36 (L) 06/19/2021    CREATININE 1.20 05/27/2022     OSH labs: 6/2024 LDL 79    Diagnostic Data:      ECG 12 Lead    Date/Time: 7/25/2024 10:25 AM  Performed by: Hector Urrutia MD    Authorized by: Hector Urrutia MD  Comparison: compared with previous ECG from 6/12/2023  Similar to previous ECG  Rhythm: sinus rhythm  Conduction: 1st degree AV block         Lower Extremity Arterial Duplex 3/2018  No significant stenosis detected bilaterally.  CHRISTINE not obtained due to non compressible arteries.     Results for orders placed during the hospital encounter of 06/18/21    Adult Transthoracic Echo " Complete W/ Cont if Necessary Per Protocol (With Agitated Saline)    Interpretation Summary  · Left ventricular ejection fraction appears to be 61 - 65%. Left ventricular systolic function is normal.  · The cardiac valves are structurally and functionally within normal limits.    Carotid Ultrasound 10/2017  Proximal right internal carotid artery is normal.  Left internal carotid artery stenosis of 0-49%.      Assessment and Plan:     Coronary artery disease   Mixed hyperlipidemia  -Not on a beta-blocker due to prior intolerance/lightheadedness.  -Continue aspirin, pravastatin, and Imdur; SL Nitro PRN  -Labs reviewed    Paroxysmal atrial fibrillation  Lacunar infarcts/TIAs  -Continue flecainide.  Previously placed on this by Dr. Daley  -Continue aspirin 325 mg daily  -Previously on Coumadin which was stopped after his ablations by EP.  Currently CHADSVASc score is 6 including his TIAs.  Multiple conversations regarding anticoagulation in the past.  Has a retinal vein (not arterial) occlusion.      Essential hypertension  -Continue current related medications.    Lightheadedness, dizziness  Mild carotid stenosis  -Acceptable carotid and vertebral arteries on CT angiogram in 6/2021.  Normal radial artery pulses, no significant blood pressure difference in the arms  -Continue clinical monitoring    Decreased left pedal pulse  Peripheral neuropathy  Right toe infection/amputation ~2016  -Previously followed by Dr. Polanco and various neurologists  -History of subsequent palpable pulses, persistent neuropathy symptoms  -History of restless leg syndrome.  No longer on Lyrica    -Consider an arterial duplex study if symptoms worsen/evolve    Preoperative cardiac risk assessment  -Okay to proceed with right knee replacement surgery from a cardiac standpoint without additional cardiac testing or changes in management at the current time  -Continue aspirin perioperatively    - Return in about 1 year (around 7/25/2025) for  Next scheduled follow-up with an ECG.     Hector Urrutia MD, MSc, [facility]  Interventional Cardiology  [facility]

## 2024-07-29 ENCOUNTER — TELEPHONE (OUTPATIENT)
Dept: CARDIOLOGY | Facility: CLINIC | Age: 84
End: 2024-07-29

## 2024-07-29 NOTE — TELEPHONE ENCOUNTER
The [facility] received a fax that requires your attention. The document has been indexed to the patient’s chart for your review.      Reason for sending: FAX WITH RECENT LABS     Documents Description: LABS FROM [facility] DRAKE 6/13/24    Name of Sender: [facility]    Date Indexed: 7/24/24

## 2024-07-30 ENCOUNTER — OFFICE VISIT (OUTPATIENT)
Dept: ORTHOPEDIC SURGERY | Facility: CLINIC | Age: 84
End: 2024-07-30
Payer: MEDICARE

## 2024-07-30 ENCOUNTER — PRE-ADMISSION TESTING (OUTPATIENT)
Dept: PREADMISSION TESTING | Facility: HOSPITAL | Age: 84
End: 2024-07-30
Payer: MEDICARE

## 2024-07-30 VITALS
WEIGHT: 209.8 LBS | BODY MASS INDEX: 28.42 KG/M2 | HEIGHT: 72 IN | DIASTOLIC BLOOD PRESSURE: 70 MMHG | SYSTOLIC BLOOD PRESSURE: 116 MMHG

## 2024-07-30 VITALS — HEIGHT: 72 IN | BODY MASS INDEX: 28.31 KG/M2 | WEIGHT: 209 LBS

## 2024-07-30 DIAGNOSIS — M17.11 PRIMARY OSTEOARTHRITIS OF RIGHT KNEE: ICD-10-CM

## 2024-07-30 DIAGNOSIS — M17.11 PRIMARY OSTEOARTHRITIS OF RIGHT KNEE: Primary | ICD-10-CM

## 2024-07-30 LAB
ALBUMIN SERPL-MCNC: 4.3 G/DL (ref 3.5–5.2)
ALBUMIN/GLOB SERPL: 1.9 G/DL
ALP SERPL-CCNC: 120 U/L (ref 39–117)
ALT SERPL W P-5'-P-CCNC: 19 U/L (ref 1–41)
ANION GAP SERPL CALCULATED.3IONS-SCNC: 12 MMOL/L (ref 5–15)
AST SERPL-CCNC: 26 U/L (ref 1–40)
BASOPHILS # BLD AUTO: 0.05 10*3/MM3 (ref 0–0.2)
BASOPHILS NFR BLD AUTO: 0.8 % (ref 0–1.5)
BILIRUB SERPL-MCNC: 0.4 MG/DL (ref 0–1.2)
BUN SERPL-MCNC: 15 MG/DL (ref 8–23)
BUN/CREAT SERPL: 11.3 (ref 7–25)
CALCIUM SPEC-SCNC: 9.1 MG/DL (ref 8.6–10.5)
CHLORIDE SERPL-SCNC: 102 MMOL/L (ref 98–107)
CO2 SERPL-SCNC: 26 MMOL/L (ref 22–29)
CREAT SERPL-MCNC: 1.33 MG/DL (ref 0.76–1.27)
CRP SERPL-MCNC: 0.46 MG/DL (ref 0–0.5)
DEPRECATED RDW RBC AUTO: 43.9 FL (ref 37–54)
EGFRCR SERPLBLD CKD-EPI 2021: 53 ML/MIN/1.73
EOSINOPHIL # BLD AUTO: 0.39 10*3/MM3 (ref 0–0.4)
EOSINOPHIL NFR BLD AUTO: 6.1 % (ref 0.3–6.2)
ERYTHROCYTE [DISTWIDTH] IN BLOOD BY AUTOMATED COUNT: 13.9 % (ref 12.3–15.4)
ERYTHROCYTE [SEDIMENTATION RATE] IN BLOOD: 22 MM/HR (ref 0–20)
GLOBULIN UR ELPH-MCNC: 2.3 GM/DL
GLUCOSE SERPL-MCNC: 103 MG/DL (ref 65–99)
HBA1C MFR BLD: 5.2 % (ref 4.8–5.6)
HCT VFR BLD AUTO: 38.8 % (ref 37.5–51)
HGB BLD-MCNC: 12 G/DL (ref 13–17.7)
IMM GRANULOCYTES # BLD AUTO: 0.03 10*3/MM3 (ref 0–0.05)
IMM GRANULOCYTES NFR BLD AUTO: 0.5 % (ref 0–0.5)
LYMPHOCYTES # BLD AUTO: 1.97 10*3/MM3 (ref 0.7–3.1)
LYMPHOCYTES NFR BLD AUTO: 30.9 % (ref 19.6–45.3)
MCH RBC QN AUTO: 26.8 PG (ref 26.6–33)
MCHC RBC AUTO-ENTMCNC: 30.9 G/DL (ref 31.5–35.7)
MCV RBC AUTO: 86.6 FL (ref 79–97)
MONOCYTES # BLD AUTO: 0.75 10*3/MM3 (ref 0.1–0.9)
MONOCYTES NFR BLD AUTO: 11.8 % (ref 5–12)
NEUTROPHILS NFR BLD AUTO: 3.18 10*3/MM3 (ref 1.7–7)
NEUTROPHILS NFR BLD AUTO: 49.9 % (ref 42.7–76)
NRBC BLD AUTO-RTO: 0 /100 WBC (ref 0–0.2)
PLATELET # BLD AUTO: 206 10*3/MM3 (ref 140–450)
PMV BLD AUTO: 10.2 FL (ref 6–12)
POTASSIUM SERPL-SCNC: 4.6 MMOL/L (ref 3.5–5.2)
PROT SERPL-MCNC: 6.6 G/DL (ref 6–8.5)
RBC # BLD AUTO: 4.48 10*6/MM3 (ref 4.14–5.8)
SODIUM SERPL-SCNC: 140 MMOL/L (ref 136–145)
WBC NRBC COR # BLD AUTO: 6.37 10*3/MM3 (ref 3.4–10.8)

## 2024-07-30 PROCEDURE — 80053 COMPREHEN METABOLIC PANEL: CPT

## 2024-07-30 PROCEDURE — 3074F SYST BP LT 130 MM HG: CPT | Performed by: ORTHOPAEDIC SURGERY

## 2024-07-30 PROCEDURE — 36415 COLL VENOUS BLD VENIPUNCTURE: CPT

## 2024-07-30 PROCEDURE — S0260 H&P FOR SURGERY: HCPCS | Performed by: ORTHOPAEDIC SURGERY

## 2024-07-30 PROCEDURE — 85652 RBC SED RATE AUTOMATED: CPT

## 2024-07-30 PROCEDURE — 85025 COMPLETE CBC W/AUTO DIFF WBC: CPT

## 2024-07-30 PROCEDURE — 83036 HEMOGLOBIN GLYCOSYLATED A1C: CPT

## 2024-07-30 PROCEDURE — 3078F DIAST BP <80 MM HG: CPT | Performed by: ORTHOPAEDIC SURGERY

## 2024-07-30 PROCEDURE — 86140 C-REACTIVE PROTEIN: CPT

## 2024-07-30 NOTE — PROGRESS NOTES
"    [facility] [place] [facility] Note        Subjective     CC: Follow-up (1 month follow up--Primary osteoarthritis of right knee)      CECILIO Blanco is a 83 y.o. male.  Patient is here today with his daughter for his preop appointment before right TKA scheduled for 8/7/2024.  Patient tells me that he is eager to get things going with his knee.  Sometimes does well getting up from a seated position and sometimes has trouble.    Overall, patient's symptoms are as above    ROS:    Constiutional:Pt denies fever, chills, nausea, or vomiting.  MSK:as above        Objective      Past Medical History  Past Medical History:   Diagnosis Date    Arthritis     Atrial fibrillation     BPH (benign prostatic hypertrophy)     Cataract     Cellulitis     COPD (chronic obstructive pulmonary disease)     Coronary artery disease     Gall stones     GERD (gastroesophageal reflux disease)     Hearing loss     Hiatal hernia     Neuropathy     Peripheral neuropathy     Peripheral vascular disease     Seasonal allergies     Skin cancer     TIA (transient ischemic attack)      Social History     Socioeconomic History    Marital status:    Tobacco Use    Smoking status: Never     Passive exposure: Past    Smokeless tobacco: Never   Vaping Use    Vaping status: Never Used   Substance and Sexual Activity    Alcohol use: No    Drug use: No    Sexual activity: Defer          Physical Exam  /70   Ht 182.9 cm (72.01\")   Wt 95.2 kg (209 lb 12.8 oz)   BMI 28.45 kg/m²     Body mass index is 28.45 kg/m².    Patient is well nourished and well developed.        Ortho Exam  Peripheral Vascular:    Upper Extremity:   Inspection:  Left--no cyanotic nail beds Right--no cyanotic nail beds   Bilateral:  Pink nail beds with brisk capillary refill   Palpation:  Bilateral radial pulse normal    Musculoskeletal:  Global Assessment:  Overall assessment of Lower Extremity Muscle Strength and Tone:  Right quadriceps--5/5   Right " hamstrings--5/5       Right tibialis anterior--5/5  Right gastroc-soleus--5/5  Right EHL --5/5    Lower Extremity:  Knee/Patella:  No digital clubbing or cyanosis.    Examination of left knee reveals:  Normal deep tendon reflexes, coordination, strength, tone, sensation.  No known fractures or deformities.    Inspection and Palpation:  Right knee:  Tenderness:  Over the lateral joint line and moderate severity  Effusion:  1+  Crepitus:  Positive  Pulses:  2+  Ecchymosis:  None  Warmth:  None     ROM:  Right:  Extension:5    Flexion:120  Left:  Extension:5    Flexion:120    Instability:    Right:  Lachman Test:  Negative, Varus stress test negative, Valgus stress test negative    Deformities/Malalignments/Discrepancies:    Left:  No deformity   Right:  Genu valgum    Functional Testing:  Norm's test:  Negative  Patella grind test:  Positive  Q-angle:  normal     Imaging/Labs/EMG Reviewed and Interpreted:  Imaging Results (Last 24 Hours)       ** No results found for the last 24 hours. **              Assessment    Assessment:  1. Primary osteoarthritis of right knee        Plan:  Recommend over the counter anti-inflammatories for pain and/or swelling  Right knee arthritis--plan will be for cemented right robot-assisted TKA for his end-stage valgus disease.  We again reviewed the risk, benefits, potential hazards, and typical recovery with the patient and his daughter today.  Plan will be for 23-hour stay and postop placement at [facility] or the [place].  Baby aspirin twice daily for DVT prophylaxis.  [facility] rehab and home program will be needed once he goes home versus [facility].  He will go to [facility] and will call with any problematic labs.      Christian Schwartz MD  07/30/24  13:44 EDT      Dictated Utilizing Dragon Dictation.

## 2024-07-30 NOTE — PAT
Patient to apply Chlorhexadine wipes  to surgical area (as instructed) the night before procedure and the AM of procedure. Wipes provided.    Prescription for Chlorhexidine shower called into patient's pharmacy or [facility] pharmacy by patient's surgeon.  Reinforced with patient to  the prescription from applicable pharmacy if they haven't already.  Verbal and written instructions given regarding proper use of Chlorhexidine body wash to patient and/or famlily during PAT visit. Patient/family also instructed to complete checklist and return it to Pre-op on the day of surgery.  Patient and/or family verbalized understanding.    Patient instructed to drink 20 ounces of Gatorade or Gatorlyte (if diabetic) and it needs to be completed 1 hour (for Main OR patients) or 2 hours (scheduled  section & [facility] patients) before given arrival time for procedure (NO RED Gatorade and NO Gatorade Zero).    Patient verbalized understanding.    Patient viewed general PAT education video as instructed in their preoperative information received from their surgeon.  Patient stated the general PAT education video was viewed in its entirety and survey completed.  Copies of PAT general education handouts (Incentive Spirometry, Meds to Beds Program, Patient Belongings, Pre-op skin preparation instructions, Blood Glucose testing, Visitor policy, Surgery FAQ, Code H) distributed to patient if not printed. Education related to the PAT pass and skin preparation for surgery (if applicable) completed in PAT as a reinforcement to PAT education video. Patient instructed to return PAT pass provided today as well as completed skin preparation sheet (if applicable) on the day of procedure.     Additionally if patient had not viewed video yet but intended to view it at home or in our waiting area, then referred them to the handout with QR code/link provided during PAT visit.  Instructed patient to complete survey after viewing the video in its  entirety.  Encouraged patient/family to read PAT general education handouts thoroughly and notify PAT staff with any questions or concerns. Patient verbalized understanding of all information and priority content.    Discussed with patient options for receiving total joint replacement education and assessed patient's ability and preference. Joint Replacement Guide given to patient during PAT visit since not received a copy within the last year. Encouraged patient/family to read guide thoroughly and notify PAT staff with any questions or concerns. Handout provided directing patient to links to watch online videos related to joint replacement surgery on the [facility] website. The handout gives detailed instructions for joining an online joint replacement class through Zoom or phone conference offered on Thursdays. Patient agreed to participate by watching videos online. Patient verbalized understanding of instructions and to complete the online learning tool survey. Encouraged to share information with family and/or . An overview of the joint replacement education was provided during the visit including general perioperative instructions that are routine for all surgical patients (PAT PASS, wipes, directions to pre-op, etc.).    [employer] (by [employer]) pain pump patient informational handout given to patient.  Instructed patient to watch [employer] Patient Education Video regarding Peripheral Nerve Catheter that will be in place for upcoming surgery unless contraindicated. The video can be accessed using QR code noted on handout.  Patient agreed to watch video.  Stressed to patient to call [employer] Nursing Hotline 24/7 if patient has any questions or concerns after discharge.     Post-Surgery Information Instruction Sheet given to patient during Pre-Admission Testing Visit with verbal instructions to patient to return with PAT PASS on the day of surgery. Additionally, encouraged patient to review the  information provided.    EKG and cardiac clearance on chart.

## 2024-08-02 ENCOUNTER — TRANSITIONAL CARE MANAGEMENT TELEPHONE ENCOUNTER (OUTPATIENT)
Dept: ORTHOPEDIC SURGERY | Facility: CLINIC | Age: 84
End: 2024-08-02
Payer: MEDICARE

## 2024-08-02 NOTE — OUTREACH NOTE
Pre-Operative Care Plan          Patient: Guicho Blanco       YOB: 1940         Age/Gender: 83 y.o. male     Medical Record Number: [id number]     Surgeon:  DR. STEVE RANGEL    Surgical Procedure Planned:   RT TKA     Date of Surgery Planned: 08/07/24    Clearances Needed: CARDIAC (IN DR. DILLON LAST OFFICE NOTE), - NO DENTAL, HAS DENTURES.     A1C: 5.20    BMI: 28.35        Pharmacy: [facility]    Living Arrangements:  PRIVATE RESIDENCE WITH WIFE    Appropriate DME: HAS ALL DME    Physical Therapy Location: [facility] FOR REHAB, THEN [facility]    First Physical Therapy Appointment:  TBD    Surgery : DAUGHTER - [facility]    DVT PX Plan: ASPIRIN 81 MG BID    D/C Plan:  PLAN IS FOR PATIENT TO BE ADMITTED FOR D/C TO [facility]. AFTER REHAB IS COMPLETE WITH [facility] PATIENT IS TRANSFERRING PT TO [facility]HAB. PATIENT HAS ALL DME REQUIRED FOR WHEN HE GETS HOME. PATIENT WILL BEGIN TAKING ASPIRIN 81 MG BID AFTER SURGERY FOR DVT PROPHYLAXIS.

## 2024-08-06 ENCOUNTER — ANESTHESIA EVENT (OUTPATIENT)
Dept: PERIOP | Facility: HOSPITAL | Age: 84
End: 2024-08-06
Payer: MEDICARE

## 2024-08-06 RX ORDER — SODIUM CHLORIDE 0.9 % (FLUSH) 0.9 %
10 SYRINGE (ML) INJECTION AS NEEDED
Status: CANCELLED | OUTPATIENT
Start: 2024-08-06

## 2024-08-06 RX ORDER — FAMOTIDINE 10 MG/ML
20 INJECTION, SOLUTION INTRAVENOUS ONCE
Status: CANCELLED | OUTPATIENT
Start: 2024-08-06 | End: 2024-08-06

## 2024-08-06 RX ORDER — SODIUM CHLORIDE 9 MG/ML
40 INJECTION, SOLUTION INTRAVENOUS AS NEEDED
Status: CANCELLED | OUTPATIENT
Start: 2024-08-06
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
## 2024-08-07 ENCOUNTER — ANESTHESIA (OUTPATIENT)
Dept: PERIOP | Facility: HOSPITAL | Age: 84
End: 2024-08-07
Payer: MEDICARE

## 2024-08-07 ENCOUNTER — APPOINTMENT (OUTPATIENT)
Dept: GENERAL RADIOLOGY | Facility: HOSPITAL | Age: 84
End: 2024-08-07
Payer: MEDICARE

## 2024-08-07 ENCOUNTER — HOSPITAL ENCOUNTER (OUTPATIENT)
Facility: HOSPITAL | Age: 84
Discharge: NURSING, REHAB OR LONG-TERM CARE | End: 2024-08-10
Attending: ORTHOPAEDIC SURGERY | Admitting: ORTHOPAEDIC SURGERY
Payer: MEDICARE

## 2024-08-07 ENCOUNTER — ANESTHESIA EVENT CONVERTED (OUTPATIENT)
Dept: ANESTHESIOLOGY | Facility: HOSPITAL | Age: 84
End: 2024-08-07
Payer: MEDICARE

## 2024-08-07 DIAGNOSIS — Z96.651 STATUS POST TOTAL RIGHT KNEE REPLACEMENT: ICD-10-CM

## 2024-08-07 DIAGNOSIS — M17.11 PRIMARY OSTEOARTHRITIS OF RIGHT KNEE: Primary | ICD-10-CM

## 2024-08-07 DIAGNOSIS — M17.11 PRIMARY OSTEOARTHRITIS OF RIGHT KNEE: ICD-10-CM

## 2024-08-07 PROCEDURE — A9270 NON-COVERED ITEM OR SERVICE: HCPCS | Performed by: ORTHOPAEDIC SURGERY

## 2024-08-07 PROCEDURE — 27447 TOTAL KNEE ARTHROPLASTY: CPT | Performed by: PHYSICIAN ASSISTANT

## 2024-08-07 PROCEDURE — C1776 JOINT DEVICE (IMPLANTABLE): HCPCS | Performed by: ORTHOPAEDIC SURGERY

## 2024-08-07 PROCEDURE — C1713 ANCHOR/SCREW BN/BN,TIS/BN: HCPCS | Performed by: ORTHOPAEDIC SURGERY

## 2024-08-07 PROCEDURE — 73560 X-RAY EXAM OF KNEE 1 OR 2: CPT

## 2024-08-07 PROCEDURE — A9270 NON-COVERED ITEM OR SERVICE: HCPCS | Performed by: NURSE PRACTITIONER

## 2024-08-07 PROCEDURE — 63710000001 NORTRIPTYLINE 10 MG CAPSULE: Performed by: NURSE PRACTITIONER

## 2024-08-07 PROCEDURE — 27447 TOTAL KNEE ARTHROPLASTY: CPT | Performed by: ORTHOPAEDIC SURGERY

## 2024-08-07 PROCEDURE — 25010000002 LABETALOL 5 MG/ML SOLUTION: Performed by: NURSE ANESTHETIST, CERTIFIED REGISTERED

## 2024-08-07 PROCEDURE — 25010000002 SUGAMMADEX 200 MG/2ML SOLUTION: Performed by: NURSE ANESTHETIST, CERTIFIED REGISTERED

## 2024-08-07 PROCEDURE — 20985 CPTR-ASST DIR MS PX: CPT | Performed by: PHYSICIAN ASSISTANT

## 2024-08-07 PROCEDURE — 63710000001 PRAVASTATIN 20 MG TABLET: Performed by: NURSE PRACTITIONER

## 2024-08-07 PROCEDURE — 63710000001 OXYCODONE 10 MG TABLET EXTENDED-RELEASE 12 HOUR: Performed by: ORTHOPAEDIC SURGERY

## 2024-08-07 PROCEDURE — 63710000001 POVIDONE-IODINE 10 % SOLUTION 30 ML BOTTLE: Performed by: ORTHOPAEDIC SURGERY

## 2024-08-07 PROCEDURE — 97162 PT EVAL MOD COMPLEX 30 MIN: CPT

## 2024-08-07 PROCEDURE — 25010000002 MORPHINE PER 10 MG: Performed by: ORTHOPAEDIC SURGERY

## 2024-08-07 PROCEDURE — 25010000002 CEFAZOLIN PER 500 MG: Performed by: ORTHOPAEDIC SURGERY

## 2024-08-07 PROCEDURE — 25810000003 SODIUM CHLORIDE 0.9 % SOLUTION: Performed by: ORTHOPAEDIC SURGERY

## 2024-08-07 PROCEDURE — 25010000002 FENTANYL CITRATE (PF) 100 MCG/2ML SOLUTION: Performed by: NURSE ANESTHETIST, CERTIFIED REGISTERED

## 2024-08-07 PROCEDURE — 25010000002 ONDANSETRON PER 1 MG: Performed by: NURSE ANESTHETIST, CERTIFIED REGISTERED

## 2024-08-07 PROCEDURE — 63710000001 FLECAINIDE 50 MG TABLET: Performed by: NURSE PRACTITIONER

## 2024-08-07 PROCEDURE — 25010000002 ROPIVACAINE HCL-NACL 0.2-0.9 % SOLUTION: Performed by: NURSE ANESTHETIST, CERTIFIED REGISTERED

## 2024-08-07 PROCEDURE — 63710000001 ACETAMINOPHEN EXTRA STRENGTH 500 MG TABLET: Performed by: ORTHOPAEDIC SURGERY

## 2024-08-07 PROCEDURE — 25010000002 BUPIVACAINE (PF) 0.25 % SOLUTION: Performed by: NURSE ANESTHETIST, CERTIFIED REGISTERED

## 2024-08-07 PROCEDURE — 25010000002 SODIUM CHLORIDE 0.9 % WITH KCL 20 MEQ 20-0.9 MEQ/L-% SOLUTION: Performed by: ORTHOPAEDIC SURGERY

## 2024-08-07 PROCEDURE — 97116 GAIT TRAINING THERAPY: CPT

## 2024-08-07 PROCEDURE — 25010000002 ROPIVACAINE PER 1 MG: Performed by: ORTHOPAEDIC SURGERY

## 2024-08-07 PROCEDURE — 20985 CPTR-ASST DIR MS PX: CPT | Performed by: ORTHOPAEDIC SURGERY

## 2024-08-07 PROCEDURE — 25010000002 HYDRALAZINE PER 20 MG: Performed by: NURSE ANESTHETIST, CERTIFIED REGISTERED

## 2024-08-07 PROCEDURE — 25010000002 KETOROLAC TROMETHAMINE PER 15 MG: Performed by: ORTHOPAEDIC SURGERY

## 2024-08-07 PROCEDURE — 25010000002 PROPOFOL 10 MG/ML EMULSION: Performed by: NURSE ANESTHETIST, CERTIFIED REGISTERED

## 2024-08-07 DEVICE — COMP FEM/KN PERSONA CR CMT COCR STD SZ9 RT: Type: IMPLANTABLE DEVICE | Site: KNEE | Status: FUNCTIONAL

## 2024-08-07 DEVICE — IMPLANTABLE DEVICE
Type: IMPLANTABLE DEVICE | Site: KNEE | Status: FUNCTIONAL
Brand: PERSONA® VIVACIT-E®

## 2024-08-07 DEVICE — IMPLANTABLE DEVICE: Type: IMPLANTABLE DEVICE | Site: KNEE | Status: FUNCTIONAL

## 2024-08-07 DEVICE — IMPLANTABLE DEVICE
Type: IMPLANTABLE DEVICE | Site: KNEE | Status: FUNCTIONAL
Brand: BIOMET® BONE CEMENT R

## 2024-08-07 DEVICE — CAP TOTL KN CMT PRIMARY W/ROSA: Type: IMPLANTABLE DEVICE | Site: KNEE | Status: FUNCTIONAL

## 2024-08-07 DEVICE — DEV CONTRL TISS STRATAFIX SYMM PDS PLUS VIL CT-1 45CM: Type: IMPLANTABLE DEVICE | Site: KNEE | Status: FUNCTIONAL

## 2024-08-07 RX ORDER — ASPIRIN 81 MG/1
81 TABLET ORAL 2 TIMES DAILY
Qty: 84 TABLET | Refills: 0 | Status: SHIPPED | OUTPATIENT
Start: 2024-08-08 | End: 2024-08-07

## 2024-08-07 RX ORDER — BUPIVACAINE HYDROCHLORIDE 2.5 MG/ML
INJECTION, SOLUTION EPIDURAL; INFILTRATION; INTRACAUDAL
Status: COMPLETED | OUTPATIENT
Start: 2024-08-07 | End: 2024-08-07

## 2024-08-07 RX ORDER — ROPIVACAINE HYDROCHLORIDE 2 MG/ML
INJECTION, SOLUTION EPIDURAL; INFILTRATION; PERINEURAL CONTINUOUS
Status: DISCONTINUED | OUTPATIENT
Start: 2024-08-07 | End: 2024-08-10 | Stop reason: HOSPADM

## 2024-08-07 RX ORDER — PANTOPRAZOLE SODIUM 40 MG/1
40 TABLET, DELAYED RELEASE ORAL
Status: DISCONTINUED | OUTPATIENT
Start: 2024-08-08 | End: 2024-08-10 | Stop reason: HOSPADM

## 2024-08-07 RX ORDER — ASPIRIN 81 MG/1
81 TABLET ORAL EVERY 12 HOURS SCHEDULED
Status: DISCONTINUED | OUTPATIENT
Start: 2024-08-08 | End: 2024-08-10 | Stop reason: HOSPADM

## 2024-08-07 RX ORDER — DROPERIDOL 2.5 MG/ML
0.62 INJECTION, SOLUTION INTRAMUSCULAR; INTRAVENOUS ONCE AS NEEDED
Status: DISCONTINUED | OUTPATIENT
Start: 2024-08-07 | End: 2024-08-07 | Stop reason: HOSPADM

## 2024-08-07 RX ORDER — MIDAZOLAM HYDROCHLORIDE 1 MG/ML
0.5 INJECTION INTRAMUSCULAR; INTRAVENOUS
Status: DISCONTINUED | OUTPATIENT
Start: 2024-08-07 | End: 2024-08-07 | Stop reason: HOSPADM

## 2024-08-07 RX ORDER — HYDROMORPHONE HYDROCHLORIDE 1 MG/ML
0.5 INJECTION, SOLUTION INTRAMUSCULAR; INTRAVENOUS; SUBCUTANEOUS
Status: DISCONTINUED | OUTPATIENT
Start: 2024-08-07 | End: 2024-08-07 | Stop reason: HOSPADM

## 2024-08-07 RX ORDER — ONDANSETRON 2 MG/ML
4 INJECTION INTRAMUSCULAR; INTRAVENOUS EVERY 6 HOURS PRN
Status: DISCONTINUED | OUTPATIENT
Start: 2024-08-07 | End: 2024-08-07 | Stop reason: SDUPTHER

## 2024-08-07 RX ORDER — ONDANSETRON 4 MG/1
4 TABLET, FILM COATED ORAL EVERY 8 HOURS PRN
Qty: 15 TABLET | Refills: 1 | Status: SHIPPED | OUTPATIENT
Start: 2024-08-07

## 2024-08-07 RX ORDER — ACETAMINOPHEN 500 MG
1000 TABLET ORAL EVERY 6 HOURS
Status: DISCONTINUED | OUTPATIENT
Start: 2024-08-07 | End: 2024-08-10 | Stop reason: HOSPADM

## 2024-08-07 RX ORDER — OXYCODONE HCL 10 MG/1
10 TABLET, FILM COATED, EXTENDED RELEASE ORAL ONCE
Status: COMPLETED | OUTPATIENT
Start: 2024-08-07 | End: 2024-08-07

## 2024-08-07 RX ORDER — FENTANYL CITRATE 50 UG/ML
50 INJECTION, SOLUTION INTRAMUSCULAR; INTRAVENOUS
Status: DISCONTINUED | OUTPATIENT
Start: 2024-08-07 | End: 2024-08-07 | Stop reason: HOSPADM

## 2024-08-07 RX ORDER — HYDRALAZINE HYDROCHLORIDE 20 MG/ML
INJECTION INTRAMUSCULAR; INTRAVENOUS AS NEEDED
Status: DISCONTINUED | OUTPATIENT
Start: 2024-08-07 | End: 2024-08-07 | Stop reason: SURG

## 2024-08-07 RX ORDER — FENTANYL CITRATE 50 UG/ML
INJECTION, SOLUTION INTRAMUSCULAR; INTRAVENOUS AS NEEDED
Status: DISCONTINUED | OUTPATIENT
Start: 2024-08-07 | End: 2024-08-07 | Stop reason: SURG

## 2024-08-07 RX ORDER — ONDANSETRON 2 MG/ML
4 INJECTION INTRAMUSCULAR; INTRAVENOUS EVERY 6 HOURS PRN
Status: DISCONTINUED | OUTPATIENT
Start: 2024-08-07 | End: 2024-08-10 | Stop reason: HOSPADM

## 2024-08-07 RX ORDER — LIDOCAINE HYDROCHLORIDE 10 MG/ML
0.5 INJECTION, SOLUTION EPIDURAL; INFILTRATION; INTRACAUDAL; PERINEURAL ONCE AS NEEDED
Status: COMPLETED | OUTPATIENT
Start: 2024-08-07 | End: 2024-08-07

## 2024-08-07 RX ORDER — DOCUSATE SODIUM 100 MG/1
100 CAPSULE, LIQUID FILLED ORAL 2 TIMES DAILY PRN
Status: DISCONTINUED | OUTPATIENT
Start: 2024-08-07 | End: 2024-08-10 | Stop reason: HOSPADM

## 2024-08-07 RX ORDER — ONDANSETRON 4 MG/1
4 TABLET, ORALLY DISINTEGRATING ORAL EVERY 6 HOURS PRN
Status: DISCONTINUED | OUTPATIENT
Start: 2024-08-07 | End: 2024-08-10 | Stop reason: HOSPADM

## 2024-08-07 RX ORDER — IPRATROPIUM BROMIDE AND ALBUTEROL SULFATE 2.5; .5 MG/3ML; MG/3ML
3 SOLUTION RESPIRATORY (INHALATION) ONCE AS NEEDED
Status: DISCONTINUED | OUTPATIENT
Start: 2024-08-07 | End: 2024-08-07 | Stop reason: HOSPADM

## 2024-08-07 RX ORDER — SENNOSIDES 8.6 MG
650 CAPSULE ORAL EVERY 8 HOURS
Qty: 90 TABLET | Refills: 0 | Status: SHIPPED | OUTPATIENT
Start: 2024-08-07

## 2024-08-07 RX ORDER — MAGNESIUM HYDROXIDE 1200 MG/15ML
LIQUID ORAL AS NEEDED
Status: DISCONTINUED | OUTPATIENT
Start: 2024-08-07 | End: 2024-08-07 | Stop reason: HOSPADM

## 2024-08-07 RX ORDER — SODIUM CHLORIDE 0.9 % (FLUSH) 0.9 %
3 SYRINGE (ML) INJECTION EVERY 12 HOURS SCHEDULED
Status: DISCONTINUED | OUTPATIENT
Start: 2024-08-07 | End: 2024-08-10 | Stop reason: HOSPADM

## 2024-08-07 RX ORDER — NALOXONE HCL 0.4 MG/ML
0.4 VIAL (ML) INJECTION
Status: DISCONTINUED | OUTPATIENT
Start: 2024-08-07 | End: 2024-08-10 | Stop reason: HOSPADM

## 2024-08-07 RX ORDER — PRAVASTATIN SODIUM 20 MG
20 TABLET ORAL NIGHTLY
Status: DISCONTINUED | OUTPATIENT
Start: 2024-08-07 | End: 2024-08-10 | Stop reason: HOSPADM

## 2024-08-07 RX ORDER — DOCUSATE SODIUM 100 MG/1
100 CAPSULE, LIQUID FILLED ORAL 2 TIMES DAILY PRN
Qty: 60 CAPSULE | Refills: 0 | Status: SHIPPED | OUTPATIENT
Start: 2024-08-07

## 2024-08-07 RX ORDER — ONDANSETRON 2 MG/ML
INJECTION INTRAMUSCULAR; INTRAVENOUS AS NEEDED
Status: DISCONTINUED | OUTPATIENT
Start: 2024-08-07 | End: 2024-08-07 | Stop reason: SURG

## 2024-08-07 RX ORDER — SODIUM CHLORIDE, SODIUM LACTATE, POTASSIUM CHLORIDE, CALCIUM CHLORIDE 600; 310; 30; 20 MG/100ML; MG/100ML; MG/100ML; MG/100ML
9 INJECTION, SOLUTION INTRAVENOUS CONTINUOUS
Status: DISCONTINUED | OUTPATIENT
Start: 2024-08-07 | End: 2024-08-07

## 2024-08-07 RX ORDER — FLECAINIDE ACETATE 50 MG/1
100 TABLET ORAL EVERY 12 HOURS SCHEDULED
Status: DISCONTINUED | OUTPATIENT
Start: 2024-08-07 | End: 2024-08-10 | Stop reason: HOSPADM

## 2024-08-07 RX ORDER — DOXYCYCLINE 100 MG/1
100 TABLET ORAL 2 TIMES DAILY
Qty: 14 TABLET | Refills: 0 | Status: SHIPPED | OUTPATIENT
Start: 2024-08-07 | End: 2024-08-14

## 2024-08-07 RX ORDER — FAMOTIDINE 20 MG/1
20 TABLET, FILM COATED ORAL ONCE
Status: DISCONTINUED | OUTPATIENT
Start: 2024-08-07 | End: 2024-08-07 | Stop reason: HOSPADM

## 2024-08-07 RX ORDER — NORTRIPTYLINE HYDROCHLORIDE 10 MG/1
20 CAPSULE ORAL NIGHTLY
Status: DISCONTINUED | OUTPATIENT
Start: 2024-08-07 | End: 2024-08-10 | Stop reason: HOSPADM

## 2024-08-07 RX ORDER — ISOSORBIDE MONONITRATE 30 MG/1
30 TABLET, EXTENDED RELEASE ORAL DAILY
Status: DISCONTINUED | OUTPATIENT
Start: 2024-08-08 | End: 2024-08-10 | Stop reason: HOSPADM

## 2024-08-07 RX ORDER — ROCURONIUM BROMIDE 10 MG/ML
INJECTION, SOLUTION INTRAVENOUS AS NEEDED
Status: DISCONTINUED | OUTPATIENT
Start: 2024-08-07 | End: 2024-08-07 | Stop reason: SURG

## 2024-08-07 RX ORDER — LABETALOL HYDROCHLORIDE 5 MG/ML
10 INJECTION, SOLUTION INTRAVENOUS EVERY 4 HOURS PRN
Status: DISCONTINUED | OUTPATIENT
Start: 2024-08-07 | End: 2024-08-10 | Stop reason: HOSPADM

## 2024-08-07 RX ORDER — SODIUM CHLORIDE 9 MG/ML
40 INJECTION, SOLUTION INTRAVENOUS AS NEEDED
Status: DISCONTINUED | OUTPATIENT
Start: 2024-08-07 | End: 2024-08-10 | Stop reason: HOSPADM

## 2024-08-07 RX ORDER — OXYCODONE HYDROCHLORIDE 5 MG/1
5 TABLET ORAL EVERY 4 HOURS PRN
Status: DISCONTINUED | OUTPATIENT
Start: 2024-08-07 | End: 2024-08-10 | Stop reason: HOSPADM

## 2024-08-07 RX ORDER — SODIUM CHLORIDE 0.9 % (FLUSH) 0.9 %
3-10 SYRINGE (ML) INJECTION AS NEEDED
Status: DISCONTINUED | OUTPATIENT
Start: 2024-08-07 | End: 2024-08-10 | Stop reason: HOSPADM

## 2024-08-07 RX ORDER — MELOXICAM 7.5 MG/1
7.5 TABLET ORAL DAILY
Qty: 30 TABLET | Refills: 0 | Status: SHIPPED | OUTPATIENT
Start: 2024-08-07

## 2024-08-07 RX ORDER — TRANEXAMIC ACID 10 MG/ML
1000 INJECTION, SOLUTION INTRAVENOUS ONCE
Status: COMPLETED | OUTPATIENT
Start: 2024-08-07 | End: 2024-08-07

## 2024-08-07 RX ORDER — SODIUM CHLORIDE 9 MG/ML
9 INJECTION, SOLUTION INTRAVENOUS CONTINUOUS PRN
Status: DISCONTINUED | OUTPATIENT
Start: 2024-08-07 | End: 2024-08-07 | Stop reason: HOSPADM

## 2024-08-07 RX ORDER — LIDOCAINE HYDROCHLORIDE 10 MG/ML
INJECTION, SOLUTION EPIDURAL; INFILTRATION; INTRACAUDAL; PERINEURAL AS NEEDED
Status: DISCONTINUED | OUTPATIENT
Start: 2024-08-07 | End: 2024-08-07 | Stop reason: SURG

## 2024-08-07 RX ORDER — LABETALOL HYDROCHLORIDE 5 MG/ML
INJECTION, SOLUTION INTRAVENOUS AS NEEDED
Status: DISCONTINUED | OUTPATIENT
Start: 2024-08-07 | End: 2024-08-07 | Stop reason: SURG

## 2024-08-07 RX ORDER — SODIUM CHLORIDE AND POTASSIUM CHLORIDE 150; 900 MG/100ML; MG/100ML
50 INJECTION, SOLUTION INTRAVENOUS CONTINUOUS
Status: DISCONTINUED | OUTPATIENT
Start: 2024-08-07 | End: 2024-08-10 | Stop reason: HOSPADM

## 2024-08-07 RX ORDER — SODIUM CHLORIDE 0.9 % (FLUSH) 0.9 %
10 SYRINGE (ML) INJECTION EVERY 12 HOURS SCHEDULED
Status: DISCONTINUED | OUTPATIENT
Start: 2024-08-07 | End: 2024-08-07 | Stop reason: HOSPADM

## 2024-08-07 RX ORDER — TRANEXAMIC ACID 10 MG/ML
1000 INJECTION, SOLUTION INTRAVENOUS ONCE
Status: DISCONTINUED | OUTPATIENT
Start: 2024-08-07 | End: 2024-08-07 | Stop reason: HOSPADM

## 2024-08-07 RX ORDER — PROPOFOL 10 MG/ML
VIAL (ML) INTRAVENOUS AS NEEDED
Status: DISCONTINUED | OUTPATIENT
Start: 2024-08-07 | End: 2024-08-07 | Stop reason: SURG

## 2024-08-07 RX ORDER — ASPIRIN 325 MG
325 TABLET ORAL DAILY
COMMUNITY

## 2024-08-07 RX ORDER — ACETAMINOPHEN 500 MG
1000 TABLET ORAL ONCE
Status: COMPLETED | OUTPATIENT
Start: 2024-08-07 | End: 2024-08-07

## 2024-08-07 RX ORDER — OXYCODONE HYDROCHLORIDE 5 MG/1
5 TABLET ORAL EVERY 6 HOURS PRN
Qty: 30 TABLET | Refills: 0 | Status: SHIPPED | OUTPATIENT
Start: 2024-08-07

## 2024-08-07 RX ADMIN — LIDOCAINE HYDROCHLORIDE 0.5 ML: 10 INJECTION, SOLUTION EPIDURAL; INFILTRATION; INTRACAUDAL; PERINEURAL at 07:03

## 2024-08-07 RX ADMIN — PRAVASTATIN SODIUM 20 MG: 20 TABLET ORAL at 20:17

## 2024-08-07 RX ADMIN — FENTANYL CITRATE 100 MCG: 50 INJECTION, SOLUTION INTRAMUSCULAR; INTRAVENOUS at 10:10

## 2024-08-07 RX ADMIN — OXYCODONE HYDROCHLORIDE 10 MG: 10 TABLET, FILM COATED, EXTENDED RELEASE ORAL at 07:03

## 2024-08-07 RX ADMIN — NORTRIPTYLINE HYDROCHLORIDE 20 MG: 10 CAPSULE ORAL at 21:27

## 2024-08-07 RX ADMIN — ROCURONIUM BROMIDE 50 MG: 10 INJECTION INTRAVENOUS at 07:38

## 2024-08-07 RX ADMIN — ACETAMINOPHEN 1000 MG: 500 TABLET, FILM COATED ORAL at 15:40

## 2024-08-07 RX ADMIN — LIDOCAINE HYDROCHLORIDE 50 MG: 10 INJECTION, SOLUTION EPIDURAL; INFILTRATION; INTRACAUDAL; PERINEURAL at 07:38

## 2024-08-07 RX ADMIN — SODIUM CHLORIDE 2 G: 900 INJECTION INTRAVENOUS at 07:43

## 2024-08-07 RX ADMIN — ACETAMINOPHEN 1000 MG: 500 TABLET ORAL at 07:03

## 2024-08-07 RX ADMIN — BUPIVACAINE HYDROCHLORIDE 20 ML: 2.5 INJECTION, SOLUTION EPIDURAL; INFILTRATION; INTRACAUDAL; PERINEURAL at 07:40

## 2024-08-07 RX ADMIN — HYDRALAZINE HYDROCHLORIDE 5 MG: 20 INJECTION INTRAMUSCULAR; INTRAVENOUS at 09:06

## 2024-08-07 RX ADMIN — ONDANSETRON 4 MG: 2 INJECTION INTRAMUSCULAR; INTRAVENOUS at 09:47

## 2024-08-07 RX ADMIN — SODIUM CHLORIDE 2000 MG: 900 INJECTION INTRAVENOUS at 15:39

## 2024-08-07 RX ADMIN — PROPOFOL 100 MG: 10 INJECTION, EMULSION INTRAVENOUS at 07:38

## 2024-08-07 RX ADMIN — POTASSIUM CHLORIDE AND SODIUM CHLORIDE 50 ML/HR: 900; 150 INJECTION, SOLUTION INTRAVENOUS at 17:28

## 2024-08-07 RX ADMIN — TRANEXAMIC ACID 1000 MG: 10 INJECTION, SOLUTION INTRAVENOUS at 07:48

## 2024-08-07 RX ADMIN — Medication 1000 MG: at 10:39

## 2024-08-07 RX ADMIN — LABETALOL HYDROCHLORIDE 5 MG: 5 INJECTION, SOLUTION INTRAVENOUS at 08:08

## 2024-08-07 RX ADMIN — ACETAMINOPHEN 1000 MG: 500 TABLET, FILM COATED ORAL at 20:16

## 2024-08-07 RX ADMIN — SODIUM CHLORIDE 9 ML/HR: 9 INJECTION, SOLUTION INTRAVENOUS at 07:03

## 2024-08-07 RX ADMIN — FLECAINIDE ACETATE 100 MG: 50 TABLET ORAL at 20:17

## 2024-08-07 RX ADMIN — TRANEXAMIC ACID 1000 MG: 10 INJECTION, SOLUTION INTRAVENOUS at 09:26

## 2024-08-07 RX ADMIN — SUGAMMADEX 200 MG: 100 INJECTION, SOLUTION INTRAVENOUS at 09:53

## 2024-08-07 NOTE — PLAN OF CARE
Goal Outcome Evaluation:  Plan of Care Reviewed With: patient, daughter           Outcome Evaluation: PT eval completed. Pt presents below baseline function d/t deficits in R knee ROM and strength, balance, and activity tolerance. Pt ambulated 40 ft w/ FWW and Vincent x2. No LOB or knee buckling noted. PADD score is 3. Pt would benefit from skilled IP PT. Recommend IRF at d/c.      Anticipated Discharge Disposition (PT): inpatient rehabilitation facility

## 2024-08-07 NOTE — THERAPY EVALUATION
Patient Name: Guicho Blanco  : 1940    MRN: [id number]                              Today's Date: 2024       Admit Date: 2024    Visit Dx:     ICD-10-CM ICD-9-CM   1. Primary osteoarthritis of right knee  M17.11 715.16     Patient Active Problem List   Diagnosis    Osteomyelitis of toe of right foot    Osteomyelitis of right foot    PAF (paroxysmal atrial fibrillation)    Coronary artery disease involving native coronary artery of native heart without angina pectoris    Gastroesophageal reflux disease without esophagitis    Benign non-nodular prostatic hyperplasia without lower urinary tract symptoms    S/P appy    Essential hypertension    Simple chronic bronchitis    Mixed hyperlipidemia    COPD (chronic obstructive pulmonary disease)    BPH (benign prostatic hypertrophy)    Hearing loss    Chest pain    Vertigo    Snoring    Overweight    PLMD (periodic limb movement disorder)    Neuropathy    Periodic headache syndrome, not intractable    Acquired absence of other right toe(s)    Tremor    Bilateral impacted cerumen    Bilateral sensorineural hearing loss    Chronic otitis externa    Deviated nasal septum    Disorder of joint of foot    ED (erectile dysfunction) of organic origin    Hydrocele    Hydrocele of testis    Impacted cerumen    Mononeuropathy    Swelling of testicle    Venous retinal branch occlusion    Vitreous degeneration    Benign prostatic hyperplasia with urinary obstruction    OA (osteoarthritis) of knee    Status post total right knee replacement     Past Medical History:   Diagnosis Date    Arthritis     Atrial fibrillation     BPH (benign prostatic hypertrophy)     Cataract     Cellulitis     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     bilateral    Coronary artery disease     Full dentures     Gall stones     GERD (gastroesophageal reflux disease)     Hearing loss     Hiatal hernia     Hyperlipidemia     Hypertension     Peripheral neuropathy     Peripheral vascular disease      Seasonal allergies     Skin cancer     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    TIA (transient ischemic attack)     2 times    Wears glasses      Past Surgical History:   Procedure Laterality Date    ABLATION OF DYSRHYTHMIC FOCUS      APPENDECTOMY      CARDIAC CATHETERIZATION N/A 10/05/2017    Procedure: Left Heart Cath;  Surgeon: Hector Urrutia MD;  Location: [facility] CATH INVASIVE LOCATION;  Service:     CATARACT EXTRACTION Bilateral     CHOLECYSTECTOMY      Remote    COCHLEAR IMPLANT REVISION  05/2019    COLONOSCOPY      PROSTATE SURGERY      SHOULDER SURGERY Right     SKIN BIOPSY      TOE AMPUTATION Right     2nd toe - Right Foot    TONSILLECTOMY        General Information       [facility] Name 08/07/24 1520          Physical Therapy Time and Intention    Document Type evaluation  -     Mode of Treatment physical therapy  -[facility] Name 08/07/24 1520          General Information    Patient Profile Reviewed yes  -     Prior Level of Function min assist:;all household mobility;gait;transfer;bed mobility;ADL's  Uses cane and FWW at baseline  -     Existing Precautions/Restrictions fall;other (see comments)  AC nerve catheter  -     Barriers to Rehab medically complex;previous functional deficit  -[facility] Name 08/07/24 1520          Living Environment    People in Home spouse  -[facility] Name 08/07/24 1520          Home Main Entrance    Number of Stairs, Main Entrance three;other (see comments)  2+1  -     Stair Railings, Main Entrance none  -[facility] Name 08/07/24 1520          Stairs Within Home, Primary    Number of Stairs, Within Home, Primary none  -[facility] Name 08/07/24 1520          Cognition    Orientation Status (Cognition) oriented to;person;verbal cues/prompts needed for orientation;time  conversational confusion at times  -[facility] Name 08/07/24 1520          Safety Issues, Functional Mobility    Safety Issues Affecting Function (Mobility) awareness of need for assistance;insight into  deficits/self-awareness;safety precaution awareness;safety precautions follow-through/compliance;sequencing abilities;judgment;positioning of assistive device  -     Impairments Affecting Function (Mobility) balance;endurance/activity tolerance;pain;range of motion (ROM);strength  -               User Key  (r) = Recorded By, (t) = Taken By, (c) = Cosigned By      Initials Name Provider Type     Carline Kraft PT Physical Therapist                   Mobility       [facility] Name 08/07/24 1524          Bed Mobility    Bed Mobility supine-sit  -     Supine-Sit [place] (Bed Mobility) minimum assist (75% patient effort);2 person assist;verbal cues  -     Assistive Device (Bed Mobility) bed rails;head of bed elevated  -     Comment, (Bed Mobility) VCs for hand placement and sequencing. Assist to upright trunk  -LH       Row Name 08/07/24 1524          Transfers    Comment, (Transfers) VCs for hand placement and sequencing w/ FWW. Cues to step out RLE prior to standing/sitting  -LH       Row Name 08/07/24 1524          Sit-Stand Transfer    Sit-Stand [place] (Transfers) moderate assist (50% patient effort);2 person assist;verbal cues  [facility]     Assistive Device (Sit-Stand Transfers) walker, front-wheeled  -     Comment, (Sit-Stand Transfer) STS from EOB. Required cues to grab FWW once in standing  -[facility] Name 08/07/24 1524          Gait/Stairs (Locomotion)    [place] Level (Gait) minimum assist (75% patient effort);2 person assist;verbal cues  [facility]     Assistive Device (Gait) walker, front-wheeled  [facility]     Patient was able to Ambulate yes  -     Distance in Feet (Gait) 40  -     Deviations/Abnormal Patterns (Gait) right sided deviations;bilateral deviations;harleen decreased;gait speed decreased;stride length decreased;weight shifting decreased  -     Bilateral Gait Deviations forward flexed posture;heel strike decreased  -     Right Sided Gait Deviations weight shift ability  decreased;heel strike decreased;decreased knee extension  -     Comment, (Gait/Stairs) Pt demo step through gait pattern w/ decreased WB through RLE and decreased R knee extension. VCs for sequencing, upright posture, increased WB through RLE, increased knee extension, and increased step length. Heavy reliance noted through FWW. No LOB or knee buckling noted. Distance limited by fatigue  -[facility] Name 08/07/24 1524          Mobility    Extremity Weight-bearing Status right lower extremity  -     Right Lower Extremity (Weight-bearing Status) weight-bearing as tolerated (WBAT)  -               User Key  (r) = Recorded By, (t) = Taken By, (c) = Cosigned By      Initials Name Provider Type     Carline Kraft, EL Physical Therapist                   Obj/Interventions       [facility] Name 08/07/24 1530          Range of Motion Comprehensive    General Range of Motion lower extremity range of motion deficits identified  -     Comment, General Range of Motion Surgical knee ROM: 9-89  -[facility] Name 08/07/24 1530          Strength Comprehensive (MMT)    General Manual Muscle Testing (MMT) Assessment lower extremity strength deficits identified  -     Comment, General Manual Muscle Testing (MMT) Assessment Pt able to perform B active ankle DF and SLR  -[facility] Name 08/07/24 1530          Motor Skills    Therapeutic Exercise knee;ankle  -[facility] Name 08/07/24 1530          Knee (Therapeutic Exercise)    Knee (Therapeutic Exercise) isometric exercises;strengthening exercise  -     Knee Isometrics (Therapeutic Exercise) right;quad sets;10 repetitions  -     Knee Strengthening (Therapeutic Exercise) right;heel slides;SLR (straight leg raise);SAQ (short arc quad);LAQ (long arc quad);10 repetitions  -[facility] Name 08/07/24 1530          Ankle (Therapeutic Exercise)    Ankle (Therapeutic Exercise) AROM (active range of motion)  -     Ankle AROM (Therapeutic Exercise)  bilateral;dorsiflexion;plantarflexion  -[facility] Name 08/07/24 1530          Balance    Balance Assessment sitting static balance;sitting dynamic balance;sit to stand dynamic balance;standing static balance;standing dynamic balance  -     Static Sitting Balance contact guard  -     Dynamic Sitting Balance contact guard  -     Position, Sitting Balance unsupported;sitting edge of bed  -     Sit to Stand Dynamic Balance moderate assist;2-person assist;verbal cues  -     Static Standing Balance minimal assist;verbal cues  -     Dynamic Standing Balance 2-person assist;minimal assist;verbal cues  -     Position/Device Used, Standing Balance supported;walker, front-wheeled  -     Balance Interventions sitting;standing;sit to stand;occupation based/functional task  -     Comment, Balance No LOB or knee buckling noted  -LH       Row Name 08/07/24 1530          Sensory Assessment (Somatosensory)    Sensory Assessment (Somatosensory) LE sensation intact  -               User Key  (r) = Recorded By, (t) = Taken By, (c) = Cosigned By      Initials Name Provider Type     Carline Kraft, PT Physical Therapist                   Goals/Plan       Row Name 08/07/24 1536          Bed Mobility Goal 1 (PT)    Activity/Assistive Device (Bed Mobility Goal 1, PT) sit to supine/supine to sit  -     [place] Level/Cues Needed (Bed Mobility Goal 1, PT) modified independence  -     Time Frame (Bed Mobility Goal 1, PT) long term goal (LTG);3 days  -[facility] Name 08/07/24 1536          Transfer Goal 1 (PT)    Activity/Assistive Device (Transfer Goal 1, PT) sit-to-stand/stand-to-sit  -     [place] Level/Cues Needed (Transfer Goal 1, PT) minimum assist (75% or more patient effort)  -     Time Frame (Transfer Goal 1, PT) short term goal (STG);2 days  -[facility] Name 08/07/24 1536          Gait Training Goal 1 (PT)    Activity/Assistive Device (Gait Training Goal 1, PT) gait (walking locomotion)   -     [place] Level (Gait Training Goal 1, PT) modified independence  -     Distance (Gait Training Goal 1, PT) 150 ft  -     Time Frame (Gait Training Goal 1, PT) long term goal (LTG);3 days  -[facility] Name 08/07/24 1536          ROM Goal 1 (PT)    ROM Goal 1 (PT) Surgical Knee ROM: 0-90  -     Time Frame (ROM Goal 1, PT) long-term goal (LTG);3 days  -LH       Row Name 08/07/24 1536          Therapy Assessment/Plan (PT)    Planned Therapy Interventions (PT) balance training;bed mobility training;gait training;home exercise program;ROM (range of motion);patient/family education;stair training;strengthening;stretching;transfer training  -               User Key  (r) = Recorded By, (t) = Taken By, (c) = Cosigned By      Initials Name Provider Type     Carline Kraft, PT Physical Therapist                   Clinical Impression       Row Name 08/07/24 1532          Pain    Pretreatment Pain Rating 0/10 - no pain  -     Posttreatment Pain Rating 4/10  -     Pain Location - Side/Orientation Right  -     Pain Location anterior  -     Pain Location - knee  -     Pain Intervention(s) Repositioned;Ambulation/increased activity;Cold applied;Cold pack;Elevated  -LH       Row Name 08/07/24 1532          Plan of Care Review    Plan of Care Reviewed With patient;daughter  -     Outcome Evaluation PT eval completed. Pt presents below baseline function d/t deficits in R knee ROM and strength, balance, and activity tolerance. Pt ambulated 40 ft w/ FWW and Vincent x2. No LOB or knee buckling noted. PADD score is 3. Pt would benefit from skilled IP PT. Recommend IRF at d/c.  -[facility] Name 08/07/24 5952          Therapy Assessment/Plan (PT)    Patient/Family Therapy Goals Statement (PT) to go to rehab  -     Rehab Potential (PT) good, to achieve stated therapy goals  -     Criteria for Skilled Interventions Met (PT) yes;meets criteria;skilled treatment is necessary  -     Therapy Frequency (PT)  2 times/day  -     Predicted Duration of Therapy Intervention (PT) 5 days  -       Row Name 08/07/24 1532          Positioning and Restraints    Pre-Treatment Position in bed  -     Post Treatment Position chair  -LH     In Chair notified nsg;reclined;sitting;call light within reach;encouraged to call for assist;exit alarm on;with family/caregiver;waffle cushion;legs elevated;R heel elevated;compression device;with other staff  -               User Key  (r) = Recorded By, (t) = Taken By, (c) = Cosigned By      Initials Name Provider Type     Carline Kraft, PT Physical Therapist                   Outcome Measures       Row Name 08/07/24 1538 08/07/24 1500       How much help from another person do you currently need...    Turning from your back to your side while in flat bed without using bedrails? 3  - 3  -CC    Moving from lying on back to sitting on the side of a flat bed without bedrails? 3  - 3  -CC    Moving to and from a bed to a chair (including a wheelchair)? 2  - 3  -CC    Standing up from a chair using your arms (e.g., wheelchair, bedside chair)? 2  - 3  -CC    Climbing 3-5 steps with a railing? 2  - 3  -CC    To walk in hospital room? 3  - 3  -CC    AM-PAC 6 Clicks Score (PT) 15  - 18  -CC    Highest Level of Mobility Goal 4 --> Transfer to chair/commode  - 6 --> Walk 10 steps or more  -      Row Name 08/07/24 1538          PADD    Diagnosis 1  -     Gender 2  -     Age Group 0  -     Gait Distance 0  -     Assist Level 0  -     Home Support 0  -     PADD Score 3  -     Patient Preference extended rehabilitation  -     Prediction by PADD Score extended rehabilitation  -       Row Name 08/07/24 1538          Functional Assessment    Outcome Measure Options AM-PAC 6 Clicks Basic Mobility (PT);PADD  -               User Key  (r) = Recorded By, (t) = Taken By, (c) = Cosigned By      Initials Name Provider Type     Carline Kraft, EL Physical Therapist    CC  Akhil Tapia RN Registered Nurse                                 Physical Therapy Education       Title: PT OT SLP Therapies (In Progress)       Topic: Physical Therapy (Done)       Point: Mobility training (Done)       Learning Progress Summary             Patient Acceptance, E, VU,DU,NR by  at 8/7/2024 1539    Comment: educated on safety w/ mobility, WBAT status, gait training, AD use, HEP, and d/c planning   Family Acceptance, E, VU,DU,NR by  at 8/7/2024 1539    Comment: educated on safety w/ mobility, WBAT status, gait training, AD use, HEP, and d/c planning                         Point: Home exercise program (Done)       Learning Progress Summary             Patient Acceptance, E, VU,DU,NR by  at 8/7/2024 1539    Comment: educated on safety w/ mobility, WBAT status, gait training, AD use, HEP, and d/c planning   Family Acceptance, E, VU,DU,NR by  at 8/7/2024 1539    Comment: educated on safety w/ mobility, WBAT status, gait training, AD use, HEP, and d/c planning                         Point: Body mechanics (Done)       Learning Progress Summary             Patient Acceptance, E, VU,DU,NR by  at 8/7/2024 1539    Comment: educated on safety w/ mobility, WBAT status, gait training, AD use, HEP, and d/c planning   Family Acceptance, E, VU,DU,NR by  at 8/7/2024 1539    Comment: educated on safety w/ mobility, WBAT status, gait training, AD use, HEP, and d/c planning                         Point: Precautions (Done)       Learning Progress Summary             Patient Acceptance, E, VU,DU,NR by  at 8/7/2024 1539    Comment: educated on safety w/ mobility, WBAT status, gait training, AD use, HEP, and d/c planning   Family Acceptance, E, VU,DU,NR by  at 8/7/2024 1539    Comment: educated on safety w/ mobility, WBAT status, gait training, AD use, HEP, and d/c planning                                         User Key       Initials Effective Dates Name Provider Type Discipline     09/21/23 -   Carline Kraft, PT Physical Therapist PT                  PT Recommendation and Plan  Planned Therapy Interventions (PT): balance training, bed mobility training, gait training, home exercise program, ROM (range of motion), patient/family education, stair training, strengthening, stretching, transfer training  Plan of Care Reviewed With: patient, daughter  Outcome Evaluation: PT eval completed. Pt presents below baseline function d/t deficits in R knee ROM and strength, balance, and activity tolerance. Pt ambulated 40 ft w/ FWW and Vincent x2. No LOB or knee buckling noted. PADD score is 3. Pt would benefit from skilled IP PT. Recommend IRF at d/c.     Time Calculation:   PT Evaluation Complexity  History, PT Evaluation Complexity: 3 or more personal factors and/or comorbidities  Examination of Body Systems (PT Eval Complexity): total of 4 or more elements  Clinical Presentation (PT Evaluation Complexity): evolving  Clinical Decision Making (PT Evaluation Complexity): moderate complexity  Overall Complexity (PT Evaluation Complexity): moderate complexity     PT Charges       Row Name 08/07/24 1541             Time Calculation    Start Time 1444  -LH      PT Received On 08/07/24  -      PT Goal Re-Cert Due Date 08/17/24  -         Timed Charges    70963 - Gait Training Minutes  8  -LH         Untimed Charges    PT Eval/Re-eval Minutes 49  -LH         Total Minutes    Timed Charges Total Minutes 8  -LH      Untimed Charges Total Minutes 49  -LH       Total Minutes 57  -LH                User Key  (r) = Recorded By, (t) = Taken By, (c) = Cosigned By      Initials Name Provider Type     Carline Kraft, PT Physical Therapist                  Therapy Charges for Today       Code Description Service Date Service Provider Modifiers Qty    56943751230 HC GAIT TRAINING EA 15 MIN 8/7/2024 Carline Kraft, PT GP 1    01603246846 HC PT EVAL MOD COMPLEXITY 4 8/7/2024 Carline Kraft, PT GP 1            PT G-Codes  Outcome  Measure Options: AM-PAC 6 Clicks Basic Mobility (PT), PADD  AM-PAC 6 Clicks Score (PT): 15  PT Discharge Summary  Anticipated Discharge Disposition (PT): inpatient rehabilitation facility    Carline Kraft, EL  8/7/2024

## 2024-08-07 NOTE — ANESTHESIA PROCEDURE NOTES
Peripheral Block      Patient reassessed immediately prior to procedure    Reason for block: at surgeon's request and post-op pain management  Performed by  CRNA/CAA: Aayush Guillermo CRNA  Preanesthetic Checklist  Completed: patient identified, IV checked, site marked, risks and benefits discussed, surgical consent, monitors and equipment checked, pre-op evaluation and timeout performed  Prep:  Pt Position: supine  Sterile barriers:cap, gloves, mask, sterile barriers and washed/disinfected hands  Prep: ChloraPrep  Patient monitoring: blood pressure monitoring, continuous pulse oximetry and EKG  Procedure  Performed under: spinal  Guidance:ultrasound guided    ULTRASOUND INTERPRETATION.  Using ultrasound guidance a 20 G gauge needle was placed in close proximity to the nerve, at which point, under ultrasound guidance anesthetic was injected in the area of the nerve and spread of the anesthesia was seen on ultrasound in close proximity thereto.  There were no abnormalities seen on ultrasound; a digital image was taken; and the patient tolerated the procedure with no complications. Images:still images obtained, printed/placed on chart    Laterality:right  Block Type:adductor canal block  Injection Technique:single-shot  Needle Type:echogenic and Quincke  Needle Gauge:20 G  Resistance on Injection: none  Catheter size: 20g.    Medications Used: bupivacaine PF (MARCAINE) 0.25 % injection - Injection   20 mL - 8/7/2024 7:40:00 AM      Post Assessment  Injection Assessment: negative aspiration for heme, incremental injection and no paresthesia on injection  Patient Tolerance:comfortable throughout block  Complications:no  Additional Notes  SINGLE shot   A high-frequency linear transducer, with sterile cover, was placed on the anterior mid-thigh (between the anterior superior iliac spine and patella). The transducer was then moved medially to identify the Sartorius muscle (Melani), Vastus Medialis muscle (VMM), Superficial  "Femoral Artery (SFA) and Vein. The transducer was then moved cephalad or caudad to position the SFA in the middle of the Melani. The insertion site was prepped and draped in sterile fashion. Skin and cutaneous tissue was infiltrated with 2-5 ml of 1% Lidocaine. Using ultrasound-guidance, a 20-gauge B-Gamino 4\" Ultraplex 360 non-stimulating echogenic needle was advanced in plane from lateral to medial. Preservative-free normal saline was utilized for hydro-dissection of tissue, advancement of needle, and to confirm needle placement below the fascial plane of the Melani where the Nerve to the VMM is located. Local anesthetic (LA) 5 ml deposited here. The needle continues its path lateral to the SFA at the level of the Saphenous Nerve. The remainder of the LA was deposited at the 10-11 o'clock position of the SFA. This injection created a space between the Melani and the SFA. Aspiration every 5 ml to prevent intravascular injection. Injection was completed with negative aspiration of blood and negative intravascular injection. Injection pressures were normal with minimal resistance.   Performed by: Pasha Hung CRNA            "

## 2024-08-07 NOTE — ANESTHESIA PROCEDURE NOTES
Peripheral Block    Pre-sedation assessment completed: 8/7/2024 10:33 AM    Patient reassessed immediately prior to procedure    Start time: 8/7/2024 10:33 AM  Reason for block: at surgeon's request and post-op pain management  Performed by  CRNA/CAA: Fabrizio Wynne, LISA  Assisted by: Ebony Musa RN  Preanesthetic Checklist  Completed: patient identified, IV checked, site marked, risks and benefits discussed, surgical consent, monitors and equipment checked, pre-op evaluation and timeout performed  Prep:  Pt Position: supine  Sterile barriers:cap, gloves, mask, sterile barriers and washed/disinfected hands  Prep: ChloraPrep  Patient monitoring: blood pressure monitoring, continuous pulse oximetry and EKG  Procedure  Performed under: spinal  Guidance:ultrasound guided    ULTRASOUND INTERPRETATION.  Using ultrasound guidance a 20 G gauge needle was placed in close proximity to the nerve, at which point, under ultrasound guidance anesthetic was injected in the area of the nerve and spread of the anesthesia was seen on ultrasound in close proximity thereto.  There were no abnormalities seen on ultrasound; a digital image was taken; and the patient tolerated the procedure with no complications. Images:still images obtained, printed/placed on chart    Laterality:right  Block Type:adductor canal block  Injection Technique:catheter  Needle Type:Tuohy and echogenic  Needle Gauge:18 G  Resistance on Injection: none  Catheter Size:20 G (20g)  Cath Depth at skin: 8 cm          Medications  Preservative Free Saline:101ml    Post Assessment  Injection Assessment: negative aspiration for heme, incremental injection and no paresthesia on injection  Patient Tolerance:comfortable throughout block  Complications:no  Additional Notes  CATHETER   A high-frequency linear transducer, with sterile cover, was placed on the anterior mid-thigh (between the anterior superior iliac spine and patella). The transducer was then moved  "medially to identify the Sartorius muscle (Melani), Vastus Medialis muscle (VMM), Superficial Femoral Artery (SFA) and Vein. The transducer was then moved cephalad or caudad to position the SFA in the middle of the Melani. The insertion site was prepped and draped in sterile fashion. Skin and cutaneous tissue was infiltrated with 2-5 ml of 1% Lidocaine. Using ultrasound-guidance, an 18-gauge Flexisiplex Ultra 360 Touhy needle was advanced in plane from lateral to medial. Preservative-free normal saline was utilized for hydro-dissection of tissue, advancement of Touhy, and to confirm needle placement below the fascial plane of the Melani where the Nerve to the VMM is located. Local anesthetic (LA) 5 ml deposited here. The Touhy needle continues its path lateral to the SFA at the level of the Saphenous Nerve. The remainder of the LA was deposited at the 10-11 o'clock position of the SFA. This injection created a space between the Melani and the SFA. Aspiration every 5 ml to prevent intravascular injection. Injection was completed with negative aspiration of blood and negative intravascular injection. Injection pressures were normal with minimal resistance. A 20-gauge Flexisiplex Echo catheter was placed through the needle and advance out the tip of the Touhy 3-5 cm anterior to the SFA. The Touhy needle was then removed, and final catheter position verified at the 12 o'clock position to the SFA. The catheter was secured in the usual fashion with skin glue, benzoin, steri-strips, CHG tegaderm and label noting \"Nerve Block Catheter\". Jerk tape applied at yellow connector and catheter connection.   Performed by: Pasha Hung CRNA            "

## 2024-08-07 NOTE — DISCHARGE INSTRUCTIONS
Discharge Instructions--Total Knee Replacement  Dr. Schwartz      Congratulations!  You have just completed knee replacement surgery.  The knee joint forms where the thigh bone, shin bone, and kneecap meet.  The knee joint is supported by muscles and ligaments.  It is lined with a cushioning called cartilage.  Over time, the cartilage wears away which can make the knee feel stiff and painful.  Dr. Schwartz replaced your painful joint with an artificial joint to relieve the pain and restore range of motion.  Here are some directions to follow once you are at home.        **Medication/Pain management at home**    Medication schedule  A.  Expect to have pain following surgery.  Our goal is to maintain a manageable pain level.  The pain medications prescribed will provide relief but do not take away all of the pain.  The first few days after surgery can be the most painful.  Just keep in mind that it will get better.  B.  Staying on top of your pain with the combination of medications prescribed to you on a regular schedule is the best way to keep the pain from getting too severe.  You can begin weaning off the pain medication (i.e. oxycodone, Tylenol, ibuprofen) as symptoms allow.  Many patients are afraid to take the pain medication but you must make sure that your pain is controlled well enough to fully participate with physical therapy.  Taking medication to control the pain 30 minutes before therapy is often a good strategy to get the most out of the exercises.  C.  The most effective way to take the medications is to take the Ibuprofen and Tylenol together every 8 hours.  If that does not do the trick, then supplement with the oxycodone or hydrocodone.  If you are taking the oxycodone or hydrocodone, please be sure to also take the Colace (stool softener) to reduce the risk of constipation.  One of the major side effects of opioid pain medications is constipation.  Please keep this in mind.  D.  Do not  wait until your pain level is at an 8 or 9 before taking medications.  At this point, you have already lost control of your pain and it will take a higher dosage to get back to a comfortable level.  Remember that the medications you are on take between 20 to 30 minutes to begin taking effect.    2.  Ice/compression/elevation  A.  Icing is helpful to reduce pain and swelling.  Icing 20 minutes at a time at least 3 times a day will be beneficial.  Do not place the ice directly on the skin itself but use a barrier such as a washcloth or a towel between the ice and your skin.  B.  Elevation works wonders for swelling.  Remember that the leg must be elevated at or above the heart for this to be effective.  C.  Remember not to place pillows directly under the knee but instead place them under your heel and ankle.  Placing pillows directly into your knee may lead to stiffness and difficulty regaining your extension.  D.  Sitting in a chair with the leg extended in front of you is not sufficient elevation and we will not make a significant difference in your swelling.  If your ankle is below your heart, your swelling may worsen.  E.  Compression is very helpful on the leg when you cannot elevate or ice.  Using an Ace wrap or compression sleeve are two good options.  Be sure to wrap the ace wrap starting from the foot and continuing up above the knee.      3.  Blood clot prevention  A.  Because you have had knee replacement surgery, you are at higher risk for developing blood clots.  The more active you are, the lower the risk of developing blood clots.  B.  We also reduce the risk of this rare complication from surgery by giving you medication such as aspirin, Eliquis, or Xarelto.  Please let Dr. Schwartz know if you have a personal history of blood clots or a first-degree relative has a history of blood clots that you did not tell him about preoperatively.  This may require a change in your blood clot prevention  medication that he has prescribed.  C.  The blood clot prevention medications must be taken as scheduled and started the day after surgery.  Please let the office know if you are having trouble tolerating the medication or affording the medication.    4.  Planning for pain medication refills   A.  Keep track of the number of pills you are taking on a daily basis and how many you have left.   B.  It takes on average 24-hours for our office to refill medications  C.  Medications will not be called in on the weekends or after hours.  Most of the time,  the doctors that are on call for emergencies are not familiar with your case and will not call in pain medication.  D.  Be especially aware if the weekend is coming up and plan accordingly.  If you feel like you may run out of medication over the weekend, please call earlier in the week.    5.  Resuming normal home medications  A.  Unless directed otherwise by your primary care physician, Dr. Schwartz, or the hospitalist, you may resume all normal home medications after discharge from the hospital.  If you have questions please call the office.  B.  If you are on medications prescribed by a specialist (cardiologist, rheumatologist, etc.) contact their office about any questions or changes to those medications following surgery.      **Incision Care**    Your Exofin Fusion dressing system is designed to stay in place until your first follow-up appointment in 3 weeks.  Underneath the Exofin Fusion dressing system (looks similar to drywall tape), there are absorbable sutures which will dissolve on their own over time.  On occasion, people have a reaction to the Exofin Fusion dressing system and develop a rash.  Please call the office if this occurs.  You may shower following surgery once your nerve block has been removed.  Please keep the incision clean and dry, however.  The best way to do this is with cling wrap or a plastic bag taped on both ends.  Sit on a shower chair  or stool when you shower to keep from falling.  While you may shower normally after surgery, baths unfortunately will disrupt the dressing and increase your risk of infection.  Similarly, do not submerge your operative leg in a bathtub, swimming pool, or hot tub until you have been cleared to do so by Dr. Schwartz.  It usually takes about 6 weeks of healing time until these activities are allowed.  If your Exofin Fusion dressing system starts to peel off before your appointment, simply trim the edges and leave as much of it intact as possible.  After the Exofin Fusion dressing system has been removed at your 3-week postop appointment, do not put any creams, lotions, antibiotic ointments, or scar creams on your incision.  Your incision needs to be completely healed (no scabs) until these products can be used safely.  Your operative knee will be warmer to the touch than your nonsurgical knee for at least a few months.  If you notice, however, that your knee is very hot to the touch, associated with any visible drainage or marked redness, or you are having a fever or a dramatic increase in your pain not related to activity, please call the office or the surgeon on call immediately.  If you went home the same day as your surgery, you will notice that you have a large bulky dressing consisting of an ACE wrap and some cotton soft roll.  On the first day after surgery, you should remove both the ACE wrap and cotton wrap which will help you feel less constricted with range of motion exercises.  Save the ACE wrap in case you need it to protect the Exofin Fusion dressing or the add some compression to your knee/leg if you are having some swelling.    There are two incisions from the surgery on your knee:  a long incision over the center of the knee and a shorter one in the middle of the tibia.  The long incision is covered with the Exofin Fusion dressing discussed above.  The shorter incision is covered with gauze and a see  through adhesive called a Tegaderm.  This dressing may be kept on until your follow up appointment or removed if it becomes restrictive.  Do your best to keep this shorter incision clean and dry as well.        **Activity**    You will have 1-2 physical therapy sessions while in the hospital.  Please make sure you have a physical therapy appointment scheduled within 3 to 5 days of returning home.  If you do not or have not heard anything with regards to therapy scheduling, please contact the office.  Typically physical therapy is scheduled during your preop visit or during your hospital visit.  Very important!  Get that knee moving!  Range of motion is the MOST important aspect of your rehab in the first 6 weeks after surgery.  Once you have reached sufficient range of motion, your physical therapist will advance your rehab and add strengthening exercises.  In most cases, once you have achieved your range of motion goals, the pain from surgery improves significantly.    Your range of motion goals are 0 degrees of extension and 90 degrees of flexion at the 3-week visit.  By 6 weeks, we want 0 degrees of extension and 120 degrees of flexion.  In rare cases, if you fail to meet your range of motion goals, we may have to take you back to the operating room to perform a procedure to restore your range of motion.  This is called a manipulation under anesthesia and not something we love doing.  This can be avoided by being vigilant about restoring your range of motion as soon as possible before scar tissue forms.  It is extremely rare to overdo it when it comes to exercise and your therapy exercises.  This occurs less than 1% of the time.    Daily movement is important to improve your comfort following surgery.  You should plan on some gentle walking and stretching 2-3 times a day in addition to your physical therapy exercises.  Recovery from a knee replacement takes time.  There will be good days where you feel hardly any  pain and bad days following the surgery where you feel your pain and swelling are greater.  Know that your body is healing from a major surgery and pay attention to cues when you need to rest and take a break.  Lean on your physical therapist to help you differentiate between good pain and bad pain.  Sit in chairs with arms.  The arms make it easier for you to stand up and sit down.  Do not sit for more than 30 to 45 minutes at a time.  Nap if you are tired but do not stay in bed all day.  Do not drive until your healthcare provider says it is okay.  Most people can start driving at about 6 weeks after surgery.  Do not drive while you are taking opioid pain medication.  Remove items that may cause you to fall such as throw rugs and electrical cords.  Use nonslip bath mats, grab bars, and elevated toilet seat, and a shower chair in your bathroom.  Until your balance, flexibility, and strength improved, use a cane or walker.  Typically your physical therapist will tell you when you can wean from these devices.  Tells your dentist and all your healthcare providers that you have an artificial joint.  You will be directed to take antibiotics before for any dental work, dental cleanings, and any major or minor surgery.  Dental work is not allowed until 6 weeks after surgery.          **Post surgical appointments**    Be sure to schedule your outpatient physical therapy appointments before your surgery takes place.  Once you are discharged from the hospital, expect to attend physical therapy 2-3 times a week for the first 6 weeks.  On days when you are not attending formal therapy, you are expected to do your home exercises given to you by your therapist.  If your insurance covers the [employer] device, it is important to use this as directed, especially on the days when you are not attending formal physical therapy.  Plan to take your pain medication about 30 minutes before your physical therapy sessions.  If you are taking  narcotic pain medicine prior to your therapy, please be sure to have a  to take you to and from your appointments.  Please make sure you have a post operative appointment to see Dr. Schwartz or his PA around 3 weeks after surgery.  This appointment is typically made by the surgery scheduler but sometimes things happen.  If you do not have an appointment, please call the office.          **Call 911**  Call 911 right away if you are experiencing chest pain or shortness of breath        **Who to contact**    Call your healthcare provider if any of the following occur:  Fever of 100 or higher  Pain or swelling in your calf  Shaking chills  Stiffness or inability to move the knee  Increased swelling in your leg  Increased redness, tenderness, or swelling in or around the knee incision  Drainage from the knee incision  Increased knee pain  Our office is open from 8 AM to 4:30 PM Monday through Thursday and 8 AM to 3 PM on Fridays.  The office number is [phone].  If you need to contact someone after hours, please call the [employer] and asked for the orthopedic surgeon on-call for Dr. Schwartz.  That phone number is [phone].    InfuBLOCK - Patient Information    What is a pain pump?  The InfuBLOCK pump delivers post-operative, non-narcotic, numbing medication to the nerve near the surgical site for pain relief.     Where can I find information about my pain pump?           For more information about your pain pump, scan the QR code.  For additional patient resources, visit [email or web address]-[email or web address]-management.                                                                                               While your physician is your primary source for information about your treatment there may be times during your treatment that you need assistance with your infusion pump.     If you need assistance take the following steps:    The [facility] Hotline is Here for You 24/7.   Please call 1-206.461.1219 for the following concerns or complications:    Answers to questions about your infusion pump                 Tubing disconnect  Assistance with pump alarms                                                      Dislodged catheter  Excessive leakage noted from pump                                         Inadequate pain control    2.   [facility] Anesthesia Acute Pain Service: [phone] is available 24/7 for any further needs or concerns about medication or pain control.     -------------------------------------------------------------------------    Nerve Catheter Removal Instructions  When your device is empty:    Remove your catheter by pulling the dressing off slowly (like you would remove a regular bandage). The catheter should pull right out of the skin.  Check that the BLUE tip is intact.                                                                                     If the catheter is stuck, reposition your   extremity and pull slowly until removed.  *If catheter is HURTING and WON'T come out, stop and call [phone] for further assistance.    Remove medication bag from the black carrying case.  Cut the tubing on right and left side of pump, and discard the medication bag and tubing into garbage.  Place the pump and black carrying case into the plastic bag and then place this into the return box.  Seal box with blue stickers and return to [employer] postal service. THIS IS PRE-PAID POSTAGE.        -------------------------------------------------------------------------    [facility] COLD THERAPY - PATIENT INSTRUCTION SHEET    Cold Compression Therapy for your comfort and rehabilitation  Your caregivers want you to be productive in your rehab and comfortable during your stay. In keeping with those goals, you will be receiving an [facility] Cold Therapy Wrap to help ease post-operative pain and swelling that might keep you from getting back on track! Your SMI Cold Therapy Wrap is  effective and simple-to-use, and you will be encouraged to apply it throughout your hospital stay and at home through the duration of your recovery.    When you are ready to go home  Be sure to take your SMI Cold Therapy Wrap and both sets of Gel Bags with you for continued comfort and use throughout your rehabilitation. If you don't already have them, ask your nurse or aide to retrieve your SMI Gel Bags from the patient freezer.    Home use precautions  Always follow your medical professional's application instructions upon discharge. Your SMI Cold Therapy Wrap and Gel Bags are designed to last for months following your surgery. Never heat the Gel Bags unless specified by your healthcare provider. Supervision is advised when using this product on children or geriatric patients. To avoid danger of suffocation, please keep the outer plastic packaging away from children & pets.    Cold Therapy Instructions  Place Gel Bags in a freezer set ¾ of the way to max temperature for at least (4) hours. For best results, lay the Gel Bags flat and [id number] in the freezer. Once frozen, slide Gel Bags into the gel pouch and secure your wrap to the affected area with the straps.  Gel wraps that have been stored in a freezer for an extended period of time may require a (10) minute period of softening up in a room temperature environment before application.  The gel pouch acts as a protective barrier. NEVER place frozen bags directly onto skin, as this may cause frostbite injury.  The SMI Cold Therapy Wrap is designed to be able to be worm while ambulating. The compression straps can be secured well enough so that the Wrap won't fall off while moving.  Wrap Application Videos can be viewed at [email or web address].  An additional protective barrier such as clothing, a washcloth, hand-towel or pillowcase may be used during prolonged treatment applications.  The Gel-Pouch and Wrap are both Latex-Free and the Gel Bag ingredients  are non toxic.    [facility] Wrap care instructions  The [facility] Cold Therapy Wrap may be hand washed and hung to dry when needed.    [facility] re-order information  Additional [facility] body specific wraps and/or Gel Bags can be re-ordered from [email or web address] or call 852-ICE-WRAP ([phone])

## 2024-08-07 NOTE — ANESTHESIA POSTPROCEDURE EVALUATION
Patient: Guicho Blanco    Procedure Summary       Date: 08/07/24 [facility] / [facility]    Anesthesia Start: 0733 Anesthesia Stop: 1032    Procedure: TOTAL KNEE ARTHROPLASTY WITH ASHLEY ROBOT (Right: Knee) Diagnosis:       Primary osteoarthritis of right knee      (Primary osteoarthritis of right knee [M17.11])    Surgeons: Christian Schwartz MD Provider: Ebony Paredes DO    Anesthesia Type: general with block ASA Status: 3            Anesthesia Type: general with block    Vitals  Vitals Value Taken Time   BP 75/43 08/07/24 1028   Temp     Pulse 73 08/07/24 1032   Resp     SpO2 97 % 08/07/24 1032   Vitals shown include unfiled device data.        Post Anesthesia Care and Evaluation    Patient location during evaluation: PACU  Patient participation: complete - patient participated  Level of consciousness: awake  Pain score: 0  Pain management: adequate    Airway patency: patent  Anesthetic complications: No anesthetic complications  PONV Status: none  Cardiovascular status: acceptable and stable  Respiratory status: nasal cannula, unassisted, acceptable and spontaneous ventilation  Hydration status: acceptable

## 2024-08-07 NOTE — LETTER
EMS Transport Request  [facility], [place], [place], [place], and [place] only   Patient Name: Guicho Blanco : 1940   Weight:94.8 kg (209 lb) Pick-up Location: [facility] BLS/ALS: BLS/ALS: BLS   Insurance: [employer] MEDICARE REPLACEMENT Auth End Date:    Pre-Cert #: D/C Summary complete:    Destination: [facility]   Contact Precautions: None   Equipment (O2, Fluids, etc.): None   Arrive By Date/Time: Saturday, 8/10/24,  mid day please- patient has to transfer out of this bed at [facility]. Stretcher/WC: Stretcher   CM Requesting: Millie Masterson RN Ext: [phone]   Notes/Medical Necessity:      ______________________________________________________________________    *Only 2 patient bags OR 1 carry-on size bag are permitted.  Wheelchairs and walkers CANNOT transported with the patient. Acknowledge: Yes

## 2024-08-07 NOTE — H&P
Pre-Op H&P  Guicho Blanco  [id number]  1940    Chief complaint: R knee pain     HPI:  Patient is a 83 y.o.male who presents with chronic right knee pain that continues to worsen despite a variety of treatment methods. He is scheduled for a R TKA with caryn. He takes ASA 325mg, last dose yesterday.     Review of Systems:  General ROS: negative for chills, fever or skin lesions.  Cardiovascular ROS: no chest pain or dyspnea on exertion  Respiratory ROS: no cough, shortness of breath, or wheezing    Allergies:   Allergies   Allergen Reactions    Divalproex Sodium (Migraine) [Valproic Acid] Other (See Comments)     Weak, lethargy and got worse    Cymbalta [Duloxetine Hcl] Mental Status Change     Depression, thoughts of Suicide.     Duloxetine Unknown (See Comments)    Metoprolol Other (See Comments)     UNKNOWN. Pt does not remember this being allergy    Neurontin [Gabapentin] Other (See Comments)     Lethargy & fatigue       Home Meds:    No current facility-administered medications on file prior to encounter.     Current Outpatient Medications on File Prior to Encounter   Medication Sig Dispense Refill    acetaminophen (TYLENOL) 500 MG tablet Take 1 tablet by mouth Every 6 (Six) Hours As Needed for Mild Pain.      aspirin 325 MG tablet Take 1 tablet by mouth Daily.      B Complex Vitamins (VITAMIN B COMPLEX) capsule capsule Take 1 capsule by mouth Daily. Takes in evening      Chlorhexidine Gluconate 4 % solution Apply 7.8667 Applications topically to the appropriate area as directed Daily. Shower w/ solution for 5 days before surgery. Bring to [facility] to be filled. 236 mL 0    Cholecalciferol (VITAMIN D3) 5000 UNITS capsule capsule Take 1 capsule by mouth Daily.      coenzyme Q10 100 MG capsule Take 1 capsule by mouth Daily.      cycloSPORINE (Restasis) 0.05 % ophthalmic emulsion Administer 1 drop to both eyes Every 12 (Twelve) Hours.      docusate sodium (COLACE) 100 MG capsule Take 1 capsule by mouth 2  (Two) Times a Day.      flecainide (TAMBOCOR) 100 MG tablet Take 1 tablet by mouth Every 12 (Twelve) Hours. 180 tablet 3    hydrocortisone 2.5 % cream APPLY 1 APPLICATION TWICE DAILY TO FACE AND EARS FOR UP TO 2 WEEKS. TAKE 2 WEEKS OFF THEN REPEAT IF NEEDED      isosorbide mononitrate (IMDUR) 30 MG 24 hr tablet Take 1 tablet by mouth Daily. 90 tablet 3    ketoconazole (NIZORAL) 2 % cream APPLY TOPICALLY TO FACE AND EARS TWICE DAILY AS NEEDED      nitroglycerin (NITROSTAT) 0.4 MG SL tablet 1 under the tongue as needed for angina, may repeat q5mins for up three doses 25 tablet 2    nortriptyline (PAMELOR) 10 MG capsule Take 2 capsules by mouth Every Night.      pravastatin (PRAVACHOL) 20 MG tablet Take 1 tablet by mouth Daily. 90 tablet 3    promethazine (PHENERGAN) 12.5 MG tablet Take 1 tablet by mouth Every 6 (Six) Hours As Needed for Nausea or Vomiting. 12 tablet 0    traMADol (ULTRAM) 50 MG tablet Take 1 tablet by mouth Every 8 (Eight) Hours As Needed for Moderate Pain.         PMH:   Past Medical History:   Diagnosis Date    Arthritis     Atrial fibrillation     BPH (benign prostatic hypertrophy)     Cataract     Cellulitis     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     bilateral    Coronary artery disease     Full dentures     Gall stones     GERD (gastroesophageal reflux disease)     Hearing loss     Hiatal hernia     Hyperlipidemia     Hypertension     Peripheral neuropathy     Peripheral vascular disease     Seasonal allergies     Skin cancer     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    TIA (transient ischemic attack)     2 times    Wears glasses      PSH:    Past Surgical History:   Procedure Laterality Date    ABLATION OF DYSRHYTHMIC FOCUS      APPENDECTOMY      CARDIAC CATHETERIZATION N/A 10/05/2017    Procedure: Left Heart Cath;  Surgeon: Hector Urrutia MD;  Location: [facility] INVASIVE LOCATION;  Service:     CATARACT EXTRACTION Bilateral     CHOLECYSTECTOMY      Remote    COCHLEAR IMPLANT REVISION  05/2019    COLONOSCOPY       PROSTATE SURGERY      SHOULDER SURGERY Right     SKIN BIOPSY      TOE AMPUTATION Right     2nd toe - Right Foot    TONSILLECTOMY         Immunization History:  Influenza: UTD  Pneumococcal: UTD  Tetanus: UTD    Social History:   Tobacco:   Social History     Tobacco Use   Smoking Status Never    Passive exposure: Past   Smokeless Tobacco Never      Alcohol:     Social History     Substance and Sexual Activity   Alcohol Use No       Vitals:           Vitals can be found in the MAR    Physical Exam:  General Appearance:    Alert, cooperative, no distress, appears stated age   Head:    Normocephalic, without obvious abnormality, atraumatic   Lungs:     Clear to auscultation bilaterally, respirations unlabored    Heart:   Regular rate and rhythm, no murmur, rub or gallop    Abdomen:    Soft, nontender. No rigidity or guarding.    Breast Exam:    deferred   Genitalia:    deferred   Extremities:   Atraumatic   Skin:   Skin color, texture, turgor normal, no rashes or lesions   Neurologic:   Grossly intact   Results Review  LABS:  Lab Results   Component Value Date    WBC 6.37 07/30/2024    HGB 12.0 (L) 07/30/2024    HCT 38.8 07/30/2024    MCV 86.6 07/30/2024     07/30/2024    NEUTROABS 3.18 07/30/2024    GLUCOSE 103 (H) 07/30/2024    BUN 15 07/30/2024    CREATININE 1.33 (H) 07/30/2024    EGFRIFNONA 69 06/19/2021     07/30/2024    K 4.6 07/30/2024     07/30/2024    CO2 26.0 07/30/2024    MG 1.9 10/18/2017    CALCIUM 9.1 07/30/2024    ALBUMIN 4.3 07/30/2024    AST 26 07/30/2024    ALT 19 07/30/2024    BILITOT 0.4 07/30/2024    PTT 28.2 06/18/2021    INR 0.98 05/01/2016   A1c: 5.20%    RADIOLOGY:  No radiology results for the last 3 days     I reviewed the patient's new imaging results and agree with the interpretation.    Impression: primary osteoarthritis of right knee    Plan: right TKA with ASHLEY Hernandez PA-C   8/7/2024   06:43 EDT

## 2024-08-07 NOTE — ANESTHESIA PROCEDURE NOTES
Airway  Urgency: elective    Date/Time: 8/7/2024 7:41 AM  Airway not difficult    General Information and Staff    Patient location during procedure: OR  CRNA/CAA: Pasha Hung CRNA    Indications and Patient Condition  Indications for airway management: airway protection    Preoxygenated: yes  MILS not maintained throughout  Mask difficulty assessment: 2 - vent by mask + OA or adjuvant +/- NMBA    Final Airway Details  Final airway type: endotracheal airway      Successful airway: ETT  Cuffed: yes   Successful intubation technique: direct laryngoscopy  Endotracheal tube insertion site: oral  Blade: Galvez  Blade size: 2  ETT size (mm): 7.5  Cormack-Lehane Classification: grade I - full view of glottis  Placement verified by: chest auscultation and capnometry   Cuff volume (mL): 10  Measured from: lips  ETT/EBT  to lips (cm): 23  Number of attempts at approach: 1  Assessment: lips, teeth, and gum same as pre-op and atraumatic intubation    Additional Comments  Negative epigastric sounds, Breath sound equal bilaterally with symmetric chest rise and fall

## 2024-08-07 NOTE — H&P
Patient Name: Guicho Blanco  MRN: [id number]  : 1940  DOS: 2024    Attending: Christian Schwartz,*    Primary Care Provider: Mitchel Culver MD      Chief complaint: Right knee pain     Subjective   Patient is a pleasant 83 y.o. male presented for scheduled surgery by Dr. Schwartz.  He underwent right total knee arthroplasty under general anesthesia.  He tolerated surgery well and was admitted for further medical management. His knee has been painful for several years. He uses a cane or walker for ambulation. He denies recent falls.    When seen postop he is doing well. His pain is well controlled. He denies nausea, shortness of breath or chest pain. No hx of DVT or PE.    He has hypertension, hyperlipidemia, afib and coronary artery disease. He is followed by cardiology, Dr. Urrutia, and had preop cardiac clearance.      Allergies:  Allergies   Allergen Reactions    Divalproex Sodium (Migraine) [Valproic Acid] Other (See Comments)     Weak, lethargy and got worse    Cymbalta [Duloxetine Hcl] Mental Status Change     Depression, thoughts of Suicide.     Duloxetine Unknown (See Comments)    Metoprolol Other (See Comments)     UNKNOWN. Pt does not remember this being allergy    Neurontin [Gabapentin] Other (See Comments)     Lethargy & fatigue       Meds:  Medications Prior to Admission   Medication Sig Dispense Refill Last Dose    aspirin 325 MG tablet Take 1 tablet by mouth Daily.   2024    B Complex Vitamins (VITAMIN B COMPLEX) capsule capsule Take 1 capsule by mouth Daily. Takes in evening   2024    Cholecalciferol (VITAMIN D3) 5000 UNITS capsule capsule Take 1 capsule by mouth Daily.   2024    coenzyme Q10 100 MG capsule Take 1 capsule by mouth Daily.   2024    cycloSPORINE (Restasis) 0.05 % ophthalmic emulsion Administer 1 drop to both eyes Every 12 (Twelve) Hours.   2024    flecainide (TAMBOCOR) 100 MG tablet Take 1 tablet by mouth Every 12 (Twelve) Hours. 180  tablet 3 8/7/2024 at 0430    hydrocortisone 2.5 % cream APPLY 1 APPLICATION TWICE DAILY TO FACE AND EARS FOR UP TO 2 WEEKS. TAKE 2 WEEKS OFF THEN REPEAT IF NEEDED   8/6/2024    isosorbide mononitrate (IMDUR) 30 MG 24 hr tablet Take 1 tablet by mouth Daily. 90 tablet 3 8/7/2024    nortriptyline (PAMELOR) 10 MG capsule Take 2 capsules by mouth Every Night.   8/6/2024    omeprazole OTC (PrilOSEC OTC) 20 MG EC tablet Take 1 tablet by mouth Daily.   8/7/2024 at 0430    pravastatin (PRAVACHOL) 20 MG tablet Take 1 tablet by mouth Daily. 90 tablet 3 8/6/2024    traMADol (ULTRAM) 50 MG tablet Take 1 tablet by mouth Every 8 (Eight) Hours As Needed for Moderate Pain.   Past Week    glucose (DEX4) 4 GM chewable tablet Chew 4 tablets As Needed.       ketoconazole (NIZORAL) 2 % cream APPLY TOPICALLY TO FACE AND EARS TWICE DAILY AS NEEDED   Unknown    nitroglycerin (NITROSTAT) 0.4 MG SL tablet 1 under the tongue as needed for angina, may repeat q5mins for up three doses 25 tablet 2 More than a month    promethazine (PHENERGAN) 12.5 MG tablet Take 1 tablet by mouth Every 6 (Six) Hours As Needed for Nausea or Vomiting. 12 tablet 0 More than a month         History:   Past Medical History:   Diagnosis Date    Arthritis     Atrial fibrillation     BPH (benign prostatic hypertrophy)     Cataract     Cellulitis     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     bilateral    Coronary artery disease     Full dentures     Gall stones     GERD (gastroesophageal reflux disease)     Hearing loss     Hiatal hernia     Hyperlipidemia     Hypertension     Peripheral neuropathy     Peripheral vascular disease     Seasonal allergies     Skin cancer     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    TIA (transient ischemic attack)     2 times    Wears glasses      Past Surgical History:   Procedure Laterality Date    ABLATION OF DYSRHYTHMIC FOCUS      APPENDECTOMY      CARDIAC CATHETERIZATION N/A 10/05/2017    Procedure: Left Heart Cath;  Surgeon: Hector Urrutia MD;  Location:   [facility] INVASIVE LOCATION;  Service:     CATARACT EXTRACTION Bilateral     CHOLECYSTECTOMY      Remote    COCHLEAR IMPLANT REVISION  05/2019    COLONOSCOPY      PROSTATE SURGERY      SHOULDER SURGERY Right     SKIN BIOPSY      TOE AMPUTATION Right     2nd toe - Right Foot    TONSILLECTOMY       Family History   Problem Relation Age of Onset    Atrial fibrillation Mother     Dementia Mother     Lung cancer Father      Social History     Tobacco Use    Smoking status: Never     Passive exposure: Past    Smokeless tobacco: Never   Vaping Use    Vaping status: Never Used   Substance Use Topics    Alcohol use: No    Drug use: No   He is  with 2 children. He is retired from fire department.     Review of Systems  Pertinent items are noted in HPI, all other systems reviewed and negative    Vital Signs  /75 (BP Location: Left arm, Patient Position: Lying)   Pulse 88   Temp 97.8 °F (36.6 °C) (Oral)   Resp 16   SpO2 98%     Physical Exam:    General Appearance:    Alert, cooperative, in no acute distress   Head:    Normocephalic, without obvious abnormality, atraumatic   Eyes:            Lids and lashes normal, conjunctivae and sclerae normal, no   icterus, no pallor, corneas clear,    Ears:    Ears appear intact with no abnormalities noted   Throat:   No oral lesions, no thrush, oral mucosa moist   Neck:   No adenopathy, supple, trachea midline, no thyromegaly    Lungs:     Clear to auscultation,respirations regular, even and unlabored    Heart:    Regular rhythm and normal rate, normal S1 and S2   Abdomen:     Normal bowel sounds, no masses, no organomegaly, soft non-tender, non-distended, no guarding, no rebound  tenderness   Genitalia:    Deferred   Extremities:   Right knee ACE wrap CDI. Nerve block present   Pulses:   Pulses palpable and equal bilaterally   Skin:   No bleeding, bruising or rash   Neurologic:   Cranial nerves 2 - 12 grossly intact. Flexion and dorsiflexion intact bilateral feet.         I reviewed the patient's new clinical results.     Lab Results   Component Value Date    HGBA1C 5.20 07/30/2024      Latest Reference Range & Units 07/30/24 14:33   Sodium 136 - 145 mmol/L 140   Potassium 3.5 - 5.2 mmol/L 4.6   Chloride 98 - 107 mmol/L 102   CO2 22.0 - 29.0 mmol/L 26.0   Anion Gap 5.0 - 15.0 mmol/L 12.0   BUN 8 - 23 mg/dL 15   Creatinine 0.76 - 1.27 mg/dL 1.33 (H)   BUN/Creatinine Ratio 7.0 - 25.0  11.3   eGFR >60.0 mL/min/1.73 53.0 (L)   Glucose 65 - 99 mg/dL 103 (H)   Calcium 8.6 - 10.5 mg/dL 9.1   Alkaline Phosphatase 39 - 117 U/L 120 (H)   Total Protein 6.0 - 8.5 g/dL 6.6   Albumin 3.5 - 5.2 g/dL 4.3   Globulin gm/dL 2.3   A/G Ratio g/dL 1.9   AST (SGOT) 1 - 40 U/L 26   ALT (SGPT) 1 - 41 U/L 19   Total Bilirubin 0.0 - 1.2 mg/dL 0.4   Hemoglobin A1C 4.80 - 5.60 % 5.20   C-Reactive Protein 0.00 - 0.50 mg/dL 0.46   WBC 3.40 - 10.80 10*3/mm3 6.37   RBC 4.14 - 5.80 10*6/mm3 4.48   Hemoglobin 13.0 - 17.7 g/dL 12.0 (L)   Hematocrit 37.5 - 51.0 % 38.8   Platelets 140 - 450 10*3/mm3 206   RDW 12.3 - 15.4 % 13.9   MCV 79.0 - 97.0 fL 86.6   MCH 26.6 - 33.0 pg 26.8   MCHC 31.5 - 35.7 g/dL 30.9 (L)   MPV 6.0 - 12.0 fL 10.2   (H): Data is abnormally high  (L): Data is abnormally low    Assessment and Plan:     Status post total right knee replacement    OA (osteoarthritis) of knee    PAF (paroxysmal atrial fibrillation)    Coronary artery disease involving native coronary artery of native heart without angina pectoris    Essential hypertension    Mixed hyperlipidemia      Plan  1. PT/OT- WBAT RLE  2. Pain control-prns, AC nerve block  3. IS-encourage  4. DVT proph- Mechs/ASA  5. Bowel regimen  6. Resume home medications as appropriate  7. Monitor post-op labs  8. DC planning for rehab    HTN, Hyperlipidemia, afib, CAD  - Continue home Tambocor, Imdur and statin  - Monitor BP   - Holding parameters for BP meds  - Labetalol PRN for SBP>170    RI  - Avoid nephrotoxic agents      Lesli Linn,  LEEROY  08/07/24  15:42 EDT

## 2024-08-07 NOTE — OP NOTE
Orthopaedics Operative Report    PREOPERATIVE DIAGNOSIS: Primary osteoarthritis right knee    POSTOPERATIVE DIAGNOSIS: Same    PROCEDURE PERFORMED: Right total knee arthroplasty using Didi robot, CPT 27721, 98506    SURGEON: Christian Schwartz MD    ANESTHESIA:  General with Block    STAFF:  Circulator: Lady Elise RN; Araseli Martínez, AUSTIN; Jae Price RN  Scrub Person: Saji Nayely  Vendor Representative: Darren Hill  Nursing Assistant: Renee Fox  Assistant: Mariaa Madrid PA-C    TOURNIQUET TIME: 88 minutes    ESTIMATED BLOOD LOSS: 50 mL    COMPLICATIONS: None apparent.    RELEASES: Posterior lateral capsule for tightness in extension    Surgical Approach: Knee Medial Parapatellar     TXA: IV    IMPLANTS:     Implant Name Type Inv. Item Serial No.  Lot No. LRB No. Used Action   DEV CONTRL TISS STRATAFIX SYMM PDS PLUS CHAITANYA CT-1 45CM - [id number] Implant DEV CONTRL TISS STRATAFIX SYMM PDS PLUS CHAITANYA CT-1 45CM  [employer]  Right 1 Implanted   CMT BONE R 1X40 - [id number] Implant CMT BONE R 1X40  [employer] [id number] Right 2 Implanted   COMP FEM/KN PERSONA CR CMT COCR STD SZ9 RT - [id number] Implant COMP FEM/KN PERSONA CR CMT COCR STD SZ9 RT  [employer] [id number] Right 1 Implanted   PAT KN PERSONA ALLPOLY CMT 38MM - [id number] Implant PAT KN PERSONA ALLPOLY CMT 38MM  [employer] [id number] Right 1 Implanted   Persona 0 deg Keel Right Sz G Cemented Tibia    Cleo [id number] Right 1 Implanted   ART/SRF KN PERSONA/VE MC GH 8TO11 10MM RT - [id number] Implant ART/SRF KN PERSONA/VE MC GH 8TO11 10MM RT  [employer] [id number] Right 1 Implanted       Cleo Persona CR femur size 9 standard  size G 0 degree tibia  38 patella button   Size 10 Vitamin E Medial Congruent highly crosslinked polyethylene articular surface    PREOPERATIVE ANTIBIOTICS: Kefzol    REFERRING PHYSICIAN: Mitchel Culver MD    INDICATIONS: Failure of nonoperative treatment including injections,  bracing, and activity modification.    DESCRIPTION OF PROCEDURE: After informed consent was obtained, the patient was taken to the operating room. The patient was given a dose of IV antibiotics prior to incision.After the smooth induction of general anesthesia with single shot adductor canal block, the patient’s right lower extremity was prepped and draped in the usual fashion for this type of procedure. We performed a timeout to verify site and the procedure to be performed.  We began with exsanguination of the right lower extremity using an Esmarch bandage and inflation of tourniquet to 300 mmHg. We made our standard midline incision and medial parapatellar approach. We took 20% of the quadriceps tendon medially and a sleeve of tissue around the patella for repair as well a sliver of patellar tendon. Dissected extra-ostially but subperiosteally on the medial side taking off the superficial and deep MCL from the proximal tibia taking special care to be judicious in our dissection medially. These were protected throughout the procedure. Visible medial meniscus was excised. The retropatellar fat pad was excised. The ACL and visible lateral meniscus was excised as well as the synovium from the anterior surface of the distal femur.  Osteophytes were removed from the distal femur and proximal tibia at this point.       We then turned our attention to placement of our pins for the femoral and tibial trackers.  The femoral trackers were placed within the incision about 2 to 3 fingerbreadths from the articular cartilage on the medial aspect of the femur.  The tibial trackers were placed through percutaneous incisions on the tibia.  Once these were placed, we obtained our hip center and then we then registered our data points on the femur and tibia.  Once these were registered, we took the knee through a range of motion and assessed our medial and lateral gaps at 0, 45, and 90 degrees. The 90 degree gap was obtained using a  Matthews elevator.  At this point, we then created our surgical plan.  Extension gaps medially and laterally were 21 and 19.5 mm.  Flexion gaps medially and laterally were 21 and 24 mm.  Patient had a preoperative 3 degree dynamic valgus deformity.  We were able to correct them to 0.5 degrees of varus alignment.  Preop range of motion was 6 to 133.  We placed 0 degrees of valgus on the distal femoral cut and 0 degrees of valgus on the proximal tibial cut.  Distal femoral cut was made at 11 mm.  Proximal tibial cut was made at 8 mm of the medial side.        At this point the robotic arm was brought in and the distal femoral cut was made in collaborative mode.  This was made and then validated.  We then sized the femur to verify the size of the femoral component to be appropriate.  We then brought the robotic arm back into pin our external rotation.  Our 4-in-1 block was then placed and we assessed for possible notching as well as for the size of the posterior condylar cuts.  The 4-in-1 cut was made without difficulty and the excess bone removed.  We then brought the robotic arm back in to make our proximal tibia cut.  Retractors were placed and the bone cut was made again in collaborative mode which was validated both for amount of bone taken off and for slope.  We then used our blocks and checked our extension and flexion gaps which were felt to be acceptable.  We then took off posterior osteophytes off the posterior medial posterior lateral femur.  We completed preparation of our tibia and femur and then trialed and a size 10 seemed to have the best stability and range of motion parameters.  We then everted the patella and this was resurfaced, restoring patellar height before trials were placed to protect the bone. The patellar height was 30 mm preoperatively and we cut the patella to 20 mm and sized the patella to a 38.  The lugholes were drilled and the component slightly medialized.      The bone was then  thoroughly irrigated and holes were drilled on the tibia for improved cement perforation.  We then injected our periarticular injection into the posterior medial aspect of the knee which consisted of 20 ml of NS, 20 ml of 0.5% lidocaine with epi, 20 ml of 0.5% bupivicaine, 30 mg of toradol, and 8 mg of morphine. We cemented these implants in place and once the cement was allowed to cure fully, the excess cement was removed. We then deflated the tourniquet prior to placement of our final polyethylene spacer. Any bleeding seen in the back of the knee was controlled and we obtained hemostasis. The final spacer was then secured into place.  After the implants were placed and a size 10 polyethylene was placed, the final range of motion was -1.5 to 142 degrees. We then used a final irrigation consisting of Irricept and diluted Betadine throughout the knee.  Trackers and pins were then removed.  We then closed our medial parapatellar approach using 0 Ethibond in an interrupted fashion.  We then closed our subcutaneous layer using running 2-0 Vicryl and interrupted 2-0 Vicryl. We closed our skin using a running 3-0 Monocryl. We placed an Exofin Fusion dressing system over the incision and took down the drapes. The patient was transferred back to their hospital bed and then taken to the recovery room in stable condition. All counts were correct postoperatively. I performed the case.      First assistant: Mariaa Madrid PA-C    The skilled assistance of the above noted first assistant was necessary during this complex surgical procedure.  The surgical assistant assisted with every aspect of the operation including, but not limited to, proper and safe positioning of the patient, obtaining adequate surgical exposure, manipulation of surgical instruments to make the proper bone cuts, cementing of the final implants, the continual process of hemostasis during the procedure itself in addition to surgical wound closure and  removal of the patient from the operating table and returning the patient back to the [facility].  The assistance of the surgical assistant allowed me to perform the most sensitive and technical potions of this operation using 2 hands, thus enhancing efficiency and patient safety.  This would not be possible without the help of a skilled assistant familiar with the procedure and capable of safely performing the aforementioned tasks.         POSTOPERATIVE PLAN:  1. Weight bearing as tolerated right lower extremity.  2. The patient will receive an indwelling low femoral nerve block in the recovery room.  3.  Patient will receive IV antibiotics on the floor for 24 hours  4.  Patient will be given a baby aspirin twice a day for DVT prophylaxis starting tomorrow morning and continuing for 6 weeks.  5.  The patient will begin physical therapy  6.  Will be under the care of the hospitalist service and Dr. VALENCIA  7.  Patient will be discharged home with a prescription for oxycodone, Mobic, Tylenol, 7 days of doxycycline, Colace, and Zofran in addition to their DVT prophylaxis  8.  Follow up with me in the office in 3 weeks    Christian Schwartz M.D.*

## 2024-08-08 LAB
ANION GAP SERPL CALCULATED.3IONS-SCNC: 9 MMOL/L (ref 5–15)
BASOPHILS # BLD AUTO: 0.02 10*3/MM3 (ref 0–0.2)
BASOPHILS NFR BLD AUTO: 0.2 % (ref 0–1.5)
BUN SERPL-MCNC: 15 MG/DL (ref 8–23)
BUN/CREAT SERPL: 13.4 (ref 7–25)
CALCIUM SPEC-SCNC: 8.2 MG/DL (ref 8.6–10.5)
CHLORIDE SERPL-SCNC: 111 MMOL/L (ref 98–107)
CO2 SERPL-SCNC: 20 MMOL/L (ref 22–29)
CREAT SERPL-MCNC: 1.12 MG/DL (ref 0.76–1.27)
DEPRECATED RDW RBC AUTO: 43.1 FL (ref 37–54)
EGFRCR SERPLBLD CKD-EPI 2021: 65.2 ML/MIN/1.73
EOSINOPHIL # BLD AUTO: 0.06 10*3/MM3 (ref 0–0.4)
EOSINOPHIL NFR BLD AUTO: 0.7 % (ref 0.3–6.2)
ERYTHROCYTE [DISTWIDTH] IN BLOOD BY AUTOMATED COUNT: 13.9 % (ref 12.3–15.4)
GLUCOSE SERPL-MCNC: 101 MG/DL (ref 65–99)
HCT VFR BLD AUTO: 34.1 % (ref 37.5–51)
HGB BLD-MCNC: 10.8 G/DL (ref 13–17.7)
IMM GRANULOCYTES # BLD AUTO: 0.03 10*3/MM3 (ref 0–0.05)
IMM GRANULOCYTES NFR BLD AUTO: 0.4 % (ref 0–0.5)
LYMPHOCYTES # BLD AUTO: 1.61 10*3/MM3 (ref 0.7–3.1)
LYMPHOCYTES NFR BLD AUTO: 19.4 % (ref 19.6–45.3)
MCH RBC QN AUTO: 27 PG (ref 26.6–33)
MCHC RBC AUTO-ENTMCNC: 31.7 G/DL (ref 31.5–35.7)
MCV RBC AUTO: 85.3 FL (ref 79–97)
MONOCYTES # BLD AUTO: 1.06 10*3/MM3 (ref 0.1–0.9)
MONOCYTES NFR BLD AUTO: 12.8 % (ref 5–12)
NEUTROPHILS NFR BLD AUTO: 5.51 10*3/MM3 (ref 1.7–7)
NEUTROPHILS NFR BLD AUTO: 66.5 % (ref 42.7–76)
NRBC BLD AUTO-RTO: 0 /100 WBC (ref 0–0.2)
PLATELET # BLD AUTO: 149 10*3/MM3 (ref 140–450)
PMV BLD AUTO: 10.2 FL (ref 6–12)
POTASSIUM SERPL-SCNC: 4.3 MMOL/L (ref 3.5–5.2)
RBC # BLD AUTO: 4 10*6/MM3 (ref 4.14–5.8)
SODIUM SERPL-SCNC: 140 MMOL/L (ref 136–145)
WBC NRBC COR # BLD AUTO: 8.29 10*3/MM3 (ref 3.4–10.8)

## 2024-08-08 PROCEDURE — 99024 POSTOP FOLLOW-UP VISIT: CPT | Performed by: ORTHOPAEDIC SURGERY

## 2024-08-08 PROCEDURE — A9270 NON-COVERED ITEM OR SERVICE: HCPCS | Performed by: ORTHOPAEDIC SURGERY

## 2024-08-08 PROCEDURE — 63710000001 NORTRIPTYLINE 10 MG CAPSULE: Performed by: NURSE PRACTITIONER

## 2024-08-08 PROCEDURE — 97165 OT EVAL LOW COMPLEX 30 MIN: CPT

## 2024-08-08 PROCEDURE — 63710000001 ASPIRIN 81 MG TABLET DELAYED-RELEASE: Performed by: ORTHOPAEDIC SURGERY

## 2024-08-08 PROCEDURE — A9270 NON-COVERED ITEM OR SERVICE: HCPCS | Performed by: NURSE PRACTITIONER

## 2024-08-08 PROCEDURE — 25010000002 CEFAZOLIN PER 500 MG: Performed by: ORTHOPAEDIC SURGERY

## 2024-08-08 PROCEDURE — 97116 GAIT TRAINING THERAPY: CPT

## 2024-08-08 PROCEDURE — 85025 COMPLETE CBC W/AUTO DIFF WBC: CPT | Performed by: ORTHOPAEDIC SURGERY

## 2024-08-08 PROCEDURE — 63710000001 FLECAINIDE 50 MG TABLET: Performed by: NURSE PRACTITIONER

## 2024-08-08 PROCEDURE — 63710000001 OXYCODONE 5 MG TABLET: Performed by: ORTHOPAEDIC SURGERY

## 2024-08-08 PROCEDURE — 80048 BASIC METABOLIC PNL TOTAL CA: CPT | Performed by: ORTHOPAEDIC SURGERY

## 2024-08-08 PROCEDURE — 63710000001 ACETAMINOPHEN EXTRA STRENGTH 500 MG TABLET: Performed by: ORTHOPAEDIC SURGERY

## 2024-08-08 PROCEDURE — 63710000001 ISOSORBIDE MONONITRATE 30 MG TABLET SUSTAINED-RELEASE 24 HOUR: Performed by: NURSE PRACTITIONER

## 2024-08-08 PROCEDURE — 97110 THERAPEUTIC EXERCISES: CPT

## 2024-08-08 PROCEDURE — 63710000001 PANTOPRAZOLE 40 MG TABLET DELAYED-RELEASE: Performed by: NURSE PRACTITIONER

## 2024-08-08 PROCEDURE — 63710000001 PRAVASTATIN 20 MG TABLET: Performed by: NURSE PRACTITIONER

## 2024-08-08 RX ORDER — NORTRIPTYLINE HYDROCHLORIDE 10 MG/1
20 CAPSULE ORAL NIGHTLY
Status: DISCONTINUED | OUTPATIENT
Start: 2024-08-08 | End: 2024-08-08

## 2024-08-08 RX ORDER — POLYETHYLENE GLYCOL 3350 17 G/17G
17 POWDER, FOR SOLUTION ORAL DAILY
Status: DISCONTINUED | OUTPATIENT
Start: 2024-08-09 | End: 2024-08-10 | Stop reason: HOSPADM

## 2024-08-08 RX ADMIN — SODIUM CHLORIDE 2000 MG: 900 INJECTION INTRAVENOUS at 00:00

## 2024-08-08 RX ADMIN — OXYCODONE HYDROCHLORIDE 5 MG: 5 TABLET ORAL at 13:03

## 2024-08-08 RX ADMIN — ACETAMINOPHEN 1000 MG: 500 TABLET, FILM COATED ORAL at 08:13

## 2024-08-08 RX ADMIN — ACETAMINOPHEN 1000 MG: 500 TABLET, FILM COATED ORAL at 21:49

## 2024-08-08 RX ADMIN — PRAVASTATIN SODIUM 20 MG: 20 TABLET ORAL at 21:49

## 2024-08-08 RX ADMIN — NORTRIPTYLINE HYDROCHLORIDE 20 MG: 10 CAPSULE ORAL at 21:49

## 2024-08-08 RX ADMIN — Medication 3 ML: at 08:13

## 2024-08-08 RX ADMIN — FLECAINIDE ACETATE 100 MG: 50 TABLET ORAL at 21:49

## 2024-08-08 RX ADMIN — ASPIRIN 81 MG: 81 TABLET, COATED ORAL at 08:13

## 2024-08-08 RX ADMIN — ACETAMINOPHEN 1000 MG: 500 TABLET, FILM COATED ORAL at 18:13

## 2024-08-08 RX ADMIN — Medication 3 ML: at 21:50

## 2024-08-08 RX ADMIN — ASPIRIN 81 MG: 81 TABLET, COATED ORAL at 21:49

## 2024-08-08 RX ADMIN — ISOSORBIDE MONONITRATE 30 MG: 30 TABLET, EXTENDED RELEASE ORAL at 08:13

## 2024-08-08 RX ADMIN — PANTOPRAZOLE SODIUM 40 MG: 40 TABLET, DELAYED RELEASE ORAL at 06:12

## 2024-08-08 RX ADMIN — FLECAINIDE ACETATE 100 MG: 50 TABLET ORAL at 08:13

## 2024-08-08 NOTE — PROGRESS NOTES
Orthopaedic Surgery Progress Note      LOS: 0 days   Patient Care Team:  Mitchel Culver MD as PCP - General (Family Medicine)  Mitchel Culver MD as PCP - Family Medicine  Hector Urrutia MD as Consulting Physician (Cardiology)    POD 1    Subjective     Interval History:   Patient had a rough night with regards to sleep and pain control.  He denies chest pain or shortness of breath this morning.    Objective     Vital Signs:  Temp (24hrs), Av.7 °F (36.5 °C), Min:97 °F (36.1 °C), Max:98 °F (36.7 °C)    /64 (BP Location: Right arm, Patient Position: Lying)   Pulse 103   Temp 98 °F (36.7 °C) (Oral)   Resp 16   SpO2 93%     Labs:  Lab Results (last 24 hours)       Procedure Component Value Units Date/Time    Basic Metabolic Panel [[id number]]  (Abnormal) Collected: 24    Specimen: Blood Updated: 24     Glucose 101 mg/dL      BUN 15 mg/dL      Creatinine 1.12 mg/dL      Sodium 140 mmol/L      Potassium 4.3 mmol/L      Chloride 111 mmol/L      CO2 20.0 mmol/L      Calcium 8.2 mg/dL      BUN/Creatinine Ratio 13.4     Anion Gap 9.0 mmol/L      eGFR 65.2 mL/min/1.73     Narrative:      GFR Normal >60  Chronic Kidney Disease <60  Kidney Failure <15    The GFR formula is only valid for adults with stable renal function between ages 18 and 70.    CBC & Differential [[id number]]  (Abnormal) Collected: 24    Specimen: Blood Updated: 24    Narrative:      The following orders were created for panel order CBC & Differential.  Procedure                               Abnormality         Status                     ---------                               -----------         ------                     CBC Auto Differential[[id number]]        Abnormal            Final result                 Please view results for these tests on the individual orders.    CBC Auto Differential [[id number]]  (Abnormal) Collected: 24    Specimen: Blood Updated:  08/08/24 0545     WBC 8.29 10*3/mm3      RBC 4.00 10*6/mm3      Hemoglobin 10.8 g/dL      Hematocrit 34.1 %      MCV 85.3 fL      MCH 27.0 pg      MCHC 31.7 g/dL      RDW 13.9 %      RDW-SD 43.1 fl      MPV 10.2 fL      Platelets 149 10*3/mm3      Neutrophil % 66.5 %      Lymphocyte % 19.4 %      Monocyte % 12.8 %      Eosinophil % 0.7 %      Basophil % 0.2 %      Immature Grans % 0.4 %      Neutrophils, Absolute 5.51 10*3/mm3      Lymphocytes, Absolute 1.61 10*3/mm3      Monocytes, Absolute 1.06 10*3/mm3      Eosinophils, Absolute 0.06 10*3/mm3      Basophils, Absolute 0.02 10*3/mm3      Immature Grans, Absolute 0.03 10*3/mm3      nRBC 0.0 /100 WBC             Physical Exam:  EHL, FHL, gastroc soleus, and tibialis anterior are intact  Toes are pink and warm  Surgical dressing is in place  Palpable dorsalis pedis pulse  Calf is soft and nontender    Postoperative x-ray has been reviewed and looks acceptable    Assessment & Plan     Postoperative day number 1 status post right total knee arthroplasty.    Labs: Hematocrit 34, platelets 149,000, creatinine 1.12    Pain Control: Acceptable overall    PT and OT     DVT prophylaxis: Baby aspirin twice daily.  Monitor creatinine function.    Discharge planning: Anticipate discharge to a skilled nursing facility.  Referral will need to be made to [facility].    Upon discharge, patient will need follow-up with me in 3 weeks' time.  They will need [facility] or similar skilled nursing facility followed by [facility] re[facility] at home program for physical therapy.  Standard Exofin Fusion dressing care instructions.  Do not remove.  DME per case management.    Admission Status:  I believe this patient meets INPATIENT status due to the need for care which can only be reasonably provided in a hospital setting such as aggressive/expedited ancillary services and/or consultation services, the necessity for IV medications, close physician monitoring and/or the  possible need for procedures.  In such, I feel patient’s risk for adverse outcomes and need for care warrant INPATIENT evaluation and predict the patient’s care encounter to likely last beyond 2 midnights.        Christian Schwartz MD  08/08/24  08:03 EDT

## 2024-08-08 NOTE — PLAN OF CARE
Goal Outcome Evaluation:  Plan of Care Reviewed With: patient        Progress: no change  Outcome Evaluation: Pt amb 40' with FWW and Mod A. Pt demonstrated significant reliance on BUE on FWW for support, decreased weight shifting onto RLE, and significantly decreased R knee extension in stance phase. Some improvements noted with VC. Activity limited by fatigue. Pt is at high risk for falls due to generalized weakness, decreased R knee ROM/strength, impaired balance, and quick fatigue. Recommend d/c to IRF to address mobility deficits and promote increased independence with functional mobility.      Anticipated Discharge Disposition (PT): inpatient rehabilitation facility

## 2024-08-08 NOTE — PROGRESS NOTES
IM progress note      Guicho Blanco  [id number]  1940     LOS: 0 days     Attending: Christian Schwartz,*    Primary Care Provider: Mitchel Culver MD      Chief Complaint/Reason for visit:  No chief complaint on file.      Subjective    Doing well. No f/c/n/vom/sob.   Very [place]. Managed to get his hearing aid to work when I visited.     Objective        Visit Vitals  /62 (BP Location: Right arm, Patient Position: Sitting)   Pulse 92   Temp 98.3 °F (36.8 °C) (Oral)   Resp 18   SpO2 94%     Temp (24hrs), Av.9 °F (36.6 °C), Min:97.7 °F (36.5 °C), Max:98.3 °F (36.8 °C)      Intake/Output:    Intake/Output Summary (Last 24 hours) at 2024 1459  Last data filed at 2024 1300  Gross per 24 hour   Intake 480 ml   Output 400 ml   Net 80 ml        Physical Therapy:  Deanna Luque, PT   Physical Therapist  Physical Therapy     Plan of Care      Signed     Date of Service: 24 0910  Creation Time: 24 1012     Signed         Goal Outcome Evaluation:  Plan of Care Reviewed With: patient  Progress: no change  Outcome Evaluation: Pt amb 40' with FWW and Mod A. Pt demonstrated significant reliance on BUE on FWW for support, decreased weight shifting onto RLE, and significantly decreased R knee extension in stance phase. Some improvements noted with VC. Activity limited by fatigue. Pt is at high risk for falls due to generalized weakness, decreased R knee ROM/strength, impaired balance, and quick fatigue. Recommend d/c to IRF to address mobility deficits and promote increased independence with functional mobility.        Anticipated Discharge Disposition (PT): inpatient rehabilitation facility                 Physical Exam:     General Appearance:    Alert, cooperative, in no acute distress   Head:    Normocephalic, without obvious abnormality, atraumatic    Lungs:     Normal effort, symmetric chest rise,  clear to      auscultation bilaterally              Heart:    Regular rhythm  and normal rate, normal S1 and S2    Abdomen:     Normal bowel sounds, no masses, no organomegaly, soft        non-tender, non-distended, no guarding, no rebound                tenderness   Extremities:   CDI dressing. PNB cath present. .  Flexion and dorsiflexion intact bilateral feet.    No clubbing, cyanosis or edema.  No deformities.    Pulses:   Pulses palpable and equal bilaterally   Skin:   No bleeding, bruising or rash          Results Review:     I reviewed the patient's new clinical results.   Results from last 7 days   Lab Units 08/08/24  0530   WBC 10*3/mm3 8.29   HEMOGLOBIN g/dL 10.8*   HEMATOCRIT % 34.1*   PLATELETS 10*3/mm3 149     Results from last 7 days   Lab Units 08/08/24  0530   SODIUM mmol/L 140   POTASSIUM mmol/L 4.3   CHLORIDE mmol/L 111*   CO2 mmol/L 20.0*   BUN mg/dL 15   CREATININE mg/dL 1.12   CALCIUM mg/dL 8.2*   GLUCOSE mg/dL 101*     I reviewed the patient's new imaging including images and reports.    All medications reviewed.   acetaminophen, 1,000 mg, Oral, Q6H  aspirin, 81 mg, Oral, Q12H  flecainide, 100 mg, Oral, Q12H  isosorbide mononitrate, 30 mg, Oral, Daily  nortriptyline, 20 mg, Oral, Nightly  pantoprazole, 40 mg, Oral, Q AM  pravastatin, 20 mg, Oral, Nightly  sodium chloride, 3 mL, Intravenous, Q12H      docusate sodium, 100 mg, BID PRN  labetalol, 10 mg, Q4H PRN  Morphine, 4 mg, Q2H PRN   And  naloxone, 0.4 mg, Q5 Min PRN  ondansetron ODT, 4 mg, Q6H PRN   Or  ondansetron, 4 mg, Q6H PRN  oxyCODONE, 5 mg, Q4H PRN  sodium chloride, 500 mL, TID PRN  sodium chloride, 3-10 mL, PRN  sodium chloride, 40 mL, PRN        Assessment & Plan       Status post total right knee replacement    PAF (paroxysmal atrial fibrillation)    Coronary artery disease involving native coronary artery of native heart without angina pectoris    Essential hypertension    Mixed hyperlipidemia    OA (osteoarthritis) of knee         Plan   1. PT/OT- WBAT RLE  2. Pain control-prns, AC nerve block  3.  IS-encourage  4. DVT proph-Mechs/ASA  5. Bowel regimen  6. Resume home medications as appropriate  7. Monitor post-op labs  8. DC planning for rehab     HTN, Hyperlipidemia, afib, CAD  - Continue home Tambocor, Imdur and statin  - Monitor BP   - Holding parameters for BP meds  - Labetalol PRN for SBP>170     RI  - Avoid nephrotoxic agents. GFR stable.  Monitor.        Berto Liu MD  08/08/24  14:59 EDT

## 2024-08-08 NOTE — THERAPY TREATMENT NOTE
Patient Name: Guicho lBanco  : 1940    MRN: [id number]                              Today's Date: 2024       Admit Date: 2024    Visit Dx:     ICD-10-CM ICD-9-CM   1. Primary osteoarthritis of right knee  M17.11 715.16     Patient Active Problem List   Diagnosis    Osteomyelitis of toe of right foot    Osteomyelitis of right foot    PAF (paroxysmal atrial fibrillation)    Coronary artery disease involving native coronary artery of native heart without angina pectoris    Gastroesophageal reflux disease without esophagitis    Benign non-nodular prostatic hyperplasia without lower urinary tract symptoms    S/P appy    Essential hypertension    Simple chronic bronchitis    Mixed hyperlipidemia    COPD (chronic obstructive pulmonary disease)    BPH (benign prostatic hypertrophy)    Hearing loss    Chest pain    Vertigo    Snoring    Overweight    PLMD (periodic limb movement disorder)    Neuropathy    Periodic headache syndrome, not intractable    Acquired absence of other right toe(s)    Tremor    Bilateral impacted cerumen    Bilateral sensorineural hearing loss    Chronic otitis externa    Deviated nasal septum    Disorder of joint of foot    ED (erectile dysfunction) of organic origin    Hydrocele    Hydrocele of testis    Impacted cerumen    Mononeuropathy    Swelling of testicle    Venous retinal branch occlusion    Vitreous degeneration    Benign prostatic hyperplasia with urinary obstruction    OA (osteoarthritis) of knee    Status post total right knee replacement     Past Medical History:   Diagnosis Date    Arthritis     Atrial fibrillation     BPH (benign prostatic hypertrophy)     Cataract     Cellulitis     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     bilateral    Coronary artery disease     Full dentures     Gall stones     GERD (gastroesophageal reflux disease)     Hearing loss     Hiatal hernia     Hyperlipidemia     Hypertension     Peripheral neuropathy     Peripheral vascular disease      Seasonal allergies     Skin cancer     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    TIA (transient ischemic attack)     2 times    Wears glasses      Past Surgical History:   Procedure Laterality Date    ABLATION OF DYSRHYTHMIC FOCUS      APPENDECTOMY      CARDIAC CATHETERIZATION N/A 10/05/2017    Procedure: Left Heart Cath;  Surgeon: Hector Urrutia MD;  Location:  [facility];  Service:     CATARACT EXTRACTION Bilateral     CHOLECYSTECTOMY      Remote    COCHLEAR IMPLANT REVISION  05/2019    COLONOSCOPY      PROSTATE SURGERY      SHOULDER SURGERY Right     SKIN BIOPSY      TOE AMPUTATION Right     2nd toe - Right Foot    TONSILLECTOMY        General Information       [facility] Name 08/08/24 0951          Physical Therapy Time and Intention    Document Type therapy note (daily note)  -     Mode of Treatment physical therapy  -       Row Name 08/08/24 0951          General Information    Patient Profile Reviewed yes  -     Existing Precautions/Restrictions fall;other (see comments)  AC nerve catheter; pt [place] [employer], wears hearing aids  -     Barriers to Rehab hearing deficit;medically complex;previous functional deficit  -       Row Name 08/08/24 0951          Cognition    Orientation Status (Cognition) oriented to;person;verbal cues/prompts needed for orientation;time  -       Row Name 08/08/24 0996          Safety Issues, Functional Mobility    Impairments Affecting Function (Mobility) balance;endurance/activity tolerance;pain;range of motion (ROM);strength  -               User Key  (r) = Recorded By, (t) = Taken By, (c) = Cosigned By      Initials Name Provider Type     Deanna Luque PT Physical Therapist                   Mobility       Row Name 08/08/24 5652          Bed Mobility    Bed Mobility supine-sit  -     Supine-Sit Kleberg (Bed Mobility) moderate assist (50% patient effort);verbal cues;nonverbal cues (demo/gesture)  -     Assistive Device (Bed Mobility) bed rails;head of bed  elevated;draw sheet  -     Comment, (Bed Mobility) draw sheet utilized to advance hips towards EOB. Decreased strength in BUE with limited assistance to scoot hips in bed. Assistance provided at trunk for stability. Increased time and effort to complete task.  -       Row Name 08/08/24 0952          Transfers    Comment, (Transfers) Pt performed STS from elevated EOB with Max A and FWW. VC for hand placement. Impaired balance with slight posterior lean noted. Increased time to acheive upright posture secondary to knee pain and decreased ROM. Assistance required for boost to stand.  -       Row Name 08/08/24 0952          Sit-Stand Transfer    Sit-Stand Dale (Transfers) maximum assist (25% patient effort);verbal cues;nonverbal cues (demo/gesture)  -     Assistive Device (Sit-Stand Transfers) walker, front-wheeled  -       Row Name 08/08/24 0952          Gait/Stairs (Locomotion)    Dale Level (Gait) moderate assist (50% patient effort);verbal cues;nonverbal cues (demo/gesture)  -     Assistive Device (Gait) walker, front-wheeled  -     Distance in Feet (Gait) 40  -     Deviations/Abnormal Patterns (Gait) right sided deviations;bilateral deviations;harleen decreased;gait speed decreased;stride length decreased;weight shifting decreased;festinating/shuffling  -     Bilateral Gait Deviations forward flexed posture;heel strike decreased  -     Right Sided Gait Deviations weight shift ability decreased;heel strike decreased;decreased knee extension  -     Comment, (Gait/Stairs) Pt amb in room with step-through gait pattern. Significant knee flexion in stance phase, forward flexed posture with heavy reliance on FWW, shuffling steps with occassional R foot catching. Frequent VC for standing up tall, staying closer to AD, taking bigger steps to encourage increased foot clearance and stride length. Momentary improvements with VC then worsening gait quality with fatigue. Pt is [place],  decreased effectiveness of VC. Visual and tactile cueing required for best improvement. Assistance required for body weight support and AD management. No knee buckling noted. Activity limited by signficant fatigue.  -[facility] Name 08/08/24 0952          Mobility    Extremity Weight-bearing Status right lower extremity  -     Right Lower Extremity (Weight-bearing Status) weight-bearing as tolerated (WBAT)  -               User Key  (r) = Recorded By, (t) = Taken By, (c) = Cosigned By      Initials Name Provider Type     Deanna Luque PT Physical Therapist                   Obj/Interventions       Row Name 08/08/24 1007          Range of Motion Comprehensive    General Range of Motion lower extremity range of motion deficits identified  -     Comment, General Range of Motion Surgical knee ROM: 19-86  -HW       Row Name 08/08/24 1007          Strength Comprehensive (MMT)    General Manual Muscle Testing (MMT) Assessment lower extremity strength deficits identified  -     Comment, General Manual Muscle Testing (MMT) Assessment Pt able to perform B active ankle DF and SLR  -HW       Row Name 08/08/24 1007          Motor Skills    Therapeutic Exercise knee;ankle  -HW       Row Name 08/08/24 1007          Knee (Therapeutic Exercise)    Knee (Therapeutic Exercise) isometric exercises;strengthening exercise  -     Knee Isometrics (Therapeutic Exercise) right;quad sets;10 repetitions  -     Knee Strengthening (Therapeutic Exercise) right;heel slides;SLR (straight leg raise);SAQ (short arc quad);LAQ (long arc quad);10 repetitions  -HW       Row Name 08/08/24 1007          Ankle (Therapeutic Exercise)    Ankle (Therapeutic Exercise) AROM (active range of motion)  -     Ankle AROM (Therapeutic Exercise) bilateral;dorsiflexion;plantarflexion;10 repetitions  -HW       Row Name 08/08/24 1007          Balance    Balance Assessment sitting static balance;sitting dynamic balance;sit to stand dynamic  balance;standing static balance;standing dynamic balance  -     Static Sitting Balance standby assist  -     Dynamic Sitting Balance standby assist  -HW     Position, Sitting Balance sitting edge of bed  -     Static Standing Balance minimal assist  -     Dynamic Standing Balance moderate assist  -     Position/Device Used, Standing Balance supported;walker, rolling  -     Balance Interventions sitting;standing;sit to stand;occupation based/functional task  -               User Key  (r) = Recorded By, (t) = Taken By, (c) = Cosigned By      Initials Name Provider Type     Deanna Luque PT Physical Therapist                   Goals/Plan    No documentation.                  Clinical Impression       Row Name 08/08/24 1008          Pain    Pretreatment Pain Rating 3/10  -     Posttreatment Pain Rating 6/10  -     Pain Location - Side/Orientation Right  -     Pain Location anterior  -     Pain Location - knee  -     Pain Intervention(s) Repositioned;Cold applied;Elevated;Ambulation/increased activity  -       Row Name 08/08/24 1008          Plan of Care Review    Plan of Care Reviewed With patient  -     Progress no change  -     Outcome Evaluation Pt amb 40' with FWW and Mod A. Pt demonstrated significant reliance on BUE on FWW for support, decreased weight shifting onto RLE, and significantly decreased R knee extension in stance phase. Some improvements noted with VC. Activity limited by fatigue. Pt is at high risk for falls due to generalized weakness, decreased R knee ROM/strength, impaired balance, and quick fatigue. Recommend d/c to IRF to address mobility deficits and promote increased independence with functional mobility.  -       Row Name 08/08/24 1008          Positioning and Restraints    Pre-Treatment Position in bed  -     Post Treatment Position chair  -     In Chair notified nsg;reclined;sitting;call light within reach;encouraged to call for assist;exit alarm on;legs  elevated;compression device  ice applied; R LE elevated with pillow placed under calf, leaving heel and knee free from support  -               User Key  (r) = Recorded By, (t) = Taken By, (c) = Cosigned By      Initials Name Provider Type     Deanna Luque, PT Physical Therapist                   Outcome Measures       [facility] Name 08/08/24 1012 08/08/24 0813       How much help from another person do you currently need...    Turning from your back to your side while in flat bed without using bedrails? 3  -HW 3  -CC    Moving from lying on back to sitting on the side of a flat bed without bedrails? 2  -HW 3  -CC    Moving to and from a bed to a chair (including a wheelchair)? 2  -HW 3  -CC    Standing up from a chair using your arms (e.g., wheelchair, bedside chair)? 2  - 3  -CC    Climbing 3-5 steps with a railing? 2  -HW 3  -CC    To walk in hospital room? 2  - 3  -CC    AM-PAC 6 Clicks Score (PT) 13  - 18  -CC    Highest Level of Mobility Goal 4 --> Transfer to chair/commode  - 6 --> Walk 10 steps or more  -[facility] Name 08/08/24 1012          Functional Assessment    Outcome Measure Options AM-PAC 6 Clicks Basic Mobility (PT)  -               User Key  (r) = Recorded By, (t) = Taken By, (c) = Cosigned By      Initials Name Provider Type     Deanna Luque, EL Physical Therapist    CC Akhil Tapia RN Registered Nurse                                 Physical Therapy Education       Title: PT OT SLP Therapies (In Progress)       Topic: Physical Therapy (Done)       Point: Mobility training (Done)       Learning Progress Summary             Patient Acceptance, E,D, VU,NR by  at 8/8/2024 1012    Acceptance, E, VU,DU,NR by  at 8/7/2024 1539    Comment: educated on safety w/ mobility, WBAT status, gait training, AD use, HEP, and d/c planning   Family Acceptance, E, VU,DU,NR by  at 8/7/2024 1539    Comment: educated on safety w/ mobility, WBAT status, gait training, AD use, HEP, and d/c planning                          Point: Home exercise program (Done)       Learning Progress Summary             Patient Acceptance, E,D, VU,NR by  at 8/8/2024 1012    Acceptance, E, VU,DU,NR by  at 8/7/2024 1539    Comment: educated on safety w/ mobility, WBAT status, gait training, AD use, HEP, and d/c planning   Family Acceptance, E, VU,DU,NR by  at 8/7/2024 1539    Comment: educated on safety w/ mobility, WBAT status, gait training, AD use, HEP, and d/c planning                         Point: Body mechanics (Done)       Learning Progress Summary             Patient Acceptance, E,D, VU,NR by  at 8/8/2024 1012    Acceptance, E, VU,DU,NR by  at 8/7/2024 1539    Comment: educated on safety w/ mobility, WBAT status, gait training, AD use, HEP, and d/c planning   Family Acceptance, E, VU,DU,NR by  at 8/7/2024 1539    Comment: educated on safety w/ mobility, WBAT status, gait training, AD use, HEP, and d/c planning                         Point: Precautions (Done)       Learning Progress Summary             Patient Acceptance, E,D, VU,NR by  at 8/8/2024 1012    Acceptance, E, VU,DU,NR by  at 8/7/2024 1539    Comment: educated on safety w/ mobility, WBAT status, gait training, AD use, HEP, and d/c planning   Family Acceptance, E, VU,DU,NR by  at 8/7/2024 1539    Comment: educated on safety w/ mobility, WBAT status, gait training, AD use, HEP, and d/c planning                                         User Key       Initials Effective Dates Name Provider Type Discipline     12/15/23 -  Deanna Luque, PT Physical Therapist PT     09/21/23 -  Carline Kraft PT Physical Therapist PT                  PT Recommendation and Plan     Plan of Care Reviewed With: patient  Progress: no change  Outcome Evaluation: Pt amb 40' with FWW and Mod A. Pt demonstrated significant reliance on BUE on FWW for support, decreased weight shifting onto RLE, and significantly decreased R knee extension in stance phase. Some  improvements noted with VC. Activity limited by fatigue. Pt is at high risk for falls due to generalized weakness, decreased R knee ROM/strength, impaired balance, and quick fatigue. Recommend d/c to IRF to address mobility deficits and promote increased independence with functional mobility.     Time Calculation:         PT Charges       Row Name 08/08/24 1012             Time Calculation    Start Time 0910  -HW      PT Received On 08/08/24  -HW         Timed Charges    85448 - PT Therapeutic Exercise Minutes 15  -HW      60529 - Gait Training Minutes  20  -HW      48464 - PT Therapeutic Activity Minutes 5  -HW         Total Minutes    Timed Charges Total Minutes 40  -HW       Total Minutes 40  -HW                User Key  (r) = Recorded By, (t) = Taken By, (c) = Cosigned By      Initials Name Provider Type     Deanna Luque PT Physical Therapist                  Therapy Charges for Today       Code Description Service Date Service Provider Modifiers Qty    [id number]  PT THER PROC EA 15 MIN 8/8/2024 Deanna Luque, PT GP 1    [id number]  GAIT TRAINING EA 15 MIN 8/8/2024 Deanna Luque PT GP 2            PT G-Codes  Outcome Measure Options: AM-PAC 6 Clicks Basic Mobility (PT)  AM-PAC 6 Clicks Score (PT): 13  PT Discharge Summary  Anticipated Discharge Disposition (PT): inpatient rehabilitation facility    Deanna Luque PT  8/8/2024

## 2024-08-08 NOTE — THERAPY TREATMENT NOTE
Patient Name: Guicho Blanco  : 1940    MRN: [id number]                              Today's Date: 2024       Admit Date: 2024    Visit Dx:     ICD-10-CM ICD-9-CM   1. Primary osteoarthritis of right knee  M17.11 715.16     Patient Active Problem List   Diagnosis    Osteomyelitis of toe of right foot    Osteomyelitis of right foot    PAF (paroxysmal atrial fibrillation)    Coronary artery disease involving native coronary artery of native heart without angina pectoris    Gastroesophageal reflux disease without esophagitis    Benign non-nodular prostatic hyperplasia without lower urinary tract symptoms    S/P appy    Essential hypertension    Simple chronic bronchitis    Mixed hyperlipidemia    COPD (chronic obstructive pulmonary disease)    BPH (benign prostatic hypertrophy)    Hearing loss    Chest pain    Vertigo    Snoring    Overweight    PLMD (periodic limb movement disorder)    Neuropathy    Periodic headache syndrome, not intractable    Acquired absence of other right toe(s)    Tremor    Bilateral impacted cerumen    Bilateral sensorineural hearing loss    Chronic otitis externa    Deviated nasal septum    Disorder of joint of foot    ED (erectile dysfunction) of organic origin    Hydrocele    Hydrocele of testis    Impacted cerumen    Mononeuropathy    Swelling of testicle    Venous retinal branch occlusion    Vitreous degeneration    Benign prostatic hyperplasia with urinary obstruction    OA (osteoarthritis) of knee    Status post total right knee replacement     Past Medical History:   Diagnosis Date    Arthritis     Atrial fibrillation     BPH (benign prostatic hypertrophy)     Cataract     Cellulitis     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     bilateral    Coronary artery disease     Full dentures     Gall stones     GERD (gastroesophageal reflux disease)     Hearing loss     Hiatal hernia     Hyperlipidemia     Hypertension     Peripheral neuropathy     Peripheral vascular disease      Seasonal allergies     Skin cancer     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    TIA (transient ischemic attack)     2 times    Wears glasses      Past Surgical History:   Procedure Laterality Date    ABLATION OF DYSRHYTHMIC FOCUS      APPENDECTOMY      CARDIAC CATHETERIZATION N/A 10/05/2017    Procedure: Left Heart Cath;  Surgeon: Hector Urrtuia MD;  Location:  [facility];  Service:     CATARACT EXTRACTION Bilateral     CHOLECYSTECTOMY      Remote    COCHLEAR IMPLANT REVISION  05/2019    COLONOSCOPY      PROSTATE SURGERY      SHOULDER SURGERY Right     SKIN BIOPSY      TOE AMPUTATION Right     2nd toe - Right Foot    TONSILLECTOMY        General Information       Row Name 08/08/24 1703 08/08/24 0951       Physical Therapy Time and Intention    Document Type therapy note (daily note)  -HW therapy note (daily note)  -HW    Mode of Treatment physical therapy  -HW physical therapy  -      Row Name 08/08/24 1703 08/08/24 0951       General Information    Patient Profile Reviewed yes  -HW yes  -HW    Existing Precautions/Restrictions fall;other (see comments)  AC nerve catheter; pt [place], wears cochlear hearing aids  -HW fall;other (see comments)  AC nerve catheter; pt [place], wears hearing aids  -HW    Barriers to Rehab -- hearing deficit;medically complex;previous functional deficit  -HW      Row Name 08/08/24 1703 08/08/24 0951       Cognition    Orientation Status (Cognition) oriented to;person;verbal cues/prompts needed for orientation;time  -HW oriented to;person;verbal cues/prompts needed for orientation;time  -      Row Name 08/08/24 1703 08/08/24 0951       Safety Issues, Functional Mobility    Impairments Affecting Function (Mobility) balance;endurance/activity tolerance;pain;range of motion (ROM);strength  -HW balance;endurance/activity tolerance;pain;range of motion (ROM);strength  -HW              User Key  (r) = Recorded By, (t) = Taken By, (c) = Cosigned By      Initials Name Provider Type    HW  Deanna Luque, PT Physical Therapist                   Mobility       Row Name 08/08/24 1703 08/08/24 0952       Bed Mobility    Bed Mobility sit-supine  - supine-sit  -HW    Supine-Sit [place] (Bed Mobility) -- moderate assist (50% patient effort);verbal cues;nonverbal cues (demo/gesture)  -    Sit-Supine [place] (Bed Mobility) moderate assist (50% patient effort);verbal cues;nonverbal cues (demo/gesture)  - --    Assistive Device (Bed Mobility) bed rails;head of bed elevated;draw sheet  - bed rails;head of bed elevated;draw sheet  -    Comment, (Bed Mobility) assisted BLE and trunk back to bed. Assist required to scoot pt up in bed with draw sheet  - draw sheet utilized to advance hips towards EOB. Decreased strength in BUE with limited assistance to scoot hips in bed. Assistance provided at trunk for stability. Increased time and effort to complete task.  -[facility] Name 08/08/24 1703 08/08/24 0952       Transfers    Comment, (Transfers) Pt performed STS from chair with Max A x2 and FWW. Posterior lean noted. Pt reported feeling unsteady and significantly fatigued. VC for upright posture and R knee extension  - Pt performed STS from elevated EOB with Max A and FWW. VC for hand placement. Impaired balance with slight posterior lean noted. Increased time to acheive upright posture secondary to knee pain and decreased ROM. Assistance required for boost to stand.  -[facility] Name 08/08/24 1703 08/08/24 0952       Sit-Stand Transfer    Sit-Stand [place] (Transfers) maximum assist (25% patient effort);verbal cues;nonverbal cues (demo/gesture);2 person assist  - maximum assist (25% patient effort);verbal cues;nonverbal cues (demo/gesture)  -    Assistive Device (Sit-Stand Transfers) walker, front-wheeled  - walker, front-wheeled  -      Row Name 08/08/24 1703 08/08/24 0952       Gait/Stairs (Locomotion)    [place] Level (Gait) maximum assist (25% patient effort);2 person  assist;verbal cues;nonverbal cues (demo/gesture)  - moderate assist (50% patient effort);verbal cues;nonverbal cues (demo/gesture)  -    Assistive Device (Gait) walker, front-wheeled  - walker, front-wheeled  -    Distance in Feet (Gait) 13  -HW 40  -HW    Deviations/Abnormal Patterns (Gait) right sided deviations;bilateral deviations;harleen decreased;gait speed decreased;stride length decreased;weight shifting decreased;festinating/shuffling  -HW right sided deviations;bilateral deviations;harleen decreased;gait speed decreased;stride length decreased;weight shifting decreased;festinating/shuffling  -HW    Bilateral Gait Deviations forward flexed posture;heel strike decreased  - forward flexed posture;heel strike decreased  -    Right Sided Gait Deviations weight shift ability decreased;heel strike decreased;decreased knee extension  - weight shift ability decreased;heel strike decreased;decreased knee extension  -    Comment, (Gait/Stairs) Pt amb back to bed with significant reliance on BUE support onf FWW to maintain upright posture. Significant forward flexed posture, anterior trunk lean, shuffling steps, and slow gait speed noted. Manual assistance provided to keep trunk upright and for support. Assistance required to guid hips to bed as pt was too fatigued in BUE and LE to control descend or fully turn walker to bed.  - Pt amb in room with step-through gait pattern. Significant knee flexion in stance phase, forward flexed posture with heavy reliance on FWW, shuffling steps with occassional R foot catching. Frequent VC for standing up tall, staying closer to AD, taking bigger steps to encourage increased foot clearance and stride length. Momentary improvements with VC then worsening gait quality with fatigue. Pt is [place], decreased effectiveness of VC. Visual and tactile cueing required for best improvement. Assistance required for body weight support and AD management. No knee buckling noted.  Activity limited by signficant fatigue.  -      Row Name 08/08/24 1703 08/08/24 0952       Mobility    Extremity Weight-bearing Status right lower extremity  - right lower extremity  -    Right Lower Extremity (Weight-bearing Status) weight-bearing as tolerated (WBAT)  - weight-bearing as tolerated (WBAT)  -              User Key  (r) = Recorded By, (t) = Taken By, (c) = Cosigned By      Initials Name Provider Type     Deanna Luque PT Physical Therapist                   Obj/Interventions       [facility] Name 08/08/24 1708 08/08/24 1007       Range of Motion Comprehensive    General Range of Motion lower extremity range of motion deficits identified  - lower extremity range of motion deficits identified  -    Comment, General Range of Motion -- Surgical knee ROM: 19-86  -[facility] Name 08/08/24 1708 08/08/24 1007       Strength Comprehensive (MMT)    General Manual Muscle Testing (MMT) Assessment lower extremity strength deficits identified  - lower extremity strength deficits identified  -    Comment, General Manual Muscle Testing (MMT) Assessment -- Pt able to perform B active ankle DF and SLR  -[facility] Name 08/08/24 1708 08/08/24 1007       Motor Skills    Therapeutic Exercise knee;ankle  - knee;ankle  -[facility] Name 08/08/24 1708 08/08/24 1007       Knee (Therapeutic Exercise)    Knee (Therapeutic Exercise) isometric exercises;strengthening exercise  - isometric exercises;strengthening exercise  -    Knee Isometrics (Therapeutic Exercise) right;quad sets;10 repetitions  - right;quad sets;10 repetitions  -    Knee Strengthening (Therapeutic Exercise) right;heel slides;SLR (straight leg raise);SAQ (short arc quad);LAQ (long arc quad);10 repetitions  - right;heel slides;SLR (straight leg raise);SAQ (short arc quad);LAQ (long arc quad);10 repetitions  -[facility] Name 08/08/24 1708 08/08/24 1007       Ankle (Therapeutic Exercise)    Ankle (Therapeutic Exercise) AROM (active range  of motion)  - AROM (active range of motion)  -    Ankle AROM (Therapeutic Exercise) bilateral;dorsiflexion;plantarflexion;10 repetitions  - bilateral;dorsiflexion;plantarflexion;10 repetitions  -      Row Name 08/08/24 1708 08/08/24 1007       Balance    Balance Assessment sitting static balance;sitting dynamic balance;sit to stand dynamic balance;standing static balance;standing dynamic balance  - sitting static balance;sitting dynamic balance;sit to stand dynamic balance;standing static balance;standing dynamic balance  -    Static Sitting Balance contact guard  -HW standby assist  -    Dynamic Sitting Balance contact guard  -HW standby assist  -    Position, Sitting Balance sitting edge of bed  - sitting edge of bed  -    Static Standing Balance moderate assist  - minimal assist  -    Dynamic Standing Balance maximum assist  - moderate assist  -    Position/Device Used, Standing Balance supported;walker, rolling  -HW supported;walker, rolling  -HW    Balance Interventions sitting;standing;sit to stand;occupation based/functional task  - sitting;standing;sit to stand;occupation based/functional task  -              User Key  (r) = Recorded By, (t) = Taken By, (c) = Cosigned By      Initials Name Provider Type     Deanna Luque PT Physical Therapist                   Goals/Plan    No documentation.                  Clinical Impression       Row Name 08/08/24 1708 08/08/24 1008       Pain    Pretreatment Pain Rating 4/10  - 3/10  -HW    Posttreatment Pain Rating 4/10  - 6/10  -HW    Pain Location - Side/Orientation Right  - Right  -    Pain Location anterior  - anterior  -    Pain Location - knee  -HW knee  -    Pain Intervention(s) Repositioned;Cold applied;Ambulation/increased activity;Elevated  - Repositioned;Cold applied;Elevated;Ambulation/increased activity  -      Row Name 08/08/24 1708 08/08/24 1008       Plan of Care Review    Plan of Care Reviewed With  patient  -HW patient  -HW    Progress no change  -HW no change  -    Outcome Evaluation Pt amb 13' with FWW and Max A x2. Pt demonstrated heavy reliance of BUE support to offload BLE with significant forward flexed posture and anterior lean on FWW. Significant fatigue noted. Assistance required to assist hips to EOB. HEP reviewed with pt. Continue to recommend d/c to IRF to promote decreased risk for falls.  - Pt amb 40' with FWW and Mod A. Pt demonstrated significant reliance on BUE on FWW for support, decreased weight shifting onto RLE, and significantly decreased R knee extension in stance phase. Some improvements noted with VC. Activity limited by fatigue. Pt is at high risk for falls due to generalized weakness, decreased R knee ROM/strength, impaired balance, and quick fatigue. Recommend d/c to IRF to address mobility deficits and promote increased independence with functional mobility.  -      Row Name 08/08/24 1708 08/08/24 1008       Positioning and Restraints    Pre-Treatment Position sitting in chair/recliner  -HW in bed  -HW    Post Treatment Position bed  -HW chair  -HW    In Bed notified nsg;supine;fowlers;call light within reach;encouraged to call for assist;exit alarm on;with family/caregiver;side rails up x2  -HW --    In Chair -- notified nsg;reclined;sitting;call light within reach;encouraged to call for assist;exit alarm on;legs elevated;compression device  ice applied; R LE elevated with pillow placed under calf, leaving heel and knee free from support  -              User Key  (r) = Recorded By, (t) = Taken By, (c) = Cosigned By      Initials Name Provider Type    HW Deanna Luque PT Physical Therapist                   Outcome Measures       Row Name 08/08/24 1710 08/08/24 1012       How much help from another person do you currently need...    Turning from your back to your side while in flat bed without using bedrails? 2  -HW 3  -HW    Moving from lying on back to sitting on the side  of a flat bed without bedrails? 2  -HW 2  -HW    Moving to and from a bed to a chair (including a wheelchair)? 2  -HW 2  -HW    Standing up from a chair using your arms (e.g., wheelchair, bedside chair)? 2  -HW 2  -HW    Climbing 3-5 steps with a railing? 2  -HW 2  -HW    To walk in hospital room? 2  -HW 2  -HW    AM-PAC 6 Clicks Score (PT) 12  -HW 13  -HW    Highest Level of Mobility Goal 4 --> Transfer to chair/commode  -HW 4 --> Transfer to chair/commode  -HW      Row Name 08/08/24 0813          How much help from another person do you currently need...    Turning from your back to your side while in flat bed without using bedrails? 3  -CC     Moving from lying on back to sitting on the side of a flat bed without bedrails? 3  -CC     Moving to and from a bed to a chair (including a wheelchair)? 3  -CC     Standing up from a chair using your arms (e.g., wheelchair, bedside chair)? 3  -CC     Climbing 3-5 steps with a railing? 3  -CC     To walk in hospital room? 3  -CC     AM-PAC 6 Clicks Score (PT) 18  -CC     Highest Level of Mobility Goal 6 --> Walk 10 steps or more  -       Row Name 08/08/24 1710 08/08/24 1401       Functional Assessment    Outcome Measure Options AM-PAC 6 Clicks Basic Mobility (PT)  - AM-PAC 6 Clicks Daily Activity (OT)  -      Row Name 08/08/24 1012          Functional Assessment    Outcome Measure Options AM-[facility] 6 Clicks Basic Mobility (PT)  -               User Key  (r) = Recorded By, (t) = Taken By, (c) = Cosigned By      Initials Name Provider Type    HW Deanna Luque, PT Physical Therapist    MR Joy Ayoub, OT Occupational Therapist    CC Akhil Tapia, RN Registered Nurse                                 Physical Therapy Education       Title: PT OT SLP Therapies (In Progress)       Topic: Physical Therapy (Done)       Point: Mobility training (Done)       Learning Progress Summary             Patient Acceptance, E,D, VU,NR by  at 8/8/2024 1710    Acceptance, E,D, VU,NR  by  at 8/8/2024 1012    Acceptance, E, VU,DU,NR by  at 8/7/2024 1539    Comment: educated on safety w/ mobility, WBAT status, gait training, AD use, HEP, and d/c planning   Family Acceptance, E, VU,DU,NR by  at 8/7/2024 1539    Comment: educated on safety w/ mobility, WBAT status, gait training, AD use, HEP, and d/c planning                         Point: Home exercise program (Done)       Learning Progress Summary             Patient Acceptance, E,D, VU,NR by  at 8/8/2024 1710    Acceptance, E,D, VU,NR by  at 8/8/2024 1012    Acceptance, E, VU,DU,NR by  at 8/7/2024 1539    Comment: educated on safety w/ mobility, WBAT status, gait training, AD use, HEP, and d/c planning   Family Acceptance, E, VU,DU,NR by  at 8/7/2024 1539    Comment: educated on safety w/ mobility, WBAT status, gait training, AD use, HEP, and d/c planning                         Point: Body mechanics (Done)       Learning Progress Summary             Patient Acceptance, E,D, VU,NR by  at 8/8/2024 1710    Acceptance, E,D, VU,NR by  at 8/8/2024 1012    Acceptance, E, VU,DU,NR by  at 8/7/2024 1539    Comment: educated on safety w/ mobility, WBAT status, gait training, AD use, HEP, and d/c planning   Family Acceptance, E, VU,DU,NR by  at 8/7/2024 1539    Comment: educated on safety w/ mobility, WBAT status, gait training, AD use, HEP, and d/c planning                         Point: Precautions (Done)       Learning Progress Summary             Patient Acceptance, E,D, VU,NR by  at 8/8/2024 1710    Acceptance, E,D, VU,NR by  at 8/8/2024 1012    Acceptance, E, VU,DU,NR by  at 8/7/2024 1539    Comment: educated on safety w/ mobility, WBAT status, gait training, AD use, HEP, and d/c planning   Family Acceptance, E, VU,DU,NR by  at 8/7/2024 1539    Comment: educated on safety w/ mobility, WBAT status, gait training, AD use, HEP, and d/c planning                                         User Key       Initials Effective Dates  Name Provider Type Discipline     12/15/23 -  Deanna Luque, PT Physical Therapist PT     09/21/23 -  Carline Kraft, EL Physical Therapist PT                  PT Recommendation and Plan     Plan of Care Reviewed With: patient  Progress: no change  Outcome Evaluation: Pt amb 13' with FWW and Max A x2. Pt demonstrated heavy reliance of BUE support to offload BLE with significant forward flexed posture and anterior lean on FWW. Significant fatigue noted. Assistance required to assist hips to EOB. HEP reviewed with pt. Continue to recommend d/c to IRF to promote decreased risk for falls.     Time Calculation:         PT Charges       Row Name 08/08/24 1711 08/08/24 1012          Time Calculation    Start Time 1503  -HW 0910  -HW     PT Received On 08/08/24  -HW 08/08/24  -HW        Timed Charges    01911 - PT Therapeutic Exercise Minutes 7  -HW 15  -HW     26498 - Gait Training Minutes  22  -HW 20  -HW     68881 - PT Therapeutic Activity Minutes -- 5  -HW        Total Minutes    Timed Charges Total Minutes 29  -HW 40  -HW      Total Minutes 29  -HW 40  -HW               User Key  (r) = Recorded By, (t) = Taken By, (c) = Cosigned By      Initials Name Provider Type     Deanna Luque, PT Physical Therapist                  Therapy Charges for Today       Code Description Service Date Service Provider Modifiers Qty    81586112253 HC PT THER PROC EA 15 MIN 8/8/2024 Deanna Luque, PT GP 1    35027230044 HC GAIT TRAINING EA 15 MIN 8/8/2024 Deanna Luque, PT GP 2    38411429952 HC PT THER PROC EA 15 MIN 8/8/2024 Deanna Luque, PT GP 1    60157266568 HC GAIT TRAINING EA 15 MIN 8/8/2024 Deanna Luque, PT GP 1    16615157715 HC PT THER SUPP EA 15 MIN 8/8/2024 Deanna Luque, PT GP 2            PT G-Codes  Outcome Measure Options: AM-PAC 6 Clicks Basic Mobility (PT)  AM-PAC 6 Clicks Score (PT): 12  AM-PAC 6 Clicks Score (OT): 16  PT Discharge Summary  Anticipated Discharge Disposition (PT): inpatient rehabilitation  facility    Deanna Luque, PT  8/8/2024

## 2024-08-08 NOTE — PROGRESS NOTES
[facility]    Acute pain service Inpatient Progress Note    Patient Name: Guicho Blanco  :  1940  MRN:  [id number]        Acute Pain  Service Inpatient Progress Note:    Analgesia:Good  Pain Score:0/10  LOC: alert and awake  Resp Status: room air  Cardiac: VS stable  Side Effects:None  Catheter Site:clean, dressing intact and dry  Cath type: peripheral nerve cath(InfuSystem)  Volume: 1mL,8ml, 8ml InfuSystem Pump.  Catheter Plan:Catheter to remain Insitu and Continue catheter infusion rate unchanged  Comments:

## 2024-08-08 NOTE — PROGRESS NOTES
"          Clinical Nutrition Assessment     Patient Name: Guicho Blanco  YOB: 1940  MRN: [id number]  Date of Encounter: 08/08/24 13:08 EDT  Admission date: 8/7/2024  Reason for Visit: MST score 2+, Unintentional weight loss, Reduced oral intake    Assessment   Nutrition Assessment   Admission Diagnosis:  Primary osteoarthritis of right knee [M17.11]  OA (osteoarthritis) of knee [M17.9]    Problem List:    Status post total right knee replacement    PAF (paroxysmal atrial fibrillation)    Coronary artery disease involving native coronary artery of native heart without angina pectoris    Essential hypertension    Mixed hyperlipidemia    OA (osteoarthritis) of knee      PMH:   He  has a past medical history of Arthritis, Atrial fibrillation, BPH (benign prostatic hypertrophy), Cataract, Cellulitis, 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    PSH:  He  has a past surgical history that includes Ablation of dysrhythmic focus; Appendectomy; Shoulder surgery (Right); Prostate surgery; Cholecystectomy; Cataract extraction (Bilateral); Toe amputation (Right); Cardiac catheterization (N/A, 10/05/2017); Tonsillectomy; Cochlear implant revision (05/2019); Skin biopsy; and Colonoscopy.    Applicable Nutrition History:   8/7) total knee arthroplasty     Anthropometrics     Height: 182.9 cm (72\")   Last Filed Weight: 94.8 kg (209 lb)  as of 7/30/2024   Method:   BMI: 28.35 kg/m²    UBW:  210# at last dr visit  Weight change: unchanged  Wt relatively stable x 1 year     Weight       Weight (kg) Weight (lbs) Weight Method Visit Report   6/12/2023 91.627 kg  202 lb   --    8/8/2023 91.627 kg  202 lb      6/25/2024 93.441 kg  206 lb   --  " "  7/25/2024 92.987 kg  205 lb   --    7/30/2024 95.165 kg  209 lb 12.8 oz  Standing scale  --     94.802 kg  209 lb   --        Nutrition Focused Physical Exam    Date:  8/8       Pt does not meet criteria for malnutrition diagnosis, at this time.      Subjective   Reported/Observed/Food/Nutrition Related History:     Patient admitted for knee replacement, states he's feeling well, tired after PT this morning. Patient reports eating well PTA, denies any recent N/V. Pt states he does have dentures but wears them regularly. Reports he occasionally has difficulty swallowing d/t a \"bone spur\" however denies diminished PO quantity and patient does not modify food textures, notes that if he drinks it helps food go down. Pt states he may eat slower d/t swallowing. Pt reports he is constipated regularly, states he takes stool softeners at home however thinks he needs something more. NKFA. Pt denies further dietary needs/preferences or nutritional questions/concerns at this time     Current Nutrition Prescription   PO: Diet: Regular/House; Fluid Consistency: Thin (IDDSI 0)  Oral Nutrition Supplement: N/A  Intake: 1 Day: 75% x 1 meal    Assessment & Plan   Nutrition Diagnosis   Date:  8/8            Updated:    Problem No nutrition diagnosis at this time; defer pending indication   Etiology    Signs/Symptoms    Status:       Goal:   Nutrition to support treatment and Establish PO      Nutrition Intervention      Follow treatment progress, Care plan reviewed, Advise alternate selection, Advised available snacks, Interview for preferences, Menu provided, Supplement provided    Boost Plus daily w/ lunch per patient preference.  Recommend bowel regimen d/t pt reported constipation    Monitoring/Evaluation:   Per protocol, I&O, PO intake, Supplement intake, Weight, Symptoms, POC/GOC, Swallow function    Hayley Gatica RD eligible   Time Spent: 35 min    "

## 2024-08-08 NOTE — PLAN OF CARE
Goal Outcome Evaluation:                                              Problem: Adult Inpatient Plan of Care  Goal: Absence of Hospital-Acquired Illness or Injury  Intervention: Identify and Manage Fall Risk  Recent Flowsheet Documentation  Taken 8/8/2024 0200 by Rudolph Shepard RN  Safety Promotion/Fall Prevention:   activity supervised   safety round/check completed   toileting scheduled  Taken 8/8/2024 0000 by Rudolph Shepard RN  Safety Promotion/Fall Prevention:   activity supervised   safety round/check completed   toileting scheduled  Taken 8/7/2024 2200 by Rudolph Shepard RN  Safety Promotion/Fall Prevention:   activity supervised   safety round/check completed   toileting scheduled  Taken 8/7/2024 2000 by Rudolph Shepard RN  Safety Promotion/Fall Prevention:   activity supervised   safety round/check completed   toileting scheduled  Intervention: Prevent Skin Injury  Recent Flowsheet Documentation  Taken 8/8/2024 0200 by Rudolph Shepard RN  Body Position: position changed independently  Taken 8/8/2024 0000 by Rudolph Shepard RN  Body Position: position changed independently  Taken 8/7/2024 2200 by Rudolph Shepard RN  Body Position: supine  Taken 8/7/2024 2000 by Rudolph Shepard RN  Body Position: supine  Intervention: Prevent and Manage VTE (Venous Thromboembolism) Risk  Recent Flowsheet Documentation  Taken 8/8/2024 0200 by Rudolph Shepard RN  VTE Prevention/Management:   bilateral   sequential compression devices on  Taken 8/8/2024 0000 by Rudolph Shepard RN  VTE Prevention/Management:   bilateral   sequential compression devices on  Taken 8/7/2024 2200 by Rudolph Shepard RN  VTE Prevention/Management:   bilateral   sequential compression devices on  Taken 8/7/2024 2000 by Rudolph Shepard RN  VTE Prevention/Management:   bilateral   sequential compression devices on  Intervention: Prevent Infection  Recent Flowsheet Documentation  Taken 8/8/2024 0200 by Rudolph Shepard RN  Infection Prevention:  environmental surveillance performed  Taken 8/8/2024 0000 by Rudolph Shepard RN  Infection Prevention: environmental surveillance performed  Taken 8/7/2024 2200 by Rudolph Shepard RN  Infection Prevention: environmental surveillance performed  Taken 8/7/2024 2000 by Rudolph Shepard RN  Infection Prevention: environmental surveillance performed  Goal: Optimal Comfort and Wellbeing  Intervention: Monitor Pain and Promote Comfort  Recent Flowsheet Documentation  Taken 8/8/2024 0200 by Rudolph Shepard RN  Pain Management Interventions: quiet environment facilitated  Taken 8/8/2024 0000 by Rudolph Shepard RN  Pain Management Interventions: quiet environment facilitated  Taken 8/7/2024 2200 by Rudolph Shepard RN  Pain Management Interventions: quiet environment facilitated  Taken 8/7/2024 2000 by Rudolph Shepard RN  Pain Management Interventions: quiet environment facilitated  Intervention: Provide Person-Centered Care  Recent Flowsheet Documentation  Taken 8/8/2024 0200 by Rudolph Shepard RN  Trust Relationship/Rapport: care explained  Taken 8/8/2024 0000 by Rudolph Shepard RN  Trust Relationship/Rapport: care explained  Taken 8/7/2024 2200 by Rudolph Shepard RN  Trust Relationship/Rapport: care explained  Taken 8/7/2024 2000 by Rudolph Shepard RN  Trust Relationship/Rapport: care explained     VSS, voids well, [place], ambulates to bathroom, will continue to monitor for changes.

## 2024-08-08 NOTE — CASE MANAGEMENT/SOCIAL WORK
"Discharge Planning Assessment  [facility]     Patient Name: Guicho Blanco  MRN: [id number]  Today's Date: 8/8/2024    Admit Date: 8/7/2024    Plan: [facility]       Discharge Plan       Row Name 08/08/24 1314       Plan    Plan [facility]    Patient/Family in Agreement with Plan yes    Plan Comments Met with Mr. Blanco, at the bedside, to initiate discharge planning.    Mr. Blanco lives with his wife, Sandra, in [facility].  He states that prior to admission, he ambulated with a rolling walker or st cane PRN.    He has been evaluated by PT and per notes, \"Pt amb 40' with FWW and Mod A. Pt demonstrated significant reliance on BUE on FWW for support, decreased weight shifting onto RLE, and significantly decreased R knee extension in stance phase. Some improvements noted with VC. Activity limited by fatigue. Pt is at high risk for falls due to generalized weakness, decreased R knee ROM/strength, impaired balance, and quick fatigue. Recommend d/c to IRF to address mobility deficits and promote increased independence with functional mobility.\"    Discussed IPR and the patient requested a referral to [facility].  Referral given to Sandra with [facility].  Once accepted by [facility], a prior auth with the patient's insurance will be required for the rehab admission.    PCP is Mitchel Culver.  Insurance is [employer].  No ACP documents.  DC plan is [facility].    CM will follow up.    Final Discharge Disposition Code 03 - skilled nursing facility (SNF)                  Continued Care and Services - Admitted Since 8/7/2024       Destination       Service Provider Request Status Selected Services Address Phone Fax Patient Preferred    [facility] Pending - No Request Sent N/A [address] [facility] [address] [phone] --                  Millie Masterson RN    "

## 2024-08-08 NOTE — PLAN OF CARE
Goal Outcome Evaluation:  Plan of Care Reviewed With: patient        Progress: no change (Initial Eval)  Outcome Evaluation: Pt presenting below his functional baseline w/ transfers, mobility, balance and ADLs. Pt requiring assist w/ LB dressing and transfers this date. Pt's deficits warrant skilled IP OT services. Recommend inpatient rehab at d/c for best functional outcome.      Anticipated Discharge Disposition (OT): inpatient rehabilitation facility

## 2024-08-08 NOTE — PLAN OF CARE
Goal Outcome Evaluation:  Plan of Care Reviewed With: patient        Progress: no change  Outcome Evaluation: Pt amb 13' with FWW and Max A x2. Pt demonstrated heavy reliance of BUE support to offload BLE with significant forward flexed posture and anterior lean on FWW. Significant fatigue noted. Assistance required to assist hips to EOB. HEP reviewed with pt. Continue to recommend d/c to IRF to promote decreased risk for falls.      Anticipated Discharge Disposition (PT): inpatient rehabilitation facility

## 2024-08-09 PROCEDURE — A9270 NON-COVERED ITEM OR SERVICE: HCPCS | Performed by: ORTHOPAEDIC SURGERY

## 2024-08-09 PROCEDURE — A9270 NON-COVERED ITEM OR SERVICE: HCPCS | Performed by: INTERNAL MEDICINE

## 2024-08-09 PROCEDURE — A9270 NON-COVERED ITEM OR SERVICE: HCPCS | Performed by: NURSE PRACTITIONER

## 2024-08-09 PROCEDURE — 63710000001 ISOSORBIDE MONONITRATE 30 MG TABLET SUSTAINED-RELEASE 24 HOUR: Performed by: NURSE PRACTITIONER

## 2024-08-09 PROCEDURE — 99024 POSTOP FOLLOW-UP VISIT: CPT | Performed by: ORTHOPAEDIC SURGERY

## 2024-08-09 PROCEDURE — 97116 GAIT TRAINING THERAPY: CPT

## 2024-08-09 PROCEDURE — 63710000001 NORTRIPTYLINE 10 MG CAPSULE: Performed by: NURSE PRACTITIONER

## 2024-08-09 PROCEDURE — 63710000001 DOCUSATE SODIUM 100 MG CAPSULE: Performed by: ORTHOPAEDIC SURGERY

## 2024-08-09 PROCEDURE — 63710000001 FLECAINIDE 50 MG TABLET: Performed by: NURSE PRACTITIONER

## 2024-08-09 PROCEDURE — 63710000001 ASPIRIN 81 MG TABLET DELAYED-RELEASE: Performed by: ORTHOPAEDIC SURGERY

## 2024-08-09 PROCEDURE — 97530 THERAPEUTIC ACTIVITIES: CPT

## 2024-08-09 PROCEDURE — 63710000001 POLYETHYLENE GLYCOL 17 G PACK: Performed by: INTERNAL MEDICINE

## 2024-08-09 PROCEDURE — 97110 THERAPEUTIC EXERCISES: CPT

## 2024-08-09 PROCEDURE — 63710000001 PANTOPRAZOLE 40 MG TABLET DELAYED-RELEASE: Performed by: NURSE PRACTITIONER

## 2024-08-09 PROCEDURE — 63710000001 BISACODYL 5 MG TABLET DELAYED-RELEASE: Performed by: INTERNAL MEDICINE

## 2024-08-09 PROCEDURE — 63710000001 PRAVASTATIN 20 MG TABLET: Performed by: NURSE PRACTITIONER

## 2024-08-09 PROCEDURE — 63710000001 ACETAMINOPHEN EXTRA STRENGTH 500 MG TABLET: Performed by: ORTHOPAEDIC SURGERY

## 2024-08-09 PROCEDURE — 63710000001 SENNOSIDES-DOCUSATE 8.6-50 MG TABLET: Performed by: INTERNAL MEDICINE

## 2024-08-09 RX ORDER — BISACODYL 10 MG
10 SUPPOSITORY, RECTAL RECTAL DAILY
Status: DISCONTINUED | OUTPATIENT
Start: 2024-08-09 | End: 2024-08-10 | Stop reason: HOSPADM

## 2024-08-09 RX ORDER — AMOXICILLIN 250 MG
2 CAPSULE ORAL 2 TIMES DAILY
Status: DISCONTINUED | OUTPATIENT
Start: 2024-08-09 | End: 2024-08-10 | Stop reason: HOSPADM

## 2024-08-09 RX ORDER — POLYETHYLENE GLYCOL 3350 17 G/17G
17 POWDER, FOR SOLUTION ORAL DAILY PRN
Status: DISCONTINUED | OUTPATIENT
Start: 2024-08-09 | End: 2024-08-10 | Stop reason: HOSPADM

## 2024-08-09 RX ORDER — BISACODYL 5 MG/1
5 TABLET, DELAYED RELEASE ORAL DAILY PRN
Status: DISCONTINUED | OUTPATIENT
Start: 2024-08-09 | End: 2024-08-10 | Stop reason: HOSPADM

## 2024-08-09 RX ORDER — BISACODYL 10 MG
10 SUPPOSITORY, RECTAL RECTAL DAILY PRN
Status: DISCONTINUED | OUTPATIENT
Start: 2024-08-09 | End: 2024-08-10 | Stop reason: HOSPADM

## 2024-08-09 RX ADMIN — ASPIRIN 81 MG: 81 TABLET, COATED ORAL at 20:14

## 2024-08-09 RX ADMIN — PRAVASTATIN SODIUM 20 MG: 20 TABLET ORAL at 20:14

## 2024-08-09 RX ADMIN — Medication 3 ML: at 08:55

## 2024-08-09 RX ADMIN — BISACODYL 5 MG: 5 TABLET, COATED ORAL at 15:50

## 2024-08-09 RX ADMIN — ACETAMINOPHEN 1000 MG: 500 TABLET, FILM COATED ORAL at 08:54

## 2024-08-09 RX ADMIN — ISOSORBIDE MONONITRATE 30 MG: 30 TABLET, EXTENDED RELEASE ORAL at 08:54

## 2024-08-09 RX ADMIN — FLECAINIDE ACETATE 100 MG: 50 TABLET ORAL at 08:54

## 2024-08-09 RX ADMIN — FLECAINIDE ACETATE 100 MG: 50 TABLET ORAL at 20:14

## 2024-08-09 RX ADMIN — NORTRIPTYLINE HYDROCHLORIDE 20 MG: 10 CAPSULE ORAL at 21:21

## 2024-08-09 RX ADMIN — POLYETHYLENE GLYCOL 3350 17 G: 17 POWDER, FOR SOLUTION ORAL at 08:54

## 2024-08-09 RX ADMIN — ACETAMINOPHEN 1000 MG: 500 TABLET, FILM COATED ORAL at 15:46

## 2024-08-09 RX ADMIN — SENNOSIDES AND DOCUSATE SODIUM 2 TABLET: 50; 8.6 TABLET ORAL at 20:14

## 2024-08-09 RX ADMIN — DOCUSATE SODIUM 100 MG: 100 CAPSULE, LIQUID FILLED ORAL at 15:50

## 2024-08-09 RX ADMIN — ASPIRIN 81 MG: 81 TABLET, COATED ORAL at 08:54

## 2024-08-09 RX ADMIN — Medication 3 ML: at 20:16

## 2024-08-09 RX ADMIN — ACETAMINOPHEN 1000 MG: 500 TABLET, FILM COATED ORAL at 03:00

## 2024-08-09 RX ADMIN — PANTOPRAZOLE SODIUM 40 MG: 40 TABLET, DELAYED RELEASE ORAL at 05:16

## 2024-08-09 NOTE — PLAN OF CARE
Goal Outcome Evaluation:  Plan of Care Reviewed With: patient, daughter        Progress: improving  Outcome Evaluation: Pt able to increase gait distance and independence this date. Pt amb 11ft + 50ft with RW and Vincent x2 with recliner in tow. Pt required frequent cues to correct posture and for step-through gait pattern. Distance limited by weakness and fatigue. Pt improving, however remains below baseline level of function for mobility and IPPT services continue to be warranted at this time.      Anticipated Discharge Disposition (PT): inpatient rehabilitation facility

## 2024-08-09 NOTE — THERAPY TREATMENT NOTE
Patient Name: Guicho Blanco  : 1940    MRN: [id number]                              Today's Date: 2024       Admit Date: 2024    Visit Dx:     ICD-10-CM ICD-9-CM   1. Primary osteoarthritis of right knee  M17.11 715.16     Patient Active Problem List   Diagnosis    Osteomyelitis of toe of right foot    Osteomyelitis of right foot    PAF (paroxysmal atrial fibrillation)    Coronary artery disease involving native coronary artery of native heart without angina pectoris    Gastroesophageal reflux disease without esophagitis    Benign non-nodular prostatic hyperplasia without lower urinary tract symptoms    S/P appy    Essential hypertension    Simple chronic bronchitis    Mixed hyperlipidemia    COPD (chronic obstructive pulmonary disease)    BPH (benign prostatic hypertrophy)    Hearing loss    Chest pain    Vertigo    Snoring    Overweight    PLMD (periodic limb movement disorder)    Neuropathy    Periodic headache syndrome, not intractable    Acquired absence of other right toe(s)    Tremor    Bilateral impacted cerumen    Bilateral sensorineural hearing loss    Chronic otitis externa    Deviated nasal septum    Disorder of joint of foot    ED (erectile dysfunction) of organic origin    Hydrocele    Hydrocele of testis    Impacted cerumen    Mononeuropathy    Swelling of testicle    Venous retinal branch occlusion    Vitreous degeneration    Benign prostatic hyperplasia with urinary obstruction    OA (osteoarthritis) of knee    Status post total right knee replacement     Past Medical History:   Diagnosis Date    Arthritis     Atrial fibrillation     BPH (benign prostatic hypertrophy)     Cataract     Cellulitis     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     bilateral    Coronary artery disease     Full dentures     Gall stones     GERD (gastroesophageal reflux disease)     Hearing loss     Hiatal hernia     Hyperlipidemia     Hypertension     Peripheral neuropathy     Peripheral vascular disease      Seasonal allergies     Skin cancer     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    TIA (transient ischemic attack)     2 times    Wears glasses      Past Surgical History:   Procedure Laterality Date    ABLATION OF DYSRHYTHMIC FOCUS      APPENDECTOMY      CARDIAC CATHETERIZATION N/A 10/05/2017    Procedure: Left Heart Cath;  Surgeon: Hector Urrutia MD;  Location:  [facility];  Service:     CATARACT EXTRACTION Bilateral     CHOLECYSTECTOMY      Remote    COCHLEAR IMPLANT REVISION  05/2019    COLONOSCOPY      PROSTATE SURGERY      SHOULDER SURGERY Right     SKIN BIOPSY      TOE AMPUTATION Right     2nd toe - Right Foot    TONSILLECTOMY        General Information       Row Name 08/09/24 1015          Physical Therapy Time and Intention    Document Type therapy note (daily note)  -SD     Mode of Treatment physical therapy  -SD       Row Name 08/09/24 1015          General Information    Existing Precautions/Restrictions fall;other (see comments)  AC nerve catheter; pt [place], wears cochlear hearing aids  -SD       Row Name 08/09/24 1015          Cognition    Orientation Status (Cognition) oriented x 4  -SD       Row Name 08/09/24 1015          Safety Issues, Functional Mobility    Safety Issues Affecting Function (Mobility) insight into deficits/self-awareness;positioning of assistive device;sequencing abilities;safety precaution awareness  -SD     Impairments Affecting Function (Mobility) balance;endurance/activity tolerance;pain;range of motion (ROM);strength;postural/trunk control  -SD               User Key  (r) = Recorded By, (t) = Taken By, (c) = Cosigned By      Initials Name Provider Type    SD Mireille Silva, EL Physical Therapist                   Mobility       Row Name 08/09/24 1049          Bed Mobility    Bed Mobility sit-supine  -SD     Supine-Sit [place] (Bed Mobility) moderate assist (50% patient effort);verbal cues;nonverbal cues (demo/gesture)  -SD     Assistive Device (Bed Mobility)  bed rails;head of bed elevated;draw sheet  -SD     Comment, (Bed Mobility) increased time and effort needed for all mobility. Pt needed assist for trunk as well as assist through draw sheet to advance hips to EOB.  -SD       Row Name 08/09/24 1049          Transfers    Comment, (Transfers) Pt t/f STS from elev bed surface and recliner with modA x2 with cues for safe hand placement and sequencing  -SD       Row Name 08/09/24 1049          Sit-Stand Transfer    Sit-Stand [place] (Transfers) moderate assist (50% patient effort);2 person assist;verbal cues  -SD     Assistive Device (Sit-Stand Transfers) walker, front-wheeled  -SD       Row Name 08/09/24 1049          Gait/Stairs (Locomotion)    [place] Level (Gait) minimum assist (75% patient effort);2 person assist;verbal cues;nonverbal cues (demo/gesture)  -SD     Assistive Device (Gait) walker, front-wheeled  -SD     Distance in Feet (Gait) 50  -SD     Deviations/Abnormal Patterns (Gait) right sided deviations;bilateral deviations;harleen decreased;gait speed decreased;stride length decreased;weight shifting decreased;festinating/shuffling;antalgic  -SD     Bilateral Gait Deviations forward flexed posture;heel strike decreased  -SD     Right Sided Gait Deviations weight shift ability decreased;heel strike decreased;decreased knee extension  -SD     Comment, (Gait/Stairs) Pt able to increase gait distance and independence this date. Pt amb 11ft + 50ft with RW and Vincent x2 with recliner in tow. Pt required frequent cues to correct posture and for step-through gait pattern. Distance limited by weakness and fatigue.  -SD       Row Name 08/09/24 1049          Mobility    Extremity Weight-bearing Status right lower extremity  -SD     Right Lower Extremity (Weight-bearing Status) weight-bearing as tolerated (WBAT)  -SD               User Key  (r) = Recorded By, (t) = Taken By, (c) = Cosigned By      Initials Name Provider Type    Mireille Paul, PT  Physical Therapist                   Obj/Interventions       Row Name 08/09/24 1053          Motor Skills    Therapeutic Exercise hip;knee;ankle  -SD       Row Name 08/09/24 1053          Knee (Therapeutic Exercise)    Knee (Therapeutic Exercise) strengthening exercise  -SD     Knee Strengthening (Therapeutic Exercise) right;marching while seated;LAQ (long arc quad);sitting;10 repetitions  -SD       Row Name 08/09/24 1053          Ankle (Therapeutic Exercise)    Ankle (Therapeutic Exercise) AROM (active range of motion)  -SD     Ankle AROM (Therapeutic Exercise) right;plantarflexion;sitting;10 repetitions  -SD       Row Name 08/09/24 1053          Balance    Static Sitting Balance contact guard  -SD     Position, Sitting Balance unsupported;sitting in chair;sitting edge of bed  -SD     Static Standing Balance minimal assist;2-person assist  -SD     Position/Device Used, Standing Balance supported;walker, rolling  -SD               User Key  (r) = Recorded By, (t) = Taken By, (c) = Cosigned By      Initials Name Provider Type    SD Mireille Silva, PT Physical Therapist                   Goals/Plan    No documentation.                  Clinical Impression       Row Name 08/09/24 1054          Pain    Pretreatment Pain Rating 0/10 - no pain  -SD     Posttreatment Pain Rating 3/10  -SD     Pain Location - Side/Orientation Right  -SD     Pain Location anterior  -SD     Pain Location - knee  -SD     Pain Intervention(s) Cold applied;Ambulation/increased activity;Repositioned  -SD       Row Name 08/09/24 1054          Plan of Care Review    Plan of Care Reviewed With patient;daughter  -SD     Progress improving  -SD     Outcome Evaluation Pt able to increase gait distance and independence this date. Pt amb 11ft + 50ft with RW and Vincent x2 with recliner in tow. Pt required frequent cues to correct posture and for step-through gait pattern. Distance limited by weakness and fatigue. Pt improving, however remains below  baseline level of function for mobility and IPPT services continue to be warranted at this time.  -SD       Row Name 08/09/24 1054          Vital Signs    Pre Systolic BP Rehab 141  -SD     Pre Treatment Diastolic BP 74  -SD     Pre SpO2 (%) 97  -SD     O2 Delivery Pre Treatment room air  -SD     Pre Patient Position Supine  -SD     Post Patient Position Sitting  -SD       Row Name 08/09/24 1054          Positioning and Restraints    Pre-Treatment Position in bed  -SD     Post Treatment Position chair  -SD     In Chair notified nsg;reclined;call light within reach;encouraged to call for assist;exit alarm on;with family/caregiver;waffle cushion;compression device;heels elevated  -SD               User Key  (r) = Recorded By, (t) = Taken By, (c) = Cosigned By      Initials Name Provider Type    Mireille Paul PT Physical Therapist                   Outcome Measures       Row Name 08/09/24 1056          How much help from another person do you currently need...    Turning from your back to your side while in flat bed without using bedrails? 2  -SD     Moving from lying on back to sitting on the side of a flat bed without bedrails? 2  -SD     Moving to and from a bed to a chair (including a wheelchair)? 2  -SD     Standing up from a chair using your arms (e.g., wheelchair, bedside chair)? 2  -SD     Climbing 3-5 steps with a railing? 2  -SD     To walk in hospital room? 2  -SD     AM-PAC 6 Clicks Score (PT) 12  -SD     Highest Level of Mobility Goal 4 --> Transfer to chair/commode  -SD       Row Name 08/09/24 1056          Functional Assessment    Outcome Measure Options AM-PAC 6 Clicks Basic Mobility (PT)  -SD               User Key  (r) = Recorded By, (t) = Taken By, (c) = Cosigned By      Initials Name Provider Type    Mireille Paul PT Physical Therapist                                 Physical Therapy Education       Title: PT OT SLP Therapies (In Progress)       Topic: Physical Therapy (In  Progress)       Point: Mobility training (In Progress)       Learning Progress Summary             Patient Acceptance, E,D, NR by SD at 8/9/2024 1058    Acceptance, E,D, VU,NR by  at 8/8/2024 1710    Acceptance, E,D, VU,NR by  at 8/8/2024 1012    Acceptance, E, VU,DU,NR by  at 8/7/2024 1539    Comment: educated on safety w/ mobility, WBAT status, gait training, AD use, HEP, and d/c planning   Family Acceptance, E,D, NR by SD at 8/9/2024 1058    Acceptance, E, VU,DU,NR by  at 8/7/2024 1539    Comment: educated on safety w/ mobility, WBAT status, gait training, AD use, HEP, and d/c planning                         Point: Home exercise program (In Progress)       Learning Progress Summary             Patient Acceptance, E,D, NR by SD at 8/9/2024 1058    Acceptance, E,D, VU,NR by  at 8/8/2024 1710    Acceptance, E,D, VU,NR by  at 8/8/2024 1012    Acceptance, E, VU,DU,NR by  at 8/7/2024 1539    Comment: educated on safety w/ mobility, WBAT status, gait training, AD use, HEP, and d/c planning   Family Acceptance, E,D, NR by SD at 8/9/2024 1058    Acceptance, E, VU,DU,NR by  at 8/7/2024 1539    Comment: educated on safety w/ mobility, WBAT status, gait training, AD use, HEP, and d/c planning                         Point: Body mechanics (In Progress)       Learning Progress Summary             Patient Acceptance, E,D, NR by SD at 8/9/2024 1058    Acceptance, E,D, VU,NR by  at 8/8/2024 1710    Acceptance, E,D, VU,NR by  at 8/8/2024 1012    Acceptance, E, VU,DU,NR by  at 8/7/2024 1539    Comment: educated on safety w/ mobility, WBAT status, gait training, AD use, HEP, and d/c planning   Family Acceptance, E,D, NR by SD at 8/9/2024 1058    Acceptance, E, VU,DU,NR by  at 8/7/2024 4206    Comment: educated on safety w/ mobility, WBAT status, gait training, AD use, HEP, and d/c planning                         Point: Precautions (In Progress)       Learning Progress Summary             Patient Acceptance,  E,D, NR by SD at 8/9/2024 1058    Acceptance, E,D, VU,NR by  at 8/8/2024 1710    Acceptance, E,D, VU,NR by  at 8/8/2024 1012    Acceptance, E, VU,DU,NR by  at 8/7/2024 1539    Comment: educated on safety w/ mobility, WBAT status, gait training, AD use, HEP, and d/c planning   Family Acceptance, E,D, NR by SD at 8/9/2024 1058    Acceptance, E, VU,DU,NR by  at 8/7/2024 1539    Comment: educated on safety w/ mobility, WBAT status, gait training, AD use, HEP, and d/c planning                                         User Key       Initials Effective Dates Name Provider Type Discipline    SD 03/13/23 -  Mireille Silva, PT Physical Therapist PT     12/15/23 -  Deanna Luque, PT Physical Therapist PT     09/21/23 -  Carline Kraft, PT Physical Therapist PT                  PT Recommendation and Plan     Plan of Care Reviewed With: patient, daughter  Progress: improving  Outcome Evaluation: Pt able to increase gait distance and independence this date. Pt amb 11ft + 50ft with RW and Vincent x2 with recliner in tow. Pt required frequent cues to correct posture and for step-through gait pattern. Distance limited by weakness and fatigue. Pt improving, however remains below baseline level of function for mobility and IPPT services continue to be warranted at this time.     Time Calculation:         PT Charges       Row Name 08/09/24 1100             Time Calculation    Start Time 1042  -SD      PT Received On 08/09/24  -SD      PT Goal Re-Cert Due Date 08/17/24  -SD         Time Calculation- PT    Total Timed Code Minutes- PT 24 minute(s)  -SD         Timed Charges    59781 - PT Therapeutic Exercise Minutes 8  -SD      58852 - Gait Training Minutes  16  -SD         Total Minutes    Timed Charges Total Minutes 24  -SD       Total Minutes 24  -SD                User Key  (r) = Recorded By, (t) = Taken By, (c) = Cosigned By      Initials Name Provider Type    SD Mireille Silva, PT Physical Therapist                   Therapy Charges for Today       Code Description Service Date Service Provider Modifiers Qty    [id number]  PT THER PROC EA 15 MIN 8/9/2024 Mireille Silva, PT GP 1    [id number] HC GAIT TRAINING EA 15 MIN 8/9/2024 Mireille Silva, PT GP 1    [id number]  PT THER SUPP EA 15 MIN 8/9/2024 Mireille Silva, PT GP 2            PT G-Codes  Outcome Measure Options: AM-PAC 6 Clicks Basic Mobility (PT)  AM-PAC 6 Clicks Score (PT): 12  AM-PAC 6 Clicks Score (OT): 16  PT Discharge Summary  Anticipated Discharge Disposition (PT): inpatient rehabilitation facility    Mireille Silva, EL  8/9/2024

## 2024-08-09 NOTE — PLAN OF CARE
Goal Outcome Evaluation:  Plan of Care Reviewed With: patient, daughter        Progress: no change  Outcome Evaluation: Pt more fatigued this afternoon and unable to progress gait distance. Pt amb 16ft + 5ft with RW and assist x2 +3rd person for recliner in tow. Pt dem step-to pattern and forward flexed posture, unable to correct despite cues. Pt dem posterior lean during static stance and fearful of falling. Pt remains below baseline level of function for mobility and IPPT services continue to be warranted at this time.      Anticipated Discharge Disposition (PT): inpatient rehabilitation facility

## 2024-08-09 NOTE — PROGRESS NOTES
[facility]    Acute pain service Inpatient Progress Note    Patient Name: Guicho Blanco  :  1940  MRN:  [id number]        Acute Pain  Service Inpatient Progress Note:    Analgesia:Excellent  LOC: alert and awake  Resp Status: room air  Cardiac: VS stable  Side Effects:None  Catheter Site:clean, dressing intact and dry  Cath type: peripheral nerve cath(InfuSystem)  Infusion rate: Fem/ Add: Basal: 1ml/hr, PIB: 8ml q 8h, PCA: 8ml q 30 min (1mL,8ml, 8ml InfuSystem Pump)  Catheter Plan:Catheter to remain Insitu and Continue catheter infusion rate unchanged  Comments:

## 2024-08-09 NOTE — PROGRESS NOTES
"IM progress note      Guicho Blanco  [id number]  1940     LOS: 0 days     Attending: Christian Schwartz,*    Primary Care Provider: Mitchel Culver MD      Chief Complaint/Reason for visit:  No chief complaint on file.      Subjective    Doing well. No f/c/n/vom/sob. ? No BM yet.        Objective        Visit Vitals  /95 (BP Location: Right arm, Patient Position: Sitting)   Pulse 107   Temp 98.2 °F (36.8 °C) (Oral)   Resp 18   Ht 182.9 cm (72.01\")   Wt 94.8 kg (209 lb)   SpO2 97%   BMI 28.34 kg/m²     Temp (24hrs), Av.2 °F (36.8 °C), Min:97.8 °F (36.6 °C), Max:98.5 °F (36.9 °C)      Intake/Output:    Intake/Output Summary (Last 24 hours) at 2024 1521  Last data filed at 2024 1501  Gross per 24 hour   Intake 662 ml   Output 525 ml   Net 137 ml        Physical Therapy:   Date of Service: 24 1042  Creation Time: 24 1058     Signed         Goal Outcome Evaluation:  Plan of Care Reviewed With: patient, daughter  Progress: improving  Outcome Evaluation: Pt able to increase gait distance and independence this date. Pt amb 11ft + 50ft with RW and Vincent x2 with recliner in tow. Pt required frequent cues to correct posture and for step-through gait pattern. Distance limited by weakness and fatigue. Pt improving, however remains below baseline level of function for mobility and IPPT services continue to be warranted at this time.        Anticipated Discharge Disposition (PT): inpatient rehabilitation facility                Physical Exam:     General Appearance:    Alert, cooperative, in no acute distress   Head:    Normocephalic, without obvious abnormality, atraumatic    Lungs:     Normal effort, symmetric chest rise,  clear to      auscultation bilaterally              Heart:    Regular rhythm and normal rate, normal S1 and S2    Abdomen:     Normal bowel sounds, no masses, no organomegaly, soft        non-tender, non-distended, no guarding, no rebound                " tenderness   Extremities:   CDI dressing. PNB cath present. .  Flexion and dorsiflexion intact bilateral feet.    No clubbing, cyanosis or edema.  No deformities.    Pulses:   Pulses palpable and equal bilaterally   Skin:   No bleeding, bruising or rash          Results Review:     I reviewed the patient's new clinical results.   Results from last 7 days   Lab Units 08/08/24  0530   WBC 10*3/mm3 8.29   HEMOGLOBIN g/dL 10.8*   HEMATOCRIT % 34.1*   PLATELETS 10*3/mm3 149     Results from last 7 days   Lab Units 08/08/24  0530   SODIUM mmol/L 140   POTASSIUM mmol/L 4.3   CHLORIDE mmol/L 111*   CO2 mmol/L 20.0*   BUN mg/dL 15   CREATININE mg/dL 1.12   CALCIUM mg/dL 8.2*   GLUCOSE mg/dL 101*     I reviewed the patient's new imaging including images and reports.    All medications reviewed.   acetaminophen, 1,000 mg, Oral, Q6H  aspirin, 81 mg, Oral, Q12H  bisacodyl, 10 mg, Rectal, Daily  flecainide, 100 mg, Oral, Q12H  isosorbide mononitrate, 30 mg, Oral, Daily  nortriptyline, 20 mg, Oral, Nightly  pantoprazole, 40 mg, Oral, Q AM  polyethylene glycol, 17 g, Oral, Daily  pravastatin, 20 mg, Oral, Nightly  senna-docusate sodium, 2 tablet, Oral, BID  sodium chloride, 3 mL, Intravenous, Q12H      polyethylene glycol, 17 g, Daily PRN   And  bisacodyl, 5 mg, Daily PRN   And  bisacodyl, 10 mg, Daily PRN  docusate sodium, 100 mg, BID PRN  labetalol, 10 mg, Q4H PRN  Morphine, 4 mg, Q2H PRN   And  naloxone, 0.4 mg, Q5 Min PRN  ondansetron ODT, 4 mg, Q6H PRN   Or  ondansetron, 4 mg, Q6H PRN  oxyCODONE, 5 mg, Q4H PRN  sodium chloride, 500 mL, TID PRN  sodium chloride, 3-10 mL, PRN  sodium chloride, 40 mL, PRN        Assessment & Plan       Status post total right knee replacement    PAF (paroxysmal atrial fibrillation)    Coronary artery disease involving native coronary artery of native heart without angina pectoris    Essential hypertension    Mixed hyperlipidemia    OA (osteoarthritis) of knee         Plan   1. PT/OT- WBAT RLE  2.  Pain control-prns, AC nerve block  3. IS-encourage  4. DVT proph-Mechs/ASA  5. Bowel regimen  6. Resume home medications as appropriate  7. Monitor post-op labs  8. DC planning for rehab. Tomorrow?     HTN, Hyperlipidemia, afib, CAD  - Continue home Tambocor, Imdur and statin  - Monitor BP   - Holding parameters for BP meds  - Labetalol PRN for SBP>170     RI  - Avoid nephrotoxic agents. GFR stable.  Monitor.        Berto Liu MD  08/09/24  15:21 EDT

## 2024-08-09 NOTE — PLAN OF CARE
Goal Outcome Evaluation:     Patient daughter at bedside this evening. Demanding. No c/o pain. Dressing in tact. Up with walker and assist 1-2. Patient tolerated getting up poorly. Nerve block in place. PT/OT consulted previously.

## 2024-08-09 NOTE — CASE MANAGEMENT/SOCIAL WORK
Case Management Discharge Note      Final Note:     Mr. Blanco has a skilled bed at [facility] tomorrow, Saturday, 8/10/24, if medically ready.  Confirmed bed with April from facility.  Insurance authorization received from the patient's [employer] for the rehab admission.      Updated Mr. Blanco, at the bedside, and he is agreeable to the DC plan.      [facility] ambulance scheduled to transport the patient to the facility on Saturday at 12noon.      Call report to [facility] at ph [phone], and fax DC summary to fax [phone].      Thank you.         Selected Continued Care - Admitted Since 8/7/2024       Destination Coordination complete.      Service Provider Selected Services Address Phone Fax Patient Preferred    [facility] [address] [address] [phone] --             Transportation Services  Ambulance: [facility]    Final Discharge Disposition Code: 03 - skilled nursing facility (SNF)

## 2024-08-09 NOTE — PROGRESS NOTES
"          Orthopaedic Surgery Progress Note      LOS: 0 days   Patient Care Team:  Mitchel Culver MD as PCP - General (Family Medicine)  Mitchel Culver MD as PCP - Family Medicine  Hector Urrutia MD as Consulting Physician (Cardiology)    POD 2    Subjective     Interval History:   Patient doing well this morning.  Minimal complaints of pain.  Wants the dressing shortened if at all possible.  Denies chest pain or shortness of breath    Objective     Vital Signs:  Temp (24hrs), Av.3 °F (36.8 °C), Min:97.8 °F (36.6 °C), Max:98.5 °F (36.9 °C)    /74 (BP Location: Left arm, Patient Position: Lying)   Pulse 100   Temp 97.8 °F (36.6 °C) (Oral)   Resp 17   Ht 182.9 cm (72.01\")   Wt 94.8 kg (209 lb)   SpO2 96%   BMI 28.34 kg/m²     Labs:  Lab Results (last 24 hours)       ** No results found for the last 24 hours. **            Physical Exam:  EHL, FHL, gastroc soleus, and tibialis anterior are intact  Toes are pink and warm  Surgical dressing is in place  Palpable dorsalis pedis pulse  Calf is soft and nontender      Assessment & Plan     Postoperative day number 2 status post right total knee arthroplasty    Pain Control: Good overall    PT and OT     DVT prophylaxis: Baby aspirin twice daily.  Monitor creatinine function    Discharge planning: To be determined.  Referral has been made to [facility].    Upon discharge, patient will need follow-up with me in 3 weeks' time.  They will need [facility] followed by [facility] at home program for physical therapy.  Standard Exofin Fusion dressing care instructions.  Do not remove.  DME per case management.    Admission Status:  I believe this patient meets INPATIENT status due to the need for care which can only be reasonably provided in a hospital setting such as aggressive/expedited ancillary services and/or consultation services, the necessity for IV medications, close physician monitoring and/or the possible need for procedures.  " In such, I feel patient’s risk for adverse outcomes and need for care warrant INPATIENT evaluation and predict the patient’s care encounter to likely last beyond 2 midnights.        Christian Schwartz MD  08/09/24  09:40 EDT

## 2024-08-09 NOTE — THERAPY TREATMENT NOTE
Patient Name: Guicho Blanco  : 1940    MRN: [id number]                              Today's Date: 2024       Admit Date: 2024    Visit Dx:     ICD-10-CM ICD-9-CM   1. Primary osteoarthritis of right knee  M17.11 715.16     Patient Active Problem List   Diagnosis    Osteomyelitis of toe of right foot    Osteomyelitis of right foot    PAF (paroxysmal atrial fibrillation)    Coronary artery disease involving native coronary artery of native heart without angina pectoris    Gastroesophageal reflux disease without esophagitis    Benign non-nodular prostatic hyperplasia without lower urinary tract symptoms    S/P appy    Essential hypertension    Simple chronic bronchitis    Mixed hyperlipidemia    COPD (chronic obstructive pulmonary disease)    BPH (benign prostatic hypertrophy)    Hearing loss    Chest pain    Vertigo    Snoring    Overweight    PLMD (periodic limb movement disorder)    Neuropathy    Periodic headache syndrome, not intractable    Acquired absence of other right toe(s)    Tremor    Bilateral impacted cerumen    Bilateral sensorineural hearing loss    Chronic otitis externa    Deviated nasal septum    Disorder of joint of foot    ED (erectile dysfunction) of organic origin    Hydrocele    Hydrocele of testis    Impacted cerumen    Mononeuropathy    Swelling of testicle    Venous retinal branch occlusion    Vitreous degeneration    Benign prostatic hyperplasia with urinary obstruction    OA (osteoarthritis) of knee    Status post total right knee replacement     Past Medical History:   Diagnosis Date    Arthritis     Atrial fibrillation     BPH (benign prostatic hypertrophy)     Cataract     Cellulitis     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     bilateral    Coronary artery disease     Full dentures     Gall stones     GERD (gastroesophageal reflux disease)     Hearing loss     Hiatal hernia     Hyperlipidemia     Hypertension     Peripheral neuropathy     Peripheral vascular disease      Seasonal allergies     Skin cancer     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    TIA (transient ischemic attack)     2 times    Wears glasses      Past Surgical History:   Procedure Laterality Date    ABLATION OF DYSRHYTHMIC FOCUS      APPENDECTOMY      CARDIAC CATHETERIZATION N/A 10/05/2017    Procedure: Left Heart Cath;  Surgeon: Hector Urrutia MD;  Location: [facility] CATH INVASIVE LOCATION;  Service:     CATARACT EXTRACTION Bilateral     CHOLECYSTECTOMY      Remote    COCHLEAR IMPLANT REVISION  05/2019    COLONOSCOPY      PROSTATE SURGERY      SHOULDER SURGERY Right     SKIN BIOPSY      TOE AMPUTATION Right     2nd toe - Right Foot    TONSILLECTOMY        General Information       Row Name 08/09/24 1521 08/09/24 1015       Physical Therapy Time and Intention    Document Type therapy note (daily note)  -SD therapy note (daily note)  -SD    Mode of Treatment physical therapy  -SD physical therapy  -SD      Row Name 08/09/24 1521 08/09/24 1015       General Information    Existing Precautions/Restrictions fall;other (see comments)  AC nerve catheter; pt Sun'aq, wears cochlear hearing aids  -SD fall;other (see comments)  AC nerve catheter; pt Sun'aq, wears cochlear hearing aids  -SD      Row Name 08/09/24 1521 08/09/24 1015       Cognition    Orientation Status (Cognition) oriented x 4  -SD oriented x 4  -SD      Row Name 08/09/24 1521 08/09/24 1015       Safety Issues, Functional Mobility    Safety Issues Affecting Function (Mobility) positioning of assistive device;sequencing abilities;safety precautions follow-through/compliance  -SD insight into deficits/self-awareness;positioning of assistive device;sequencing abilities;safety precaution awareness  -SD    Impairments Affecting Function (Mobility) balance;endurance/activity tolerance;pain;range of motion (ROM);strength;postural/trunk control  -SD balance;endurance/activity tolerance;pain;range of motion (ROM);strength;postural/trunk control  -SD              User Key  (r) =  Recorded By, (t) = Taken By, (c) = Cosigned By      Initials Name Provider Type    SD Mireille Silva, PT Physical Therapist                   Mobility       Row Name 08/09/24 1624 08/09/24 1049       Bed Mobility    Bed Mobility sit-supine;scooting/bridging  -SD sit-supine  -SD    Scooting/Bridging Donie (Bed Mobility) 2 person assist;nonverbal cues (demo/gesture);verbal cues;dependent (less than 25% patient effort)  -SD --    Supine-Sit Donie (Bed Mobility) -- moderate assist (50% patient effort);verbal cues;nonverbal cues (demo/gesture)  -SD    Sit-Supine Donie (Bed Mobility) minimum assist (75% patient effort);2 person assist;verbal cues  -SD --    Assistive Device (Bed Mobility) -- bed rails;head of bed elevated;draw sheet  -SD    Comment, (Bed Mobility) Pt needed assist for LE's for sit > supine, unable to scoot to HOB on his own and required dependence x2 with draw sheet.  -SD increased time and effort needed for all mobility. Pt needed assist for trunk as well as assist through draw sheet to advance hips to EOB.  -SD      Row Name 08/09/24 1624 08/09/24 1049       Transfers    Comment, (Transfers) Pt t/f STS from recliner x2 reps with modA x2 and RW, then SPT from recliner > EOB with maxA x2. Pt instructed to step out RLE prior to sitting and needed max cues for safe hand placement and sequencing transfers. Pt fatigued easier this afternoon and required increased assistance with mobility as compared to morning session.  -SD Pt t/f STS from elev bed surface and recliner with modA x2 with cues for safe hand placement and sequencing  -SD      Row Name 08/09/24 1624 08/09/24 1049       Sit-Stand Transfer    Sit-Stand Donie (Transfers) moderate assist (50% patient effort);2 person assist;verbal cues;maximum assist (25% patient effort)  -SD moderate assist (50% patient effort);2 person assist;verbal cues  -SD    Assistive Device (Sit-Stand Transfers) walker, front-wheeled  -SD  walker, front-wheeled  -SD      Row Name 08/09/24 1624 08/09/24 1049       Gait/Stairs (Locomotion)    [place] Level (Gait) moderate assist (50% patient effort);2 person assist;verbal cues;nonverbal cues (demo/gesture)  -SD minimum assist (75% patient effort);2 person assist;verbal cues;nonverbal cues (demo/gesture)  -SD    Assistive Device (Gait) walker, front-wheeled  -SD walker, front-wheeled  -SD    Distance in Feet (Gait) 16  -SD 50  -SD    Deviations/Abnormal Patterns (Gait) right sided deviations;bilateral deviations;harleen decreased;gait speed decreased;stride length decreased;weight shifting decreased;festinating/shuffling;antalgic;base of support, narrow  -SD right sided deviations;bilateral deviations;harleen decreased;gait speed decreased;stride length decreased;weight shifting decreased;festinating/shuffling;antalgic  -SD    Bilateral Gait Deviations forward flexed posture;heel strike decreased  -SD forward flexed posture;heel strike decreased  -SD    Right Sided Gait Deviations weight shift ability decreased;heel strike decreased;decreased knee extension  -SD weight shift ability decreased;heel strike decreased;decreased knee extension  -SD    Comment, (Gait/Stairs) Pt more fatigued this afternoon and unable to progress gait distance. Pt amb 16ft + 5ft with RW and assist x2 +3rd person for recliner in tow. Pt dem step-to pattern and forward flexed posture, unable to correct despite cues.  -SD Pt able to increase gait distance and independence this date. Pt amb 11ft + 50ft with RW and Vincent x2 with recliner in tow. Pt required frequent cues to correct posture and for step-through gait pattern. Distance limited by weakness and fatigue.  -SD      Row Name 08/09/24 1624 08/09/24 1049       Mobility    Extremity Weight-bearing Status right lower extremity  -SD right lower extremity  -SD    Right Lower Extremity (Weight-bearing Status) weight-bearing as tolerated (WBAT)  -SD weight-bearing as tolerated  (WBAT)  -SD              User Key  (r) = Recorded By, (t) = Taken By, (c) = Cosigned By      Initials Name Provider Type    Mireille Paul PT Physical Therapist                   Obj/Interventions       Row Name 08/09/24 1053          Motor Skills    Therapeutic Exercise hip;knee;ankle  -SD       Row Name 08/09/24 1053          Knee (Therapeutic Exercise)    Knee (Therapeutic Exercise) strengthening exercise  -SD     Knee Strengthening (Therapeutic Exercise) right;marching while seated;LAQ (long arc quad);sitting;10 repetitions  -SD       Row Name 08/09/24 1053          Ankle (Therapeutic Exercise)    Ankle (Therapeutic Exercise) AROM (active range of motion)  -SD     Ankle AROM (Therapeutic Exercise) right;plantarflexion;sitting;10 repetitions  -SD       Row Name 08/09/24 1629 08/09/24 1053       Balance    Static Sitting Balance contact guard  -SD contact guard  -SD    Dynamic Sitting Balance minimal assist  -SD --    Position, Sitting Balance unsupported;sitting in chair  -SD unsupported;sitting in chair;sitting edge of bed  -SD    Static Standing Balance moderate assist;2-person assist  -SD minimal assist;2-person assist  -SD    Dynamic Standing Balance maximum assist;2-person assist  -SD --    Position/Device Used, Standing Balance -- supported;walker, rolling  -SD    Comment, Balance posterior lean in standing  -SD --              User Key  (r) = Recorded By, (t) = Taken By, (c) = Cosigned By      Initials Name Provider Type    Mireille Paul PT Physical Therapist                   Goals/Plan    No documentation.                  Clinical Impression       Row Name 08/09/24 1630 08/09/24 1054       Pain    Pretreatment Pain Rating -- 0/10 - no pain  -SD    Posttreatment Pain Rating -- 3/10  -SD    Pain Location - Side/Orientation -- Right  -SD    Pain Location -- anterior  -SD    Pain Location - -- knee  -SD    Pain Intervention(s) -- Cold applied;Ambulation/increased activity;Repositioned   -SD    Additional Documentation Pain Scale: FACES Pre/Post-Treatment (Group)  -SD --      Row Name 08/09/24 1630          Pain Scale: FACES Pre/Post-Treatment    Pain: FACES Scale, Pretreatment 4-->hurts little more  -SD     Posttreatment Pain Rating 4-->hurts little more  -SD     Pain Location - Side/Orientation Right  -SD     Pain Location anterior  -SD     Pain Location - knee  -SD       Row Name 08/09/24 1630 08/09/24 1054       Plan of Care Review    Plan of Care Reviewed With patient;daughter  -SD patient;daughter  -SD    Progress no change  -SD improving  -SD    Outcome Evaluation Pt more fatigued this afternoon and unable to progress gait distance. Pt amb 16ft + 5ft with RW and assist x2 +3rd person for recliner in tow. Pt dem step-to pattern and forward flexed posture, unable to correct despite cues. Pt dem posterior lean during static stance and fearful of falling. Pt remains below baseline level of function for mobility and IPPT services continue to be warranted at this time.  -SD Pt able to increase gait distance and independence this date. Pt amb 11ft + 50ft with RW and Vincent x2 with recliner in tow. Pt required frequent cues to correct posture and for step-through gait pattern. Distance limited by weakness and fatigue. Pt improving, however remains below baseline level of function for mobility and IPPT services continue to be warranted at this time.  -SD      Row Name 08/09/24 1054          Vital Signs    Pre Systolic BP Rehab 141  -SD     Pre Treatment Diastolic BP 74  -SD     Pre SpO2 (%) 97  -SD     O2 Delivery Pre Treatment room air  -SD     Pre Patient Position Supine  -SD     Post Patient Position Sitting  -SD       Row Name 08/09/24 1630 08/09/24 1054       Positioning and Restraints    Pre-Treatment Position sitting in chair/recliner  -SD in bed  -SD    Post Treatment Position bed  -SD chair  -SD    In Bed fowlers;call light within reach;encouraged to call for assist;exit alarm on;with  family/caregiver;with nsg;heels elevated;SCD pump applied  -SD --    In Chair -- notified nsg;reclined;call light within reach;encouraged to call for assist;exit alarm on;with family/caregiver;waffle cushion;compression device;heels elevated  -SD              User Key  (r) = Recorded By, (t) = Taken By, (c) = Cosigned By      Initials Name Provider Type    Mireille Paul PT Physical Therapist                   Outcome Measures       Row Name 08/09/24 1632 08/09/24 1056       How much help from another person do you currently need...    Turning from your back to your side while in flat bed without using bedrails? 2  -SD 2  -SD    Moving from lying on back to sitting on the side of a flat bed without bedrails? 2  -SD 2  -SD    Moving to and from a bed to a chair (including a wheelchair)? 2  -SD 2  -SD    Standing up from a chair using your arms (e.g., wheelchair, bedside chair)? 2  -SD 2  -SD    Climbing 3-5 steps with a railing? 1  -SD 2  -SD    To walk in hospital room? 2  -SD 2  -SD    AM-PAC 6 Clicks Score (PT) 11  -SD 12  -SD    Highest Level of Mobility Goal 4 --> Transfer to chair/commode  -SD 4 --> Transfer to chair/commode  -SD      Row Name 08/09/24 1632 08/09/24 1056       Functional Assessment    Outcome Measure Options AM-PAC 6 Clicks Basic Mobility (PT)  -SD AM-PAC 6 Clicks Basic Mobility (PT)  -SD              User Key  (r) = Recorded By, (t) = Taken By, (c) = Cosigned By      Initials Name Provider Type    Mireille Paul PT Physical Therapist                                 Physical Therapy Education       Title: PT OT SLP Therapies (In Progress)       Topic: Physical Therapy (In Progress)       Point: Mobility training (In Progress)       Learning Progress Summary             Patient Acceptance, E, NR by SD at 8/9/2024 1632    Acceptance, E,D, NR by SD at 8/9/2024 1058    Acceptance, E,D, VU,NR by  at 8/8/2024 1710    Acceptance, E,D, VU,NR by  at 8/8/2024 1012    Acceptance,  E, VU,DU,NR by  at 8/7/2024 1539    Comment: educated on safety w/ mobility, WBAT status, gait training, AD use, HEP, and d/c planning   Family Acceptance, E, NR by SD at 8/9/2024 1632    Acceptance, E,D, NR by SD at 8/9/2024 1058    Acceptance, E, VU,DU,NR by  at 8/7/2024 1539    Comment: educated on safety w/ mobility, WBAT status, gait training, AD use, HEP, and d/c planning                         Point: Home exercise program (In Progress)       Learning Progress Summary             Patient Acceptance, E, NR by SD at 8/9/2024 1632    Acceptance, E,D, NR by SD at 8/9/2024 1058    Acceptance, E,D, VU,NR by  at 8/8/2024 1710    Acceptance, E,D, VU,NR by  at 8/8/2024 1012    Acceptance, E, VU,DU,NR by  at 8/7/2024 1539    Comment: educated on safety w/ mobility, WBAT status, gait training, AD use, HEP, and d/c planning   Family Acceptance, E, NR by SD at 8/9/2024 1632    Acceptance, E,D, NR by SD at 8/9/2024 1058    Acceptance, E, VU,DU,NR by  at 8/7/2024 1539    Comment: educated on safety w/ mobility, WBAT status, gait training, AD use, HEP, and d/c planning                         Point: Body mechanics (In Progress)       Learning Progress Summary             Patient Acceptance, E, NR by SD at 8/9/2024 1632    Acceptance, E,D, NR by SD at 8/9/2024 1058    Acceptance, E,D, VU,NR by  at 8/8/2024 1710    Acceptance, E,D, VU,NR by  at 8/8/2024 1012    Acceptance, E, VU,DU,NR by  at 8/7/2024 1539    Comment: educated on safety w/ mobility, WBAT status, gait training, AD use, HEP, and d/c planning   Family Acceptance, E, NR by SD at 8/9/2024 1632    Acceptance, E,D, NR by SD at 8/9/2024 1058    Acceptance, E, VU,DU,NR by  at 8/7/2024 1539    Comment: educated on safety w/ mobility, WBAT status, gait training, AD use, HEP, and d/c planning                         Point: Precautions (In Progress)       Learning Progress Summary             Patient Acceptance, E, NR by SD at 8/9/2024 3330     Acceptance, E,D, NR by SD at 8/9/2024 1058    Acceptance, E,D, VU,NR by  at 8/8/2024 1710    Acceptance, E,D, VU,NR by  at 8/8/2024 1012    Acceptance, E, VU,DU,NR by  at 8/7/2024 1539    Comment: educated on safety w/ mobility, WBAT status, gait training, AD use, HEP, and d/c planning   Family Acceptance, E, NR by SD at 8/9/2024 1632    Acceptance, E,D, NR by SD at 8/9/2024 1058    Acceptance, E, VU,DU,NR by  at 8/7/2024 1539    Comment: educated on safety w/ mobility, WBAT status, gait training, AD use, HEP, and d/c planning                                         User Key       Initials Effective Dates Name Provider Type Discipline    SD 03/13/23 -  Mireille Silva, PT Physical Therapist PT     12/15/23 -  Deanna Luque, PT Physical Therapist PT     09/21/23 -  Carline Kraft, PT Physical Therapist PT                  PT Recommendation and Plan     Plan of Care Reviewed With: patient, daughter  Progress: no change  Outcome Evaluation: Pt more fatigued this afternoon and unable to progress gait distance. Pt amb 16ft + 5ft with RW and assist x2 +3rd person for recliner in tow. Pt dem step-to pattern and forward flexed posture, unable to correct despite cues. Pt dem posterior lean during static stance and fearful of falling. Pt remains below baseline level of function for mobility and IPPT services continue to be warranted at this time.     Time Calculation:         PT Charges       Row Name 08/09/24 1633 08/09/24 1100          Time Calculation    Start Time 1521  -SD 1042  -SD     PT Received On 08/09/24  -SD 08/09/24  -SD     PT Goal Re-Cert Due Date 08/17/24  -SD 08/17/24  -SD        Time Calculation- PT    Total Timed Code Minutes- PT 25 minute(s)  -SD 24 minute(s)  -SD        Timed Charges    25082 - PT Therapeutic Exercise Minutes -- 8  -SD     19992 - Gait Training Minutes  16  -SD 16  -SD     16242 - PT Therapeutic Activity Minutes 9  -SD --        Total Minutes    Timed Charges Total  Minutes 25  -SD 24  -SD      Total Minutes 25  -SD 24  -SD               User Key  (r) = Recorded By, (t) = Taken By, (c) = Cosigned By      Initials Name Provider Type    Mireille Paul, PT Physical Therapist                  Therapy Charges for Today       Code Description Service Date Service Provider Modifiers Qty    50709176775 HC PT THER PROC EA 15 MIN 8/9/2024 Mireille Silva, PT GP 1    91383398182 HC GAIT TRAINING EA 15 MIN 8/9/2024 Mireille Silva, PT GP 1    41085104905 HC PT THER SUPP EA 15 MIN 8/9/2024 Mireille Silva, PT GP 2    64910322692 HC GAIT TRAINING EA 15 MIN 8/9/2024 Mireille Silva, PT GP 1    67552981018 HC PT THERAPEUTIC ACT EA 15 MIN 8/9/2024 Mireille Silva, PT GP 1    25735643000 HC PT THER SUPP EA 15 MIN 8/9/2024 Mireille Silva, PT GP 2            PT G-Codes  Outcome Measure Options: AM-PAC 6 Clicks Basic Mobility (PT)  AM-PAC 6 Clicks Score (PT): 11  AM-PAC 6 Clicks Score (OT): 16  PT Discharge Summary  Anticipated Discharge Disposition (PT): inpatient rehabilitation facility    Mireille Silva PT  8/9/2024

## 2024-08-10 VITALS
HEIGHT: 72 IN | SYSTOLIC BLOOD PRESSURE: 117 MMHG | OXYGEN SATURATION: 98 % | TEMPERATURE: 98.4 F | RESPIRATION RATE: 18 BRPM | WEIGHT: 209 LBS | HEART RATE: 96 BPM | BODY MASS INDEX: 28.31 KG/M2 | DIASTOLIC BLOOD PRESSURE: 65 MMHG

## 2024-08-10 PROCEDURE — 63710000001 PANTOPRAZOLE 40 MG TABLET DELAYED-RELEASE: Performed by: NURSE PRACTITIONER

## 2024-08-10 PROCEDURE — A9270 NON-COVERED ITEM OR SERVICE: HCPCS | Performed by: NURSE PRACTITIONER

## 2024-08-10 PROCEDURE — 63710000001 FLECAINIDE 50 MG TABLET: Performed by: NURSE PRACTITIONER

## 2024-08-10 PROCEDURE — 97110 THERAPEUTIC EXERCISES: CPT

## 2024-08-10 PROCEDURE — 99024 POSTOP FOLLOW-UP VISIT: CPT | Performed by: ORTHOPAEDIC SURGERY

## 2024-08-10 PROCEDURE — 63710000001 ACETAMINOPHEN 500 MG TABLET: Performed by: ORTHOPAEDIC SURGERY

## 2024-08-10 PROCEDURE — A9270 NON-COVERED ITEM OR SERVICE: HCPCS | Performed by: ORTHOPAEDIC SURGERY

## 2024-08-10 PROCEDURE — 63710000001 ISOSORBIDE MONONITRATE 30 MG TABLET SUSTAINED-RELEASE 24 HOUR: Performed by: NURSE PRACTITIONER

## 2024-08-10 PROCEDURE — 63710000001 ASPIRIN 81 MG TABLET DELAYED-RELEASE: Performed by: ORTHOPAEDIC SURGERY

## 2024-08-10 PROCEDURE — 97116 GAIT TRAINING THERAPY: CPT

## 2024-08-10 PROCEDURE — A9270 NON-COVERED ITEM OR SERVICE: HCPCS | Performed by: INTERNAL MEDICINE

## 2024-08-10 PROCEDURE — 63710000001 BISACODYL 10 MG SUPPOSITORY: Performed by: INTERNAL MEDICINE

## 2024-08-10 PROCEDURE — 63710000001 POLYETHYLENE GLYCOL 17 G PACK: Performed by: INTERNAL MEDICINE

## 2024-08-10 PROCEDURE — 63710000001 SENNOSIDES-DOCUSATE 8.6-50 MG TABLET: Performed by: INTERNAL MEDICINE

## 2024-08-10 RX ORDER — BISACODYL 5 MG/1
5 TABLET, DELAYED RELEASE ORAL DAILY PRN
Start: 2024-08-10

## 2024-08-10 RX ORDER — AMOXICILLIN 250 MG
2 CAPSULE ORAL 2 TIMES DAILY
Start: 2024-08-10

## 2024-08-10 RX ORDER — POLYETHYLENE GLYCOL 3350 17 G/17G
17 POWDER, FOR SOLUTION ORAL DAILY PRN
Start: 2024-08-10

## 2024-08-10 RX ORDER — BISACODYL 10 MG
10 SUPPOSITORY, RECTAL RECTAL DAILY PRN
Start: 2024-08-10

## 2024-08-10 RX ORDER — ROPIVACAINE HYDROCHLORIDE 2 MG/ML
1 INJECTION, SOLUTION EPIDURAL; INFILTRATION; PERINEURAL CONTINUOUS
Start: 2024-08-10

## 2024-08-10 RX ADMIN — PANTOPRAZOLE SODIUM 40 MG: 40 TABLET, DELAYED RELEASE ORAL at 06:55

## 2024-08-10 RX ADMIN — FLECAINIDE ACETATE 100 MG: 50 TABLET ORAL at 08:29

## 2024-08-10 RX ADMIN — ASPIRIN 81 MG: 81 TABLET, COATED ORAL at 08:29

## 2024-08-10 RX ADMIN — POLYETHYLENE GLYCOL 3350 17 G: 17 POWDER, FOR SOLUTION ORAL at 08:29

## 2024-08-10 RX ADMIN — ACETAMINOPHEN 1000 MG: 500 TABLET, FILM COATED ORAL at 02:45

## 2024-08-10 RX ADMIN — ACETAMINOPHEN 1000 MG: 500 TABLET, FILM COATED ORAL at 08:32

## 2024-08-10 RX ADMIN — SENNOSIDES AND DOCUSATE SODIUM 2 TABLET: 50; 8.6 TABLET ORAL at 08:29

## 2024-08-10 RX ADMIN — BISACODYL 10 MG: 10 SUPPOSITORY RECTAL at 07:20

## 2024-08-10 RX ADMIN — ISOSORBIDE MONONITRATE 30 MG: 30 TABLET, EXTENDED RELEASE ORAL at 08:29

## 2024-08-10 RX ADMIN — Medication 3 ML: at 08:30

## 2024-08-10 NOTE — THERAPY TREATMENT NOTE
Patient Name: Guicho Blanco  : 1940    MRN: [id number]                              Today's Date: 8/10/2024       Admit Date: 2024    Visit Dx:     ICD-10-CM ICD-9-CM   1. Primary osteoarthritis of right knee  M17.11 715.16     Patient Active Problem List   Diagnosis    Osteomyelitis of toe of right foot    Osteomyelitis of right foot    PAF (paroxysmal atrial fibrillation)    Coronary artery disease involving native coronary artery of native heart without angina pectoris    Gastroesophageal reflux disease without esophagitis    Benign non-nodular prostatic hyperplasia without lower urinary tract symptoms    S/P appy    Essential hypertension    Simple chronic bronchitis    Mixed hyperlipidemia    COPD (chronic obstructive pulmonary disease)    BPH (benign prostatic hypertrophy)    Hearing loss    Chest pain    Vertigo    Snoring    Overweight    PLMD (periodic limb movement disorder)    Neuropathy    Periodic headache syndrome, not intractable    Acquired absence of other right toe(s)    Tremor    Bilateral impacted cerumen    Bilateral sensorineural hearing loss    Chronic otitis externa    Deviated nasal septum    Disorder of joint of foot    ED (erectile dysfunction) of organic origin    Hydrocele    Hydrocele of testis    Impacted cerumen    Mononeuropathy    Swelling of testicle    Venous retinal branch occlusion    Vitreous degeneration    Benign prostatic hyperplasia with urinary obstruction    OA (osteoarthritis) of knee    Status post total right knee replacement     Past Medical History:   Diagnosis Date    Arthritis     Atrial fibrillation     BPH (benign prostatic hypertrophy)     Cataract     Cellulitis     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     bilateral    Coronary artery disease     Full dentures     Gall stones     GERD (gastroesophageal reflux disease)     Hearing loss     Hiatal hernia     Hyperlipidemia     Hypertension     Peripheral neuropathy     Peripheral vascular disease      Seasonal allergies     Skin cancer     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    TIA (transient ischemic attack)     2 times    Wears glasses      Past Surgical History:   Procedure Laterality Date    ABLATION OF DYSRHYTHMIC FOCUS      APPENDECTOMY      CARDIAC CATHETERIZATION N/A 10/05/2017    Procedure: Left Heart Cath;  Surgeon: Hector Urrutia MD;  Location:  [facility];  Service:     CATARACT EXTRACTION Bilateral     CHOLECYSTECTOMY      Remote    COCHLEAR IMPLANT REVISION  05/2019    COLONOSCOPY      PROSTATE SURGERY      SHOULDER SURGERY Right     SKIN BIOPSY      TOE AMPUTATION Right     2nd toe - Right Foot    TONSILLECTOMY        General Information       [facility] Name 08/10/24 0966          Physical Therapy Time and Intention    Document Type therapy note (daily note)  -     Mode of Treatment physical therapy  -       Row Name 08/10/24 0911          General Information    Patient Profile Reviewed yes  -     Existing Precautions/Restrictions fall;other (see comments)  AC nerve catheter; pt [place], wears cochlear hearing aids  -     Barriers to Rehab medically complex;previous functional deficit;hearing deficit  -       Row Name 08/10/24 0974          Cognition    Orientation Status (Cognition) oriented x 4  -       Row Name 08/10/24 0922          Safety Issues, Functional Mobility    Safety Issues Affecting Function (Mobility) insight into deficits/self-awareness;safety precaution awareness;safety precautions follow-through/compliance;sequencing abilities;positioning of assistive device  -     Impairments Affecting Function (Mobility) balance;endurance/activity tolerance;pain;range of motion (ROM);strength;postural/trunk control  -               User Key  (r) = Recorded By, (t) = Taken By, (c) = Cosigned By      Initials Name Provider Type     Carline Kraft PT Physical Therapist                   Mobility       Row Name 08/10/24 7090          Bed Mobility    Comment, (Bed Mobility)  [facility] pre/post tx  -       Row Name 08/10/24 0945          Transfers    Comment, (Transfers) VCs for hand placement and sequencing. Cues to step out RLE prior to transfers. Signficant hip and trunk flexion noted in standing and pt required max cues for upright posture and to grab walker.  -       Row Name 08/10/24 0945          Sit-Stand Transfer    Sit-Stand Stanley (Transfers) moderate assist (50% patient effort);2 person assist;verbal cues  -     Assistive Device (Sit-Stand Transfers) walker, front-wheeled  -     Comment, (Sit-Stand Transfer) STS x2 from chair  -[facility] Name 08/10/24 0920          Gait/Stairs (Locomotion)    Stanley Level (Gait) moderate assist (50% patient effort);2 person assist;verbal cues;nonverbal cues (demo/gesture)  -     Assistive Device (Gait) walker, front-wheeled  -     Distance in Feet (Gait) 15  +10  -     Deviations/Abnormal Patterns (Gait) right sided deviations;bilateral deviations;harleen decreased;gait speed decreased;stride length decreased;weight shifting decreased;festinating/shuffling;antalgic;base of support, narrow  -     Bilateral Gait Deviations forward flexed posture;heel strike decreased  -     Right Sided Gait Deviations weight shift ability decreased;heel strike decreased;decreased knee extension  -     Comment, (Gait/Stairs) Pt demo step to gait pattern w/ short, shuffled steps. VCs for sequencing, upright posture, forward gaze, increased step length, and increased R knee extension. Heavy reliance noted through FWW causing BUE to fatigue quickly. Required manual assist to navigate walker. Chair in tow and pt required 1 seated rest break d/t fatigue. Further distance limited by weakness, fatigue, and BUE fatigue  -[facility] Name 08/10/24 0990          Mobility    Extremity Weight-bearing Status right lower extremity  -     Right Lower Extremity (Weight-bearing Status) weight-bearing as tolerated (WBAT)  -               User Key   (r) = Recorded By, (t) = Taken By, (c) = Cosigned By      Initials Name Provider Type     Carline Kraft PT Physical Therapist                   Obj/Interventions       [facility] Name 08/10/24 0949          Range of Motion Comprehensive    General Range of Motion lower extremity range of motion deficits identified  -     Comment, General Range of Motion R knee ROM: 10-89  -[facility] Name 08/10/24 0949          Strength Comprehensive (MMT)    General Manual Muscle Testing (MMT) Assessment lower extremity strength deficits identified  -[facility] Name 08/10/24 0949          Motor Skills    Therapeutic Exercise knee;ankle  -LH       Row Name 08/10/24 0949          Hip (Therapeutic Exercise)    Hip (Therapeutic Exercise) --  -     Hip Isometrics (Therapeutic Exercise) --  -LH       Row Name 08/10/24 0949          Knee (Therapeutic Exercise)    Knee (Therapeutic Exercise) isometric exercises;strengthening exercise  -     Knee Isometrics (Therapeutic Exercise) right;quad sets;10 repetitions  -     Knee Strengthening (Therapeutic Exercise) right;heel slides;SLR (straight leg raise);SAQ (short arc quad);LAQ (long arc quad);10 repetitions  -LH       Row Name 08/10/24 0949          Ankle (Therapeutic Exercise)    Ankle (Therapeutic Exercise) AROM (active range of motion)  -     Ankle AROM (Therapeutic Exercise) bilateral;dorsiflexion;plantarflexion;10 repetitions  -LH       Row Name 08/10/24 0949          Balance    Balance Assessment sitting static balance;sitting dynamic balance;sit to stand dynamic balance;standing static balance;standing dynamic balance  -     Static Sitting Balance contact guard  -     Dynamic Sitting Balance minimal assist  -     Position, Sitting Balance unsupported;sitting in chair  -     Sit to Stand Dynamic Balance moderate assist;2-person assist;verbal cues  -     Static Standing Balance moderate assist;2-person assist;verbal cues  -     Dynamic Standing Balance moderate  assist;2-person assist;verbal cues  -     Balance Interventions sitting;standing;sit to stand  -[facility] Name 08/10/24 0949          Sensory Assessment (Somatosensory)    Sensory Assessment (Somatosensory) LE sensation intact  -               User Key  (r) = Recorded By, (t) = Taken By, (c) = Cosigned By      Initials Name Provider Type     Carline Kraft PT Physical Therapist                   Goals/Plan    No documentation.                  Clinical Impression       Row Name 08/10/24 0952          Pain    Pretreatment Pain Rating 0/10 - no pain  -     Posttreatment Pain Rating 0/10 - no pain  -LH       Row Name 08/10/24 0952          Plan of Care Review    Plan of Care Reviewed With patient  -     Progress no change  -     Outcome Evaluation Pt continues to present below baseline function d/t generalized weakness, decreased R knee strength, balance deficits, and decreased activity tolerance. Pt ambulated 15+10 ft w/ FWW, modA x2 + chair in tow. He required 1 seated rest break. No LOB or knee buckling noted. Pt would contiinue to benefit from skilled IP PT. Recommend IRF at d/c.  -[facility] 08/10/24 0952          Therapy Assessment/Plan (PT)    Rehab Potential (PT) good, to achieve stated therapy goals  -     Criteria for Skilled Interventions Met (PT) yes;meets criteria;skilled treatment is necessary  -     Therapy Frequency (PT) 2 times/day  -LH       Row Name 08/10/24 0952          Positioning and Restraints    Pre-Treatment Position sitting in chair/recliner  -     Post Treatment Position chair  -     In Chair notified nsg;reclined;sitting;call light within reach;encouraged to call for assist;exit alarm on;waffle cushion;legs elevated;R heel elevated  cold pack applied to incision  -               User Key  (r) = Recorded By, (t) = Taken By, (c) = Cosigned By      Initials Name Provider Type     Carline Kraft PT Physical Therapist                   Outcome Measures        Row Name 08/10/24 0954 08/10/24 0900       How much help from another person do you currently need...    Turning from your back to your side while in flat bed without using bedrails? 3  -LH 3  -CC    Moving from lying on back to sitting on the side of a flat bed without bedrails? 2  - 3  -CC    Moving to and from a bed to a chair (including a wheelchair)? 2  - 3  -CC    Standing up from a chair using your arms (e.g., wheelchair, bedside chair)? 2  - 3  -CC    Climbing 3-5 steps with a railing? 2  - 3  -CC    To walk in hospital room? 2  - 3  -CC    AM-PAC 6 Clicks Score (PT) 13  - 18  -CC    Highest Level of Mobility Goal 4 --> Transfer to chair/commode  - 6 --> Walk 10 steps or more  -CC      Row Name 08/10/24 0954          Functional Assessment    Outcome Measure Options AM-PAC 6 Clicks Basic Mobility (PT)  -               User Key  (r) = Recorded By, (t) = Taken By, (c) = Cosigned By      Initials Name Provider Type     Carline Kraft, PT Physical Therapist    CC Akhil Tapia RN Registered Nurse                                 Physical Therapy Education       Title: PT OT SLP Therapies (In Progress)       Topic: Physical Therapy (In Progress)       Point: Mobility training (In Progress)       Learning Progress Summary             Patient Acceptance, E, VU,NR by  at 8/10/2024 0955    Acceptance, E, NR by SD at 8/9/2024 1632    Acceptance, E,D, NR by SD at 8/9/2024 1058    Acceptance, E,D, VU,NR by  at 8/8/2024 1710    Acceptance, E,D, VU,NR by  at 8/8/2024 1012    Acceptance, E, VU,DU,NR by  at 8/7/2024 1539    Comment: educated on safety w/ mobility, WBAT status, gait training, AD use, HEP, and d/c planning   Family Acceptance, E, NR by SD at 8/9/2024 1632    Acceptance, E,D, NR by SD at 8/9/2024 1058    Acceptance, E, VU,DU,NR by  at 8/7/2024 1539    Comment: educated on safety w/ mobility, WBAT status, gait training, AD use, HEP, and d/c planning                         Point:  Home exercise program (In Progress)       Learning Progress Summary             Patient Acceptance, E, VU,NR by  at 8/10/2024 0955    Acceptance, E, NR by SD at 8/9/2024 1632    Acceptance, E,D, NR by SD at 8/9/2024 1058    Acceptance, E,D, VU,NR by  at 8/8/2024 1710    Acceptance, E,D, VU,NR by  at 8/8/2024 1012    Acceptance, E, VU,DU,NR by  at 8/7/2024 1539    Comment: educated on safety w/ mobility, WBAT status, gait training, AD use, HEP, and d/c planning   Family Acceptance, E, NR by SD at 8/9/2024 1632    Acceptance, E,D, NR by SD at 8/9/2024 1058    Acceptance, E, VU,DU,NR by  at 8/7/2024 1539    Comment: educated on safety w/ mobility, WBAT status, gait training, AD use, HEP, and d/c planning                         Point: Body mechanics (In Progress)       Learning Progress Summary             Patient Acceptance, E, VU,NR by  at 8/10/2024 0955    Acceptance, E, NR by SD at 8/9/2024 1632    Acceptance, E,D, NR by SD at 8/9/2024 1058    Acceptance, E,D, VU,NR by  at 8/8/2024 1710    Acceptance, E,D, VU,NR by  at 8/8/2024 1012    Acceptance, E, VU,DU,NR by  at 8/7/2024 1539    Comment: educated on safety w/ mobility, WBAT status, gait training, AD use, HEP, and d/c planning   Family Acceptance, E, NR by SD at 8/9/2024 1632    Acceptance, E,D, NR by SD at 8/9/2024 1058    Acceptance, E, VU,DU,NR by  at 8/7/2024 1539    Comment: educated on safety w/ mobility, WBAT status, gait training, AD use, HEP, and d/c planning                         Point: Precautions (In Progress)       Learning Progress Summary             Patient Acceptance, E, VU,NR by  at 8/10/2024 0955    Acceptance, E, NR by SD at 8/9/2024 1632    Acceptance, E,D, NR by SD at 8/9/2024 1058    Acceptance, E,D, VU,NR by HW at 8/8/2024 1710    Acceptance, E,D, VU,NR by HW at 8/8/2024 1012    Acceptance, E, VU,DU,NR by  at 8/7/2024 1539    Comment: educated on safety w/ mobility, WBAT status, gait training, AD use, HEP, and  d/c planning   Family Acceptance, E, NR by SD at 8/9/2024 1632    Acceptance, E,D, NR by SD at 8/9/2024 1058    Acceptance, E, VU,DU,NR by  at 8/7/2024 1539    Comment: educated on safety w/ mobility, WBAT status, gait training, AD use, HEP, and d/c planning                                         User Key       Initials Effective Dates Name Provider Type Discipline    SD 03/13/23 -  Mireille Silva, PT Physical Therapist PT     12/15/23 -  Deanna Luque, PT Physical Therapist PT     09/21/23 -  Carline Kraft, PT Physical Therapist PT                  PT Recommendation and Plan  Planned Therapy Interventions (PT): balance training, bed mobility training, gait training, home exercise program, ROM (range of motion), patient/family education, stair training, strengthening, stretching, transfer training  Plan of Care Reviewed With: patient  Progress: no change  Outcome Evaluation: Pt continues to present below baseline function d/t generalized weakness, decreased R knee strength, balance deficits, and decreased activity tolerance. Pt ambulated 15+10 ft w/ FWW, modA x2 + chair in tow. He required 1 seated rest break. No LOB or knee buckling noted. Pt would contiinue to benefit from skilled IP PT. Recommend IRF at d/c.     Time Calculation:   PT Evaluation Complexity  History, PT Evaluation Complexity: 3 or more personal factors and/or comorbidities  Examination of Body Systems (PT Eval Complexity): total of 4 or more elements  Clinical Presentation (PT Evaluation Complexity): evolving  Clinical Decision Making (PT Evaluation Complexity): moderate complexity  Overall Complexity (PT Evaluation Complexity): moderate complexity     PT Charges       Row Name 08/10/24 0955             Time Calculation    Start Time 0840  -      PT Received On 08/10/24  -      PT Goal Re-Cert Due Date 08/17/24  -         Timed Charges    95447 - PT Therapeutic Exercise Minutes 12  -      01374 - Gait Training Minutes  15   -         Total Minutes    Timed Charges Total Minutes 27  -LH       Total Minutes 27  -LH                User Key  (r) = Recorded By, (t) = Taken By, (c) = Cosigned By      Initials Name Provider Type     Carline Kraft, PT Physical Therapist                  Therapy Charges for Today       Code Description Service Date Service Provider Modifiers Qty    [id number]  PT THER PROC EA 15 MIN 8/10/2024 Carline Kraft, PT GP 1    [id number] HC GAIT TRAINING EA 15 MIN 8/10/2024 Carline Kraft, PT GP 1    [id number]  PT THER SUPP EA 15 MIN 8/10/2024 Carline Kraft, PT GP 2            PT G-Codes  Outcome Measure Options: AM-PAC 6 Clicks Basic Mobility (PT)  AM-PAC 6 Clicks Score (PT): 13  AM-PAC 6 Clicks Score (OT): 16  PT Discharge Summary  Anticipated Discharge Disposition (PT): inpatient rehabilitation facility    Carline Kraft PT  8/10/2024

## 2024-08-10 NOTE — PROGRESS NOTES
"          Orthopaedic Surgery Progress Note      LOS: 0 days   Patient Care Team:  Mitchel Culver MD as PCP - General (Family Medicine)  Mitchel Culver MD as PCP - Family Medicine  Hector Urrutia MD as Consulting Physician (Cardiology)    POD 3    Subjective     Interval History:   Patient doing well this morning.  Did well in the morning with physical therapy but the afternoon session was limited and he was quite fatigued and almost went down onto his right knee.  Very fearful of falling.  Denies chest pain or shortness of breath.  Large BM yesterday.    Objective     Vital Signs:  Temp (24hrs), Av.3 °F (36.8 °C), Min:98.1 °F (36.7 °C), Max:98.4 °F (36.9 °C)    /74 (BP Location: Left arm, Patient Position: Lying)   Pulse 104   Temp 98.3 °F (36.8 °C) (Oral)   Resp 18   Ht 182.9 cm (72.01\")   Wt 94.8 kg (209 lb)   SpO2 95%   BMI 28.34 kg/m²     Labs:  Lab Results (last 24 hours)       ** No results found for the last 24 hours. **            Physical Exam:  EHL, FHL, gastroc soleus, and tibialis anterior are intact  Toes are pink and warm  Surgical dressing is in place  Palpable dorsalis pedis pulse  Calf is soft and nontender    Assessment & Plan     Postoperative day number 3 status post right total knee arthroplasty    Pain Control: Good overall    PT and OT     DVT prophylaxis: Baby aspirin twice daily    Discharge planning: [facility] today.    Upon discharge, patient will need follow-up with me in 3 weeks' time.  They will need [facility] followed by [facility] for physical therapy.  Standard Exofin Fusion dressing care instructions.  Do not remove.  DME per case management.    Admission Status:  I believe this patient meets INPATIENT status due to the need for care which can only be reasonably provided in a hospital setting such as aggressive/expedited ancillary services and/or consultation services, the necessity for IV medications, close physician " monitoring and/or the possible need for procedures.  In such, I feel patient’s risk for adverse outcomes and need for care warrant INPATIENT evaluation and predict the patient’s care encounter to likely last beyond 2 midnights.       Christian Schwartz MD  08/10/24  07:58 EDT

## 2024-08-10 NOTE — PLAN OF CARE
Problem: Adult Inpatient Plan of Care  Goal: Absence of Hospital-Acquired Illness or Injury  Intervention: Identify and Manage Fall Risk  Recent Flowsheet Documentation  Taken 8/10/2024 0400 by Kathryn Saunders, RN  Safety Promotion/Fall Prevention:   activity supervised   assistive device/personal items within reach   clutter free environment maintained   room organization consistent   safety round/check completed  Taken 8/10/2024 0211 by Kathryn Saunders, RN  Safety Promotion/Fall Prevention:   activity supervised   assistive device/personal items within reach   clutter free environment maintained   fall prevention program maintained   nonskid shoes/slippers when out of bed   room organization consistent   safety round/check completed  Taken 8/10/2024 0014 by Kathryn Saunders, [place]  Safety Promotion/Fall Prevention:   activity supervised   assistive device/personal items within reach   clutter free environment maintained   fall prevention program maintained   nonskid shoes/slippers when out of bed   room organization consistent   safety round/check completed  Taken 8/9/2024 2230 by Kathryn Saunders, [place]  Safety Promotion/Fall Prevention:   activity supervised   assistive device/personal items within reach   clutter free environment maintained   fall prevention program maintained   room organization consistent   safety round/check completed  Taken 8/9/2024 2015 by Kathryn Saunders RN  Safety Promotion/Fall Prevention:   activity supervised   assistive device/personal items within reach   clutter free environment maintained   fall prevention program maintained   lighting adjusted   mobility aid in reach   muscle strengthening facilitated   nonskid shoes/slippers when out of bed   room organization consistent   safety round/check completed  Intervention: Prevent Skin Injury  Recent Flowsheet Documentation  Taken 8/10/2024 0400 by Kathryn Saunders RN  Body Position: position changed independently  Intervention: Prevent and Manage VTE  (Venous Thromboembolism) Risk  Recent Flowsheet Documentation  Taken 8/9/2024 2015 by Kathryn Saunders RN  VTE Prevention/Management:   bilateral   sequential compression devices on  Range of Motion: active ROM (range of motion) encouraged  Intervention: Prevent Infection  Recent Flowsheet Documentation  Taken 8/10/2024 0400 by Kathryn Saunders RN  Infection Prevention:   environmental surveillance performed   rest/sleep promoted   single patient room provided  Taken 8/10/2024 0211 by Kathryn Saunders RN  Infection Prevention:   environmental surveillance performed   rest/sleep promoted   single patient room provided  Taken 8/10/2024 0014 by Kathryn Saunders RN  Infection Prevention:   environmental surveillance performed   rest/sleep promoted   single patient room provided  Taken 8/9/2024 2230 by Kathryn Saunders RN  Infection Prevention:   environmental surveillance performed   rest/sleep promoted   single patient room provided  Taken 8/9/2024 2015 by Kathryn Saunders RN  Infection Prevention:   cohorting utilized   environmental surveillance performed   equipment surfaces disinfected   hand hygiene promoted   personal protective equipment utilized   rest/sleep promoted   single patient room provided  Goal: Optimal Comfort and Wellbeing  Intervention: Provide Person-Centered Care  Recent Flowsheet Documentation  Taken 8/9/2024 2015 by Kathryn Saunders RN  Trust Relationship/Rapport: care explained     Problem: Osteoarthritis Comorbidity  Goal: Maintenance of Osteoarthritis Symptom Control  Intervention: Maintain Osteoarthritis Symptom Control  Recent Flowsheet Documentation  Taken 8/9/2024 2015 by Kathryn Saunders RN  Medication Review/Management: medications reviewed     Problem: Pain Chronic (Persistent) (Comorbidity Management)  Goal: Acceptable Pain Control and Functional Ability  Intervention: Manage Persistent Pain  Recent Flowsheet Documentation  Taken 8/9/2024 2015 by Kathryn Saunders RN  Medication Review/Management: medications  reviewed  Intervention: Optimize Psychosocial Wellbeing  Recent Flowsheet Documentation  Taken 8/9/2024 2015 by Kathryn Saunders RN  Supportive Measures: active listening utilized  Diversional Activities: television  Family/Support System Care: self-care encouraged     Problem: Fall Injury Risk  Goal: Absence of Fall and Fall-Related Injury  Intervention: Identify and Manage Contributors  Recent Flowsheet Documentation  Taken 8/9/2024 2015 by Kathryn Saunders RN  Medication Review/Management: medications reviewed  Intervention: Promote Injury-Free Environment  Recent Flowsheet Documentation  Taken 8/10/2024 0400 by Kathryn Saunders, RN  Safety Promotion/Fall Prevention:   activity supervised   assistive device/personal items within reach   clutter free environment maintained   room organization consistent   safety round/check completed  Taken 8/10/2024 0211 by Kathryn Saunders RN  Safety Promotion/Fall Prevention:   activity supervised   assistive device/personal items within reach   clutter free environment maintained   fall prevention program maintained   nonskid shoes/slippers when out of bed   room organization consistent   safety round/check completed  Taken 8/10/2024 0014 by Kathryn Saunders, [place]  Safety Promotion/Fall Prevention:   activity supervised   assistive device/personal items within reach   clutter free environment maintained   fall prevention program maintained   nonskid shoes/slippers when out of bed   room organization consistent   safety round/check completed  Taken 8/9/2024 2230 by Kathryn Saunders, RN  Safety Promotion/Fall Prevention:   activity supervised   assistive device/personal items within reach   clutter free environment maintained   fall prevention program maintained   room organization consistent   safety round/check completed  Taken 8/9/2024 2015 by Kathryn Saunders, RN  Safety Promotion/Fall Prevention:   activity supervised   assistive device/personal items within reach   clutter free environment  maintained   fall prevention program maintained   lighting adjusted   mobility aid in reach   muscle strengthening facilitated   nonskid shoes/slippers when out of bed   room organization consistent   safety round/check completed   Goal Outcome Evaluation:

## 2024-08-10 NOTE — PLAN OF CARE
Goal Outcome Evaluation:  Plan of Care Reviewed With: patient        Progress: no change  Outcome Evaluation: Pt continues to present below baseline function d/t generalized weakness, decreased R knee strength, balance deficits, and decreased activity tolerance. Pt ambulated 15+10 ft w/ FWW, modA x2 + chair in tow. He required 1 seated rest break. No LOB or knee buckling noted. Pt would contiinue to benefit from skilled IP PT. Recommend IRF at d/c.      Anticipated Discharge Disposition (PT): inpatient rehabilitation facility

## 2024-08-10 NOTE — DISCHARGE PLACEMENT REQUEST
"Guicho Blanco \"Rad\" (83 y.o. Male)       Date of Birth   1940    Social Security Number       Address   [facility] [address]  [facility] [address]    Home Phone   [phone]    MRN   [id number]       Congregational   Zoroastrianism    Marital Status                               Admission Date   24    Admission Type   Elective    Admitting Provider   Christian Schwartz MD    Attending Provider   Christian Schwartz MD    Department, Room/Bed   [facility] 3G, S367/1       Discharge Date       Discharge Disposition   Rehab Facility or Unit (DC - External)    Discharge Destination                                 Attending Provider: Christian Schwartz MD    Allergies: Divalproex Sodium (Migraine) [Valproic Acid], Cymbalta [Duloxetine Hcl], Duloxetine, Metoprolol, Neurontin [Gabapentin]    Isolation: None   Infection: None   Code Status: CPR    Ht: 182.9 cm (72.01\")   Wt: 94.8 kg (209 lb)    Admission Cmt: None   Principal Problem: Status post total right knee replacement [Z96.651]                   Active Insurance as of 2024       Primary Coverage       Payor Plan Insurance Group Employer/Plan Group    ANTHEM MEDICARE REPLACEMENT ANTHEM MEDICARE ADVANTAGE [id number]       Payor Plan Address Payor Plan Phone Number Payor Plan Fax Number Effective Dates    [address] [phone]  2024 - None Entered    [facility] [address]         Subscriber Name Subscriber Birth Date Member ID       GUICHO BLANCO 1940 [id number]                     Emergency Contacts        (Rel.) Home Phone Work Phone Mobile Phone    Ashley Mejia (Grandchild) -- -- [phone]    BellaSandra (Spouse) [phone] -- [phone]    Sylwia Cee (Power of ) -- -- [phone]                 Discharge Summary        Berto Liu MD at 08/10/24 0936          Patient Name: Guicho Blanco  MRN: [id number]  : 1940  DOS: " 8/10/2024    Attending: Christian Schwartz,*    Primary Care Provider: Mitchel Culver MD    Date of Admission:.8/7/2024  5:48 AM    Date of Discharge:  8/10/2024    Discharge Diagnosis:   Status post total right knee replacement    PAF (paroxysmal atrial fibrillation)    Coronary artery disease involving native coronary artery of native heart without angina pectoris    Essential hypertension    Mixed hyperlipidemia    OA (osteoarthritis) of knee      Hospital Course    At admit:  Patient is a pleasant 83 y.o. male presented for scheduled surgery by Dr. Schwartz.  He underwent right total knee arthroplasty under general anesthesia.  He tolerated surgery well and was admitted for further medical management. His knee has been painful for several years. He uses a cane or walker for ambulation. He denies recent falls.     When seen postop he is doing well. His pain is well controlled. He denies nausea, shortness of breath or chest pain. No hx of DVT or PE.     He has hypertension, hyperlipidemia, afib and coronary artery disease. He is followed by cardiology, Dr. Urrutia, and had preop cardiac clearance.     After admit:    Patient was provided pain medications as needed for pain control, along with adductor canal nerve block infusion of Ropivacaine.      Adjustments were made to pain medications to optimize postop pain management. Risks and benefits of opiate medications discussed with patient. YARY report was reviewed.    He was seen by PT and OT and has progressed well over his stay. IPR was recommended.    He used an IS for atelectasis prophylaxis and asa along with mechanicals for DVT prophylaxis.    Home medications were resumed as appropriate, and labs were monitored and remained fairly stable.     With the progress he has made,  is ready for SD to [facility] today.      He will have an Infupump ( instructed on it during this admit).    Discussed with patient regarding plan and he shows  "understanding and agreement.             Procedures Performed  Procedure(s):  TOTAL KNEE ARTHROPLASTY WITH ASHLEY ROBOT RIGHT       Pertinent Test Results:    I reviewed the patient's new clinical results.   Results from last 7 days   Lab Units 24  0530   WBC 10*3/mm3 8.29   HEMOGLOBIN g/dL 10.8*   HEMATOCRIT % 34.1*   PLATELETS 10*3/mm3 149     Results from last 7 days   Lab Units 24  0530   SODIUM mmol/L 140   POTASSIUM mmol/L 4.3   CHLORIDE mmol/L 111*   CO2 mmol/L 20.0*   BUN mg/dL 15   CREATININE mg/dL 1.12   CALCIUM mg/dL 8.2*   GLUCOSE mg/dL 101*     I reviewed the patient's new imaging including images and reports.      Physical therapy  Date of Service: 24 1521  Creation Time: 24 1633     Signed         Goal Outcome Evaluation:  Plan of Care Reviewed With: patient, daughter  Progress: no change  Outcome Evaluation: Pt more fatigued this afternoon and unable to progress gait distance. Pt amb 16ft + 5ft with RW and assist x2 +3rd person for recliner in tow. Pt dem step-to pattern and forward flexed posture, unable to correct despite cues. Pt dem posterior lean during static stance and fearful of falling. Pt remains below baseline level of function for mobility and IPPT services continue to be warranted at this time.        Anticipated Discharge Disposition (PT): inpatient rehabilitation facility              Discharge Assessment:       Visit Vitals  /74 (BP Location: Left arm, Patient Position: Lying)   Pulse 104   Temp 98.3 °F (36.8 °C) (Oral)   Resp 18   Ht 182.9 cm (72.01\")   Wt 94.8 kg (209 lb)   SpO2 95%   BMI 28.34 kg/m²     Temp (24hrs), Av.3 °F (36.8 °C), Min:98.1 °F (36.7 °C), Max:98.4 °F (36.9 °C)      General Appearance:    Alert, cooperative, in no acute distress   Lungs:     Clear to auscultation,respirations regular, even and                   unlabored    Heart:    Regular rhythm and normal rate, normal S1 and S2   Abdomen:     Normal bowel sounds, no masses, no " organomegaly, soft        non-tender, non-distended, no guarding, no rebound                 tenderness   Extremities:   CDI dressing surgical knee . ACB cath present, infupump.   Pulses:   Pulses palpable and equal bilaterally   Skin:   No bleeding, bruising or rash   Neurologic:   Cranial nerves 2 - 12 grossly intact, sensation intact, Flexion and dorsiflexion intact bilateral feet.         Discharge Disposition: [facility]        Discharge Medications        New Medications        Instructions Start Date   acetaminophen 650 MG 8 hr tablet  Commonly known as: TYLENOL   650 mg, Oral, Every 8 Hours      bisacodyl 10 MG suppository  Commonly known as: DULCOLAX   10 mg, Rectal, Daily PRN      bisacodyl 5 MG EC tablet  Commonly known as: DULCOLAX   5 mg, Oral, Daily PRN      docusate sodium 100 MG capsule  Commonly known as: Colace   100 mg, Oral, 2 Times Daily PRN      doxycycline 100 MG tablet  Commonly known as: ADOXA   100 mg, Oral, 2 Times Daily      meloxicam 7.5 MG tablet  Commonly known as: MOBIC   7.5 mg, Oral, Daily      ondansetron 4 MG tablet  Commonly known as: Zofran   4 mg, Oral, Every 8 Hours PRN      oxyCODONE 5 MG immediate release tablet  Commonly known as: ROXICODONE   5 mg, Oral, Every 6 Hours PRN      polyethylene glycol 17 g packet  Commonly known as: MIRALAX   17 g, Oral, Daily PRN      ropivacaine 0.2 % infusion (INFUSYSTEM)  Commonly known as: NAROPIN   1 mL/hr (2 mg/hr), Peripheral Nerve, Continuous      sennosides-docusate 8.6-50 MG per tablet  Commonly known as: PERICOLACE   2 tablets, Oral, 2 Times Daily             Continue These Medications        Instructions Start Date   aspirin 325 MG tablet   325 mg, Oral, Daily      coenzyme Q10 100 MG capsule   100 mg, Oral, Daily      flecainide 100 MG tablet  Commonly known as: TAMBOCOR   100 mg, Oral, Every 12 Hours      glucose 4 GM chewable tablet  Commonly known as: DEX4   16 g, Oral, As Needed      hydrocortisone 2.5 % cream   APPLY 1 APPLICATION  TWICE DAILY TO FACE AND EARS FOR UP TO 2 WEEKS. TAKE 2 WEEKS OFF THEN REPEAT IF NEEDED      isosorbide mononitrate 30 MG 24 hr tablet  Commonly known as: IMDUR   30 mg, Oral, Daily      ketoconazole 2 % cream  Commonly known as: NIZORAL   APPLY TOPICALLY TO FACE AND EARS TWICE DAILY AS NEEDED      nitroglycerin 0.4 MG SL tablet  Commonly known as: NITROSTAT   1 under the tongue as needed for angina, may repeat q5mins for up three doses      nortriptyline 10 MG capsule  Commonly known as: PAMELOR   20 mg, Oral, Nightly      pravastatin 20 MG tablet  Commonly known as: PRAVACHOL   20 mg, Oral, Daily      PrilOSEC OTC 20 MG EC tablet  Generic drug: omeprazole OTC   20 mg, Oral, Daily      promethazine 12.5 MG tablet  Commonly known as: PHENERGAN   12.5 mg, Oral, Every 6 Hours PRN      Restasis 0.05 % ophthalmic emulsion  Generic drug: cycloSPORINE   Administer 1 drop to both eyes Every 12 (Twelve) Hours.      traMADol 50 MG tablet  Commonly known as: ULTRAM   50 mg, Oral, Every 8 Hours PRN      vitamin b complex capsule capsule   1 capsule, Oral, Daily, Takes in evening      vitamin D3 125 MCG (5000 UT) capsule capsule   5,000 Units, Oral, Daily                Diet Instructions    REGULAR DIET            Activity Instructions    WEIGHT BEARING AS TOLERATED TO RIGHT LOWER EXTREMITY           Follow-up Appointments:  Future Appointments   Date Time Provider Department Center   8/10/2024 12:00 PM [facility]   8/27/2024  2:10 PM Christian Schwartz MD [facility]   9/15/2025 10:30 AM Hector Urrutia MD [facility]         Dragon disclaimer:  Part of this encounter note is an electronic transcription/translation of spoken language to printed text. The electronic translation of spoken language may permit erroneous, or at times, nonsensical words or phrases to be inadvertently transcribed; Although I have reviewed the note for such errors, some may still exist.       Berto Liu,  MD  08/10/24  09:36 EDT              Electronically signed by Berto Liu MD at 08/10/24 0938

## 2024-08-10 NOTE — DISCHARGE SUMMARY
Patient Name: Guicho Blanco  MRN: [id number]  : 1940  DOS: 8/10/2024    Attending: Christian Schwartz,*    Primary Care Provider: Mitchel Culver MD    Date of Admission:.2024  5:48 AM    Date of Discharge:  8/10/2024    Discharge Diagnosis:   Status post total right knee replacement    PAF (paroxysmal atrial fibrillation)    Coronary artery disease involving native coronary artery of native heart without angina pectoris    Essential hypertension    Mixed hyperlipidemia    OA (osteoarthritis) of knee      Hospital Course    At admit:  Patient is a pleasant 83 y.o. male presented for scheduled surgery by Dr. Schwartz.  He underwent right total knee arthroplasty under general anesthesia.  He tolerated surgery well and was admitted for further medical management. His knee has been painful for several years. He uses a cane or walker for ambulation. He denies recent falls.     When seen postop he is doing well. His pain is well controlled. He denies nausea, shortness of breath or chest pain. No hx of DVT or PE.     He has hypertension, hyperlipidemia, afib and coronary artery disease. He is followed by cardiology, Dr. Urrutia, and had preop cardiac clearance.     After admit:    Patient was provided pain medications as needed for pain control, along with adductor canal nerve block infusion of Ropivacaine.      Adjustments were made to pain medications to optimize postop pain management. Risks and benefits of opiate medications discussed with patient. YARY report was reviewed.    He was seen by PT and OT and has progressed well over his stay. IPR was recommended.    He used an IS for atelectasis prophylaxis and asa along with mechanicals for DVT prophylaxis.    Home medications were resumed as appropriate, and labs were monitored and remained fairly stable.     With the progress he has made,  is ready for DC to [facility] today.      He will have an Infupump ( instructed on it during  "this admit).    Discussed with patient regarding plan and he shows understanding and agreement.             Procedures Performed  Procedure(s):  TOTAL KNEE ARTHROPLASTY WITH ASHLEY ROBOT RIGHT       Pertinent Test Results:    I reviewed the patient's new clinical results.   Results from last 7 days   Lab Units 24  0530   WBC 10*3/mm3 8.29   HEMOGLOBIN g/dL 10.8*   HEMATOCRIT % 34.1*   PLATELETS 10*3/mm3 149     Results from last 7 days   Lab Units 24  0530   SODIUM mmol/L 140   POTASSIUM mmol/L 4.3   CHLORIDE mmol/L 111*   CO2 mmol/L 20.0*   BUN mg/dL 15   CREATININE mg/dL 1.12   CALCIUM mg/dL 8.2*   GLUCOSE mg/dL 101*     I reviewed the patient's new imaging including images and reports.      Physical therapy  Date of Service: 24 152  Creation Time: 24 1633     Signed         Goal Outcome Evaluation:  Plan of Care Reviewed With: patient, daughter  Progress: no change  Outcome Evaluation: Pt more fatigued this afternoon and unable to progress gait distance. Pt amb 16ft + 5ft with RW and assist x2 +3rd person for recliner in tow. Pt dem step-to pattern and forward flexed posture, unable to correct despite cues. Pt dem posterior lean during static stance and fearful of falling. Pt remains below baseline level of function for mobility and IPPT services continue to be warranted at this time.        Anticipated Discharge Disposition (PT): inpatient rehabilitation facility              Discharge Assessment:       Visit Vitals  /74 (BP Location: Left arm, Patient Position: Lying)   Pulse 104   Temp 98.3 °F (36.8 °C) (Oral)   Resp 18   Ht 182.9 cm (72.01\")   Wt 94.8 kg (209 lb)   SpO2 95%   BMI 28.34 kg/m²     Temp (24hrs), Av.3 °F (36.8 °C), Min:98.1 °F (36.7 °C), Max:98.4 °F (36.9 °C)      General Appearance:    Alert, cooperative, in no acute distress   Lungs:     Clear to auscultation,respirations regular, even and                   unlabored    Heart:    Regular rhythm and normal rate, " normal S1 and S2   Abdomen:     Normal bowel sounds, no masses, no organomegaly, soft        non-tender, non-distended, no guarding, no rebound                 tenderness   Extremities:   CDI dressing surgical knee . ACB cath present, infupump.   Pulses:   Pulses palpable and equal bilaterally   Skin:   No bleeding, bruising or rash   Neurologic:   Cranial nerves 2 - 12 grossly intact, sensation intact, Flexion and dorsiflexion intact bilateral feet.         Discharge Disposition: [facility]        Discharge Medications        New Medications        Instructions Start Date   acetaminophen 650 MG 8 hr tablet  Commonly known as: TYLENOL   650 mg, Oral, Every 8 Hours      bisacodyl 10 MG suppository  Commonly known as: DULCOLAX   10 mg, Rectal, Daily PRN      bisacodyl 5 MG EC tablet  Commonly known as: DULCOLAX   5 mg, Oral, Daily PRN      docusate sodium 100 MG capsule  Commonly known as: Colace   100 mg, Oral, 2 Times Daily PRN      doxycycline 100 MG tablet  Commonly known as: ADOXA   100 mg, Oral, 2 Times Daily      meloxicam 7.5 MG tablet  Commonly known as: MOBIC   7.5 mg, Oral, Daily      ondansetron 4 MG tablet  Commonly known as: Zofran   4 mg, Oral, Every 8 Hours PRN      oxyCODONE 5 MG immediate release tablet  Commonly known as: ROXICODONE   5 mg, Oral, Every 6 Hours PRN      polyethylene glycol 17 g packet  Commonly known as: MIRALAX   17 g, Oral, Daily PRN      ropivacaine 0.2 % infusion (INFUSYSTEM)  Commonly known as: NAROPIN   1 mL/hr (2 mg/hr), Peripheral Nerve, Continuous      sennosides-docusate 8.6-50 MG per tablet  Commonly known as: PERICOLACE   2 tablets, Oral, 2 Times Daily             Continue These Medications        Instructions Start Date   aspirin 325 MG tablet   325 mg, Oral, Daily      coenzyme Q10 100 MG capsule   100 mg, Oral, Daily      flecainide 100 MG tablet  Commonly known as: TAMBOCOR   100 mg, Oral, Every 12 Hours      glucose 4 GM chewable tablet  Commonly known as: DEX4   16 g,  Oral, As Needed      hydrocortisone 2.5 % cream   APPLY 1 APPLICATION TWICE DAILY TO FACE AND EARS FOR UP TO 2 WEEKS. TAKE 2 WEEKS OFF THEN REPEAT IF NEEDED      isosorbide mononitrate 30 MG 24 hr tablet  Commonly known as: IMDUR   30 mg, Oral, Daily      ketoconazole 2 % cream  Commonly known as: NIZORAL   APPLY TOPICALLY TO FACE AND EARS TWICE DAILY AS NEEDED      nitroglycerin 0.4 MG SL tablet  Commonly known as: NITROSTAT   1 under the tongue as needed for angina, may repeat q5mins for up three doses      nortriptyline 10 MG capsule  Commonly known as: PAMELOR   20 mg, Oral, Nightly      pravastatin 20 MG tablet  Commonly known as: PRAVACHOL   20 mg, Oral, Daily      PrilOSEC OTC 20 MG EC tablet  Generic drug: omeprazole OTC   20 mg, Oral, Daily      promethazine 12.5 MG tablet  Commonly known as: PHENERGAN   12.5 mg, Oral, Every 6 Hours PRN      Restasis 0.05 % ophthalmic emulsion  Generic drug: cycloSPORINE   Administer 1 drop to both eyes Every 12 (Twelve) Hours.      traMADol 50 MG tablet  Commonly known as: ULTRAM   50 mg, Oral, Every 8 Hours PRN      vitamin b complex capsule capsule   1 capsule, Oral, Daily, Takes in evening      vitamin D3 125 MCG (5000 UT) capsule capsule   5,000 Units, Oral, Daily                Diet Instructions    REGULAR DIET            Activity Instructions    WEIGHT BEARING AS TOLERATED TO RIGHT LOWER EXTREMITY           Follow-up Appointments:  Future Appointments   Date Time Provider Department Center   8/10/2024 12:00 PM MED 7  [facility]   8/27/2024  2:10 PM Chrsitian Schwartz MD [facility]   9/15/2025 10:30 AM Hector Urrutia MD [facility]         Dragon disclaimer:  Part of this encounter note is an electronic transcription/translation of spoken language to printed text. The electronic translation of spoken language may permit erroneous, or at times, nonsensical words or phrases to be inadvertently transcribed; Although I have reviewed the note for such  errors, some may still exist.       Berto Liu MD  08/10/24  09:36 EDT

## 2024-08-22 ENCOUNTER — APPOINTMENT (OUTPATIENT)
Dept: CT IMAGING | Facility: HOSPITAL | Age: 84
End: 2024-08-22
Payer: MEDICARE

## 2024-08-22 ENCOUNTER — HOSPITAL ENCOUNTER (EMERGENCY)
Facility: HOSPITAL | Age: 84
Discharge: NURSING, REHAB OR LONG-TERM CARE | End: 2024-08-22
Attending: EMERGENCY MEDICINE
Payer: MEDICARE

## 2024-08-22 ENCOUNTER — TELEPHONE (OUTPATIENT)
Dept: ORTHOPEDIC SURGERY | Facility: CLINIC | Age: 84
End: 2024-08-22
Payer: MEDICARE

## 2024-08-22 VITALS
TEMPERATURE: 98.5 F | BODY MASS INDEX: 27.83 KG/M2 | SYSTOLIC BLOOD PRESSURE: 150 MMHG | HEART RATE: 89 BPM | RESPIRATION RATE: 18 BRPM | HEIGHT: 73 IN | WEIGHT: 210 LBS | DIASTOLIC BLOOD PRESSURE: 77 MMHG | OXYGEN SATURATION: 99 %
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
## 2024-08-22 DIAGNOSIS — S09.90XA CLOSED HEAD INJURY, INITIAL ENCOUNTER: Primary | ICD-10-CM

## 2024-08-22 PROCEDURE — 72125 CT NECK SPINE W/O DYE: CPT

## 2024-08-22 PROCEDURE — 99284 EMERGENCY DEPT VISIT MOD MDM: CPT

## 2024-08-22 PROCEDURE — 70450 CT HEAD/BRAIN W/O DYE: CPT

## 2024-08-22 NOTE — ED PROVIDER NOTES
Subjective   History of Present Illness  83-year-old male with a history of hypertension and TIAs presenting after an accidental fall.  The patient apparently slid out of bed just prior to arrival.  This was unwitnessed.  However the patient remembers the incident.  States that he did hit the back of his head.  However there was no loss of consciousness.  Patient complaining of no symptoms at this time.  Denies any headache or change in vision.  No neck pain.  No chest pain or shortness of breath.  No abdominal pain or vomiting.  No other musculoskeletal injuries.    History provided by:  Patient, EMS personnel and nursing home   used: No        Review of Systems   Constitutional:  Negative for chills and fever.   HENT:  Negative for congestion, ear pain and sore throat.    Eyes:  Negative for visual disturbance.   Respiratory:  Negative for shortness of breath.    Cardiovascular:  Negative for chest pain.   Gastrointestinal:  Negative for abdominal pain.   Genitourinary:  Negative for difficulty urinating.   Musculoskeletal:  Negative for arthralgias.   Skin:  Negative for rash.   Neurological:  Negative for dizziness, weakness and numbness.   Psychiatric/Behavioral:  Negative for agitation.        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     Duloxetine Unknown (See Comments)    Metoprolol Other (See Comments)     UNKNOWN. Pt does not remember this being allergy    Neurontin [Gabapentin] Other (See Comments)     Lethargy & fatigue       Past Surgical History:   Procedure Laterality Date    ABLATION OF DYSRHYTHMIC FOCUS      APPENDECTOMY      CARDIAC CATHETERIZATION N/A 10/05/2017    Procedure: Left Heart Cath;  Surgeon: Hector Urrutia MD;  Location:  [facility];  Service:     CATARACT EXTRACTION Bilateral     CHOLECYSTECTOMY      Remote    COCHLEAR IMPLANT REVISION  05/2019    COLONOSCOPY      PROSTATE SURGERY      SHOULDER SURGERY Right     SKIN BIOPSY      TOE AMPUTATION Right     2nd toe - Right Foot    TONSILLECTOMY      TOTAL KNEE ARTHROPLASTY Right 8/7/2024    Procedure: TOTAL KNEE ARTHROPLASTY WITH ASHLEY ROBOT RIGHT;  Surgeon: Christian Schwartz MD;  Location:  [facility];  Service: Robotics - Ortho;  Laterality: Right;       Family History   Problem Relation Age of Onset    Atrial fibrillation Mother     Dementia Mother     Lung cancer Father        Social History     Socioeconomic History    Marital status:    Tobacco Use    Smoking status: Never     Passive exposure: Past    Smokeless tobacco: Never   Vaping Use    Vaping status: Never Used   Substance and Sexual Activity    Alcohol use: No    Drug use: No    Sexual activity: Defer           Objective   Physical Exam  Vitals and nursing note reviewed.   Constitutional:       General: He is not in acute distress.     Appearance: He is not ill-appearing or toxic-appearing.   HENT:      Head: Normocephalic and atraumatic.   Eyes:      Extraocular Movements: Extraocular movements intact.      Pupils: Pupils are equal, round, and reactive to light.   Cardiovascular:      Rate and Rhythm: Normal rate and regular rhythm.   Pulmonary:      Effort: Pulmonary effort is normal. No respiratory  distress.   Abdominal:      General: Abdomen is flat. There is no distension.      Palpations: There is no mass.      Tenderness: There is no abdominal tenderness. There is no guarding or rebound.   Musculoskeletal:         General: No deformity. Normal range of motion.   Neurological:      General: No focal deficit present.      Mental Status: He is alert.      Sensory: No sensory deficit.      Motor: No weakness.         Procedures           ED Course  ED Course as of 08/22/24 0515   Thu Aug 22, 2024   0514 BP: 137/71 [JK]   0514 Temp: 98.5 °F (36.9 °C) [JK]   0514 Temp src: Oral [JK]   0514 Heart Rate: 96 [JK]   0514 Resp: 18 [JK]   0514 SpO2: 98 % [JK]   0514 Device (Oxygen Therapy): room air  Interpretation:  Patient's repeat vitals, telemetry tracing, and pulse oximetry tracing were directly viewed and interpreted by myself.   O2 sat 98% on room air, interpreted as normal  Telemetry rhythm strip revealed a rate of 96 bpm, interpreted as normal sinus rhythm [JK]   0514 CT Cervical Spine Without Contrast [JK]   0514 CT Head Without Contrast  Interpretation:  Imaging was directly visualized by myself, per my interpretations, CT of the cervical spine revealed some chronic arthritis.  CT of the head revealed some chronic infarcts. [JK]   0514 On reevaluation, the patient denies any symptoms.  He is at his neurologic baseline.  Findings consistent with a closed head injury.  Patient will follow-up with PCP in 48 hours.  Given strict return precautions.  Verbalized understanding. [JK]   0515 I had a discussion with the patient/family regarding diagnosis, diagnostic results, treatment plan, and medications. The patient/family indicated understanding of these instructions. I spent adequate time at the bedside prior to discharge necessary to discuss the aftercare instructions, giving patient education, providing explanations of the results of our evaluations/findings, and my decision making to assure that the  patient/family understand the plan of care. Time was allotted to answer questions at that time and throughout the ED course. Patient is required to maintain timely follow up, as discussed. I also discussed the potential for the development of an acute emergent condition requiring further evaluation, return to the ER, admission, or even surgical intervention.  I encouraged the patient to return to the emergency department immediately for any concerns, worsening symptoms, new complaints, or if symptoms persist and they are unable to seek follow-up in a timely fashion. The patient/family expressed understanding and agreement with this plan    Shared decision making:   After full review of the patient's clinical presentation, review of any work-up including but not limited to laboratory studies and radiology obtained, I had a discussion with the patient.  Treatment options were discussed as well as the risks, benefits and consequences.  I discussed all findings with the patient and family members if available.  During the discussion, treatment goals were understood by all as well as any misconceptions which were addressed with the patient.  Ample time was given for any questions they may have had.  They are in agreement with the treatment plan as well as final disposition. [JK]      ED Course User Index  [JK] Mitchel Gallagher MD                                             Medical Decision Making  This is a 83-year-old male with history of hypertension and TIA presenting to the emergency department after an accidental fall.  Patient slid out of bed.  He does admit to hitting his head, however there is no loss of consciousness.  Patient asymptomatic at this time.  However he meets imaging for the head and cervical spine based on [employer] guidelines.  Workup initiated.      Differential diagnosis: Closed head injury, intracranial abnormality, contusion, sprain, strain, fracture    Amount and/or Complexity of Data  Reviewed  Independent Historian: EMS  External Data Reviewed: labs, radiology and notes.     Details: External laboratories, imaging as well as notes were reviewed personally by myself.  All relevant studies were used to guide decision making.     Date of previous record: 8/7/2024    Source of note: Admission    Summary: Patient was admitted for total knee replacement.  I did review basic laboratory studies on file as well as previous chest x-ray.  Records reviewed    Radiology: ordered and independent interpretation performed. Decision-making details documented in ED Course.        Final diagnoses:   Closed head injury, initial encounter   Accidental fall, initial encounter       ED Disposition  ED Disposition       ED Disposition   Discharge    Condition   Stable    Comment   --               Mitchel Culver MD  [address] [address]  [phone]    Call in 1 day           Medication List      No changes were made to your prescriptions during this visit.            Mitchel Gallagher MD  08/22/24 0515

## 2024-08-22 NOTE — TELEPHONE ENCOUNTER
Received a call from patients wife. Patient had RT TKA with Dr. Schwartz on 08/07/24. Patient is still currently at [facility] for rehab, and had a fall last night.     Wife stated the patient called her, and told her he tried to request assistance to the bathroom, and no one came, and he ended up wetting the bed. Wife stated the patient got out of bed on his own, and went to the bathroom, falling on his way back out hitting his head.     Wife stated patient was transported from [facility] to [facility] ER, where a CT Scan of the head was done. Wife advised no one in their family was notified of the fall, or the patient being transferred to ER.     Wife stated the patient is concerned that he has injured his knee, and wanted Dr. Schwartz to know what happened.    I have spoken with Dr. Ramos and made him aware of what happened, and spoken with [facility] as well.     [facility] SRU stated the patient is doing fine, and everything checked out. Dr. Schwartz is ok with patient keeping his original post op appointment on Tuesday 08/27/24.     Patients wife was advised if any other issues or concerns came up, to call the office or my direct line at [phone]. Wife verbalized understanding, and had no other questions at this time.

## 2024-08-24 ENCOUNTER — HOME HEALTH ADMISSION (OUTPATIENT)
Dept: HOME HEALTH SERVICES | Facility: HOME HEALTHCARE | Age: 84
End: 2024-08-24
Payer: COMMERCIAL

## 2024-08-24 ENCOUNTER — TRANSCRIBE ORDERS (OUTPATIENT)
Dept: HOME HEALTH SERVICES | Facility: HOME HEALTHCARE | Age: 84
End: 2024-08-24
Payer: MEDICARE

## 2024-08-24 DIAGNOSIS — Z96.651 AFTERCARE FOLLOWING RIGHT KNEE JOINT REPLACEMENT SURGERY: Primary | ICD-10-CM

## 2024-08-24 DIAGNOSIS — Z47.1 AFTERCARE FOLLOWING RIGHT KNEE JOINT REPLACEMENT SURGERY: Primary | ICD-10-CM

## 2024-08-26 ENCOUNTER — HOME CARE VISIT (OUTPATIENT)
Dept: HOME HEALTH SERVICES | Facility: HOME HEALTHCARE | Age: 84
End: 2024-08-26
Payer: COMMERCIAL

## 2024-08-26 VITALS
RESPIRATION RATE: 16 BRPM | OXYGEN SATURATION: 99 % | HEART RATE: 90 BPM | DIASTOLIC BLOOD PRESSURE: 74 MMHG | SYSTOLIC BLOOD PRESSURE: 118 MMHG | TEMPERATURE: 97.7 F

## 2024-08-26 PROCEDURE — G0151 HHCP-SERV OF PT,EA 15 MIN: HCPCS

## 2024-08-27 ENCOUNTER — HOME CARE VISIT (OUTPATIENT)
Dept: HOME HEALTH SERVICES | Facility: HOME HEALTHCARE | Age: 84
End: 2024-08-27
Payer: MEDICARE

## 2024-08-27 ENCOUNTER — HOME CARE VISIT (OUTPATIENT)
Dept: HOME HEALTH SERVICES | Facility: HOME HEALTHCARE | Age: 84
End: 2024-08-27
Payer: COMMERCIAL

## 2024-08-27 ENCOUNTER — OFFICE VISIT (OUTPATIENT)
Dept: ORTHOPEDIC SURGERY | Facility: CLINIC | Age: 84
End: 2024-08-27
Payer: MEDICARE

## 2024-08-27 VITALS — TEMPERATURE: 97.1 F

## 2024-08-27 DIAGNOSIS — Z96.651 STATUS POST TOTAL RIGHT KNEE REPLACEMENT: Primary | ICD-10-CM

## 2024-08-27 DIAGNOSIS — L53.9 SKIN ERYTHEMA: ICD-10-CM

## 2024-08-27 PROCEDURE — 99024 POSTOP FOLLOW-UP VISIT: CPT | Performed by: ORTHOPAEDIC SURGERY

## 2024-08-27 PROCEDURE — G0155 HHCP-SVS OF CSW,EA 15 MIN: HCPCS

## 2024-08-27 RX ORDER — DOXYCYCLINE HYCLATE 100 MG
100 TABLET ORAL 2 TIMES DAILY
Qty: 14 TABLET | Refills: 0 | Status: ON HOLD | OUTPATIENT
Start: 2024-08-27 | End: 2024-09-03

## 2024-08-27 RX ORDER — TRAMADOL HYDROCHLORIDE 50 MG/1
50 TABLET ORAL EVERY 8 HOURS PRN
Qty: 30 TABLET | Refills: 1 | Status: ON HOLD | OUTPATIENT
Start: 2024-08-27

## 2024-08-27 RX ORDER — CEPHALEXIN 500 MG/1
500 CAPSULE ORAL 4 TIMES DAILY
Qty: 28 CAPSULE | Refills: 0 | Status: ON HOLD | OUTPATIENT
Start: 2024-08-27 | End: 2024-09-03

## 2024-08-27 NOTE — CASE COMMUNICATION
"Please send to Dr Culver:    SOC Note: Pt admitted to [facility] on 24 s/p R TKA by Dr Ramos on 24.  Pt transferred to [facility] on 8/10/24 and home with spouse on 24.      Home Health ordered for: disciplines PT/OT/MSW    Reason for Hosp/Primary Dx/Co-morbidities: R TKA/PMH includes PAF, CAD, HTN, HLD and OA    Focus of Care: HHPT 2wk4 for gait training, balance training, therapeutic exercise, pt educat ion and HEP instruction related to R TKA    Patient's goal(s): \"to get back to walking and getting around without knee pain\"    Current Functional status/mobility/DME: pt ambulates with FWW with CGA in home, needs CGA/min A for functional tranfsers    HB status/Living Arrangements: lives with spouse in a one story home, one step at entry to home and one step to reach den area    Skin Integrity/wound status: wound covered by non removabl e dressing to be kept intact until f/u with Dr Ramos    Code Status: full code    Fall Risk/Safety concerns: high fall risk per Tinetti score of     Educated on Emergency Plan, steps to take prior to going to the ER and when to Call Home Health First:  YES     Medication issues/Concerns:  Restasis and Pravastatin: Plasma concentrations and adverse effects of pravastatin may be increased by cyclosporine. A reduced pravastatin st arting pravastatin dose and maximum dose (20 mg daily) is recommended.  Oxycodone and Ketoconazole: Plasma concentrations and pharmacologic effects of oxycodone may be increased by strong CY inhibitors (e.g. ketoconazole).  Nortriptyline and Tramadol: Serotonergic effects of tricyclic antidepressants and certain opioids may be additive. The risk of serotonin syndrom/toxicity may be increased. Additive CNS depression may also occur w ith this combination.  Ketoconazole and Restasis: Cyclosporine blood or plasma concentrations and pharmacologic effects of cyclosporine may be increased by ketoconazole. Renal toxicity " may occur.    Additional Problems/Concerns: n/a    SDOH Barriers (i.e. caregiver concerns, social isolation, transportation, food insecurity, environment, income etc.)/Need for MSW: MSW to see for caregiver resources

## 2024-08-27 NOTE — PROGRESS NOTES
[facility] Note        Subjective     Post-op (3 weeks s/p TOTAL KNEE ARTHROPLASTY WITH ASHLEY ROBOT RIGHT DOS 8/7/24/)       HPI    Guicho Blanco is a 83 y.o. male.  Patient is here today with his daughter.  Underwent robot-assisted right TKA for valgus deformity on 8/7/2024.  Released to [facility] after surgery.  Has been on antibiotics for 3 to 4 days for redness on the anterior lateral aspect of his knee.  Denies fever chills nausea vomiting.          Objective      Physical Exam  Temp 97.1 °F (36.2 °C)     There is no height or weight on file to calculate BMI.        Ortho Exam  Patient has painless motion of the right knee.  Incisions are healing and free of erythema or drainage.  Range of motion is .  At the anterior lateral aspect adjacent to the joint line, there is a red area that does have some blanching but no induration or streaking.  This is not overly tender to palpation.  There is a black sharpie marker delineating the extent of the erythema in the redness today seems to be less when compared to the sharpie line.    Imaging Reviewed and Interpreted:  Imaging Results (Last 24 Hours)       Procedure Component Value Units Date/Time    XR Knee 3+ View With [place] Right [[id number]] Resulted: 08/27/24 1426     Updated: 08/27/24 1427    Narrative:      Knee X-ray    Indication: status-post TKA    Study:  AP, Lateral, and Sunrise views of Right knee    Comparison: Right knee 8/7/2024    Findings:  No signs of acute fracture are visualized  No signs of loosening are appreciated  Components are well aligned    Impression:  Status post Right cemented total knee arthroplasty. No signs   of loosening or fracture.                Assessment    Assessment:  1. Status post total right knee replacement    2. Skin erythema        Plan:  Status post right TKA--patient will be assisted with arranging home health physical therapy while he is in the office.  See him back in a week  for reevaluation.  Skin erythema right knee--does not appear to be involving his knee joint proper.  Not sure if this represents a pressure area, an area anterior laterally where he had some thinning of the anterolateral capsule from his IT band release, resolving hematoma, or combination thereof.  Will keep him on his Keflex and doxycycline.  I will see him back in a week to make sure this is getting better not worse.      Christian Schwartz MD  08/27/24  15:05 EDT      Dictated Utilizing Dragon Dictation.

## 2024-08-27 NOTE — HOME HEALTH
Met with Mr. Blanco and his wife, Sandra, who uses a walker and has health issues of her own.  He states his wife is overwhelmed with trying to help him since he came home from [facility].  He said he let her sleep last night and he was able to get up on his own when he had to through the night on his walker.  He said he was very careful because he doesn't want to fall.  We discussed the options of hiring some help in the home vs trying to go to short term rehab inpatient.  He sees Dr. Schwartz this afternoon and his daugther, Sylwia, who lives in [place] is taking him.  I provided them with resources including a sitter list, pathways book of resources, lifeline alert options, assisted living options, and other [facility] resources.  He is a retired  and .  They are both very pleasant but just need more assistance in the home until he can recover.  While I was there, Nina,  their cleaning lady came over.  I made phone calls to all [place] facilities they were interested in going to and noone had any short term rehab beds available.  I was also told that because of his insurance it would be very difficult for him to get approved from home regardless of bed availability.  Mr. Blanco and his wife state they will explore the option of hiring extra help in the home and this is what he said he would like anyway.  I have talked to MICA Beck, who is seeing him tomorrow and she will help with the shower (the wife was asking about this) and will assess for HHA.  They have my number to call if further assistance is needed.  A bed rail arrived while I was there that the daughter had ordered for him.

## 2024-08-27 NOTE — HOME HEALTH
"SOC Note: Pt admitted to [facility] on 24 s/p R TKA by Dr Ramos on 24.  Pt transferred to [facility] on 8/10/24 and home with spouse on 24.      Home Health ordered for: disciplines PT/OT/MSW    Reason for Hosp/Primary Dx/Co-morbidities: R TKA/PMH includes PAF, CAD, HTN, HLD and OA    Focus of Care: HHPT 2wk4 for gait training, balance training, therapeutic exercise, pt education and HEP instruction related to R TKA    Patient's goal(s): \"to get back to walking and getting around without knee pain\"    Current Functional status/mobility/DME: pt ambulates with FWW with CGA in home, needs CGA/min A for functional tranfsers    HB status/Living Arrangements: lives with spouse in a one story home, one step at entry to home and one step to reach den area    Skin Integrity/wound status: wound covered by non removable dressing to be kept intact until f/u with Dr Ramos    Code Status: full code    Fall Risk/Safety concerns: high fall risk per Tinetti score of     Educated on Emergency Plan, steps to take prior to going to the ER and when to Call Home Health First:  YES     Medication issues/Concerns:  Restasis and Pravastatin: Plasma concentrations and adverse effects of pravastatin may be increased by cyclosporine. A reduced pravastatin starting pravastatin dose and maximum dose (20 mg daily) is recommended.  Oxycodone and Ketoconazole: Plasma concentrations and pharmacologic effects of oxycodone may be increased by strong CY inhibitors (e.g. ketoconazole).  Nortriptyline and Tramadol: Serotonergic effects of tricyclic antidepressants and certain opioids may be additive. The risk of serotonin syndrom/toxicity may be increased. Additive CNS depression may also occur with this combination.  Ketoconazole and Restasis: Cyclosporine blood or plasma concentrations and pharmacologic effects of cyclosporine may be increased by ketoconazole. Renal toxicity may occur.    Additional " Problems/Concerns: n/a    SDOH Barriers (i.e. caregiver concerns, social isolation, transportation, food insecurity, environment, income etc.)/Need for MSW: MSW to see for caregiver resources

## 2024-08-27 NOTE — CASE COMMUNICATION
Please send to:   Mitchel Cluver MD   [address], [address]   [place] [address]   Phone: [phone]   Fax: [phone]   Potential major drug interaction with medication reconciliation:     Restasis and Pravastatin: Plasma concentrations and adverse effects of pravastatin may be increased by cyclosporine. A reduced pravastatin starting pravastatin dose and maximum dose (20 mg daily) is recommended.  Oxycodone and  Ketoconazole: Plasma concentrations and pharmacologic effects of oxycodone may be increased by strong CY inhibitors (e.g. ketoconazole).  Nortriptyline and Tramadol: Serotonergic effects of tricyclic antidepressants and certain opioids may be additive. The risk of serotonin syndrom/toxicity may be increased. Additive CNS depression may also occur with this combination.  Ketoconazole and Restasis: Cyclosporine blood or plasma concent rations and pharmacologic effects of cyclosporine may be increased by ketoconazole. Renal toxicity may occur.

## 2024-08-28 ENCOUNTER — HOME CARE VISIT (OUTPATIENT)
Dept: HOME HEALTH SERVICES | Facility: HOME HEALTHCARE | Age: 84
End: 2024-08-28
Payer: COMMERCIAL

## 2024-08-28 VITALS
TEMPERATURE: 97.7 F | HEART RATE: 92 BPM | DIASTOLIC BLOOD PRESSURE: 68 MMHG | OXYGEN SATURATION: 98 % | SYSTOLIC BLOOD PRESSURE: 120 MMHG

## 2024-08-28 PROCEDURE — G0152 HHCP-SERV OF OT,EA 15 MIN: HCPCS

## 2024-08-30 ENCOUNTER — APPOINTMENT (OUTPATIENT)
Dept: GENERAL RADIOLOGY | Facility: HOSPITAL | Age: 84
DRG: 603 | End: 2024-08-30
Payer: MEDICARE

## 2024-08-30 ENCOUNTER — HOSPITAL ENCOUNTER (INPATIENT)
Facility: HOSPITAL | Age: 84
LOS: 5 days | Discharge: HOME | DRG: 603 | End: 2024-09-05
Attending: EMERGENCY MEDICINE | Admitting: INTERNAL MEDICINE
Payer: MEDICARE

## 2024-08-30 ENCOUNTER — TELEPHONE (OUTPATIENT)
Dept: ORTHOPEDIC SURGERY | Facility: CLINIC | Age: 84
End: 2024-08-30
Payer: MEDICARE

## 2024-08-30 ENCOUNTER — TELEPHONE (OUTPATIENT)
Dept: ORTHOPEDIC SURGERY | Facility: CLINIC | Age: 84
End: 2024-08-30

## 2024-08-30 ENCOUNTER — HOME CARE VISIT (OUTPATIENT)
Dept: HOME HEALTH SERVICES | Facility: HOME HEALTHCARE | Age: 84
End: 2024-08-30
Payer: COMMERCIAL

## 2024-08-30 DIAGNOSIS — M86.9 OSTEOMYELITIS OF RIGHT FOOT, UNSPECIFIED TYPE: ICD-10-CM

## 2024-08-30 DIAGNOSIS — I25.10 CORONARY ARTERY DISEASE INVOLVING NATIVE CORONARY ARTERY OF NATIVE HEART WITHOUT ANGINA PECTORIS: ICD-10-CM

## 2024-08-30 DIAGNOSIS — G62.9 NEUROPATHY: ICD-10-CM

## 2024-08-30 DIAGNOSIS — M86.9 OSTEOMYELITIS OF TOE OF RIGHT FOOT: ICD-10-CM

## 2024-08-30 DIAGNOSIS — R13.12 OROPHARYNGEAL DYSPHAGIA: ICD-10-CM

## 2024-08-30 DIAGNOSIS — L03.115 CELLULITIS OF RIGHT LOWER EXTREMITY: Primary | ICD-10-CM

## 2024-08-30 DIAGNOSIS — J42 CHRONIC BRONCHITIS, UNSPECIFIED CHRONIC BRONCHITIS TYPE: ICD-10-CM

## 2024-08-30 DIAGNOSIS — Z78.9 FAILURE OF OUTPATIENT TREATMENT: ICD-10-CM

## 2024-08-30 PROBLEM — L03.90 CELLULITIS: Status: ACTIVE | Noted: 2024-08-30

## 2024-08-30 PROBLEM — M00.9 SEPTIC ARTHRITIS: Status: ACTIVE | Noted: 2024-08-30

## 2024-08-30 LAB
ALBUMIN SERPL-MCNC: 4.2 G/DL (ref 3.5–5.2)
ALBUMIN/GLOB SERPL: 1.7 G/DL
ALP SERPL-CCNC: 187 U/L (ref 39–117)
ALT SERPL W P-5'-P-CCNC: 20 U/L (ref 1–41)
ANION GAP SERPL CALCULATED.3IONS-SCNC: 13 MMOL/L (ref 5–15)
AST SERPL-CCNC: 32 U/L (ref 1–40)
BASOPHILS # BLD AUTO: 0.07 10*3/MM3 (ref 0–0.2)
BASOPHILS NFR BLD AUTO: 1 % (ref 0–1.5)
BILIRUB SERPL-MCNC: 0.5 MG/DL (ref 0–1.2)
BUN SERPL-MCNC: 12 MG/DL (ref 8–23)
BUN/CREAT SERPL: 10.6 (ref 7–25)
CALCIUM SPEC-SCNC: 9.5 MG/DL (ref 8.6–10.5)
CHLORIDE SERPL-SCNC: 101 MMOL/L (ref 98–107)
CO2 SERPL-SCNC: 22 MMOL/L (ref 22–29)
CREAT SERPL-MCNC: 1.13 MG/DL (ref 0.76–1.27)
CRP SERPL-MCNC: 0.8 MG/DL (ref 0–0.5)
D-LACTATE SERPL-SCNC: 1.8 MMOL/L (ref 0.5–2)
DEPRECATED RDW RBC AUTO: 45.1 FL (ref 37–54)
EGFRCR SERPLBLD CKD-EPI 2021: 64.5 ML/MIN/1.73
EOSINOPHIL # BLD AUTO: 0.35 10*3/MM3 (ref 0–0.4)
EOSINOPHIL NFR BLD AUTO: 5 % (ref 0.3–6.2)
ERYTHROCYTE [DISTWIDTH] IN BLOOD BY AUTOMATED COUNT: 14.6 % (ref 12.3–15.4)
ERYTHROCYTE [SEDIMENTATION RATE] IN BLOOD: 25 MM/HR (ref 0–20)
GLOBULIN UR ELPH-MCNC: 2.5 GM/DL
GLUCOSE SERPL-MCNC: 103 MG/DL (ref 65–99)
HCT VFR BLD AUTO: 36.1 % (ref 37.5–51)
HGB BLD-MCNC: 11.5 G/DL (ref 13–17.7)
HOLD SPECIMEN: NORMAL
IMM GRANULOCYTES # BLD AUTO: 0.04 10*3/MM3 (ref 0–0.05)
IMM GRANULOCYTES NFR BLD AUTO: 0.6 % (ref 0–0.5)
LYMPHOCYTES # BLD AUTO: 1.98 10*3/MM3 (ref 0.7–3.1)
LYMPHOCYTES NFR BLD AUTO: 28.4 % (ref 19.6–45.3)
MAGNESIUM SERPL-MCNC: 1.9 MG/DL (ref 1.6–2.4)
MCH RBC QN AUTO: 27.3 PG (ref 26.6–33)
MCHC RBC AUTO-ENTMCNC: 31.9 G/DL (ref 31.5–35.7)
MCV RBC AUTO: 85.5 FL (ref 79–97)
MONOCYTES # BLD AUTO: 0.85 10*3/MM3 (ref 0.1–0.9)
MONOCYTES NFR BLD AUTO: 12.2 % (ref 5–12)
NEUTROPHILS NFR BLD AUTO: 3.69 10*3/MM3 (ref 1.7–7)
NEUTROPHILS NFR BLD AUTO: 52.8 % (ref 42.7–76)
NRBC BLD AUTO-RTO: 0 /100 WBC (ref 0–0.2)
PLATELET # BLD AUTO: 314 10*3/MM3 (ref 140–450)
PMV BLD AUTO: 9.8 FL (ref 6–12)
POTASSIUM SERPL-SCNC: 4.1 MMOL/L (ref 3.5–5.2)
PROCALCITONIN SERPL-MCNC: 0.09 NG/ML (ref 0–0.25)
PROT SERPL-MCNC: 6.7 G/DL (ref 6–8.5)
RBC # BLD AUTO: 4.22 10*6/MM3 (ref 4.14–5.8)
SODIUM SERPL-SCNC: 136 MMOL/L (ref 136–145)
WBC NRBC COR # BLD AUTO: 6.98 10*3/MM3 (ref 3.4–10.8)
WHOLE BLOOD HOLD COAG: NORMAL
WHOLE BLOOD HOLD SPECIMEN: NORMAL

## 2024-08-30 PROCEDURE — G0378 HOSPITAL OBSERVATION PER HR: HCPCS

## 2024-08-30 PROCEDURE — 80053 COMPREHEN METABOLIC PANEL: CPT | Performed by: EMERGENCY MEDICINE

## 2024-08-30 PROCEDURE — 85652 RBC SED RATE AUTOMATED: CPT | Performed by: EMERGENCY MEDICINE

## 2024-08-30 PROCEDURE — 85025 COMPLETE CBC W/AUTO DIFF WBC: CPT | Performed by: EMERGENCY MEDICINE

## 2024-08-30 PROCEDURE — 99024 POSTOP FOLLOW-UP VISIT: CPT | Performed by: ORTHOPAEDIC SURGERY

## 2024-08-30 PROCEDURE — 83735 ASSAY OF MAGNESIUM: CPT | Performed by: EMERGENCY MEDICINE

## 2024-08-30 PROCEDURE — 25810000003 SODIUM CHLORIDE 0.9 % SOLUTION: Performed by: EMERGENCY MEDICINE

## 2024-08-30 PROCEDURE — 84145 PROCALCITONIN (PCT): CPT | Performed by: EMERGENCY MEDICINE

## 2024-08-30 PROCEDURE — 83605 ASSAY OF LACTIC ACID: CPT | Performed by: EMERGENCY MEDICINE

## 2024-08-30 PROCEDURE — 99285 EMERGENCY DEPT VISIT HI MDM: CPT

## 2024-08-30 PROCEDURE — 86140 C-REACTIVE PROTEIN: CPT | Performed by: EMERGENCY MEDICINE

## 2024-08-30 PROCEDURE — 25010000002 VANCOMYCIN 10 G RECONSTITUTED SOLUTION: Performed by: EMERGENCY MEDICINE

## 2024-08-30 PROCEDURE — G0151 HHCP-SERV OF PT,EA 15 MIN: HCPCS

## 2024-08-30 PROCEDURE — 25010000002 CEFTRIAXONE PER 250 MG: Performed by: EMERGENCY MEDICINE

## 2024-08-30 PROCEDURE — 36415 COLL VENOUS BLD VENIPUNCTURE: CPT

## 2024-08-30 PROCEDURE — 73560 X-RAY EXAM OF KNEE 1 OR 2: CPT

## 2024-08-30 PROCEDURE — 87040 BLOOD CULTURE FOR BACTERIA: CPT | Performed by: EMERGENCY MEDICINE

## 2024-08-30 RX ORDER — PRAVASTATIN SODIUM 20 MG
20 TABLET ORAL DAILY
Status: DISCONTINUED | OUTPATIENT
Start: 2024-08-31 | End: 2024-09-05 | Stop reason: HOSPADM

## 2024-08-30 RX ORDER — ASPIRIN 325 MG
325 TABLET ORAL DAILY
Status: DISCONTINUED | OUTPATIENT
Start: 2024-08-31 | End: 2024-09-05 | Stop reason: HOSPADM

## 2024-08-30 RX ORDER — NORTRIPTYLINE HCL 10 MG
20 CAPSULE ORAL NIGHTLY
Status: DISCONTINUED | OUTPATIENT
Start: 2024-08-30 | End: 2024-09-05 | Stop reason: HOSPADM

## 2024-08-30 RX ORDER — MELOXICAM 7.5 MG/1
7.5 TABLET ORAL DAILY
Status: DISCONTINUED | OUTPATIENT
Start: 2024-08-31 | End: 2024-09-05 | Stop reason: HOSPADM

## 2024-08-30 RX ORDER — PANTOPRAZOLE SODIUM 40 MG/1
40 TABLET, DELAYED RELEASE ORAL
Status: DISCONTINUED | OUTPATIENT
Start: 2024-08-31 | End: 2024-09-05 | Stop reason: HOSPADM

## 2024-08-30 RX ORDER — DOCUSATE SODIUM 100 MG/1
100 CAPSULE, LIQUID FILLED ORAL 2 TIMES DAILY PRN
Status: DISCONTINUED | OUTPATIENT
Start: 2024-08-30 | End: 2024-09-05 | Stop reason: HOSPADM

## 2024-08-30 RX ORDER — FLECAINIDE ACETATE 50 MG/1
50 TABLET ORAL EVERY 12 HOURS
Status: DISCONTINUED | OUTPATIENT
Start: 2024-08-30 | End: 2024-09-05 | Stop reason: HOSPADM

## 2024-08-30 RX ORDER — ISOSORBIDE MONONITRATE 30 MG/1
30 TABLET, EXTENDED RELEASE ORAL DAILY
Status: DISCONTINUED | OUTPATIENT
Start: 2024-08-31 | End: 2024-09-05 | Stop reason: HOSPADM

## 2024-08-30 RX ORDER — SODIUM CHLORIDE 0.9 % (FLUSH) 0.9 %
10 SYRINGE (ML) INJECTION AS NEEDED
Status: DISCONTINUED | OUTPATIENT
Start: 2024-08-30 | End: 2024-09-05 | Stop reason: HOSPADM

## 2024-08-30 RX ORDER — POLYETHYLENE GLYCOL 3350 17 G/17G
17 POWDER, FOR SOLUTION ORAL DAILY PRN
Status: DISCONTINUED | OUTPATIENT
Start: 2024-08-30 | End: 2024-09-05 | Stop reason: HOSPADM

## 2024-08-30 RX ADMIN — FLECAINIDE ACETATE 50 MG: 50 TABLET ORAL at 22:33

## 2024-08-30 RX ADMIN — NORTRIPTYLINE HYDROCHLORIDE 20 MG: 10 CAPSULE ORAL at 22:34

## 2024-08-30 RX ADMIN — VANCOMYCIN HYDROCHLORIDE 2250 MG: 10 INJECTION, POWDER, LYOPHILIZED, FOR SOLUTION INTRAVENOUS at 20:37

## 2024-08-30 RX ADMIN — SODIUM CHLORIDE 2000 MG: 900 INJECTION INTRAVENOUS at 19:31

## 2024-08-30 NOTE — ED PROVIDER NOTES
Subjective   History of Present Illness  Patient is a pleasant 83-year-old male presents to the emergency department, accompanied by his daughter, with right lower extremity cellulitis.  States that he had a right knee replacement on the seventh of this month.  Procedure went well.  The daughter noticed just after the surgery is slight area of erythema over the lateral aspect of his right knee.  She noticed this at the time of discharge and that this area continued to worsen while at rehab at [facility].  [facility] initially started him on antibiotic and the erythema did improve but never fully resolved.  They followed up with Dr. Tavares Palacio on Tuesday of this week, 3 days ago, and antibiotic was continued.  Unfortunately the erythema is no longer improving exactly getting slightly worse today.  Additionally, the location of erythema over the lateral aspect of the knee also was tender to palpation today, which was new.  Daughter states that they sent a picture to Dr. Ramos today and it was recommended that they come to the emergency department for evaluation.      Review of Systems   All other systems reviewed and are negative.      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      Duloxetine Unknown (See Comments)    Metoprolol Other (See Comments)     UNKNOWN. Pt does not remember this being allergy    Neurontin [Gabapentin] Other (See Comments)     Lethargy & fatigue       Past Surgical History:   Procedure Laterality Date    ABLATION OF DYSRHYTHMIC FOCUS      APPENDECTOMY      CARDIAC CATHETERIZATION N/A 10/05/2017    Procedure: Left Heart Cath;  Surgeon: Hector Urrutia MD;  Location:  [facility];  Service:     CATARACT EXTRACTION Bilateral     CHOLECYSTECTOMY      Remote    COCHLEAR IMPLANT REVISION  05/2019    COLONOSCOPY      PROSTATE SURGERY      SHOULDER SURGERY Right     SKIN BIOPSY      TOE AMPUTATION Right     2nd toe - Right Foot    TONSILLECTOMY      TOTAL KNEE ARTHROPLASTY Right 8/7/2024    Procedure: TOTAL KNEE ARTHROPLASTY WITH ASHLEY ROBOT RIGHT;  Surgeon: Christian Schwartz MD;  Location:  [facility] OR;  Service: Robotics - Ortho;  Laterality: Right;       Family History   Problem Relation Age of Onset    Atrial fibrillation Mother     Dementia Mother     Lung cancer Father        Social History     Socioeconomic History    Marital status:    Tobacco Use    Smoking status: Never     Passive exposure: Past    Smokeless tobacco: Never   Vaping Use    Vaping status: Never Used   Substance and Sexual Activity    Alcohol use: No    Drug use: No    Sexual activity: Defer           Objective   Physical Exam  Vitals and nursing note reviewed.   Constitutional:       General: He is not in acute distress.     Appearance: Normal appearance.   HENT:      Head: Normocephalic and atraumatic.      Mouth/Throat:      Mouth: Mucous membranes are moist.   Eyes:      Extraocular Movements: Extraocular movements intact.      Conjunctiva/sclera: Conjunctivae normal.      Pupils: Pupils are equal, round, and reactive to light.   Neck:      Thyroid: No thyromegaly.   Cardiovascular:      Rate and Rhythm: Normal rate and regular rhythm.      Heart sounds: Normal heart  sounds. No murmur heard.     No friction rub. No gallop.   Pulmonary:      Effort: Pulmonary effort is normal. No respiratory distress.      Breath sounds: Normal breath sounds.   Abdominal:      General: Bowel sounds are normal.      Palpations: Abdomen is soft.      Tenderness: There is no abdominal tenderness.   Musculoskeletal:         General: Normal range of motion.      Cervical back: Normal range of motion and neck supple.      Comments: Moderate to erythema to the lateral aspect of the right knee.  There is increased warmth.  No fluctuance or drainable abscess definitively palpated.  Patient does not have pain with passive range of motion and states he only has slight discomfort with almost full extension of the knee.     Lymphadenopathy:      Cervical: No cervical adenopathy.   Skin:     General: Skin is warm and dry.      Capillary Refill: Capillary refill takes less than 2 seconds.   Neurological:      General: No focal deficit present.      Mental Status: He is alert and oriented to person, place, and time.   Psychiatric:         Behavior: Behavior normal.         Thought Content: Thought content normal.         Procedures           ED Course  ED Course as of 09/02/24 2333   Fri Aug 30, 2024   1926 Case initially discussed with internal medicine service.  This patient has been admitted to the hospital within the past 30 days and was managed by DOMINGUEZ Fall I have also paged him for admission. [CP]      ED Course User Index  [CP] Sincere Mari DO       Latest Reference Range & Units 08/30/24 16:38   Sodium 136 - 145 mmol/L 136   Potassium 3.5 - 5.2 mmol/L 4.1   Chloride 98 - 107 mmol/L 101   CO2 22.0 - 29.0 mmol/L 22.0   Anion Gap 5.0 - 15.0 mmol/L 13.0   BUN 8 - 23 mg/dL 12   Creatinine 0.76 - 1.27 mg/dL 1.13   BUN/Creatinine Ratio 7.0 - 25.0  10.6   eGFR >60.0 mL/min/1.73 64.5   Glucose 65 - 99 mg/dL 103 (H)   Calcium 8.6 - 10.5 mg/dL 9.5   Magnesium 1.6 - 2.4 mg/dL 1.9   Alkaline Phosphatase 39 - 117 U/L  "187 (H)   Total Protein 6.0 - 8.5 g/dL 6.7   Albumin 3.5 - 5.2 g/dL 4.2   Globulin gm/dL 2.5   A/G Ratio g/dL 1.7   AST (SGOT) 1 - 40 U/L 32   ALT (SGPT) 1 - 41 U/L 20   Total Bilirubin 0.0 - 1.2 mg/dL 0.5   C-Reactive Protein 0.00 - 0.50 mg/dL 0.80 (H)   Lactate 0.5 - 2.0 mmol/L 1.8   Procalcitonin 0.00 - 0.25 ng/mL 0.09   WBC 3.40 - 10.80 10*3/mm3 6.98   RBC 4.14 - 5.80 10*6/mm3 4.22   Hemoglobin 13.0 - 17.7 g/dL 11.5 (L)   Hematocrit 37.5 - 51.0 % 36.1 (L)   Platelets 140 - 450 10*3/mm3 314   RDW 12.3 - 15.4 % 14.6   MCV 79.0 - 97.0 fL 85.5   MCH 26.6 - 33.0 pg 27.3   MCHC 31.5 - 35.7 g/dL 31.9   MPV 6.0 - 12.0 fL 9.8   RDW-SD 37.0 - 54.0 fl 45.1   Neutrophil Rel % 42.7 - 76.0 % 52.8   Lymphocyte Rel % 19.6 - 45.3 % 28.4   Monocyte Rel % 5.0 - 12.0 % 12.2 (H)   Eosinophil Rel % 0.3 - 6.2 % 5.0   Basophil Rel % 0.0 - 1.5 % 1.0   Immature Granulocyte Rel % 0.0 - 0.5 % 0.6 (H)   Neutrophils Absolute 1.70 - 7.00 10*3/mm3 3.69   Lymphocytes Absolute 0.70 - 3.10 10*3/mm3 1.98   Monocytes Absolute 0.10 - 0.90 10*3/mm3 0.85   Eosinophils Absolute 0.00 - 0.40 10*3/mm3 0.35   Basophils Absolute 0.00 - 0.20 10*3/mm3 0.07   Immature Grans, Absolute 0.00 - 0.05 10*3/mm3 0.04   nRBC 0.0 - 0.2 /100 WBC 0.0   Sed Rate 0 - 20 mm/hr 25 (H)   (H): Data is abnormally high  (L): Data is abnormally low    No orders to display     Vitals:    08/30/24 1559 08/30/24 1601   BP: 117/68    BP Location: Left arm    Patient Position: Sitting    Pulse: 104    Resp: 14    Temp: 98.3 °F (36.8 °C)    TempSrc: Oral    SpO2:  98%   Weight: 99.8 kg (220 lb)    Height: 182.9 cm (72\")      Medications - No data to display  ECG/EMG Results (last 24 hours)       ** No results found for the last 24 hours. **          No orders to display                                              Medical Decision Making  Problems Addressed:  Cellulitis of right lower extremity: complicated acute illness or injury  Failure of outpatient treatment: complicated acute " illness or injury    Amount and/or Complexity of Data Reviewed  External Data Reviewed: notes.  Labs: ordered. Decision-making details documented in ED Course.  Radiology: ordered and independent interpretation performed. Decision-making details documented in ED Course.     Details: I personally reviewed images    Risk  Prescription drug management.  Decision regarding hospitalization.        Final diagnoses:   Cellulitis of right lower extremity   Failure of outpatient treatment       ED Disposition  ED Disposition       ED Disposition   Decision to Admit    Condition   --    Comment   Level of Care: Med/Surg [1]   Diagnosis: Cellulitis [[id number]]                  Sincere Mari DO  09/02/24 0045

## 2024-08-30 NOTE — TELEPHONE ENCOUNTER
Patient's POA called back in and stated that the spot on patient's knee is actually no better or no worse.     Please Advise. Patient's POA will send in pictures.    Callback #  [phone]   Trilobed Flap Text: The defect edges were debeveled with a #15 scalpel blade.  Given the location of the defect and the proximity to free margins a trilobed flap was deemed most appropriate.  Using a sterile surgical marker, an appropriate trilobed flap drawn around the defect.    The area thus outlined was incised deep to adipose tissue with a #15 scalpel blade.  The skin margins were undermined to an appropriate distance in all directions utilizing iris scissors.

## 2024-08-30 NOTE — TELEPHONE ENCOUNTER
Provider:  DR. STEVE RANGEL    Caller: TORITO    Relationship to Patient: DAUGHTER AND POA    Pharmacy: [facility] [phone]    Phone Number: [phone]    Reason for Call:  REGARDING MESSAGES LEFT EARLER TODAY, DAUGHTER CALLED BACK TO SAY THE SPOT ON THE RIGHT KNEE IS TENDER TODAY WHICH IS A NEW SYMPTOM.    When was the patient last seen: 8/27/24

## 2024-08-30 NOTE — TELEPHONE ENCOUNTER
Called patient's power of  (daughter Sylwia) to check on patient's erythema.  She tells me it is better.  He is feeling better as well and is getting home health physical therapy.  Told her to give him some form of probiotic to limit the risk of C. difficile.  She will inquire and call back if there are any problems.

## 2024-08-31 ENCOUNTER — APPOINTMENT (OUTPATIENT)
Dept: GENERAL RADIOLOGY | Facility: HOSPITAL | Age: 84
DRG: 603 | End: 2024-08-31
Payer: MEDICARE

## 2024-08-31 VITALS
SYSTOLIC BLOOD PRESSURE: 138 MMHG | OXYGEN SATURATION: 98 % | RESPIRATION RATE: 16 BRPM | TEMPERATURE: 97.8 F | HEART RATE: 71 BPM | DIASTOLIC BLOOD PRESSURE: 72 MMHG

## 2024-08-31 PROCEDURE — 99024 POSTOP FOLLOW-UP VISIT: CPT | Performed by: ORTHOPAEDIC SURGERY

## 2024-08-31 PROCEDURE — 74230 X-RAY XM SWLNG FUNCJ C+: CPT

## 2024-08-31 PROCEDURE — 97166 OT EVAL MOD COMPLEX 45 MIN: CPT

## 2024-08-31 PROCEDURE — 97535 SELF CARE MNGMENT TRAINING: CPT

## 2024-08-31 PROCEDURE — 25010000002 CEFTRIAXONE PER 250 MG: Performed by: NURSE PRACTITIONER

## 2024-08-31 PROCEDURE — 92611 MOTION FLUOROSCOPY/SWALLOW: CPT

## 2024-08-31 PROCEDURE — 97162 PT EVAL MOD COMPLEX 30 MIN: CPT

## 2024-08-31 PROCEDURE — 92610 EVALUATE SWALLOWING FUNCTION: CPT

## 2024-08-31 PROCEDURE — 97110 THERAPEUTIC EXERCISES: CPT

## 2024-08-31 RX ORDER — VANCOMYCIN 2 GRAM/500 ML IN 0.9 % SODIUM CHLORIDE INTRAVENOUS
2000 ONCE
Status: DISCONTINUED | OUTPATIENT
Start: 2024-08-31 | End: 2024-08-31

## 2024-08-31 RX ORDER — VANCOMYCIN/0.9 % SOD CHLORIDE 1.5G/250ML
1500 PLASTIC BAG, INJECTION (ML) INTRAVENOUS EVERY 24 HOURS
Status: DISCONTINUED | OUTPATIENT
Start: 2024-09-01 | End: 2024-09-01

## 2024-08-31 RX ORDER — SODIUM CHLORIDE 9 MG/ML
40 INJECTION, SOLUTION INTRAVENOUS AS NEEDED
Status: DISCONTINUED | OUTPATIENT
Start: 2024-08-31 | End: 2024-09-05 | Stop reason: HOSPADM

## 2024-08-31 RX ORDER — SODIUM CHLORIDE 0.9 % (FLUSH) 0.9 %
10 SYRINGE (ML) INJECTION EVERY 12 HOURS SCHEDULED
Status: DISCONTINUED | OUTPATIENT
Start: 2024-08-31 | End: 2024-09-05 | Stop reason: HOSPADM

## 2024-08-31 RX ORDER — SODIUM CHLORIDE 0.9 % (FLUSH) 0.9 %
10 SYRINGE (ML) INJECTION AS NEEDED
Status: DISCONTINUED | OUTPATIENT
Start: 2024-08-31 | End: 2024-09-05 | Stop reason: HOSPADM

## 2024-08-31 RX ADMIN — SODIUM CHLORIDE 2000 MG: 900 INJECTION INTRAVENOUS at 18:35

## 2024-08-31 RX ADMIN — BARIUM SULFATE 20 ML: 400 PASTE ORAL at 12:21

## 2024-08-31 RX ADMIN — BARIUM SULFATE 100 ML: 0.81 POWDER, FOR SUSPENSION ORAL at 12:20

## 2024-08-31 RX ADMIN — PANTOPRAZOLE SODIUM 40 MG: 40 TABLET, DELAYED RELEASE ORAL at 04:43

## 2024-08-31 RX ADMIN — ASPIRIN 325 MG: 325 TABLET ORAL at 08:30

## 2024-08-31 RX ADMIN — MELOXICAM 7.5 MG: 7.5 TABLET ORAL at 08:30

## 2024-08-31 RX ADMIN — PRAVASTATIN SODIUM 20 MG: 20 TABLET ORAL at 08:30

## 2024-08-31 RX ADMIN — FLECAINIDE ACETATE 50 MG: 50 TABLET ORAL at 20:39

## 2024-08-31 RX ADMIN — FLECAINIDE ACETATE 50 MG: 50 TABLET ORAL at 08:30

## 2024-08-31 RX ADMIN — ISOSORBIDE MONONITRATE 30 MG: 30 TABLET, EXTENDED RELEASE ORAL at 08:30

## 2024-08-31 RX ADMIN — Medication 10 ML: at 08:31

## 2024-08-31 RX ADMIN — NORTRIPTYLINE HYDROCHLORIDE 20 MG: 10 CAPSULE ORAL at 22:31

## 2024-08-31 NOTE — THERAPY EVALUATION
Patient Name: Guicho Blanco  : 1940    MRN: [id number]                              Today's Date: 2024       Admit Date: 2024    Visit Dx:     ICD-10-CM ICD-9-CM   1. Cellulitis of right lower extremity  L03.115 682.6   2. Failure of outpatient treatment  Z78.9 V49.89     Patient Active Problem List   Diagnosis    Osteomyelitis of toe of right foot    Osteomyelitis of right foot    PAF (paroxysmal atrial fibrillation)    Coronary artery disease involving native coronary artery of native heart without angina pectoris    Gastroesophageal reflux disease without esophagitis    Benign non-nodular prostatic hyperplasia without lower urinary tract symptoms    S/P appy    Essential hypertension    Simple chronic bronchitis    Mixed hyperlipidemia    COPD (chronic obstructive pulmonary disease)    BPH (benign prostatic hypertrophy)    Hearing loss    Chest pain    Vertigo    Snoring    Overweight    PLMD (periodic limb movement disorder)    Neuropathy    Periodic headache syndrome, not intractable    Acquired absence of other right toe(s)    Tremor    Bilateral impacted cerumen    Bilateral sensorineural hearing loss    Chronic otitis externa    Deviated nasal septum    Disorder of joint of foot    ED (erectile dysfunction) of organic origin    Hydrocele    Hydrocele of testis    Impacted cerumen    Mononeuropathy    Swelling of testicle    Venous retinal branch occlusion    Vitreous degeneration    Benign prostatic hyperplasia with urinary obstruction    OA (osteoarthritis) of knee    Status post total right knee replacement    Septic arthritis    Cellulitis     Past Medical History:   Diagnosis Date    Arthritis     Atrial fibrillation     BPH (benign prostatic hypertrophy)     Cataract     Cellulitis     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     bilateral    Coronary artery disease     Full dentures     Gall stones     GERD (gastroesophageal reflux disease)     Hearing loss     Hiatal hernia      Hyperlipidemia     Hypertension     Peripheral neuropathy     Peripheral vascular disease     Seasonal allergies     Skin cancer     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    TIA (transient ischemic attack)     2 times    Wears glasses      Past Surgical History:   Procedure Laterality Date    ABLATION OF DYSRHYTHMIC FOCUS      APPENDECTOMY      CARDIAC CATHETERIZATION N/A 10/05/2017    Procedure: Left Heart Cath;  Surgeon: Hector Urrutia MD;  Location:  [facility];  Service:     CATARACT EXTRACTION Bilateral     CHOLECYSTECTOMY      Remote    COCHLEAR IMPLANT REVISION  05/2019    COLONOSCOPY      PROSTATE SURGERY      SHOULDER SURGERY Right     SKIN BIOPSY      TOE AMPUTATION Right     2nd toe - Right Foot    TONSILLECTOMY      TOTAL KNEE ARTHROPLASTY Right 8/7/2024    Procedure: TOTAL KNEE ARTHROPLASTY WITH ASHLEY ROBOT RIGHT;  Surgeon: Christian Schwartz MD;  Location:  [facility];  Service: Robotics - Ortho;  Laterality: Right;      General Information       Row Name 08/31/24 1144          Physical Therapy Time and Intention    Document Type therapy note (daily note)  -     Mode of Treatment physical therapy  -       Row Name 08/31/24 114          General Information    Patient Profile Reviewed yes  -HW     Prior Level of Function independent:;all household mobility;gait;transfer;bed mobility;ADL's;dependent:;home management  d/c from [facility] recently, able to dress and sponge bathe I w/ increased time, was ambulating w/ FWW, wasn't able to get things up off ground, pt and spouse receiving A for home mgmt tasks, laundry; spouse completed cooking intermittent caregiver for spouse  -     Existing Precautions/Restrictions fall;other (see comments)  [facility] w/ cochlear hearing aids, neuropathy at B LE, recent R TKA 8/7/24 with WBAT RLE, 2nd toe amputation on RLE after cellulitis in the past  -     Barriers to Rehab hearing deficit;previous functional deficit  -       Row Name 08/31/24 1146           Living Environment    People in Home spouse;other (see comments)  spouse unable to provide much support; limited family support noted  -       Row Name 08/31/24 1144          Home Main Entrance    Number of Stairs, Main Entrance two  -       Row Name 08/31/24 1144          Stairs Within Home, Primary    Stairs, Within Home, Primary one step into family room  -     Number of Stairs, Within Home, Primary one  -       Row Name 08/31/24 1144          Cognition    Orientation Status (Cognition) oriented x 3  -       Row Name 08/31/24 1144          Safety Issues, Functional Mobility    Safety Issues Affecting Function (Mobility) insight into deficits/self-awareness;awareness of need for assistance;safety precaution awareness;sequencing abilities  -     Impairments Affecting Function (Mobility) balance;endurance/activity tolerance;pain;strength;motor control;range of motion (ROM);sensation/sensory awareness  -               User Key  (r) = Recorded By, (t) = Taken By, (c) = Cosigned By      Initials Name Provider Type     Deanna Luque PT Physical Therapist                   Mobility       Row Name 08/31/24 1149          Bed Mobility    Comment, (Bed Mobility) pt UIC  -[facility] Name 08/31/24 1149          Transfers    Comment, (Transfers) Pt performed STS from chair with Mod A x2 and FWW. Boost required to stance and support with initial standing secondary to posterior lean  -       Row Name 08/31/24 1149          Sit-Stand Transfer    Sit-Stand [place] (Transfers) moderate assist (50% patient effort);2 person assist;nonverbal cues (demo/gesture);verbal cues  -     Assistive Device (Sit-Stand Transfers) walker, front-wheeled  -       Row Name 08/31/24 1149          Gait/Stairs (Locomotion)    [place] Level (Gait) minimum assist (75% patient effort);verbal cues;nonverbal cues (demo/gesture)  -     Assistive Device (Gait) walker, front-wheeled  -     Distance in Feet (Gait) 80  -      Deviations/Abnormal Patterns (Gait) bilateral deviations;festinating/shuffling;gait speed decreased;stride length decreased;base of support, wide  -     Left Sided Gait Deviations decreased knee extension  -     Right Sided Gait Deviations decreased knee extension  -     Comment, (Gait/Stairs) Pt amb with step-through gait pattern. Short, shuffling steps noted, forward flexed posture, decreased stride length, wide BHARATH, and slow gait speed observed. Frequent VC for upright posture, larger steps, and B knee extension with temporary improvement. Assistance required for mild unsteadiness. No knee buckling noted though flexed knees throughout all gait observed. Activity limited by fatigue.  -       Row Name 08/31/24 1149          Mobility    Extremity Weight-bearing Status right lower extremity  -     Right Lower Extremity (Weight-bearing Status) weight-bearing as tolerated (WBAT)  -               User Key  (r) = Recorded By, (t) = Taken By, (c) = Cosigned By      Initials Name Provider Type     Deanna Luque PT Physical Therapist                   Obj/Interventions       [facility] Name 08/31/24 1152          Range of Motion Comprehensive    General Range of Motion lower extremity range of motion deficits identified  -     Comment, General Range of Motion Surgical knee ROM:   -[facility] Name 08/31/24 1152          Strength Comprehensive (MMT)    General Manual Muscle Testing (MMT) Assessment lower extremity strength deficits identified  -     Comment, General Manual Muscle Testing (MMT) Assessment Pt able to perform B active ankle DF and SLR  -[facility] Name 08/31/24 1152          Motor Skills    Therapeutic Exercise knee;ankle  -[facility] Name 08/31/24 1152          Knee (Therapeutic Exercise)    Knee (Therapeutic Exercise) isometric exercises;strengthening exercise  -     Knee Isometrics (Therapeutic Exercise) right;quad sets;10 repetitions;5 repetitions  -     Knee Strengthening  (Therapeutic Exercise) right;heel slides;SLR (straight leg raise);SAQ (short arc quad);LAQ (long arc quad);15 repititions  -       Row Name 08/31/24 1152          Ankle (Therapeutic Exercise)    Ankle (Therapeutic Exercise) AROM (active range of motion)  -     Ankle AROM (Therapeutic Exercise) bilateral;dorsiflexion;plantarflexion;15 repititions  -       Row Name 08/31/24 1152          Balance    Balance Assessment sitting static balance;sitting dynamic balance;sit to stand dynamic balance;standing static balance;standing dynamic balance  -     Static Sitting Balance standby assist  -     Dynamic Sitting Balance standby assist  -     Position, Sitting Balance sitting in chair  -     Static Standing Balance minimal assist  -     Dynamic Standing Balance minimal assist  -     Position/Device Used, Standing Balance supported;walker, rolling  -     Balance Interventions sitting;standing;sit to stand;occupation based/functional task  -       Row Name 08/31/24 1152          Sensory Assessment (Somatosensory)    Sensory Assessment (Somatosensory) bilateral LE  -     Bilateral LE Sensory Assessment general sensation;impaired;other (see comments)  baseline neuropathy in B feet  -               User Key  (r) = Recorded By, (t) = Taken By, (c) = Cosigned By      Initials Name Provider Type     Deanna Luque, EL Physical Therapist                   Goals/Plan       [facility] Name 08/31/24 1156          Bed Mobility Goal 1 (PT)    Activity/Assistive Device (Bed Mobility Goal 1, PT) sit to supine;supine to sit  -     [place] Level/Cues Needed (Bed Mobility Goal 1, PT) contact guard required  -     Time Frame (Bed Mobility Goal 1, PT) short term goal (STG);5 days  -[facility] Name 08/31/24 1156          Transfer Goal 1 (PT)    Activity/Assistive Device (Transfer Goal 1, PT) sit-to-stand/stand-to-sit;bed-to-chair/chair-to-bed  -     [place] Level/Cues Needed (Transfer Goal 1, PT) contact guard  required  -HW     Time Frame (Transfer Goal 1, PT) long term goal (LTG);10 days  -       Row Name 08/31/24 1156          Gait Training Goal 1 (PT)    Activity/Assistive Device (Gait Training Goal 1, PT) gait (walking locomotion)  -     [place] Level (Gait Training Goal 1, PT) contact guard required  -HW     Distance (Gait Training Goal 1, PT) 150  -HW     Time Frame (Gait Training Goal 1, PT) long term goal (LTG);10 days  -HW       Row Name 08/31/24 1156          ROM Goal 1 (PT)    ROM Goal 1 (PT) R knee ROM 0-120  -HW     Time Frame (ROM Goal 1, PT) long-term goal (LTG);10 days  -       Row Name 08/31/24 1156          Therapy Assessment/Plan (PT)    Planned Therapy Interventions (PT) balance training;bed mobility training;gait training;home exercise program;patient/family education;ROM (range of motion);strengthening;stretching;transfer training  -               User Key  (r) = Recorded By, (t) = Taken By, (c) = Cosigned By      Initials Name Provider Type    HW Deanna Luque, PT Physical Therapist                   Clinical Impression       Row Name 08/31/24 1153          Pain    Pretreatment Pain Rating 3/10  -     Posttreatment Pain Rating 3/10  -     Pain Location - Side/Orientation Right  -     Pain Location generalized  -     Pain Location - knee  -     Pain Intervention(s) Ambulation/increased activity;Repositioned;Elevated  -       Row Name 08/31/24 9055          Plan of Care Review    Plan of Care Reviewed With patient  -     Progress no change  -     Outcome Evaluation Pt presents with deficits in balanace, strength, R knee ROM, and sensation impacting his functional mobility. Pt required Min A to amb 80' secondary to unsteadiness noted. Short, shuffling steps observed with limited B knee extension in stance phase. Pt required Mod A x2 to perform STS secondary to B LE weakness. Limited support at home noted. Recommend d/c to IRF to promote increased independence with functional  mobility and decrease risk for falls.  -       Row Name 08/31/24 1153          Therapy Assessment/Plan (PT)    Rehab Potential (PT) good, to achieve stated therapy goals  -     Criteria for Skilled Interventions Met (PT) yes;meets criteria;skilled treatment is necessary  -     Therapy Frequency (PT) daily  -[facility] Name 08/31/24 1153          Positioning and Restraints    Pre-Treatment Position sitting in chair/recliner  -     Post Treatment Position chair  -     In Chair notified nsg;reclined;sitting;call light within reach;encouraged to call for assist;exit alarm on;legs elevated;waffle cushion  -               User Key  (r) = Recorded By, (t) = Taken By, (c) = Cosigned By      Initials Name Provider Type    Deanna Fernandes PT Physical Therapist                   Outcome Measures       Row Name 08/31/24 1156 08/31/24 0830       How much help from another person do you currently need...    Turning from your back to your side while in flat bed without using bedrails? 3  - 3  -OD    Moving from lying on back to sitting on the side of a flat bed without bedrails? 2  - 2  -OD    Moving to and from a bed to a chair (including a wheelchair)? 3  - 2  -OD    Standing up from a chair using your arms (e.g., wheelchair, bedside chair)? 2  - 2  -OD    Climbing 3-5 steps with a railing? 2  -HW 2  -OD    To walk in hospital room? 3  -HW 2  -OD    AM-PAC 6 Clicks Score (PT) 15  - 13  -OD    Highest Level of Mobility Goal 4 --> Transfer to chair/commode  - 4 --> Transfer to chair/commode  -OD      [facility] Name 08/31/24 1156          Functional Assessment    Outcome Measure Options AM-PAC 6 Clicks Basic Mobility (PT)  -               User Key  (r) = Recorded By, (t) = Taken By, (c) = Cosigned By      Initials Name Provider Type    OD Deya Johnson, RN Registered Nurse     Deanna Luque PT Physical Therapist                                 Physical Therapy Education       Title: PT OT SLP Therapies  (In Progress)       Topic: Physical Therapy (Done)       Point: Mobility training (Done)       Learning Progress Summary             Patient Acceptance, E,D, VU,NR by  at 8/31/2024 1157                         Point: Home exercise program (Done)       Learning Progress Summary             Patient Acceptance, E,D, VU,NR by  at 8/31/2024 1157                         Point: Body mechanics (Done)       Learning Progress Summary             Patient Acceptance, E,D, VU,NR by  at 8/31/2024 1157                         Point: Precautions (Done)       Learning Progress Summary             Patient Acceptance, E,D, VU,NR by  at 8/31/2024 1157                                         User Key       Initials Effective Dates Name Provider Type Discipline     12/15/23 -  Deanna Luque, EL Physical Therapist PT                  PT Recommendation and Plan  Planned Therapy Interventions (PT): balance training, bed mobility training, gait training, home exercise program, patient/family education, ROM (range of motion), strengthening, stretching, transfer training  Plan of Care Reviewed With: patient  Progress: no change  Outcome Evaluation: Pt presents with deficits in balanace, strength, R knee ROM, and sensation impacting his functional mobility. Pt required Min A to amb 80' secondary to unsteadiness noted. Short, shuffling steps observed with limited B knee extension in stance phase. Pt required Mod A x2 to perform STS secondary to B LE weakness. Limited support at home noted. Recommend d/c to IRF to promote increased independence with functional mobility and decrease risk for falls.     Time Calculation:   PT Evaluation Complexity  History, PT Evaluation Complexity: 1-2 personal factors and/or comorbidities  Examination of Body Systems (PT Eval Complexity): total of 3 or more elements  Clinical Presentation (PT Evaluation Complexity): evolving  Clinical Decision Making (PT Evaluation Complexity): moderate  complexity  Overall Complexity (PT Evaluation Complexity): moderate complexity     PT Charges       Row Name 08/31/24 1157             Time Calculation    Start Time 0947  -HW      PT Received On 08/31/24  -      PT Goal Re-Cert Due Date 09/10/24  -         Timed Charges    07929 - PT Therapeutic Exercise Minutes 10  -HW         Untimed Charges    PT Eval/Re-eval Minutes 52  -HW         Total Minutes    Timed Charges Total Minutes 10  -HW      Untimed Charges Total Minutes 52  -HW       Total Minutes 62  -HW                User Key  (r) = Recorded By, (t) = Taken By, (c) = Cosigned By      Initials Name Provider Type     Deanna Luque, PT Physical Therapist                  Therapy Charges for Today       Code Description Service Date Service Provider Modifiers Qty    86187829509 HC PT THER PROC EA 15 MIN 8/31/2024 Deanna Luque, PT GP 1    86599984183 HC PT EVAL MOD COMPLEXITY 4 8/31/2024 Deanna Luque, PT GP 1            PT G-Codes  Outcome Measure Options: AM-PAC 6 Clicks Basic Mobility (PT)  AM-PAC 6 Clicks Score (PT): 15  PT Discharge Summary  Anticipated Discharge Disposition (PT): inpatient rehabilitation facility    Deanna Luque PT  8/31/2024

## 2024-08-31 NOTE — THERAPY EVALUATION
Patient Name: Guicho Blanco  : 1940    MRN: [id number]                              Today's Date: 2024       Admit Date: 2024    Visit Dx:     ICD-10-CM ICD-9-CM   1. Cellulitis of right lower extremity  L03.115 682.6   2. Failure of outpatient treatment  Z78.9 V49.89     Patient Active Problem List   Diagnosis    Osteomyelitis of toe of right foot    Osteomyelitis of right foot    PAF (paroxysmal atrial fibrillation)    Coronary artery disease involving native coronary artery of native heart without angina pectoris    Gastroesophageal reflux disease without esophagitis    Benign non-nodular prostatic hyperplasia without lower urinary tract symptoms    S/P appy    Essential hypertension    Simple chronic bronchitis    Mixed hyperlipidemia    COPD (chronic obstructive pulmonary disease)    BPH (benign prostatic hypertrophy)    Hearing loss    Chest pain    Vertigo    Snoring    Overweight    PLMD (periodic limb movement disorder)    Neuropathy    Periodic headache syndrome, not intractable    Acquired absence of other right toe(s)    Tremor    Bilateral impacted cerumen    Bilateral sensorineural hearing loss    Chronic otitis externa    Deviated nasal septum    Disorder of joint of foot    ED (erectile dysfunction) of organic origin    Hydrocele    Hydrocele of testis    Impacted cerumen    Mononeuropathy    Swelling of testicle    Venous retinal branch occlusion    Vitreous degeneration    Benign prostatic hyperplasia with urinary obstruction    OA (osteoarthritis) of knee    Status post total right knee replacement    Septic arthritis    Cellulitis     Past Medical History:   Diagnosis Date    Arthritis     Atrial fibrillation     BPH (benign prostatic hypertrophy)     Cataract     Cellulitis     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     bilateral    Coronary artery disease     Full dentures     Gall stones     GERD (gastroesophageal reflux disease)     Hearing loss     Hiatal hernia      Hyperlipidemia     Hypertension     Peripheral neuropathy     Peripheral vascular disease     Seasonal allergies     Skin cancer     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    TIA (transient ischemic attack)     2 times    Wears glasses      Past Surgical History:   Procedure Laterality Date    ABLATION OF DYSRHYTHMIC FOCUS      APPENDECTOMY      CARDIAC CATHETERIZATION N/A 10/05/2017    Procedure: Left Heart Cath;  Surgeon: Hector Urrutia MD;  Location:  [facility];  Service:     CATARACT EXTRACTION Bilateral     CHOLECYSTECTOMY      Remote    COCHLEAR IMPLANT REVISION  05/2019    COLONOSCOPY      PROSTATE SURGERY      SHOULDER SURGERY Right     SKIN BIOPSY      TOE AMPUTATION Right     2nd toe - Right Foot    TONSILLECTOMY      TOTAL KNEE ARTHROPLASTY Right 8/7/2024    Procedure: TOTAL KNEE ARTHROPLASTY WITH ASHLEY ROBOT RIGHT;  Surgeon: Christian Schwartz MD;  Location:  [facility];  Service: Robotics - Ortho;  Laterality: Right;      General Information       Row Name 08/31/24 1035          OT Time and Intention    Document Type evaluation  -JY     Mode of Treatment occupational therapy  -JY       Row Name 08/31/24 1035          General Information    Patient Profile Reviewed yes  -JY     Prior Level of Function independent:;all household mobility;gait;transfer;bed mobility;feeding;grooming;dressing;bathing;dependent:;home management;cooking;cleaning  recently at [facility] s/p R TKA on 8/7/24, at d/c from [facility] pt able to dress and sponge bathe I w/ increased time, was ambulating w/ FWW, wasn't able to get things up off ground, pt and spouse receiving A for home mgmt tasks, laundry; spouse completed cooking  -JY     Existing Precautions/Restrictions fall;other (see comments)  [facility] w/ cochlear hearing aids, neuropathy at B LE, recent R TKA 8/7/24 with WBAT RLE, 2nd toe amputation on RLE after cellulitis in the past  -JY     Barriers to Rehab previous functional deficit;hearing deficit  cochlear implants  -JY        Row Name 08/31/24 1035          Occupational Profile    Environmental Supports and Barriers (Occupational Profile) walk in shower w/ seat and tub/shwoer combo, elevated toilet w/ grab bars; DME: FWW, has access to LH AE yet was not using at baseline  -JY     Patient Goals (Occupational Profile) to return to PLOF  -JY       Row Name 08/31/24 1035          Living Environment    People in Home spouse;other (see comments)  per pt, spouse needs A and is not able to grossly help him, spouse does do some simple cooking however at times pt is caring for spouse; otherwise dtr helps when able and hired help as been contacted for house mgmt tasks  -JY       Row Name 08/31/24 1035          Home Main Entrance    Number of Stairs, Main Entrance three;other (see comments)  2+1  -JY       Row Name 08/31/24 1035          Stairs Within Home, Primary    Number of Stairs, Within Home, Primary none  -JY       Row Name 08/31/24 1035          Cognition    Orientation Status (Cognition) oriented to;person;situation;place;other (see comments);time  said Sept vs last day of August, stated corrected year  -JY       Row Name 08/31/24 1035          Safety Issues, Functional Mobility    Safety Issues Affecting Function (Mobility) awareness of need for assistance;insight into deficits/self-awareness;positioning of assistive device;problem-solving;safety precaution awareness;safety precautions follow-through/compliance  -JY     Impairments Affecting Function (Mobility) balance;endurance/activity tolerance;pain;postural/trunk control;sensation/sensory awareness;strength  -JY     Comment, Safety Issues/Impairments (Mobility) pt alert and able to follow commands; appears to recognize need for greater A; presented with forward flexion at trunk, most difficulty getting up from seated surfaces and initiating movement  -JY               User Key  (r) = Recorded By, (t) = Taken By, (c) = Cosigned By      Initials Name Provider Type    SLIME Segura  MICA Mccollum Occupational Therapist                     Mobility/ADL's       Row Name 08/31/24 1152          Bed Mobility    Bed Mobility other (see comments)  received UIC  -JY     Comment, (Bed Mobility) pt received UIC and preferred to remain OOB thus bed mobility not assessed this date  -SLIME       Row Name 08/31/24 1152          Transfers    Transfers sit-stand transfer;stand-sit transfer;toilet transfer  -JY     Comment, (Transfers) skilled cues for optimal hand placement for controlled ascend, descend specifically to push up from seated surface, reach back prior to sitting once aligned and in close proximity to seated surface; increased time and effort and ultimately boot to stand from seated surfaces (glynn lower height of toilet); also presented with posterior lean upon initial standing  -SLIME       Row Name 08/31/24 1152          Sit-Stand Transfer    Sit-Stand [place] (Transfers) moderate assist (50% patient effort);2 person assist;nonverbal cues (demo/gesture);verbal cues  -JY     Assistive Device (Sit-Stand Transfers) walker, front-wheeled  -JY       Row Name 08/31/24 1152          Stand-Sit Transfer    Stand-Sit [place] (Transfers) moderate assist (50% patient effort);2 person assist;nonverbal cues (demo/gesture)  -JY     Assistive Device (Stand-Sit Transfers) walker, front-wheeled  -JY       Row Name 08/31/24 1152          Toilet Transfer    Type (Toilet Transfer) sit-stand;stand-sit  -JY     [place] Level (Toilet Transfer) moderate assist (50% patient effort);1 person to manage equipment;2 person assist;verbal cues  -JY     Assistive Device (Toilet Transfer) commode;grab bars/safety frame;walker, front-wheeled  -JY     Comment, (Toilet Transfer) pt was educated on option for grab bar use at LUE for controlled ascend, descend and ultimately had BSC placed over toilet for extended height and BUE support in future sessions given increased effort, time and unsteadiness in trying to stand with  only the L UE support; maintained grasp of FWW during descend to toilet which resulted in front legs of FWW raising up and pt being at increased risk for falls  -[facility] Name 08/31/24 1152          Functional Mobility    Functional Mobility- Ind. Level minimum assist (75% patient effort);verbal cues required  -     Functional Mobility- Device walker, front-wheeled  -     Functional Mobility-Distance (Feet) --  in room ADL related mobility  -     Functional Mobility- Comment defer to PT for specifics however during in room ADL related mobility pt req'd gross min A w/ FWW support requiring increased time to get seq started and postural cues after initiated for improved stability then pt demonstrated better performance, still intermittent shorter, shffling steps noted  -     Patient was able to Ambulate yes  -JY       Row Name 08/31/24 1152          Activities of Daily Living    BADL Assessment/Intervention upper body dressing;lower body dressing;grooming;toileting  -JY       Row Name 08/31/24 1152          Mobility    Extremity Weight-bearing Status right lower extremity  -JY       Row Name 08/31/24 1152          Upper Body Dressing Assessment/Training    [place] Level (Upper Body Dressing) doff;don;pajama/robe;minimum assist (75% patient effort);verbal cues  -JY     Position (Upper Body Dressing) unsupported sitting  -JY     Comment, (Upper Body Dressing) min A for posterior and proximal gown mgmt after threading initiated at RUE  -ANNIE       [facility] 08/31/24 1152          Lower Body Dressing Assessment/Training    [place] Level (Lower Body Dressing) doff;don;socks;contact guard assist;verbal cues  -JY     Position (Lower Body Dressing) edge of bed sitting  -JY     Comment, (Lower Body Dressing) pt able to reach distally enough get to feet and ultimately d/d sock w/o a lot of physical A however sitting balance deficits when reaching distally warranted CGA for safety; intiated education on LH AE  to assist with improved reach and safety and pt reported having at home yet not using at baseline, educated pt on potential use and pt indicated potential trial with further training, not available at hospital this date  -JY       Row Name 08/31/24 1152          Grooming Assessment/Training    [place] Level (Grooming) wash face, hands;set up  -JY     Position (Grooming) supported sitting  -JY       Row Name 08/31/24 1152          Toileting Assessment/Training    [place] Level (Toileting) adjust/manage clothing;contact guard assist;perform perineal hygiene;standby assist  -JY     Assistive Devices (Toileting) commode;grab bar/safety frame  -JY     Position (Toileting) supported standing;supported sitting  -JY     Comment, (Toileting) CGA for balance during clothing mgmt glynn when reaching away from BHARATH and AD, pt able to complete seated hygiene w/o physical A  -JY               User Key  (r) = Recorded By, (t) = Taken By, (c) = Cosigned By      Initials Name Provider Type    Veda Thomas OT Occupational Therapist                   Obj/Interventions       Row Name 08/31/24 1209          Sensory Assessment (Somatosensory)    Sensory Assessment (Somatosensory) bilateral UE;sensation intact  -JY     Bilateral UE Sensory Assessment general sensation;light touch awareness;intact  -JY     Bilateral LE Sensory Assessment general sensation;impaired;other (see comments)  baseline neuropathy in BLEs  -JY     Sensory Assessment denies any numbness or tingling at BUEs, endorses baseline neropathy in B feet and increased instances of difficulty picking up items at fingertips however denies nmbness at hands  -       Row Name 08/31/24 1209          Vision Assessment/Intervention    Visual Impairment/Limitations WFL  -     Vision Assessment Comment denies any acute changes to vision  -       Row Name 08/31/24 1209          Range of Motion Comprehensive    General Range of Motion bilateral upper extremity ROM WFL   -JY       Row Name 08/31/24 1209          Strength Comprehensive (MMT)    General Manual Muscle Testing (MMT) Assessment upper extremity strength deficits identified  -JY     Comment, General Manual Muscle Testing (MMT) Assessment BUE MMS grossly 4+/5 per MMT  -JY       Row Name 08/31/24 1209          Motor Skills    Motor Skills functional endurance;coordination  -JY     Coordination bilateral;upper extremity;finger to nose;other (see comments);WFL  finger thumb opposition  -JY     Functional Endurance decreased activity tolerance toward more dynamic demands; increased effort and energy expenditure in moving sit> stand  -JY       Row Name 08/31/24 1209          Balance    Balance Assessment sitting static balance;sitting dynamic balance;standing static balance;standing dynamic balance  -JY     Static Sitting Balance standby assist  -JY     Dynamic Sitting Balance contact guard;verbal cues;other (see comments)  LBD  -JY     Position, Sitting Balance unsupported;sitting in chair  -JY     Static Standing Balance minimal assist  -JY     Dynamic Standing Balance minimal assist;verbal cues  -JY     Position/Device Used, Standing Balance supported;walker, front-wheeled  -JY     Balance Interventions sitting;standing;static;dynamic;sit to stand;supported;occupation based/functional task  -JY     Comment, Balance no overt LOB during seated or standing tasks, used FWW in standing  -JY               User Key  (r) = Recorded By, (t) = Taken By, (c) = Cosigned By      Initials Name Provider Type    Veda Thomas OT Occupational Therapist                   Goals/Plan       Row Name 08/31/24 1306          Transfer Goal 1 (OT)    Activity/Assistive Device (Transfer Goal 1, OT) sit-to-stand/stand-to-sit;bed-to-chair/chair-to-bed;toilet;commode;commode, bedside without drop arms;walker, rolling  -JY     [place] Level/Cues Needed (Transfer Goal 1, OT) moderate assist (50-74% patient effort);other (see comments)  x 1  -JY      Progress/Outcome (Transfer Goal 1, OT) new goal  -JY       Row Name 08/31/24 1306          Dressing Goal 1 (OT)    Activity/Device (Dressing Goal 1, OT) lower body dressing;upper body dressing;other (see comments)  d/d TB garments with LH AE PRN  -JY     [place]/Cues Needed (Dressing Goal 1, OT) contact guard required;verbal cues required  -JY     Time Frame (Dressing Goal 1, OT) short term goal (STG);5 days  -JY     Progress/Outcome (Dressing Goal 1, OT) new goal  -JY       Row Name 08/31/24 1306          Grooming Goal 1 (OT)    Activity/Device (Grooming Goal 1, OT) hair care;oral care;wash face, hands  -JY     [place] (Grooming Goal 1, OT) standby assist  -JY     Time Frame (Grooming Goal 1, OT) long term goal (LTG);by discharge  -JY     Strategies/Barriers (Grooming Goal 1, OT) while standing supported at sink side  -JY     Progress/Outcome (Grooming Goal 1, OT) new goal  -JY       Row Name 08/31/24 1306          Strength Goal 1 (OT)    Strength Goal 1 (OT) PT to comoplete seated HEP encompassing BUEs targeting strength and endurance with progressive sets/reps/resistance in order to improve integration in ADLs, related t/fs, mobility  -JY     Time Frame (Strength Goal 1, OT) long term goal (LTG);by discharge  -JY     Progress/Outcome (Strength Goal 1, OT) new goal  -JY       Row Name 08/31/24 1306          Therapy Assessment/Plan (OT)    Planned Therapy Interventions (OT) activity tolerance training;adaptive equipment training;BADL retraining;functional balance retraining;neuromuscular control/coordination retraining;occupation/activity based interventions;patient/caregiver education/training;ROM/therapeutic exercise;strengthening exercise;transfer/mobility retraining  -JY               User Key  (r) = Recorded By, (t) = Taken By, (c) = Cosigned By      Initials Name Provider Type    Veda Thomas, OT Occupational Therapist                   Clinical Impression       Row Name 08/31/24 8419           Pain Assessment    Pretreatment Pain Rating 3/10  -JY     Posttreatment Pain Rating 3/10  -JY     Pain Location - Side/Orientation Right  -JY     Pain Location generalized  -JY     Pain Location - knee  -JY     Pre/Posttreatment Pain Comment no change in pain rating throughout session, able to tolerate OT interventions  -JY     Pain Intervention(s) Repositioned;Ambulation/increased activity;Rest;Elevated;Nursing Notified  -JY       Row Name 08/31/24 5884          Plan of Care Review    Plan of Care Reviewed With patient  -JY     Progress no change  OT IE  -JY     Outcome Evaluation OT evaluation completed. Pt presents with decreased I in ADLs, related t/fs, mobility compared to PLOF limited by decreased activity tolerance, impaired balance, muscle weakness at BUEs, noted sensory deficits at B feet, fatigue with more dynamic demands and increased difficulty with distal reach toward most distal aspects of feet. Initiated education on potential benefits of LH AE to assist with LBD with plan for progressive training in future sessions. Pt req'd mod A x 2 for STS at various seated surfaces and min A for fxl mobility in room w/ FWW. Pt req'd CGA for d/d socks, min A for UBD and upward of CGA for toileting tasks. Facilitated BSC over toilet for height extension and BUE support for anticipated greater I in toilet t/fs. Pt is below occupational performance baseline and has limited support at home, would recommend IPOT POC and IRF at d/c when medically ready.  -JY       Row Name 08/31/24 1252          Therapy Assessment/Plan (OT)    Patient/Family Therapy Goal Statement (OT) to return to PLOF  -JY     Rehab Potential (OT) good, to achieve stated therapy goals  -JY     Criteria for Skilled Therapeutic Interventions Met (OT) yes;skilled treatment is necessary  -JY     Therapy Frequency (OT) daily  -JY     Predicted Duration of Therapy Intervention (OT) 4 days  -JY       Row Name 08/31/24 2961          Therapy Plan  Review/Discharge Plan (OT)    Anticipated Discharge Disposition (OT) inpatient rehabilitation facility  -SLIME       Row Name 08/31/24 1254          Vital Signs    Pre Systolic BP Rehab 126  -JY     Pre Treatment Diastolic BP 70  -JY     O2 Delivery Pre Treatment room air  -JY     O2 Delivery Intra Treatment room air  -JY     O2 Delivery Post Treatment room air  -JY     Pre Patient Position Sitting  -JY       Row Name 08/31/24 1254          Positioning and Restraints    Pre-Treatment Position sitting in chair/recliner  -JY     Post Treatment Position chair  -JY     In Chair notified nsg;reclined;call light within reach;encouraged to call for assist;exit alarm on;waffle cushion;legs elevated  -JY               User Key  (r) = Recorded By, (t) = Taken By, (c) = Cosigned By      Initials Name Provider Type    Veda Thomas, OT Occupational Therapist                   Outcome Measures       Row Name 08/31/24 1309          How much help from another is currently needed...    Putting on and taking off regular lower body clothing? 3  -JY     Bathing (including washing, rinsing, and drying) 2  -JY     Toileting (which includes using toilet bed pan or urinal) 3  -JY     Putting on and taking off regular upper body clothing 3  -JY     Taking care of personal grooming (such as brushing teeth) 3  -JY     Eating meals 4  -JY     AM-PAC 6 Clicks Score (OT) 18  -JY       Row Name 08/31/24 1156 08/31/24 0830       How much help from another person do you currently need...    Turning from your back to your side while in flat bed without using bedrails? 3  -HW 3  -OD    Moving from lying on back to sitting on the side of a flat bed without bedrails? 2  -HW 2  -OD    Moving to and from a bed to a chair (including a wheelchair)? 3  -HW 2  -OD    Standing up from a chair using your arms (e.g., wheelchair, bedside chair)? 2  -HW 2  -OD    Climbing 3-5 steps with a railing? 2  -HW 2  -OD    To walk in hospital room? 3  -HW 2  -OD     AM-PAC 6 Clicks Score (PT) 15  -HW 13  -OD    Highest Level of Mobility Goal 4 --> Transfer to chair/commode  -HW 4 --> Transfer to chair/commode  -OD      Row Name 08/31/24 1309 08/31/24 1156       Functional Assessment    Outcome Measure Options AM-PAC 6 Clicks Daily Activity (OT)  -RAFAELY AM-PAC 6 Clicks Basic Mobility (PT)  -              User Key  (r) = Recorded By, (t) = Taken By, (c) = Cosigned By      Initials Name Provider Type    OD Deya Johnson, RN Registered Nurse    Veda Thomas, OT Occupational Therapist    HW Deanna Luque, PT Physical Therapist                    Occupational Therapy Education       Title: PT OT SLP Therapies (In Progress)       Topic: Occupational Therapy (In Progress)       Point: ADL training (In Progress)       Description:   Instruct learner(s) on proper safety adaptation and remediation techniques during self care or transfers.   Instruct in proper use of assistive devices.                  Learning Progress Summary             Patient Acceptance, E,D, NR by SLIME at 8/31/2024 0950                         Point: Home exercise program (Not Started)       Description:   Instruct learner(s) on appropriate technique for monitoring, assisting and/or progressing therapeutic exercises/activities.                  Learner Progress:  Not documented in this visit.              Point: Precautions (In Progress)       Description:   Instruct learner(s) on prescribed precautions during self-care and functional transfers.                  Learning Progress Summary             Patient Acceptance, E,D, NR by SLIME at 8/31/2024 0950                         Point: Body mechanics (In Progress)       Description:   Instruct learner(s) on proper positioning and spine alignment during self-care, functional mobility activities and/or exercises.                  Learning Progress Summary             Patient Acceptance, E,D, NR by SLIME at 8/31/2024 0950                                         User Key        Initials Effective Dates Name Provider Type Discipline    SLIME 06/16/21 -  Veda Segura, OT Occupational Therapist OT                  OT Recommendation and Plan  Planned Therapy Interventions (OT): activity tolerance training, adaptive equipment training, BADL retraining, functional balance retraining, neuromuscular control/coordination retraining, occupation/activity based interventions, patient/caregiver education/training, ROM/therapeutic exercise, strengthening exercise, transfer/mobility retraining  Therapy Frequency (OT): daily  Plan of Care Review  Plan of Care Reviewed With: patient  Progress: no change (OT IE)  Outcome Evaluation: OT evaluation completed. Pt presents with decreased I in ADLs, related t/fs, mobility compared to PLOF limited by decreased activity tolerance, impaired balance, muscle weakness at BUEs, noted sensory deficits at B feet, fatigue with more dynamic demands and increased difficulty with distal reach toward most distal aspects of feet. Initiated education on potential benefits of LH AE to assist with LBD with plan for progressive training in future sessions. Pt req'd mod A x 2 for STS at various seated surfaces and min A for fxl mobility in room w/ FWW. Pt req'd CGA for d/d socks, min A for UBD and upward of CGA for toileting tasks. Facilitated BSC over toilet for height extension and BUE support for anticipated greater I in toilet t/fs. Pt is below occupational performance baseline and has limited support at home, would recommend IPOT POC and IRF at d/c when medically ready.     Time Calculation:   Evaluation Complexity (OT)  Review Occupational Profile/Medical/Therapy History Complexity: expanded/moderate complexity  Assessment, Occupational Performance/Identification of Deficit Complexity: 3-5 performance deficits  Clinical Decision Making Complexity (OT): detailed assessment/moderate complexity  Overall Complexity of Evaluation (OT): moderate complexity     Time Calculation-  OT       Row Name 08/31/24 1311             Time Calculation- OT    OT Start Time 0950  -JY      OT Received On 08/31/24  -JY      OT Goal Re-Cert Due Date 09/10/24  -JY         Timed Charges    83766 - OT Self Care/Mgmt Minutes 16  -JY         Untimed Charges    OT Eval/Re-eval Minutes 51  -JY         Total Minutes    Timed Charges Total Minutes 16  -JY      Untimed Charges Total Minutes 51  -JY       Total Minutes 67  -JY                User Key  (r) = Recorded By, (t) = Taken By, (c) = Cosigned By      Initials Name Provider Type    Veda Thomas OT Occupational Therapist                  Therapy Charges for Today       Code Description Service Date Service Provider Modifiers Qty    24482096557 HC OT SELF CARE/MGMT/TRAIN EA 15 MIN 8/31/2024 Veda Segura OT GO 1    24529300051 HC OT EVAL MOD COMPLEXITY 4 8/31/2024 Veda Segura OT GO 1                 Veda Segura OT  8/31/2024

## 2024-08-31 NOTE — PAYOR COMM NOTE
"Guicho Blanco \"Rad\" (83 y.o. Male)       Date of Birth   1940    Social Security Number       Address   [facility] [address]  [place] [address]    Home Phone   [phone]    MRN   [id number]       Restorationism   Muslim    Marital Status                               Admission Date   8/30/24    Admission Type   Emergency    Admitting Provider   Berto Liu MD    Attending Provider   Berto Liu MD    Department, Room/Bed   [facility] 3H, S368/1       Discharge Date       Discharge Disposition       Discharge Destination                                 Attending Provider: Berto Liu MD    Allergies: Divalproex Sodium (Migraine) [Valproic Acid], Cymbalta [Duloxetine Hcl], Duloxetine, Metoprolol, Neurontin [Gabapentin]    Isolation: None   Infection: None   Code Status: CPR    Ht: 182.9 cm (72\")   Wt: 99.8 kg (220 lb)    Admission Cmt: None   Principal Problem: Cellulitis [L03.90]                   Active Insurance as of 8/30/2024       Primary Coverage       Payor Plan Insurance Group Employer/Plan Group    ANTHEM MEDICARE REPLACEMENT ANTHEM MEDICARE ADVANTAGE [id number]       Payor Plan Address Payor Plan Phone Number Payor Plan Fax Number Effective Dates    [address] [phone]  1/1/2024 - None Entered    [facility] [address]         Subscriber Name Subscriber Birth Date Member ID       GUICHO BLANCO 1940 [id number]                     Emergency Contacts        (Rel.) Home Phone Work Phone Mobile Phone    JayeSAMEERA Sylwia (Daughter) [phone] -- [phone]    Sandra Blanco (Spouse) [phone] -- [phone]    Ashley Mejia (Grandchild) -- -- [phone]              History & Physical    No notes of this type exist for this encounter.          Physician Progress Notes (all)        Christian Schwartz MD at 08/31/24 0751                Orthopaedic Surgery Progress Note    CC: Right leg " "redness      Subjective     Interval History:   Patient admitted overnight for worsening cellulitis of his right leg lateral to his total knee incision.  Started on Rocephin and vancomycin.  Patient tells me his pain is about the same overall.      ROS: Denies fever, chills, nausea or vomiting    Objective     Vital Signs:  Temp (24hrs), Av.1 °F (36.7 °C), Min:98 °F (36.7 °C), Max:98.3 °F (36.8 °C)    /82 (BP Location: Left arm, Patient Position: Lying)   Pulse 94   Temp 98 °F (36.7 °C) (Oral)   Resp 18   Ht 182.9 cm (72\")   Wt 99.8 kg (220 lb)   SpO2 98%   BMI 29.84 kg/m²       Physical Exam:  Erythema slightly improved.  Induration slightly improved.  Continues to have painless passive range of motion of the knee.  Skin over the erythematous portion is warmer than the rest of his skin.    Assessment and Plan:  3 weeks out status post right TKA on 2024.  Patient readmitted for new onset cellulitis of his right knee lateral to his knee incision.  Patient demonstrating no signs of septic arthritis at this point.  Recommend nonoperative treatment for now.  Patient failed outpatient treatment with oral antibiotics.  Admitted for IV antibiotics.  Continue to monitor closely.  ID consult.  Will reexamine tomorrow morning.  PT hopefully today.    Christian Schwartz MD  24  07:52 EDT          Electronically signed by Christian Schwartz MD at 24 0754          Consult Notes (all)        Christian Schwartz MD at 24                  Orthopaedic Consult Note    Patient Care Team:  Mitchel Culver MD as PCP - General (Family Medicine)  Mitchel Culver MD as PCP - Family Medicine  Hector Urrutia MD as Consulting Physician (Cardiology)    Chief complaint  Right leg redness    Subjective .     History of present illness:    Patient well-known to me.  He underwent robot-assisted right TKA on 2024.  Postoperatively the patient was transferred to [facility]" [place].  He sustained a fall while at [facility] requiring ER visit where the primary focus was his head.  According to the patient who saw me in the office on 8/27/2024, he was started on oral antibiotics to [facility] just before being discharged home.  He was brought into the office by his daughter.  He denies fever chills nausea vomiting.  We did continue his Keflex and doxycycline.  We called the patient's daughter to check on his status today and she was notified by him that he was starting to have tenderness laterally on the knee and several pictures were sent to the office which per our review appears to show worsening erythema.  We felt that it was appropriate to bring the patient in for IV antibiotics for his worsening cellulitis to prevent a worsening infection and spread of the infection to his prosthetic joint.  Of note, patient underwent a second toe amputation on this right lower extremity after cellulitis in the past.  He is known to the [facility] practice.    Review of Systems:    All systems reviewed are negative except for what is stated in the HPI     History  Family History   Problem Relation Age of Onset    Atrial fibrillation Mother     Dementia Mother     Lung cancer Father      Social History     Socioeconomic History    Marital status:    Tobacco Use    Smoking status: Never     Passive exposure: Past    Smokeless tobacco: Never   Vaping Use    Vaping status: Never Used   Substance and Sexual Activity    Alcohol use: No    Drug use: No    Sexual activity: Defer     Past Surgical History:   Procedure Laterality Date    ABLATION OF DYSRHYTHMIC FOCUS      APPENDECTOMY      CARDIAC CATHETERIZATION N/A 10/05/2017    Procedure: Left Heart Cath;  Surgeon: Hector Urrutia MD;  Location: [facility] CATH INVASIVE LOCATION;  Service:     CATARACT EXTRACTION Bilateral     CHOLECYSTECTOMY      Remote    COCHLEAR IMPLANT REVISION  05/2019    COLONOSCOPY      PROSTATE SURGERY      SHOULDER SURGERY Right      SKIN BIOPSY      TOE AMPUTATION Right     2nd toe - Right Foot    TONSILLECTOMY      TOTAL KNEE ARTHROPLASTY Right 8/7/2024    Procedure: TOTAL KNEE ARTHROPLASTY WITH ASHLEY ROBOT RIGHT;  Surgeon: Christian Schwartz MD;  Location: [facility];  Service: Robotics - Ortho;  Laterality: Right;     Past Medical History:   Diagnosis Date    Arthritis     Atrial fibrillation     BPH (benign prostatic hypertrophy)     Cataract     Cellulitis     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     bilateral    Coronary artery disease     Full dentures     Gall stones     GERD (gastroesophageal reflux disease)     Hearing loss     Hiatal hernia     Hyperlipidemia     Hypertension     Peripheral neuropathy     Peripheral vascular disease     Seasonal allergies     Skin cancer     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     Duloxetine Unknown (See Comments)    Metoprolol Other (See Comments)     UNKNOWN. Pt does not remember this being allergy    Neurontin [Gabapentin] Other (See Comments)     Lethargy & fatigue       Current Facility-Administered Medications:     [START ON 8/31/2024] aspirin tablet 325 mg, 325 mg, Oral, Daily, Berto Liu MD    docusate sodium (COLACE) capsule 100 mg, 100 mg, Oral, BID PRN, Berto Liu MD    flecainide (TAMBOCOR) tablet 50 mg, 50 mg, Oral, Q12H, Berto iLu MD    [START ON 8/31/2024] isosorbide mononitrate (IMDUR) 24 hr tablet 30 mg, 30 mg, Oral, Daily, Berto Liu MD    [START ON 8/31/2024] meloxicam (MOBIC) tablet 7.5 mg, 7.5 mg, Oral, Daily, Berto Liu MD    nortriptyline (PAMELOR) capsule 20 mg, 20 mg, Oral, Nightly, Berto Liu MD    [START ON 8/31/2024] pantoprazole  (PROTONIX) EC tablet 40 mg, 40 mg, Oral, Q AM, Berto Liu MD    polyethylene glycol (MIRALAX) packet 17 g, 17 g, Oral, Daily PRN, Berto Liu MD    pravastatin (PRAVACHOL) tablet 20 mg, 20 mg, Oral, Daily, Berto Liu MD    sodium chloride 0.9 % flush 10 mL, 10 mL, Intravenous, PRN, [place], Sincere, DO    vancomycin 2250 mg/500 mL 0.9% NS IVPB ([facility]), 22 mg/kg, Intravenous, Once, [place], Sincere, DO, 2,250 mg at 08/30/24 2037    Objective     Vital Signs   Temp:  [98.3 °F (36.8 °C)] 98.3 °F (36.8 °C)  Heart Rate:  [] 98  Resp:  [14-15] 15  BP: (117-150)/(68-80) 129/71      Physical Exam:   Right lower extremity: Patient has erythema and mild induration laterally.  His skin incisions are free of induration or erythema or drainage.  He has painless active and passive range of motion of the knee.  There is no streaking noted.  When compared to his exam from 8/27/2024, his erythema does appear to be worse.  There is no drainage noted.    Labs:  Lab Results (last 24 hours)       Procedure Component Value Units Date/Time    Sedimentation Rate [[id number]]  (Abnormal) Collected: 08/30/24 1638    Specimen: Blood Updated: 08/30/24 1844     Sed Rate 25 mm/hr     Blood Culture - Blood, Arm, Right [[id number]] Collected: 08/30/24 1800    Specimen: Blood from Arm, Right Updated: 08/30/24 1827    Procalcitonin [[id number]]  (Normal) Collected: 08/30/24 1638    Specimen: Blood Updated: 08/30/24 1731     Procalcitonin 0.09 ng/mL     Narrative:      As a Marker for Sepsis (Non-Neonates):    1. <0.5 ng/mL represents a low risk of severe sepsis and/or septic shock.  2. >2 ng/mL represents a high risk of severe sepsis and/or septic shock.    As a Marker for Lower Respiratory Tract Infections that require antibiotic therapy:    PCT on Admission    Antibiotic Therapy       6-12 Hrs later    >0.5                Strongly Recommended  >0.25 - <0.5        Recommended   0.1 - 0.25          Discouraged               "Remeasure/reassess PCT  <0.1                Strongly Discouraged     Remeasure/reassess PCT    As 28 day mortality risk marker: \"Change in Procalcitonin Result\" (>80% or <=80%) if Day 0 (or Day 1) and Day 4 values are available. Refer to http://[email or web address]-pct-calculator.com    Change in PCT <=80%  A decrease of PCT levels below or equal to 80% defines a positive change in PCT test result representing a higher risk for 28-day all-cause mortality of patients diagnosed with severe sepsis for septic shock.    Change in PCT >80%  A decrease of PCT levels of more than 80% defines a negative change in PCT result representing a lower risk for 28-day all-cause mortality of patients diagnosed with severe sepsis or septic shock.       Comprehensive Metabolic Panel [[id number]]  (Abnormal) Collected: 08/30/24 1638    Specimen: Blood Updated: 08/30/24 1727     Glucose 103 mg/dL      BUN 12 mg/dL      Creatinine 1.13 mg/dL      Sodium 136 mmol/L      Potassium 4.1 mmol/L      Chloride 101 mmol/L      CO2 22.0 mmol/L      Calcium 9.5 mg/dL      Total Protein 6.7 g/dL      Albumin 4.2 g/dL      ALT (SGPT) 20 U/L      AST (SGOT) 32 U/L      Alkaline Phosphatase 187 U/L      Total Bilirubin 0.5 mg/dL      Globulin 2.5 gm/dL      Comment: Calculated Result        A/G Ratio 1.7 g/dL      BUN/Creatinine Ratio 10.6     Anion Gap 13.0 mmol/L      eGFR 64.5 mL/min/1.73     Narrative:      GFR Normal >60  Chronic Kidney Disease <60  Kidney Failure <15    The GFR formula is only valid for adults with stable renal function between ages 18 and 70.    C-reactive Protein [[id number]]  (Abnormal) Collected: 08/30/24 1638    Specimen: Blood Updated: 08/30/24 1727     C-Reactive Protein 0.80 mg/dL     Magnesium [[id number]]  (Normal) Collected: 08/30/24 1638    Specimen: Blood Updated: 08/30/24 1727     Magnesium 1.9 mg/dL     Lactic Acid, Plasma [[id number]]  (Normal) Collected: 08/30/24 1638    Specimen: Blood Updated: 08/30/24 1718     Lactate 1.8 " mmol/L      Comment: Falsely depressed results may occur on samples drawn from patients receiving N-Acetylcysteine (NAC) or Metamizole.       Blood Culture - Blood, Arm, Right [[id number]] Collected: 08/30/24 1638    Specimen: Blood from Arm, Right Updated: 08/30/24 1701    [place] Draw [[id number]] Collected: 08/30/24 1638    Specimen: Blood Updated: 08/30/24 1701    Narrative:      The following orders were created for panel order [place] Draw.  Procedure                               Abnormality         Status                     ---------                               -----------         ------                     Green Top (Gel)[[id number]]                                  Final result               Lavender Top[[id number]]                                     Final result               Gold Top - SST[[id number]]                                   Final result               Toussaint Top[[id number]]                                         Final result               Light Blue Top[[id number]]                                   Final result                 Please view results for these tests on the individual orders.    Green Top (Gel) [[id number]] Collected: 08/30/24 1638    Specimen: Blood Updated: 08/30/24 1701     Extra Tube Hold for add-ons.     Comment: Auto resulted.       Lavender Top [[id number]] Collected: 08/30/24 1638    Specimen: Blood Updated: 08/30/24 1701     Extra Tube hold for add-on     Comment: Auto resulted       Gold Top - SST [[id number]] Collected: 08/30/24 1638    Specimen: Blood Updated: 08/30/24 1701     Extra Tube Hold for add-ons.     Comment: Auto resulted.       Toussaint Top [[id number]] Collected: 08/30/24 1638    Specimen: Blood Updated: 08/30/24 1701     Extra Tube Hold for add-ons.     Comment: Auto resulted.       Light Blue Top [[id number]] Collected: 08/30/24 1638    Specimen: Blood Updated: 08/30/24 1701     Extra Tube Hold for add-ons.     Comment: Auto resulted       CBC & Differential  [[id number]]  (Abnormal) Collected: 08/30/24 1638    Specimen: Blood Updated: 08/30/24 1700    Narrative:      The following orders were created for panel order CBC & Differential.  Procedure                               Abnormality         Status                     ---------                               -----------         ------                     CBC Auto Differential[[id number]]        Abnormal            Final result                 Please view results for these tests on the individual orders.    CBC Auto Differential [[id number]]  (Abnormal) Collected: 08/30/24 1638    Specimen: Blood Updated: 08/30/24 1700     WBC 6.98 10*3/mm3      RBC 4.22 10*6/mm3      Hemoglobin 11.5 g/dL      Hematocrit 36.1 %      MCV 85.5 fL      MCH 27.3 pg      MCHC 31.9 g/dL      RDW 14.6 %      RDW-SD 45.1 fl      MPV 9.8 fL      Platelets 314 10*3/mm3      Neutrophil % 52.8 %      Lymphocyte % 28.4 %      Monocyte % 12.2 %      Eosinophil % 5.0 %      Basophil % 1.0 %      Immature Grans % 0.6 %      Neutrophils, Absolute 3.69 10*3/mm3      Lymphocytes, Absolute 1.98 10*3/mm3      Monocytes, Absolute 0.85 10*3/mm3      Eosinophils, Absolute 0.35 10*3/mm3      Basophils, Absolute 0.07 10*3/mm3      Immature Grans, Absolute 0.04 10*3/mm3      nRBC 0.0 /100 WBC             Imaging:  We have reviewed and interpreted an x-ray of the patient's right knee from 8/30/2024.  There does not appear to be any fractures or dislocations of the patient's knee implant.  There are vascular calcifications noted throughout the radiograph.  No periosteal reactions are noted.        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  Patient is recovering appropriately from his knee replacement but has worsening erythema along the lateral aspect of the knee.  Erythema is consistent with cellulitis and not septic arthritis at this point.   Agree with IV antibiotics.  The area of erythema and induration has been marked by me with a sharpie this evening.  We will follow this during his hospitalization.  Laboratory studies are reassuring with regards to significant infection.  Will consult infectious disease service in the morning.  We will start physical therapy.  Continue with aspirin for DVT prophylaxis.  Weight-bear as tolerated on the right lower extremity.      Cellulitis    PAF (paroxysmal atrial fibrillation)    Coronary artery disease involving native coronary artery of native heart without angina pectoris    Mixed hyperlipidemia        I discussed the patients findings and my recommendations with patient and family    Christian Schwartz MD  08/30/24  20:57 EDT               Electronically signed by Christian Schwartz MD at 08/30/24 3080

## 2024-08-31 NOTE — PROGRESS NOTES
"      Orthopaedic Surgery Progress Note    CC: Right leg redness      Subjective     Interval History:   Patient admitted overnight for worsening cellulitis of his right leg lateral to his total knee incision.  Started on Rocephin and vancomycin.  Patient tells me his pain is about the same overall.      ROS: Denies fever, chills, nausea or vomiting    Objective     Vital Signs:  Temp (24hrs), Av.1 °F (36.7 °C), Min:98 °F (36.7 °C), Max:98.3 °F (36.8 °C)    /82 (BP Location: Left arm, Patient Position: Lying)   Pulse 94   Temp 98 °F (36.7 °C) (Oral)   Resp 18   Ht 182.9 cm (72\")   Wt 99.8 kg (220 lb)   SpO2 98%   BMI 29.84 kg/m²       Physical Exam:  Erythema slightly improved.  Induration slightly improved.  Continues to have painless passive range of motion of the knee.  Skin over the erythematous portion is warmer than the rest of his skin.    Assessment and Plan:  3 weeks out status post right TKA on 2024.  Patient readmitted for new onset cellulitis of his right knee lateral to his knee incision.  Patient demonstrating no signs of septic arthritis at this point.  Recommend nonoperative treatment for now.  Patient failed outpatient treatment with oral antibiotics.  Admitted for IV antibiotics.  Continue to monitor closely.  ID consult.  Will reexamine tomorrow morning.  PT hopefully today.    Christian Schwartz MD  24  07:52 EDT        "

## 2024-08-31 NOTE — H&P
Patient Name: Guicho Blanco  MRN: [id number]  : 1940  DOS: 2024    Attending: Sincere Mari DO    Primary Care Provider: Mitchel Culver MD      Chief complaint:  Right leg redness. Cellulitis.    Subjective   Patient is a pleasant 83 y.o. male who is known to me from recent hospitalization to [facility] for a total knee arthroplasty.  Surgery took place on 2024.  He was transferred to [facility] on on 8/10/2024 he stayed there about 10 days.  Progressed well with his rehab prior to being discharged home.    He was noted to have redness close to his right knee incision and was started on antibiotics.  Antibiotics were changed included Keflex and doxycycline.  With marginal improvement and possible worsening of redness he presented for admission.  No fever or chills.  No nausea or vomiting.        Allergies:  Allergies   Allergen Reactions    Divalproex Sodium (Migraine) [Valproic Acid] Other (See Comments)     Weak, lethargy and got worse    Cymbalta [Duloxetine Hcl] Mental Status Change     Depression, thoughts of Suicide.     Duloxetine Unknown (See Comments)    Metoprolol Other (See Comments)     UNKNOWN. Pt does not remember this being allergy    Neurontin [Gabapentin] Other (See Comments)     Lethargy & fatigue       Meds:  Aspirin, Tylenol, multivitamin, Keflex, vitamin D3, co-Q10, Restasis eyedrops, Colace, doxycycline, flecainide, Imdur, Pamelor, Prilosec OTC, tramadol, Dulcolax.  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    TIA (transient ischemic attack)     2 times    Wears glasses      Past Surgical History:   Procedure Laterality Date    ABLATION OF DYSRHYTHMIC FOCUS      APPENDECTOMY      CARDIAC CATHETERIZATION N/A 10/05/2017    Procedure: Left Heart Cath;  Surgeon: Hecotr Urrutia MD;  Location:  [facility];  Service:     CATARACT EXTRACTION Bilateral     CHOLECYSTECTOMY      Remote    COCHLEAR IMPLANT REVISION  05/2019    COLONOSCOPY      PROSTATE SURGERY      SHOULDER SURGERY Right     SKIN BIOPSY      TOE AMPUTATION Right     2nd toe - Right Foot    TONSILLECTOMY      TOTAL KNEE ARTHROPLASTY Right 8/7/2024    Procedure: TOTAL KNEE ARTHROPLASTY WITH ASHLEY ROBOT RIGHT;  Surgeon: Christian Schwartz MD;  Location:  [facility] OR;  Service: Robotics - Ortho;  Laterality: Right;     Family History   Problem Relation Age of Onset    Atrial fibrillation Mother     Dementia Mother     Lung cancer Father      Social History     Tobacco Use    Smoking status: Never     Passive exposure: Past    Smokeless tobacco: Never   Vaping Use    Vaping status: Never Used   Substance Use Topics    Alcohol use: No    Drug use: No       Review of Systems  Pertinent items are noted in HPI    Vital Signs  /80   Pulse 99   Temp 98.3 °F (36.8 °C) (Oral)   Resp 14   Ht 182.9 cm (72\")   Wt 99.8 kg (220 lb)   SpO2 98%   BMI 29.84 kg/m²     Physical Exam:    General Appearance:    Alert, cooperative, in no acute distress   Head:    Normocephalic, without obvious abnormality, atraumatic   Eyes:            Lids and lashes normal, conjunctivae and sclerae normal, no   icterus, no pallor, corneas clear,    Ears:    Ears appear intact with no abnormalities noted   Throat:   No oral lesions, no thrush, oral mucosa moist   Neck:   No adenopathy, supple, trachea midline, no thyromegaly         Lungs:     Clear to " auscultation,respirations regular, even and                   unlabored    Heart:    Regular rhythm and normal rate, normal S1 and S2, no            murmur, no gallop   Abdomen:     Normal bowel sounds, no masses, no organomegaly, soft,        nontender, nondistended, no guarding, no rebound                 tenderness   Genitalia:    Deferred   Extremities: Mild erythema lateral aspect of right knee incision.  No drainage.  Good range of motion of the knee without pain.   Pulses:   Pulses palpable and equal bilaterally   Skin:   No bleeding, bruising or rash   Neurologic:   Cranial nerves 2 - 12 grossly intact, no gross deficit.      I reviewed the patient's new clinical results.       Results from last 7 days   Lab Units 08/30/24  1638   WBC 10*3/mm3 6.98   HEMOGLOBIN g/dL 11.5*   HEMATOCRIT % 36.1*   PLATELETS 10*3/mm3 314     Results from last 7 days   Lab Units 08/30/24  1638   SODIUM mmol/L 136   POTASSIUM mmol/L 4.1   CHLORIDE mmol/L 101   CO2 mmol/L 22.0   BUN mg/dL 12   CREATININE mg/dL 1.13   CALCIUM mg/dL 9.5   BILIRUBIN mg/dL 0.5   ALK PHOS U/L 187*   ALT (SGPT) U/L 20   AST (SGOT) U/L 32   GLUCOSE mg/dL 103*     Lab Results   Component Value Date    HGBA1C 5.20 07/30/2024           Assessment and Plan:       Cellulitis    PAF (paroxysmal atrial fibrillation)    Coronary artery disease involving native coronary artery of native heart without angina pectoris    Mixed hyperlipidemia      Plan  Admit for further management.  Empiric antibiotics including ceftriaxone and vancomycin initially.  Monitor for clinical response.  Infectious ease consultation to follow.    HTN, Hyperlipidemia, afib, CAD  - Continue home Tambocor, Imdur and statin  - Monitor BP   - Holding parameters for BP meds  - Labetalol PRN for SBP>170    Physical therapy consultation.  DVT prophylax with p.o. aspirin  Bowel regimen.  Pain medication as needed.      Discussed with patient and his daughter is his power of .    Dragon  disclaimer:  Part of this encounter note is an electronic transcription/translation of spoken language to printed text. The electronic translation of spoken language may permit erroneous, or at times, nonsensical words or phrases to be inadvertently transcribed; Although I have reviewed the note for such errors, some may still exist.    Berto Liu MD  08/30/24  20:38 EDT

## 2024-08-31 NOTE — PLAN OF CARE
Goal Outcome Evaluation:  Plan of Care Reviewed With: patient        Progress: no change  Outcome Evaluation: Pt presents with deficits in balanace, strength, R knee ROM, and sensation impacting his functional mobility. Pt required Min A to amb 80' secondary to unsteadiness noted. Short, shuffling steps observed with limited B knee extension in stance phase. Pt required Mod A x2 to perform STS secondary to B LE weakness. Limited support at home noted. Recommend d/c to IRF to promote increased independence with functional mobility and decrease risk for falls.      Anticipated Discharge Disposition (PT): inpatient rehabilitation facility

## 2024-08-31 NOTE — PLAN OF CARE
Knee red / mildly swelling. Free of drainage.. Patient denies pain / discomfort at this time. VSS on RA. SR in 90s on cardiac mx. Lung sound diminished at RUL. Current IS level 1500 ml. Patient has been instructed to use it while awake. Urine tea colored. Voids small amount frequently. Bladder volume periodically  assessed.

## 2024-08-31 NOTE — PROGRESS NOTES
"IM progress note      Guicho Blanco  [id number]  1940     LOS: 0 days     Attending: Berto Liu MD    Primary Care Provider: Mitchel Culver MD      Chief Complaint/Reason for visit:    Chief Complaint   Patient presents with    Post-op Problem       Subjective   Doing well overall.  Good pain control.  No nausea, vomiting, or shortness of breath.    Objective        Visit Vitals  /70   Pulse 94   Temp 98 °F (36.7 °C) (Oral)   Resp 18   Ht 182.9 cm (72\")   Wt 99.8 kg (220 lb)   SpO2 98%   BMI 29.84 kg/m²     Temp (24hrs), Av.1 °F (36.7 °C), Min:98 °F (36.7 °C), Max:98.3 °F (36.8 °C)      Intake/Output:    Intake/Output Summary (Last 24 hours) at 2024 1425  Last data filed at 2024 0453  Gross per 24 hour   Intake 100 ml   Output 575 ml   Net -475 ml        Physical Therapy:     Signed         Goal Outcome Evaluation:  Plan of Care Reviewed With: patient  Progress: no change  Outcome Evaluation: Pt presents with deficits in balanace, strength, R knee ROM, and sensation impacting his functional mobility. Pt required Min A to amb 80' secondary to unsteadiness noted. Short, shuffling steps observed with limited B knee extension in stance phase. Pt required Mod A x2 to perform STS secondary to B LE weakness. Limited support at home noted. Recommend d/c to IRF to promote increased independence with functional mobility and decrease risk for falls.        Anticipated Discharge Disposition (PT): inpatient rehabilitation facility              Physical Exam:     General Appearance:    Alert, cooperative, in no acute distress   Head:    Normocephalic, without obvious abnormality, atraumatic    Lungs:     Normal effort, symmetric chest rise,  clear to      auscultation bilaterally              Heart:    Regular rhythm and normal rate, normal S1 and S2    Abdomen:     Normal bowel sounds, no masses, no organomegaly, soft        non-tender, non-distended, no guarding, no rebound     "            tenderness   Extremities: Decreased erythema around right knee.  No drainage from incision.  No clubbing, cyanosis or edema.  No deformities.    Pulses:   Pulses palpable and equal bilaterally   Skin:   No bleeding, bruising or rash          Results Review:     I reviewed the patient's new clinical results.   Results from last 7 days   Lab Units 08/30/24  1638   WBC 10*3/mm3 6.98   HEMOGLOBIN g/dL 11.5*   HEMATOCRIT % 36.1*   PLATELETS 10*3/mm3 314     Results from last 7 days   Lab Units 08/30/24  1638   SODIUM mmol/L 136   POTASSIUM mmol/L 4.1   CHLORIDE mmol/L 101   CO2 mmol/L 22.0   BUN mg/dL 12   CREATININE mg/dL 1.13   CALCIUM mg/dL 9.5   BILIRUBIN mg/dL 0.5   ALK PHOS U/L 187*   ALT (SGPT) U/L 20   AST (SGOT) U/L 32   GLUCOSE mg/dL 103*     I reviewed the patient's new imaging including images and reports.    All medications reviewed.   aspirin, 325 mg, Oral, Daily  cefTRIAXone, 2,000 mg, Intravenous, Q24H  flecainide, 50 mg, Oral, Q12H  isosorbide mononitrate, 30 mg, Oral, Daily  meloxicam, 7.5 mg, Oral, Daily  nortriptyline, 20 mg, Oral, Nightly  pantoprazole, 40 mg, Oral, Q AM  pravastatin, 20 mg, Oral, Daily  sodium chloride, 10 mL, Intravenous, Q12H      docusate sodium, 100 mg, BID PRN  Pharmacy to dose vancomycin, , Continuous PRN  polyethylene glycol, 17 g, Daily PRN  sodium chloride, 10 mL, PRN  sodium chloride, 10 mL, PRN  sodium chloride, 40 mL, PRN        Assessment & Plan       Cellulitis    PAF (paroxysmal atrial fibrillation)    Coronary artery disease involving native coronary artery of native heart without angina pectoris    Mixed hyperlipidemia         Plan  1. PT/OT, continue.  Inpatient rehab recommended.  2. Pain control-prns   3. IS-encouraged  4. DVT proph-aspirin.  5. Bowel regimen  6. Monitor post-op labs  7. DC planning    Continue antibiotics per ID.  Monitor for adverse drug reactions.    HTN, Hyperlipidemia, afib, CAD  - Continue home Tambocor, Imdur and statin  -  Monitor BP   - Holding parameters for BP meds  - Labetalol PRN for SBP>170             Berto Liu MD  08/31/24  14:25 EDT

## 2024-08-31 NOTE — PLAN OF CARE
Goal Outcome Evaluation:  SLP evaluation completed. Will continue to address swallow function, discuss swallow strategies, and monitor diet tolerance. Please see note for further details and recommendations.    Plan of Care Reviewed With: patient        Progress: improving         Anticipated Discharge Disposition (SLP): home with assist          SLP Swallowing Diagnosis: mild, oral dysphagia, pharyngeal dysphagia (08/31/24 6309)

## 2024-08-31 NOTE — PROGRESS NOTES
"Pharmacy Consult-Vancomycin Dosing  Guicho Blanco is a  83 y.o. male receiving vancomycin therapy.     Indication: SSTI  Consulting Provider:LEEROY Espino  ID Consult: no    Goal AUC: 400 - 600 mg/L*hr    Current Antimicrobial Therapy  Anti-Infectives (From admission, onward)      Ordered     Dose/Rate Route Frequency Start Stop    08/31/24 1322  cefTRIAXone (ROCEPHIN) 2,000 mg in sodium chloride 0.9 % 100 mL MBP        Ordering Provider: No García APRN    2,000 mg  200 mL/hr over 30 Minutes Intravenous Every 24 Hours 08/31/24 1900 09/07/24 1859    08/31/24 1322  Pharmacy to dose vancomycin        Ordering Provider: No García APRN     Does not apply Continuous PRN 08/31/24 1321 09/07/24 1320    08/30/24 1854  vancomycin 2250 mg/500 mL 0.9% NS IVPB ([facility])        Ordering Provider: Sincere Mari DO    22 mg/kg × 99.8 kg  over 135 Minutes Intravenous Once 08/1940 08/30/24 2252    08/30/24 1854  cefTRIAXone (ROCEPHIN) 2,000 mg in sodium chloride 0.9 % 100 mL MBP        Ordering Provider: Sincere Mari DO    2,000 mg  200 mL/hr over 30 Minutes Intravenous Once 08/30/24 1910 08/30/24 2008            Allergies  Allergies as of 08/30/2024 - Reviewed 08/30/2024   Allergen Reaction Noted    Divalproex sodium (migraine) [valproic acid] Other (See Comments) 11/22/2021    Cymbalta [duloxetine hcl] Mental Status Change 04/28/2016    Duloxetine Unknown (See Comments) 10/04/2017    Metoprolol Other (See Comments) 10/02/2017    Neurontin [gabapentin] Other (See Comments) 10/02/2017       Labs    Results from last 7 days   Lab Units 08/30/24  1638   BUN mg/dL 12   CREATININE mg/dL 1.13       Results from last 7 days   Lab Units 08/30/24  1638   WBC 10*3/mm3 6.98     Ht - 182.9 cm (72\")  Wt - 99.8 kg (220 lb)    Estimated Creatinine Clearance: 60.6 mL/min (by C-G formula based on SCr of 1.13 mg/dL).    I/O last 3 completed shifts:  In: 100 [IV Piggyback:100]  Out: 575 [Urine:575]    Microbiology " and Radiology  Microbiology Results (last 10 days)       ** No results found for the last 240 hours. **            Reported Vancomycin Levels                         Insight[employer] AUC Calculation:    Current AUC:   -- mg/L*hr    Predicted Steady State AUC on Current Dose: -- mg/L*hr  _________________________________    Predicted Steady State AUC on New Dose:    mg/L*hr    Assessment/Plan:  Pharmacy to dose vancomycin for SSTI.  Goal AUC: 400-600 mg/L*hr  Patient received a loading dose of vancomycin 2250 mg at 2037 on 8/30  Maintenance dose will be vancomycin 1500 mg every 24 hours starting at 1800 on 9/1  Assess clearance by obtaining vancomycin random level on 9/2 at 0600, prior to the 4th dose at 0900.  Monitor cultures and sensitivities, renal function and clinical status.  Pharmacy will continue to follow and adjust regimen as necessary.     Wai Hector RPH  8/31/2024  14:36 EDT

## 2024-08-31 NOTE — ED NOTES
Guicho Blanco    Nursing Report ED to Floor:  Mental status: alert and oriented x4  Ambulatory status: x2 w walker  Oxygen Therapy:  room air  Cardiac Rhythm: normal sinus  Admitted from: ed/home  Safety Concerns:  fall risk  Social Issues: na  ED Room #:  29    ED Nurse Phone Extension - [phone] or may call [phone].      HPI:   Chief Complaint   Patient presents with    Post-op Problem       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     bilateral    Coronary artery disease     Full dentures     Gall stones     GERD (gastroesophageal reflux disease)     Hearing loss     Hiatal hernia     Hyperlipidemia     Hypertension     Peripheral neuropathy     Peripheral vascular disease     Seasonal allergies     Skin cancer     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    TIA (transient ischemic attack)     2 times    Wears glasses         Past Surgical History:  Past Surgical History:   Procedure Laterality Date    ABLATION OF DYSRHYTHMIC FOCUS      APPENDECTOMY      CARDIAC CATHETERIZATION N/A 10/05/2017    Procedure: Left Heart Cath;  Surgeon: Hector Urrutia MD;  Location:  [facility];  Service:     CATARACT EXTRACTION Bilateral     CHOLECYSTECTOMY      Remote    COCHLEAR IMPLANT REVISION  05/2019    COLONOSCOPY      PROSTATE SURGERY      SHOULDER SURGERY Right     SKIN BIOPSY      TOE AMPUTATION Right     2nd toe - Right Foot    TONSILLECTOMY      TOTAL KNEE ARTHROPLASTY Right 8/7/2024    Procedure: TOTAL KNEE ARTHROPLASTY WITH ASHLEY ROBOT RIGHT;  Surgeon: Christian Schwartz MD;  Location:  [facility] OR;  Service: Robotics - Ortho;  Laterality: Right;        Admitting Doctor:   Berto Liu MD    Consulting Provider(s):  Consults       No orders found from 8/1/2024 to 8/31/2024.             Admitting Diagnosis:   The primary encounter diagnosis was  Cellulitis of right lower extremity. A diagnosis of Failure of outpatient treatment was also pertinent to this visit.    Most Recent Vitals:   Vitals:    08/30/24 1601 08/30/24 1800 08/30/24 1830 08/30/24 1900   BP:  121/70 122/76 150/76   BP Location:       Patient Position:       Pulse:  95 95 93   Resp:       Temp:       TempSrc:       SpO2: 98% 99% 100% 100%   Weight:       Height:           Active LDAs/IV Access:   Lines, Drains & Airways       Active LDAs       Name Placement date Placement time Site Days    Peripheral IV 08/30/24 1758 Right Antecubital 08/30/24 1758  Antecubital  less than 1                    Labs (abnormal labs have a star):   Labs Reviewed   COMPREHENSIVE METABOLIC PANEL - Abnormal; Notable for the following components:       Result Value    Glucose 103 (*)     Alkaline Phosphatase 187 (*)     All other components within normal limits    Narrative:     GFR Normal >60  Chronic Kidney Disease <60  Kidney Failure <15    The GFR formula is only valid for adults with stable renal function between ages 18 and 70.   C-REACTIVE PROTEIN - Abnormal; Notable for the following components:    C-Reactive Protein 0.80 (*)     All other components within normal limits   SEDIMENTATION RATE - Abnormal; Notable for the following components:    Sed Rate 25 (*)     All other components within normal limits   CBC WITH AUTO DIFFERENTIAL - Abnormal; Notable for the following components:    Hemoglobin 11.5 (*)     Hematocrit 36.1 (*)     Monocyte % 12.2 (*)     Immature Grans % 0.6 (*)     All other components within normal limits   LACTIC ACID, PLASMA - Normal   PROCALCITONIN - Normal    Narrative:     As a Marker for Sepsis (Non-Neonates):    1. <0.5 ng/mL represents a low risk of severe sepsis and/or septic shock.  2. >2 ng/mL represents a high risk of severe sepsis and/or septic shock.    As a Marker for Lower Respiratory Tract Infections that require antibiotic therapy:    PCT on Admission    Antibiotic  "Therapy       6-12 Hrs later    >0.5                Strongly Recommended  >0.25 - <0.5        Recommended   0.1 - 0.25          Discouraged              Remeasure/reassess PCT  <0.1                Strongly Discouraged     Remeasure/reassess PCT    As 28 day mortality risk marker: \"Change in Procalcitonin Result\" (>80% or <=80%) if Day 0 (or Day 1) and Day 4 values are available. Refer to http://www.[facility] [email or web address]-pct-calculator.com    Change in PCT <=80%  A decrease of PCT levels below or equal to 80% defines a positive change in PCT test result representing a higher risk for 28-day all-cause mortality of patients diagnosed with severe sepsis for septic shock.    Change in PCT >80%  A decrease of PCT levels of more than 80% defines a negative change in PCT result representing a lower risk for 28-day all-cause mortality of patients diagnosed with severe sepsis or septic shock.      MAGNESIUM - Normal   BLOOD CULTURE   BLOOD CULTURE   RAINBOW DRAW    Narrative:     The following orders were created for panel order [place] Draw.  Procedure                               Abnormality         Status                     ---------                               -----------         ------                     Green Top (Gel)[[id number]]                                  Final result               Lavender Top[[id number]]                                     Final result               Gold Top - SST[[id number]]                                   Final result               Toussaint Top[[id number]]                                         Final result               Light Blue Top[[id number]]                                   Final result                 Please view results for these tests on the individual orders.   CBC AND DIFFERENTIAL    Narrative:     The following orders were created for panel order CBC & Differential.  Procedure                               Abnormality         Status                     ---------                               " -----------         ------                     CBC Auto Differential[[id number]]        Abnormal            Final result                 Please view results for these tests on the individual orders.   GREEN TOP   LAVENDER TOP   GOLD TOP - SST   GRAY TOP   LIGHT BLUE TOP       Meds Given in ED:   Medications   sodium chloride 0.9 % flush 10 mL (has no administration in time range)   vancomycin 2250 mg/500 mL 0.9% NS IVPB ([facility]) (has no administration in time range)   cefTRIAXone (ROCEPHIN) 2,000 mg in sodium chloride 0.9 % 100 mL MBP (2,000 mg Intravenous New Bag 8/30/24 1931)           Last NIH score:                                                          Dysphagia screening results:  Patient Factors Component (Dysphagia:Stroke or Rule-out)  Best Eye Response: 4-->(E4) spontaneous (08/30/24 1747)  Best Motor Response: 6-->(M6) obeys commands (08/30/24 1747)  Best Verbal Response: 5-->(V5) oriented (08/30/24 1747)  [place] Coma Scale Score: 15 (08/30/24 1747)     [place] Coma Scale:  No data recorded     CIWA:        Restraint Type:            Isolation Status:  No active isolations

## 2024-08-31 NOTE — CONSULTS
INFECTIOUS DISEASE CONSULT/INITIAL HOSPITAL VISIT    Guicho Blanco  1940  [id number]    Date of Consult: 8/31/2024    Admission Date: 8/30/2024      Requesting Provider: NIKOLE Liu MD  Evaluating Physician: LEEROY Espino MD      Reason for Consultation: right leg cellulitis    History of present illness:    Patient is a 83 y.o. male, with PMH atrial fibrillation, CAD, GERD, seen today for right leg cellulitis.  Underwent robotic assisted right TKA 8/7/24, and then went to [facility] for rehab 8/10-8/24/24 discharged on Doxycycline for mild cellulitis. He was seen in clinic by Dr. Schwartz on 8/27, continued antibiotics with Doxycycline and Keflex. Office called to check on him yesterday and was told he was having worsening tenderness, and redness, referred for admission.   Admitting labs with WBC 6.98, ESR 25, PCT 0.09, CRP 0.80, Scr 1.13. He has been afebrile.  Xray with scattered vascular calcifications, normal appearance of the TKA.  Started on Vancomycin, Ceftriaxone and we were consulted for evaluation and treatment.     Past Medical History:   Diagnosis Date    Arthritis     Atrial fibrillation     BPH (benign prostatic hypertrophy)     Cataract     Cellulitis     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     bilateral    Coronary artery disease     Full dentures     Gall stones     GERD (gastroesophageal reflux disease)     Hearing loss     Hiatal hernia     Hyperlipidemia     Hypertension     Peripheral neuropathy     Peripheral vascular disease     Seasonal allergies     Skin cancer     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    TIA (transient ischemic attack)     2 times    Wears glasses        Past Surgical History:   Procedure Laterality Date    ABLATION OF DYSRHYTHMIC FOCUS      APPENDECTOMY      CARDIAC CATHETERIZATION N/A 10/05/2017    Procedure: Left Heart Cath;  Surgeon: Hector Urrutia MD;  Location: [facility] CATH INVASIVE LOCATION;  Service:     CATARACT EXTRACTION Bilateral      CHOLECYSTECTOMY      Remote    COCHLEAR IMPLANT REVISION  05/2019    COLONOSCOPY      PROSTATE SURGERY      SHOULDER SURGERY Right     SKIN BIOPSY      TOE AMPUTATION Right     2nd toe - Right Foot    TONSILLECTOMY      TOTAL KNEE ARTHROPLASTY Right 8/7/2024    Procedure: TOTAL KNEE ARTHROPLASTY WITH ASHLEY ROBOT RIGHT;  Surgeon: Christian Schwartz MD;  Location: [facility];  Service: Robotics - Ortho;  Laterality: Right;       Family History   Problem Relation Age of Onset    Atrial fibrillation Mother     Dementia Mother     Lung cancer Father        Social History     Socioeconomic History    Marital status:    Tobacco Use    Smoking status: Never     Passive exposure: Past    Smokeless tobacco: Never   Vaping Use    Vaping status: Never Used   Substance and Sexual Activity    Alcohol use: No    Drug use: No    Sexual activity: Defer       Allergies   Allergen Reactions    Divalproex Sodium (Migraine) [Valproic Acid] Other (See Comments)     Weak, lethargy and got worse    Cymbalta [Duloxetine Hcl] Mental Status Change     Depression, thoughts of Suicide.     Duloxetine Unknown (See Comments)    Metoprolol Other (See Comments)     UNKNOWN. Pt does not remember this being allergy    Neurontin [Gabapentin] Other (See Comments)     Lethargy & fatigue         Medication:    Current Facility-Administered Medications:     aspirin tablet 325 mg, 325 mg, Oral, Daily, Berto Liu MD, 325 mg at 08/31/24 0830    docusate sodium (COLACE) capsule 100 mg, 100 mg, Oral, BID PRN, Berto Liu MD    flecainide (TAMBOCOR) tablet 50 mg, 50 mg, Oral, Q12H, Berto Liu MD, 50 mg at 08/31/24 0830    isosorbide mononitrate (IMDUR) 24 hr tablet 30 mg, 30 mg, Oral, Daily, Berto Liu MD, 30 mg at 08/31/24 0830    meloxicam (MOBIC) tablet 7.5 mg, 7.5 mg, Oral, Daily, Berto Liu MD, 7.5 mg at 08/31/24 0830    nortriptyline (PAMELOR) capsule 20 mg, 20 mg, Oral, Nightly, Alexa  MD Berto, 20 mg at 08/30/24 2234    pantoprazole (PROTONIX) EC tablet 40 mg, 40 mg, Oral, Q AM, Berto Liu MD, 40 mg at 08/31/24 0443    polyethylene glycol (MIRALAX) packet 17 g, 17 g, Oral, Daily PRN, Berto Liu MD    pravastatin (PRAVACHOL) tablet 20 mg, 20 mg, Oral, Daily, Berto Liu MD, 20 mg at 08/31/24 0830    sodium chloride 0.9 % flush 10 mL, 10 mL, Intravenous, PRN, Sincere Mari DO    sodium chloride 0.9 % flush 10 mL, 10 mL, Intravenous, Q12H, Berto Liu MD, 10 mL at 08/31/24 0831    sodium chloride 0.9 % flush 10 mL, 10 mL, Intravenous, PRN, Berto Liu MD    sodium chloride 0.9 % infusion 40 mL, 40 mL, Intravenous, PRN, Berto Liu MD    Antibiotics:  Anti-Infectives (From admission, onward)      Ordered     Dose/Rate Route Frequency Start Stop    08/30/24 1854  vancomycin 2250 mg/500 mL 0.9% NS IVPB ([facility])        Ordering Provider: Sincere Mari DO    22 mg/kg × 99.8 kg  over 135 Minutes Intravenous Once 08/1940 08/30/24 2252    08/30/24 1854  cefTRIAXone (ROCEPHIN) 2,000 mg in sodium chloride 0.9 % 100 mL MBP        Ordering Provider: Sincere Mari DO    2,000 mg  200 mL/hr over 30 Minutes Intravenous Once 08/30/24 1910 08/30/24 2008              Review of Systems:  Constitutional-- No Fever, chills or sweats.  Appetite good, and no malaise. No fatigue.  HEENT-- No new vision, hearing or throat complaints.  No epistaxis or oral sores.  Denies odynophagia or dysphagia. No headache, photophobia or neck stiffness.  CV-- No chest pain, palpitation or syncope  Resp-- No SOB/cough/Hemoptysis  GI- No nausea, vomiting, or diarrhea.  No hematochezia, melena, or hematemesis. Denies jaundice or chronic liver disease.  -- No dysuria, hematuria, or flank pain.  Denies hesitancy, urgency or flank pain.  Lymph- no swollen lymph nodes in neck/axilla or groin.   Heme- No active bruising or bleeding; no Hx of DVT or PE.  MS--+ swelling and redness of  right knee  No new back pain.  Neuro-- No acute focal weakness or numbness in the arms or legs.  No seizures.  Skin- right knee redness       Physical Exam:   Vital Signs  Temp (24hrs), Av.1 °F (36.7 °C), Min:98 °F (36.7 °C), Max:98.3 °F (36.8 °C)    Temp  Min: 98 °F (36.7 °C)  Max: 98.3 °F (36.8 °C)  BP  Min: 117/68  Max: 150/76  Pulse  Min: 90  Max: 104  Resp  Min: 14  Max: 18  SpO2  Min: 98 %  Max: 100 %    GENERAL: Awake and alert, in no acute distress.   HEENT: Normocephalic, atraumatic.  EOMI. No conjunctival injection.  NECK: Supple without nuchal rigidity.   HEART: RRR; No murmur, rubs, gallops.   LUNGS: Clear to auscultation bilaterally without wheezing, rales, rhonchi. Normal respiratory effort. Nonlabored.   ABDOMEN: Soft, nontender, nondistended. Positive bowel sounds. No rebound or guarding.  EXT:  1+ right leg pitting edema  MS: Good movement of right knee without significant pain.  SKIN:  right TKA incision well approximated.  Erythema distally and medial knee. No drainage.  No crepitus, induration, or fluctuance.  NEURO: Oriented to PPT.  Motor strength intact  PSYCHIATRIC: Normal insight and judgment. Cooperative with PE    Laboratory Data    Results from last 7 days   Lab Units 24  1638   WBC 10*3/mm3 6.98   HEMOGLOBIN g/dL 11.5*   HEMATOCRIT % 36.1*   PLATELETS 10*3/mm3 314     Results from last 7 days   Lab Units 24  1638   SODIUM mmol/L 136   POTASSIUM mmol/L 4.1   CHLORIDE mmol/L 101   CO2 mmol/L 22.0   BUN mg/dL 12   CREATININE mg/dL 1.13   GLUCOSE mg/dL 103*   CALCIUM mg/dL 9.5     Results from last 7 days   Lab Units 24  1638   ALK PHOS U/L 187*   BILIRUBIN mg/dL 0.5   ALT (SGPT) U/L 20   AST (SGOT) U/L 32     Results from last 7 days   Lab Units 24  1638   SED RATE mm/hr 25*     Results from last 7 days   Lab Units 24  1638   CRP mg/dL 0.80*     Results from last 7 days   Lab Units 24  1638   LACTATE mmol/L 1.8             Estimated Creatinine  Clearance: 60.6 mL/min (by C-G formula based on SCr of 1.13 mg/dL).      Microbiology:  Microbiology Results (last 10 days)       Procedure Component Value - Date/Time    Blood Culture - Blood, Arm, Right [[id number]]  (Normal) Collected: 08/30/24 1800    Lab Status: Preliminary result Specimen: Blood from Arm, Right Updated: 08/31/24 1832     Blood Culture No growth at 24 hours    Narrative:      Less than seven (7) mL's of blood was collected.  Insufficient quantity may yield false negative results.    Blood Culture - Blood, Arm, Right [[id number]]  (Normal) Collected: 08/30/24 1638    Lab Status: Preliminary result Specimen: Blood from Arm, Right Updated: 08/31/24 1716     Blood Culture No growth at 24 hours              Radiology:  Imaging Results (Last 72 Hours)       Procedure Component Value Units Date/Time    XR Knee 1 or 2 View Right [[id number]] Collected: 08/30/24 1710     Updated: 08/30/24 1715    Narrative:      XR KNEE 1 OR 2 VW RIGHT    Date of Exam: 8/30/2024 5:04 PM EDT    Indication: right knee erythema, post op    Comparison: Right knee radiographs 8/7/2024    Findings:  There are faint scattered vascular calcifications. Normal appearance of the total knee arthroplasty which appears in satisfactory alignment. No hardware fracture or perihardware lucency. Small joint effusion is difficult to exclude, nonspecific.      Impression:      Impression:  There are faint scattered vascular calcifications. Normal appearance of the total knee arthroplasty which appears in satisfactory alignment. No hardware fracture or perihardware lucency. Small joint effusion is difficult to exclude, nonspecific.      Electronically Signed: Roge Guillen MD    8/30/2024 5:12 PM EDT    Workstation ID: [id number]            Discussion:  83 year old admitted with right lower extremity cellulitis, following right total knee replacement on 8/7. Started on oral antibiotics at [facility] prior to discharge without improvement and  was referred for admission.  No concern for septic arthritis at this time per orthopedics.      Problem list:   - Right lower extremity cellulitis, with underlying prosthesis in place.  Appears superficial.  - s/p Right TKA, 8/7/24.  No obvious joint inflammation with minimally elevated inflammatory markers.  Little concern for periprosthetic joint infection.      PLAN/RECOMMENDATIONS:   Thank you for asking us to see Guicho Blanco, I recommend the following:    -- Continue IV vancomycin and ceftriaxone for now  -- Follow exam  -- Potential change to oral antibiotics in the next couple of days or he could come to my office once daily for continued IV infusion, though PT is recommending inpatient rehab.     Dr. Manzanares has obtained the history, performed the PE, formulated the above treatment plan.    LEEROY Espino  8/31/2024  09:08 EDT    Jae Manzanares MD  8/31/2024  20:31 EDT

## 2024-08-31 NOTE — CONSULTS
Orthopaedic Consult Note    Patient Care Team:  Mitchel Culver MD as PCP - General (Family Medicine)  Mitchel Culver MD as PCP - Family Medicine  Hector Urrutia MD as Consulting Physician (Cardiology)    Chief complaint  Right leg redness    Subjective .     History of present illness:    Patient well-known to me.  He underwent robot-assisted right TKA on 8/7/2024.  Postoperatively the patient was transferred to [facility].  He sustained a fall while at [facility] requiring ER visit where the primary focus was his head.  According to the patient who saw me in the office on 8/27/2024, he was started on oral antibiotics to [facility] just before being discharged home.  He was brought into the office by his daughter.  He denies fever chills nausea vomiting.  We did continue his Keflex and doxycycline.  We called the patient's daughter to check on his status today and she was notified by him that he was starting to have tenderness laterally on the knee and several pictures were sent to the office which per our review appears to show worsening erythema.  We felt that it was appropriate to bring the patient in for IV antibiotics for his worsening cellulitis to prevent a worsening infection and spread of the infection to his prosthetic joint.  Of note, patient underwent a second toe amputation on this right lower extremity after cellulitis in the past.  He is known to the [facility] practice.    Review of Systems:    All systems reviewed are negative except for what is stated in the HPI     History  Family History   Problem Relation Age of Onset    Atrial fibrillation Mother     Dementia Mother     Lung cancer Father      Social History     Socioeconomic History    Marital status:    Tobacco Use    Smoking status: Never     Passive exposure: Past    Smokeless tobacco: Never   Vaping Use    Vaping status: Never Used   Substance and Sexual Activity    Alcohol use: No    Drug use: No     Sexual activity: Defer     Past Surgical History:   Procedure Laterality Date    ABLATION OF DYSRHYTHMIC FOCUS      APPENDECTOMY      CARDIAC CATHETERIZATION N/A 10/05/2017    Procedure: Left Heart Cath;  Surgeon: Hector Urrutia MD;  Location:  [facility] CATH INVASIVE LOCATION;  Service:     CATARACT EXTRACTION Bilateral     CHOLECYSTECTOMY      Remote    COCHLEAR IMPLANT REVISION  05/2019    COLONOSCOPY      PROSTATE SURGERY      SHOULDER SURGERY Right     SKIN BIOPSY      TOE AMPUTATION Right     2nd toe - Right Foot    TONSILLECTOMY      TOTAL KNEE ARTHROPLASTY Right 8/7/2024    Procedure: TOTAL KNEE ARTHROPLASTY WITH ASHLEY ROBOT RIGHT;  Surgeon: Christian Schwartz MD;  Location:  [facility] OR;  Service: Robotics - Ortho;  Laterality: Right;     Past Medical History:   Diagnosis Date    Arthritis     Atrial fibrillation     BPH (benign prostatic hypertrophy)     Cataract     Cellulitis     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     bilateral    Coronary artery disease     Full dentures     Gall stones     GERD (gastroesophageal reflux disease)     Hearing loss     Hiatal hernia     Hyperlipidemia     Hypertension     Peripheral neuropathy     Peripheral vascular disease     Seasonal allergies     Skin cancer     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     Duloxetine Unknown (See Comments)    Metoprolol Other (See Comments)     UNKNOWN. Pt does not remember this being allergy    Neurontin [Gabapentin] Other (See Comments)     Lethargy & fatigue       Current Facility-Administered Medications:     [START ON 8/31/2024] aspirin tablet 325 mg, 325 mg, Oral, Daily, Berto Liu MD    docusate sodium (COLACE) capsule 100 mg, 100 mg, Oral, BID PRN,  Berto Liu MD    flecainide (TAMBOCOR) tablet 50 mg, 50 mg, Oral, Q12H, Berto Liu MD    [START ON 8/31/2024] isosorbide mononitrate (IMDUR) 24 hr tablet 30 mg, 30 mg, Oral, Daily, Berto Liu MD    [START ON 8/31/2024] meloxicam (MOBIC) tablet 7.5 mg, 7.5 mg, Oral, Daily, Berto Liu MD    nortriptyline (PAMELOR) capsule 20 mg, 20 mg, Oral, Nightly, Berto Liu MD    [START ON 8/31/2024] pantoprazole (PROTONIX) EC tablet 40 mg, 40 mg, Oral, Q AM, Berto Liu MD    polyethylene glycol (MIRALAX) packet 17 g, 17 g, Oral, Daily PRN, Berto Liu MD    pravastatin (PRAVACHOL) tablet 20 mg, 20 mg, Oral, Daily, Berto Liu MD    sodium chloride 0.9 % flush 10 mL, 10 mL, Intravenous, PRN, Kendall Mariy, DO    vancomycin 2250 mg/500 mL 0.9% NS IVPB ([facility]), 22 mg/kg, Intravenous, Once, [place] Sincere, DO, 2,250 mg at 08/30/24 2037    Objective     Vital Signs   Temp:  [98.3 °F (36.8 °C)] 98.3 °F (36.8 °C)  Heart Rate:  [] 98  Resp:  [14-15] 15  BP: (117-150)/(68-80) 129/71      Physical Exam:   Right lower extremity: Patient has erythema and mild induration laterally.  His skin incisions are free of induration or erythema or drainage.  He has painless active and passive range of motion of the knee.  There is no streaking noted.  When compared to his exam from 8/27/2024, his erythema does appear to be worse.  There is no drainage noted.    Labs:  Lab Results (last 24 hours)       Procedure Component Value Units Date/Time    Sedimentation Rate [[id number]]  (Abnormal) Collected: 08/30/24 1638    Specimen: Blood Updated: 08/30/24 1844     Sed Rate 25 mm/hr     Blood Culture - Blood, Arm, Right [[id number]] Collected: 08/30/24 1800    Specimen: Blood from Arm, Right Updated: 08/30/24 1827    Procalcitonin [[id number]]  (Normal) Collected: 08/30/24 1638    Specimen: Blood Updated: 08/30/24 1731     Procalcitonin 0.09 ng/mL     Narrative:      As a Marker for  "Sepsis (Non-Neonates):    1. <0.5 ng/mL represents a low risk of severe sepsis and/or septic shock.  2. >2 ng/mL represents a high risk of severe sepsis and/or septic shock.    As a Marker for Lower Respiratory Tract Infections that require antibiotic therapy:    PCT on Admission    Antibiotic Therapy       6-12 Hrs later    >0.5                Strongly Recommended  >0.25 - <0.5        Recommended   0.1 - 0.25          Discouraged              Remeasure/reassess PCT  <0.1                Strongly Discouraged     Remeasure/reassess PCT    As 28 day mortality risk marker: \"Change in Procalcitonin Result\" (>80% or <=80%) if Day 0 (or Day 1) and Day 4 values are available. Refer to http://www.Generopct-calculator.com    Change in PCT <=80%  A decrease of PCT levels below or equal to 80% defines a positive change in PCT test result representing a higher risk for 28-day all-cause mortality of patients diagnosed with severe sepsis for septic shock.    Change in PCT >80%  A decrease of PCT levels of more than 80% defines a negative change in PCT result representing a lower risk for 28-day all-cause mortality of patients diagnosed with severe sepsis or septic shock.       Comprehensive Metabolic Panel [[id number]]  (Abnormal) Collected: 08/30/24 1638    Specimen: Blood Updated: 08/30/24 1727     Glucose 103 mg/dL      BUN 12 mg/dL      Creatinine 1.13 mg/dL      Sodium 136 mmol/L      Potassium 4.1 mmol/L      Chloride 101 mmol/L      CO2 22.0 mmol/L      Calcium 9.5 mg/dL      Total Protein 6.7 g/dL      Albumin 4.2 g/dL      ALT (SGPT) 20 U/L      AST (SGOT) 32 U/L      Alkaline Phosphatase 187 U/L      Total Bilirubin 0.5 mg/dL      Globulin 2.5 gm/dL      Comment: Calculated Result        A/G Ratio 1.7 g/dL      BUN/Creatinine Ratio 10.6     Anion Gap 13.0 mmol/L      eGFR 64.5 mL/min/1.73     Narrative:      GFR Normal >60  Chronic Kidney Disease <60  Kidney Failure <15    The GFR formula is only valid for adults with " stable renal function between ages 18 and 70.    C-reactive Protein [[id number]]  (Abnormal) Collected: 08/30/24 1638    Specimen: Blood Updated: 08/30/24 1727     C-Reactive Protein 0.80 mg/dL     Magnesium [[id number]]  (Normal) Collected: 08/30/24 1638    Specimen: Blood Updated: 08/30/24 1727     Magnesium 1.9 mg/dL     Lactic Acid, Plasma [[id number]]  (Normal) Collected: 08/30/24 1638    Specimen: Blood Updated: 08/30/24 1718     Lactate 1.8 mmol/L      Comment: Falsely depressed results may occur on samples drawn from patients receiving N-Acetylcysteine (NAC) or Metamizole.       Blood Culture - Blood, Arm, Right [[id number]] Collected: 08/30/24 1638    Specimen: Blood from Arm, Right Updated: 08/30/24 1701    [place] Draw [[id number]] Collected: 08/30/24 1638    Specimen: Blood Updated: 08/30/24 1701    Narrative:      The following orders were created for panel order [place] Draw.  Procedure                               Abnormality         Status                     ---------                               -----------         ------                     Green Top (Gel)[[id number]]                                  Final result               Lavender Top[[id number]]                                     Final result               Gold Top - SST[[id number]]                                   Final result               [place] Top[[id number]]                                         Final result               Light Blue Top[[id number]]                                   Final result                 Please view results for these tests on the individual orders.    Green Top (Gel) [[id number]] Collected: 08/30/24 1638    Specimen: Blood Updated: 08/30/24 1701     Extra Tube Hold for add-ons.     Comment: Auto resulted.       Lavender Top [[id number]] Collected: 08/30/24 1638    Specimen: Blood Updated: 08/30/24 1701     Extra Tube hold for add-on     Comment: Auto resulted       Gold Top - SST [[id number]] Collected: 08/30/24 1638     Specimen: Blood Updated: 08/30/24 1701     Extra Tube Hold for add-ons.     Comment: Auto resulted.       [place]aint Top [[id number]] Collected: 08/30/24 1638    Specimen: Blood Updated: 08/30/24 1701     Extra Tube Hold for add-ons.     Comment: Auto resulted.       Light Blue Top [[id number]] Collected: 08/30/24 1638    Specimen: Blood Updated: 08/30/24 1701     Extra Tube Hold for add-ons.     Comment: Auto resulted       CBC & Differential [[id number]]  (Abnormal) Collected: 08/30/24 1638    Specimen: Blood Updated: 08/30/24 1700    Narrative:      The following orders were created for panel order CBC & Differential.  Procedure                               Abnormality         Status                     ---------                               -----------         ------                     CBC Auto Differential[[id number]]        Abnormal            Final result                 Please view results for these tests on the individual orders.    CBC Auto Differential [[id number]]  (Abnormal) Collected: 08/30/24 1638    Specimen: Blood Updated: 08/30/24 1700     WBC 6.98 10*3/mm3      RBC 4.22 10*6/mm3      Hemoglobin 11.5 g/dL      Hematocrit 36.1 %      MCV 85.5 fL      MCH 27.3 pg      MCHC 31.9 g/dL      RDW 14.6 %      RDW-SD 45.1 fl      MPV 9.8 fL      Platelets 314 10*3/mm3      Neutrophil % 52.8 %      Lymphocyte % 28.4 %      Monocyte % 12.2 %      Eosinophil % 5.0 %      Basophil % 1.0 %      Immature Grans % 0.6 %      Neutrophils, Absolute 3.69 10*3/mm3      Lymphocytes, Absolute 1.98 10*3/mm3      Monocytes, Absolute 0.85 10*3/mm3      Eosinophils, Absolute 0.35 10*3/mm3      Basophils, Absolute 0.07 10*3/mm3      Immature Grans, Absolute 0.04 10*3/mm3      nRBC 0.0 /100 WBC             Imaging:  We have reviewed and interpreted an x-ray of the patient's right knee from 8/30/2024.  There does not appear to be any fractures or dislocations of the patient's knee implant.  There are vascular calcifications noted  throughout the radiograph.  No periosteal reactions are noted.        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  Patient is recovering appropriately from his knee replacement but has worsening erythema along the lateral aspect of the knee.  Erythema is consistent with cellulitis and not septic arthritis at this point.  Agree with IV antibiotics.  The area of erythema and induration has been marked by me with a sharpie this evening.  We will follow this during his hospitalization.  Laboratory studies are reassuring with regards to significant infection.  Will consult infectious disease service in the morning.  We will start physical therapy.  Continue with aspirin for DVT prophylaxis.  Weight-bear as tolerated on the right lower extremity.      Cellulitis    PAF (paroxysmal atrial fibrillation)    Coronary artery disease involving native coronary artery of native heart without angina pectoris    Mixed hyperlipidemia        I discussed the patients findings and my recommendations with patient and family    Christian Schwartz MD  08/30/24  20:57 EDT

## 2024-08-31 NOTE — MBS/VFSS/FEES
Acute Care - Speech Language Pathology   Swallow Initial Evaluation  [place]  Modified Barium Swallow Study (MBS)  & Clinical Swallow Evaluation       Patient Name: Guicho Blanco  : 1940  MRN: [id number]  Today's Date: 2024               Admit Date: 2024    Visit Dx:     ICD-10-CM ICD-9-CM   1. Cellulitis of right lower extremity  L03.115 682.6   2. Failure of outpatient treatment  Z78.9 V49.89   3. Oropharyngeal dysphagia  R13.12 787.22     Patient Active Problem List   Diagnosis    Osteomyelitis of toe of right foot    Osteomyelitis of right foot    PAF (paroxysmal atrial fibrillation)    Coronary artery disease involving native coronary artery of native heart without angina pectoris    Gastroesophageal reflux disease without esophagitis    Benign non-nodular prostatic hyperplasia without lower urinary tract symptoms    S/P appy    Essential hypertension    Simple chronic bronchitis    Mixed hyperlipidemia    COPD (chronic obstructive pulmonary disease)    BPH (benign prostatic hypertrophy)    Hearing loss    Chest pain    Vertigo    Snoring    Overweight    PLMD (periodic limb movement disorder)    Neuropathy    Periodic headache syndrome, not intractable    Acquired absence of other right toe(s)    Tremor    Bilateral impacted cerumen    Bilateral sensorineural hearing loss    Chronic otitis externa    Deviated nasal septum    Disorder of joint of foot    ED (erectile dysfunction) of organic origin    Hydrocele    Hydrocele of testis    Impacted cerumen    Mononeuropathy    Swelling of testicle    Venous retinal branch occlusion    Vitreous degeneration    Benign prostatic hyperplasia with urinary obstruction    OA (osteoarthritis) of knee    Status post total right knee replacement    Septic arthritis    Cellulitis     Past Medical History:   Diagnosis Date    Arthritis     Atrial fibrillation     BPH (benign prostatic hypertrophy)     Cataract     Cellulitis     Cochlear implant in  place     bilateral    Coronary artery disease     Full dentures     Gall stones     GERD (gastroesophageal reflux disease)     Hearing loss     Hiatal hernia     Hyperlipidemia     Hypertension     Peripheral neuropathy     Peripheral vascular disease     Seasonal allergies     Skin cancer     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    TIA (transient ischemic attack)     2 times    Wears glasses      Past Surgical History:   Procedure Laterality Date    ABLATION OF DYSRHYTHMIC FOCUS      APPENDECTOMY      CARDIAC CATHETERIZATION N/A 10/05/2017    Procedure: Left Heart Cath;  Surgeon: Hector Urrutia MD;  Location:  [facility];  Service:     CATARACT EXTRACTION Bilateral     CHOLECYSTECTOMY      Remote    COCHLEAR IMPLANT REVISION  05/2019    COLONOSCOPY      PROSTATE SURGERY      SHOULDER SURGERY Right     SKIN BIOPSY      TOE AMPUTATION Right     2nd toe - Right Foot    TONSILLECTOMY      TOTAL KNEE ARTHROPLASTY Right 8/7/2024    Procedure: TOTAL KNEE ARTHROPLASTY WITH ASHLEY ROBOT RIGHT;  Surgeon: Christian Schwartz MD;  Location:  [facility];  Service: Robotics - Ortho;  Laterality: Right;       SLP Recommendation and Plan  SLP Swallowing Diagnosis: mild, oral dysphagia, pharyngeal dysphagia (08/31/24 1205)  SLP Diet Recommendation: thin liquids, soft to chew textures, chopped (08/31/24 1205)  Recommended Precautions and Strategies: upright posture during/after eating, small bites of food and sips of liquid, alternate between small bites of food and sips of liquid, multiple swallows per bite of food, multiple swallows per sip of liquid, general aspiration precautions (08/31/24 1205)  SLP Rec. for Method of Medication Administration: as tolerated (08/31/24 1205)     Monitor for Signs of Aspiration: yes, notify SLP if any concerns (08/31/24 1205)  Recommended Diagnostics: VFSS (MBS) (08/31/24 1045)  Swallow Criteria for Skilled Therapeutic Interventions Met: demonstrates skilled criteria (08/31/24  1205)  Anticipated Discharge Disposition (SLP): home with assist (08/31/24 1205)  Rehab Potential/Prognosis, Swallowing: good, to achieve stated therapy goals (08/31/24 1205)  Therapy Frequency (Swallow): 3 days per week (08/31/24 1205)  Predicted Duration Therapy Intervention (Days): 1 week (08/31/24 1205)  Oral Care Recommendations: Oral Care BID/PRN, Toothbrush (08/31/24 1205)                                        Plan of Care Reviewed With: patient  Progress: improving      SWALLOW EVALUATION (Last 72 Hours)       SLP Adult Swallow Evaluation       Row Name 08/31/24 1205 08/31/24 1045                Rehab Evaluation    Document Type evaluation  -GA evaluation  -GA       Subjective Information no complaints  -GA no complaints  -GA       Patient Observations alert;cooperative;agree to therapy  -GA alert;cooperative;agree to therapy  -GA       Patient/Family/Caregiver Comments/Observations daughter present  -GA daughter came in 1/2 way through session  -GA       Patient Effort good  -GA good  -GA       Symptoms Noted During/After Treatment none  -GA none  -GA          General Information    Patient Profile Reviewed yes  -GA yes  -GA       Pertinent History Of Current Problem see SLP hx  -GA 83-year-old male with a 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 Patient with recent 10 pound weight loss, reports of difficulty swallowing, and throat clearing and coughing at bedside.  -GA       Current Method of Nutrition nectar/syrup-thick liquids;soft to chew textures;chopped  -GA regular textures;thin liquids  -GA       Precautions/Limitations, Vision WFL;for purposes of eval  -GA WFL;for purposes of eval  -GA       Precautions/Limitations, Hearing hearing aid, left;hearing aid, right;hearing impairment, bilaterally  -GA hearing aid, left;hearing aid, right;hearing impairment, bilaterally  -GA       Prior Level of Function-Communication  unknown  -GA unknown  -GA       Prior Level of Function-Swallowing no diet consistency restrictions;concerns regarding malnutrition;concerns regarding dehydration;esophageal concerns;other (see comments)  has been complaining of difficulty swallowing for a few months. Increased difficulties [place] [facility] [place]. reports cervical osteophyte.Family confirms loosing 10 pounds in the last month.  -GA no diet consistency restrictions;concerns regarding malnutrition;concerns regarding dehydration;esophageal concerns;other (see comments)  has been complaining of difficulty swallowing for a few months. Increased difficulties [place] [facility] [place]. reports cervical osteophyte.  -GA       Plans/Goals Discussed with patient and family;agreed upon  -GA patient and family;agreed upon  -GA       Barriers to Rehab none identified  -GA none identified  -GA       Patient's Goals for Discharge patient did not state  -GA patient did not state  -GA       Family Goals for Discharge family did not state  -GA family did not state  -GA          Pain    Additional Documentation Pain Scale: FACES Pre/Post-Treatment (Group)  -GA Pain Scale: FACES Pre/Post-Treatment (Group)  -GA          Pain Scale: FACES Pre/Post-Treatment    Pain: FACES Scale, Pretreatment 0-->no hurt  -GA 0-->no hurt  -GA       Posttreatment Pain Rating 0-->no hurt  -GA 0-->no hurt  -GA          Oral Motor Structure and Function    Dentition Assessment -- upper dentures/partial in place;lower dentures/partial in place;dentures are ill fitting  -GA       Secretion Management -- WNL/WFL  -GA       Volitional Swallow -- WFL  -GA       Volitional Cough -- WFL  -GA          Oral Musculature and Cranial Nerve Assessment    Oral Motor General Assessment -- generalized oral motor weakness  -GA          General Eating/Swallowing Observations    Respiratory Support Currently in Use -- room air  -GA       Eating/Swallowing Skills -- needed assist;self-fed  -GA       Positioning  During Eating -- upright 90 degree  -GA       Utensils Used -- spoon;cup;straw  -GA          Respiratory    Respiratory Status -- room air;WFL  -GA          Clinical Swallow Eval    Oral Prep Phase -- impaired  -GA       Oral Transit -- impaired  -GA       Oral Residue -- impaired  -GA       Pharyngeal Phase -- suspected pharyngeal impairment  -GA       Esophageal Phase -- suspected esophageal impairment  -GA       Clinical Swallow Evaluation Summary -- Patient with throat clear and cough during trials of thin liquids consistently. Reduced s/sx of aspiration with nectar liquids liquids with only 2x throat clear. No overt s/sx with regular solids and puree. However patient reporting globus sensation with regular solids. Recommend patient be downgraded to nectar liquids and soft solids. [facility] [place] [facility] to the [facility] to ensure diet is appropirate.  -GA          Oral Prep Concerns    Oral Prep Concerns -- increased prep time;oral holding  -GA       Oral Holding -- all consistencies  -GA       Increased Prep Time -- all consistencies  -GA       Oral Prep Concerns, Comment -- patient reports he hold food and liquids in his mouth longeer to anticipate the swallow as well as moisten the food/liquids  -GA          Oral Transit Concerns    Oral Transit Concerns -- increased oral transit time  -GA       Increased Oral Transit Time -- all consistencies;other (see comments)  behavioral  -GA          Oral Residue Concerns    Oral Residue Concerns -- diffuse residue throughout oral cavity;other (see comments)  mild with regular solids  -GA          Pharyngeal Phase Concerns    Pharyngeal Phase Concerns -- throat clear;cough;other (see comments);multiple swallows  audible swallow with all consistencies  -GA       Multiple Swallows -- all consistencies  -GA       Cough -- thin  -GA       Throat Clear -- thin;nectar  2x nectar only  -GA          Esophageal Phase Concerns    Esophageal Phase Concerns -- globus;sensation of material sticking  -GA       Globus  "-- regular consistencies  -GA          MBS/VFSS    Utensils Used spoon;cup;straw  -GA --       Consistencies Trialed regular textures;pureed;thin liquids  -GA --          MBS/VFSS Interpretation    Oral Prep Phase impaired oral phase of swallowing  -GA --       Oral Transit Phase impaired  -GA --       Oral Residue WFL  -GA --       VFSS Summary Patient demonstrating oral holding d/t reports \"caution\" while swallowing. Prolonged manipulation, tongue pumping, etc. Patient with trigger at pyriform sinus. No aspiration/penetration however cervical osteophytes (what appear to be near C5-C7) impacting clearance below UES. Residue present below UES and above cervical osteophyte with occasional resulted in retrograde flow above UES. Residue was worse with thin liquids and able to be cleared with alternating liquids and solids (solids cleared thin liquid residue below UES and above osteophyte) as well as producing double swallows. Recommend soft solids/chopped and thin liquids. Soft solids recommended d/t reports of bread and other dry food getting \"caught\". If patient preents with coughing at bedside, consider thickening liquids d/t bedside assessment demonstrating limited coughing and throat clearing/discomfort. Discomfort and s/sx may be d/t residue between UES and cervical osteophyte. Medication crushed or whole in puree.  -GA --          Oral Preparatory Phase    Oral Preparatory Phase oral holding  -GA --       Oral Holding all consistencies tested;secondary to impaired cognitive status  -GA --          Oral Transit Phase    Impaired Oral Transit Phase premature spillage of liquids into pharynx;increased A-P transit time  -GA --       Increased A-P Transit Time all consistencies tested;secondary to impaired cognitive status  -GA --       Premature Spillage of Liquids into Pharynx thin liquids;secondary to reduced lingual control  -GA --          Initiation of Pharyngeal Swallow    Initiation of Pharyngeal Swallow " bolus in pyriform sinuses  -GA --       Pharyngeal Phase impaired pharyngeal phase of swallowing  -GA --       Rosenbek's Scale thin:;pudding/puree:;regular textures:;1--->level 1  -GA --       Pharyngeal Residue all consistencies tested;valleculae;secondary to reduced base of tongue retraction  -GA --       Response to Residue cleared residue with spontaneous subsequent swallow;cleared residue with liquid wash  -GA --       Attempted Compensatory Maneuvers additional subsequent swallow;alternate liquids/solids  -GA --       Response to Attempted Compensatory Maneuvers reduced residue  -GA --       Successful Compensatory Maneuver Competency patient able to;demonstrate compensations;with cues  -GA --          Esophageal Phase    Esophageal Phase esophageal retention with retrograde flow below PES;esophageal retention with retrograde flow through PES;see radiology report for further details  See summary for details involving cervical osteophyte  -GA --          SLP Evaluation Clinical Impression    SLP Swallowing Diagnosis mild;oral dysphagia;pharyngeal dysphagia  -GA R/O pharyngeal dysphagia;oral dysphagia;suspected esophageal dysphagia  -GA       Functional Impact risk of malnutrition;risk of aspiration/pneumonia;risk of dehydration  -GA risk of malnutrition;risk of aspiration/pneumonia;risk of dehydration  -GA       Rehab Potential/Prognosis, Swallowing good, to achieve stated therapy goals  -GA --       Swallow Criteria for Skilled Therapeutic Interventions Met demonstrates skilled criteria  -GA demonstrates skilled criteria  -GA          Recommendations    Therapy Frequency (Swallow) 3 days per week  -GA --       Predicted Duration Therapy Intervention (Days) 1 week  -GA --       SLP Diet Recommendation thin liquids;soft to chew textures;chopped  -GA nectar thick liquids;soft to chew textures;chopped  -GA       Recommended Diagnostics -- VFSS (MBS)  -GA       Recommended Precautions and Strategies upright  posture during/after eating;small bites of food and sips of liquid;alternate between small bites of food and sips of liquid;multiple swallows per bite of food;multiple swallows per sip of liquid;general aspiration precautions  -GA upright posture during/after eating;small bites of food and sips of liquid;alternate between small bites of food and sips of liquid;multiple swallows per bite of food;multiple swallows per sip of liquid  -GA       Oral Care Recommendations Oral Care BID/PRN;Toothbrush  -GA Oral Care BID/PRN;Toothbrush  -GA       SLP Rec. for Method of Medication Administration as tolerated  -GA as tolerated  -GA       Monitor for Signs of Aspiration yes;notify SLP if any concerns  -GA yes;notify SLP if any concerns  -GA       Anticipated Discharge Disposition (SLP) home with assist  -GA home with assist  -GA                 User Key  (r) = Recorded By, (t) = Taken By, (c) = Cosigned By      Initials Name Effective Dates    Carrie Cooney MS CCC-SLP 09/14/23 -                     EDUCATION  The patient has been educated in the following areas:   Dysphagia (Swallowing Impairment) Modified Diet Instruction  .        SLP GOALS       Row Name 08/31/24 1045             (LTG) Patient will demonstrate functional swallow for    Diet Texture (Demonstrate functional swallow) regular textures  -GA      Liquid viscosity (Demonstrate functional swallow) thin liquids  -GA      [place] (Demonstrate functional swallow) independently (over 90% accuracy)  -GA      Time Frame (Demonstrate functional swallow) by discharge  -GA      Progress/Outcomes (Demonstrate functional swallow) new goal  -GA         (STG) Patient will tolerate trials of    Consistencies Trialed (Tolerate trials) soft to chew (chopped) textures;thin liquids  -GA      Desired Outcome (Tolerate trials) without signs/symptoms of aspiration;without signs of distress  -GA      [place] (Tolerate trials) with minimal cues (75-90% accuracy)  -GA       Time Frame (Tolerate trials) 1 week  -GA      Progress/Outcomes (Tolerate trials) new goal  -GA         (STG) Patient will tolerate therapeutic trials of    Consistencies Trialed (Tolerate therapeutic trials) regular textures;thin liquids  -GA      Desired Outcome (Tolerate therapeutic trials) without signs/symptoms of aspiration  -GA      [place] (Tolerate therapeutic trials) independently (over 90% accuracy)  -GA      Time Frame (Tolerate therapeutic trials) 1 week  -GA      Progress/Outcomes (Tolerate therapeutic trials) new goal  -GA         (STG) Lingual Strengthening Goal 1 (SLP)    Activity (Lingual Strengthening Goal 1, SLP) increase tongue back strength;increase lingual tone/sensation/control/coordination/movement  -GA      Increase Lingual Tone/Sensation/Control/Coordination/Movement lingual resistance exercises;swallow trials  -GA      Increase Tongue Back Strength lingual resistance exercises;swallow trials  -GA      [place]/Accuracy (Lingual Strengthening Goal 1, SLP) with minimal cues (75-90% accuracy)  -GA      Time Frame (Lingual Strengthening Goal 1, SLP) 1 week  -GA      Progress/Outcomes (Lingual Strengthening Goal 1, SLP) new goal  -GA         (STG) Pharyngeal Strengthening Exercise Goal 1 (SLP)    Activity (Pharyngeal Strengthening Goal 1, SLP) increase tongue base retraction  -GA      Increase Tongue Base Retraction gerry  -GA      [place]/Accuracy (Pharyngeal Strengthening Goal 1, SLP) with minimal cues (75-90% accuracy)  -GA      Time Frame (Pharyngeal Strengthening Goal 1, SLP) 1 week  -GA      Progress/Outcomes (Pharyngeal Strengthening Goal 1, SLP) new goal  -GA         (STG) Swallow Compensatory Strategies Goal 1 (SLP)    Activity (Swallow Compensatory Strategies/Techniques Goal 1, SLP) reflux precautions;small bites;alternate food/liquid intake;effortful swallow;extra swallow per bolus  -GA      [place]/Accuracy (Swallow Compensatory Strategies/Techniques Goal 1,  SLP) with minimal cues (75-90% accuracy)  -GA      Time Frame (Swallow Compensatory Strategies/Techniques Goal 1, SLP) 1 week  -GA      Progress/Outcomes (Swallow Compensatory Strategies/Techniques Goal 1, SLP) new goal  -GA                User Key  (r) = Recorded By, (t) = Taken By, (c) = Cosigned By      Initials Name Provider Type    Carrie Cooney MS CCC-SLP Speech and Language Pathologist                         Time Calculation:    Time Calculation- SLP       Row Name 08/31/24 1344             Time Calculation- SLP    SLP Start Time 1045  -GA      SLP Received On 08/31/24  -GA         Untimed Charges    99814-IM Eval Oral Pharyng Swallow Minutes 55  -GA      63471-WR Motion Fluoro Eval Swallow Minutes 70  -GA         Total Minutes    Untimed Charges Total Minutes 125  -GA       Total Minutes 125  -GA                User Key  (r) = Recorded By, (t) = Taken By, (c) = Cosigned By      Initials Name Provider Type    Carrie Cooney MS CCC-SLP Speech and Language Pathologist                    Therapy Charges for Today       Code Description Service Date Service Provider Modifiers Qty    68271343058 HC ST EVAL ORAL PHARYNG SWALLOW 4 8/31/2024 Carrie Wise MS CCC-SLP GN 1    [id number] HC ST MOTION FLUORO EVAL SWALLOW 5 8/31/2024 Carrie Wise MS CCC-SLP GN 1                 Carrie Wise MS CCC-YOLA  8/31/2024

## 2024-08-31 NOTE — PLAN OF CARE
Goal Outcome Evaluation:  Plan of Care Reviewed With: patient        Progress: no change (OT IE)  Outcome Evaluation: OT evaluation completed. Pt presents with decreased I in ADLs, related t/fs, mobility compared to PLOF limited by decreased activity tolerance, impaired balance, muscle weakness at BUEs, noted sensory deficits at B feet, fatigue with more dynamic demands and increased difficulty with distal reach toward most distal aspects of feet. Initiated education on potential benefits of LH AE to assist with LBD with plan for progressive training in future sessions. Pt req'd mod A x 2 for STS at various seated surfaces and min A for fxl mobility in room w/ FWW. Pt req'd CGA for d/d socks, min A for UBD and upward of CGA for toileting tasks. Facilitated BSC over toilet for height extension and BUE support for anticipated greater I in toilet t/fs. Pt is below occupational performance baseline and has limited support at home, would recommend IPOT POC and IRF at d/c when medically ready.      Anticipated Discharge Disposition (OT): inpatient rehabilitation facility

## 2024-09-01 LAB
BILIRUB UR QL STRIP: NEGATIVE
CLARITY UR: CLEAR
COLOR UR: YELLOW
GLUCOSE UR STRIP-MCNC: NEGATIVE MG/DL
HGB UR QL STRIP.AUTO: NEGATIVE
KETONES UR QL STRIP: ABNORMAL
LEUKOCYTE ESTERASE UR QL STRIP.AUTO: NEGATIVE
NITRITE UR QL STRIP: NEGATIVE
PH UR STRIP.AUTO: 6 [PH] (ref 5–8)
PROT UR QL STRIP: ABNORMAL
SP GR UR STRIP: 1.02 (ref 1–1.03)
UROBILINOGEN UR QL STRIP: ABNORMAL

## 2024-09-01 PROCEDURE — 25010000002 CEFTRIAXONE PER 250 MG: Performed by: NURSE PRACTITIONER

## 2024-09-01 PROCEDURE — 97110 THERAPEUTIC EXERCISES: CPT

## 2024-09-01 PROCEDURE — 99024 POSTOP FOLLOW-UP VISIT: CPT | Performed by: ORTHOPAEDIC SURGERY

## 2024-09-01 PROCEDURE — 25010000002 VANCOMYCIN HCL IN NACL 1.5-0.9 GM/500ML-% SOLUTION

## 2024-09-01 PROCEDURE — 97116 GAIT TRAINING THERAPY: CPT

## 2024-09-01 PROCEDURE — 81003 URINALYSIS AUTO W/O SCOPE: CPT | Performed by: INTERNAL MEDICINE

## 2024-09-01 RX ORDER — VANCOMYCIN/0.9 % SOD CHLORIDE 1.5G/250ML
1500 PLASTIC BAG, INJECTION (ML) INTRAVENOUS DAILY
Status: DISCONTINUED | OUTPATIENT
Start: 2024-09-01 | End: 2024-09-05 | Stop reason: HOSPADM

## 2024-09-01 RX ORDER — VANCOMYCIN/0.9 % SOD CHLORIDE 1.5G/250ML
1500 PLASTIC BAG, INJECTION (ML) INTRAVENOUS EVERY 24 HOURS
Status: DISCONTINUED | OUTPATIENT
Start: 2024-09-01 | End: 2024-09-01

## 2024-09-01 RX ADMIN — Medication 10 ML: at 20:46

## 2024-09-01 RX ADMIN — FLECAINIDE ACETATE 50 MG: 50 TABLET ORAL at 08:10

## 2024-09-01 RX ADMIN — Medication 1500 MG: at 11:18

## 2024-09-01 RX ADMIN — ASPIRIN 325 MG: 325 TABLET ORAL at 08:09

## 2024-09-01 RX ADMIN — DOCUSATE SODIUM 100 MG: 100 CAPSULE, LIQUID FILLED ORAL at 20:46

## 2024-09-01 RX ADMIN — SODIUM CHLORIDE 2000 MG: 900 INJECTION INTRAVENOUS at 18:45

## 2024-09-01 RX ADMIN — PRAVASTATIN SODIUM 20 MG: 20 TABLET ORAL at 08:09

## 2024-09-01 RX ADMIN — POLYETHYLENE GLYCOL 3350 17 G: 17 POWDER, FOR SOLUTION ORAL at 08:22

## 2024-09-01 RX ADMIN — ISOSORBIDE MONONITRATE 30 MG: 30 TABLET, EXTENDED RELEASE ORAL at 08:09

## 2024-09-01 RX ADMIN — NORTRIPTYLINE HYDROCHLORIDE 20 MG: 10 CAPSULE ORAL at 20:46

## 2024-09-01 RX ADMIN — MELOXICAM 7.5 MG: 7.5 TABLET ORAL at 08:10

## 2024-09-01 RX ADMIN — Medication 10 ML: at 08:10

## 2024-09-01 RX ADMIN — FLECAINIDE ACETATE 50 MG: 50 TABLET ORAL at 20:46

## 2024-09-01 NOTE — PROGRESS NOTES
"  INFECTIOUS DISEASES  INPATIENT PROGRESS NOTE  2024      PATIENT NAME: Guicho Blanco  :  1940  MRN:  [id number]  Date of Admission:  2024      Antimicrobials:  IV vancomycin  Ceftriaxone      MAR reviewed.      Reason for consultation: Right knee cellulitis after recent arthroplasty    Interval history: Patient reports that his right knee is feeling better.  Less painful.  Less red.  Tolerating antibiotics.  No fevers.  Hopeful to leave the hospital soon.    ROS:  No fevers/chills overnight.  No new rashes.  No SOB or chest pain.  No nausea/vomiting/diarrhea.  Denies side effects from antimicrobials.      Objective:  Temp (24hrs), Av.9 °F (36.6 °C), Min:97.5 °F (36.4 °C), Max:98.3 °F (36.8 °C)    /70 (BP Location: Left arm, Patient Position: Sitting)   Pulse 118   Temp 97.6 °F (36.4 °C) (Oral)   Resp 18   Ht 182.9 cm (72\")   Wt 99.8 kg (220 lb)   SpO2 90%   BMI 29.84 kg/m²     Physical Examination:  GENERAL: Awake and alert, in no acute distress.   HEENT: Normocephalic, atraumatic.  EOMI. No conjunctival injection.  HEART: RRR; No murmur, rubs, gallops.   LUNGS: Clear to auscultation bilaterally without wheezing, rales, rhonchi. Normal respiratory effort. Nonlabored.   ABDOMEN: Soft, nontender, nondistended. Positive bowel sounds. No rebound or guarding.  EXT:  1+ right leg pitting edema - slight improvement  MS: Good movement of right knee without significant pain.  SKIN:  right TKA incision well approximated.  Erythema distally and medial knee is fading decreased warmth with some new slight desquamation; no drainage.  No crepitus, induration, or fluctuance.  NEURO: Oriented to PPT.  Motor strength intact  PSYCHIATRIC: Normal insight and judgment. Cooperative with PE    Laboratory Data:    Results from last 7 days   Lab Units 24  1638   WBC 10*3/mm3 6.98   HEMOGLOBIN g/dL 11.5*   HEMATOCRIT % 36.1*   PLATELETS 10*3/mm3 314     Results from last 7 days   Lab Units " 08/30/24  1638   SODIUM mmol/L 136   POTASSIUM mmol/L 4.1   CHLORIDE mmol/L 101   CO2 mmol/L 22.0   BUN mg/dL 12   CREATININE mg/dL 1.13   GLUCOSE mg/dL 103*   CALCIUM mg/dL 9.5     Results from last 7 days   Lab Units 08/30/24  1638   ALK PHOS U/L 187*   BILIRUBIN mg/dL 0.5   ALT (SGPT) U/L 20   AST (SGOT) U/L 32     Results from last 7 days   Lab Units 08/30/24  1638   SED RATE mm/hr 25*     Results from last 7 days   Lab Units 08/30/24  1638   CRP mg/dL 0.80*     Estimated Creatinine Clearance: 60.6 mL/min (by C-G formula based on SCr of 1.13 mg/dL).  Results from last 7 days   Lab Units 08/30/24  1638   LACTATE mmol/L 1.8                     Microbiology:    Microbiology Results (last 10 days)       Procedure Component Value - Date/Time    Blood Culture - Blood, Arm, Right [[id number]]  (Normal) Collected: 08/30/24 1800    Lab Status: Preliminary result Specimen: Blood from Arm, Right Updated: 08/31/24 1832     Blood Culture No growth at 24 hours    Narrative:      Less than seven (7) mL's of blood was collected.  Insufficient quantity may yield false negative results.    Blood Culture - Blood, Arm, Right [[id number]]  (Normal) Collected: 08/30/24 1638    Lab Status: Preliminary result Specimen: Blood from Arm, Right Updated: 09/01/24 1716     Blood Culture No growth at 2 days                Radiology:  FL Video Swallow With Speech Single Contrast    Result Date: 8/31/2024  Fluoroscopy provided for a modified barium swallow. Please see speech therapy report for full details and recommendations. There was no evidence of sylvain aspiration during the course of the exam. Electronically Signed: Romero Rubio MD  8/31/2024 6:27 PM EDT  Workstation ID: [id number]           DISCUSSION:  83 year old admitted with right lower extremity cellulitis, following right total knee replacement on 8/7. Started on oral antibiotics at [facility] prior to discharge without improvement and was referred for admission.  No concern for  septic arthritis at this time per orthopedics.     PROBLEM LIST:   - Right lower extremity cellulitis, with underlying prosthesis in place.  Appears superficial.  Improving with antibiotics.  - s/p Right TKA, 8/7/24.  No obvious joint inflammation with minimally elevated inflammatory markers.  Little concern for periprosthetic joint infection a this time. Ortho following.    PLAN:  -- Continue IV vancomycin and ceftriaxone for now  -- Follow exam  -- Potential change to oral antibiotics in the next couple of days or he could come to my office once daily for continued IV infusion if discharged home, though PT is recommending inpatient rehab.    Plan discussed with patient.    Jae Manzanares MD  9/1/2024  17:51 EDT

## 2024-09-01 NOTE — PROGRESS NOTES
"      Orthopaedic Surgery Progress Note    CC: Right leg pain      Subjective     Interval History:   Patient tells me his right leg is less tender today.  Continues to receive IV vancomycin and Rocephin.  Appreciate infectious disease service recommendations (Dr. Manzanares and team).      ROS: Denies fever, chills, nausea or vomiting    Objective     Vital Signs:  Temp (24hrs), Av.1 °F (36.7 °C), Min:97.5 °F (36.4 °C), Max:98.4 °F (36.9 °C)    /82 (BP Location: Left arm, Patient Position: Sitting)   Pulse 118   Temp 97.5 °F (36.4 °C) (Oral)   Resp 18   Ht 182.9 cm (72\")   Wt 99.8 kg (220 lb)   SpO2 90%   BMI 29.84 kg/m²       Physical Exam:  Patient continues to demonstrate painless active and passive range of motion of the right knee.  Induration is improved  Intensity of the erythema has improved  The area in question is nontender to palpation  The area in question is less swollen    Assessment and Plan:  83-year-old male status post right robot-assisted TKA on 2024.  Patient readmitted for new onset cellulitis that has failed outpatient oral antibiotic treatment.  Patient appears to be improving during this hospitalization with the IV antibiotics.  I am hesitant to recommend outpatient oral antibiotics unless we give him a trial here to ensure that he continues to improve.  He may be a better candidate for outpatient IV treatment until we know his cellulitis has resolved because of the high risk with the underlying prosthesis in place.  Continue physical therapy.  Physical therapy evaluation recommended discharge to inpatient rehab facility.  Will reexamine tomorrow and hopefully we can coordinate a discharge plan to accommodate both his need for physical therapy and IV antibiotics.    Christian Schwartz MD  24  09:34 EDT        " Physical Therapy    Physical Therapy Daily Treatment Note    Date:  2022    Patient Name:  Nahun Reynolds    :  1985  MRN: [id number]  Restrictions/Precautions:     Medical/Treatment Diagnosis Information:   Diagnosis: S82.891 R ankle Fx  Treatment Diagnosis: S82.892 R ankle Fx, fibular avulsion  Insurance/Certification information:  PT Insurance Information: BCBS  Physician Information:   [employer] of care signed (Y/N):    Visit# / total visits:    Pain level: 2/10       Time In:4:25   Time Out:4:50    Progress Note: [x]  Yes  []  No  Next due by: Visit #10 , or 22     Subjective:  See eval     Objective: See eval  Observation:   Test measurements:      Exercises:   Exercise/Equipment Resistance/Repetitions Other comments   Toe/ heel raise  On airex   Toe raise on blue beam 10x    Squat matrix 9 position    Lunges  Front/ lat / pivot   Step up   Front/ lat retro   Step up & over     Toe/ heel walk          BAPS  stand                             [x] Provided verbal/tactile cueing for activities related to strengthening, flexibility, endurance, ROM. (20083)  [] Provided verbal/tactile cueing for activities related to improving balance, coordination, kinesthetic sense, posture, motor skill, proprioception. (22804)    Therapeutic Activities:     [] Therapeutic activities, direct (one-on-one) patient contact (use of dynamic activities to improve functional performance). (43728)    Gait:   [] Provided training and instruction to the patient for ambulation re-education. (05244)    Self-Care/ADL's  [] Self-care/home management training and compensatory training, meal preparation, safety procedures, and instructions in use of assistive technology devices/adaptive equipment, direct one-on-one contact.  (45351)    Home Exercise Program:  Toe raise on 2\" pre-stretch   [x] Reviewed/Progressed HEP activities related to strengthening, flexibility, endurance, ROM. (78021)  [] Reviewed/Progressed HEP activities related to improving balance, coordination, kinesthetic sense, posture, motor skill, proprioception.  (89423)    Manual Treatments:    [] Provided manual therapy to mobilize soft tissue/joints for the purpose of modulating pain, promoting relaxation,  increasing ROM, reducing/eliminating soft tissue swelling/inflammation/restriction, improving soft tissue extensibility. (65251)    Service Based Modalities:  Eval 25'    Timed Code Treatment Minutes:        Total Treatment Minutes:   25'    Treatment/Activity Tolerance:  [x] Patient tolerated treatment well [] Patient limited by fatique  [] Patient limited by pain  [] Patient limited by other medical complications  [] Other:     Prognosis: [x] Good [] Fair  [] Poor    Patient Requires Follow-up: [x] Yes  [] No      Goals:  Short Term Goals  Time Frame for Short Term Goals: 1 week  Short Term Goal 1: Start HEP    Long Term Goals  Time Frame for Long Term Goals : 4 weeks  Long Term Goal 1: R ankle 0-1/10 pain during & after activity  Long Term Goal 2: Descend 1 flight of steps with no pain  Long Term Goal 3: Hop on R foot 10x  Long Term Goal 4: Walk in the grass, stones without sense of instability          Plan:   [] Continue per plan of care [] Alter current plan (see comments)  [x] Plan of care initiated [] Hold pending MD visit [] Discharge  Plan for Next Session:      Electronically signed by:  Mauro Martinez PT,PT

## 2024-09-01 NOTE — PROGRESS NOTES
"IM progress note      Guicho Blanco  [id number]  1940     LOS: 1 day     Attending: Berto Liu MD    Primary Care Provider: Mitchel Culver MD      Chief Complaint/Reason for visit:    Chief Complaint   Patient presents with    Post-op Problem       Subjective   Doing well overall.  Right knee redness is improving.  No side effects to antibiotic noted.  Good pain control.  No nausea, vomiting, or shortness of breath.    Objective        Visit Vitals  /75 (BP Location: Left arm, Patient Position: Sitting)   Pulse 118   Temp 97.6 °F (36.4 °C) (Oral)   Resp 18   Ht 182.9 cm (72\")   Wt 99.8 kg (220 lb)   SpO2 90%   BMI 29.84 kg/m²     Temp (24hrs), Av °F (36.7 °C), Min:97.5 °F (36.4 °C), Max:98.4 °F (36.9 °C)      Intake/Output:    Intake/Output Summary (Last 24 hours) at 2024 1428  Last data filed at 2024 1246  Gross per 24 hour   Intake 600 ml   Output 400 ml   Net 200 ml        Physical Therapy:   Deanna Luque, EL   Physical Therapist  Physical Therapy     Plan of Care      Signed     Date of Service: 24 1033  Creation Time: 24 1110     Signed         Goal Outcome Evaluation:  Plan of Care Reviewed With: patient  Progress: improving  Outcome Evaluation: Pt amb 100 with FWW and CGA. VC for safety awareness. Gait deviations worsened as pt fatigued. Pt continues to be at high risk for falls secondary to mobility deficits and decreased insight into need for assistance. Continue to recommend d/c to [facility] to promote increased independence and decrease risk for falls.        Anticipated Discharge Disposition (PT): inpatient rehabilitation facility                   Physical Exam:     General Appearance:    Alert, cooperative, in no acute distress   Head:    Normocephalic, without obvious abnormality, atraumatic    Lungs:     Normal effort, symmetric chest rise,  clear to      auscultation bilaterally              Heart:    Regular rhythm and normal rate, normal S1 " and S2    Abdomen:     Normal bowel sounds, no masses, no organomegaly, soft        non-tender, non-distended, no guarding, no rebound                tenderness   Extremities: Decreased erythema around right knee.    No drainage from incision.  No clubbing, cyanosis or edema.  No deformities.    Pulses:   Pulses palpable and equal bilaterally   Skin:   No bleeding, bruising or rash          Results Review:     I reviewed the patient's new clinical results.   Results from last 7 days   Lab Units 08/30/24  1638   WBC 10*3/mm3 6.98   HEMOGLOBIN g/dL 11.5*   HEMATOCRIT % 36.1*   PLATELETS 10*3/mm3 314     Results from last 7 days   Lab Units 08/30/24  1638   SODIUM mmol/L 136   POTASSIUM mmol/L 4.1   CHLORIDE mmol/L 101   CO2 mmol/L 22.0   BUN mg/dL 12   CREATININE mg/dL 1.13   CALCIUM mg/dL 9.5   BILIRUBIN mg/dL 0.5   ALK PHOS U/L 187*   ALT (SGPT) U/L 20   AST (SGOT) U/L 32   GLUCOSE mg/dL 103*     I reviewed the patient's new imaging including images and reports.    All medications reviewed.   aspirin, 325 mg, Oral, Daily  cefTRIAXone, 2,000 mg, Intravenous, Q24H  flecainide, 50 mg, Oral, Q12H  isosorbide mononitrate, 30 mg, Oral, Daily  meloxicam, 7.5 mg, Oral, Daily  nortriptyline, 20 mg, Oral, Nightly  pantoprazole, 40 mg, Oral, Q AM  pravastatin, 20 mg, Oral, Daily  sodium chloride, 10 mL, Intravenous, Q12H  vancomycin, 1,500 mg, Intravenous, Daily      docusate sodium, 100 mg, BID PRN  Pharmacy to dose vancomycin, , Continuous PRN  polyethylene glycol, 17 g, Daily PRN  sodium chloride, 10 mL, PRN  sodium chloride, 10 mL, PRN  sodium chloride, 40 mL, PRN        Assessment & Plan       Cellulitis    PAF (paroxysmal atrial fibrillation)    Coronary artery disease involving native coronary artery of native heart without angina pectoris    Mixed hyperlipidemia  Mild oral dysphagia and pharyngeal dysphagia       Plan  1. PT/OT, continue.  Inpatient rehab recommended.  2. Pain control-prns   3. IS-encouraged  4. DVT  proph-aspirin.  5. Bowel regimen  6. Monitor post-op labs  7. DC planning, inpatient rehab is recommended at this point.     Continue antibiotics per ID.  Monitor for adverse drug reactions.  Discussed with Dr. Manzanares yesterday    HTN, Hyperlipidemia, afib, CAD  - Continue home Tambocor, Imdur and statin  - Monitor BP   - Holding parameters for BP meds  - Labetalol PRN for SBP>170     Continue soft to chew textured diet, chopped, thin liquids, per speech therapy recommendation       Berto Liu MD  09/01/24  14:28 EDT

## 2024-09-01 NOTE — PLAN OF CARE
Due to deafness, white board utilized for communications. Although Patient has been instructed multiple times to use call bell before going to bathroom, patient would just stand up and go about. Not quite sure if this is due to forgetfulness or intermittent confusion.

## 2024-09-01 NOTE — THERAPY TREATMENT NOTE
Patient Name: Guicho Blanco  : 1940    MRN: [id number]                              Today's Date: 2024       Admit Date: 2024    Visit Dx:     ICD-10-CM ICD-9-CM   1. Cellulitis of right lower extremity  L03.115 682.6   2. Failure of outpatient treatment  Z78.9 V49.89   3. Oropharyngeal dysphagia  R13.12 787.22     Patient Active Problem List   Diagnosis    Osteomyelitis of toe of right foot    Osteomyelitis of right foot    PAF (paroxysmal atrial fibrillation)    Coronary artery disease involving native coronary artery of native heart without angina pectoris    Gastroesophageal reflux disease without esophagitis    Benign non-nodular prostatic hyperplasia without lower urinary tract symptoms    S/P appy    Essential hypertension    Simple chronic bronchitis    Mixed hyperlipidemia    COPD (chronic obstructive pulmonary disease)    BPH (benign prostatic hypertrophy)    Hearing loss    Chest pain    Vertigo    Snoring    Overweight    PLMD (periodic limb movement disorder)    Neuropathy    Periodic headache syndrome, not intractable    Acquired absence of other right toe(s)    Tremor    Bilateral impacted cerumen    Bilateral sensorineural hearing loss    Chronic otitis externa    Deviated nasal septum    Disorder of joint of foot    ED (erectile dysfunction) of organic origin    Hydrocele    Hydrocele of testis    Impacted cerumen    Mononeuropathy    Swelling of testicle    Venous retinal branch occlusion    Vitreous degeneration    Benign prostatic hyperplasia with urinary obstruction    OA (osteoarthritis) of knee    Status post total right knee replacement    Septic arthritis    Cellulitis     Past Medical History:   Diagnosis Date    Arthritis     Atrial fibrillation     BPH (benign prostatic hypertrophy)     Cataract     Cellulitis     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     bilateral    Coronary artery disease     Full dentures     Gall stones     GERD (gastroesophageal reflux disease)      Hearing loss     Hiatal hernia     Hyperlipidemia     Hypertension     Peripheral neuropathy     Peripheral vascular disease     Seasonal allergies     Skin cancer     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    TIA (transient ischemic attack)     2 times    Wears glasses      Past Surgical History:   Procedure Laterality Date    ABLATION OF DYSRHYTHMIC FOCUS      APPENDECTOMY      CARDIAC CATHETERIZATION N/A 10/05/2017    Procedure: Left Heart Cath;  Surgeon: Hector Urrutia MD;  Location:  [facility];  Service:     CATARACT EXTRACTION Bilateral     CHOLECYSTECTOMY      Remote    COCHLEAR IMPLANT REVISION  05/2019    COLONOSCOPY      PROSTATE SURGERY      SHOULDER SURGERY Right     SKIN BIOPSY      TOE AMPUTATION Right     2nd toe - Right Foot    TONSILLECTOMY      TOTAL KNEE ARTHROPLASTY Right 8/7/2024    Procedure: TOTAL KNEE ARTHROPLASTY WITH ASHLEY ROBOT RIGHT;  Surgeon: Christian Schwartz MD;  Location:  [facility];  Service: Robotics - Ortho;  Laterality: Right;      General Information       Row Name 09/01/24 1103          Physical Therapy Time and Intention    Document Type therapy note (daily note)  -     Mode of Treatment physical therapy  -       Row Name 09/01/24 1103          General Information    Patient Profile Reviewed yes  -     Existing Precautions/Restrictions fall;other (see comments)  [place] w/ cochlear hearing aids, neuropathy at B LE, recent R TKA 8/7/24 with WBAT RLE, 2nd toe amputation on RLE after cellulitis in the past  -       Row Name 09/01/24 1103          Cognition    Orientation Status (Cognition) oriented x 3  -       Row Name 09/01/24 1103          Safety Issues, Functional Mobility    Impairments Affecting Function (Mobility) balance;endurance/activity tolerance;pain;strength;motor control;range of motion (ROM);sensation/sensory awareness  -               User Key  (r) = Recorded By, (t) = Taken By, (c) = Cosigned By      Initials Name Provider Type    HW Rebel  EL Huerta Physical Therapist                   Mobility       Row Name 09/01/24 1104          Bed Mobility    Bed Mobility supine-sit  -     Supine-Sit [place] (Bed Mobility) standby assist  -     Assistive Device (Bed Mobility) head of bed elevated;bed rails  -       Row Name 09/01/24 1104          Transfers    Comment, (Transfers) Min A for boost to stand  -       Row Name 09/01/24 1104          Sit-Stand Transfer    Sit-Stand [place] (Transfers) minimum assist (75% patient effort);nonverbal cues (demo/gesture);verbal cues  -     Assistive Device (Sit-Stand Transfers) walker, front-wheeled  -       Row Name 09/01/24 1104          Gait/Stairs (Locomotion)    [place] Level (Gait) contact guard  -     Assistive Device (Gait) walker, front-wheeled  -     Distance in Feet (Gait) 100  -HW     Deviations/Abnormal Patterns (Gait) bilateral deviations;festinating/shuffling;gait speed decreased;stride length decreased;base of support, wide  -     Left Sided Gait Deviations decreased knee extension  -     Right Sided Gait Deviations decreased knee extension  -     Comment, (Gait/Stairs) Pt amb with step-through gait pattern with short, shuffling steps, B knee flexion, decreased stirde length, and significant forward flexed posture. Pt is impulsive with mobility and VC for safety awareness required. Pt demonstrated difficulty correcting gait deviations with frequent VC for standing up tall, extending B knees, and staying closer to AD. Gait deviations worsened as pt fatigued. Activity limited by fatigue.  -       Row Name 09/01/24 1104          Mobility    Extremity Weight-bearing Status right lower extremity  -     Right Lower Extremity (Weight-bearing Status) weight-bearing as tolerated (WBAT)  -               User Key  (r) = Recorded By, (t) = Taken By, (c) = Cosigned By      Initials Name Provider Type     Deanna Luque PT Physical Therapist                   Obj/Interventions        [facility] Name 09/01/24 1107          Range of Motion Comprehensive    General Range of Motion lower extremity range of motion deficits identified  -     Comment, General Range of Motion Surgical knee ROM:   -[facility] Name 09/01/24 1107          Strength Comprehensive (MMT)    General Manual Muscle Testing (MMT) Assessment lower extremity strength deficits identified  -     Comment, General Manual Muscle Testing (MMT) Assessment Pt able to perform B active ankle DF and SLR  -[facility] Name 09/01/24 1107          Motor Skills    Therapeutic Exercise knee;ankle  -[facility] Name 09/01/24 1107          Knee (Therapeutic Exercise)    Knee (Therapeutic Exercise) isometric exercises;strengthening exercise  -     Knee Isometrics (Therapeutic Exercise) right;quad sets;10 repetitions;5 repetitions;2 sets  -     Knee Strengthening (Therapeutic Exercise) right;heel slides;SLR (straight leg raise);SAQ (short arc quad);LAQ (long arc quad);15 repititions;2 sets  -[facility] Name 09/01/24 1107          Ankle (Therapeutic Exercise)    Ankle (Therapeutic Exercise) AROM (active range of motion)  -     Ankle AROM (Therapeutic Exercise) bilateral;dorsiflexion;plantarflexion;15 repititions;2 sets  -[facility] Name 09/01/24 1107          Balance    Balance Assessment sitting static balance;sitting dynamic balance;sit to stand dynamic balance;standing static balance;standing dynamic balance  -     Static Sitting Balance standby assist  -     Dynamic Sitting Balance standby assist  -     Position, Sitting Balance sitting edge of bed  -     Static Standing Balance contact guard  -     Dynamic Standing Balance contact guard  -     Position/Device Used, Standing Balance supported;walker, rolling  -     Balance Interventions sitting;standing;sit to stand;occupation based/functional task  -               User Key  (r) = Recorded By, (t) = Taken By, (c) = Cosigned By      Initials Name Provider Type      Deanna Luque PT Physical Therapist                   Goals/Plan    No documentation.                  Clinical Impression       Row Name 09/01/24 1108          Pain    Pretreatment Pain Rating 0/10 - no pain  -     Posttreatment Pain Rating 0/10 - no pain  -       Row Name 09/01/24 1108          Plan of Care Review    Plan of Care Reviewed With patient  -     Progress improving  -     Outcome Evaluation Pt amb 100 with FWW and CGA. VC for safety awareness. Gait deviations worsened as pt fatigued. Pt continues to be at high risk for falls secondary to mobility deficits and decreased insight into need for assistance. Continue to recommend d/c to IRF to promote increased independence and decrease risk for falls.  -       Row Name 09/01/24 1108          Positioning and Restraints    Pre-Treatment Position in bed  -     Post Treatment Position chair  -     In Chair notified nsg;reclined;sitting;encouraged to call for assist;call light within reach;exit alarm on;legs elevated  -               User Key  (r) = Recorded By, (t) = Taken By, (c) = Cosigned By      Initials Name Provider Type     Deanna Luque PT Physical Therapist                   Outcome Measures       Row Name 09/01/24 1110 09/01/24 0812       How much help from another person do you currently need...    Turning from your back to your side while in flat bed without using bedrails? 3  - 4  -LH    Moving from lying on back to sitting on the side of a flat bed without bedrails? 3  - 3  -LH    Moving to and from a bed to a chair (including a wheelchair)? 3  - 3  -LH    Standing up from a chair using your arms (e.g., wheelchair, bedside chair)? 3  - 3  -LH    Climbing 3-5 steps with a railing? 2  - 3  -LH    To walk in hospital room? 3  - 3  -LH    AM-PAC 6 Clicks Score (PT) 17  - 19  -    Highest Level of Mobility Goal 5 --> Static standing  - 6 --> Walk 10 steps or more  -      Row Name 09/01/24 1110           Functional Assessment    Outcome Measure Options AM-PAC 6 Clicks Basic Mobility (PT)  -               User Key  (r) = Recorded By, (t) = Taken By, (c) = Cosigned By      Initials Name Provider Type     Deanna Luque, PT Physical Therapist     Stacy Dallas, RN Registered Nurse                                 Physical Therapy Education       Title: PT OT SLP Therapies (In Progress)       Topic: Physical Therapy (Done)       Point: Mobility training (Done)       Learning Progress Summary             Patient Acceptance, E,D, VU,NR by  at 9/1/2024 1110    Acceptance, E,TB, VU by  at 9/1/2024 0915    Acceptance, E,D, VU,NR by  at 8/31/2024 1157                         Point: Home exercise program (Done)       Learning Progress Summary             Patient Acceptance, E,D, VU,NR by  at 9/1/2024 1110    Acceptance, E,TB, VU by  at 9/1/2024 0915    Acceptance, E,D, VU,NR by  at 8/31/2024 1157                         Point: Body mechanics (Done)       Learning Progress Summary             Patient Acceptance, E,D, VU,NR by  at 9/1/2024 1110    Acceptance, E,TB, VU by  at 9/1/2024 0915    Acceptance, E,D, VU,NR by  at 8/31/2024 1157                         Point: Precautions (Done)       Learning Progress Summary             Patient Acceptance, E,D, VU,NR by  at 9/1/2024 1110    Acceptance, E,TB, VU by  at 9/1/2024 0915    Acceptance, E,D, VU,NR by  at 8/31/2024 1157                                         User Key       Initials Effective Dates Name Provider Type Discipline     12/15/23 -  Deanna Luque, PT Physical Therapist PT     09/07/23 -  Stacy Dallas, RN Registered Nurse Nurse                  PT Recommendation and Plan  Planned Therapy Interventions (PT): balance training, bed mobility training, gait training, home exercise program, patient/family education, ROM (range of motion), strengthening, stretching, transfer training  Plan of Care Reviewed With: patient  Progress: improving  Outcome  Evaluation: Pt amb 100 with FWW and CGA. VC for safety awareness. Gait deviations worsened as pt fatigued. Pt continues to be at high risk for falls secondary to mobility deficits and decreased insight into need for assistance. Continue to recommend d/c to IRF to promote increased independence and decrease risk for falls.     Time Calculation:   PT Evaluation Complexity  History, PT Evaluation Complexity: 1-2 personal factors and/or comorbidities  Examination of Body Systems (PT Eval Complexity): total of 3 or more elements  Clinical Presentation (PT Evaluation Complexity): evolving  Clinical Decision Making (PT Evaluation Complexity): moderate complexity  Overall Complexity (PT Evaluation Complexity): moderate complexity     PT Charges       Row Name 09/01/24 1110             Time Calculation    Start Time 1033  -HW      PT Received On 09/01/24  -HW         Timed Charges    80321 - PT Therapeutic Exercise Minutes 14  -HW      79163 - Gait Training Minutes  10  -HW         Total Minutes    Timed Charges Total Minutes 24  -HW       Total Minutes 24  -HW                User Key  (r) = Recorded By, (t) = Taken By, (c) = Cosigned By      Initials Name Provider Type     Deanna Luque, PT Physical Therapist                  Therapy Charges for Today       Code Description Service Date Service Provider Modifiers Qty    66685014049 HC PT THER PROC EA 15 MIN 8/31/2024 Deanna Luque, PT GP 1    05506294450 HC PT EVAL MOD COMPLEXITY 4 8/31/2024 Deanna Luque, PT GP 1    59007658237 HC PT THER PROC EA 15 MIN 9/1/2024 Deanna Luque, PT GP 1    64915214283 HC GAIT TRAINING EA 15 MIN 9/1/2024 Deanna Luque, PT GP 1            PT G-Codes  Outcome Measure Options: AM-PAC 6 Clicks Basic Mobility (PT)  AM-PAC 6 Clicks Score (PT): 17  AM-PAC 6 Clicks Score (OT): 18  PT Discharge Summary  Anticipated Discharge Disposition (PT): inpatient rehabilitation facility    Deanna Luque PT  9/1/2024

## 2024-09-01 NOTE — PLAN OF CARE
Goal Outcome Evaluation:  Plan of Care Reviewed With: patient        Progress: improving  Outcome Evaluation: Pt amb 100 with FWW and CGA. VC for safety awareness. Gait deviations worsened as pt fatigued. Pt continues to be at high risk for falls secondary to mobility deficits and decreased insight into need for assistance. Continue to recommend d/c to IRF to promote increased independence and decrease risk for falls.      Anticipated Discharge Disposition (PT): inpatient rehabilitation facility

## 2024-09-02 ENCOUNTER — HOME CARE VISIT (OUTPATIENT)
Dept: HOME HEALTH SERVICES | Facility: HOME HEALTHCARE | Age: 84
End: 2024-09-02
Payer: COMMERCIAL

## 2024-09-02 PROCEDURE — 25010000002 CEFTRIAXONE PER 250 MG: Performed by: NURSE PRACTITIONER

## 2024-09-02 PROCEDURE — 97116 GAIT TRAINING THERAPY: CPT

## 2024-09-02 PROCEDURE — 25010000002 VANCOMYCIN HCL IN NACL 1.5-0.9 GM/500ML-% SOLUTION

## 2024-09-02 RX ADMIN — Medication 10 ML: at 20:23

## 2024-09-02 RX ADMIN — FLECAINIDE ACETATE 50 MG: 50 TABLET ORAL at 09:48

## 2024-09-02 RX ADMIN — FLECAINIDE ACETATE 50 MG: 50 TABLET ORAL at 20:20

## 2024-09-02 RX ADMIN — Medication 10 ML: at 10:00

## 2024-09-02 RX ADMIN — SODIUM CHLORIDE 2000 MG: 900 INJECTION INTRAVENOUS at 18:02

## 2024-09-02 RX ADMIN — ISOSORBIDE MONONITRATE 30 MG: 30 TABLET, EXTENDED RELEASE ORAL at 09:48

## 2024-09-02 RX ADMIN — PANTOPRAZOLE SODIUM 40 MG: 40 TABLET, DELAYED RELEASE ORAL at 06:31

## 2024-09-02 RX ADMIN — Medication 1500 MG: at 09:48

## 2024-09-02 RX ADMIN — MELOXICAM 7.5 MG: 7.5 TABLET ORAL at 09:48

## 2024-09-02 RX ADMIN — PRAVASTATIN SODIUM 20 MG: 20 TABLET ORAL at 09:48

## 2024-09-02 RX ADMIN — NORTRIPTYLINE HYDROCHLORIDE 20 MG: 10 CAPSULE ORAL at 20:19

## 2024-09-02 RX ADMIN — ASPIRIN 325 MG: 325 TABLET ORAL at 09:48

## 2024-09-02 NOTE — PLAN OF CARE
Goal Outcome Evaluation:  Plan of Care Reviewed With: patient        Progress: no change  Outcome Evaluation: Pt amb to/from bathroom with increased independence this session. Pt able to navigate environment and perform functional tasks without need for physical assistance. Mild unsteadiness noted though no LOB observed. Pt continues to be at risk for falls. Would recommend d/c home with 24/7 assist and HHPT. If 24/7 assistance is not available, recommend IRF for safest d/c option to promote decreased risk for falls.      Anticipated Discharge Disposition (PT): home with 24/7 care, home with home health

## 2024-09-02 NOTE — PROGRESS NOTES
"  INFECTIOUS DISEASES  INPATIENT PROGRESS NOTE  2024      PATIENT NAME: Guicho Blanco  :  1940  MRN:  [id number]  Date of Admission:  2024      Antimicrobials:  IV vancomycin  Ceftriaxone      MAR reviewed.      Reason for consultation: Right knee cellulitis after recent arthroplasty    Interval history: Doing fine.  Still thinking about rehab.  No fevers.  Tolerating antibiotics.  His knee continues to improve.    ROS:  No fevers/chills overnight.  No new rashes.  No SOB or chest pain.  No nausea/vomiting/diarrhea.  Denies side effects from antimicrobials.      Objective:  Temp (24hrs), Av.9 °F (36.6 °C), Min:97.7 °F (36.5 °C), Max:98.4 °F (36.9 °C)    /66 (BP Location: Left arm, Patient Position: Lying)   Pulse 103   Temp 98 °F (36.7 °C) (Oral)   Resp 18   Ht 182.9 cm (72\")   Wt 99.8 kg (220 lb)   SpO2 100%   BMI 29.84 kg/m²     Physical Examination:  GENERAL: Awake and alert, in no acute distress.   HEENT: Normocephalic, atraumatic.  EOMI. No conjunctival injection.  LUNGS: Normal respiratory effort. Nonlabored.   EXT:  1+ right leg pitting edema - improvement  MS: Good movement of right knee without significant pain.  SKIN:  right TKA incision well approximated.  Erythema distally and medial knee is fading decreased warmth with some slight desquamation; no drainage.  No crepitus, induration, or fluctuance.  NEURO: Oriented to PPT.  Motor strength intact  PSYCHIATRIC: Normal insight and judgment. Cooperative with PE    Laboratory Data:    Results from last 7 days   Lab Units 24  1638   WBC 10*3/mm3 6.98   HEMOGLOBIN g/dL 11.5*   HEMATOCRIT % 36.1*   PLATELETS 10*3/mm3 314     Results from last 7 days   Lab Units 24  1638   SODIUM mmol/L 136   POTASSIUM mmol/L 4.1   CHLORIDE mmol/L 101   CO2 mmol/L 22.0   BUN mg/dL 12   CREATININE mg/dL 1.13   GLUCOSE mg/dL 103*   CALCIUM mg/dL 9.5     Results from last 7 days   Lab Units 24  1638   ALK PHOS U/L 187* "   BILIRUBIN mg/dL 0.5   ALT (SGPT) U/L 20   AST (SGOT) U/L 32     Results from last 7 days   Lab Units 08/30/24  1638   SED RATE mm/hr 25*     Results from last 7 days   Lab Units 08/30/24  1638   CRP mg/dL 0.80*     Estimated Creatinine Clearance: 60.6 mL/min (by C-G formula based on SCr of 1.13 mg/dL).  Results from last 7 days   Lab Units 08/30/24  1638   LACTATE mmol/L 1.8                     Microbiology:    Microbiology Results (last 10 days)       Procedure Component Value - Date/Time    Blood Culture - Blood, Arm, Right [[id number]]  (Normal) Collected: 08/30/24 1800    Lab Status: Preliminary result Specimen: Blood from Arm, Right Updated: 09/02/24 1831     Blood Culture No growth at 3 days    Narrative:      Less than seven (7) mL's of blood was collected.  Insufficient quantity may yield false negative results.    Blood Culture - Blood, Arm, Right [[id number]]  (Normal) Collected: 08/30/24 1638    Lab Status: Preliminary result Specimen: Blood from Arm, Right Updated: 09/02/24 1715     Blood Culture No growth at 3 days                Radiology:  No radiology results for the last day          DISCUSSION:  83 year old admitted with right lower extremity cellulitis, following right total knee replacement on 8/7. Started on oral antibiotics at [facility] prior to discharge without improvement and was referred for admission.  No concern for septic arthritis at this time per orthopedics.     PROBLEM LIST:   - Right lower extremity cellulitis, with underlying prosthesis in place.  Appears superficial.  Improving with antibiotics.  - s/p Right TKA, 8/7/24.  No obvious joint inflammation with minimally elevated inflammatory markers.  Little concern for periprosthetic joint infection at this time. Ortho following.    PLAN:  -- Continue IV vancomycin and ceftriaxone for now  -- Follow exam - ongoing improvement  -- Case management plans to reach out to rehab facilities tomorrow.  Expect we will be able to  transition over to oral antibiotics by time of discharge, either to home versus rehab.    Plan discussed with patient.    Jae Manzanares MD  9/2/2024  18:38 EDT

## 2024-09-02 NOTE — THERAPY TREATMENT NOTE
Patient Name: Guicho Blanco  : 1940    MRN: [id number]                              Today's Date: 2024       Admit Date: 2024    Visit Dx:     ICD-10-CM ICD-9-CM   1. Cellulitis of right lower extremity  L03.115 682.6   2. Failure of outpatient treatment  Z78.9 V49.89   3. Oropharyngeal dysphagia  R13.12 787.22     Patient Active Problem List   Diagnosis    Osteomyelitis of toe of right foot    Osteomyelitis of right foot    PAF (paroxysmal atrial fibrillation)    Coronary artery disease involving native coronary artery of native heart without angina pectoris    Gastroesophageal reflux disease without esophagitis    Benign non-nodular prostatic hyperplasia without lower urinary tract symptoms    S/P appy    Essential hypertension    Simple chronic bronchitis    Mixed hyperlipidemia    COPD (chronic obstructive pulmonary disease)    BPH (benign prostatic hypertrophy)    Hearing loss    Chest pain    Vertigo    Snoring    Overweight    PLMD (periodic limb movement disorder)    Neuropathy    Periodic headache syndrome, not intractable    Acquired absence of other right toe(s)    Tremor    Bilateral impacted cerumen    Bilateral sensorineural hearing loss    Chronic otitis externa    Deviated nasal septum    Disorder of joint of foot    ED (erectile dysfunction) of organic origin    Hydrocele    Hydrocele of testis    Impacted cerumen    Mononeuropathy    Swelling of testicle    Venous retinal branch occlusion    Vitreous degeneration    Benign prostatic hyperplasia with urinary obstruction    OA (osteoarthritis) of knee    Status post total right knee replacement    Septic arthritis    Cellulitis     Past Medical History:   Diagnosis Date    Arthritis     Atrial fibrillation     BPH (benign prostatic hypertrophy)     Cataract     Cellulitis     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     bilateral    Coronary artery disease     Full dentures     Gall stones     GERD (gastroesophageal reflux disease)      Hearing loss     Hiatal hernia     Hyperlipidemia     Hypertension     Peripheral neuropathy     Peripheral vascular disease     Seasonal allergies     Skin cancer     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    TIA (transient ischemic attack)     2 times    Wears glasses      Past Surgical History:   Procedure Laterality Date    ABLATION OF DYSRHYTHMIC FOCUS      APPENDECTOMY      CARDIAC CATHETERIZATION N/A 10/05/2017    Procedure: Left Heart Cath;  Surgeon: Hector Urrutia MD;  Location:  [facility];  Service:     CATARACT EXTRACTION Bilateral     CHOLECYSTECTOMY      Remote    COCHLEAR IMPLANT REVISION  05/2019    COLONOSCOPY      PROSTATE SURGERY      SHOULDER SURGERY Right     SKIN BIOPSY      TOE AMPUTATION Right     2nd toe - Right Foot    TONSILLECTOMY      TOTAL KNEE ARTHROPLASTY Right 8/7/2024    Procedure: TOTAL KNEE ARTHROPLASTY WITH ASHLEY ROBOT RIGHT;  Surgeon: Christian Schwartz MD;  Location:  [facility];  Service: Robotics - Ortho;  Laterality: Right;      General Information       Row Name 09/02/24 0902          Physical Therapy Time and Intention    Document Type therapy note (daily note)  -     Mode of Treatment physical therapy  -       Row Name 09/02/24 0902          General Information    Patient Profile Reviewed yes  -     Existing Precautions/Restrictions fall;other (see comments)  [place] w/ cochlear hearing aids, neuropathy at B LE, recent R TKA 8/7/24 with WBAT RLE, 2nd toe amputation on RLE after cellulitis in the past  -       Row Name 09/02/24 0902          Cognition    Orientation Status (Cognition) oriented x 3  -       Row Name 09/02/24 0902          Safety Issues, Functional Mobility    Impairments Affecting Function (Mobility) balance;endurance/activity tolerance;pain;strength;motor control;range of motion (ROM);sensation/sensory awareness  -               User Key  (r) = Recorded By, (t) = Taken By, (c) = Cosigned By      Initials Name Provider Type    HW Rebel  Deanna, PT Physical Therapist                   Mobility       Row Name 09/02/24 0903          Bed Mobility    Comment, (Bed Mobility) pt sitting on EOB upon entry into room  -       Row Name 09/02/24 0903          Transfers    Comment, (Transfers) Pt performed STS with SBA. Multiple mini scoots initially towards EOB prior to transition.  -       Row Name 09/02/24 0903          Sit-Stand Transfer    Sit-Stand Evangeline (Transfers) standby assist  -     Assistive Device (Sit-Stand Transfers) walker, front-wheeled  -       Row Name 09/02/24 0903          Gait/Stairs (Locomotion)    Evangeline Level (Gait) standby assist  -     Assistive Device (Gait) walker, front-wheeled  -     Distance in Feet (Gait) 40  -     Deviations/Abnormal Patterns (Gait) bilateral deviations;festinating/shuffling;gait speed decreased;stride length decreased;base of support, wide  -     Left Sided Gait Deviations decreased knee extension  -     Right Sided Gait Deviations decreased knee extension  -     Comment, (Gait/Stairs) Minimal VC given to assess how pt would perform in home environment. Pt demonstrated short, shuffling steps when ambulating to bathroom. Forward flexed posture and B knee flexion noted. Pt able to stand at sink and wash dentures, open and close doors, and wash hands without need for physical assistance. Unsteadiness observed though no overt LOB noted. One episode of decreased safety awareness with VC for keeping walker near by as he turned to sit in chair. Pt left walker and reached back with hands for armrests prior to sitting. Further mobility limited by fatigue and breakfast.  -       Row Name 09/02/24 0903          Mobility    Extremity Weight-bearing Status right lower extremity  -     Right Lower Extremity (Weight-bearing Status) weight-bearing as tolerated (WBAT)  -               User Key  (r) = Recorded By, (t) = Taken By, (c) = Cosigned By      Initials Name Provider Type      Deanna Luque, EL Physical Therapist                   Obj/Interventions       Row Name 09/02/24 0909          Motor Skills    Therapeutic Exercise other (see comments)  deferred due to breakfast  -       Row Name 09/02/24 0909          Balance    Balance Assessment sitting static balance;sitting dynamic balance;sit to stand dynamic balance;standing static balance;standing dynamic balance  -     Static Sitting Balance independent  -     Dynamic Sitting Balance independent  -HW     Position, Sitting Balance sitting edge of bed  -     Static Standing Balance standby assist  -     Dynamic Standing Balance standby assist  -     Position/Device Used, Standing Balance supported;walker, rolling  -     Balance Interventions sitting;standing;sit to stand;occupation based/functional task  -               User Key  (r) = Recorded By, (t) = Taken By, (c) = Cosigned By      Initials Name Provider Type     Deanna Luque, EL Physical Therapist                   Goals/Plan    No documentation.                  Clinical Impression       Row Name 09/02/24 0910          Pain    Pretreatment Pain Rating 0/10 - no pain  -     Posttreatment Pain Rating 0/10 - no pain  -       Row Name 09/02/24 0910          Plan of Care Review    Plan of Care Reviewed With patient  -     Progress no change  -     Outcome Evaluation Pt amb to/from bathroom with increased independence this session. Pt able to navigate environment and perform functional tasks without need for physical assistance. Mild unsteadiness noted though no LOB observed. Pt continues to be at risk for falls. Would recommend d/c home with 24/7 assist and HHPT. If 24/7 assistance is not available, recommend IRF for safest d/c option to promote decreased risk for falls.  -       Row Name 09/02/24 0910          Positioning and Restraints    Pre-Treatment Position in bed  -     Post Treatment Position chair  -     In Chair notified nsg;reclined;sitting;call  light within reach;encouraged to call for assist;exit alarm on;legs elevated;waffle cushion  -               User Key  (r) = Recorded By, (t) = Taken By, (c) = Cosigned By      Initials Name Provider Type     Deanna Luque PT Physical Therapist                   Outcome Measures       Row Name 09/02/24 0913          How much help from another person do you currently need...    Turning from your back to your side while in flat bed without using bedrails? 4  -HW     Moving from lying on back to sitting on the side of a flat bed without bedrails? 4  -HW     Moving to and from a bed to a chair (including a wheelchair)? 3  -HW     Standing up from a chair using your arms (e.g., wheelchair, bedside chair)? 3  -HW     Climbing 3-5 steps with a railing? 3  -HW     To walk in hospital room? 3  -HW     AM-PAC 6 Clicks Score (PT) 20  -HW     Highest Level of Mobility Goal 6 --> Walk 10 steps or more  -       Row Name 09/02/24 0913          Functional Assessment    Outcome Measure Options AM-PAC 6 Clicks Basic Mobility (PT)  -               User Key  (r) = Recorded By, (t) = Taken By, (c) = Cosigned By      Initials Name Provider Type     Deanna Luque PT Physical Therapist                                 Physical Therapy Education       Title: PT OT SLP Therapies (In Progress)       Topic: Physical Therapy (Done)       Point: Mobility training (Done)       Learning Progress Summary             Patient Acceptance, E,D, VU by  at 9/2/2024 0914    Acceptance, E,D, VU,NR by  at 9/1/2024 1110    Acceptance, E,TB, VU by  at 9/1/2024 0915    Acceptance, E,D, VU,NR by  at 8/31/2024 1157                         Point: Home exercise program (Done)       Learning Progress Summary             Patient Acceptance, E,D, VU,NR by  at 9/1/2024 1110    Acceptance, E,TB, VU by  at 9/1/2024 0915    Acceptance, E,D, VU,NR by  at 8/31/2024 1157                         Point: Body mechanics (Done)       Learning Progress  Summary             Patient Acceptance, E,D, VU by  at 9/2/2024 0914    Acceptance, E,D, VU,NR by  at 9/1/2024 1110    Acceptance, E,TB, VU by  at 9/1/2024 0915    Acceptance, E,D, VU,NR by  at 8/31/2024 1157                         Point: Precautions (Done)       Learning Progress Summary             Patient Acceptance, E,D, VU by  at 9/2/2024 0914    Acceptance, E,D, VU,NR by  at 9/1/2024 1110    Acceptance, E,TB, VU by  at 9/1/2024 0915    Acceptance, E,D, VU,NR by  at 8/31/2024 1157                                         User Key       Initials Effective Dates Name Provider Type Discipline     12/15/23 -  Deanna Luque, EL Physical Therapist PT     09/07/23 -  Stacy Dallas RN Registered Nurse Nurse                  PT Recommendation and Plan  Planned Therapy Interventions (PT): balance training, bed mobility training, gait training, home exercise program, patient/family education, ROM (range of motion), strengthening, stretching, transfer training  Plan of Care Reviewed With: patient  Progress: no change  Outcome Evaluation: Pt amb to/from bathroom with increased independence this session. Pt able to navigate environment and perform functional tasks without need for physical assistance. Mild unsteadiness noted though no LOB observed. Pt continues to be at risk for falls. Would recommend d/c home with 24/7 assist and HHPT. If 24/7 assistance is not available, recommend IRF for safest d/c option to promote decreased risk for falls.     Time Calculation:   PT Evaluation Complexity  History, PT Evaluation Complexity: 1-2 personal factors and/or comorbidities  Examination of Body Systems (PT Eval Complexity): total of 3 or more elements  Clinical Presentation (PT Evaluation Complexity): evolving  Clinical Decision Making (PT Evaluation Complexity): moderate complexity  Overall Complexity (PT Evaluation Complexity): moderate complexity     PT Charges       Row Name 09/02/24 0914             Time  Calculation    Start Time 0842  -HW      PT Received On 09/02/24  -HW         Timed Charges    31629 - Gait Training Minutes  10  -HW      78733 - PT Therapeutic Activity Minutes 6  -HW         Total Minutes    Timed Charges Total Minutes 16  -HW       Total Minutes 16  -HW                User Key  (r) = Recorded By, (t) = Taken By, (c) = Cosigned By      Initials Name Provider Type     Deanna Luque PT Physical Therapist                  Therapy Charges for Today       Code Description Service Date Service Provider Modifiers Qty    70861931571 HC PT THER PROC EA 15 MIN 9/1/2024 Deanna Luque, PT GP 1    92009825132 HC GAIT TRAINING EA 15 MIN 9/1/2024 Deanna Luque, PT GP 1    90636952064 HC GAIT TRAINING EA 15 MIN 9/2/2024 Deanna Luque, PT GP 1            PT G-Codes  Outcome Measure Options: AM-PAC 6 Clicks Basic Mobility (PT)  AM-PAC 6 Clicks Score (PT): 20  AM-PAC 6 Clicks Score (OT): 18  PT Discharge Summary  Anticipated Discharge Disposition (PT): home with 24/7 care, home with home health    Deanna Luque PT  9/2/2024

## 2024-09-02 NOTE — CASE MANAGEMENT/SOCIAL WORK
Discharge Planning Assessment  [facility]     Patient Name: Guicho Blanco  MRN: [id number]  Today's Date: 9/2/2024    Admit Date: 8/30/2024    Plan: IPR vs Home with Home Health   Discharge Needs Assessment       Row Name 09/02/24 1546       Living Environment    People in Home spouse    Name(s) of People in Home Sandra Blanco - wife    Current Living Arrangements home    Potentially Unsafe Housing Conditions none    In the past 12 months has the electric, gas, oil, or water company threatened to shut off services in your home? No    Primary Care Provided by self;spouse/significant other;child(arsalan)    Provides Primary Care For no one    Caregiving Concerns Sylwia Kuo -* dtr, Sandra Blanco- wife    Family Caregiver if Needed child(arsalan), adult    Family Caregiver Names Sylwia Kuo- dtr    Quality of Family Relationships helpful;involved;supportive    Able to Return to Prior Arrangements other (see comments)  TBD       Transition Planning    Patient/Family Anticipates Transition to inpatient rehabilitation facility    Patient/Family Anticipated Services at Transition rehabilitation services    Transportation Anticipated family or friend will provide       Discharge Needs Assessment    Readmission Within the Last 30 Days current reason for admission unrelated to previous admission    Current Outpatient/Agency/Support Group homecare agency    Equipment Currently Used at Home walker, rolling;cane, straight    Concerns to be Addressed discharge planning;adjustment to diagnosis/illness    Outpatient/Agency/Support Group Needs inpatient rehabilitation facility    Discharge Facility/Level of Care Needs rehabilitation facility    Current Discharge Risk chronically ill;physical impairment                   Discharge Plan       Row Name 09/02/24 1604       Plan    Plan IPR vs Home with Home Health    Patient/Family in Agreement with Plan yes    Plan Comments I have met with Mr. Blanco at the bedside today to  initiate a discharge plan.  He states that he lives in a home he shares with his wife, Sandra in [place] [facility].  He reports that he is independent with activities of daily living and requires a rolling walker to assist with mobility. He states that his wife and daughter provide assistance with meals, medicines and transportation. He denies use of home oxygen and is current with [facility].  CM will confirm  services in am.  Infectious Disease is following for Cellulitis and possible need for IV antibiotics post discharge.  PT has recommended home with 24/7 assist and Home health services.  Mr. Blanco daughter, Sylwia is requesting referral to [facility].  Per PT notes, he ambulated 40 feet with SBA.  I explained that insurance approval may be difficult for acute rehab.  Sylwia verbalizes understanding and states they will consider private pay for acute rehab.  I have confirmed with Kay with [facility] that she will review.  CM will cont to follow the plan of care, await final antibiotic plan and cont to assist with discharge planning as recommendations become available.    Final Discharge Disposition Code 62 - inpatient rehab facility                  Continued Care and Services - Admitted Since 8/30/2024    No active coordination exists for this encounter.       Selected Continued Care - Prior Encounters Includes continued care and service providers with selected services from prior encounters from 6/1/2024 to 9/2/2024      Discharged on 8/10/2024 Admission date: 8/7/2024 - Discharge disposition: Rehab Facility or Unit (DC - External)      Destination       Service Provider Selected Services Address Phone Fax Patient Preferred    [facility] [address] [address] [phone] --                             Demographic Summary       Row Name 09/02/24 0260       General Information    Admission Type inpatient    Arrived From  home    Referral Source admission list    Reason for Consult discharge planning    Preferred Language English       Contact Information    Permission Granted to Share Info With                    Functional Status       Row Name 09/02/24 1545       Functional Status, IADL    Medications independent    Meal Preparation assistive person    Housekeeping assistive person    Laundry assistive person    Shopping assistive person                  Elena Sahu, RN

## 2024-09-02 NOTE — PROGRESS NOTES
"      Orthopaedic Surgery Progress Note    CC: Right leg redness      Subjective     Interval History:   Patient doing well this morning.  Getting positive feedback from physical therapy.  Leg is less painful.      ROS: Denies fever, chills, nausea or vomiting    Objective     Vital Signs:  Temp (24hrs), Av.7 °F (36.5 °C), Min:97.6 °F (36.4 °C), Max:97.9 °F (36.6 °C)    /66   Pulse 100   Temp 97.8 °F (36.6 °C) (Oral)   Resp 18   Ht 182.9 cm (72\")   Wt 99.8 kg (220 lb)   SpO2 96%   BMI 29.84 kg/m²       Physical Exam:  Erythema and induration continue to improve.  Erythematous area in question blanches this morning.  Painless passive range of motion in the right knee continues    Assessment and Plan:  Hospital day #4.  Patient admitted for IV antibiotics secondary to failure of outpatient treatment with oral antibiotics for right leg cellulitis.  Patient continues to improve with IV antibiotics.  PT recommending home with home health if 24/ assistance at home is available.  If not, inpatient rehab is being recommended.  I will defer to Dr. Manzanares regarding IV versus oral antibiotics.  Will need to coordinate a plan to accommodate both the need for rehab and antibiotics.    Christian Schwartz MD  24  10:25 EDT        "

## 2024-09-02 NOTE — PROGRESS NOTES
Clinical Nutrition Assessment     Patient Name: Guicho Blanco  YOB: 1940  MRN: [id number]  Date of Encounter: 09/02/24 16:53 EDT  Admission date: 8/30/2024  Reason for Visit: Identified at risk by screening criteria    Assessment   Nutrition Assessment   Admission Diagnosis:  Septic arthritis [M00.9]  Cellulitis [L03.90]    Problem List:    Cellulitis    PAF (paroxysmal atrial fibrillation)    Coronary artery disease involving native coronary artery of native heart without angina pectoris    Mixed hyperlipidemia      PMH:   He  has a past medical history of Arthritis, Atrial fibrillation, BPH (benign prostatic hypertrophy), Cataract, Cellulitis, 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    PSH:  He  has a past surgical history that includes Ablation of dysrhythmic focus; Appendectomy; Shoulder surgery (Right); Prostate surgery; Cholecystectomy; Cataract extraction (Bilateral); Toe amputation (Right); Cardiac catheterization (N/A, 10/05/2017); Tonsillectomy; Cochlear implant revision (05/2019); Skin biopsy; Colonoscopy; and Total knee arthroplasty (Right, 8/7/2024).    Applicable Nutrition History:   SLP Recommendation and Plan  SLP Swallowing Diagnosis: mild, oral dysphagia, pharyngeal dysphagia (08/31/24 1205)  SLP Diet Recommendation: thin liquids, soft to chew textures, chopped (08/31/24 1205)  Recommended Precautions and Strategies: upright posture during/after eating, small bites of food and sips of liquid, alternate between small bites of food and sips of liquid, multiple swallows per bite of food, multiple swallows per sip of liquid, general aspiration precautions (08/31/24 1205)  SLP Rec. for Method of  "Medication Administration: as tolerated (08/31/24 1205)      SKIN: R knee cellulitis    Anthropometrics     Height: Height: 182.9 cm (72\")  Last Filed Weight: Weight: 99.8 kg (220 lb) (08/30/24 1559)  Method: Weight Method: Stated  BMI: BMI (Calculated): 29.8    UBW:  210lb  Weight change: ? 10lb wt gain    Need accurate scale weight     Weight       Weight (kg) Weight (lbs) Weight Method Visit Report   8/31/2021 95.255 kg  210 lb   --    11/8/2021 95.255 kg  210 lb      11/22/2021 94.348 kg  208 lb   --    5/27/2022 97.251 kg  214 lb 6.4 oz   --    6/12/2023 91.627 kg  202 lb   --    8/8/2023 91.627 kg  202 lb      6/25/2024 93.441 kg  206 lb   --    7/25/2024 92.987 kg  205 lb   --    7/30/2024 95.165 kg  209 lb 12.8 oz  Standing scale  --     94.802 kg  209 lb   --    8/8/2024 94.802 kg  209 lb  Bed scale        Bed scale     8/22/2024 95.255 kg  210 lb  Stated     8/30/2024 99.791 kg  220 lb  Stated           Nutrition Focused Physical Exam    Date:     Unable to perform due to Defer pending indication     Subjective   Reported/Observed/Food/Nutrition Related History:     Pt resting in bed, reports fair appetite, would prefer chopped meats as he has a bone spur in his throat, UBW ~ 210lb's    Current Nutrition Prescription   PO: Regular diet  Oral Nutrition Supplement:   Intake:  75% of 5 meals    Assessment & Plan   Nutrition Diagnosis   Date:  9-2-24            Updated:    Problem Inadequate oral intake    Etiology Per Clinical Status   Signs/Symptoms Po intake 75%   Status: New    Goal / Objectives:   Nutrition to support treatment and Increase intake      Nutrition Intervention      Follow treatment progress, Care plan reviewed, Interview for preferences, Menu adjusted, Supplement provided    Will change to Soft chopped Diet per SLP recommendations 8-31-24    Add Boost+ daily      Monitoring/Evaluation:   Per protocol, I&O, PO intake, Pertinent labs, Weight, Skin status, GI status, Symptoms, Swallow " function    Joy Martinez RD  Time Spent:30min

## 2024-09-02 NOTE — PROGRESS NOTES
" progress note      Guicho Blanco  [id number]  1940     LOS: 2 days     Attending: Berto Liu MD    Primary Care Provider: Mitchel Culver MD      Chief Complaint/Reason for visit:    Chief Complaint   Patient presents with    Post-op Problem       Subjective   Doing well overall. Ambulated over 600 ft with RN using a RW.  No f/c/n/vom/sob.     Objective        Visit Vitals  BP 98/65 (BP Location: Left arm, Patient Position: Sitting)   Pulse 105   Temp 98.4 °F (36.9 °C) (Oral)   Resp 18   Ht 182.9 cm (72\")   Wt 99.8 kg (220 lb)   SpO2 99%   BMI 29.84 kg/m²     Temp (24hrs), Av.9 °F (36.6 °C), Min:97.6 °F (36.4 °C), Max:98.4 °F (36.9 °C)      Intake/Output:    Intake/Output Summary (Last 24 hours) at 2024 1413  Last data filed at 2024 1257  Gross per 24 hour   Intake 800 ml   Output 320 ml   Net 480 ml          Physical Exam:     General Appearance:    Alert, cooperative, in no acute distress   Head:    Normocephalic, without obvious abnormality, atraumatic    Lungs:     Normal effort, symmetric chest rise,  clear to      auscultation bilaterally              Heart:    Regular rhythm and normal rate, normal S1 and S2    Abdomen:     Normal bowel sounds, no masses, no organomegaly, soft        non-tender, non-distended, no guarding, no rebound                tenderness   Extremities: Decreased erythema around right knee.    No drainage from incision.  No clubbing, cyanosis or edema.  No deformities.    Pulses:   Pulses palpable and equal bilaterally   Skin:   No bleeding, bruising or rash          Results Review:     I reviewed the patient's new clinical results.   Results from last 7 days   Lab Units 24  1638   WBC 10*3/mm3 6.98   HEMOGLOBIN g/dL 11.5*   HEMATOCRIT % 36.1*   PLATELETS 10*3/mm3 314     Results from last 7 days   Lab Units 24  1638   SODIUM mmol/L 136   POTASSIUM mmol/L 4.1   CHLORIDE mmol/L 101   CO2 mmol/L 22.0   BUN mg/dL 12   CREATININE mg/dL " 1.13   CALCIUM mg/dL 9.5   BILIRUBIN mg/dL 0.5   ALK PHOS U/L 187*   ALT (SGPT) U/L 20   AST (SGOT) U/L 32   GLUCOSE mg/dL 103*     I reviewed the patient's new imaging including images and reports.   Latest Reference Range & Units 09/01/24 20:39   Color, UA Yellow, Straw  Yellow   Appearance, UA Clear  Clear   Specific Gravity, UA 1.001 - 1.030  1.025   pH, UA 5.0 - 8.0  6.0   Glucose Negative  Negative   Ketones, UA Negative  Trace !   Blood, UA Negative  Negative   Nitrite, UA Negative  Negative   Leukocytes, UA Negative  Negative   Protein, UA Negative  Trace !   Bilirubin, UA Negative  Negative   Urobilinogen, UA 0.2 - 1.0 E.U./dL  0.2 E.U./dL   !: Data is abnormal  All medications reviewed.   aspirin, 325 mg, Oral, Daily  cefTRIAXone, 2,000 mg, Intravenous, Q24H  flecainide, 50 mg, Oral, Q12H  isosorbide mononitrate, 30 mg, Oral, Daily  meloxicam, 7.5 mg, Oral, Daily  nortriptyline, 20 mg, Oral, Nightly  pantoprazole, 40 mg, Oral, Q AM  pravastatin, 20 mg, Oral, Daily  sodium chloride, 10 mL, Intravenous, Q12H  vancomycin, 1,500 mg, Intravenous, Daily      docusate sodium, 100 mg, BID PRN  Pharmacy to dose vancomycin, , Continuous PRN  polyethylene glycol, 17 g, Daily PRN  sodium chloride, 10 mL, PRN  sodium chloride, 10 mL, PRN  sodium chloride, 40 mL, PRN        Assessment & Plan       Cellulitis    PAF (paroxysmal atrial fibrillation)    Coronary artery disease involving native coronary artery of native heart without angina pectoris    Mixed hyperlipidemia  Mild oral dysphagia and pharyngeal dysphagia       Plan  1. PT/OT, continue.   2. Pain control-prns   3. IS-encouraged  4. DVT proph-aspirin.  5. Bowel regimen  6. Monitor post-op labs  7. DC planning, will decide after considering his progress, home support, and antibiotics need.     Continue antibiotics per ID.  Monitor for adverse drug reactions.       HTN, Hyperlipidemia, afib, CAD  - Continue home Tambocor, Imdur and statin  - Monitor BP   - Holding  parameters for BP meds  - Labetalol PRN for SBP>170     Continue soft to chew textured diet, chopped, thin liquids, per speech therapy recommendation       Berto Liu MD  09/02/24  14:13 EDT

## 2024-09-03 LAB — VANCOMYCIN SERPL-MCNC: 10.9 MCG/ML (ref 5–40)

## 2024-09-03 PROCEDURE — 25010000002 CEFTRIAXONE PER 250 MG: Performed by: NURSE PRACTITIONER

## 2024-09-03 PROCEDURE — 25010000002 VANCOMYCIN HCL IN NACL 1.5-0.9 GM/500ML-% SOLUTION

## 2024-09-03 PROCEDURE — 97110 THERAPEUTIC EXERCISES: CPT

## 2024-09-03 PROCEDURE — 97530 THERAPEUTIC ACTIVITIES: CPT

## 2024-09-03 PROCEDURE — 80202 ASSAY OF VANCOMYCIN: CPT

## 2024-09-03 PROCEDURE — 97535 SELF CARE MNGMENT TRAINING: CPT

## 2024-09-03 RX ORDER — BISACODYL 10 MG
10 SUPPOSITORY, RECTAL RECTAL DAILY PRN
Status: DISCONTINUED | OUTPATIENT
Start: 2024-09-03 | End: 2024-09-05 | Stop reason: HOSPADM

## 2024-09-03 RX ORDER — SACCHAROMYCES BOULARDII 250 MG
250 CAPSULE ORAL 2 TIMES DAILY
Status: DISCONTINUED | OUTPATIENT
Start: 2024-09-03 | End: 2024-09-05 | Stop reason: HOSPADM

## 2024-09-03 RX ADMIN — ISOSORBIDE MONONITRATE 30 MG: 30 TABLET, EXTENDED RELEASE ORAL at 09:55

## 2024-09-03 RX ADMIN — FLECAINIDE ACETATE 50 MG: 50 TABLET ORAL at 20:09

## 2024-09-03 RX ADMIN — Medication 1500 MG: at 09:54

## 2024-09-03 RX ADMIN — Medication 10 ML: at 09:57

## 2024-09-03 RX ADMIN — Medication 250 MG: at 20:09

## 2024-09-03 RX ADMIN — SODIUM CHLORIDE 2000 MG: 900 INJECTION INTRAVENOUS at 18:41

## 2024-09-03 RX ADMIN — POLYETHYLENE GLYCOL 3350 17 G: 17 POWDER, FOR SOLUTION ORAL at 13:27

## 2024-09-03 RX ADMIN — DOCUSATE SODIUM 100 MG: 100 CAPSULE, LIQUID FILLED ORAL at 13:27

## 2024-09-03 RX ADMIN — NORTRIPTYLINE HYDROCHLORIDE 20 MG: 10 CAPSULE ORAL at 20:09

## 2024-09-03 RX ADMIN — ASPIRIN 325 MG: 325 TABLET ORAL at 09:55

## 2024-09-03 RX ADMIN — MELOXICAM 7.5 MG: 7.5 TABLET ORAL at 09:55

## 2024-09-03 RX ADMIN — FLECAINIDE ACETATE 50 MG: 50 TABLET ORAL at 09:55

## 2024-09-03 RX ADMIN — PRAVASTATIN SODIUM 20 MG: 20 TABLET ORAL at 09:55

## 2024-09-03 RX ADMIN — PANTOPRAZOLE SODIUM 40 MG: 40 TABLET, DELAYED RELEASE ORAL at 06:03

## 2024-09-03 NOTE — PLAN OF CARE
Problem: Adult Inpatient Plan of Care  Goal: Plan of Care Review  Recent Flowsheet Documentation  Taken 9/3/2024 0950 by Keesha Melo OT  Progress: improving  Plan of Care Reviewed With: patient  Outcome Evaluation: Pt participates in therapy with good effort. Remains below his baseline with strength and functional endurance deficits. SBA to transition to EOB sitting. Stands with Min Ax1. Up in room with RW support and CGAx1. Education reviewed for ADL retraining. Pt reports having AE at home. Mod A LB ADLs. Set-up UB ADLs. OT will continue to follow IP. Recommend home with 24/7 assist when medically ready.

## 2024-09-03 NOTE — PROGRESS NOTES
"  INFECTIOUS DISEASES  INPATIENT PROGRESS NOTE  9/3/2024      PATIENT NAME: Guicho Blanco  :  1940  MRN:  [id number]  Date of Admission:  2024      Antimicrobials:  IV vancomycin  Ceftriaxone      MAR reviewed.      Reason for consultation: Right knee cellulitis after recent arthroplasty    Interval history: Tolerating antibiotics.  Still remains generally weak.  Patient and family agree that he needs to go to rehab.  Knee is improving.    ROS:  No fevers/chills overnight.  No new rashes.  No SOB or chest pain.  No nausea/vomiting/diarrhea.  Denies side effects from antimicrobials.      Objective:  Temp (24hrs), Av °F (36.7 °C), Min:97.4 °F (36.3 °C), Max:99 °F (37.2 °C)    /67 (BP Location: Right arm, Patient Position: Lying)   Pulse 81   Temp 98 °F (36.7 °C) (Oral)   Resp 18   Ht 182.9 cm (72\")   Wt 99.8 kg (220 lb)   SpO2 100%   BMI 29.84 kg/m²     Physical Examination:  GENERAL: Awake and alert, in no acute distress.   HEENT: Normocephalic, atraumatic.  EOMI. No conjunctival injection.  LUNGS: Normal respiratory effort. Nonlabored.   EXT:  trace right leg edema - improved  MS: Good movement of right knee without significant pain.  SKIN:  right TKA incision well approximated.  Erythema distally and medial knee is fading with decreased warmth with some slight desquamation; no drainage.  No crepitus, induration, or fluctuance.  NEURO: Oriented to PPT.  Motor strength intact  PSYCHIATRIC: Normal insight and judgment. Cooperative with PE    Laboratory Data:    Results from last 7 days   Lab Units 24  1638   WBC 10*3/mm3 6.98   HEMOGLOBIN g/dL 11.5*   HEMATOCRIT % 36.1*   PLATELETS 10*3/mm3 314     Results from last 7 days   Lab Units 24  1638   SODIUM mmol/L 136   POTASSIUM mmol/L 4.1   CHLORIDE mmol/L 101   CO2 mmol/L 22.0   BUN mg/dL 12   CREATININE mg/dL 1.13   GLUCOSE mg/dL 103*   CALCIUM mg/dL 9.5     Results from last 7 days   Lab Units 24  1638   ALK PHOS " U/L 187*   BILIRUBIN mg/dL 0.5   ALT (SGPT) U/L 20   AST (SGOT) U/L 32     Results from last 7 days   Lab Units 08/30/24  1638   SED RATE mm/hr 25*     Results from last 7 days   Lab Units 08/30/24  1638   CRP mg/dL 0.80*     Estimated Creatinine Clearance: 60.6 mL/min (by C-G formula based on SCr of 1.13 mg/dL).  Results from last 7 days   Lab Units 08/30/24  1638   LACTATE mmol/L 1.8         Results from last 7 days   Lab Units 09/03/24  0546   VANCOMYCIN RM mcg/mL 10.90             Microbiology:    Microbiology Results (last 10 days)       Procedure Component Value - Date/Time    Blood Culture - Blood, Arm, Right [[id number]]  (Normal) Collected: 08/30/24 1800    Lab Status: Preliminary result Specimen: Blood from Arm, Right Updated: 09/02/24 1831     Blood Culture No growth at 3 days    Narrative:      Less than seven (7) mL's of blood was collected.  Insufficient quantity may yield false negative results.    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 Started on oral antibiotics at [facility] prior to discharge without improvement and was referred for admission.  No concern for septic arthritis at this time per orthopedics.     PROBLEM LIST:   - Right lower extremity cellulitis, with underlying prosthesis in place.  Appears superficial.  Improving with antibiotics.  - s/p Right TKA, 8/7/24.  No obvious joint inflammation with minimally elevated inflammatory markers.  Little concern for periprosthetic joint infection at this time. Ortho following.    PLAN:  -- Continue IV vancomycin and ceftriaxone while in hospital  -- Follow exam - ongoing improvement  --  Noted plans for discharge to rehab.  --Okay for discharge at any time from my perspective with doxycycline 100 mg p.o. twice daily and cephalexin 500 mg p.o. 3 times daily through 9/10/24.    Plan discussed with patient and family.    Jae Manzanares MD  9/3/2024  18:30 EDT

## 2024-09-03 NOTE — PLAN OF CARE
Goal Outcome Evaluation:  Plan of Care Reviewed With: patient        Progress: improving  Outcome Evaluation: Patient able to progress ambulation distance to 150'+150' CGA with FWW with prolonged seated rest break taken to perform HEP. Patient performs HEP well and ROM progressing as expected. R knee does lack some knee extension which appears to match his L knee. IPPT remains indicated to address functional mobility below baseline. Continue to recommend D/C home with 24/7 assist and HHPT.      Anticipated Discharge Disposition (PT): home with 24/7 care, home with home health

## 2024-09-03 NOTE — CASE MANAGEMENT/SOCIAL WORK
Discharge Planning Assessment  [facility]     Patient Name: Guicho Blanco  MRN: [id number]  Today's Date: 9/3/2024    Admit Date: 8/30/2024    Plan: [facility] pending     Discharge Plan       Row Name 09/03/24 1557       Plan    Plan [facility] pending    Patient/Family in Agreement with Plan yes    Plan Comments Received phone call from Mr. Blanco' daughter, Kayley, regarding discharge planning.   Mr. Blanco was referred to [facility] yesterday.  Per April with [facility], the facility has initiated the prior auth with the patient's [employer], for an acute rehab bed at their facility.  Updated [place]     will follow up.    Final Discharge Disposition Code 62 - inpatient rehab facility                  Continued Care and Services - Admitted Since 8/30/2024       Destination       Service Provider Request Status Selected Services Address Phone Fax Patient Preferred    [facility] Pending - No Request Sent N/A [address] [facility] [address] [phone] --              Home Medical Care       Service Provider Request Status Selected Services Address Phone Fax Patient Preferred    [facility] Pending - No Request Sent N/A [address] [facility] [address] [phone] --                         Millie Masterson RN

## 2024-09-03 NOTE — PROGRESS NOTES
"      Orthopaedic Surgery Progress Note    CC: Right leg redness      Subjective     Interval History:   Patient doing very well this morning.  Sitting up getting ready to eat breakfast.  Having little to no pain in the leg.      ROS: Denies fever, chills, nausea or vomiting    Objective     Vital Signs:  Temp (24hrs), Av.2 °F (36.8 °C), Min:97.9 °F (36.6 °C), Max:99 °F (37.2 °C)    /81 (BP Location: Right arm, Patient Position: Lying)   Pulse 107   Temp 97.9 °F (36.6 °C) (Oral)   Resp 18   Ht 182.9 cm (72\")   Wt 99.8 kg (220 lb)   SpO2 98%   BMI 29.84 kg/m²       Physical Exam:  Erythema continues to improve.  Has not completely resolved however.  Erythematous area around the right lateral side of the knee blanches.  Knee incision proper continues to be benign appearing.  Range of motion of the knee is without pain both passively and actively.    Assessment and Plan:  Hospital day #5.  Cellulitis right leg that has failed outpatient oral antibiotic treatment.  Patient continues to improve with IV Vanco and Rocephin.  Case management has initiated a discharge plan yesterday.  Considering options such as acute rehab, home health, and private pay.  Case management awaiting final antibiotic plan which will likely consist of outpatient oral treatment based on Dr. Manzanares note yesterday.  We will continue to follow.  Appreciate multi service recommendations and input and management.    Christian Schwartz MD  24  08:02 EDT        "

## 2024-09-03 NOTE — THERAPY TREATMENT NOTE
Patient Name: Guicho Blanco  : 1940    MRN: [id number]                              Today's Date: 9/3/2024       Admit Date: 2024    Visit Dx:     ICD-10-CM ICD-9-CM   1. Cellulitis of right lower extremity  L03.115 682.6   2. Failure of outpatient treatment  Z78.9 V49.89   3. Oropharyngeal dysphagia  R13.12 787.22     Patient Active Problem List   Diagnosis    Osteomyelitis of toe of right foot    Osteomyelitis of right foot    PAF (paroxysmal atrial fibrillation)    Coronary artery disease involving native coronary artery of native heart without angina pectoris    Gastroesophageal reflux disease without esophagitis    Benign non-nodular prostatic hyperplasia without lower urinary tract symptoms    S/P appy    Essential hypertension    Simple chronic bronchitis    Mixed hyperlipidemia    COPD (chronic obstructive pulmonary disease)    BPH (benign prostatic hypertrophy)    Hearing loss    Chest pain    Vertigo    Snoring    Overweight    PLMD (periodic limb movement disorder)    Neuropathy    Periodic headache syndrome, not intractable    Acquired absence of other right toe(s)    Tremor    Bilateral impacted cerumen    Bilateral sensorineural hearing loss    Chronic otitis externa    Deviated nasal septum    Disorder of joint of foot    ED (erectile dysfunction) of organic origin    Hydrocele    Hydrocele of testis    Impacted cerumen    Mononeuropathy    Swelling of testicle    Venous retinal branch occlusion    Vitreous degeneration    Benign prostatic hyperplasia with urinary obstruction    OA (osteoarthritis) of knee    Status post total right knee replacement    Septic arthritis    Cellulitis     Past Medical History:   Diagnosis Date    Arthritis     Atrial fibrillation     BPH (benign prostatic hypertrophy)     Cataract     Cellulitis     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     bilateral    Coronary artery disease     Full dentures     Gall stones     GERD (gastroesophageal reflux disease)      Hearing loss     Hiatal hernia     Hyperlipidemia     Hypertension     Peripheral neuropathy     Peripheral vascular disease     Seasonal allergies     Skin cancer     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    TIA (transient ischemic attack)     2 times    Wears glasses      Past Surgical History:   Procedure Laterality Date    ABLATION OF DYSRHYTHMIC FOCUS      APPENDECTOMY      CARDIAC CATHETERIZATION N/A 10/05/2017    Procedure: Left Heart Cath;  Surgeon: Hector Urrutia MD;  Location:  [facility];  Service:     CATARACT EXTRACTION Bilateral     CHOLECYSTECTOMY      Remote    COCHLEAR IMPLANT REVISION  05/2019    COLONOSCOPY      PROSTATE SURGERY      SHOULDER SURGERY Right     SKIN BIOPSY      TOE AMPUTATION Right     2nd toe - Right Foot    TONSILLECTOMY      TOTAL KNEE ARTHROPLASTY Right 8/7/2024    Procedure: TOTAL KNEE ARTHROPLASTY WITH ASHLEY ROBOT RIGHT;  Surgeon: Christian Schwartz MD;  Location:  [facility];  Service: Robotics - Ortho;  Laterality: Right;      General Information       Row Name 09/03/24 0957          Physical Therapy Time and Intention    Document Type therapy note (daily note)  -CK     Mode of Treatment individual therapy;physical therapy  -CK       Row Name 09/03/24 0957          General Information    Patient Profile Reviewed yes  -CK     Existing Precautions/Restrictions fall;other (see comments)  [place] w/ cochlear hearing aids, neuropathy at B LE, recent R TKA 8/7/24 with WBAT RLE, 2nd toe amputation on RLE after cellulitis in the past  -CK     Barriers to Rehab medically complex;previous functional deficit;hearing deficit  -CK       Row Name 09/03/24 0957          Cognition    Orientation Status (Cognition) oriented x 3  -CK       Row Name 09/03/24 0957          Safety Issues, Functional Mobility    Safety Issues Affecting Function (Mobility) awareness of need for assistance;insight into deficits/self-awareness;safety precaution awareness;safety precautions  follow-through/compliance;sequencing abilities  -CK     Impairments Affecting Function (Mobility) balance;endurance/activity tolerance;strength;range of motion (ROM);sensation/sensory awareness  -CK               User Key  (r) = Recorded By, (t) = Taken By, (c) = Cosigned By      Initials Name Provider Type    CK Aspen Goodman PT Physical Therapist                   Mobility       Row Name 09/03/24 0957          Bed Mobility    Comment, (Bed Mobility) [facility] pre/post treatment  -CK       Row Name 09/03/24 0957          Sit-Stand Transfer    Sit-Stand Anselmo (Transfers) contact guard;1 person assist;verbal cues  -CK     Assistive Device (Sit-Stand Transfers) walker, front-wheeled  -CK     Comment, (Sit-Stand Transfer) cues not to stand without walker and then to reach back for chair when sitting, patient did not require assist to stand  -CK       Row Name 09/03/24 0957          Gait/Stairs (Locomotion)    Anselmo Level (Gait) contact guard;1 person assist;verbal cues  -CK     Assistive Device (Gait) walker, front-wheeled  -CK     Distance in Feet (Gait) 150  +150  -CK     Deviations/Abnormal Patterns (Gait) bilateral deviations;harleen decreased;gait speed decreased;stride length decreased  -CK     Bilateral Gait Deviations forward flexed posture;heel strike decreased  -CK     Left Sided Gait Deviations decreased knee extension  -CK     Right Sided Gait Deviations decreased knee extension  -CK     Comment, (Gait/Stairs) Patient ambulated in [place] with step through gait pattern. Cues provided for upright posture with forward gaze. Bilateral knees mildly flexed throughout which appears to be close to baseline his baseline posturing. Patient took prolonged seated rest break to perform HEP and then completed additional bout of ambulation. Distance limited only by fatigue.  -CK               User Key  (r) = Recorded By, (t) = Taken By, (c) = Cosigned By      Initials Name Provider Type    ROSETTA Goodman  EL Louise Physical Therapist                   Obj/Interventions       Row Name 09/03/24 1001          Range of Motion Comprehensive    General Range of Motion bilateral lower extremity ROM WFL  -CK     Comment, General Range of Motion R knee AROM:   -CK       Row Name 09/03/24 1001          Motor Skills    Therapeutic Exercise hip;knee;ankle;other (see comments)  standing heel raises x10 reps  -CK       Row Name 09/03/24 1001          Hip (Therapeutic Exercise)    Hip (Therapeutic Exercise) strengthening exercise  -CK     Hip Strengthening (Therapeutic Exercise) heel slides;right;bilateral;marching while standing;10 repetitions  -CK       Row Name 09/03/24 1001          Knee (Therapeutic Exercise)    Knee (Therapeutic Exercise) isometric exercises;strengthening exercise  -CK     Knee Isometrics (Therapeutic Exercise) quad sets;10 repetitions;3 second hold;right  -CK     Knee Strengthening (Therapeutic Exercise) SLR (straight leg raise);SAQ (short arc quad);LAQ (long arc quad);sitting;heel slides;10 repetitions;right  -CK       Row Name 09/03/24 1001          Ankle (Therapeutic Exercise)    Ankle (Therapeutic Exercise) AROM (active range of motion)  -CK     Ankle AROM (Therapeutic Exercise) bilateral;dorsiflexion;plantarflexion;10 repetitions  -CK       Row Name 09/03/24 1001          Balance    Balance Assessment sitting static balance;standing static balance;standing dynamic balance  -CK     Static Sitting Balance independent  -CK     Position, Sitting Balance unsupported;sitting in chair  -CK     Static Standing Balance standby assist  -CK     Dynamic Standing Balance contact guard  -CK     Position/Device Used, Standing Balance supported;walker, front-wheeled  -CK     Comment, Balance no LOB  -CK               User Key  (r) = Recorded By, (t) = Taken By, (c) = Cosigned By      Initials Name Provider Type    CK Aspen Goodman PT Physical Therapist                   Goals/Plan    No  documentation.                  Clinical Impression       Row Name 09/03/24 1002          Pain    Additional Documentation Pain Scale: FACES Pre/Post-Treatment (Group)  -CK       Row Name 09/03/24 1002          Pain Scale: FACES Pre/Post-Treatment    Pain: FACES Scale, Pretreatment 0-->no hurt  -CK     Posttreatment Pain Rating 2-->hurts little bit  -CK       Row Name 09/03/24 1002          Plan of Care Review    Plan of Care Reviewed With patient  -CK     Progress improving  -CK     Outcome Evaluation Patient able to progress ambulation distance to 150'+150' CGA with FWW with prolonged seated rest break taken to perform HEP. Patient performs HEP well and ROM progressing as expected. R knee does lack some knee extension which appears to match his L knee. IPPT remains indicated to address functional mobility below baseline. Continue to recommend D/C home with 24/7 assist and HHPT.  -CK       Row Name 09/03/24 1002          Vital Signs    O2 Delivery Pre Treatment room air  -CK     O2 Delivery Intra Treatment room air  -CK     O2 Delivery Post Treatment room air  -CK       Row Name 09/03/24 1002          Positioning and Restraints    Pre-Treatment Position sitting in chair/recliner  -CK     Post Treatment Position chair  -CK     In Chair reclined;call light within reach;encouraged to call for assist;exit alarm on;notified nsg  -CK               User Key  (r) = Recorded By, (t) = Taken By, (c) = Cosigned By      Initials Name Provider Type    CK Aspen Goodman, PT Physical Therapist                   Outcome Measures       Row Name 09/03/24 1005          How much help from another person do you currently need...    Turning from your back to your side while in flat bed without using bedrails? 4  -CK     Moving from lying on back to sitting on the side of a flat bed without bedrails? 3  -CK     Moving to and from a bed to a chair (including a wheelchair)? 3  -CK     Standing up from a chair using your arms (e.g.,  wheelchair, bedside chair)? 3  -CK     Climbing 3-5 steps with a railing? 3  -CK     To walk in hospital room? 3  -CK     AM-PAC 6 Clicks Score (PT) 19  -CK     Highest Level of Mobility Goal 6 --> Walk 10 steps or more  -CK       Row Name 09/03/24 1005          Functional Assessment    Outcome Measure Options AM-PAC 6 Clicks Basic Mobility (PT)  -CK               User Key  (r) = Recorded By, (t) = Taken By, (c) = Cosigned By      Initials Name Provider Type    CK Aspen Goodman, PT Physical Therapist                                 Physical Therapy Education       Title: PT OT SLP Therapies (In Progress)       Topic: Physical Therapy (Done)       Point: Mobility training (Done)       Learning Progress Summary             Patient Acceptance, E, VU by  at 9/3/2024 1005    Acceptance, E,D, VU by  at 9/2/2024 0914    Acceptance, E,D, VU,NR by  at 9/1/2024 1110    Acceptance, E,TB, VU by  at 9/1/2024 0915    Acceptance, E,D, VU,NR by  at 8/31/2024 1157                         Point: Home exercise program (Done)       Learning Progress Summary             Patient Acceptance, E, VU by  at 9/3/2024 1005    Acceptance, E,D, VU,NR by  at 9/1/2024 1110    Acceptance, E,TB, VU by  at 9/1/2024 0915    Acceptance, E,D, VU,NR by  at 8/31/2024 1157                         Point: Body mechanics (Done)       Learning Progress Summary             Patient Acceptance, E, VU by  at 9/3/2024 1005    Acceptance, E,D, VU by  at 9/2/2024 0914    Acceptance, E,D, VU,NR by  at 9/1/2024 1110    Acceptance, E,TB, VU by  at 9/1/2024 0915    Acceptance, E,D, VU,NR by  at 8/31/2024 1157                         Point: Precautions (Done)       Learning Progress Summary             Patient Acceptance, E, VU by  at 9/3/2024 1005    Acceptance, E,D, VU by  at 9/2/2024 0914    Acceptance, E,D, VU,NR by  at 9/1/2024 1110    Acceptance, E,TB, VU by  at 9/1/2024 0915    Acceptance, E,D, VU,NR by  at 8/31/2024 1155                                          User Key       Initials Effective Dates Name Provider Type Discipline    HW 12/15/23 -  Deanna Luque PT Physical Therapist PT    CK 02/06/24 -  Aspen Goodman PT Physical Therapist PT     09/07/23 -  Stacy Dallas, RN Registered Nurse Nurse                  PT Recommendation and Plan     Plan of Care Reviewed With: patient  Progress: improving  Outcome Evaluation: Patient able to progress ambulation distance to 150'+150' CGA with FWW with prolonged seated rest break taken to perform HEP. Patient performs HEP well and ROM progressing as expected. R knee does lack some knee extension which appears to match his L knee. IPPT remains indicated to address functional mobility below baseline. Continue to recommend D/C home with 24/7 assist and HHPT.     Time Calculation:         PT Charges       Row Name 09/03/24 1005             Time Calculation    Start Time 0925  -CK      PT Received On 09/03/24  -CK         Timed Charges    99810 - PT Therapeutic Exercise Minutes 15  -CK      12575 - PT Therapeutic Activity Minutes 11  -CK         Total Minutes    Timed Charges Total Minutes 26  -CK       Total Minutes 26  -CK                User Key  (r) = Recorded By, (t) = Taken By, (c) = Cosigned By      Initials Name Provider Type    CK Aspen Goodman, PT Physical Therapist                  Therapy Charges for Today       Code Description Service Date Service Provider Modifiers Qty    88348338069 HC PT THER PROC EA 15 MIN 9/3/2024 Aspen Goodman, PT GP 1    40333655255 HC PT THERAPEUTIC ACT EA 15 MIN 9/3/2024 Aspen Goodman, PT GP 1            PT G-Codes  Outcome Measure Options: AM-PAC 6 Clicks Basic Mobility (PT)  AM-PAC 6 Clicks Score (PT): 19  AM-PAC 6 Clicks Score (OT): 18  PT Discharge Summary  Anticipated Discharge Disposition (PT): home with 24/7 care, home with home health    Aspen Goodman PT  9/3/2024

## 2024-09-03 NOTE — THERAPY TREATMENT NOTE
Patient Name: Guicho Blanco  : 1940    MRN: [id number]                              Today's Date: 9/3/2024       Admit Date: 2024    Visit Dx:     ICD-10-CM ICD-9-CM   1. Cellulitis of right lower extremity  L03.115 682.6   2. Failure of outpatient treatment  Z78.9 V49.89   3. Oropharyngeal dysphagia  R13.12 787.22     Patient Active Problem List   Diagnosis    Osteomyelitis of toe of right foot    Osteomyelitis of right foot    PAF (paroxysmal atrial fibrillation)    Coronary artery disease involving native coronary artery of native heart without angina pectoris    Gastroesophageal reflux disease without esophagitis    Benign non-nodular prostatic hyperplasia without lower urinary tract symptoms    S/P appy    Essential hypertension    Simple chronic bronchitis    Mixed hyperlipidemia    COPD (chronic obstructive pulmonary disease)    BPH (benign prostatic hypertrophy)    Hearing loss    Chest pain    Vertigo    Snoring    Overweight    PLMD (periodic limb movement disorder)    Neuropathy    Periodic headache syndrome, not intractable    Acquired absence of other right toe(s)    Tremor    Bilateral impacted cerumen    Bilateral sensorineural hearing loss    Chronic otitis externa    Deviated nasal septum    Disorder of joint of foot    ED (erectile dysfunction) of organic origin    Hydrocele    Hydrocele of testis    Impacted cerumen    Mononeuropathy    Swelling of testicle    Venous retinal branch occlusion    Vitreous degeneration    Benign prostatic hyperplasia with urinary obstruction    OA (osteoarthritis) of knee    Status post total right knee replacement    Septic arthritis    Cellulitis     Past Medical History:   Diagnosis Date    Arthritis     Atrial fibrillation     BPH (benign prostatic hypertrophy)     Cataract     Cellulitis     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     bilateral    Coronary artery disease     Full dentures     Gall stones     GERD (gastroesophageal reflux disease)      Hearing loss     Hiatal hernia     Hyperlipidemia     Hypertension     Peripheral neuropathy     Peripheral vascular disease     Seasonal allergies     Skin cancer     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    TIA (transient ischemic attack)     2 times    Wears glasses      Past Surgical History:   Procedure Laterality Date    ABLATION OF DYSRHYTHMIC FOCUS      APPENDECTOMY      CARDIAC CATHETERIZATION N/A 10/05/2017    Procedure: Left Heart Cath;  Surgeon: Hector Urrutia MD;  Location:  [facility];  Service:     CATARACT EXTRACTION Bilateral     CHOLECYSTECTOMY      Remote    COCHLEAR IMPLANT REVISION  05/2019    COLONOSCOPY      PROSTATE SURGERY      SHOULDER SURGERY Right     SKIN BIOPSY      TOE AMPUTATION Right     2nd toe - Right Foot    TONSILLECTOMY      TOTAL KNEE ARTHROPLASTY Right 8/7/2024    Procedure: TOTAL KNEE ARTHROPLASTY WITH ASHLEY ROBOT RIGHT;  Surgeon: Christian Schwartz MD;  Location:  [facility];  Service: Robotics - Ortho;  Laterality: Right;      General Information       Row Name 09/03/24 0824          OT Time and Intention    Document Type therapy note (daily note)  -TB     Mode of Treatment occupational therapy;individual therapy  -TB       Row Name 09/03/24 0824          General Information    Patient Profile Reviewed yes  -TB     Existing Precautions/Restrictions fall;other (see comments)  [place] w/ cochlear hearing aids, neuropathy at B LE, recent R TKA 8/7/24 with WBAT RLE, 2nd toe amputation on RLE after cellulitis in the past  -TB     Barriers to Rehab medically complex;hearing deficit;previous functional deficit  -TB       Row Name 09/03/24 0824          Occupational Profile    Reason for Services/Referral (Occupational Profile) Occupational decline  -TB       Row Name 09/03/24 0824          Cognition    Orientation Status (Cognition) oriented x 3  -TB       Row Name 09/03/24 0824          Safety Issues, Functional Mobility    Safety Issues Affecting Function (Mobility)  insight into deficits/self-awareness;awareness of need for assistance;safety precaution awareness;safety precautions follow-through/compliance;sequencing abilities  -TB     Impairments Affecting Function (Mobility) balance;endurance/activity tolerance;strength;range of motion (ROM);sensation/sensory awareness  -TB     Comment, Safety Issues/Impairments (Mobility) Pt is up in room with RW support and CGAx1. Min Ax1 STS.  -TB               User Key  (r) = Recorded By, (t) = Taken By, (c) = Cosigned By      Initials Name Provider Type    TB Keesha Melo, OT Occupational Therapist                     Mobility/ADL's       Row Name 09/03/24 0826          Bed Mobility    Bed Mobility supine-sit;scooting/bridging  -TB     Scooting/Bridging [place] (Bed Mobility) standby assist  -TB     Supine-Sit [place] (Bed Mobility) standby assist  -TB     Assistive Device (Bed Mobility) head of bed elevated;bed rails  -TB     Comment, (Bed Mobility) Pt moves to EOB in a timely manner.  -TB       Row Name 09/03/24 0826          Transfers    Transfers sit-stand transfer;stand-sit transfer;bed-chair transfer  -TB     Comment, (Transfers) Education and cues for hand placement. Good effort Boost to come to upright stand.  -TB       Row Name 09/03/24 0826          Bed-Chair Transfer    Bed-Chair [place] (Transfers) contact guard;1 person assist;verbal cues  -TB     Assistive Device (Bed-Chair Transfers) walker, front-wheeled  -TB       Row Name 09/03/24 0826          Sit-Stand Transfer    Sit-Stand [place] (Transfers) minimum assist (75% patient effort);1 person assist;verbal cues  -TB     Assistive Device (Sit-Stand Transfers) walker, front-wheeled  -TB     Comment, (Sit-Stand Transfer) Boost to stand from EOB  -TB       Row Name 09/03/24 0826          Stand-Sit Transfer    Stand-Sit [place] (Transfers) contact guard;1 person assist;verbal cues  -TB     Assistive Device (Stand-Sit Transfers) walker,  "front-wheeled  -TB       Row Name 09/03/24 0826          Functional Mobility    Functional Mobility- Ind. Level contact guard assist;1 person;verbal cues required  -     Functional Mobility- Device walker, front-wheeled  -TB     Functional Mobility-Distance (Feet) 30  -TB     Functional Mobility- Safety Issues sequencing ability decreased;balance decreased during turns  -TB     Functional Mobility- Comment Pt is up in room with good effort. Denies pain.  -     Patient was able to Ambulate yes  -       Row Name 09/03/24 0826          Activities of Daily Living    BADL Assessment/Intervention upper body dressing;lower body dressing;feeding;bathing;grooming  -       Row Name 09/03/24 0826          Mobility    Extremity Weight-bearing Status right lower extremity  -TB     Right Lower Extremity (Weight-bearing Status) weight-bearing as tolerated (WBAT)  -       Row Name 09/03/24 0826          Upper Body Dressing Assessment/Training    [place] Level (Upper Body Dressing) don;pajama/robe;set up  -TB     Position (Upper Body Dressing) edge of bed sitting  -       Row Name 09/03/24 0826          Lower Body Dressing Assessment/Training    [place] Level (Lower Body Dressing) lower body dressing skills;moderate assist (50% patient effort);verbal cues  -TB     Position (Lower Body Dressing) edge of bed sitting  -TB     Comment, (Lower Body Dressing) Education reviewed for ADL retraining. Pt reports having \"some\" AE at home.  shoe horn issued and teaching completed.  -       Row Name 09/03/24 0826          Grooming Assessment/Training    [place] Level (Grooming) set up;grooming skills  -       Row Name 09/03/24 0826          Self-Feeding Assessment/Training    [place] Level (Feeding) independent;scoop food and bring to mouth;liquids to mouth  -TB     Position (Self-Feeding) supported sitting  -TB       Row Name 09/03/24 0826          Bathing Assessment/Intervention    [place] Level " (Bathing) set up;distal lower extremities/feet  -TB     Position (Bathing) unsupported sitting  -TB     Comment, (Bathing) Education reviewed for ADL retraining. Pt reports having all necessary AE at home.  -TB               User Key  (r) = Recorded By, (t) = Taken By, (c) = Cosigned By      Initials Name Provider Type    Keesha Hall OT Occupational Therapist                   Obj/Interventions       Row Name 09/03/24 0896          Balance    Balance Assessment sitting dynamic balance;sit to stand dynamic balance;standing dynamic balance  -TB     Dynamic Sitting Balance independent  -TB     Position, Sitting Balance unsupported;sitting in chair;sitting edge of bed  -TB     Sit to Stand Dynamic Balance minimal assist;1-person assist;verbal cues  -TB     Dynamic Standing Balance contact guard;1-person assist;verbal cues  -TB     Position/Device Used, Standing Balance supported;walker, front-wheeled  -TB     Balance Interventions sitting;standing;sit to stand;supported;static;dynamic;dynamic reaching;occupation based/functional task;UE activity with balance activity  -TB     Comment, Balance No LOB  -TB               User Key  (r) = Recorded By, (t) = Taken By, (c) = Cosigned By      Initials Name Provider Type    TB Keesha Melo OT Occupational Therapist                   Goals/Plan    No documentation.                  Clinical Impression       Row Name 09/03/24 0915          Pain Assessment    Pretreatment Pain Rating 0/10 - no pain  -TB     Posttreatment Pain Rating 0/10 - no pain  -TB     Pre/Posttreatment Pain Comment Pt denies pain  -TB     Pain Intervention(s) Ambulation/increased activity;Repositioned;Elevated  -TB       Row Name 09/03/24 0955          Plan of Care Review    Plan of Care Reviewed With patient  -TB     Progress improving  -TB     Outcome Evaluation Pt participates in therapy with good effort. Remains below his baseline with strength and functional endurance deficits.  SBA to transition to EOB sitting. Stands with Min Ax1. Up in room with RW support and CGAx1. Education reviewed for ADL retraining. Pt reports having AE at home. Mod A LB ADLs. Set-up UB ADLs. OT will continue to follow IP. Recommend home with 24/7 assist when medically ready.  -TB       Row Name 09/03/24 0950          Therapy Plan Review/Discharge Plan (OT)    Anticipated Discharge Disposition (OT) home with 24/7 care  -TB       Row Name 09/03/24 0950          Vital Signs    Pre Systolic BP Rehab --  RN cleared OT  -TB     Pre SpO2 (%) 94  -TB     O2 Delivery Pre Treatment room air  -TB     Pre Patient Position Supine  -TB     Intra Patient Position Standing  -TB     Post Patient Position Sitting  -TB       Row Name 09/03/24 0950          Positioning and Restraints    Pre-Treatment Position in bed  -TB     Post Treatment Position chair  -TB     In Chair notified nsg;reclined;call light within reach;encouraged to call for assist;exit alarm on;waffle cushion;legs elevated  -TB               User Key  (r) = Recorded By, (t) = Taken By, (c) = Cosigned By      Initials Name Provider Type    TB Keesha Melo, OT Occupational Therapist                   Outcome Measures    No documentation.                   Occupational Therapy Education       Title: PT OT SLP Therapies (In Progress)       Topic: Occupational Therapy (In Progress)       Point: ADL training (Done)       Description:   Instruct learner(s) on proper safety adaptation and remediation techniques during self care or transfers.   Instruct in proper use of assistive devices.                  Learning Progress Summary             Patient Acceptance, E,D, VU,NR by TB at 9/3/2024 0957    Acceptance, E,D, NR by SLIME at 8/31/2024 0950                         Point: Home exercise program (Not Started)       Description:   Instruct learner(s) on appropriate technique for monitoring, assisting and/or progressing therapeutic exercises/activities.                   Learner Progress:  Not documented in this visit.              Point: Precautions (Done)       Description:   Instruct learner(s) on prescribed precautions during self-care and functional transfers.                  Learning Progress Summary             Patient Acceptance, E,D, VU,NR by  at 9/3/2024 0957    Acceptance, E,D, NR by JY at 8/31/2024 0950                         Point: Body mechanics (In Progress)       Description:   Instruct learner(s) on proper positioning and spine alignment during self-care, functional mobility activities and/or exercises.                  Learning Progress Summary             Patient Acceptance, E,D, NR by LSIME at 8/31/2024 0950                                         User Key       Initials Effective Dates Name Provider Type Discipline     07/11/23 -  Keesha Melo, OT Occupational Therapist OT    SLIME 06/16/21 -  Veda Segura OT Occupational Therapist OT                  OT Recommendation and Plan     Plan of Care Review  Plan of Care Reviewed With: patient  Progress: improving  Outcome Evaluation: Pt participates in therapy with good effort. Remains below his baseline with strength and functional endurance deficits. SBA to transition to EOB sitting. Stands with Min Ax1. Up in room with RW support and CGAx1. Education reviewed for ADL retraining. Pt reports having AE at home. Mod A LB ADLs. Set-up UB ADLs. OT will continue to follow IP. Recommend home with 24/7 assist when medically ready.     Time Calculation:         Time Calculation- OT       Row Name 09/03/24 0758             Time Calculation- OT    OT Start Time 0758  -TB      OT Received On 09/03/24  -TB      OT Goal Re-Cert Due Date 09/10/24  -TB         Timed Charges    35216 - OT Self Care/Mgmt Minutes 25  -TB         Total Minutes    Timed Charges Total Minutes 25  -TB       Total Minutes 25  -TB                User Key  (r) = Recorded By, (t) = Taken By, (c) = Cosigned By      Initials Name Provider Type     TB Keesha Melo, MICA Occupational Therapist                  Therapy Charges for Today       Code Description Service Date Service Provider Modifiers Qty    [id number] HC OT SELF CARE/MGMT/TRAIN EA 15 MIN 9/3/2024 Keesha Melo OT GO 2                 Keesha Melo OT  9/3/2024

## 2024-09-03 NOTE — PLAN OF CARE
Problem: Adult Inpatient Plan of Care  Goal: Absence of Hospital-Acquired Illness or Injury  Intervention: Identify and Manage Fall Risk  Recent Flowsheet Documentation  Taken 9/3/2024 0404 by Maryjo Quiroz RN  Safety Promotion/Fall Prevention:   activity supervised   assistive device/personal items within reach   clutter free environment maintained   lighting adjusted   nonskid shoes/slippers when out of bed   room organization consistent   safety round/check completed  Taken 9/3/2024 0230 by Maryjo Quiroz RN  Safety Promotion/Fall Prevention:   activity supervised   assistive device/personal items within reach   clutter free environment maintained   lighting adjusted   nonskid shoes/slippers when out of bed   room organization consistent   safety round/check completed  Taken 9/3/2024 0035 by Maryjo Quiroz RN  Safety Promotion/Fall Prevention:   activity supervised   assistive device/personal items within reach   clutter free environment maintained   lighting adjusted   nonskid shoes/slippers when out of bed   room organization consistent   safety round/check completed  Taken 9/2/2024 2200 by Maryjo Quiroz RN  Safety Promotion/Fall Prevention:   activity supervised   assistive device/personal items within reach   clutter free environment maintained   lighting adjusted   nonskid shoes/slippers when out of bed   room organization consistent   safety round/check completed  Taken 9/2/2024 2023 by Maryjo Quiroz RN  Safety Promotion/Fall Prevention:   activity supervised   assistive device/personal items within reach   clutter free environment maintained   lighting adjusted   nonskid shoes/slippers when out of bed   room organization consistent   safety round/check completed  Intervention: Prevent Skin Injury  Recent Flowsheet Documentation  Taken 9/3/2024 0404 by Maryjo Quiroz RN  Body Position: supine, legs elevated  Skin Protection:   adhesive use limited   incontinence pads utilized   tubing/devices free from skin  contact   skin-to-skin areas padded   skin-to-device areas padded   skin sealant/moisture barrier applied   silicone foam dressing in place   pulse oximeter probe site changed  Taken 9/3/2024 0230 by Maryjo Quiroz RN  Body Position: position changed independently  Skin Protection:   adhesive use limited   incontinence pads utilized   tubing/devices free from skin contact   skin-to-skin areas padded   skin-to-device areas padded   skin sealant/moisture barrier applied   silicone foam dressing in place  Taken 9/3/2024 0035 by Maryjo Quiroz RN  Body Position:   side-lying   right  Skin Protection:   adhesive use limited   incontinence pads utilized   tubing/devices free from skin contact   skin-to-skin areas padded   skin-to-device areas padded   skin sealant/moisture barrier applied   silicone foam dressing in place  Taken 9/2/2024 2200 by Maryjo Quiroz RN  Body Position: position changed independently  Skin Protection:   adhesive use limited   incontinence pads utilized   tubing/devices free from skin contact   skin-to-skin areas padded   skin-to-device areas padded   skin sealant/moisture barrier applied   silicone foam dressing in place  Taken 9/2/2024 2023 by Maryjo Quiroz RN  Body Position:   position changed independently   side-lying   right  Skin Protection:   adhesive use limited   incontinence pads utilized   tubing/devices free from skin contact   skin-to-skin areas padded   skin-to-device areas padded   skin sealant/moisture barrier applied   silicone foam dressing in place   pulse oximeter probe site changed  Intervention: Prevent and Manage VTE (Venous Thromboembolism) Risk  Recent Flowsheet Documentation  Taken 9/3/2024 0404 by Maryjo Quiroz RN  VTE Prevention/Management: sequential compression devices off  Taken 9/3/2024 0230 by Maryjo Quiroz RN  Activity Management: ambulated to bathroom  VTE Prevention/Management:   bilateral   sequential compression devices on  Taken 9/2/2024 2200 by Maryjo Quiroz  "RN  VTE Prevention/Management:   bilateral   sequential compression devices on  Taken 9/2/2024 2023 by Maryjo Quiroz RN  Activity Management: dorsiflexion/plantar flexion performed  Range of Motion: active ROM (range of motion) encouraged  Intervention: Prevent Infection  Recent Flowsheet Documentation  Taken 9/3/2024 0404 by Maryjo Quiroz RN  Infection Prevention:   environmental surveillance performed   hand hygiene promoted   rest/sleep promoted   single patient room provided  Taken 9/3/2024 0230 by Maryjo Quiroz RN  Infection Prevention:   environmental surveillance performed   hand hygiene promoted   rest/sleep promoted   single patient room provided  Taken 9/3/2024 0035 by Maryjo Quiroz RN  Infection Prevention:   environmental surveillance performed   hand hygiene promoted   rest/sleep promoted   single patient room provided  Taken 9/2/2024 2200 by Maryjo Quiroz RN  Infection Prevention:   environmental surveillance performed   hand hygiene promoted   rest/sleep promoted   single patient room provided  Taken 9/2/2024 2023 by Maryjo Quiroz RN  Infection Prevention:   environmental surveillance performed   hand hygiene promoted   rest/sleep promoted   single patient room provided  Goal: Optimal Comfort and Wellbeing  Intervention: Monitor Pain and Promote Comfort  Recent Flowsheet Documentation  Taken 9/3/2024 0035 by Maryjo Quiroz RN  Pain Management Interventions:   pillow support provided   position adjusted  Intervention: Provide Person-Centered Care  Recent Flowsheet Documentation  Taken 9/2/2024 2023 by Maryjo Quiroz RN  Trust Relationship/Rapport:   choices provided   care explained   emotional support provided   empathic listening provided   questions answered   questions encouraged   reassurance provided   thoughts/feelings acknowledged   Goal Outcome Evaluation:Ambulating with walker and standby assist. Redness noted at incision site of right knee and ankles. Pt reports redness \"looks better\". " Tenderness reported at site but pt denies pain. Voiding without difficulty. Confusion briefly when awakening from sleep caused pt to remove IV. Pt easily reoriented. CM following for placement.

## 2024-09-03 NOTE — PROGRESS NOTES
" progress note      Guicho Blanco  [id number]  1940     LOS: 3 days     Attending: Berto Liu MD    Primary Care Provider: Mitchel Culver MD      Chief Complaint/Reason for visit:    Chief Complaint   Patient presents with    Post-op Problem       Subjective   Doing well. Ambulating with PT and staff. Tolerating po.   No rash, no n/vom.   Redness improving.     Objective        Visit Vitals  /67 (BP Location: Right arm, Patient Position: Lying)   Pulse 81   Temp 98 °F (36.7 °C) (Oral)   Resp 18   Ht 182.9 cm (72\")   Wt 99.8 kg (220 lb)   SpO2 100%   BMI 29.84 kg/m²     Temp (24hrs), Av °F (36.7 °C), Min:97.4 °F (36.3 °C), Max:99 °F (37.2 °C)      Intake/Output:    Intake/Output Summary (Last 24 hours) at 9/3/2024 1544  Last data filed at 9/3/2024 1332  Gross per 24 hour   Intake 840 ml   Output 1050 ml   Net -210 ml          Physical Exam:     General Appearance:    Alert, cooperative, in no acute distress   Head:    Normocephalic, without obvious abnormality, atraumatic    Lungs:     Normal effort, symmetric chest rise,  clear to      auscultation bilaterally              Heart:    Regular rhythm and normal rate, normal S1 and S2    Abdomen:     Normal bowel sounds, no masses, no organomegaly, soft        non-tender, non-distended, no guarding, no rebound     tenderness   Extremities: Decreased erythema around right knee.    No drainage from incision.  No clubbing, cyanosis or edema.  No deformities.    Pulses:   Pulses palpable and equal bilaterally               Results Review:     I reviewed the patient's new clinical results.   Results from last 7 days   Lab Units 24  1638   WBC 10*3/mm3 6.98   HEMOGLOBIN g/dL 11.5*   HEMATOCRIT % 36.1*   PLATELETS 10*3/mm3 314     Results from last 7 days   Lab Units 24  1638   SODIUM mmol/L 136   POTASSIUM mmol/L 4.1   CHLORIDE mmol/L 101   CO2 mmol/L 22.0   BUN mg/dL 12   CREATININE mg/dL 1.13   CALCIUM mg/dL 9.5 "   BILIRUBIN mg/dL 0.5   ALK PHOS U/L 187*   ALT (SGPT) U/L 20   AST (SGOT) U/L 32   GLUCOSE mg/dL 103*     I reviewed the patient's new imaging including images and reports.   Latest Reference Range & Units 09/01/24 20:39   Color, UA Yellow, Straw  Yellow   Appearance, UA Clear  Clear   Specific Gravity, UA 1.001 - 1.030  1.025   pH, UA 5.0 - 8.0  6.0   Glucose Negative  Negative   Ketones, UA Negative  Trace !   Blood, UA Negative  Negative   Nitrite, UA Negative  Negative   Leukocytes, UA Negative  Negative   Protein, UA Negative  Trace !   Bilirubin, UA Negative  Negative   Urobilinogen, UA 0.2 - 1.0 E.U./dL  0.2 E.U./dL   !: Data is abnormal  All medications reviewed.   aspirin, 325 mg, Oral, Daily  cefTRIAXone, 2,000 mg, Intravenous, Q24H  flecainide, 50 mg, Oral, Q12H  isosorbide mononitrate, 30 mg, Oral, Daily  meloxicam, 7.5 mg, Oral, Daily  nortriptyline, 20 mg, Oral, Nightly  pantoprazole, 40 mg, Oral, Q AM  pravastatin, 20 mg, Oral, Daily  saccharomyces boulardii, 250 mg, Oral, BID  sodium chloride, 10 mL, Intravenous, Q12H  vancomycin, 1,500 mg, Intravenous, Daily      bisacodyl, 10 mg, Daily PRN  docusate sodium, 100 mg, BID PRN  Pharmacy to dose vancomycin, , Continuous PRN  polyethylene glycol, 17 g, Daily PRN  sodium chloride, 10 mL, PRN  sodium chloride, 10 mL, PRN  sodium chloride, 40 mL, PRN        Assessment & Plan       Cellulitis    PAF (paroxysmal atrial fibrillation)    Coronary artery disease involving native coronary artery of native heart without angina pectoris    Mixed hyperlipidemia   Mild oral dysphagia and pharyngeal dysphagia       Plan  1. PT/OT, continue.   2. Pain control-prns   3. IS-encouraged  4. DVT proph-aspirin.  5. Bowel regimen  6. Monitor post-op labs  7. DC planning, likely home with PO antibiotics.     Continue antibiotics per ID.  Monitor for adverse drug reactions.       HTN, Hyperlipidemia, afib, CAD  - Continue home Tambocor, Imdur and statin  - Monitor BP   -  Holding parameters for BP meds  - Labetalol PRN for SBP>170     Continue soft to chew textured diet, chopped, thin liquids, per speech therapy recommendation       Berto Liu MD  09/03/24  15:44 EDT

## 2024-09-04 LAB
ANION GAP SERPL CALCULATED.3IONS-SCNC: 12 MMOL/L (ref 5–15)
BACTERIA SPEC AEROBE CULT: NORMAL
BACTERIA SPEC AEROBE CULT: NORMAL
BUN SERPL-MCNC: 7 MG/DL (ref 8–23)
BUN/CREAT SERPL: 8.2 (ref 7–25)
CALCIUM SPEC-SCNC: 8.7 MG/DL (ref 8.6–10.5)
CHLORIDE SERPL-SCNC: 103 MMOL/L (ref 98–107)
CO2 SERPL-SCNC: 21 MMOL/L (ref 22–29)
CREAT SERPL-MCNC: 0.85 MG/DL (ref 0.76–1.27)
EGFRCR SERPLBLD CKD-EPI 2021: 86.2 ML/MIN/1.73
GLUCOSE SERPL-MCNC: 119 MG/DL (ref 65–99)
POTASSIUM SERPL-SCNC: 4 MMOL/L (ref 3.5–5.2)
SODIUM SERPL-SCNC: 136 MMOL/L (ref 136–145)

## 2024-09-04 PROCEDURE — 97110 THERAPEUTIC EXERCISES: CPT

## 2024-09-04 PROCEDURE — 97116 GAIT TRAINING THERAPY: CPT

## 2024-09-04 PROCEDURE — 25810000003 SODIUM CHLORIDE 0.9 % SOLUTION

## 2024-09-04 PROCEDURE — 25010000002 CEFTRIAXONE PER 250 MG: Performed by: NURSE PRACTITIONER

## 2024-09-04 PROCEDURE — 25010000002 VANCOMYCIN 10 G RECONSTITUTED SOLUTION

## 2024-09-04 PROCEDURE — 80048 BASIC METABOLIC PNL TOTAL CA: CPT

## 2024-09-04 RX ORDER — CEPHALEXIN 500 MG/1
500 CAPSULE ORAL 4 TIMES DAILY
Qty: 28 CAPSULE | Refills: 0 | Status: SHIPPED | OUTPATIENT
Start: 2024-09-04 | End: 2024-09-11

## 2024-09-04 RX ORDER — PRAVASTATIN SODIUM 20 MG
20 TABLET ORAL DAILY
Status: ON HOLD
Start: 2024-09-04

## 2024-09-04 RX ORDER — DOXYCYCLINE HYCLATE 100 MG
100 TABLET ORAL 2 TIMES DAILY
Qty: 14 TABLET | Refills: 0 | Status: SHIPPED | OUTPATIENT
Start: 2024-09-04 | End: 2024-09-11

## 2024-09-04 RX ORDER — SACCHAROMYCES BOULARDII 250 MG
250 CAPSULE ORAL 2 TIMES DAILY
Qty: 42 CAPSULE | Refills: 0 | Status: ON HOLD | OUTPATIENT
Start: 2024-09-04

## 2024-09-04 RX ADMIN — MELOXICAM 7.5 MG: 7.5 TABLET ORAL at 09:36

## 2024-09-04 RX ADMIN — Medication 10 ML: at 09:38

## 2024-09-04 RX ADMIN — NORTRIPTYLINE HYDROCHLORIDE 20 MG: 10 CAPSULE ORAL at 20:26

## 2024-09-04 RX ADMIN — FLECAINIDE ACETATE 50 MG: 50 TABLET ORAL at 09:37

## 2024-09-04 RX ADMIN — ASPIRIN 325 MG: 325 TABLET ORAL at 09:35

## 2024-09-04 RX ADMIN — DOCUSATE SODIUM 100 MG: 100 CAPSULE, LIQUID FILLED ORAL at 16:50

## 2024-09-04 RX ADMIN — PRAVASTATIN SODIUM 20 MG: 20 TABLET ORAL at 09:35

## 2024-09-04 RX ADMIN — SODIUM CHLORIDE 2000 MG: 900 INJECTION INTRAVENOUS at 18:02

## 2024-09-04 RX ADMIN — Medication 1500 MG: at 09:37

## 2024-09-04 RX ADMIN — Medication 250 MG: at 09:37

## 2024-09-04 RX ADMIN — FLECAINIDE ACETATE 50 MG: 50 TABLET ORAL at 20:26

## 2024-09-04 RX ADMIN — ISOSORBIDE MONONITRATE 30 MG: 30 TABLET, EXTENDED RELEASE ORAL at 09:35

## 2024-09-04 RX ADMIN — Medication 250 MG: at 20:26

## 2024-09-04 RX ADMIN — PANTOPRAZOLE SODIUM 40 MG: 40 TABLET, DELAYED RELEASE ORAL at 09:58

## 2024-09-04 RX ADMIN — POLYETHYLENE GLYCOL 3350 17 G: 17 POWDER, FOR SOLUTION ORAL at 16:50

## 2024-09-04 RX ADMIN — Medication 10 ML: at 22:06

## 2024-09-04 NOTE — PROGRESS NOTES
"      Orthopaedic Surgery Progress Note    CC: Right leg pain      Subjective     Interval History:   Patient tells me he is frustrated this afternoon.  Main source of frustration has his discharge plan.  He feels good overall.  Leg pain has improved.  Continues to increase mobility.      ROS: Denies fever, chills, nausea or vomiting    Objective     Vital Signs:  Temp (24hrs), Av.7 °F (36.5 °C), Min:97.5 °F (36.4 °C), Max:98 °F (36.7 °C)    /80 (BP Location: Right arm, Patient Position: Lying)   Pulse 89   Temp 97.8 °F (36.6 °C) (Oral)   Resp 18   Ht 182.9 cm (72\")   Wt 99.8 kg (220 lb)   SpO2 98%   BMI 29.84 kg/m²       Physical Exam:  Right lower extremity: Patient's surgical incision remains benign in appearance.  Erythema and induration continue to improve  Calf soft and nontender  No significant increase in warmth over the area of concern.    Assessment and Plan:  Hospital day #6 for cellulitis of the right leg that failed outpatient oral antibiotic treatment.  Patient continues to improve with IV vancomycin and Rocephin.  Patient denied admission to acute rehab at [facility].  Referral will be made for the skilled side at [facility] per patient's daughter's wishes.  Patient has expressed a desire to go home with home health services if placement cannot be arranged.  Continue antibiotics per ID service and Dr. Manzanares recommendations.    Christian Schwartz MD  24  15:14 EDT        "

## 2024-09-04 NOTE — PROGRESS NOTES
"  INFECTIOUS DISEASES  INPATIENT PROGRESS NOTE  2024      PATIENT NAME: Guicho Blanco  :  1940  MRN:  [id number]  Date of Admission:  2024      Antimicrobials:  IV vancomycin  Ceftriaxone      MAR reviewed.      Reason for consultation: Right knee cellulitis after recent arthroplasty    Interval history: No new issues.  Right knee about the same.  No fevers.  Tolerating antibiotics.  Family and case management exploring placement options.    ROS:  No fevers/chills overnight.  No new rashes.  No SOB or chest pain.  No nausea/vomiting/diarrhea.  Denies side effects from antimicrobials.      Objective:  Temp (24hrs), Av.7 °F (36.5 °C), Min:97.5 °F (36.4 °C), Max:98 °F (36.7 °C)    /65 (BP Location: Right arm, Patient Position: Lying)   Pulse 88   Temp 97.7 °F (36.5 °C) (Oral)   Resp 18   Ht 182.9 cm (72\")   Wt 99.8 kg (220 lb)   SpO2 99%   BMI 29.84 kg/m²     Physical Examination:  GENERAL: Awake and alert, in no acute distress.   HEENT: Normocephalic, atraumatic.  EOMI. No conjunctival injection.  Hard of hearing.  LUNGS: Normal respiratory effort. Nonlabored.   EXT:  trace right leg edema - stable today  MS: Good movement of right knee without significant pain.  SKIN:  right TKA incision well approximated.  Erythema distally and medial knee is fading with decreased warmth with some slight desquamation; no drainage.  No crepitus, induration, or fluctuance.  NEURO: Oriented to PPT.  Motor strength intact  PSYCHIATRIC: Normal insight and judgment. Cooperative with PE    Laboratory Data:    Results from last 7 days   Lab Units 24  1638   WBC 10*3/mm3 6.98   HEMOGLOBIN g/dL 11.5*   HEMATOCRIT % 36.1*   PLATELETS 10*3/mm3 314     Results from last 7 days   Lab Units 24  0954   SODIUM mmol/L 136   POTASSIUM mmol/L 4.0   CHLORIDE mmol/L 103   CO2 mmol/L 21.0*   BUN mg/dL 7*   CREATININE mg/dL 0.85   GLUCOSE mg/dL 119*   CALCIUM mg/dL 8.7     Results from last 7 days   Lab " Units 08/30/24  1638   ALK PHOS U/L 187*   BILIRUBIN mg/dL 0.5   ALT (SGPT) U/L 20   AST (SGOT) U/L 32     Results from last 7 days   Lab Units 08/30/24  1638   SED RATE mm/hr 25*     Results from last 7 days   Lab Units 08/30/24  1638   CRP mg/dL 0.80*     Estimated Creatinine Clearance: 80.6 mL/min (by C-G formula based on SCr of 0.85 mg/dL).  Results from last 7 days   Lab Units 08/30/24  1638   LACTATE mmol/L 1.8         Results from last 7 days   Lab Units 09/03/24  0546   VANCOMYCIN RM mcg/mL 10.90             Microbiology:    Microbiology Results (last 10 days)       Procedure Component Value - Date/Time    Blood Culture - Blood, Arm, Right [[id number]]  (Normal) Collected: 08/30/24 1800    Lab Status: Preliminary result Specimen: Blood from Arm, Right Updated: 09/03/24 1831     Blood Culture No growth at 4 days    Narrative:      Less than seven (7) mL's of blood was collected.  Insufficient quantity may yield false negative results.    Blood Culture - Blood, Arm, Right [[id number]]  (Normal) Collected: 08/30/24 1638    Lab Status: Preliminary result Specimen: Blood from Arm, Right Updated: 09/03/24 1715     Blood Culture No growth at 4 days                Radiology:  No radiology results for the last day          DISCUSSION:  83 year old admitted with right lower extremity cellulitis, following right total knee replacement on 8/7. Started on oral antibiotics at [facility] prior to discharge without improvement and was referred for admission.  No concern for septic arthritis at this time per orthopedics.     PROBLEM LIST:   - Right lower extremity cellulitis, with underlying prosthesis in place.  Appears superficial.  Improving with antibiotics.  - s/p Right TKA, 8/7/24.  No obvious joint inflammation with minimally elevated inflammatory markers.  Little concern for periprosthetic joint infection at this time. Ortho following.    PLAN:  -- Continue IV vancomycin and ceftriaxone while in hospital  -- Follow  exam - ongoing improvement  -- Family and CM working on safe discharge plan/rehab  --Okay for discharge at any time from my perspective with doxycycline 100 mg p.o. twice daily and cephalexin 500 mg p.o. 3 times daily through 9/10/24.  -- I can see him back in my office in one week    Plan discussed with patient and family.    Jae Manzanares MD  9/4/2024  15:52 EDT

## 2024-09-04 NOTE — PLAN OF CARE
Goal Outcome Evaluation:   Patient alert and oriented.On Antibiotics.No complaints of pain.Vital signs within Normal limits.On RA.A Waiting Placement.

## 2024-09-04 NOTE — PLAN OF CARE
After Visit Summary   5/29/2018    Dung Norton    MRN: [id number]           Patient Information     Date Of Birth          1943        Visit Information        Provider Department      5/29/2018 1:02 PM Haley Baker MD [facility]        Today's Diagnoses     Hypertension goal BP (blood pressure) < 140/90    -  1       Follow-ups after your visit        Your next 10 appointments already scheduled     Jun 11, 2018 10:30 AM CDT   LAB with  [facility]   [facility] ([facility])    [address] [place] [id number] [phone]           Please do not eat 10-12 hours before your appointment if you are coming in fasting for labs on lipids, cholesterol, or glucose (sugar). This does not apply to pregnant women. Water, hot tea and black coffee (with nothing added) are okay. Do not drink other fluids, diet soda or chew gum.            Jun 22, 2018  1:00 PM CDT   (Arrive by 12:30 PM)   Return Visit with Oralia De La Torre NP   [facility] ([facility])    [address]  [address]  [facility] [id number] [phone]            Oct 26, 2018  9:15 AM CDT   (Arrive by 9:00 AM)   Return Visit with Tamiko Vanegas MD   [facility] ([facility])    [address] [place] [address]  4th Floor  [facility] [id number] [phone]              Who to contact     If you have questions or need follow up information about today's clinic visit or your schedule please contact [facility] directly at [phone].  Normal or non-critical lab and imaging results will be communicated to you by MyChart, letter or phone within 4 business days after the clinic has received the results. If you do not hear from us within 7 days, please contact the clinic through MyChart or phone. If you have a critical or abnormal lab result, we will notify you    Problem: Adult Inpatient Plan of Care  Goal: Absence of Hospital-Acquired Illness or Injury  Intervention: Identify and Manage Fall Risk  Recent Flowsheet Documentation  Taken 9/4/2024 0212 by Maryjo Quiroz RN  Safety Promotion/Fall Prevention:   activity supervised   assistive device/personal items within reach   clutter free environment maintained   lighting adjusted   nonskid shoes/slippers when out of bed   room organization consistent   safety round/check completed  Taken 9/4/2024 0034 by Maryjo Quiroz RN  Safety Promotion/Fall Prevention:   activity supervised   assistive device/personal items within reach   clutter free environment maintained   lighting adjusted   nonskid shoes/slippers when out of bed   room organization consistent   safety round/check completed  Taken 9/3/2024 2226 by Maryjo Quiroz RN  Safety Promotion/Fall Prevention:   activity supervised   assistive device/personal items within reach   clutter free environment maintained   lighting adjusted   nonskid shoes/slippers when out of bed   room organization consistent   safety round/check completed  Taken 9/3/2024 2012 by Maryjo Quiroz RN  Safety Promotion/Fall Prevention:   activity supervised   assistive device/personal items within reach   clutter free environment maintained   lighting adjusted   nonskid shoes/slippers when out of bed   safety round/check completed   room organization consistent  Intervention: Prevent Skin Injury  Recent Flowsheet Documentation  Taken 9/4/2024 0212 by Maryjo Quiroz RN  Body Position: position changed independently  Skin Protection:   tubing/devices free from skin contact   skin-to-skin areas padded   skin-to-device areas padded   skin sealant/moisture barrier applied   silicone foam dressing in place  Taken 9/4/2024 0034 by Maryjo Quiroz RN  Body Position:   position changed independently   supine, legs elevated  Skin Protection:   adhesive use limited   incontinence pads utilized   tubing/devices free  by phone as soon as possible.  Submit refill requests through [employer] or call your pharmacy and they will forward the refill request to us. Please allow 3 business days for your refill to be completed.          Additional Information About Your Visit        Care EveryWhere ID     This is your Care EveryWhere ID. This could be used by other organizations to access your [facility] medical records  [id number]         Blood Pressure from Last 3 Encounters:   06/04/18 138/80   05/29/18 130/72   05/21/18 130/78    Weight from Last 3 Encounters:   04/27/18 173 lb (78.5 kg)   04/13/18 173 lb (78.5 kg)   03/14/18 171 lb (77.6 kg)              Today, you had the following     No orders found for display         Today's Medication Changes          These changes are accurate as of 5/29/18 11:59 PM.  If you have any questions, ask your nurse or doctor.               These medicines have changed or have updated prescriptions.        Dose/Directions    amLODIPine 2.5 MG tablet   Commonly known as:  NORVASC   This may have changed:  when to take this   Used for:  Renal hypertension        Dose:  2.5 mg   Take 1 tablet (2.5 mg) by mouth daily   Quantity:  90 tablet   Refills:  3       finasteride 5 MG tablet   Commonly known as:  PROSCAR   This may have changed:  when to take this   Used for:  Benign prostatic hyperplasia with urinary retention        Dose:  5 mg   Take 1 tablet (5 mg) by mouth daily   Quantity:  90 tablet   Refills:  3                Primary Care Provider Office Phone # Fax #    Haley Baker [phone] [address] [address]        Equal Access to Services     JOSS CERRATO AH: Hadjakob verdin Sochalo, waaxda luqadaha, qaybta kaalmada gibson jones. So [facility] [phone].    ATENCIÓN: Si habla español, tiene a montague disposición servicios gratuitos de asistencia lingüística. Llame al [phone].    We comply with applicable federal  from skin contact   skin-to-skin areas padded   skin-to-device areas padded   skin sealant/moisture barrier applied  Taken 9/3/2024 2226 by Maryjo Quiroz RN  Body Position:   position changed independently   tilted   right  Skin Protection:   adhesive use limited   incontinence pads utilized   tubing/devices free from skin contact   skin-to-skin areas padded   skin-to-device areas padded   skin sealant/moisture barrier applied   silicone foam dressing in place  Taken 9/3/2024 2012 by Maryjo Quiroz RN  Body Position: sitting up in bed  Skin Protection:   adhesive use limited   incontinence pads utilized   tubing/devices free from skin contact   skin-to-skin areas padded   skin-to-device areas padded   skin sealant/moisture barrier applied   silicone foam dressing in place   pulse oximeter probe site changed  Intervention: Prevent and Manage VTE (Venous Thromboembolism) Risk  Recent Flowsheet Documentation  Taken 9/4/2024 0212 by Maryjo Quiroz RN  VTE Prevention/Management:   sequential compression devices off   patient refused intervention  Taken 9/4/2024 0034 by Maryjo uQiroz RN  VTE Prevention/Management:   sequential compression devices off   patient refused intervention  Taken 9/3/2024 2226 by Maryjo Quiroz RN  VTE Prevention/Management:   sequential compression devices off   patient refused intervention  Taken 9/3/2024 2012 by Maryjo Quiroz RN  Activity Management:   dorsiflexion/plantar flexion performed   activity encouraged  VTE Prevention/Management:   sequential compression devices off   patient refused intervention  Range of Motion: active ROM (range of motion) encouraged  Intervention: Prevent Infection  Recent Flowsheet Documentation  Taken 9/4/2024 0212 by Maryjo Quiroz RN  Infection Prevention:   environmental surveillance performed   hand hygiene promoted   rest/sleep promoted   single patient room provided  Taken 9/4/2024 0034 by Maryjo Quiroz RN  Infection Prevention:   environmental surveillance  civil rights laws and [place] laws. We do not discriminate on the basis of race, color, national origin, age, disability, sex, sexual orientation, or gender identity.            Thank you!     Thank you for choosing [facility]  for your care. Our goal is always to provide you with excellent care. Hearing back from our patients is one way we can continue to improve our services. Please take a few minutes to complete the written survey that you may receive in the mail after your visit with us. Thank you!             Your Updated Medication List - Protect others around you: Learn how to safely use, store and throw away your medicines at www.disposemymeds.org.          This list is accurate as of 5/29/18 11:59 PM.  Always use your most recent med list.                   Brand Name Dispense Instructions for use Diagnosis    amLODIPine 2.5 MG tablet    NORVASC    90 tablet    Take 1 tablet (2.5 mg) by mouth daily    Renal hypertension       aspirin 81 MG tablet     30 tablet    Take 1 tablet (81 mg) by mouth every morning - RESUME on 3/9/18 if urine remains clear    S/P TURP       calcium acetate 667 MG Caps capsule    PHOSLO    540 capsule    Take 2 capsules (1,334 mg) by mouth 3 times daily (with meals)    Chronic kidney disease, unspecified CKD stage       darbepoetin william 100 MCG/0.5ML injection    ARANESP (ALBUMIN FREE)    0.5 mL    Inject 0.5 mLs (100 mcg) Subcutaneous every 14 days As needed for hgb<10g/dL.  If Hgb increases >1 point in 2 weeks (if blood transfusion given, use hgb PRIOR to this), SYSTOLIC BP > 180 mmHg or hgb>=10g/dL, HOLD DOSE. Dose must be within 1 week of Hgb.  Per anemia protocol with Cecilia De La Torre CNP    Anemia of chronic renal failure, stage 5 (H), CKD (chronic kidney disease) stage 5, GFR less than 15 ml/min (H)       ferrous sulfate 325 (65 Fe) MG tablet    IRON    30 tablet    Take 1 tablet (325 mg) by mouth daily (with breakfast)    Anemia of chronic renal failure, stage 5  performed   hand hygiene promoted   rest/sleep promoted   single patient room provided  Taken 9/3/2024 2226 by Maryjo Quiroz, RN  Infection Prevention:   environmental surveillance performed   hand hygiene promoted   rest/sleep promoted   single patient room provided  Taken 9/3/2024 2012 by Maryjo Quiroz, RN  Infection Prevention:   environmental surveillance performed   hand hygiene promoted   single patient room provided   rest/sleep promoted  Goal: Optimal Comfort and Wellbeing  Intervention: Provide Person-Centered Care  Recent Flowsheet Documentation  Taken 9/3/2024 2012 by Maryjo Quiroz, RN  Trust Relationship/Rapport:   care explained   choices provided   emotional support provided   empathic listening provided   questions answered   questions encouraged   reassurance provided   thoughts/feelings acknowledged   Goal Outcome Evaluation:   A&OX4 Ambulating with walker and standby assist. Pain controlled. Voiding without difficulty.Repositioned in bed with assist. Allevyn placed to protect heals and small fissure in gluteal cleft. CM following.                                            (H), CKD (chronic kidney disease) stage 5, GFR less than 15 ml/min (H)       finasteride 5 MG tablet    PROSCAR    90 tablet    Take 1 tablet (5 mg) by mouth daily    Benign prostatic hyperplasia with urinary retention       FISH OIL PO           Multi-vitamin Tabs tablet      Take 1 tablet by mouth daily        order for DME     1 each    Equipment being ordered: olecranon bursa brace    Olecranon bursitis of left elbow

## 2024-09-04 NOTE — PROGRESS NOTES
" progress note      Guicho Blanco  [id number]  1940     LOS: 4 days     Attending: Berto Liu MD    Primary Care Provider: Mitchel Culver MD      Chief Complaint/Reason for visit:    Chief Complaint   Patient presents with    Post-op Problem       Subjective   Doing well. Ambulating with PT and staff.   Tolerating po.   Acute rehab was  denied by insurance.   No rash, no n/vom. Redness improving.     Objective        Visit Vitals  /65   Pulse 93   Temp 97.7 °F (36.5 °C) (Oral)   Resp 18   Ht 182.9 cm (72\")   Wt 99.8 kg (220 lb)   SpO2 97%   BMI 29.84 kg/m²     Temp (24hrs), Av.7 °F (36.5 °C), Min:97.5 °F (36.4 °C), Max:98 °F (36.7 °C)      Intake/Output:    Intake/Output Summary (Last 24 hours) at 2024 1039  Last data filed at 2024 0900  Gross per 24 hour   Intake 678 ml   Output 2200 ml   Net -1522 ml          Physical Exam:     General Appearance:    Alert, cooperative, in no acute distress   Head:    Normocephalic, without obvious abnormality, atraumatic    Lungs:     Normal effort, symmetric chest rise,  clear to      auscultation bilaterally              Heart:    Regular rhythm and normal rate, normal S1 and S2    Abdomen:     Normal bowel sounds, no masses, no organomegaly, soft        non-tender, non-distended, no guarding, no rebound     tenderness   Extremities: Decreased erythema around right knee.    No drainage from incision.  No clubbing, cyanosis or edema.  No deformities.    Pulses:   Pulses palpable and equal bilaterally               Results Review:     I reviewed the patient's new clinical results.   Results from last 7 days   Lab Units 24  1638   WBC 10*3/mm3 6.98   HEMOGLOBIN g/dL 11.5*   HEMATOCRIT % 36.1*   PLATELETS 10*3/mm3 314     Results from last 7 days   Lab Units 24  1638   SODIUM mmol/L 136   POTASSIUM mmol/L 4.1   CHLORIDE mmol/L 101   CO2 mmol/L 22.0   BUN mg/dL 12   CREATININE mg/dL 1.13   CALCIUM mg/dL 9.5   BILIRUBIN " mg/dL 0.5   ALK PHOS U/L 187*   ALT (SGPT) U/L 20   AST (SGOT) U/L 32   GLUCOSE mg/dL 103*     I reviewed the patient's new imaging including images and reports.   Latest Reference Range & Units 09/01/24 20:39   Color, UA Yellow, Straw  Yellow   Appearance, UA Clear  Clear   Specific Gravity, UA 1.001 - 1.030  1.025   pH, UA 5.0 - 8.0  6.0   Glucose Negative  Negative   Ketones, UA Negative  Trace !   Blood, UA Negative  Negative   Nitrite, UA Negative  Negative   Leukocytes, UA Negative  Negative   Protein, UA Negative  Trace !   Bilirubin, UA Negative  Negative   Urobilinogen, UA 0.2 - 1.0 E.U./dL  0.2 E.U./dL   !: Data is abnormal  All medications reviewed.   aspirin, 325 mg, Oral, Daily  cefTRIAXone, 2,000 mg, Intravenous, Q24H  flecainide, 50 mg, Oral, Q12H  isosorbide mononitrate, 30 mg, Oral, Daily  meloxicam, 7.5 mg, Oral, Daily  nortriptyline, 20 mg, Oral, Nightly  pantoprazole, 40 mg, Oral, Q AM  pravastatin, 20 mg, Oral, Daily  saccharomyces boulardii, 250 mg, Oral, BID  sodium chloride, 10 mL, Intravenous, Q12H  vancomycin, 1,500 mg, Intravenous, Daily      bisacodyl, 10 mg, Daily PRN  docusate sodium, 100 mg, BID PRN  Pharmacy to dose vancomycin, , Continuous PRN  polyethylene glycol, 17 g, Daily PRN  sodium chloride, 10 mL, PRN  sodium chloride, 10 mL, PRN  sodium chloride, 40 mL, PRN        Assessment & Plan       Cellulitis    PAF (paroxysmal atrial fibrillation)    Coronary artery disease involving native coronary artery of native heart without angina pectoris    Mixed hyperlipidemia   Mild oral dysphagia and pharyngeal dysphagia       Plan  1. PT/OT, continue.   2. Pain control-prns   3. IS-encouraged  4. DVT proph-aspirin.  5. Bowel regimen  6. Monitor post-op labs  7. DC planning,  is following, applying for subacute rehab with insurance!    Continue antibiotics per ID.  Monitor for adverse drug reactions.       HTN, Hyperlipidemia, afib, CAD  - Continue home Tambocor, Imdur and  statin  - Monitor BP   - Holding parameters for BP meds  - Labetalol PRN for SBP>170     Continue soft to chew textured diet, chopped, thin liquids, per speech therapy recommendation       Berto Liu MD  09/04/24  10:39 EDT

## 2024-09-04 NOTE — CASE MANAGEMENT/SOCIAL WORK
Continued Stay Note  [facility]     Patient Name: Guicho Blanco  MRN: [id number]  Today's Date: 9/4/2024    Admit Date: 8/30/2024    Plan: TBD   Discharge Plan       Row Name 09/04/24 1352       Plan    Plan [facility] SR    Patient/Family in Agreement with Plan yes    Plan Comments Rec'd call from girish Chen with [facility]. Patient's insurance co, [facility] [employer] denied patient for acute rehab services. P2P offered to Physician Advisor. P2P not completed due to patient walking 150' and therapy recommending home health services at [facility]. Discussed denial with daughter, Kayley and she would like to explore self pay options at [facility]. girish Chen with [facility] will call daughter to discuss. CM following.    1400: Kayley discussed payment options with girish Chen. Kayley has decided to pursue skilled rehab pre-cert at [facility]. Unable to begin prior auth with patient's [facility] [employer] until official denial from insurance co is received. CM following.      Final Discharge Disposition Code 03 - skilled nursing facility (SNF)                   Discharge Codes    No documentation.                 Expected Discharge Date and Time       Expected Discharge Date Expected Discharge Time    Sep 6, 2024               Korin Woods RN

## 2024-09-04 NOTE — PLAN OF CARE
Goal Outcome Evaluation:  Plan of Care Reviewed With: patient        Progress: improving  Outcome Evaluation: Pt amb 300' with FWW and CGA. Good safety awareness noted. No knee buckling or LOB noted. Activity limited by fatigue. Good improvement in functional endurance and balance noted. Continue to recommend d/c home with HHPT when medically appropriate. Time spend discussing fall prevention strategies.      Anticipated Discharge Disposition (PT): home with 24/7 care, home with home health

## 2024-09-04 NOTE — THERAPY TREATMENT NOTE
Patient Name: Guicho Blanco  : 1940    MRN: [id number]                              Today's Date: 2024       Admit Date: 2024    Visit Dx:     ICD-10-CM ICD-9-CM   1. Cellulitis of right lower extremity  L03.115 682.6   2. Failure of outpatient treatment  Z78.9 V49.89   3. Oropharyngeal dysphagia  R13.12 787.22     Patient Active Problem List   Diagnosis    Osteomyelitis of toe of right foot    Osteomyelitis of right foot    PAF (paroxysmal atrial fibrillation)    Coronary artery disease involving native coronary artery of native heart without angina pectoris    Gastroesophageal reflux disease without esophagitis    Benign non-nodular prostatic hyperplasia without lower urinary tract symptoms    S/P appy    Essential hypertension    Simple chronic bronchitis    Mixed hyperlipidemia    COPD (chronic obstructive pulmonary disease)    BPH (benign prostatic hypertrophy)    Hearing loss    Chest pain    Vertigo    Snoring    Overweight    PLMD (periodic limb movement disorder)    Neuropathy    Periodic headache syndrome, not intractable    Acquired absence of other right toe(s)    Tremor    Bilateral impacted cerumen    Bilateral sensorineural hearing loss    Chronic otitis externa    Deviated nasal septum    Disorder of joint of foot    ED (erectile dysfunction) of organic origin    Hydrocele    Hydrocele of testis    Impacted cerumen    Mononeuropathy    Swelling of testicle    Venous retinal branch occlusion    Vitreous degeneration    Benign prostatic hyperplasia with urinary obstruction    OA (osteoarthritis) of knee    Status post total right knee replacement    Septic arthritis    Cellulitis     Past Medical History:   Diagnosis Date    Arthritis     Atrial fibrillation     BPH (benign prostatic hypertrophy)     Cataract     Cellulitis     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     bilateral    Coronary artery disease     Full dentures     Gall stones     GERD (gastroesophageal reflux disease)      Hearing loss     Hiatal hernia     Hyperlipidemia     Hypertension     Peripheral neuropathy     Peripheral vascular disease     Seasonal allergies     Skin cancer     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    TIA (transient ischemic attack)     2 times    Wears glasses      Past Surgical History:   Procedure Laterality Date    ABLATION OF DYSRHYTHMIC FOCUS      APPENDECTOMY      CARDIAC CATHETERIZATION N/A 10/05/2017    Procedure: Left Heart Cath;  Surgeon: Hector Urrutia MD;  Location:  [facility];  Service:     CATARACT EXTRACTION Bilateral     CHOLECYSTECTOMY      Remote    COCHLEAR IMPLANT REVISION  05/2019    COLONOSCOPY      PROSTATE SURGERY      SHOULDER SURGERY Right     SKIN BIOPSY      TOE AMPUTATION Right     2nd toe - Right Foot    TONSILLECTOMY      TOTAL KNEE ARTHROPLASTY Right 8/7/2024    Procedure: TOTAL KNEE ARTHROPLASTY WITH ASHLEY ROBOT RIGHT;  Surgeon: Christian Schwartz MD;  Location:  [facility];  Service: Robotics - Ortho;  Laterality: Right;      General Information       Row Name 09/04/24 1656          Physical Therapy Time and Intention    Document Type therapy note (daily note)  -     Mode of Treatment individual therapy;physical therapy  -       Row Name 09/04/24 1656          General Information    Patient Profile Reviewed yes  -     Existing Precautions/Restrictions fall;other (see comments)  [place] w/ cochlear hearing aids, neuropathy at B LE, recent R TKA 8/7/24 with WBAT RLE, 2nd toe amputation on RLE after cellulitis in the past  -       Row Name 09/04/24 1656          Cognition    Orientation Status (Cognition) oriented x 3  -       Row Name 09/04/24 1656          Safety Issues, Functional Mobility    Impairments Affecting Function (Mobility) balance;endurance/activity tolerance;strength;range of motion (ROM);sensation/sensory awareness  -               User Key  (r) = Recorded By, (t) = Taken By, (c) = Cosigned By      Initials Name Provider Type    HW Rebel  EL Huerta Physical Therapist                   Mobility       Row Name 09/04/24 1656          Bed Mobility    Comment, (Bed Mobility) Pt UIC  -       Row Name 09/04/24 1656          Sit-Stand Transfer    Sit-Stand [place] (Transfers) contact guard;1 person assist;verbal cues  -     Assistive Device (Sit-Stand Transfers) walker, front-wheeled  -       Row Name 09/04/24 1656          Gait/Stairs (Locomotion)    [place] Level (Gait) contact guard;1 person assist;verbal cues  -     Assistive Device (Gait) walker, front-wheeled  -     Distance in Feet (Gait) 300  -     Deviations/Abnormal Patterns (Gait) bilateral deviations;harleen decreased;gait speed decreased;stride length decreased  -     Bilateral Gait Deviations forward flexed posture;heel strike decreased  -     Left Sided Gait Deviations decreased knee extension  -     Right Sided Gait Deviations decreased knee extension  -     Comment, (Gait/Stairs) Pt amb in [place] with step-through gait pattern. VC for upright posture and staying closer to AD. Encouraged B knee extension with minimal change. Good improvement with posture with VC. Activity limited by fatigue. No knee buckling or LOB noted. Good weight shifting and safety awareness.  -               User Key  (r) = Recorded By, (t) = Taken By, (c) = Cosigned By      Initials Name Provider Type     Deanna Luque PT Physical Therapist                   Obj/Interventions       Row Name 09/04/24 1829          Motor Skills    Therapeutic Exercise knee;ankle  -       Row Name 09/04/24 1829          Knee (Therapeutic Exercise)    Knee (Therapeutic Exercise) isometric exercises;strengthening exercise  -     Knee Isometrics (Therapeutic Exercise) right;quad sets;10 repetitions;5 repetitions  -     Knee Strengthening (Therapeutic Exercise) right;heel slides;SLR (straight leg raise);SAQ (short arc quad);LAQ (long arc quad);15 repititions  -       Row Name 09/04/24 1213           Ankle (Therapeutic Exercise)    Ankle (Therapeutic Exercise) AROM (active range of motion)  -     Ankle AROM (Therapeutic Exercise) bilateral;dorsiflexion;plantarflexion;15 repititions  -       Row Name 09/04/24 1829          Balance    Balance Assessment sitting static balance;sitting dynamic balance;sit to stand dynamic balance;standing static balance;standing dynamic balance  -     Static Sitting Balance independent  -     Dynamic Sitting Balance independent  -     Position, Sitting Balance sitting in chair  -     Static Standing Balance standby assist  -     Dynamic Standing Balance contact guard  -     Position/Device Used, Standing Balance supported;walker, rolling  -     Balance Interventions sitting;standing;sit to stand;occupation based/functional task  -               User Key  (r) = Recorded By, (t) = Taken By, (c) = Cosigned By      Initials Name Provider Type     Deanna Luque PT Physical Therapist                   Goals/Plan    No documentation.                  Clinical Impression       [facility] [place] 09/04/24 1830          Pain    Pretreatment Pain Rating 0/10 - no pain  -     Posttreatment Pain Rating 0/10 - no pain  -[facility] Name 09/04/24 1830          Plan of Care Review    Plan of Care Reviewed With patient  -     Progress improving  -     Outcome Evaluation Pt amb 300' with FWW and CGA. Good safety awareness noted. No knee buckling or LOB noted. Activity limited by fatigue. Good improvement in functional endurance and balance noted. Continue to recommend d/c home with HHPT when medically appropriate. Time spend discussing fall prevention strategies.  -       Row Name 09/04/24 1830          Positioning and Restraints    Pre-Treatment Position sitting in chair/recliner  -     Post Treatment Position chair  -     In Chair notified nsg;reclined;sitting;call light within reach;encouraged to call for assist;exit alarm on;legs elevated  -               User Key  (r)  = Recorded By, (t) = Taken By, (c) = Cosigned By      Initials Name Provider Type     Deanna Luque PT Physical Therapist                   Outcome Measures       Row Name 09/04/24 1832 09/04/24 0800       How much help from another person do you currently need...    Turning from your back to your side while in flat bed without using bedrails? 3  -HW 4  -PT    Moving from lying on back to sitting on the side of a flat bed without bedrails? 3  -HW 3  -PT    Moving to and from a bed to a chair (including a wheelchair)? 3  -HW 3  -PT    Standing up from a chair using your arms (e.g., wheelchair, bedside chair)? 3  - 3  -PT    Climbing 3-5 steps with a railing? 3  -HW 3  -PT    To walk in hospital room? 3  -HW 3  -PT    AM-PAC 6 Clicks Score (PT) 18  -HW 19  -PT    Highest Level of Mobility Goal 6 --> Walk 10 steps or more  -HW 6 --> Walk 10 steps or more  -PT      Row Name 09/04/24 1832          Functional Assessment    Outcome Measure Options AM-PAC 6 Clicks Basic Mobility (PT)  -               User Key  (r) = Recorded By, (t) = Taken By, (c) = Cosigned By      Initials Name Provider Type     Deanna Luque PT Physical Therapist    PT Dee Maldonado, RN Registered Nurse                                 Physical Therapy Education       Title: PT OT SLP Therapies (In Progress)       Topic: Physical Therapy (Done)       Point: Mobility training (Done)       Learning Progress Summary             Patient Acceptance, E,D, VU by  at 9/4/2024 1832    Acceptance, E, VU by CK at 9/3/2024 1005    Acceptance, E,D, VU by  at 9/2/2024 0914    Acceptance, E,D, VU,NR by  at 9/1/2024 1110    Acceptance, E,TB, VU by  at 9/1/2024 0915    Acceptance, E,D, VU,NR by  at 8/31/2024 1157                         Point: Home exercise program (Done)       Learning Progress Summary             Patient Acceptance, E,D, VU by  at 9/4/2024 1832    Acceptance, E, VU by CK at 9/3/2024 1005    Acceptance, E,D, VU,NR by  at 9/1/2024  1110    Acceptance, E,TB, VU by  at 9/1/2024 0915    Acceptance, E,D, VU,NR by  at 8/31/2024 1157                         Point: Body mechanics (Done)       Learning Progress Summary             Patient Acceptance, E,D, VU by  at 9/4/2024 1832    Acceptance, E, VU by  at 9/3/2024 1005    Acceptance, E,D, VU by  at 9/2/2024 0914    Acceptance, E,D, VU,NR by  at 9/1/2024 1110    Acceptance, E,TB, VU by  at 9/1/2024 0915    Acceptance, E,D, VU,NR by  at 8/31/2024 1157                         Point: Precautions (Done)       Learning Progress Summary             Patient Acceptance, E,D, VU by  at 9/4/2024 1832    Acceptance, E, VU by  at 9/3/2024 1005    Acceptance, E,D, VU by  at 9/2/2024 0914    Acceptance, E,D, VU,NR by  at 9/1/2024 1110    Acceptance, E,TB, VU by  at 9/1/2024 0915    Acceptance, E,D, VU,NR by  at 8/31/2024 1157                                         User Key       Initials Effective Dates Name Provider Type Discipline     12/15/23 -  Deanna Luque, PT Physical Therapist PT     02/06/24 -  Aspen Goodman PT Physical Therapist PT     09/07/23 -  Stacy Dallas, [place] Registered Nurse Nurse                  PT Recommendation and Plan  Planned Therapy Interventions (PT): balance training, bed mobility training, gait training, home exercise program, patient/family education, ROM (range of motion), strengthening, stretching, transfer training  Plan of Care Reviewed With: patient  Progress: improving  Outcome Evaluation: Pt amb 300' with FWW and CGA. Good safety awareness noted. No knee buckling or LOB noted. Activity limited by fatigue. Good improvement in functional endurance and balance noted. Continue to recommend d/c home with HHPT when medically appropriate. Time spend discussing fall prevention strategies.     Time Calculation:   PT Evaluation Complexity  History, PT Evaluation Complexity: 1-2 personal factors and/or comorbidities  Examination of Body Systems (PT Eval  Complexity): total of 3 or more elements  Clinical Presentation (PT Evaluation Complexity): evolving  Clinical Decision Making (PT Evaluation Complexity): moderate complexity  Overall Complexity (PT Evaluation Complexity): moderate complexity     PT Charges       Row Name 09/04/24 1834             Time Calculation    Start Time 1605  -HW      PT Received On 09/04/24  -HW         Timed Charges    59956 - PT Therapeutic Exercise Minutes 8  -HW      07326 - Gait Training Minutes  20  -HW         Total Minutes    Timed Charges Total Minutes 28  -HW       Total Minutes 28  -HW                User Key  (r) = Recorded By, (t) = Taken By, (c) = Cosigned By      Initials Name Provider Type     Deanna Luque, EL Physical Therapist                  Therapy Charges for Today       Code Description Service Date Service Provider Modifiers Qty    52392788963 HC PT THER PROC EA 15 MIN 9/4/2024 Deanna Luque, PT GP 1    15463390861 HC GAIT TRAINING EA 15 MIN 9/4/2024 Deanna Luque, PT GP 1            PT G-Codes  Outcome Measure Options: AM-PAC 6 Clicks Basic Mobility (PT)  AM-PAC 6 Clicks Score (PT): 18  AM-PAC 6 Clicks Score (OT): 18  PT Discharge Summary  Anticipated Discharge Disposition (PT): home with 24/7 care, home with home health    Deanna Luque PT  9/4/2024

## 2024-09-04 NOTE — PROGRESS NOTES
"Pharmacy Consult-Vancomycin Dosing  Guicho Blanco is a  83 y.o. male receiving vancomycin therapy.     Indication: SSTI  Consulting Provider: LEEROY Espino  ID Consult: no    Goal AUC: 400 - 600 mg/L*hr    Current Antimicrobial Therapy  Anti-Infectives (From admission, onward)      Ordered     Dose/Rate Route Frequency Start Stop    09/01/24 1040  vancomycin IVPB 1500 mg in 0.9% NaCl (Premix) 500 mL        Ordering Provider: Wai Hector RPH    1,500 mg  333.3 mL/hr over 90 Minutes Intravenous Daily 09/01/24 1130 09/08/24 0859    08/31/24 1322  cefTRIAXone (ROCEPHIN) 2,000 mg in sodium chloride 0.9 % 100 mL MBP        Ordering Provider: No García APRN    2,000 mg  200 mL/hr over 30 Minutes Intravenous Every 24 Hours 08/31/24 1900 09/07/24 1859    08/31/24 1322  Pharmacy to dose vancomycin        Ordering Provider: No García APRN     Does not apply Continuous PRN 08/31/24 1321 09/07/24 1320    08/30/24 1854  vancomycin 2250 mg/500 mL 0.9% NS IVPB ([facility])        Ordering Provider: Sincere Mari DO    22 mg/kg × 99.8 kg  over 135 Minutes Intravenous Once 08[birth year]1940 08/30/24 2252    08/30/24 1854  cefTRIAXone (ROCEPHIN) 2,000 mg in sodium chloride 0.9 % 100 mL MBP        Ordering Provider: Sincere Mari DO    2,000 mg  200 mL/hr over 30 Minutes Intravenous Once 08/30/24 1910 08/30/24 2008            Allergies  Allergies as of 08/30/2024 - Reviewed 08/30/2024   Allergen Reaction Noted    Divalproex sodium (migraine) [valproic acid] Other (See Comments) 11/22/2021    Cymbalta [duloxetine hcl] Mental Status Change 04/28/2016    Duloxetine Unknown (See Comments) 10/04/2017    Metoprolol Other (See Comments) 10/02/2017    Neurontin [gabapentin] Other (See Comments) 10/02/2017       Labs    Results from last 7 days   Lab Units 08/30/24  1638   BUN mg/dL 12   CREATININE mg/dL 1.13       Results from last 7 days   Lab Units 08/30/24  1638   WBC 10*3/mm3 6.98     Ht - 182.9 cm (72\")  Wt - " 99.8 kg (220 lb)    Estimated Creatinine Clearance: 60.6 mL/min (by C-G formula based on SCr of 1.13 mg/dL).    I/O last 3 completed shifts:  In: 980 [P.O.:980]  Out: 2750 [Urine:2750]    Microbiology and Radiology  Microbiology Results (last 10 days)       Procedure Component Value - Date/Time    Blood Culture - Blood, Arm, Right [[id number]]  (Normal) Collected: 08/30/24 1800    Lab Status: Preliminary result Specimen: Blood from Arm, Right Updated: 09/03/24 1831     Blood Culture No growth at 4 days    Narrative:      Less than seven (7) mL's of blood was collected.  Insufficient quantity may yield false negative results.    Blood Culture - Blood, Arm, Right [[id number]]  (Normal) Collected: 08/30/24 1638    Lab Status: Preliminary result Specimen: Blood from Arm, Right Updated: 09/03/24 1715     Blood Culture No growth at 4 days            Reported Vancomycin Levels    Results from last 7 days   Lab Units 09/03/24  0546   VANCOMYCIN RM mcg/mL 10.90       InsightRX AUC Calculation:    Current AUC:   412 mg/L*hr    Predicted Steady State AUC on Current Dose: 412 mg/L*hr  _________________________________    Predicted Steady State AUC on New Dose:  412  mg/L*hr    Assessment/Plan:   Pharmacy to dose vancomycin for SSTI.  Patient is on maintenance dose of vancomycin 1500 mg IV Q24h (~15 mg/kg). Goal AUC: 400-600 mg/L*hr.  Vancomycin random 9/3 @ 0600 was 10.90 (~20 hr level).  Will continue vancomycin 1500 mg IV Q24h.   Assess clearance by obtaining vancomycin random level in 3-5 days or sooner if clinical status or renal function declines.   Pharmacy will continue to monitor and adjust dose as necessary.       Sanjana Stanley RPH  9/4/2024  07:08 EDT

## 2024-09-05 ENCOUNTER — READMISSION MANAGEMENT (OUTPATIENT)
Dept: CALL CENTER | Facility: HOSPITAL | Age: 84
End: 2024-09-05
Payer: MEDICARE

## 2024-09-05 ENCOUNTER — TELEPHONE (OUTPATIENT)
Dept: ORTHOPEDIC SURGERY | Facility: CLINIC | Age: 84
End: 2024-09-05

## 2024-09-05 VITALS
BODY MASS INDEX: 29.8 KG/M2 | WEIGHT: 220 LBS | DIASTOLIC BLOOD PRESSURE: 80 MMHG | TEMPERATURE: 97.6 F | OXYGEN SATURATION: 100 % | HEART RATE: 88 BPM | RESPIRATION RATE: 16 BRPM | HEIGHT: 72 IN | SYSTOLIC BLOOD PRESSURE: 139 MMHG

## 2024-09-05 PROCEDURE — 97116 GAIT TRAINING THERAPY: CPT

## 2024-09-05 PROCEDURE — 97110 THERAPEUTIC EXERCISES: CPT

## 2024-09-05 PROCEDURE — 97535 SELF CARE MNGMENT TRAINING: CPT

## 2024-09-05 PROCEDURE — 25010000002 VANCOMYCIN 10 G RECONSTITUTED SOLUTION

## 2024-09-05 PROCEDURE — 25810000003 SODIUM CHLORIDE 0.9 % SOLUTION

## 2024-09-05 PROCEDURE — 97530 THERAPEUTIC ACTIVITIES: CPT

## 2024-09-05 RX ADMIN — PRAVASTATIN SODIUM 20 MG: 20 TABLET ORAL at 08:42

## 2024-09-05 RX ADMIN — ASPIRIN 325 MG: 325 TABLET ORAL at 08:41

## 2024-09-05 RX ADMIN — Medication 10 ML: at 08:43

## 2024-09-05 RX ADMIN — PANTOPRAZOLE SODIUM 40 MG: 40 TABLET, DELAYED RELEASE ORAL at 05:54

## 2024-09-05 RX ADMIN — MELOXICAM 7.5 MG: 7.5 TABLET ORAL at 08:42

## 2024-09-05 RX ADMIN — ISOSORBIDE MONONITRATE 30 MG: 30 TABLET, EXTENDED RELEASE ORAL at 08:41

## 2024-09-05 RX ADMIN — Medication 250 MG: at 08:42

## 2024-09-05 RX ADMIN — DOCUSATE SODIUM 100 MG: 100 CAPSULE, LIQUID FILLED ORAL at 09:15

## 2024-09-05 RX ADMIN — Medication 1500 MG: at 08:43

## 2024-09-05 RX ADMIN — POLYETHYLENE GLYCOL 3350 17 G: 17 POWDER, FOR SOLUTION ORAL at 09:14

## 2024-09-05 RX ADMIN — FLECAINIDE ACETATE 50 MG: 50 TABLET ORAL at 08:42

## 2024-09-05 NOTE — PLAN OF CARE
Goal Outcome Evaluation:  Plan of Care Reviewed With: patient        Progress: no change  Outcome Evaluation: Patient ambulated 300' CGA with FWW in [place], veering left at times especially as patient fatigued, but patient with good awareness and able to manage walker. Good effort with BLE therex and R knee ROM progressing well. IPPT remains indicated to address current deficits. Continue to recommend D/C home with assist and HHPT.      Anticipated Discharge Disposition (PT): home with 24/7 care, home with home health

## 2024-09-05 NOTE — PROGRESS NOTES
Pharmacy Consult-Vancomycin Dosing  Guicho Blanco is a  83 y.o. male receiving vancomycin therapy.     Indication: SSTI  Consulting Provider: LEEROY Espino  ID Consult: no    Goal AUC: 400 - 600 mg/L*hr    Current Antimicrobial Therapy  Anti-Infectives (From admission, onward)      Ordered     Dose/Rate Route Frequency Start Stop    09/04/24 0802  doxycycline (VIBRAMYICN) 100 MG tablet        Ordering Provider: Berto Liu MD    100 mg Oral 2 Times Daily 09/04/24 0000 09/11/24 2359    09/04/24 0802  cephalexin (Keflex) 500 MG capsule        Ordering Provider: Berto Liu MD    500 mg Oral 4 Times Daily 09/04/24 0000 09/11/24 2359    09/01/24 1040  vancomycin IVPB 1500 mg in 0.9% NaCl (Premix) 500 mL        Ordering Provider: Wai Hector RPH    1,500 mg  333.3 mL/hr over 90 Minutes Intravenous Daily 09/01/24 1130 09/08/24 0859    08/31/24 1322  cefTRIAXone (ROCEPHIN) 2,000 mg in sodium chloride 0.9 % 100 mL MBP        Ordering Provider: No García APRN    2,000 mg  200 mL/hr over 30 Minutes Intravenous Every 24 Hours 08/31/24 1900 09/07/24 1859    08/31/24 1322  Pharmacy to dose vancomycin        Ordering Provider: No García APRN     Does not apply Continuous PRN 08/31/24 1321 09/07/24 1320    08/30/24 1854  vancomycin 2250 mg/500 mL 0.9% NS IVPB ([facility])        Ordering Provider: Sincere Mari DO    22 mg/kg × 99.8 kg  over 135 Minutes Intravenous Once 08/1940 08/30/24 2252    08/30/24 1854  cefTRIAXone (ROCEPHIN) 2,000 mg in sodium chloride 0.9 % 100 mL MBP        Ordering Provider: Sincere Mari DO    2,000 mg  200 mL/hr over 30 Minutes Intravenous Once 08/30/24 1910 08/30/24 2008            Allergies  Allergies as of 08/30/2024 - Reviewed 08/30/2024   Allergen Reaction Noted    Divalproex sodium (migraine) [valproic acid] Other (See Comments) 11/22/2021    Cymbalta [duloxetine hcl] Mental Status Change 04/28/2016    Duloxetine Unknown (See Comments)  "10/04/2017    Metoprolol Other (See Comments) 10/02/2017    Neurontin [gabapentin] Other (See Comments) 10/02/2017       Labs    Results from last 7 days   Lab Units 09/04/24  0954 08/30/24  1638   BUN mg/dL 7* 12   CREATININE mg/dL 0.85 1.13       Results from last 7 days   Lab Units 08/30/24  1638   WBC 10*3/mm3 6.98     Ht - 182.9 cm (72\")  Wt - 99.8 kg (220 lb)    Estimated Creatinine Clearance: 80.6 mL/min (by C-G formula based on SCr of 0.85 mg/dL).    I/O last 3 completed shifts:  In: 478 [P.O.:478]  Out: 3100 [Urine:2700; Stool:400]    Microbiology and Radiology  Microbiology Results (last 10 days)       Procedure Component Value - Date/Time    Blood Culture - Blood, Arm, Right [[id number]]  (Normal) Collected: 08/30/24 1800    Lab Status: Final result Specimen: Blood from Arm, Right Updated: 09/04/24 1831     Blood Culture No growth at 5 days    Narrative:      Less than seven (7) mL's of blood was collected.  Insufficient quantity may yield false negative results.    Blood Culture - Blood, Arm, Right [[id number]]  (Normal) Collected: 08/30/24 1638    Lab Status: Final result Specimen: Blood from Arm, Right Updated: 09/04/24 1715     Blood Culture No growth at 5 days            Reported Vancomycin Levels    Results from last 7 days   Lab Units 09/03/24  0546   VANCOMYCIN RM mcg/mL 10.90       InsightRX AUC Calculation:    Current AUC:   365 mg/L*hr    Predicted Steady State AUC on Current Dose: 365 mg/L*hr  _________________________________    Predicted Steady State AUC on New Dose:  N/A    Assessment/Plan:   Pharmacy to dose vancomycin for SSTI.  Patient is on maintenance dose of vancomycin 1500 mg IV Q24h.   Vancomycin random 9/3 @ 0600 was therapeutic at 10.90 mcg/mL (~20 hr level).  Will continue vancomycin 1500 mg IV Q24h. Plan transition to PO antibiotics at hospital discharge, likely 9/5 PM.   Assess clearance by obtaining vancomycin random level in 3-5 days or sooner if clinical status/renal function " declines & patient still admitted.   Pharmacy will continue to monitor and adjust dose as necessary.       Sanjana Stanley RPH  9/5/2024  10:33 EDT

## 2024-09-05 NOTE — CASE MANAGEMENT/SOCIAL WORK
Discharge Planning Assessment  [facility]     Patient Name: Guihco Blanco  MRN: [id number]  Today's Date: 9/5/2024    Admit Date: 8/30/2024    Plan: Home iwth daughter's assistance and [facility] for SN, PT,OT, SLP and home health aides     Discharge Plan       Row Name 09/05/24 1204       Plan    Plan Home iwth daughter's assistance and [facility] for SN, PT,OT, SLP and home health aides    Patient/Family in Agreement with Plan yes    Plan Comments Spoke with Mr. Blanco' daughter,  Kayley, for discharge planning.    Kayley is agreeable to having her Father return home.  She requested [facility] for home health SN, PT, OT, SLP and HHA.  Referral given to Venkatesh with [facility].  The patient was accepted by [facility] for home services.    Mr. Blanco has a walker and st cane at home already.  No DME needs.    Mr. Blanco will be returning home today with his daughter and wife's assistance.  Kayley will be transporting her Father home by personal vehicle when discharged.  CM will follow up.    Final Discharge Disposition Code 06 - home with home health care                  Continued Care and Services - Admitted Since 8/30/2024       Destination       Service Provider Request Status Selected Services Address Phone Fax Patient Preferred    [facility] Pending - No Request Sent N/A [address] [facility] [address] [phone] --              Home Medical Care       Service Provider Request Status Selected Services Address Phone Fax Patient Preferred    [facility]  Selected Home Health Services [address] [facility] [address] [phone] --                         Expected Discharge Date and Time       Expected Discharge Date Expected Discharge Time    Sep 5, 2024             Millie Masterson RN

## 2024-09-05 NOTE — THERAPY TREATMENT NOTE
Patient Name: Guicho Blanco  : 1940    MRN: [id number]                              Today's Date: 2024       Admit Date: 2024    Visit Dx:     ICD-10-CM ICD-9-CM   1. Cellulitis of right lower extremity  L03.115 682.6   2. Failure of outpatient treatment  Z78.9 V49.89   3. Oropharyngeal dysphagia  R13.12 787.22   4. Osteomyelitis of toe of right foot  M86.9 730.27   5. Osteomyelitis of right foot, unspecified type  M86.9 730.27   6. Chronic bronchitis, unspecified chronic bronchitis type  J42 491.9   7. Coronary artery disease involving native coronary artery of native heart without angina pectoris  I25.10 414.01   8. Neuropathy  G62.9 355.9     Patient Active Problem List   Diagnosis    Osteomyelitis of toe of right foot    Osteomyelitis of right foot    PAF (paroxysmal atrial fibrillation)    Coronary artery disease involving native coronary artery of native heart without angina pectoris    Gastroesophageal reflux disease without esophagitis    Benign non-nodular prostatic hyperplasia without lower urinary tract symptoms    S/P appy    Essential hypertension    Simple chronic bronchitis    Mixed hyperlipidemia    COPD (chronic obstructive pulmonary disease)    BPH (benign prostatic hypertrophy)    Hearing loss    Chest pain    Vertigo    Snoring    Overweight    PLMD (periodic limb movement disorder)    Neuropathy    Periodic headache syndrome, not intractable    Acquired absence of other right toe(s)    Tremor    Bilateral impacted cerumen    Bilateral sensorineural hearing loss    Chronic otitis externa    Deviated nasal septum    Disorder of joint of foot    ED (erectile dysfunction) of organic origin    Hydrocele    Hydrocele of testis    Impacted cerumen    Mononeuropathy    Swelling of testicle    Venous retinal branch occlusion    Vitreous degeneration    Benign prostatic hyperplasia with urinary obstruction    OA (osteoarthritis) of knee    Status post total right knee replacement     Septic arthritis    Cellulitis     Past Medical History:   Diagnosis Date    Arthritis     Atrial fibrillation     BPH (benign prostatic hypertrophy)     Cataract     Cellulitis     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     bilateral    Coronary artery disease     Full dentures     Gall stones     GERD (gastroesophageal reflux disease)     Hearing loss     Hiatal hernia     Hyperlipidemia     Hypertension     Peripheral neuropathy     Peripheral vascular disease     Seasonal allergies     Skin cancer     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    TIA (transient ischemic attack)     2 times    Wears glasses      Past Surgical History:   Procedure Laterality Date    ABLATION OF DYSRHYTHMIC FOCUS      APPENDECTOMY      CARDIAC CATHETERIZATION N/A 10/05/2017    Procedure: Left Heart Cath;  Surgeon: Hector Urrutia MD;  Location:  [facility];  Service:     CATARACT EXTRACTION Bilateral     CHOLECYSTECTOMY      Remote    COCHLEAR IMPLANT REVISION  05/2019    COLONOSCOPY      PROSTATE SURGERY      SHOULDER SURGERY Right     SKIN BIOPSY      TOE AMPUTATION Right     2nd toe - Right Foot    TONSILLECTOMY      TOTAL KNEE ARTHROPLASTY Right 8/7/2024    Procedure: TOTAL KNEE ARTHROPLASTY WITH ASHLEY ROBOT RIGHT;  Surgeon: Christian Schwartz MD;  Location:  [facility];  Service: Robotics - Ortho;  Laterality: Right;      General Information       Row Name 09/05/24 1312          OT Time and Intention    Document Type therapy note (daily note) (P)   -     Mode of Treatment occupational therapy (P)   -       Row Name 09/05/24 1312          General Information    Patient Profile Reviewed yes (P)   -     Existing Precautions/Restrictions fall;other (see comments) (P)   [place] w/ cochlear hearing aids, neuropathy at B LE, recent R TKA 8/7/24 with WBAT RLE, 2nd toe amputation on RLE after cellulitis in the past  -     Barriers to Rehab medically complex;previous functional deficit;hearing deficit (P)   -       Row Name 09/05/24  1312          Cognition    Orientation Status (Cognition) oriented x 3 (P)   -       Row Name 09/05/24 1312          Safety Issues, Functional Mobility    Safety Issues Affecting Function (Mobility) awareness of need for assistance;insight into deficits/self-awareness;safety precaution awareness;safety precautions follow-through/compliance;sequencing abilities (P)   -     Impairments Affecting Function (Mobility) balance;endurance/activity tolerance;strength;range of motion (ROM);sensation/sensory awareness (P)   -               User Key  (r) = Recorded By, (t) = Taken By, (c) = Cosigned By      Initials Name Provider Type     Jeny Harrison OT Student OT Student                     Mobility/ADL's       [facility] Name 09/05/24 1313          Transfers    Transfers sit-stand transfer;stand-sit transfer;toilet transfer (P)   -[facility] Name 09/05/24 1313          Sit-Stand Transfer    Sit-Stand [place] (Transfers) contact guard;1 person assist;verbal cues (P)   -     Assistive Device (Sit-Stand Transfers) walker, front-wheeled (P)   -[facility] Name 09/05/24 1313          Stand-Sit Transfer    Stand-Sit [place] (Transfers) contact guard;1 person assist;verbal cues (P)   -     Assistive Device (Stand-Sit Transfers) walker, front-wheeled (P)   -[facility] Name 09/05/24 1313          Toilet Transfer    Type (Toilet Transfer) sit-stand;stand-sit (P)   -     [place] Level (Toilet Transfer) contact guard;1 person assist;verbal cues (P)   -     Assistive Device (Toilet Transfer) commode;grab bars/safety frame;walker, front-wheeled (P)   -     Comment, (Toilet Transfer) BSC placed over toilet for extended height. Pt used grab bar to steady himself while performing toileting hygiene. (P)   -[facility] Name 09/05/24 1313          Functional Mobility    Functional Mobility- Ind. Level contact guard assist;1 person;verbal cues required (P)   -     Functional Mobility- Device walker,  front-wheeled (P)   -     Functional Mobility-Distance (Feet) -- (P)   HH distance  -     Patient was able to Ambulate yes (P)   -[facility] Name 09/05/24 1313          Activities of Daily Living    BADL Assessment/Intervention upper body dressing;lower body dressing;grooming;toileting (P)   -[facility] Name 09/05/24 1313          Mobility    Extremity Weight-bearing Status right lower extremity (P)   -     Right Lower Extremity (Weight-bearing Status) weight-bearing as tolerated (WBAT) (P)   -[facility] Name 09/05/24 1313          Upper Body Dressing Assessment/Training    [place] Level (Upper Body Dressing) don;pull-over garment;standby assist (P)   -     Position (Upper Body Dressing) unsupported sitting (P)   -[facility] Name 09/05/24 1313          Lower Body Dressing Assessment/Training    [place] Level (Lower Body Dressing) don;pants/bottoms;shoes/slippers;socks;verbal cues;minimum assist (75% patient effort) (P)   -     Assistive Devices (Lower Body Dressing) long-handled shoe horn (P)   -     Position (Lower Body Dressing) unsupported sitting (P)   -     Comment, (Lower Body Dressing) Pt educated on  shoe horn to increase independence with LB dressing. Pt demo'd use of AE but could use reinforcement. (P)   -[facility] Name 09/05/24 1313          Grooming Assessment/Training    [place] Level (Grooming) wash face, hands;standby assist (P)   -     Position (Grooming) sink side (P)   -BH       Row Name 09/05/24 1313          Toileting Assessment/Training    [place] Level (Toileting) adjust/manage clothing;contact guard assist;perform perineal hygiene;minimum assist (75% patient effort) (P)   -     Assistive Devices (Toileting) commode;grab bar/safety frame (P)   -     Position (Toileting) supported standing;supported sitting (P)   -     Comment, (Toileting) CGA for balance during transfer and clothing mgmt. Pt required min assist for hygiene after BM. (P)    -               User Key  (r) = Recorded By, (t) = Taken By, (c) = Cosigned By      Initials Name Provider Type    Jeny Cordova, OT Student OT Student                   Obj/Interventions       Row Name 09/05/24 1321          Balance    Balance Assessment sitting static balance;sitting dynamic balance;sit to stand dynamic balance;standing static balance;standing dynamic balance (P)   -     Static Sitting Balance independent (P)   -     Dynamic Sitting Balance supervision (P)   -     Position, Sitting Balance unsupported;sitting in chair (P)   -     Sit to Stand Dynamic Balance minimal assist;1-person assist;verbal cues (P)   -     Static Standing Balance standby assist (P)   -     Dynamic Standing Balance contact guard (P)   -     Position/Device Used, Standing Balance supported;walker, front-wheeled (P)   -     Balance Interventions sitting;standing;sit to stand;supported;static;dynamic (P)   -               User Key  (r) = Recorded By, (t) = Taken By, (c) = Cosigned By      Initials Name Provider Type    Jeny Cordova, OT Student OT Student                   Goals/Plan    No documentation.                  Clinical Impression       [facility] Name 09/05/24 1323          Pain Assessment    Pretreatment Pain Rating 0/10 - no pain (P)   -     Posttreatment Pain Rating 0/10 - no pain (P)   -     Pain Intervention(s) Repositioned;Ambulation/increased activity (P)   -[facility] Name 09/05/24 1323          Plan of Care Review    Plan of Care Reviewed With patient (P)   -     Progress improving (P)   -     Outcome Evaluation Pt presenting below functional baseline due to generalized weakness, balance, and decreased activity tolerance requiring increased time to complete ADLs. Pt educated on AE to increase independece with ADLs on this date. Continued IP OT warranted to address current deficits. Continue to recommend d/c home with assist and HHOT. (P)   -[facility] Name 09/05/24  1323          Therapy Assessment/Plan (OT)    Rehab Potential (OT) good, to achieve stated therapy goals (P)   -     Criteria for Skilled Therapeutic Interventions Met (OT) yes;skilled treatment is necessary (P)   -     Therapy Frequency (OT) daily (P)   -     Predicted Duration of Therapy Intervention (OT) 5 days (P)   -       Row Name 09/05/24 1323          Therapy Plan Review/Discharge Plan (OT)    Anticipated Discharge Disposition (OT) home with assist (P)   -[facility] Name 09/05/24 1323          Vital Signs    Pre Systolic BP Rehab 139 (P)   -BH     Pre Treatment Diastolic BP 80 (P)   -BH     Pre SpO2 (%) 100 (P)   -     O2 Delivery Pre Treatment room air (P)   -     O2 Delivery Intra Treatment room air (P)   -     O2 Delivery Post Treatment room air (P)   -BH     Pre Patient Position Sitting (P)   -     Intra Patient Position Standing (P)   -     Post Patient Position Sitting (P)   -BH       Row Name 09/05/24 1323          Positioning and Restraints    Pre-Treatment Position sitting in chair/recliner (P)   -     Post Treatment Position chair (P)   -     In Chair reclined;sitting;call light within reach;encouraged to call for assist;exit alarm on;notified [facility] (P)   -               User Key  (r) = Recorded By, (t) = Taken By, (c) = Cosigned By      Initials Name Provider Type     Jeny Harrison, OT Student OT Student                   Outcome Measures       Row Name 09/05/24 1328          How much help from another is currently needed...    Putting on and taking off regular lower body clothing? 3 (P)   -     Bathing (including washing, rinsing, and drying) 2 (P)   -     Toileting (which includes using toilet bed pan or urinal) 3 (P)   -     Putting on and taking off regular upper body clothing 3 (P)   -     Eating meals 4 (P)   -       Row Name 09/05/24 1134 09/05/24 0820       How much help from another person do you currently need...    Turning from your back to your  side while in flat bed without using bedrails? 3  -CK 3  -LH    Moving from lying on back to sitting on the side of a flat bed without bedrails? 3  -CK 3  -LH    Moving to and from a bed to a chair (including a wheelchair)? 3  -CK 3  -LH    Standing up from a chair using your arms (e.g., wheelchair, bedside chair)? 3  -CK 3  -LH    Climbing 3-5 steps with a railing? 3  -CK 3  -LH    To walk in hospital room? 3  -CK 3  -LH    AM-PAC 6 Clicks Score (PT) 18  -CK 18  -    Highest Level of Mobility Goal 6 --> Walk 10 steps or more  - 6 --> Walk 10 steps or more  -      Row Name 09/05/24 1328 09/05/24 1134       Functional Assessment    Outcome Measure Options AM-PAC 6 Clicks Daily Activity (OT) (P)   - AM-PAC 6 Clicks Basic Mobility (PT)  -              User Key  (r) = Recorded By, (t) = Taken By, (c) = Cosigned By      Initials Name Provider Type     Aspen Goodman, PT Physical Therapist     Stacy Dallas, [place] Registered Nurse     Jeny Harrison, OT Student OT Student                    Occupational Therapy Education       Title: PT OT SLP Therapies (In Progress)       Topic: Occupational Therapy (Done)       Point: ADL training (Done)       Description:   Instruct learner(s) on proper safety adaptation and remediation techniques during self care or transfers.   Instruct in proper use of assistive devices.                  Learning Progress Summary             Patient Acceptance, E, VU,NR by  at 9/5/2024 1329    Acceptance, E,TB, VU by  at 9/5/2024 1135    Acceptance, E,D, VU,NR by  at 9/3/2024 0957    Acceptance, E,D, NR by J at 8/31/2024 0950                         Point: Home exercise program (Done)       Description:   Instruct learner(s) on appropriate technique for monitoring, assisting and/or progressing therapeutic exercises/activities.                  Learning Progress Summary             Patient Acceptance, E, VU,NR by  at 9/5/2024 1329                         Point: Precautions  (Done)       Description:   Instruct learner(s) on prescribed precautions during self-care and functional transfers.                  Learning Progress Summary             Patient Acceptance, E, VU,NR by  at 9/5/2024 1329    Acceptance, E,TB, VU by  at 9/5/2024 1135    Acceptance, E,D, VU,NR by  at 9/3/2024 0957    Acceptance, E,D, NR by J at 8/31/2024 0950                         Point: Body mechanics (Done)       Description:   Instruct learner(s) on proper positioning and spine alignment during self-care, functional mobility activities and/or exercises.                  Learning Progress Summary             Patient Acceptance, E, VU,NR by  at 9/5/2024 1329    Acceptance, E,D, NR by SLIME at 8/31/2024 0950                                         User Key       Initials Effective Dates Name Provider Type Discipline     07/11/23 -  Keesha eMlo OT Occupational Therapist OT     06/16/21 -  Veda Segura OT Occupational Therapist OT     09/07/23 -  Stacy Dallas RN Registered Nurse Nurse     08/08/24 -  Jeny Harrison OT Student OT Student OT                  OT Recommendation and Plan  Therapy Frequency (OT): (P) daily  Plan of Care Review  Plan of Care Reviewed With: (P) patient  Progress: (P) improving  Outcome Evaluation: (P) Pt presenting below functional baseline due to generalized weakness, balance, and decreased activity tolerance requiring increased time to complete ADLs. Pt educated on AE to increase independece with ADLs on this date. Continued IP OT warranted to address current deficits. Continue to recommend d/c home with assist and HHOT.     Time Calculation:         Time Calculation- OT       Row Name 09/05/24 1329 09/05/24 1135          Time Calculation- OT    OT Start Time 1110 (P)   - --     OT Received On 09/05/24 (P)   - --        Timed Charges    88163 - Gait Training Minutes  -- 15  -CK     96546 - OT Therapeutic Activity Minutes 14 (P)   - --     83270 - OT  Self Care/Mgmt Minutes 30 (P)   - --        Total Minutes    Timed Charges Total Minutes 44 (P)   -BH 15  -CK      Total Minutes 44 (P)   - 15  -CK               User Key  (r) = Recorded By, (t) = Taken By, (c) = Cosigned By      Initials Name Provider Type    Aspen Alcazar, PT Physical Therapist     Jeny Harrison, OT Student OT Student                  Therapy Charges for Today       Code Description Service Date Service Provider Modifiers Qty    [id number]  OT THERAPEUTIC ACT EA 15 MIN 9/5/2024 Jeny Harrison, OT Student GO 1    [id number]  OT SELF CARE/MGMT/TRAIN EA 15 MIN 9/5/2024 Jeny Harrison OT Student GO 2                 MICA Irizarry  9/5/2024

## 2024-09-05 NOTE — THERAPY TREATMENT NOTE
Patient Name: Guicho Blanco  : 1940    MRN: [id number]                              Today's Date: 2024       Admit Date: 2024    Visit Dx:     ICD-10-CM ICD-9-CM   1. Cellulitis of right lower extremity  L03.115 682.6   2. Failure of outpatient treatment  Z78.9 V49.89   3. Oropharyngeal dysphagia  R13.12 787.22   4. Osteomyelitis of toe of right foot  M86.9 730.27   5. Osteomyelitis of right foot, unspecified type  M86.9 730.27   6. Chronic bronchitis, unspecified chronic bronchitis type  J42 491.9   7. Coronary artery disease involving native coronary artery of native heart without angina pectoris  I25.10 414.01   8. Neuropathy  G62.9 355.9     Patient Active Problem List   Diagnosis    Osteomyelitis of toe of right foot    Osteomyelitis of right foot    PAF (paroxysmal atrial fibrillation)    Coronary artery disease involving native coronary artery of native heart without angina pectoris    Gastroesophageal reflux disease without esophagitis    Benign non-nodular prostatic hyperplasia without lower urinary tract symptoms    S/P appy    Essential hypertension    Simple chronic bronchitis    Mixed hyperlipidemia    COPD (chronic obstructive pulmonary disease)    BPH (benign prostatic hypertrophy)    Hearing loss    Chest pain    Vertigo    Snoring    Overweight    PLMD (periodic limb movement disorder)    Neuropathy    Periodic headache syndrome, not intractable    Acquired absence of other right toe(s)    Tremor    Bilateral impacted cerumen    Bilateral sensorineural hearing loss    Chronic otitis externa    Deviated nasal septum    Disorder of joint of foot    ED (erectile dysfunction) of organic origin    Hydrocele    Hydrocele of testis    Impacted cerumen    Mononeuropathy    Swelling of testicle    Venous retinal branch occlusion    Vitreous degeneration    Benign prostatic hyperplasia with urinary obstruction    OA (osteoarthritis) of knee    Status post total right knee replacement     Septic arthritis    Cellulitis     Past Medical History:   Diagnosis Date    Arthritis     Atrial fibrillation     BPH (benign prostatic hypertrophy)     Cataract     Cellulitis     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     bilateral    Coronary artery disease     Full dentures     Gall stones     GERD (gastroesophageal reflux disease)     Hearing loss     Hiatal hernia     Hyperlipidemia     Hypertension     Peripheral neuropathy     Peripheral vascular disease     Seasonal allergies     Skin cancer     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    TIA (transient ischemic attack)     2 times    Wears glasses      Past Surgical History:   Procedure Laterality Date    ABLATION OF DYSRHYTHMIC FOCUS      APPENDECTOMY      CARDIAC CATHETERIZATION N/A 10/05/2017    Procedure: Left Heart Cath;  Surgeon: Hector Urrutia MD;  Location:  [facility];  Service:     CATARACT EXTRACTION Bilateral     CHOLECYSTECTOMY      Remote    COCHLEAR IMPLANT REVISION  05/2019    COLONOSCOPY      PROSTATE SURGERY      SHOULDER SURGERY Right     SKIN BIOPSY      TOE AMPUTATION Right     2nd toe - Right Foot    TONSILLECTOMY      TOTAL KNEE ARTHROPLASTY Right 8/7/2024    Procedure: TOTAL KNEE ARTHROPLASTY WITH ASHLEY ROBOT RIGHT;  Surgeon: Christian Schwartz MD;  Location:  [facility] OR;  Service: Robotics - Ortho;  Laterality: Right;      General Information       Row Name 09/05/24 1127          Physical Therapy Time and Intention    Document Type therapy note (daily note)  -CK     Mode of Treatment individual therapy;physical therapy  -CK       Row Name 09/05/24 1127          General Information    Patient Profile Reviewed yes  -CK     Existing Precautions/Restrictions fall;other (see comments)  [place] w/ cochlear hearing aids, neuropathy at B LE, recent R TKA 8/7/24 with WBAT RLE, 2nd toe amputation on RLE after cellulitis in the past  -CK     Barriers to Rehab medically complex;previous functional deficit;hearing deficit  -CK       Row Name  09/05/24 1127          Cognition    Orientation Status (Cognition) oriented x 3  -CK       Row Name 09/05/24 1127          Safety Issues, Functional Mobility    Impairments Affecting Function (Mobility) balance;endurance/activity tolerance;strength;range of motion (ROM);sensation/sensory awareness  -CK               User Key  (r) = Recorded By, (t) = Taken By, (c) = Cosigned By      Initials Name Provider Type    CK Aspen Goodman PT Physical Therapist                   Mobility       Row Name 09/05/24 1127          Bed Mobility    Comment, (Bed Mobility) [facility] pre/post  -CK       Row Name 09/05/24 1127          Sit-Stand Transfer    Sit-Stand [place] (Transfers) contact guard;1 person assist;verbal cues  -CK     Assistive Device (Sit-Stand Transfers) walker, front-wheeled  -CK     Comment, (Sit-Stand Transfer) patient initially unable to achieve upright standing, cues provided to scoot to edge of chair and improve anterior weightshifting, patient then able to complete with CGA  -CK       Row Name 09/05/24 1127          Gait/Stairs (Locomotion)    [place] Level (Gait) contact guard;1 person assist;verbal cues  -CK     Assistive Device (Gait) walker, front-wheeled  -CK     Distance in Feet (Gait) 300  -CK     Deviations/Abnormal Patterns (Gait) bilateral deviations;harleen decreased;gait speed decreased;stride length decreased  -CK     Bilateral Gait Deviations forward flexed posture;heel strike decreased  -CK     Left Sided Gait Deviations decreased knee extension  -CK     Right Sided Gait Deviations decreased knee extension  -CK     Comment, (Gait/Stairs) Patient ambulated in [place] with step through gait pattern and flexed hip/knee posturing. He had no LOB but did have some difficulty managing walker in linear fashion as it would veer to the left. Patient initially able to manage, but became worse as he fatigued and became more forward flexed. Cues provided for upright posture throughout and also  demonstrated for patient.  -CK       Row Name 09/05/24 1127          Mobility    Extremity Weight-bearing Status right lower extremity  -CK     Right Lower Extremity (Weight-bearing Status) weight-bearing as tolerated (WBAT)  -CK               User Key  (r) = Recorded By, (t) = Taken By, (c) = Cosigned By      Initials Name Provider Type    CK Aspen Goodman PT Physical Therapist                   Obj/Interventions       Row Name 09/05/24 1130          Motor Skills    Therapeutic Exercise knee  -CK       Row Name 09/05/24 1130          Hip (Therapeutic Exercise)    Hip (Therapeutic Exercise) strengthening exercise  -CK     Hip Strengthening (Therapeutic Exercise) right;heel slides;10 repetitions  passive overpressure at end range  -CK       Row Name 09/05/24 1130          Knee (Therapeutic Exercise)    Knee (Therapeutic Exercise) strengthening exercise;isometric exercises  -CK     Knee Isometrics (Therapeutic Exercise) right;quad sets;10 repetitions;3 second hold  -CK     Knee Strengthening (Therapeutic Exercise) bilateral;LAQ (long arc quad);right;SAQ (short arc quad);15 repititions;SLR (straight leg raise);10 repetitions  -CK       Row Name 09/05/24 1130          Balance    Balance Assessment sitting static balance;standing static balance;standing dynamic balance  -CK     Static Sitting Balance independent  -CK     Position, Sitting Balance unsupported;sitting in chair  -CK     Static Standing Balance standby assist  -CK     Dynamic Standing Balance contact guard  -CK     Position/Device Used, Standing Balance supported;walker, front-wheeled  -CK     Comment, Balance no overt LOB  -CK               User Key  (r) = Recorded By, (t) = Taken By, (c) = Cosigned By      Initials Name Provider Type    Aspen Alcazar PT Physical Therapist                   Goals/Plan    No documentation.                  Clinical Impression       Row Name 09/05/24 1132          Pain    Pretreatment Pain Rating 0/10 - no pain   -CK     Posttreatment Pain Rating 0/10 - no pain  -CK       Row Name 09/05/24 1132          Plan of Care Review    Plan of Care Reviewed With patient  -CK     Progress no change  -CK     Outcome Evaluation Patient ambulated 300' CGA with FWW in [place], veering left at times especially as patient fatigued, but patient with good awareness and able to manage walker. Good effort with BLE therex and R knee ROM progressing well. IPPT remains indicated to address current deficits. Continue to recommend D/C home with assist and HHPT.  -CK       Row Name 09/05/24 1132          Vital Signs    O2 Delivery Pre Treatment room air  -CK     O2 Delivery Intra Treatment room air  -CK     O2 Delivery Post Treatment room air  -CK       Row Name 09/05/24 1132          Positioning and Restraints    Pre-Treatment Position sitting in chair/recliner  -CK     Post Treatment Position chair  -CK     In Chair reclined;call light within reach;encouraged to call for assist;exit alarm on;waffle cushion;notified nsg  -CK               User Key  (r) = Recorded By, (t) = Taken By, (c) = Cosigned By      Initials Name Provider Type    CK Aspen Goodman, PT Physical Therapist                   Outcome Measures       Row Name 09/05/24 1134 09/05/24 0820       How much help from another person do you currently need...    Turning from your back to your side while in flat bed without using bedrails? 3  -CK 3  -LH    Moving from lying on back to sitting on the side of a flat bed without bedrails? 3  -CK 3  -LH    Moving to and from a bed to a chair (including a wheelchair)? 3  -CK 3  -LH    Standing up from a chair using your arms (e.g., wheelchair, bedside chair)? 3  -CK 3  -LH    Climbing 3-5 steps with a railing? 3  -CK 3  -LH    To walk in hospital room? 3  -CK 3  -LH    AM-PAC 6 Clicks Score (PT) 18  -CK 18  -LH    Highest Level of Mobility Goal 6 --> Walk 10 steps or more  -CK 6 --> Walk 10 steps or more  -LH      Row Name 09/05/24 113           Functional Assessment    Outcome Measure Options AM-PAC 6 Clicks Basic Mobility (PT)  -CK               User Key  (r) = Recorded By, (t) = Taken By, (c) = Cosigned By      Initials Name Provider Type    Aspen Alcazar, PT Physical Therapist     Stacy Dallas, RN Registered Nurse                                 Physical Therapy Education       Title: PT OT SLP Therapies (In Progress)       Topic: Physical Therapy (Done)       Point: Mobility training (Done)       Learning Progress Summary             Patient Acceptance, E, VU by  at 9/5/2024 1139    Comment: educated patient on safety at D/C including walker use AAT, ambulating in well lit areas at home even at night, and removing any rugs/mats on floor    Acceptance, E,TB, VU by  at 9/5/2024 1135    Acceptance, E,D, VU by  at 9/4/2024 1832    Acceptance, E, VU by  at 9/3/2024 1005    Acceptance, E,D, VU by  at 9/2/2024 0914    Acceptance, E,D, VU,NR by  at 9/1/2024 1110    Acceptance, E,TB, VU by  at 9/1/2024 0915    Acceptance, E,D, VU,NR by  at 8/31/2024 1157                         Point: Home exercise program (Done)       Learning Progress Summary             Patient Acceptance, E, VU by  at 9/5/2024 1139    Comment: educated patient on safety at D/C including walker use AAT, ambulating in well lit areas at home even at night, and removing any rugs/mats on floor    Acceptance, E,TB, VU by  at 9/5/2024 1135    Acceptance, E,D, VU by  at 9/4/2024 1832    Acceptance, E, VU by  at 9/3/2024 1005    Acceptance, E,D, VU,NR by  at 9/1/2024 1110    Acceptance, E,TB, VU by  at 9/1/2024 0915    Acceptance, E,D, VU,NR by  at 8/31/2024 1157                         Point: Body mechanics (Done)       Learning Progress Summary             Patient Acceptance, E, VU by  at 9/5/2024 1139    Comment: educated patient on safety at D/C including walker use AAT, ambulating in well lit areas at home even at night, and removing any rugs/mats on  floor    Acceptance, E,TB, VU by  at 9/5/2024 1135    Acceptance, E,D, VU by  at 9/4/2024 1832    Acceptance, E, VU by  at 9/3/2024 1005    Acceptance, E,D, VU by  at 9/2/2024 0914    Acceptance, E,D, VU,NR by  at 9/1/2024 1110    Acceptance, E,TB, VU by  at 9/1/2024 0915    Acceptance, E,D, VU,NR by  at 8/31/2024 1157                         Point: Precautions (Done)       Learning Progress Summary             Patient Acceptance, E, VU by  at 9/5/2024 1139    Comment: educated patient on safety at D/C including walker use AAT, ambulating in well lit areas at home even at night, and removing any rugs/mats on floor    Acceptance, E,TB, VU by  at 9/5/2024 1135    Acceptance, E,D, VU by  at 9/4/2024 1832    Acceptance, E, VU by  at 9/3/2024 1005    Acceptance, E,D, VU by  at 9/2/2024 0914    Acceptance, E,D, VU,NR by  at 9/1/2024 1110    Acceptance, E,TB, VU by  at 9/1/2024 0915    Acceptance, E,D, VU,NR by  at 8/31/2024 1157                                         User Key       Initials Effective Dates Name Provider Type Discipline     12/15/23 -  Deanna Luque, PT Physical Therapist PT     02/06/24 -  Aspen Goodman PT Physical Therapist PT     09/07/23 -  Stacy [place], [place] Registered Nurse Nurse                  PT Recommendation and Plan     Plan of Care Reviewed With: patient  Progress: no change  Outcome Evaluation: Patient ambulated 300' CGA with FWW in [place], veering left at times especially as patient fatigued, but patient with good awareness and able to manage walker. Good effort with BLE therex and R knee ROM progressing well. IPPT remains indicated to address current deficits. Continue to recommend D/C home with assist and HHPT.     Time Calculation:         PT Charges       Row Name 09/05/24 1132             Time Calculation    Start Time 1015  -CK      PT Received On 09/05/24  -CK         Timed Charges    63806 - PT Therapeutic Exercise Minutes 8  -CK      64277 - Gait  Training Minutes  15  -CK         Total Minutes    Timed Charges Total Minutes 23  -CK       Total Minutes 23  -CK                User Key  (r) = Recorded By, (t) = Taken By, (c) = Cosigned By      Initials Name Provider Type    CK Aspen Goodman, PT Physical Therapist                  Therapy Charges for Today       Code Description Service Date Service Provider Modifiers Qty    06138540233  PT THER PROC EA 15 MIN 9/5/2024 Aspen Goodman, PT GP 1    [id number] HC GAIT TRAINING EA 15 MIN 9/5/2024 Aspen Goodman, PT GP 1            PT G-Codes  Outcome Measure Options: AM-PAC 6 Clicks Basic Mobility (PT)  AM-PAC 6 Clicks Score (PT): 18  AM-PAC 6 Clicks Score (OT): 18  PT Discharge Summary  Anticipated Discharge Disposition (PT): home with 24/7 care, home with home health    Aspen Goodman, EL  9/5/2024

## 2024-09-05 NOTE — TELEPHONE ENCOUNTER
"Provider:  DR. STEVE RANGEL    Caller:  TORITO SIFUENTES    Relationship to Patient:  DAUGHTER AND POA      Phone Number:  [phone]    Reason for Call:  REGARDING HOME HEALTH ORDER IN [facility] TODAY, DAUGHTER ASKING TO SPEAK WITH \"STEPHANIE\", DR. RANGEL'S ASSISTANT TO EXPEDITE THINGS. SAYS SHE HELPED THEM BEFORE.    When was the patient last seen:  8/27/24    "

## 2024-09-05 NOTE — DISCHARGE SUMMARY
Patient Name: Guicho Blanco  MRN: [id number]  : 1940  DOS: 2024    Attending: Berto Liu MD    Primary Care Provider: Mitchel Culver MD    Date of Admission:.2024  5:20 PM    Date of Discharge:  2024    Discharge Diagnosis:   Cellulitis    PAF (paroxysmal atrial fibrillation)    Coronary artery disease involving native coronary artery of native heart without angina pectoris    Mixed hyperlipidemia      Consults: Infectious disease consult, Jae Manzanares MD.  Orthopedic consult: Christian Ramos MD.    Hospital Course  At admit:   Patient is a pleasant 83 y.o. male who is known to me from recent hospitalization to [facility] for a total knee arthroplasty.  Surgery took place on 2024.  He was transferred to [facility] on on 8/10/2024 he stayed there about 10 days.  Progressed well with his rehab prior to being discharged home.     He was noted to have redness close to his right knee incision and was started on antibiotics.  Antibiotics were changed included Keflex and doxycycline.  With marginal improvement and possible worsening of redness he presented for admission.  No fever or chills.  No nausea or vomiting.     After admit,  He was maintained on IV antibiotics throughout his stay under the care of Dr. Manzanares with [facility].  He was monitored daily and showed significant improvement in erythema around his right knee, no incision involvement and no drainage.  Range of motion remains good both passive and active with no knee pain.    He participated with physical therapy and has improved gradually throughout his stay.  Discharged home with help was recommended.    Antibiotics were transitioned to p.o. antibiotics at time of discharge per Dr. Manzanares recommendation.    Pertinent Test Results:    I reviewed the patient's new clinical results.   Results from last 7 days   Lab Units 24  1638   WBC 10*3/mm3 6.98   HEMOGLOBIN g/dL 11.5*   HEMATOCRIT %  "36.1*   PLATELETS 10*3/mm3 314     Results from last 7 days   Lab Units 24  0954 24  1638   SODIUM mmol/L 136 136   POTASSIUM mmol/L 4.0 4.1   CHLORIDE mmol/L 103 101   CO2 mmol/L 21.0* 22.0   BUN mg/dL 7* 12   CREATININE mg/dL 0.85 1.13   CALCIUM mg/dL 8.7 9.5   BILIRUBIN mg/dL  --  0.5   ALK PHOS U/L  --  187*   ALT (SGPT) U/L  --  20   AST (SGOT) U/L  --  32   GLUCOSE mg/dL 119* 103*     I reviewed the patient's new imaging including images and reports.          Physical therapy     Signed         Goal Outcome Evaluation:  Plan of Care Reviewed With: patient  Progress: no change  Outcome Evaluation: Patient ambulated 300' CGA with FWW in [place], veering left at times especially as patient fatigued, but patient with good awareness and able to manage walker. Good effort with BLE therex and R knee ROM progressing well. IPPT remains indicated to address current deficits. Continue to recommend D/C home with assist and HHPT.        Anticipated Discharge Disposition (PT): home with  care, home with home health                 Discharge Assessment:    Vital Signs  Visit Vitals  /80 (BP Location: Left arm, Patient Position: Sitting)   Pulse 88   Temp 97.6 °F (36.4 °C) (Oral)   Resp 16   Ht 182.9 cm (72\")   Wt 99.8 kg (220 lb)   SpO2 100%   BMI 29.84 kg/m²     Temp (24hrs), Av.6 °F (36.4 °C), Min:97.3 °F (36.3 °C), Max:97.8 °F (36.6 °C)      General Appearance:    Alert, cooperative, in no acute distress   Lungs:     Clear to auscultation,respirations regular, even and                   unlabored    Heart:    Regular rhythm and normal rate, normal S1 and S2   Abdomen:     Normal bowel sounds, no masses, no organomegaly, soft        non-tender, non-distended, no guarding, no rebound                 tenderness   Extremities:  Improved erythema of right knee.   Pulses:   Pulses palpable and equal bilaterally   Skin:   No bleeding, bruising or rash            Discharge Disposition: Home        "   Discharge Medications        New Medications        Instructions Start Date   saccharomyces boulardii 250 MG capsule  Commonly known as: FLORASTOR   250 mg, Oral, 2 Times Daily             Continue These Medications        Instructions Start Date   acetaminophen 650 MG 8 hr tablet  Commonly known as: TYLENOL   650 mg, Oral, Every 8 Hours      aspirin 325 MG tablet   1 tablet, Oral, Daily      bisacodyl 10 MG suppository  Commonly known as: DULCOLAX   10 mg, Rectal, Daily PRN      bisacodyl 5 MG EC tablet  Commonly known as: DULCOLAX   5 mg, Oral, Daily PRN      cephalexin 500 MG capsule  Commonly known as: Keflex   500 mg, Oral, 4 Times Daily      coenzyme Q10 100 MG capsule   1 capsule, Oral, Daily      docusate sodium 100 MG capsule  Commonly known as: Colace   100 mg, Oral, 2 Times Daily PRN      doxycycline 100 MG tablet  Commonly known as: VIBRAMYICN   100 mg, Oral, 2 Times Daily      flecainide 100 MG tablet  Commonly known as: TAMBOCOR   100 mg, Oral, Every 12 Hours      glucose 4 GM chewable tablet  Commonly known as: DEX4   16 g, Oral, As Needed      isosorbide mononitrate 30 MG 24 hr tablet  Commonly known as: IMDUR   30 mg, Oral, Daily      nitroglycerin 0.4 MG SL tablet  Commonly known as: NITROSTAT   1 under the tongue as needed for angina, may repeat q5mins for up three doses      nortriptyline 10 MG capsule  Commonly known as: PAMELOR   2 capsules, Oral, Nightly      ondansetron 4 MG tablet  Commonly known as: Zofran   4 mg, Oral, Every 8 Hours PRN      polyethylene glycol 17 g packet  Commonly known as: MIRALAX   17 g, Oral, Daily PRN      pravastatin 20 MG tablet  Commonly known as: PRAVACHOL   20 mg, Oral, Daily      PrilOSEC OTC 20 MG EC tablet  Generic drug: omeprazole OTC   1 tablet, Oral, Daily      promethazine 12.5 MG tablet  Commonly known as: PHENERGAN   12.5 mg, Oral, Every 6 Hours PRN      Restasis 0.05 % ophthalmic emulsion  Generic drug: cycloSPORINE   Administer 1 drop to both eyes  Every 12 (Twelve) Hours. Indications: Drying and Inflammation of Cornea and Conjunctiva of Eyes      traMADol 50 MG tablet  Commonly known as: ULTRAM   50 mg, Oral, Every 8 Hours PRN      vitamin b complex capsule capsule   1 capsule, Oral, Daily, Takes in evening      vitamin D3 125 MCG (5000 UT) capsule capsule   1 capsule, Oral, Daily               Diet Instructions       Diet: Regular/House Diet; Soft to Chew (NDD 3); Chopped Meat; Thin (IDDSI 0)      Discharge Diet: Regular/House Diet    Texture: Soft to Chew (NDD 3)    Soft to Chew: Chopped Meat    Fluid Consistency: Thin (IDDSI 0)            Activity at Discharge:     Weight-bear as tolerated right lower extremity      Follow-up Appointments  Dr. Ramos per his orders       Berto Liu MD  09/05/24  10:26 EDT

## 2024-09-05 NOTE — PROGRESS NOTES
" progress note      Guicho Blanco  [id number]  1940     LOS: 5 days     Attending: Berto Liu MD    Primary Care Provider: Mitchel Culver MD      Chief Complaint/Reason for visit:    Chief Complaint   Patient presents with    Post-op Problem       Subjective   Feels ok. No new complaints. No f/c/n/vom/sob.      Objective        Visit Vitals  /80 (BP Location: Left arm, Patient Position: Sitting)   Pulse 88   Temp 97.6 °F (36.4 °C) (Oral)   Resp 16   Ht 182.9 cm (72\")   Wt 99.8 kg (220 lb)   SpO2 100%   BMI 29.84 kg/m²     Temp (24hrs), Av.6 °F (36.4 °C), Min:97.3 °F (36.3 °C), Max:97.8 °F (36.6 °C)      Intake/Output:    Intake/Output Summary (Last 24 hours) at 2024 0914  Last data filed at 2024 0800  Gross per 24 hour   Intake 477 ml   Output 1200 ml   Net -723 ml          Physical Exam:     General Appearance:    Alert, cooperative, in no acute distress   Head:    Normocephalic, without obvious abnormality, atraumatic    Lungs:     Normal effort, symmetric chest rise,  clear to      auscultation bilaterally              Heart:    Regular rhythm and normal rate, normal S1 and S2    Abdomen:     Normal bowel sounds, no masses, no organomegaly, soft        non-tender, non-distended, no guarding, no rebound     tenderness   Extremities: Decreased erythema around right knee.    No drainage from incision.   Pulses:   Pulses palpable and equal bilaterally               Results Review:     I reviewed the patient's new clinical results.   Results from last 7 days   Lab Units 24  1638   WBC 10*3/mm3 6.98   HEMOGLOBIN g/dL 11.5*   HEMATOCRIT % 36.1*   PLATELETS 10*3/mm3 314     Results from last 7 days   Lab Units 24  0954 24  1638   SODIUM mmol/L 136 136   POTASSIUM mmol/L 4.0 4.1   CHLORIDE mmol/L 103 101   CO2 mmol/L 21.0* 22.0   BUN mg/dL 7* 12   CREATININE mg/dL 0.85 1.13   CALCIUM mg/dL 8.7 9.5   BILIRUBIN mg/dL  --  0.5   ALK PHOS U/L  --  187*   ALT " (SGPT) U/L  --  20   AST (SGOT) U/L  --  32   GLUCOSE mg/dL 119* 103*     I reviewed the patient's new imaging including images and reports.   Latest Reference Range & Units 09/01/24 20:39   Color, UA Yellow, Straw  Yellow   Appearance, UA Clear  Clear   Specific Gravity, UA 1.001 - 1.030  1.025   pH, UA 5.0 - 8.0  6.0   Glucose Negative  Negative   Ketones, UA Negative  Trace !   Blood, UA Negative  Negative   Nitrite, UA Negative  Negative   Leukocytes, UA Negative  Negative   Protein, UA Negative  Trace !   Bilirubin, UA Negative  Negative   Urobilinogen, UA 0.2 - 1.0 E.U./dL  0.2 E.U./dL   !: Data is abnormal  All medications reviewed.   aspirin, 325 mg, Oral, Daily  cefTRIAXone, 2,000 mg, Intravenous, Q24H  flecainide, 50 mg, Oral, Q12H  isosorbide mononitrate, 30 mg, Oral, Daily  meloxicam, 7.5 mg, Oral, Daily  nortriptyline, 20 mg, Oral, Nightly  pantoprazole, 40 mg, Oral, Q AM  pravastatin, 20 mg, Oral, Daily  saccharomyces boulardii, 250 mg, Oral, BID  sodium chloride, 10 mL, Intravenous, Q12H  vancomycin, 1,500 mg, Intravenous, Daily      bisacodyl, 10 mg, Daily PRN  docusate sodium, 100 mg, BID PRN  Pharmacy to dose vancomycin, , Continuous PRN  polyethylene glycol, 17 g, Daily PRN  sodium chloride, 10 mL, PRN  sodium chloride, 10 mL, PRN  sodium chloride, 40 mL, PRN        Assessment & Plan       Cellulitis    PAF (paroxysmal atrial fibrillation)    Coronary artery disease involving native coronary artery of native heart without angina pectoris    Mixed hyperlipidemia   Mild oral dysphagia and pharyngeal dysphagia       Plan  1. PT/OT, continue. Improving.   2. Pain control-prns   3. IS-encouraged  4. DVT proph-aspirin.  5. Bowel regimen  6. Monitor post-op labs  7. DC planning,  is following, I discussed with her.     Continue antibiotics per ID. IV abx while hospitalized.   Monitor for adverse drug reactions.       HTN, Hyperlipidemia, afib, CAD  - Continue home Tambocor, Imdur and  statin  - Monitor BP   - Holding parameters for BP meds  - Labetalol PRN for SBP>170     Continue soft to chew textured diet, chopped, thin liquids, per speech therapy recommendation       Berto Liu MD  09/05/24  09:14 EDT

## 2024-09-05 NOTE — PLAN OF CARE
Goal Outcome Evaluation:  Plan of Care Reviewed With: (P) patient        Progress: (P) improving  Outcome Evaluation: (P) Pt presenting below functional baseline due to generalized weakness, balance, and decreased activity tolerance requiring increased time to complete ADLs. Pt educated on AE to increase independece with ADLs on this date. Continued IP OT warranted to address current deficits. Continue to recommend d/c home with assist and HHOT.      Anticipated Discharge Disposition (OT): (P) home with assist

## 2024-09-05 NOTE — PROGRESS NOTES
"      Orthopaedic Surgery Progress Note    CC: Right leg pain      Subjective     Interval History:   Patient tells me he anticipates going home today.  Feeling better overall      ROS: Denies fever, chills, nausea or vomiting    Objective     Vital Signs:  Temp (24hrs), Av.6 °F (36.4 °C), Min:97.3 °F (36.3 °C), Max:97.8 °F (36.6 °C)    /80 (BP Location: Left arm, Patient Position: Sitting)   Pulse 88   Temp 97.6 °F (36.4 °C) (Oral)   Resp 16   Ht 182.9 cm (72\")   Wt 99.8 kg (220 lb)   SpO2 100%   BMI 29.84 kg/m²       Physical Exam:  Induration and erythema are significantly improved this morning.  Still faintly present.  Patient has some dryness and flaking of the skin over the area of concern consistent with resolving swelling    Assessment and Plan:  Hospital day #7 for cellulitis of the right leg that has failed outpatient oral antibiotic treatment consisting of doxycycline and Keflex.  Patient continues to improve with IV vancomycin and Rocephin.  -- Discharge planning  -- Continue IV antibiotics while in hospital per Dr. Manzanares recommendation  -- She has made recommendations for oral antibiotics upon discharge  -- Follow-up with me in 1 week after discharge    Christian Schwartz MD  24  08:19 EDT        "

## 2024-09-06 ENCOUNTER — HOME CARE VISIT (OUTPATIENT)
Dept: HOME HEALTH SERVICES | Facility: HOME HEALTHCARE | Age: 84
End: 2024-09-06
Payer: COMMERCIAL

## 2024-09-06 ENCOUNTER — TELEPHONE (OUTPATIENT)
Dept: ORTHOPEDIC SURGERY | Facility: CLINIC | Age: 84
End: 2024-09-06
Payer: MEDICARE

## 2024-09-06 VITALS
HEART RATE: 98 BPM | SYSTOLIC BLOOD PRESSURE: 122 MMHG | DIASTOLIC BLOOD PRESSURE: 64 MMHG | RESPIRATION RATE: 16 BRPM | TEMPERATURE: 97.5 F | OXYGEN SATURATION: 98 %

## 2024-09-06 PROCEDURE — G0299 HHS/HOSPICE OF RN EA 15 MIN: HCPCS

## 2024-09-06 NOTE — TELEPHONE ENCOUNTER
"Spoke to pt's daughter to let her know I would recommend they keep the follow up with Dr. Schwartz so he can lay eyes on him after his hospital discharge; I explained that it is possible that at that time he may turn care over to Infectious Disease; however, since he's been following him while in the hospital, he would most likely want to see him in office to re-evaluate his progress and officially \"discharge\" him from his care if warranted. She understood and they will follow up as scheduled.    Vignesh MARLEY CMA ([facility]), ROT    "

## 2024-09-06 NOTE — Clinical Note
Please forward to Dr. Sonido Avery    Resumption of Care Note:     Reason for hospitalization/new problems: Post-op cellulitis s/p total right knee replacement.     STEVO [place] Findings:    New/changed medications: Keflex, Doxycycline, Florastor    New/changed orders: None    Educated on Emergency Plan, steps to take prior to going to the ER and when to Call Home Health First:  Yes    Plan/Focus of Care and Skilled need: Education r/t cellulitis, medication education, pain management, fall prevention

## 2024-09-06 NOTE — OUTREACH NOTE
Prep Survey      Flowsheet Row Responses   [facility] facility patient discharged from? [place]   Is LACE score < 7 ? No   Eligibility Readm Mgmt   Discharge diagnosis Cellulitis   Does the patient have one of the following disease processes/diagnoses(primary or secondary)? Other   Does the patient have Home health ordered? Yes   What is the Home health agency?  [facility]   Is there a DME ordered? No   Prep survey completed? Yes            Johanna FONSECA - Registered Nurse

## 2024-09-06 NOTE — PAYOR COMM NOTE
"Cary Ríos RN  [facility]  Utilization Management  P:[phone]  F:[phone]    Ref # [id number]   DC 9/5/24  No dc summary available at this time    Guicho Blanco \"Rad\" (83 y.o. Male)       Date of Birth   1940    Social Security Number       Address   [facility] [address]  [facility] [address]    Home Phone   [phone]    MRN   [id number]       Christianity   Zoroastrian    Marital Status                               Admission Date   8/30/24    Admission Type   Emergency    Admitting Provider   Berto Liu MD    Attending Provider       Department, Room/Bed   [facility] 3H, S368/1       Discharge Date   9/5/2024    Discharge Disposition   Home or Self Care    Discharge Destination                                 Attending Provider: (none)   Allergies: Divalproex Sodium (Migraine) [Valproic Acid], Cymbalta [Duloxetine Hcl], Duloxetine, Metoprolol, Neurontin [Gabapentin]    Isolation: None   Infection: None   Code Status: CPR    Ht: 182.9 cm (72\")   Wt: 99.8 kg (220 lb)    Admission Cmt: None   Principal Problem: Cellulitis [L03.90]                   Active Insurance as of 8/30/2024       Primary Coverage       Payor Plan Insurance Group Employer/Plan Group    ANTH MEDICARE REPLACEMENT ANTHEM MEDICARE ADVANTAGE [id number]       Payor Plan Address Payor Plan Phone Number Payor Plan Fax Number Effective Dates    [address] [phone]  1/1/2024 - None Entered    [facility] [id number]         Subscriber Name Subscriber Birth Date Member ID       GUICHO BLANCO 1940 [id number]                     Emergency Contacts        (Rel.) Home Phone Work Phone Mobile Phone    SAMEERA Cee (Daughter) [phone] -- [phone]    BellaSandra (Spouse) [phone] -- [phone]    MejiaAshley (Grandchild) -- -- [phone]               Berto Liu MD   Physician  Hospitalist     Progress Notes      Signed     Date " "of Service: 24  Creation Time: 24     Signed       Expand All Collapse All    IM progress note        Guicho Blanco  [id number]  1940      LOS: 5 days      Attending: Berto Liu MD     Primary Care Provider: Mitchel Culver MD        Chief Complaint/Reason for visit:        Chief Complaint   Patient presents with    Post-op Problem            Subjective  Feels ok. No new complaints. No f/c/n/vom/sob.              Objective  Visit Vitals  /80 (BP Location: Left arm, Patient Position: Sitting)   Pulse 88   Temp 97.6 °F (36.4 °C) (Oral)   Resp 16   Ht 182.9 cm (72\")   Wt 99.8 kg (220 lb)   SpO2 100%   BMI 29.84 kg/m²      Temp (24hrs), Av.6 °F (36.4 °C), Min:97.3 °F (36.3 °C), Max:97.8 °F (36.6 °C)        Intake/Output:     Intake/Output Summary (Last 24 hours) at 2024  Last data filed at 2024 0800      Gross per 24 hour   Intake 477 ml   Output 1200 ml   Net -723 ml            Physical Exam:                General Appearance:    Alert, cooperative, in no acute distress   Head:    Normocephalic, without obvious abnormality, atraumatic    Lungs:     Normal effort, symmetric chest rise,  clear to      auscultation bilaterally              Heart:    Regular rhythm and normal rate, normal S1 and S2    Abdomen:     Normal bowel sounds, no masses, no organomegaly, soft        non-tender, non-distended, no guarding, no rebound     tenderness   Extremities: Decreased erythema around right knee.    No drainage from incision.   Pulses:   Pulses palpable and equal bilaterally                  Results Review:                I reviewed the patient's new clinical results.        Results from last 7 days   Lab Units 24  1638   WBC 10*3/mm3 6.98   HEMOGLOBIN g/dL 11.5*   HEMATOCRIT % 36.1*   PLATELETS 10*3/mm3 314            Results from last 7 days   Lab Units 24  0954 24  1638   SODIUM mmol/L 136 136   POTASSIUM mmol/L 4.0 4.1   CHLORIDE " mmol/L 103 101   CO2 mmol/L 21.0* 22.0   BUN mg/dL 7* 12   CREATININE mg/dL 0.85 1.13   CALCIUM mg/dL 8.7 9.5   BILIRUBIN mg/dL  --  0.5   ALK PHOS U/L  --  187*   ALT (SGPT) U/L  --  20   AST (SGOT) U/L  --  32   GLUCOSE mg/dL 119* 103*      I reviewed the patient's new imaging including images and reports.    Latest Reference Range & Units 09/01/24 20:39   Color, UA Yellow, Straw  Yellow   Appearance, UA Clear  Clear   Specific Gravity, UA 1.001 - 1.030  1.025   pH, UA 5.0 - 8.0  6.0   Glucose Negative  Negative   Ketones, UA Negative  Trace !   Blood, UA Negative  Negative   Nitrite, UA Negative  Negative   Leukocytes, UA Negative  Negative   Protein, UA Negative  Trace !   Bilirubin, UA Negative  Negative   Urobilinogen, UA 0.2 - 1.0 E.U./dL  0.2 E.U./dL   !: Data is abnormal  All medications reviewed.     Scheduled Medication   aspirin, 325 mg, Oral, Daily  cefTRIAXone, 2,000 mg, Intravenous, Q24H  flecainide, 50 mg, Oral, Q12H  isosorbide mononitrate, 30 mg, Oral, Daily  meloxicam, 7.5 mg, Oral, Daily  nortriptyline, 20 mg, Oral, Nightly  pantoprazole, 40 mg, Oral, Q AM  pravastatin, 20 mg, Oral, Daily  saccharomyces boulardii, 250 mg, Oral, BID  sodium chloride, 10 mL, Intravenous, Q12H  vancomycin, 1,500 mg, Intravenous, Daily         bisacodyl, 10 mg, Daily PRN  docusate sodium, 100 mg, BID PRN  Pharmacy to dose vancomycin, , Continuous PRN  polyethylene glycol, 17 g, Daily PRN  sodium chloride, 10 mL, PRN  sodium chloride, 10 mL, PRN  sodium chloride, 40 mL, PRN                 Assessment & Plan    Cellulitis    PAF (paroxysmal atrial fibrillation)    Coronary artery disease involving native coronary artery of native heart without angina pectoris    Mixed hyperlipidemia   Mild oral dysphagia and pharyngeal dysphagia        Plan  1. PT/OT, continue. Improving.   2. Pain control-prns       3. IS-encouraged  4. DVT proph-aspirin.  5. Bowel regimen  6. Monitor post-op labs  7. DC planning,  is  "following, I discussed with her.      Continue antibiotics per ID. IV abx while hospitalized.   Monitor for adverse drug reactions.        HTN, Hyperlipidemia, afib, CAD  - Continue home Tambocor, Imdur and statin  - Monitor BP   - Holding parameters for BP meds  - Labetalol PRN for SBP>170     Continue soft to chew textured diet, chopped, thin liquids, per speech therapy recommendation        Berto Liu MD  24  09:14 EDT                             Christian Schwartz MD   Physician  Orthopedics     Progress Notes      Signed     Date of Service: 24  Creation Time: 24     Signed       Expand All Collapse All            Orthopaedic Surgery Progress Note     CC: Right leg pain           Subjective  Interval History:   Patient tells me he anticipates going home today.  Feeling better overall        ROS: Denies fever, chills, nausea or vomiting           Objective  Vital Signs:  Temp (24hrs), Av.6 °F (36.4 °C), Min:97.3 °F (36.3 °C), Max:97.8 °F (36.6 °C)     /80 (BP Location: Left arm, Patient Position: Sitting)   Pulse 88   Temp 97.6 °F (36.4 °C) (Oral)   Resp 16   Ht 182.9 cm (72\")   Wt 99.8 kg (220 lb)   SpO2 100%   BMI 29.84 kg/m²         Physical Exam:  Induration and erythema are significantly improved this morning.  Still faintly present.  Patient has some dryness and flaking of the skin over the area of concern consistent with resolving swelling     Assessment and Plan:  Hospital day #7 for cellulitis of the right leg that has failed outpatient oral antibiotic treatment consisting of doxycycline and Keflex.  Patient continues to improve with IV vancomycin and Rocephin.  -- Discharge planning  -- Continue IV antibiotics while in hospital per Dr. Manzanares recommendation  -- She has made recommendations for oral antibiotics upon discharge  -- Follow-up with me in 1 week after discharge     Christian Schwartz MD  24  08:19 EDT                      "            Discharge Summary    No notes of this type exist for this encounter.

## 2024-09-06 NOTE — HOME HEALTH
Resumption of Care Note:     Reason for hospitalization/new problems: Post-op cellulitis s/p total right knee replacement.     STEVO [facility] Findings:    New/changed medications: Keflex, Doxycycline, Florastor    New/changed orders: None    Educated on Emergency Plan, steps to take prior to going to the ER and when to Call Home Health First:  Yes    Plan/Focus of Care and Skilled need: Education r/t cellulitis, medication education, pain management, fall prevention

## 2024-09-06 NOTE — TELEPHONE ENCOUNTER
Patient daughter called stating the patient has been seeing by Dr Schwartz in the hospital and was wondering if Dr Ramos wants to continue to see the patient next week? And that the patient also has an appointment with infectious disease center        Sylwia Kuo #[phone]    Please advise, thank you

## 2024-09-09 ENCOUNTER — HOME CARE VISIT (OUTPATIENT)
Dept: HOME HEALTH SERVICES | Facility: HOME HEALTHCARE | Age: 84
End: 2024-09-09
Payer: COMMERCIAL

## 2024-09-09 ENCOUNTER — READMISSION MANAGEMENT (OUTPATIENT)
Dept: CALL CENTER | Facility: HOSPITAL | Age: 84
End: 2024-09-09
Payer: MEDICARE

## 2024-09-09 PROCEDURE — G0151 HHCP-SERV OF PT,EA 15 MIN: HCPCS

## 2024-09-09 NOTE — OUTREACH NOTE
Medical Week 1 Survey      Flowsheet Row Responses   [facility] patient discharged from? [place]   Does the patient have one of the following disease processes/diagnoses(primary or secondary)? Other   Week 1 attempt successful? Yes   Call start time 1340   Call end time 1349   Discharge diagnosis Cellulitis   Is patient permission given to speak with other caregiver? Yes   Meds reviewed with patient/caregiver? Yes   Is the patient having any side effects they believe may be caused by any medication additions or changes? No   Does the patient have all medications ordered at discharge? Yes   Is the patient taking all medications as directed (includes completed medication regime)? Yes   Comments regarding appointments Follow up with Infectious Disease Dr. Manzanares on 9/11   Does the patient have a primary care provider?  Yes   Does the patient have an appointment with their PCP within 7 days of discharge? Yes   Comments regarding PCP Hospital follow up appt with PCP Dr. Culver on 9/13   Has the patient kept scheduled appointments due by today? N/A   Comments Follow up with Orthopedic Surgeon Dr. Ramos on 9/12   What is the Home health agency?  [facility]   Has home health visited the patient within 72 hours of discharge? Yes   Home health comments HH has been to see   Psychosocial issues? No   Did the patient receive a copy of their discharge instructions? Yes   Nursing interventions Reviewed instructions with patient   What is the patient's perception of their health status since discharge? Improving   Is the patient/caregiver able to teach back signs and symptoms related to disease process for when to call PCP? Yes   Is the patient/caregiver able to teach back signs and symptoms related to disease process for when to call 911? Yes   Is the patient/caregiver able to teach back the hierarchy of who to call/visit for symptoms/problems? PCP, Specialist, Home health nurse, Urgent Care, ED, 911 Yes   If the patient  is a current smoker, are they able to teach back resources for cessation? Not a smoker   Additional teach back comments Reviewed signs/symptoms of infection,  Fall precautions discussed- using walker   Week 1 call completed? Yes   Graduated Yes   Did the patient feel the follow up calls were helpful during their recovery period? Yes   Was the number of calls appropriate? Yes   Would this patient benefit from a Referral to [facility] Social Work? No   Is the patient interested in additional calls from an ambulatory ? No   Graduated/Revoked comments Per spouse, patient is doing well, being seen by HH, has several follow up appts this week, no further calls needed.   Call end time 2476            Jyothi HALL - Registered Nurse

## 2024-09-10 ENCOUNTER — HOME CARE VISIT (OUTPATIENT)
Dept: HOME HEALTH SERVICES | Facility: HOME HEALTHCARE | Age: 84
End: 2024-09-10
Payer: MEDICARE

## 2024-09-10 ENCOUNTER — HOME CARE VISIT (OUTPATIENT)
Dept: HOME HEALTH SERVICES | Facility: HOME HEALTHCARE | Age: 84
End: 2024-09-10
Payer: COMMERCIAL

## 2024-09-10 VITALS
RESPIRATION RATE: 18 BRPM | DIASTOLIC BLOOD PRESSURE: 68 MMHG | TEMPERATURE: 97.6 F | OXYGEN SATURATION: 100 % | HEART RATE: 97 BPM | SYSTOLIC BLOOD PRESSURE: 132 MMHG

## 2024-09-10 VITALS
SYSTOLIC BLOOD PRESSURE: 112 MMHG | TEMPERATURE: 98.2 F | HEART RATE: 92 BPM | OXYGEN SATURATION: 96 % | RESPIRATION RATE: 16 BRPM | DIASTOLIC BLOOD PRESSURE: 64 MMHG

## 2024-09-10 PROCEDURE — G0152 HHCP-SERV OF OT,EA 15 MIN: HCPCS

## 2024-09-10 PROCEDURE — G0300 HHS/HOSPICE OF LPN EA 15 MIN: HCPCS

## 2024-09-11 ENCOUNTER — HOME CARE VISIT (OUTPATIENT)
Dept: HOME HEALTH SERVICES | Facility: HOME HEALTHCARE | Age: 84
End: 2024-09-11
Payer: COMMERCIAL

## 2024-09-11 VITALS
RESPIRATION RATE: 16 BRPM | SYSTOLIC BLOOD PRESSURE: 116 MMHG | TEMPERATURE: 98.4 F | DIASTOLIC BLOOD PRESSURE: 70 MMHG | HEART RATE: 78 BPM | OXYGEN SATURATION: 97 %

## 2024-09-11 PROCEDURE — G0153 HHCP-SVS OF S/L PATH,EA 15MN: HCPCS

## 2024-09-11 NOTE — CASE COMMUNICATION
Patient missed a HHAIDE visit from [facility] on 9-11-24.     Reason: Spouse declined HHA visit this week due to other appointments.       For your records only.   Per CMS Guidance, MD must be notified of missed/cancelled visits; therefore the prescribed frequency was not met.

## 2024-09-12 ENCOUNTER — OFFICE VISIT (OUTPATIENT)
Dept: ORTHOPEDIC SURGERY | Facility: CLINIC | Age: 84
End: 2024-09-12
Payer: MEDICARE

## 2024-09-12 VITALS — HEIGHT: 72 IN | BODY MASS INDEX: 29.83 KG/M2 | SYSTOLIC BLOOD PRESSURE: 120 MMHG | DIASTOLIC BLOOD PRESSURE: 78 MMHG

## 2024-09-12 DIAGNOSIS — L03.115 CELLULITIS OF RIGHT LOWER EXTREMITY: ICD-10-CM

## 2024-09-12 DIAGNOSIS — Z96.651 STATUS POST TOTAL RIGHT KNEE REPLACEMENT: Primary | ICD-10-CM

## 2024-09-12 PROCEDURE — 99024 POSTOP FOLLOW-UP VISIT: CPT | Performed by: ORTHOPAEDIC SURGERY

## 2024-09-12 PROCEDURE — 3078F DIAST BP <80 MM HG: CPT | Performed by: ORTHOPAEDIC SURGERY

## 2024-09-12 PROCEDURE — 3074F SYST BP LT 130 MM HG: CPT | Performed by: ORTHOPAEDIC SURGERY

## 2024-09-12 RX ORDER — LINEZOLID 600 MG/1
600 TABLET, FILM COATED ORAL 2 TIMES DAILY
Status: ON HOLD | COMMUNITY

## 2024-09-12 NOTE — PROGRESS NOTES
"    [facility] [place]        Subjective     Post-op (2 week follow up--- 5 weeks s/p TOTAL KNEE ARTHROPLASTY WITH ASHLEY ROBOT RIGHT DOS 8/7/24/))       CECILIO Blanco is a 83 y.o. male.  Patient is here today with his daughter for follow-up now 5 weeks out of his complicated robot-assisted right TKA on 8/7/2024.  He was seen in the office a few weeks after surgery in a few days after being discharged from [facility] with some erythema laterally.  We continue the oral antibiotics and then saw him back a week later and the erythema had gotten worse and therefore we made the decision to have him admitted for IV antibiotics.  He stayed in the hospital and received Rocephin and vancomycin and was seen by Dr. Manzanares with [facility].  She recommended discharge home with oral antibiotics.  He was discharged about a week ago and we are seeing him in follow-up.  He saw Dr. Manzanares earlier.  He again denies fever, chills, nausea or vomiting.  No significant pain in the knee.  They have noticed that over the last day or 2 the incision has gotten redder.  No drainage.  He has gotten weak and his appetite is not what it used to be.          Objective      Physical Exam  /78   Ht 182.9 cm (72.01\")   BMI 29.83 kg/m²     Body mass index is 29.83 kg/m².        Ortho Exam        Lower Extremity:     Inspection and Palpation:      Right knee:  Calf:  Soft and non tender  Effusion:  None  Pulses:  2+  Warmth:  None  Incision:  Healed.  There are 3 areas where there are some prominent Vicryl sutures.  The erythema goes away with extension.  The lateral area of concern that got him admitted seems to be much better overall.  The erythema surrounding the skin incision is new.    ROM:  Right:  Extension:5    Flexion:110.  Range of motion is without pain.      Deformities/Malalignments/Discrepancies:    Right:  none    Functional Testing:  Right:  Straight Leg Raise: 5/5  Patella Tracking:  Normal      Imaging " Reviewed and Interpreted:  Imaging Results (Last 24 Hours)       ** No results found for the last 24 hours. **              Assessment    Assessment:  1. Status post total right knee replacement    2. Cellulitis of right lower extremity        Plan:  Status post right TKA with cellulitis of the right lower extremity--erythema has migrated down to the incision.  Not sure if he is having a reaction to the underlying Vicryl sutures.  I brought him my partner Dr. Bueno to evaluate the incision as well and he agrees with me that this does not seem to represent an infectious process that needs aggressive treatment more than oral antibiotics for now.  I agree with the switch to linezolid.  The prominent Vicryl's have been removed.  I will recheck him in a week.  They have been instructed to call me should he worsen clinically or the appearance of the incision worsens.  I recommended high-calorie diet and continued use of probiotics.  Recommend possible switch to outpatient physical therapy but the patient's daughter tells me that he needs the Occupational Therapy and speech therapy that is coming into the home and therefore he cannot have both modalities.      Christian Schwartz MD  09/12/24  16:55 EDT      Dictated Utilizing Dragon Dictation.

## 2024-09-12 NOTE — PROGRESS NOTES
[facility] Note        Subjective     Pain of the Right Femur      HPI    Guicho Blanco is a 83 y.o. male. 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   • Coronary artery disease    • Full dentures    • Gall stones    • GERD (gastroesophageal reflux disease)    • Hearing loss    • Hiatal hernia    • Hyperlipidemia    • Hypertension    • Peripheral neuropathy    • Peripheral vascular disease    • Seasonal allergies    • Skin cancer     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   • TIA (transient ischemic attack)     2 times   • Wears glasses       Past Surgical History:   Procedure Laterality Date   • ABLATION OF DYSRHYTHMIC FOCUS     • APPENDECTOMY     • CARDIAC CATHETERIZATION N/A 10/05/2017    Procedure: Left Heart Cath;  Surgeon: Hector Urrutia MD;  Location:  [facility] CATH INVASIVE LOCATION;  Service:    • CATARACT EXTRACTION Bilateral    • CHOLECYSTECTOMY      Remote   • COCHLEAR IMPLANT REVISION  05/2019   • COLONOSCOPY     • PROSTATE SURGERY     • SHOULDER SURGERY Right    • SKIN BIOPSY     • TOE AMPUTATION Right     2nd toe - Right Foot   • TONSILLECTOMY     • TOTAL KNEE ARTHROPLASTY Right 8/7/2024    Procedure: TOTAL KNEE ARTHROPLASTY WITH ASHLEY ROBOT RIGHT;  Surgeon: Christian Schwartz MD;  Location:  [facility] OR;  Service: Robotics - Ortho;  Laterality: Right;      Family History   Problem Relation Age of Onset   • Atrial fibrillation Mother    • Dementia Mother    • Lung cancer Father      Social History     Socioeconomic History   • Marital status:    Tobacco Use   • Smoking status: Never     Passive exposure: Past   • Smokeless tobacco: Never   Vaping Use   • Vaping status: Never Used   Substance and Sexual Activity   • Alcohol use: No   • Drug use: No   • Sexual activity: Defer      Current Outpatient Medications  on File Prior to Visit   Medication Sig Dispense Refill   • acetaminophen (TYLENOL) 650 MG 8 hr tablet Take 1 tablet by mouth Every 8 (Eight) Hours. 90 tablet 0   • aspirin 325 MG tablet Take 1 tablet by mouth Daily. Indications: Disease involving Lipid Deposits in the Arteries     • B Complex Vitamins (VITAMIN B COMPLEX) capsule capsule Take 1 capsule by mouth Daily. Takes in evening  Indications: Vitamin Deficiency     • bisacodyl (DULCOLAX) 10 MG suppository Insert 1 suppository into the rectum Daily As Needed for Constipation (Use if bisacodyl oral is ineffective).     • bisacodyl (DULCOLAX) 5 MG EC tablet Take 1 tablet by mouth Daily As Needed for Constipation (Use if polyethylene glycol is ineffective).     • [] cephalexin (Keflex) 500 MG capsule Take 1 capsule by mouth 4 (Four) Times a Day for 7 days. Indications: Preventative Medication Therapy Used Around Surgery 28 capsule 0   • Cholecalciferol (VITAMIN D3) 5000 UNITS capsule capsule Take 1 capsule by mouth Daily. Indications: Vitamin D Deficiency     • coenzyme Q10 100 MG capsule Take 1 capsule by mouth Daily. Indications: Heart Rhythm Disorder     • cycloSPORINE (Restasis) 0.05 % ophthalmic emulsion Administer 1 drop to both eyes Every 12 (Twelve) Hours. Indications: Drying and Inflammation of Cornea and Conjunctiva of Eyes     • docusate sodium (Colace) 100 MG capsule Take 1 capsule by mouth 2 (Two) Times a Day As Needed for Constipation. 60 capsule 0   • [] doxycycline (VIBRAMYICN) 100 MG tablet Take 1 tablet by mouth 2 (Two) Times a Day for 7 days. Indications: Preventative Medication Therapy Used Around Surgery 14 tablet 0   • flecainide (TAMBOCOR) 100 MG tablet Take 1 tablet by mouth Every 12 (Twelve) Hours. 180 tablet 3   • glucose (DEX4) 4 GM chewable tablet Chew 4 tablets As Needed.     • isosorbide mononitrate (IMDUR) 30 MG 24 hr tablet Take 1 tablet by mouth Daily. 90 tablet 3   • nitroglycerin (NITROSTAT) 0.4 MG SL tablet 1 under  the tongue as needed for angina, may repeat q5mins for up three doses 25 tablet 2   • nortriptyline (PAMELOR) 10 MG capsule Take 2 capsules by mouth Every Night. Indications: Migraine Headache     • omeprazole OTC (PrilOSEC OTC) 20 MG EC tablet Take 1 tablet by mouth Daily. Indications: Heartburn     • ondansetron (Zofran) 4 MG tablet Take 1 tablet by mouth Every 8 (Eight) Hours As Needed for Nausea or Vomiting. 15 tablet 1   • polyethylene glycol (MIRALAX) 17 g packet Take 17 g by mouth Daily As Needed (Use if senna-docusate is ineffective).     • pravastatin (PRAVACHOL) 20 MG tablet Take 1 tablet by mouth Daily. Indications: High Amount of Fats in the Blood     • promethazine (PHENERGAN) 12.5 MG tablet Take 1 tablet by mouth Every 6 (Six) Hours As Needed for Nausea or Vomiting. 12 tablet 0   • saccharomyces boulardii (FLORASTOR) 250 MG capsule Take 1 capsule by mouth 2 (Two) Times a Day. 42 capsule 0   • traMADol (ULTRAM) 50 MG tablet Take 1 tablet by mouth Every 8 (Eight) Hours As Needed for Moderate Pain. 30 tablet 1     No current facility-administered medications on file prior to visit.      Allergies   Allergen Reactions   • Divalproex Sodium (Migraine) [Valproic Acid] Other (See Comments)     Weak, lethargy and got worse   • Cymbalta [Duloxetine Hcl] Mental Status Change     Depression, thoughts of Suicide.    • Duloxetine Unknown (See Comments)   • Metoprolol Other (See Comments)     UNKNOWN. Pt does not remember this being allergy   • Neurontin [Gabapentin] Other (See Comments)     Lethargy & fatigue          Review of Systems   Constitutional: Negative.    HENT: Negative.     Eyes: Negative.    Respiratory: Negative.     Cardiovascular: Negative.    Gastrointestinal: Negative.    Endocrine: Negative.    Genitourinary: Negative.    Musculoskeletal:  Positive for arthralgias.   Skin: Negative.    Allergic/Immunologic: Negative.    Neurological: Negative.    Hematological: Negative.     Psychiatric/Behavioral: Negative.        I reviewed the patient's chief complaint, history of present illness, review of systems, past medical history, surgical history, family history, social history, medications and allergy list.        Objective      Physical Exam  There were no vitals taken for this visit.    There is no height or weight on file to calculate BMI.    General  Mental Status - alert  General Appearance - cooperative, well groomed, not in acute distress  Orientation - Oriented X3  Build & Nutrition - well developed and well nourished  Posture - normal posture  Gait - normal gait       Ortho Exam  ***    Imaging/Studies Reviewed and Interpreted:  Imaging Results (Last 24 Hours)       ** No results found for the last 24 hours. **              Assessment    Assessment:  1. Status post total right knee replacement    2. Cellulitis of right lower extremity        Plan:  Continue over-the-counter medication as needed for discomfort  ***        Venkatesh Awad MA  09/12/24  14:54 EDT      Dictated Utilizing Dragon Dictation.

## 2024-09-13 ENCOUNTER — APPOINTMENT (OUTPATIENT)
Dept: GENERAL RADIOLOGY | Facility: HOSPITAL | Age: 84
DRG: 871 | End: 2024-09-13
Payer: MEDICARE

## 2024-09-13 ENCOUNTER — HOME CARE VISIT (OUTPATIENT)
Dept: HOME HEALTH SERVICES | Facility: HOME HEALTHCARE | Age: 84
End: 2024-09-13
Payer: MEDICARE

## 2024-09-13 ENCOUNTER — TELEPHONE (OUTPATIENT)
Dept: ORTHOPEDIC SURGERY | Facility: CLINIC | Age: 84
End: 2024-09-13
Payer: MEDICARE

## 2024-09-13 ENCOUNTER — HOSPITAL ENCOUNTER (INPATIENT)
Facility: HOSPITAL | Age: 84
LOS: 7 days | Discharge: NURSING, REHAB OR LONG-TERM CARE | DRG: 871 | End: 2024-09-23
Attending: EMERGENCY MEDICINE | Admitting: INTERNAL MEDICINE
Payer: MEDICARE

## 2024-09-13 DIAGNOSIS — L03.115 CELLULITIS OF RIGHT LOWER EXTREMITY: ICD-10-CM

## 2024-09-13 DIAGNOSIS — R13.12 OROPHARYNGEAL DYSPHAGIA: ICD-10-CM

## 2024-09-13 DIAGNOSIS — R53.1 GENERALIZED WEAKNESS: Primary | ICD-10-CM

## 2024-09-13 PROBLEM — G47.00 INSOMNIA: Status: ACTIVE | Noted: 2024-09-13

## 2024-09-13 PROBLEM — R62.7 FAILURE TO THRIVE IN ADULT: Status: ACTIVE | Noted: 2024-09-13

## 2024-09-13 LAB
ALBUMIN SERPL-MCNC: 4 G/DL (ref 3.5–5.2)
ALBUMIN/GLOB SERPL: 1.7 G/DL
ALP SERPL-CCNC: 152 U/L (ref 39–117)
ALT SERPL W P-5'-P-CCNC: 20 U/L (ref 1–41)
ANION GAP SERPL CALCULATED.3IONS-SCNC: 12 MMOL/L (ref 5–15)
AST SERPL-CCNC: 28 U/L (ref 1–40)
BACTERIA UR QL AUTO: ABNORMAL /HPF
BASOPHILS # BLD AUTO: 0.08 10*3/MM3 (ref 0–0.2)
BASOPHILS NFR BLD AUTO: 1.1 % (ref 0–1.5)
BILIRUB SERPL-MCNC: 0.5 MG/DL (ref 0–1.2)
BILIRUB UR QL STRIP: NEGATIVE
BUN BLDA-MCNC: 18 MG/DL (ref 8–26)
BUN SERPL-MCNC: 18 MG/DL (ref 8–23)
BUN/CREAT SERPL: 18.6 (ref 7–25)
CA-I BLDA-SCNC: 1.21 MMOL/L (ref 1.2–1.32)
CALCIUM SPEC-SCNC: 9.2 MG/DL (ref 8.6–10.5)
CHLORIDE BLDA-SCNC: 100 MMOL/L (ref 98–109)
CHLORIDE SERPL-SCNC: 100 MMOL/L (ref 98–107)
CLARITY UR: CLEAR
CO2 BLDA-SCNC: 22 MMOL/L (ref 24–29)
CO2 SERPL-SCNC: 24 MMOL/L (ref 22–29)
COD CRY URNS QL: ABNORMAL /HPF
COLOR UR: ABNORMAL
CREAT BLDA-MCNC: 1.1 MG/DL (ref 0.6–1.3)
CREAT SERPL-MCNC: 0.97 MG/DL (ref 0.76–1.27)
CRP SERPL-MCNC: 0.7 MG/DL (ref 0–0.5)
D-LACTATE SERPL-SCNC: 1.4 MMOL/L (ref 0.5–2)
DEPRECATED RDW RBC AUTO: 43.8 FL (ref 37–54)
EGFRCR SERPLBLD CKD-EPI 2021: 66.6 ML/MIN/1.73
EGFRCR SERPLBLD CKD-EPI 2021: 77.5 ML/MIN/1.73
EOSINOPHIL # BLD AUTO: 0.41 10*3/MM3 (ref 0–0.4)
EOSINOPHIL NFR BLD AUTO: 5.7 % (ref 0.3–6.2)
ERYTHROCYTE [DISTWIDTH] IN BLOOD BY AUTOMATED COUNT: 13.8 % (ref 12.3–15.4)
ERYTHROCYTE [SEDIMENTATION RATE] IN BLOOD: 18 MM/HR (ref 0–20)
FLUAV RNA RESP QL NAA+PROBE: NOT DETECTED
FLUBV RNA RESP QL NAA+PROBE: NOT DETECTED
GLOBULIN UR ELPH-MCNC: 2.3 GM/DL
GLUCOSE BLDC GLUCOMTR-MCNC: 90 MG/DL (ref 70–130)
GLUCOSE SERPL-MCNC: 88 MG/DL (ref 65–99)
GLUCOSE UR STRIP-MCNC: NEGATIVE MG/DL
HCT VFR BLD AUTO: 34.8 % (ref 37.5–51)
HCT VFR BLDA CALC: 32 % (ref 38–51)
HGB BLD-MCNC: 10.8 G/DL (ref 13–17.7)
HGB BLDA-MCNC: 10.9 G/DL (ref 12–17)
HGB UR QL STRIP.AUTO: NEGATIVE
HOLD SPECIMEN: NORMAL
HOLD SPECIMEN: NORMAL
HYALINE CASTS UR QL AUTO: ABNORMAL /LPF
IMM GRANULOCYTES # BLD AUTO: 0.03 10*3/MM3 (ref 0–0.05)
IMM GRANULOCYTES NFR BLD AUTO: 0.4 % (ref 0–0.5)
KETONES UR QL STRIP: ABNORMAL
LEUKOCYTE ESTERASE UR QL STRIP.AUTO: ABNORMAL
LYMPHOCYTES # BLD AUTO: 1.64 10*3/MM3 (ref 0.7–3.1)
LYMPHOCYTES NFR BLD AUTO: 22.7 % (ref 19.6–45.3)
MCH RBC QN AUTO: 26.8 PG (ref 26.6–33)
MCHC RBC AUTO-ENTMCNC: 31 G/DL (ref 31.5–35.7)
MCV RBC AUTO: 86.4 FL (ref 79–97)
MONOCYTES # BLD AUTO: 0.93 10*3/MM3 (ref 0.1–0.9)
MONOCYTES NFR BLD AUTO: 12.9 % (ref 5–12)
NEUTROPHILS NFR BLD AUTO: 4.14 10*3/MM3 (ref 1.7–7)
NEUTROPHILS NFR BLD AUTO: 57.2 % (ref 42.7–76)
NITRITE UR QL STRIP: NEGATIVE
NRBC BLD AUTO-RTO: 0 /100 WBC (ref 0–0.2)
PH UR STRIP.AUTO: 5.5 [PH] (ref 5–8)
PLATELET # BLD AUTO: 197 10*3/MM3 (ref 140–450)
PMV BLD AUTO: 10.4 FL (ref 6–12)
POTASSIUM BLDA-SCNC: 3.9 MMOL/L (ref 3.5–4.9)
POTASSIUM SERPL-SCNC: 3.9 MMOL/L (ref 3.5–5.2)
PROCALCITONIN SERPL-MCNC: 0.1 NG/ML (ref 0–0.25)
PROT SERPL-MCNC: 6.3 G/DL (ref 6–8.5)
PROT UR QL STRIP: ABNORMAL
RBC # BLD AUTO: 4.03 10*6/MM3 (ref 4.14–5.8)
RBC # UR STRIP: ABNORMAL /HPF
REF LAB TEST METHOD: ABNORMAL
SARS-COV-2 RNA RESP QL NAA+PROBE: NOT DETECTED
SODIUM BLD-SCNC: 136 MMOL/L (ref 138–146)
SODIUM SERPL-SCNC: 136 MMOL/L (ref 136–145)
SP GR UR STRIP: 1.03 (ref 1–1.03)
SQUAMOUS #/AREA URNS HPF: ABNORMAL /HPF
UROBILINOGEN UR QL STRIP: ABNORMAL
WBC # UR STRIP: ABNORMAL /HPF
WBC NRBC COR # BLD AUTO: 7.23 10*3/MM3 (ref 3.4–10.8)
WHOLE BLOOD HOLD COAG: NORMAL

## 2024-09-13 PROCEDURE — 85652 RBC SED RATE AUTOMATED: CPT | Performed by: EMERGENCY MEDICINE

## 2024-09-13 PROCEDURE — 83605 ASSAY OF LACTIC ACID: CPT | Performed by: EMERGENCY MEDICINE

## 2024-09-13 PROCEDURE — 93005 ELECTROCARDIOGRAM TRACING: CPT | Performed by: EMERGENCY MEDICINE

## 2024-09-13 PROCEDURE — 25010000002 CEFTRIAXONE PER 250 MG: Performed by: EMERGENCY MEDICINE

## 2024-09-13 PROCEDURE — 80053 COMPREHEN METABOLIC PANEL: CPT | Performed by: EMERGENCY MEDICINE

## 2024-09-13 PROCEDURE — 84145 PROCALCITONIN (PCT): CPT | Performed by: EMERGENCY MEDICINE

## 2024-09-13 PROCEDURE — 36415 COLL VENOUS BLD VENIPUNCTURE: CPT

## 2024-09-13 PROCEDURE — 80047 BASIC METABLC PNL IONIZED CA: CPT | Performed by: EMERGENCY MEDICINE

## 2024-09-13 PROCEDURE — G0378 HOSPITAL OBSERVATION PER HR: HCPCS

## 2024-09-13 PROCEDURE — 25010000002 VANCOMYCIN HCL IN NACL 2-0.9 GM/500ML-% SOLUTION: Performed by: EMERGENCY MEDICINE

## 2024-09-13 PROCEDURE — 85025 COMPLETE CBC W/AUTO DIFF WBC: CPT | Performed by: EMERGENCY MEDICINE

## 2024-09-13 PROCEDURE — 87636 SARSCOV2 & INF A&B AMP PRB: CPT | Performed by: EMERGENCY MEDICINE

## 2024-09-13 PROCEDURE — 73560 X-RAY EXAM OF KNEE 1 OR 2: CPT

## 2024-09-13 PROCEDURE — 81001 URINALYSIS AUTO W/SCOPE: CPT | Performed by: EMERGENCY MEDICINE

## 2024-09-13 PROCEDURE — 25810000003 SODIUM CHLORIDE 0.9 % SOLUTION: Performed by: EMERGENCY MEDICINE

## 2024-09-13 PROCEDURE — 86140 C-REACTIVE PROTEIN: CPT | Performed by: EMERGENCY MEDICINE

## 2024-09-13 PROCEDURE — 85014 HEMATOCRIT: CPT | Performed by: EMERGENCY MEDICINE

## 2024-09-13 PROCEDURE — 87040 BLOOD CULTURE FOR BACTERIA: CPT | Performed by: EMERGENCY MEDICINE

## 2024-09-13 PROCEDURE — 99285 EMERGENCY DEPT VISIT HI MDM: CPT

## 2024-09-13 PROCEDURE — 71045 X-RAY EXAM CHEST 1 VIEW: CPT

## 2024-09-13 RX ORDER — NITROGLYCERIN 0.4 MG/1
0.4 TABLET SUBLINGUAL
Status: DISCONTINUED | OUTPATIENT
Start: 2024-09-13 | End: 2024-09-23 | Stop reason: HOSPADM

## 2024-09-13 RX ORDER — DOCUSATE SODIUM 100 MG/1
100 CAPSULE, LIQUID FILLED ORAL 2 TIMES DAILY PRN
Status: DISCONTINUED | OUTPATIENT
Start: 2024-09-13 | End: 2024-09-23 | Stop reason: HOSPADM

## 2024-09-13 RX ORDER — SODIUM CHLORIDE 0.9 % (FLUSH) 0.9 %
10 SYRINGE (ML) INJECTION AS NEEDED
Status: DISCONTINUED | OUTPATIENT
Start: 2024-09-13 | End: 2024-09-23 | Stop reason: HOSPADM

## 2024-09-13 RX ORDER — SODIUM CHLORIDE 0.9 % (FLUSH) 0.9 %
10 SYRINGE (ML) INJECTION EVERY 12 HOURS SCHEDULED
Status: DISCONTINUED | OUTPATIENT
Start: 2024-09-13 | End: 2024-09-23 | Stop reason: HOSPADM

## 2024-09-13 RX ORDER — ACETAMINOPHEN 500 MG
1000 TABLET ORAL EVERY 6 HOURS PRN
Status: DISCONTINUED | OUTPATIENT
Start: 2024-09-13 | End: 2024-09-23 | Stop reason: HOSPADM

## 2024-09-13 RX ORDER — PRAVASTATIN SODIUM 20 MG
20 TABLET ORAL DAILY
Status: DISCONTINUED | OUTPATIENT
Start: 2024-09-13 | End: 2024-09-23 | Stop reason: HOSPADM

## 2024-09-13 RX ORDER — NORTRIPTYLINE HCL 10 MG
20 CAPSULE ORAL NIGHTLY
Status: DISCONTINUED | OUTPATIENT
Start: 2024-09-13 | End: 2024-09-23 | Stop reason: HOSPADM

## 2024-09-13 RX ORDER — SODIUM CHLORIDE 9 MG/ML
40 INJECTION, SOLUTION INTRAVENOUS AS NEEDED
Status: DISCONTINUED | OUTPATIENT
Start: 2024-09-13 | End: 2024-09-23 | Stop reason: HOSPADM

## 2024-09-13 RX ORDER — ASPIRIN 325 MG
325 TABLET ORAL DAILY
Status: DISCONTINUED | OUTPATIENT
Start: 2024-09-13 | End: 2024-09-23 | Stop reason: HOSPADM

## 2024-09-13 RX ORDER — FLECAINIDE ACETATE 50 MG/1
100 TABLET ORAL EVERY 12 HOURS
Status: DISCONTINUED | OUTPATIENT
Start: 2024-09-13 | End: 2024-09-14

## 2024-09-13 RX ORDER — BISACODYL 5 MG/1
5 TABLET, DELAYED RELEASE ORAL DAILY PRN
Status: DISCONTINUED | OUTPATIENT
Start: 2024-09-13 | End: 2024-09-23 | Stop reason: HOSPADM

## 2024-09-13 RX ORDER — SACCHAROMYCES BOULARDII 250 MG
250 CAPSULE ORAL 2 TIMES DAILY
Status: DISCONTINUED | OUTPATIENT
Start: 2024-09-13 | End: 2024-09-23 | Stop reason: HOSPADM

## 2024-09-13 RX ORDER — VANCOMYCIN/0.9 % SOD CHLORIDE 1.5G/250ML
1500 PLASTIC BAG, INJECTION (ML) INTRAVENOUS EVERY 24 HOURS
Status: DISCONTINUED | OUTPATIENT
Start: 2024-09-14 | End: 2024-09-15

## 2024-09-13 RX ORDER — VANCOMYCIN 2 GRAM/500 ML IN 0.9 % SODIUM CHLORIDE INTRAVENOUS
20 ONCE
Status: COMPLETED | OUTPATIENT
Start: 2024-09-13 | End: 2024-09-13

## 2024-09-13 RX ORDER — ISOSORBIDE MONONITRATE 30 MG/1
30 TABLET, EXTENDED RELEASE ORAL DAILY
Status: DISCONTINUED | OUTPATIENT
Start: 2024-09-13 | End: 2024-09-23 | Stop reason: HOSPADM

## 2024-09-13 RX ORDER — POLYETHYLENE GLYCOL 3350 17 G/17G
17 POWDER, FOR SOLUTION ORAL DAILY PRN
Status: DISCONTINUED | OUTPATIENT
Start: 2024-09-13 | End: 2024-09-23 | Stop reason: HOSPADM

## 2024-09-13 RX ORDER — PANTOPRAZOLE SODIUM 40 MG/1
40 TABLET, DELAYED RELEASE ORAL
Status: DISCONTINUED | OUTPATIENT
Start: 2024-09-14 | End: 2024-09-17

## 2024-09-13 RX ORDER — BISACODYL 10 MG
10 SUPPOSITORY, RECTAL RECTAL DAILY PRN
Status: DISCONTINUED | OUTPATIENT
Start: 2024-09-13 | End: 2024-09-23 | Stop reason: HOSPADM

## 2024-09-13 RX ADMIN — FLECAINIDE ACETATE 100 MG: 50 TABLET ORAL at 21:57

## 2024-09-13 RX ADMIN — Medication 250 MG: at 21:57

## 2024-09-13 RX ADMIN — SODIUM CHLORIDE 1000 ML: 9 INJECTION, SOLUTION INTRAVENOUS at 14:04

## 2024-09-13 RX ADMIN — NORTRIPTYLINE HYDROCHLORIDE 20 MG: 10 CAPSULE ORAL at 21:58

## 2024-09-13 RX ADMIN — PRAVASTATIN SODIUM 20 MG: 20 TABLET ORAL at 21:58

## 2024-09-13 RX ADMIN — Medication 10 ML: at 21:58

## 2024-09-13 RX ADMIN — CEFTRIAXONE SODIUM 1000 MG: 1 INJECTION, POWDER, FOR SOLUTION INTRAMUSCULAR; INTRAVENOUS at 14:47

## 2024-09-13 RX ADMIN — Medication 2000 MG: at 15:29

## 2024-09-13 NOTE — ED PROVIDER NOTES
Subjective   History of Present Illness  Mr. Blanco presents via EMS from home with weakness.  Unable to bear weight today.  Denies pain anywhere.  Denies nausea or vomiting.  Currently on Zyvox for cellulitis over his knee.  He had right-sided knee replacement 1 month ago by .  He was readmitted to the hospital on August 30 through the fifth of this month with cellulitis overlying the site.  Got Rocephin and Vanco in the hospital and improved.  Was discharged on Zyvox.  Since that time has had increasing weakness.  Saw Dr. Ramos yesterday and sutures were removed.  Today redness is no worse but weakness is much worse.  He denies fevers or chills.  He denies nausea or vomiting.      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     Duloxetine Unknown (See Comments)    Metoprolol Other (See Comments)     UNKNOWN. Pt does not remember this being allergy    Neurontin [Gabapentin] Other (See Comments)     Lethargy & fatigue       Past Surgical History:   Procedure Laterality Date    ABLATION OF DYSRHYTHMIC FOCUS      APPENDECTOMY      CARDIAC CATHETERIZATION N/A 10/05/2017    Procedure: Left Heart Cath;  Surgeon: Hector Urrutia MD;  Location: [facility]  INVASIVE LOCATION;  Service:     CATARACT EXTRACTION Bilateral     CHOLECYSTECTOMY      Remote    COCHLEAR IMPLANT REVISION  05/2019    COLONOSCOPY      PROSTATE SURGERY      SHOULDER SURGERY Right     SKIN BIOPSY      TOE AMPUTATION Right     2nd toe - Right Foot    TONSILLECTOMY      TOTAL KNEE ARTHROPLASTY Right 8/7/2024    Procedure: TOTAL KNEE ARTHROPLASTY WITH ASHLEY ROBOT RIGHT;  Surgeon: Christian Schwartz MD;  Location: [facility] OR;  Service: Robotics - Ortho;  Laterality: Right;       Family History   Problem Relation Age of Onset    Atrial fibrillation Mother     Dementia Mother     Lung cancer Father        Social History     Socioeconomic History    Marital status:    Tobacco Use    Smoking status: Never     Passive exposure: Past    Smokeless tobacco: Never   Vaping Use    Vaping status: Never Used   Substance and Sexual Activity    Alcohol use: No    Drug use: No    Sexual activity: Defer           Objective   Physical Exam  Vitals and nursing note reviewed.   Constitutional:       Comments: Appears weak   HENT:      Head: Normocephalic and atraumatic.      Nose: Nose normal. No congestion or rhinorrhea.      Mouth/Throat:      Mouth: Mucous membranes are dry.   Eyes:      General: No scleral icterus.     Conjunctiva/sclera: Conjunctivae normal.   Neck:      Comments: No JVD   Cardiovascular:      Rate and Rhythm: Normal rate and regular rhythm.      Heart sounds: No murmur heard.     No friction rub.   Pulmonary:      Effort: Pulmonary effort is normal.      Breath sounds: Normal breath sounds. No wheezing or rales.   Abdominal:      General: Bowel sounds are normal.      Palpations: Abdomen is soft.      Tenderness: There is no abdominal tenderness. There is no guarding or rebound.   Musculoskeletal:         General: Tenderness present.      Cervical back: Normal range of motion and neck supple.      Right lower leg: No edema.      Left lower leg: No edema.      Comments: There is erythema  extending about an inch in all directions from the incision over his knee.  The skin is very hot to touch.  He is able to flex and extend.  No drainage   Skin:     General: Skin is warm and dry.      Coloration: Skin is not pale.      Findings: Erythema present.   Neurological:      General: No focal deficit present.      Mental Status: He is alert.      Motor: No weakness.      Coordination: Coordination normal.   Psychiatric:         Mood and Affect: Mood normal.         Behavior: Behavior normal.         Procedures           ED Course  ED Course as of 09/13/24 1449   Fri Sep 13, 2024   1343 Notified Dr Ramos of Mr Gerard's presence in the emergency department.  Awaiting labs.  He appears very weak and is not able to walk today.  Plan on admission with consult with Dr. Manzanares.  Mr. Blanco apparently improved on Vanco and Rocephin in the hospital.  Will give those. [DT]   1352 Bedside Chem-8 looks okay.  Paged Dr. Manzanares. [DT]   9029 Discussed with Dr. Manzanares who is familiar with him.  She agrees with Rocephin and bank.  She will come see him and examine him.  I will speak with Dr. VALENCIA and request admission.  I have ordered IV fluids. [DT]   4489 Spoke with Dr. Liu, spoke with Mr. Blanco and his daughter [DT]      ED Course User Index  [DT] Girma Jimenez MD                                             Medical Decision Making  Please see course notes.  I reviewed and interpreted prior records.  Ordered and interpreted labs.  Ordered and interpreted x-rays.  Had consultation with orthopedics, infectious disease, and the hospitalist.  Ordered multiple IV antibiotics and IV fluids    Problems Addressed:  Cellulitis of right lower extremity: complicated acute illness or injury that poses a threat to life or bodily functions  Generalized weakness: complicated acute illness or injury that poses a threat to life or bodily functions    Amount and/or Complexity of Data Reviewed  External Data Reviewed:  notes.  Labs: ordered. Decision-making details documented in ED Course.  Radiology: ordered. Decision-making details documented in ED Course.  ECG/medicine tests: ordered. Decision-making details documented in ED Course.    Risk  Prescription drug management.  Decision regarding hospitalization.        Final diagnoses:   Generalized weakness   Cellulitis of right lower extremity       ED Disposition  ED Disposition       ED Disposition   Decision to Admit    Condition   --    Comment   Level of Care: Med/Surg [1]   Admitting Physician: TERRI YING [[id number]]                 No follow-up provider specified.       Medication List      No changes were made to your prescriptions during this visit.            Girma Jimenez MD  09/13/24 3225

## 2024-09-13 NOTE — CASE COMMUNICATION
Patient missed a PT home visit from [facility] on 9/13/2024.    Reason: Drive by performed after no response to multiple calls/attempts to schedule, no answer at door.       For your records only.   Per CMS Guidance, MD must be notified of missed/cancelled visits; therefore the prescribed frequency was not met.

## 2024-09-13 NOTE — PROGRESS NOTES
Pharmacy Consult-Vancomycin Dosing  Guicho Blanco is a  83 y.o. male receiving vancomycin therapy.     Indication: SSTI  Consulting Provider: ID  ID Consult: Yes    Goal AUC: 400 - 600 mg/L*hr    Current Antimicrobial Therapy  Anti-Infectives (From admission, onward)      Ordered     Dose/Rate Route Frequency Start Stop    09/13/24 1758  cefTRIAXone (ROCEPHIN) 2,000 mg in sodium chloride 0.9 % 100 mL MBP        Ordering Provider: Jae Manzanares MD    2,000 mg  200 mL/hr over 30 Minutes Intravenous Every 24 Hours 09/14/24 1700 09/19/24 1659    09/13/24 1852  vancomycin IVPB 1500 mg in 0.9% NaCl (Premix) 500 mL        Ordering Provider: Charlene Wyatt RPH    1,500 mg  333.3 mL/hr over 90 Minutes Intravenous Every 24 Hours 09/14/24 1500 09/19/24 1459    09/13/24 1758  Pharmacy to dose vancomycin        Ordering Provider: Jae Manzanares MD     Does not apply Continuous PRN 09/13/24 1758 09/18/24 1757    09/13/24 1349  vancomycin IVPB 2000 mg in 0.9% Sodium Chloride 500 mL        Ordering Provider: Girma Jimenez MD    20 mg/kg × 95.3 kg  250 mL/hr over 120 Minutes Intravenous Once 09/13/24 1500 09/13/24 1729    09/13/24 1349  cefTRIAXone (ROCEPHIN) 1,000 mg in sodium chloride 0.9 % 100 mL MBP        Ordering Provider: Girma Jimenez MD    1,000 mg  200 mL/hr over 30 Minutes Intravenous Once 09/13/24 1430 09/13/24 1517            Allergies  Allergies as of 09/13/2024 - Reviewed 09/13/2024   Allergen Reaction Noted    Divalproex sodium (migraine) [valproic acid] Other (See Comments) 11/22/2021    Cymbalta [duloxetine hcl] Mental Status Change 04/28/2016    Duloxetine Unknown (See Comments) 10/04/2017    Metoprolol Other (See Comments) 10/02/2017    Neurontin [gabapentin] Other (See Comments) 10/02/2017       Labs    Results from last 7 days   Lab Units 09/13/24  1341 09/13/24  1334   BUN mg/dL 18  --    CREATININE mg/dL 0.97 1.10       Results from last 7 days   Lab Units 09/13/24  1232   WBC 10*3/mm3 7.23  "      Evaluation of Dosing     Last Dose Received in the ED/Outside Facility: Yes  Is Patient on Dialysis or Renal Replacement: No    Ht - 182.9 cm (72\")  Wt - 95.3 kg (210 lb)    Estimated Creatinine Clearance: 69.1 mL/min (by C-G formula based on SCr of 0.97 mg/dL).    No intake/output data recorded.    Microbiology and Radiology  Microbiology Results (last 10 days)       Procedure Component Value - Date/Time    COVID PRE-OP / PRE-PROCEDURE SCREENING ORDER (NO ISOLATION) - Swab, Nasopharynx [[id number]]  (Normal) Collected: 09/13/24 1344    Lab Status: Final result Specimen: Swab from Nasopharynx Updated: 09/13/24 1418    Narrative:      The following orders were created for panel order COVID PRE-OP / PRE-PROCEDURE SCREENING ORDER (NO ISOLATION) - Swab, Nasopharynx.  Procedure                               Abnormality         Status                     ---------                               -----------         ------                     COVID-19 and FLU A/B PCR...[[id number]]  Normal              Final result                 Please view results for these tests on the individual orders.    COVID-19 and FLU A/B PCR, 1 HR TAT - Swab, Nasopharynx [[id number]]  (Normal) Collected: 09/13/24 1344    Lab Status: Final result Specimen: Swab from Nasopharynx Updated: 09/13/24 1418     COVID19 Not Detected     Influenza A PCR Not Detected     Influenza B PCR Not Detected    Narrative:      Fact sheet for providers: https://www.fda.gov/media/705560/download    Fact sheet for patients: https://www.fda.gov/media/320262/download    Test performed by PCR.            Reported Vancomycin Levels                         [facility] AUC Calculation:    Current AUC:   --- mg/L*hr    Predicted Steady State AUC on Current Dose: --- mg/L*hr  _________________________________    Predicted Steady State AUC on New Dose:   470 mg/L*hr    Assessment/Plan:   Pharmacy consulted to dose vancomycin for SSTI, goal -600 mg/L*hr.  Patient loaded " with vancomycin 2000 ( ~ 21 mg/kg)  BC x 2 pending.  Patient remains afebrile, serum creatinine is 0.97, BUN is 18, and WBC is 7.23.  Based on evaluation, initiate a maintenance regimen of vancomycin 1500 mg ( ~ 16 mg/kg) every 24 hours.  A vancomycin trough will be assessed on 9/15 @ 0600 (prior to the 3rd dose).  Will continue to follow and adjust vancomycin dose as needed based on renal function, cultures, and patient clinical status.    Thank you,    Chralene Wyatt [facility]  9/13/2024  18:54 EDT

## 2024-09-13 NOTE — TELEPHONE ENCOUNTER
Caller: SAMEERA Cee    Relationship to patient: Emergency Contact    Best call back number: [phone]    Patient is needing: PATIENTS DAUGHTER CALLED STATING SHE HAD TO TAKE THE PATIENT TO THE ER DUE TO AN INCREASE IN PAIN AND ABLE TO GET UP THIS MORNING.   PATIENT IS CURRENTLY AT [facility]

## 2024-09-13 NOTE — ED NOTES
Guicho Blanco    Nursing Report ED to Floor:  Mental status: a/ox3  Ambulatory status: ambulates at home with assistance  Oxygen Therapy:  ra  Cardiac Rhythm: nsr  Admitted from: home/ed  Safety Concerns:  none  Social Issues: none  ED Room #:  28    ED Nurse Phone Extension - [phone] or may call [phone].      HPI:   Chief Complaint   Patient presents with    Weakness - Generalized       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     bilateral    Coronary artery disease     Full dentures     Gall stones     GERD (gastroesophageal reflux disease)     Hearing loss     Hiatal hernia     Hyperlipidemia     Hypertension     Peripheral neuropathy     Peripheral vascular disease     Seasonal allergies     Skin cancer     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    TIA (transient ischemic attack)     2 times    Wears glasses         Past Surgical History:  Past Surgical History:   Procedure Laterality Date    ABLATION OF DYSRHYTHMIC FOCUS      APPENDECTOMY      CARDIAC CATHETERIZATION N/A 10/05/2017    Procedure: Left Heart Cath;  Surgeon: Hector Urrutia MD;  Location:  [facility];  Service:     CATARACT EXTRACTION Bilateral     CHOLECYSTECTOMY      Remote    COCHLEAR IMPLANT REVISION  05/2019    COLONOSCOPY      PROSTATE SURGERY      SHOULDER SURGERY Right     SKIN BIOPSY      TOE AMPUTATION Right     2nd toe - Right Foot    TONSILLECTOMY      TOTAL KNEE ARTHROPLASTY Right 8/7/2024    Procedure: TOTAL KNEE ARTHROPLASTY WITH ASHLEY ROBOT RIGHT;  Surgeon: Christian Schwartz MD;  Location:  [facility] OR;  Service: Robotics - Ortho;  Laterality: Right;        Admitting Doctor:   Berto Liu MD    Consulting Provider(s):  Consults       Date and Time Order Name Status Description    8/31/2024  1:29 AM Inpatient Infectious Diseases Consult Completed           "    Admitting Diagnosis:   The primary encounter diagnosis was Generalized weakness. A diagnosis of Cellulitis of right lower extremity was also pertinent to this visit.    Most Recent Vitals:   Vitals:    09/13/24 1226 09/13/24 1236   BP: 125/77 118/65   BP Location: Right arm    Patient Position: Sitting    Pulse: 91 88   Resp: 16    Temp: 97.7 °F (36.5 °C)    TempSrc: Oral    SpO2: 100% 100%   Weight: 95.3 kg (210 lb)    Height: 182.9 cm (72\")        Active LDAs/IV Access:   Lines, Drains & Airways       Active LDAs       Name Placement date Placement time Site Days    Peripheral IV 09/13/24 1401 Anterior;Right Forearm 09/13/24  1401  Forearm  less than 1                    Labs (abnormal labs have a star):   Labs Reviewed   URINALYSIS W/ MICROSCOPIC IF INDICATED (NO CULTURE) - Abnormal; Notable for the following components:       Result Value    Color, UA Dark Yellow (*)     Ketones, UA Trace (*)     Protein, UA Trace (*)     Leuk Esterase, UA Trace (*)     All other components within normal limits   CBC WITH AUTO DIFFERENTIAL - Abnormal; Notable for the following components:    RBC 4.03 (*)     Hemoglobin 10.8 (*)     Hematocrit 34.8 (*)     MCHC 31.0 (*)     Monocyte % 12.9 (*)     Monocytes, Absolute 0.93 (*)     Eosinophils, Absolute 0.41 (*)     All other components within normal limits   URINALYSIS, MICROSCOPIC ONLY - Abnormal; Notable for the following components:    WBC, UA 3-5 (*)     All other components within normal limits   POCT CHEM 8 - Abnormal; Notable for the following components:    Sodium 136 (*)     Total CO2 22 (*)     Hemoglobin 10.9 (*)     Hematocrit 32 (*)     All other components within normal limits   COVID-19 AND FLU A/B, NP SWAB IN TRANSPORT MEDIA 1 HR TAT - Normal    Narrative:     Fact sheet for providers: https://www.fda.gov/media/185123/download    Fact sheet for patients: https://www.fda.gov/media/012093/download    Test performed by PCR.   LACTIC ACID, PLASMA - Normal "   SEDIMENTATION RATE - Normal   COVID PRE-OP / PRE-PROCEDURE SCREENING ORDER (NO ISOLATION)    Narrative:     The following orders were created for panel order COVID PRE-OP / PRE-PROCEDURE SCREENING ORDER (NO ISOLATION) - Swab, Nasopharynx.  Procedure                               Abnormality         Status                     ---------                               -----------         ------                     COVID-19 and FLU A/B PCR...[[id number]]  Normal              Final result                 Please view results for these tests on the individual orders.   BLOOD CULTURE   BLOOD CULTURE   RAINBOW DRAW    Narrative:     The following orders were created for panel order [place] Draw.  Procedure                               Abnormality         Status                     ---------                               -----------         ------                     Gold Top - SST[[id number]]                                   Final result               Gray Top[[id number]]                                         Final result               Light Blue Top[[id number]]                                   Final result                 Please view results for these tests on the individual orders.   COMPREHENSIVE METABOLIC PANEL   PROCALCITONIN   C-REACTIVE PROTEIN   CBC AND DIFFERENTIAL    Narrative:     The following orders were created for panel order CBC & Differential.  Procedure                               Abnormality         Status                     ---------                               -----------         ------                     CBC Auto Differential[[id number]]        Abnormal            Final result                 Please view results for these tests on the individual orders.   GOLD TOP - SST   GRAY TOP   LIGHT BLUE TOP       Meds Given in ED:   Medications   sodium chloride 0.9 % bolus 1,000 mL (1,000 mL Intravenous New Bag 9/13/24 1404)   vancomycin IVPB 2000 mg in 0.9% Sodium Chloride 500 mL (has no administration  in time range)   cefTRIAXone (ROCEPHIN) 1,000 mg in sodium chloride 0.9 % 100 mL MBP (1,000 mg Intravenous New Bag 9/13/24 1447)           Last NIH score:                                                          Dysphagia screening results:        [place] Coma Scale:  No data recorded     CIWA:        Restraint Type:            Isolation Status:  No active isolations

## 2024-09-13 NOTE — CONSULTS
INFECTIOUS DISEASES  INPATIENT CONSULT NOTE / INITIAL HOSPITAL VISIT      PATIENT NAME:  Guicho Blanco  YOB: 1940  MEDICAL RECORD NUMBER:  [id number]    Date of Admission:  9/13/2024  Date of Consult: 9/13/2024    Requesting Provider: LANDON Jimenez MD  Evaluating Physician: Jae Manzanares MD    Chief Complaint   Patient presents with    Weakness - Generalized       Reason for Consultation:   right knee cellulitis    History of Present Illness:  Patient is a 83 y.o. male with a past medical history significant for recent right knee arthroplasy complicated by cellulitis who was admitted to [facility] on 9/13/2024 after presenting to ED with complaints of increased confusion.   Patient previously known to me from his recent hospitalization.  Has lateral right knee superficial cellulitis.  Did not appear to involve joint or incision.  Treated with IV vancomycin and ceftriaxone with improvement.  Discharged home on oral doxycycline and cephalexin.  I saw him back in office 2 days ago, when he had new erythema and warmth about the incision itself.  I changed his antibiotic to oral linezolid.  He has taken that for 2 days.  Family brought him back to ED today as he had just slowly been declining at home.  He will have episodes of disorientation and is just generally weak.  He saw ortho just yesterday and some of the deeper sutures removed.  Afebrile today with normal WBC, PCT and ESR.    I have been consulted to assist in workup and antimicrobial management of right knee cellulitis.    Past Medical History:   Diagnosis Date    Arthritis     Atrial fibrillation     BPH (benign prostatic hypertrophy)     Cataract     Cellulitis     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     bilateral    Coronary artery disease     Full dentures     Gall stones     GERD (gastroesophageal reflux disease)     Hearing loss     Hiatal hernia     Hyperlipidemia     Hypertension     Peripheral neuropathy     Peripheral  vascular disease     Seasonal allergies     Skin cancer     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    TIA (transient ischemic attack)     2 times    Wears glasses        Past Surgical History:   Procedure Laterality Date    ABLATION OF DYSRHYTHMIC FOCUS      APPENDECTOMY      CARDIAC CATHETERIZATION N/A 10/05/2017    Procedure: Left Heart Cath;  Surgeon: Hector Urrutia MD;  Location:  [facility];  Service:     CATARACT EXTRACTION Bilateral     CHOLECYSTECTOMY      Remote    COCHLEAR IMPLANT REVISION  05/2019    COLONOSCOPY      PROSTATE SURGERY      SHOULDER SURGERY Right     SKIN BIOPSY      TOE AMPUTATION Right     2nd toe - Right Foot    TONSILLECTOMY      TOTAL KNEE ARTHROPLASTY Right 8/7/2024    Procedure: TOTAL KNEE ARTHROPLASTY WITH ASHLEY ROBOT RIGHT;  Surgeon: Christian Schwartz MD;  Location:  [facility] OR;  Service: Robotics - Ortho;  Laterality: Right;       Family History   Problem Relation Age of Onset    Atrial fibrillation Mother     Dementia Mother     Lung cancer Father        Social History     Socioeconomic History    Marital status:    Tobacco Use    Smoking status: Never     Passive exposure: Past    Smokeless tobacco: Never   Vaping Use    Vaping status: Never Used   Substance and Sexual Activity    Alcohol use: No    Drug use: No    Sexual activity: Defer         Allergies:  Allergies   Allergen Reactions    Divalproex Sodium (Migraine) [Valproic Acid] Other (See Comments)     Weak, lethargy and got worse    Cymbalta [Duloxetine Hcl] Mental Status Change     Depression, thoughts of Suicide.     Duloxetine Unknown (See Comments)    Metoprolol Other (See Comments)     UNKNOWN. Pt does not remember this being allergy    Neurontin [Gabapentin] Other (See Comments)     Lethargy & fatigue       Home Medications:  No current facility-administered medications on file prior to encounter.     Current Outpatient Medications on File Prior to Encounter   Medication Sig Dispense Refill     acetaminophen (TYLENOL) 650 MG 8 hr tablet Take 1 tablet by mouth Every 8 (Eight) Hours. (Patient taking differently: Take 1 tablet by mouth Every 8 (Eight) Hours As Needed for Mild Pain, Moderate Pain or Headache. Indications: Pain) 90 tablet 0    aspirin 325 MG tablet Take 1 tablet by mouth Daily. Indications: Disease involving Lipid Deposits in the Arteries      B Complex Vitamins (VITAMIN B COMPLEX) capsule capsule Take 1 capsule by mouth Daily. Takes in evening  Indications: Vitamin Deficiency      bisacodyl (DULCOLAX) 10 MG suppository Insert 1 suppository into the rectum Daily As Needed for Constipation (Use if bisacodyl oral is ineffective).      Cholecalciferol (VITAMIN D3) 5000 UNITS capsule capsule Take 1 capsule by mouth Daily. Indications: Vitamin D Deficiency      coenzyme Q10 100 MG capsule Take 1 capsule by mouth Daily. Indications: Heart Rhythm Disorder      cycloSPORINE (Restasis) 0.05 % ophthalmic emulsion Administer 1 drop to both eyes Every 12 (Twelve) Hours. Indications: Drying and Inflammation of Cornea and Conjunctiva of Eyes      docusate sodium (Colace) 100 MG capsule Take 1 capsule by mouth 2 (Two) Times a Day As Needed for Constipation. 60 capsule 0    flecainide (TAMBOCOR) 100 MG tablet Take 1 tablet by mouth Every 12 (Twelve) Hours. 180 tablet 3    isosorbide mononitrate (IMDUR) 30 MG 24 hr tablet Take 1 tablet by mouth Daily. 90 tablet 3    linezolid (ZYVOX) 600 MG tablet Take 1 tablet by mouth 2 (Two) Times a Day.      nortriptyline (PAMELOR) 10 MG capsule Take 2 capsules by mouth Every Night. Indications: Migraine Headache      omeprazole OTC (PrilOSEC OTC) 20 MG EC tablet Take 1 tablet by mouth Daily. Indications: Heartburn      pravastatin (PRAVACHOL) 20 MG tablet Take 1 tablet by mouth Daily. Indications: High Amount of Fats in the Blood      saccharomyces boulardii (FLORASTOR) 250 MG capsule Take 1 capsule by mouth 2 (Two) Times a Day. 42 capsule 0    traMADol (ULTRAM) 50 MG  tablet Take 1 tablet by mouth Every 8 (Eight) Hours As Needed for Moderate Pain. 30 tablet 1    glucose (DEX4) 4 GM chewable tablet Chew 4 tablets As Needed.      nitroglycerin (NITROSTAT) 0.4 MG SL tablet 1 under the tongue as needed for angina, may repeat q5mins for up three doses 25 tablet 2    ondansetron (Zofran) 4 MG tablet Take 1 tablet by mouth Every 8 (Eight) Hours As Needed for Nausea or Vomiting. (Patient not taking: Reported on 9/13/2024) 15 tablet 1    [DISCONTINUED] Acetaminophen 500 MG pack 500 mg.      [DISCONTINUED] bisacodyl (DULCOLAX) 5 MG EC tablet Take 1 tablet by mouth Daily As Needed for Constipation (Use if polyethylene glycol is ineffective).      [DISCONTINUED] Cholecalciferol (Vitamin D-3) 125 MCG (5000 UT) tablet 1 tablet.      [DISCONTINUED] polyethylene glycol (MIRALAX) 17 g packet Take 17 g by mouth Daily As Needed (Use if senna-docusate is ineffective).      [DISCONTINUED] Polyethylene Glycol 3350 (MIRALAX PO) 17 g.      [DISCONTINUED] promethazine (PHENERGAN) 12.5 MG tablet Take 1 tablet by mouth Every 6 (Six) Hours As Needed for Nausea or Vomiting. 12 tablet 0       Current Hospital Medications:  Current Facility-Administered Medications   Medication Dose Route Frequency Provider Last Rate Last Admin    acetaminophen (TYLENOL) tablet 1,000 mg  1,000 mg Oral Q6H PRN Berto Liu MD        aspirin tablet 325 mg  325 mg Oral Daily Berto Liu MD        polyethylene glycol (MIRALAX) packet 17 g  17 g Oral Daily PRN Berto Liu MD        And    bisacodyl (DULCOLAX) EC tablet 5 mg  5 mg Oral Daily PRN Berto Liu MD        And    bisacodyl (DULCOLAX) suppository 10 mg  10 mg Rectal Daily PRN Berto Liu MD        docusate sodium (COLACE) capsule 100 mg  100 mg Oral BID PRN Berto Liu MD        flecainide (TAMBOCOR) tablet 100 mg  100 mg Oral Q12H Berto Liu MD        isosorbide mononitrate (IMDUR) 24 hr tablet 30 mg  30 mg  Oral Daily Berto Liu MD        nitroglycerin (NITROSTAT) SL tablet 0.4 mg  0.4 mg Sublingual Q5 Min PRN Berto Liu MD        nortriptyline (PAMELOR) capsule 20 mg  20 mg Oral Nightly Berto Liu MD        [START ON 9/14/2024] pantoprazole (PROTONIX) EC tablet 40 mg  40 mg Oral Q AM Berto Liu MD        pravastatin (PRAVACHOL) tablet 20 mg  20 mg Oral Daily Berto Liu MD        saccharomyces boulardii (FLORASTOR) capsule 250 mg  250 mg Oral BID Berto Liu MD        sodium chloride 0.9 % flush 10 mL  10 mL Intravenous Q12H Berto Liu MD        sodium chloride 0.9 % flush 10 mL  10 mL Intravenous PRN Berto Liu MD        sodium chloride 0.9 % infusion 40 mL  40 mL Intravenous PRN Berto Liu MD           Antimicrobials:  Anti-Infectives (From admission, onward)      Ordered     Dose/Rate Route Frequency Start Stop    09/13/24 1349  vancomycin IVPB 2000 mg in 0.9% Sodium Chloride 500 mL        Ordering Provider: Girma Jimenez MD    20 mg/kg × 95.3 kg  250 mL/hr over 120 Minutes Intravenous Once 09/13/24 1500 09/13/24 1729    09/13/24 1349  cefTRIAXone (ROCEPHIN) 1,000 mg in sodium chloride 0.9 % 100 mL MBP        Ordering Provider: Girma Jimenez MD    1,000 mg  200 mL/hr over 30 Minutes Intravenous Once 09/13/24 1430 09/13/24 1517            Review of Systems:   Constitutional:  No fever, chills, or sweats.  Appetite poor.  +fatigue.  HEENT:  No new vision, hearing or throat complaints.  No oral sores.  No headache, photophobia or neck stiffness.  Hard of hearing.  CV:  No chest pain, palpitations, or syncope.  Respiratory:  No SOB, cough, or hemoptysis.  GI:  No nausea, vomiting, or diarrhea.  No hematochezia, melena, or hematemesis. No jaundice or liver disease.  :  No dysuria, hematuria, urgency, or flank pain.  Lymph:  No swollen lymph nodes in neck, axilla, or groin.   Hematologic:  No easy bruising or bleeding.  Endo:   "No diabetes.  No thyroid issues.  Musculoskeletal:  recent right TKA  Neurologic:  No acute focal weakness or numbness.  No seizures.  +episodes of confusion.  Skin:  right knee redness.       Vital Signs:  Temp (24hrs), Av.9 °F (36.6 °C), Min:97.7 °F (36.5 °C), Max:98 °F (36.7 °C)    /62 (BP Location: Left arm, Patient Position: Lying)   Pulse 88   Temp 98 °F (36.7 °C) (Oral)   Resp 18   Ht 182.9 cm (72\")   Wt 95.3 kg (210 lb)   SpO2 100%   BMI 28.48 kg/m²     Physical Examination:  GENERAL: Elderly-appearing male.  Awake and alert, in no acute distress.   HEENT: Normocephalic, atraumatic.  EOMI. No conjunctival injection or subconjunctival hemorrhage.   NECK: Supple without nuchal rigidity.   LUNGS: Normal respiratory effort.  SKIN: Right knee incision closed.  Patchy erythema and warmth about incision.    MS:  Right knee enlarged.  NEURO: A&O. No focal deficits.  Face symmetric.  Speech fluent.       Laboratory Data:    Results from last 7 days   Lab Units 24  1334 24  1232   WBC 10*3/mm3  --  7.23   HEMOGLOBIN g/dL  --  10.8*   HEMOGLOBIN, POC g/dL 10.9*  --    HEMATOCRIT %  --  34.8*   HEMATOCRIT POC % 32*  --    PLATELETS 10*3/mm3  --  197     Results from last 7 days   Lab Units 24  1341   SODIUM mmol/L 136   POTASSIUM mmol/L 3.9   CHLORIDE mmol/L 100   CO2 mmol/L 24.0   BUN mg/dL 18   CREATININE mg/dL 0.97   GLUCOSE mg/dL 88   CALCIUM mg/dL 9.2     Results from last 7 days   Lab Units 24  1341   ALK PHOS U/L 152*   BILIRUBIN mg/dL 0.5   ALT (SGPT) U/L 20   AST (SGOT) U/L 28     Results from last 7 days   Lab Units 24  1232   SED RATE mm/hr 18     Results from last 7 days   Lab Units 24  1341   CRP mg/dL 0.70*     Estimated Creatinine Clearance: 69.1 mL/min (by C-G formula based on SCr of 0.97 mg/dL).  Results from last 7 days   Lab Units 24  1232   LACTATE mmol/L 1.4             PCT 0.10      Microbiology:  Microbiology Results (last 10 days)       " Procedure Component Value - Date/Time    COVID PRE-OP / PRE-PROCEDURE SCREENING ORDER (NO ISOLATION) - Swab, Nasopharynx [[id number]]  (Normal) Collected: 09/13/24 1344    Lab Status: Final result Specimen: Swab from Nasopharynx Updated: 09/13/24 1418    Narrative:      The following orders were created for panel order COVID PRE-OP / PRE-PROCEDURE SCREENING ORDER (NO ISOLATION) - Swab, Nasopharynx.  Procedure                               Abnormality         Status                     ---------                               -----------         ------                     COVID-19 and FLU A/B PCR...[[id number]]  Normal              Final result                 Please view results for these tests on the individual orders.    COVID-19 and FLU A/B PCR, 1 HR TAT - Swab, Nasopharynx [[id number]]  (Normal) Collected: 09/13/24 1344    Lab Status: Final result Specimen: Swab from Nasopharynx Updated: 09/13/24 1418     COVID19 Not Detected     Influenza A PCR Not Detected     Influenza B PCR Not Detected    Narrative:      Fact sheet for providers: https://www.fda.gov/media/989455/download    Fact sheet for patients: https://www.fda.gov/media/630489/download    Test performed by PCR.                Radiology:  Imaging Results (Last 72 Hours)       Procedure Component Value Units Date/Time    XR Chest 1 View [[id number]] Collected: 09/13/24 1425     Updated: 09/13/24 1430    Narrative:      XR CHEST 1 VW    Date of Exam: 9/13/2024 2:03 PM EDT    Indication: Weakness    Comparison: 6/18/2021    Findings:  Cardiomediastinal silhouette is unremarkable.  No airspace disease, pneumothorax, nor pleural effusion. There is multilevel thoracic spondylosis.      Impression:      Impression:  No acute cardiopulmonary abnormality.      Electronically Signed: Brit Landaverde MD    9/13/2024 2:27 PM EDT    Workstation ID: [id number]    XR Knee 1 or 2 View Right [[id number]] Collected: 09/13/24 1314     Updated: 09/13/24 1318    Narrative:      XR  KNEE 1 OR 2 VW RIGHT    Date of Exam: 9/13/2024 1:06 PM EDT    Indication: Knee pain, 1 month status post replacement    Comparison: 8/30/2024 and prior    Findings:  Patient is status post total knee arthroplasty with patellar resurfacing. No acute fracture or dislocation noted. The orthopedic hardware appears to be intact. Mild soft tissue swelling noted. Small joint effusion noted. Findings appear to overall   decreased from comparison and are nonspecific peripheral vascular calcification noted      Impression:      Impression:    1. Status post total knee arthroplasty with patellar resurfacing. Hardware appears to be intact.    2. Small joint effusion and mild soft tissue swelling, nonspecific.      Electronically Signed: Ramirez Mireles MD    9/13/2024 1:15 PM EDT    Workstation ID: [id number]              IMPRESSION:  83 y.o. male with history of recent right knee cellulitis after TKA admitted with encephalopathy.       PROBLEM LIST:   -- Encephalopathy, unclear etiology.  Gradual mental decline with short episodes of disorientation per family.  Doubt antibiotics contributing.  -- Right knee cellulitis.  Recent admission earlier this month with lateral right knee superficial cellulitis.  Did not appear to involve joint or incision.  Treated with IV vancomycin and ceftriaxone with improvement.  Discharged home on oral doxycycline and cephalexin.  I saw him back in office 2 days prior to admission, when he had new erythema and warmth about the incision itself.  I changed his antibiotic to oral linezolid. Continues to have erythema/warmth about incision; consider reaction to sutures.  Normal WBC, ESR, and PCT.  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       PLAN:  -- IV vancomycin and ceftriaxone  -- Follow exam  -- Encephalopathy workup per Dr. VALENCIA    Thank you for the consultation.  I will continue to follow along with you.  Plan discussed with ED physician and family.    Jae Manzanares MD  9/13/2024  17:57 EDT

## 2024-09-13 NOTE — H&P
Patient Name: Guicho Blanco  MRN: [id number]  : 1940  DOS: 2024    Attending: Berto Liu MD    Primary Care Provider: Mitchel Culver MD      Chief complaint: Right knee postop cellulitis    Subjective   Patient is a pleasant 83 y.o. male who is known to me from recent hospitalizations following right total knee arthroplasty on 2024 at which point he was discharged to [facility] on 8/10/2024.  After spending several days there he was discharged home but was readmitted to [facility] on 2024 with cellulitis of his right knee.  He was treated with IV antibiotics under the direction of Dr. Manzanares with [facility].  Received ceftriaxone and vancomycin during his stay and was transition to p.o. antibiotics at discharge.    He worked with PT and OT and has made enough progress to be discharged home at that time.  Unfortunately at home he has not been sleeping well.  His mobility has fluctuated but he has had much difficulty getting up to initiate walking according to his wife.  He has had follow-ups with Dr. Manzanares and with Dr. aRmos.  His antibiotics were most recently transitioned to Zyvox on erythema worsening at his incision site.  Deeper sutures were removed by Dr. Ramos in the office yesterday for concern of possible reaction.  There has been no fever or chills and no nausea or vomiting though appetite has been very low and he has been spending extended periods of time chewing as opposed to eating.    Understandably his wife is exhausted and she and the family including the daughters are concerned with the decline and the lack of progress physically.     Allergies   Allergen Reactions    Divalproex Sodium (Migraine) [Valproic Acid] Other (See Comments)     Weak, lethargy and got worse    Cymbalta [Duloxetine Hcl] Mental Status Change     Depression, thoughts of Suicide.     Duloxetine Unknown (See Comments)    Metoprolol Other (See Comments)      UNKNOWN. Pt does not remember this being allergy    Neurontin [Gabapentin] Other (See Comments)     Lethargy & fatigue       Meds:  Medications Prior to Admission   Medication Sig Dispense Refill Last Dose    acetaminophen (TYLENOL) 650 MG 8 hr tablet Take 1 tablet by mouth Every 8 (Eight) Hours. (Patient taking differently: Take 1 tablet by mouth Every 8 (Eight) Hours As Needed for Mild Pain, Moderate Pain or Headache. Indications: Pain) 90 tablet 0 Past Month    aspirin 325 MG tablet Take 1 tablet by mouth Daily. Indications: Disease involving Lipid Deposits in the Arteries   9/12/2024    B Complex Vitamins (VITAMIN B COMPLEX) capsule capsule Take 1 capsule by mouth Daily. Takes in evening  Indications: Vitamin Deficiency   9/12/2024    bisacodyl (DULCOLAX) 10 MG suppository Insert 1 suppository into the rectum Daily As Needed for Constipation (Use if bisacodyl oral is ineffective).   Past Week    Cholecalciferol (VITAMIN D3) 5000 UNITS capsule capsule Take 1 capsule by mouth Daily. Indications: Vitamin D Deficiency   9/12/2024    coenzyme Q10 100 MG capsule Take 1 capsule by mouth Daily. Indications: Heart Rhythm Disorder   9/12/2024    cycloSPORINE (Restasis) 0.05 % ophthalmic emulsion Administer 1 drop to both eyes Every 12 (Twelve) Hours. Indications: Drying and Inflammation of Cornea and Conjunctiva of Eyes   9/12/2024    docusate sodium (Colace) 100 MG capsule Take 1 capsule by mouth 2 (Two) Times a Day As Needed for Constipation. 60 capsule 0 Past Week    flecainide (TAMBOCOR) 100 MG tablet Take 1 tablet by mouth Every 12 (Twelve) Hours. 180 tablet 3 9/12/2024    isosorbide mononitrate (IMDUR) 30 MG 24 hr tablet Take 1 tablet by mouth Daily. 90 tablet 3 9/12/2024    linezolid (ZYVOX) 600 MG tablet Take 1 tablet by mouth 2 (Two) Times a Day.   9/12/2024    nortriptyline (PAMELOR) 10 MG capsule Take 2 capsules by mouth Every Night. Indications: Migraine Headache   9/12/2024    omeprazole OTC (PrilOSEC OTC)  20 MG EC tablet Take 1 tablet by mouth Daily. Indications: Heartburn   9/12/2024    pravastatin (PRAVACHOL) 20 MG tablet Take 1 tablet by mouth Daily. Indications: High Amount of Fats in the Blood   9/12/2024    saccharomyces boulardii (FLORASTOR) 250 MG capsule Take 1 capsule by mouth 2 (Two) Times a Day. 42 capsule 0 9/12/2024    traMADol (ULTRAM) 50 MG tablet Take 1 tablet by mouth Every 8 (Eight) Hours As Needed for Moderate Pain. 30 tablet 1 Past Week    glucose (DEX4) 4 GM chewable tablet Chew 4 tablets As Needed.       nitroglycerin (NITROSTAT) 0.4 MG SL tablet 1 under the tongue as needed for angina, may repeat q5mins for up three doses 25 tablet 2 More than a month    ondansetron (Zofran) 4 MG tablet Take 1 tablet by mouth Every 8 (Eight) Hours As Needed for Nausea or Vomiting. (Patient not taking: Reported on 9/13/2024) 15 tablet 1 Not Taking        Past Medical History:   Diagnosis Date    Arthritis     Atrial fibrillation     BPH (benign prostatic hypertrophy)     Cataract     Cellulitis     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     bilateral    Coronary artery disease     Full dentures     Gall stones     GERD (gastroesophageal reflux disease)     Hearing loss     Hiatal hernia     Hyperlipidemia     Hypertension     Peripheral neuropathy     Peripheral vascular disease     Seasonal allergies     Skin cancer     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    TIA (transient ischemic attack)     2 times    Wears glasses      Past Surgical History:   Procedure Laterality Date    ABLATION OF DYSRHYTHMIC FOCUS      APPENDECTOMY      CARDIAC CATHETERIZATION N/A 10/05/2017    Procedure: Left Heart Cath;  Surgeon: Hector Urrutia MD;  Location: [facility] INVASIVE LOCATION;  Service:     CATARACT EXTRACTION Bilateral     CHOLECYSTECTOMY      Remote    COCHLEAR IMPLANT REVISION  05/2019    COLONOSCOPY      PROSTATE SURGERY      SHOULDER SURGERY Right     SKIN BIOPSY      TOE AMPUTATION Right     2nd toe - Right Foot    TONSILLECTOMY    "   TOTAL KNEE ARTHROPLASTY Right 8/7/2024    Procedure: TOTAL KNEE ARTHROPLASTY WITH ASHLEY ROBOT RIGHT;  Surgeon: Christian Schwartz MD;  Location: [facility];  Service: Robotics - Ortho;  Laterality: Right;     Family History   Problem Relation Age of Onset    Atrial fibrillation Mother     Dementia Mother     Lung cancer Father      Social History     Tobacco Use    Smoking status: Never     Passive exposure: Past    Smokeless tobacco: Never   Vaping Use    Vaping status: Never Used   Substance Use Topics    Alcohol use: No    Drug use: No       Review of Systems  Pertinent items are noted in HPI    Vital Signs  /62 (BP Location: Left arm, Patient Position: Lying)   Pulse 88   Temp 98 °F (36.7 °C) (Oral)   Resp 18   Ht 182.9 cm (72\")   Wt 95.3 kg (210 lb)   SpO2 100%   BMI 28.48 kg/m²     Physical Exam:    General Appearance:    Alert, cooperative, in no acute distress   Head:    Normocephalic, without obvious abnormality, atraumatic   Eyes:            Lids and lashes normal, conjunctivae and sclerae normal, no   icterus, no pallor, corneas clear,    Ears:    Ears appear intact with no abnormalities noted   Throat:   No oral lesions, no thrush, oral mucosa moist   Neck:   No adenopathy, supple, trachea midline, no thyromegaly         Lungs:     Clear to auscultation,respirations regular, even and                   unlabored    Heart:    Regular rhythm and normal rate, normal S1 and S2, no            murmur, no gallop   Abdomen:     Normal bowel sounds, no masses, no organomegaly, soft,        nontender, nondistended, no guarding, no rebound                 tenderness   Genitalia:    Deferred   Extremities:   Moves all extremities well, no edema, no cyanosis, no              redness   Pulses:   Pulses palpable and equal bilaterally   Skin:   No bleeding, bruising or rash   Neurologic:   Cranial nerves 2 - 12 grossly intact,       I reviewed the patient's new clinical results.       Results " from last 7 days   Lab Units 09/13/24  1334 09/13/24  1232   WBC 10*3/mm3  --  7.23   HEMOGLOBIN g/dL  --  10.8*   HEMOGLOBIN, POC g/dL 10.9*  --    HEMATOCRIT %  --  34.8*   HEMATOCRIT POC % 32*  --    PLATELETS 10*3/mm3  --  197     Results from last 7 days   Lab Units 09/13/24  1341 09/13/24  1334   SODIUM mmol/L 136  --    POTASSIUM mmol/L 3.9  --    CHLORIDE mmol/L 100  --    CO2 mmol/L 24.0  --    BUN mg/dL 18  --    CREATININE mg/dL 0.97 1.10   CALCIUM mg/dL 9.2  --    BILIRUBIN mg/dL 0.5  --    ALK PHOS U/L 152*  --    ALT (SGPT) U/L 20  --    AST (SGOT) U/L 28  --    GLUCOSE mg/dL 88  --      Lab Results   Component Value Date    HGBA1C 5.20 07/30/2024      Latest Reference Range & Units 09/13/24 13:41   C-Reactive Protein 0.00 - 0.50 mg/dL 0.70 (H)   Procalcitonin 0.00 - 0.25 ng/mL 0.10   (H): Data is abnormally high     Latest Reference Range & Units 09/13/24 13:08   Color, UA Yellow, Straw  Dark Yellow !   Appearance, UA Clear  Clear   Specific Gravity, UA 1.001 - 1.030  1.027   pH, UA 5.0 - 8.0  5.5   Glucose Negative  Negative   Ketones, UA Negative  Trace !   Blood, UA Negative  Negative   Nitrite, UA Negative  Negative   Leukocytes, UA Negative  Trace !   Protein, UA Negative  Trace !   Bilirubin, UA Negative  Negative   Urobilinogen, UA 0.2 - 1.0 E.U./dL  0.2 E.U./dL   RBC, UA None Seen, 0-2 /HPF 0-2   WBC, UA None Seen, 0-2 /HPF 3-5 !   Bacteria, UA None Seen, Trace /HPF None Seen   Calcium Oxalate Crystals, UA None Seen /HPF Moderate/2+   Squamous Epithelial Cells, UA None Seen, 0-2 /HPF 0-2   Hyaline Casts, UA 0 - 6 /LPF 0-6   !: Data is abnormal  Assessment and Plan:       Cellulitis of right knee    Hearing loss    Neuropathy    Failure to thrive in adult      Plan  Admit for further management.  Resume IV antibiotics for cellulitis noting the absence of any difficulty with range of motion, absence of knee pain.  Also noting that WBC is normal so is procalcitonin and sed rate.  CRP is elevated  but lower than it was on his prior visit.    HTN, Hyperlipidemia, afib, CAD  - Continue home Tambocor, Imdur and statin  - Monitor BP   - Holding parameters for BP meds  - Labetalol PRN for SBP>170     Physical therapy consultation.  DVT prophylax with p.o. aspirin  Bowel regimen.  Pain medication as needed.    1.  Physical therapy evaluation.  Likely Occupational Therapy and likely to need inpatient rehab following this hospitalization  2. Pain control-prns, avoid sedatives and opiates.   3. IS-encourage  4. DVT proph- mechanicals and aspirin  5. Bowel regimen  6. Resume home medications as appropriate    Urinalysis has been evaluated.  No significant findings.  Will check TSH and ammonia levels in a.m.  Will monitor for response to antibiotics.    Discussed with patient, wife, and daughter.  Discussed with Dr. Ramos.  Dr. Manzanares is following, input appreciated.    Dragon disclaimer:  Part of this encounter note is an electronic transcription/translation of spoken language to printed text. The electronic translation of spoken language may permit erroneous, or at times, nonsensical words or phrases to be inadvertently transcribed; Although I have reviewed the note for such errors, some may still exist.    Berto Liu MD  09/13/24  17:51 EDT

## 2024-09-14 LAB
AMMONIA BLD-SCNC: 12 UMOL/L (ref 16–60)
ANION GAP SERPL CALCULATED.3IONS-SCNC: 12 MMOL/L (ref 5–15)
BUN SERPL-MCNC: 10 MG/DL (ref 8–23)
BUN/CREAT SERPL: 12 (ref 7–25)
CALCIUM SPEC-SCNC: 8.9 MG/DL (ref 8.6–10.5)
CHLORIDE SERPL-SCNC: 102 MMOL/L (ref 98–107)
CO2 SERPL-SCNC: 23 MMOL/L (ref 22–29)
CREAT SERPL-MCNC: 0.83 MG/DL (ref 0.76–1.27)
EGFRCR SERPLBLD CKD-EPI 2021: 86.8 ML/MIN/1.73
GLUCOSE SERPL-MCNC: 118 MG/DL (ref 65–99)
POTASSIUM SERPL-SCNC: 4.1 MMOL/L (ref 3.5–5.2)
SODIUM SERPL-SCNC: 137 MMOL/L (ref 136–145)
TSH SERPL DL<=0.05 MIU/L-ACNC: 1.79 UIU/ML (ref 0.27–4.2)

## 2024-09-14 PROCEDURE — 92610 EVALUATE SWALLOWING FUNCTION: CPT

## 2024-09-14 PROCEDURE — 99223 1ST HOSP IP/OBS HIGH 75: CPT | Performed by: STUDENT IN AN ORGANIZED HEALTH CARE EDUCATION/TRAINING PROGRAM

## 2024-09-14 PROCEDURE — 82140 ASSAY OF AMMONIA: CPT

## 2024-09-14 PROCEDURE — G0378 HOSPITAL OBSERVATION PER HR: HCPCS

## 2024-09-14 PROCEDURE — 84443 ASSAY THYROID STIM HORMONE: CPT

## 2024-09-14 PROCEDURE — 25010000002 CEFTRIAXONE PER 250 MG: Performed by: INTERNAL MEDICINE

## 2024-09-14 PROCEDURE — 80048 BASIC METABOLIC PNL TOTAL CA: CPT

## 2024-09-14 PROCEDURE — 97162 PT EVAL MOD COMPLEX 30 MIN: CPT

## 2024-09-14 PROCEDURE — 97110 THERAPEUTIC EXERCISES: CPT

## 2024-09-14 PROCEDURE — 25010000002 VANCOMYCIN HCL IN NACL 1.5-0.9 GM/500ML-% SOLUTION

## 2024-09-14 RX ORDER — FLECAINIDE ACETATE 50 MG/1
100 TABLET ORAL EVERY 12 HOURS
Status: DISCONTINUED | OUTPATIENT
Start: 2024-09-14 | End: 2024-09-23 | Stop reason: HOSPADM

## 2024-09-14 RX ORDER — MIRTAZAPINE 15 MG/1
15 TABLET, FILM COATED ORAL NIGHTLY
Status: DISCONTINUED | OUTPATIENT
Start: 2024-09-14 | End: 2024-09-16

## 2024-09-14 RX ORDER — SODIUM CHLORIDE, SODIUM LACTATE, POTASSIUM CHLORIDE, CALCIUM CHLORIDE 600; 310; 30; 20 MG/100ML; MG/100ML; MG/100ML; MG/100ML
75 INJECTION, SOLUTION INTRAVENOUS CONTINUOUS
Status: CANCELLED | OUTPATIENT
Start: 2024-09-13

## 2024-09-14 RX ORDER — SENNOSIDES 8.6 MG
650 CAPSULE ORAL EVERY 8 HOURS PRN
COMMUNITY

## 2024-09-14 RX ADMIN — FLECAINIDE ACETATE 100 MG: 50 TABLET ORAL at 05:34

## 2024-09-14 RX ADMIN — PANTOPRAZOLE SODIUM 40 MG: 40 TABLET, DELAYED RELEASE ORAL at 05:34

## 2024-09-14 RX ADMIN — Medication 1500 MG: at 14:47

## 2024-09-14 RX ADMIN — SODIUM CHLORIDE 2000 MG: 900 INJECTION INTRAVENOUS at 17:06

## 2024-09-14 RX ADMIN — MIRTAZAPINE 15 MG: 15 TABLET, FILM COATED ORAL at 20:21

## 2024-09-14 RX ADMIN — NORTRIPTYLINE HYDROCHLORIDE 20 MG: 10 CAPSULE ORAL at 20:22

## 2024-09-14 RX ADMIN — FLECAINIDE ACETATE 100 MG: 50 TABLET ORAL at 20:22

## 2024-09-14 RX ADMIN — ISOSORBIDE MONONITRATE 30 MG: 30 TABLET, EXTENDED RELEASE ORAL at 08:58

## 2024-09-14 RX ADMIN — Medication 250 MG: at 08:58

## 2024-09-14 RX ADMIN — ASPIRIN 325 MG: 325 TABLET ORAL at 08:59

## 2024-09-14 RX ADMIN — PRAVASTATIN SODIUM 20 MG: 20 TABLET ORAL at 08:59

## 2024-09-14 RX ADMIN — Medication 10 ML: at 08:59

## 2024-09-14 RX ADMIN — Medication 250 MG: at 20:21

## 2024-09-14 NOTE — THERAPY EVALUATION
Acute Care - Speech Language Pathology   Swallow Initial Evaluation [facility]  Clinical Swallow Evaluation       Patient Name: Guicho Blanco  : 1940  MRN: [id number]  Today's Date: 2024               Admit Date: 2024    Visit Dx:     ICD-10-CM ICD-9-CM   1. Generalized weakness  R53.1 780.79   2. Cellulitis of right lower extremity  L03.115 682.6   3. Dysphagia, unspecified type  R13.10 787.20     Patient Active Problem List   Diagnosis    Osteomyelitis of toe of right foot    Osteomyelitis of right foot    PAF (paroxysmal atrial fibrillation)    Coronary artery disease involving native coronary artery of native heart without angina pectoris    Gastroesophageal reflux disease without esophagitis    Benign non-nodular prostatic hyperplasia without lower urinary tract symptoms    S/P appy    Essential hypertension    Simple chronic bronchitis    Mixed hyperlipidemia    COPD (chronic obstructive pulmonary disease)    BPH (benign prostatic hypertrophy)    Hearing loss    Chest pain    Vertigo    Snoring    Overweight    PLMD (periodic limb movement disorder)    Neuropathy    Periodic headache syndrome, not intractable    Acquired absence of other right toe(s)    Tremor    Bilateral impacted cerumen    Bilateral sensorineural hearing loss    Chronic otitis externa    Deviated nasal septum    Disorder of joint of foot    ED (erectile dysfunction) of organic origin    Hydrocele    Hydrocele of testis    Impacted cerumen    Mononeuropathy    Swelling of testicle    Venous retinal branch occlusion    Vitreous degeneration    Benign prostatic hyperplasia with urinary obstruction    OA (osteoarthritis) of knee    Status post total right knee replacement    Septic arthritis    Cellulitis    Cellulitis of right knee    Failure to thrive in adult    Insomnia     Past Medical History:   Diagnosis Date    Arthritis     Atrial fibrillation     BPH (benign prostatic hypertrophy)     Cataract     Cellulitis      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     bilateral    Coronary artery disease     Full dentures     Gall stones     GERD (gastroesophageal reflux disease)     Hearing loss     Hiatal hernia     Hyperlipidemia     Hypertension     Peripheral neuropathy     Peripheral vascular disease     Seasonal allergies     Skin cancer     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    TIA (transient ischemic attack)     2 times    Wears glasses      Past Surgical History:   Procedure Laterality Date    ABLATION OF DYSRHYTHMIC FOCUS      APPENDECTOMY      CARDIAC CATHETERIZATION N/A 10/05/2017    Procedure: Left Heart Cath;  Surgeon: Hector Urrutia MD;  Location:  [facility];  Service:     CATARACT EXTRACTION Bilateral     CHOLECYSTECTOMY      Remote    COCHLEAR IMPLANT REVISION  05/2019    COLONOSCOPY      PROSTATE SURGERY      SHOULDER SURGERY Right     SKIN BIOPSY      TOE AMPUTATION Right     2nd toe - Right Foot    TONSILLECTOMY      TOTAL KNEE ARTHROPLASTY Right 8/7/2024    Procedure: TOTAL KNEE ARTHROPLASTY WITH ASHLEY ROBOT RIGHT;  Surgeon: Christian Schwartz MD;  Location:  [facility] OR;  Service: Robotics - Ortho;  Laterality: Right;       SLP Recommendation and Plan  SLP Swallowing Diagnosis: moderate, oral dysphagia, suspect acute-on-chronic, pharyngeal dysphagia, esophageal dysphagia, other (see comments) (baseline pharyngoesophageal dysphagia; acute/worsened oral dysphagia (appeared cognitive-based) + ? acute pharyngeal dysphagia r/t weakness) (09/14/24 0940)  SLP Diet Recommendation: puree, nectar thick liquids, other (see comments) (If aversion/poor intake w/ this diet, resulting in more risk than benefit, may consider upgrading to more liberal textures of mechanical ground and thin liquids, if ok'd by provider.) (09/14/24 0940)  Recommended Precautions and Strategies: upright posture during/after eating, general aspiration precautions, alternate between small bites of food and sips of liquid (09/14/24 0940)  SLP  Rec. for Method of Medication Administration: meds crushed, with puree, as tolerated (09/14/24 0940)     Monitor for Signs of Aspiration: yes, notify SLP if any concerns (09/14/24 0940)  Recommended Diagnostics: VFSS (MBS) (09/14/24 0940)  Swallow Criteria for Skilled Therapeutic Interventions Met: demonstrates skilled criteria (09/14/24 0940)  Anticipated Discharge Disposition (SLP): inpatient rehabilitation facility (09/14/24 0940)  Rehab Potential/Prognosis, Swallowing: re-evaluate goals as necessary (09/14/24 0940)        Oral Care Recommendations: Oral Care BID/PRN, Toothbrush (09/14/24 0940)  Demonstrates Need for Referral to Another Service: neurology (per provider discretion) (09/14/24 0940)                                     Plan of Care Reviewed With: patient, spouse, daughter      SWALLOW EVALUATION (Last 72 Hours)       SLP Adult Swallow Evaluation       Row Name 09/14/24 0940                   Rehab Evaluation    Document Type evaluation  -AC        Subjective Information no complaints  -AC        Patient Observations alert;cooperative  -AC        Patient/Family/Caregiver Comments/Observations Dtr present. Spouse on speaker phone during eval per pt/dtr request.  -AC        Patient Effort good  -AC        Oral Care swabbed with antiseptic solution;dental appliance cleaned  -AC           General Information    Patient Profile Reviewed yes  -AC        Pertinent History Of Current Problem R knee cellulitis. Encephalopathy of unknown cause. Family reported cont'd decline--weaker, more confused, increased difficulty swallowing, weaker/hoarse/softer voice, some slurring. Hx AFTT, GERD, COPD, pharyngoesophageal dysphagia. 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 MBS during that admission revealed R facial droop, WFL swallow. Multiple MBSs since. Most recent completed 8/31/24. MBSs have consistently revealed anterior cervical spine osteophytes near C5, though minimal impact on swallowing, no  penetration/aspiration. Most recently SLP recommended soft/chopped textures (for ease) and thin liquids after MBS (w/ suggestion to downgrade to nectar-thick if throat clearing/coughing noted per clinical assessment). Pt was evaluated by  SLP few days ago who did recommend downgrade to nectar-thick liquids w/ thin liquid between meals.  -AC        Current Method of Nutrition soft to chew textures;chopped;thin liquids  -AC        Precautions/Limitations, Vision WFL;for purposes of eval  -AC        Precautions/Limitations, Hearing hearing impairment, bilaterally;other (see comments)  cochlear implants  -AC        Prior Level of Function-Communication other (see comments)  pt/family have noted possible motor speech/voice changes  -AC        Prior Level of Function-Swallowing soft to chew;nectar thick liquids;water between meals;ice between meals;esophageal concerns;concerns regarding malnutrition  -AC        Plans/Goals Discussed with patient and family;agreed upon  -AC        Barriers to Rehab medically complex;previous functional deficit  -AC        Patient's Goals for Discharge eat/drink without coughing/choking  -AC        Family Goals for Discharge patient able to eat/drink without coughing/choking  -AC           Pain    Additional Documentation Pain Scale: FACES Pre/Post-Treatment (Group)  -AC           Pain Scale: FACES Pre/Post-Treatment    Pain: FACES Scale, Pretreatment 0-->no hurt  -AC        Posttreatment Pain Rating 0-->no hurt  -AC           Oral Motor Structure and Function    Dentition Assessment upper dentures/partial in place;lower dentures/partial in place;dentures are ill fitting  -AC        Secretion Management WNL/WFL  -AC        Mucosal Quality moist, healthy  -AC        Volitional Swallow weak  -AC        Volitional Cough weak  -AC           Oral Musculature and Cranial Nerve Assessment    Oral Labial or Buccal Impairment, Detail, Cranial Nerve VII (Facial): right labial droop;other (see  comments)  slight--family denied noting acute changes; noted R labial weakness documented per SLP evals in 2017 and 2022  -AC           General Eating/Swallowing Observations    Respiratory Support Currently in Use room air  -AC        Eating/Swallowing Skills fed by SLP;self-fed;needed assist  -AC        Positioning During Eating upright 90 degree;upright in bed  -AC        Utensils Used spoon;cup;straw  -AC        Consistencies Trialed thin liquids;nectar/syrup-thick liquids;pureed;soft to chew textures;chopped  -AC           Clinical Swallow Eval    Oral Prep Phase impaired  -AC        Oral Transit impaired  -AC        Oral Residue impaired  -AC        Pharyngeal Phase suspected pharyngeal impairment  -AC        Esophageal Phase suspected esophageal impairment  -AC           Oral Prep Concerns    Oral Prep Concerns prolonged mastication;increased prep time  -AC        Prolonged Mastication mechanical soft  -AC        Increased Prep Time pudding;mechanical soft  -AC           Oral Transit Concerns    Oral Transit Concerns increased oral transit time;delayed initiation of bolus transit  -AC        Delayed Intiation of Bolus Transit all consistencies  -AC        Increased Oral Transit Time mechanical soft  -AC        Oral Transit Concerns, Comment Inability to fully transit soft/solid bolus despite max cues & multiple attempts. Ill-fitting dentures + appeared cognitive-based.  -AC           Oral Residue Concerns    Oral Residue Concerns lateral sulcus residue, right  -AC        Lateral Sulcus Residue, Right mechanical soft  -AC        Oral Residue Concerns, Comment moderate  -AC           Pharyngeal Phase Concerns    Pharyngeal Phase Concerns wet vocal quality;throat clear  -AC        Wet Vocal Quality thin  -AC        Throat Clear thin  -AC        Pharyngeal Phase Concerns, Comment Difficult to determine if sxs are r/t baseline/chronic pharyngoesophageal dysphagia resulting from presence of anterior c-spine  osteophytes vs. acute pharyngeal dysphagia r/t weakness. No overt clinical s/sxs aspiration w/ nectar or puree. Spent extensive time discussing dysphagia phases, possible etiologies, intervention options, risks. They're quite concerned about increased swallowing difficulty, weakness, confusion, weak/hoarse voice and slurred speech. Notified Dr. ZALDIVAR  -           Esophageal Phase Concerns    Esophageal Phase Concerns other (see comments)  -        Esophageal Phase Concerns, Comment Hx anterior cervical spine osteophytes.  -           Swallowing Quality of Life Assessment    Education and counseling provided Signs of aspiration;Risks of aspiration;Safest diet options;Comfort diet options;Compensatory strategy recommendations and rationale  -           SLP Evaluation Clinical Impression    SLP Swallowing Diagnosis moderate;oral dysphagia;suspect acute-on-chronic;pharyngeal dysphagia;esophageal dysphagia;other (see comments)  baseline pharyngoesophageal dysphagia; acute/worsened oral dysphagia (appeared cognitive-based) + ? acute pharyngeal dysphagia r/t weakness  -        Functional Impact risk of aspiration/pneumonia;risk of malnutrition;risk of dehydration  -        Rehab Potential/Prognosis, Swallowing re-evaluate goals as necessary  -        Swallow Criteria for Skilled Therapeutic Interventions Met demonstrates skilled criteria  -           Recommendations    SLP Diet Recommendation puree;nectar thick liquids;other (see comments)  If aversion/poor intake w/ this diet, resulting in more risk than benefit, may consider upgrading to more liberal textures of mechanical ground and thin liquids, if ok'd by provider.  -        Recommended Diagnostics VFSS (MBS)  -        Recommended Precautions and Strategies upright posture during/after eating;general aspiration precautions;alternate between small bites of food and sips of liquid  -        Oral Care Recommendations Oral Care BID/PRN;Toothbrush  -         SLP Rec. for Method of Medication Administration meds crushed;with puree;as tolerated  -AC        Monitor for Signs of Aspiration yes;notify SLP if any concerns  -AC        Anticipated Discharge Disposition (SLP) inpatient rehabilitation facility  -AC        Demonstrates Need for Referral to Another Service neurology  per provider discretion  -AC                  User Key  (r) = Recorded By, (t) = Taken By, (c) = Cosigned By      Initials Name Effective Dates    Sendy Tee MS CCC-SLP 02/03/23 -                     EDUCATION  The patient has been educated in the following areas:   Dysphagia (Swallowing Impairment) Modified Diet Instruction.                Time Calculation:    Time Calculation- SLP       Row Name 09/14/24 1136             Time Calculation- SLP    SLP Start Time 0940  -AC      SLP Received On 09/14/24  -AC         Untimed Charges    34189-FA Eval Oral Pharyng Swallow Minutes 95  -AC         Total Minutes    Untimed Charges Total Minutes 95  -AC       Total Minutes 95  -AC                User Key  (r) = Recorded By, (t) = Taken By, (c) = Cosigned By      Initials Name Provider Type    AC Sendy Ryan MS CCC-SLP Speech and Language Pathologist                    Therapy Charges for Today       Code Description Service Date Service Provider Modifiers Qty    [id number] HC ST EVAL ORAL PHARYNG SWALLOW 6 9/14/2024 Sendy Ryan MS CCC-SLP GN 1                 Sendy Ryan MS CCC-SLP  9/14/2024

## 2024-09-14 NOTE — PROGRESS NOTES
" progress note      Guicho Blanco  [id number]  1940     LOS: 0 days     Attending: Berto Liu MD    Primary Care Provider: Mitchel Culver MD      Chief Complaint/Reason for visit: Right knee postop cellulitis    Subjective   Doing okay.  Denies pain, nausea, shortness of breath or chest pain.  Evaluated by speech therapy today, recommending modified barium swallow test on Monday.  Tolerating nectar thick liquids    Objective     Vital Signs  Visit Vitals  /66 (BP Location: Right arm, Patient Position: Lying)   Pulse 85   Temp 97.7 °F (36.5 °C) (Oral)   Resp 16   Ht 182.9 cm (72\")   Wt 95.3 kg (210 lb)   SpO2 100%   BMI 28.48 kg/m²     Temp (24hrs), Av.6 °F (36.4 °C), Min:97.2 °F (36.2 °C), Max:98 °F (36.7 °C)      Nutrition: P.o.    Respiratory: Room air    Physical Therapy: Progress: no change  Outcome Evaluation: PT initial eval completed. Pt presents below baseline with generalized weakness, impaired balance, and decreased activity tolerance. TKA on . Pt ambulated 5' with mod Ax2 and RW. Pt unsteady in standing with fear of falling. Further IPPT is warrented. Rec d/c to IPR for best functional outcomes.        Anticipated Discharge Disposition (PT): inpatient rehabilitation facility    Physical Exam:     General Appearance:    Alert, cooperative, in no acute distress   Head:    Normocephalic, without obvious abnormality, atraumatic    Lungs:     Normal effort, symmetric chest rise, no crepitus, clear to      auscultation bilaterally             Heart:    Regular rhythm and normal rate, normal S1 and S2   Abdomen:     Normal bowel sounds, no masses, no organomegaly, soft        non-tender, non-distended, no guarding, no rebound                tenderness   Extremities:   No clubbing, cyanosis or edema.  No deformities.    Pulses:   Pulses palpable and equal bilaterally   Skin:   No bleeding, bruising or rash   Neurologic:   Moves all extremities with no obvious focal " motor deficit.  Cranial nerves 2 - 12 grossly intact     Results Review:     I reviewed the patient's new clinical results.   Results from last 7 days   Lab Units 09/13/24  1334 09/13/24  1232   WBC 10*3/mm3  --  7.23   HEMOGLOBIN g/dL  --  10.8*   HEMOGLOBIN, POC g/dL 10.9*  --    HEMATOCRIT %  --  34.8*   HEMATOCRIT POC % 32*  --    PLATELETS 10*3/mm3  --  197     Results from last 7 days   Lab Units 09/14/24  1145 09/13/24  1341 09/13/24  1334   SODIUM mmol/L 137 136  --    POTASSIUM mmol/L 4.1 3.9  --    CHLORIDE mmol/L 102 100  --    CO2 mmol/L 23.0 24.0  --    BUN mg/dL 10 18  --    CREATININE mg/dL 0.83 0.97 1.10   CALCIUM mg/dL 8.9 9.2  --    BILIRUBIN mg/dL  --  0.5  --    ALK PHOS U/L  --  152*  --    ALT (SGPT) U/L  --  20  --    AST (SGOT) U/L  --  28  --    GLUCOSE mg/dL 118* 88  --      I reviewed the patient's new imaging including images and reports.    All medications reviewed.   aspirin, 325 mg, Oral, Daily  cefTRIAXone, 2,000 mg, Intravenous, Q24H  flecainide, 100 mg, Oral, Q12H  isosorbide mononitrate, 30 mg, Oral, Daily  nortriptyline, 20 mg, Oral, Nightly  pantoprazole, 40 mg, Oral, Q AM  pravastatin, 20 mg, Oral, Daily  saccharomyces boulardii, 250 mg, Oral, BID  sodium chloride, 10 mL, Intravenous, Q12H  vancomycin, 1,500 mg, Intravenous, Q24H        Assessment & Plan     Cellulitis of right knee    Hearing loss    Neuropathy    Failure to thrive in adult    Insomnia      Plan  Admit for further management.  Resume IV antibiotics for cellulitis noting the absence of any difficulty with range of motion, absence of knee pain.  Also noting that WBC is normal so is procalcitonin and sed rate.  CRP is elevated but lower than it was on his prior visit.   [facility], IV vancomycin and ceftriaxone      HTN, Hyperlipidemia, afib, CAD  - Continue home Tambocor, Imdur and statin  - Monitor BP   - Holding parameters for BP meds  - Labetalol PRN for SBP>170     Physical  therapy consultation.  DVT prophylax with p.o. aspirin  Bowel regimen.  Pain medication as needed.     1.  Physical therapy evaluation.  Likely Occupational Therapy and likely to need inpatient rehab following this hospitalization  2. Pain control-prns, avoid sedatives and opiates.      3. IS-encourage  4. DVT proph- mechanicals and aspirin  5. Bowel regimen  6. Resume home medications as appropriate     Urinalysis has been evaluated.  No significant findings.  Will check TSH and ammonia levels, orders placed. Will monitor for response to antibiotics.    History of TIAs  Recent increase in weakness  Consult neurology, appreciate assistance     Dysphagia   Worsening recently according to family  Speech consult, continue nectar thick liquids.   MBS test planned for Monday     Electronically signed by LEEROY Dow, 09/14/24, 4:55 PM EDT.

## 2024-09-14 NOTE — PROGRESS NOTES
Pharmacy Consult-Vancomycin Dosing  Guicho Blanco is a  83 y.o. male receiving vancomycin therapy.     Indication: SSTI  Consulting Provider: ID  ID Consult: Yes    Goal AUC: 400 - 600 mg/L*hr    Current Antimicrobial Therapy  Anti-Infectives (From admission, onward)      Ordered     Dose/Rate Route Frequency Start Stop    09/13/24 1758  cefTRIAXone (ROCEPHIN) 2,000 mg in sodium chloride 0.9 % 100 mL MBP        Ordering Provider: Jae Manzanares MD    2,000 mg  200 mL/hr over 30 Minutes Intravenous Every 24 Hours 09/14/24 1700 09/19/24 1659    09/13/24 1852  vancomycin IVPB 1500 mg in 0.9% NaCl (Premix) 500 mL        Ordering Provider: Charlene Wyatt RPH    1,500 mg  333.3 mL/hr over 90 Minutes Intravenous Every 24 Hours 09/14/24 1500 09/19/24 1459    09/13/24 1758  Pharmacy to dose vancomycin        Ordering Provider: Jae Manzanares MD     Does not apply Continuous PRN 09/13/24 1758 09/18/24 1757    09/13/24 1349  vancomycin IVPB 2000 mg in 0.9% Sodium Chloride 500 mL        Ordering Provider: Girma Jimenez MD    20 mg/kg × 95.3 kg  250 mL/hr over 120 Minutes Intravenous Once 09/13/24 1500 09/13/24 1729    09/13/24 1349  cefTRIAXone (ROCEPHIN) 1,000 mg in sodium chloride 0.9 % 100 mL MBP        Ordering Provider: Girma Jimenez MD    1,000 mg  200 mL/hr over 30 Minutes Intravenous Once 09/13/24 1430 09/13/24 1517            Allergies  Allergies as of 09/13/2024 - Reviewed 09/13/2024   Allergen Reaction Noted    Divalproex sodium (migraine) [valproic acid] Other (See Comments) 11/22/2021    Cymbalta [duloxetine hcl] Mental Status Change 04/28/2016    Duloxetine Unknown (See Comments) 10/04/2017    Metoprolol Other (See Comments) 10/02/2017    Neurontin [gabapentin] Other (See Comments) 10/02/2017       Labs    Results from last 7 days   Lab Units 09/14/24  1145 09/13/24  1341 09/13/24  1334   BUN mg/dL 10 18  --    CREATININE mg/dL 0.83 0.97 1.10       Results from last 7 days   Lab Units 09/13/24  1232  "  WBC 10*3/mm3 7.23       Evaluation of Dosing     Last Dose Received in the ED/Outside Facility: Yes  Is Patient on Dialysis or Renal Replacement: No    Ht - 182.9 cm (72\")  Wt - 95.3 kg (210 lb)    Estimated Creatinine Clearance: 80.8 mL/min (by C-G formula based on SCr of 0.83 mg/dL).    I/O last 3 completed shifts:  In: 100 [IV Piggyback:100]  Out: 1205 [Urine:1205]    Microbiology and Radiology  Microbiology Results (last 10 days)       Procedure Component Value - Date/Time    COVID PRE-OP / PRE-PROCEDURE SCREENING ORDER (NO ISOLATION) - Swab, Nasopharynx [[id number]]  (Normal) Collected: 09/13/24 1344    Lab Status: Final result Specimen: Swab from Nasopharynx Updated: 09/13/24 1418    Narrative:      The following orders were created for panel order COVID PRE-OP / PRE-PROCEDURE SCREENING ORDER (NO ISOLATION) - Swab, Nasopharynx.  Procedure                               Abnormality         Status                     ---------                               -----------         ------                     COVID-19 and FLU A/B PCR...[[id number]]  Normal              Final result                 Please view results for these tests on the individual orders.    COVID-19 and FLU A/B PCR, 1 HR TAT - Swab, Nasopharynx [[id number]]  (Normal) Collected: 09/13/24 1344    Lab Status: Final result Specimen: Swab from Nasopharynx Updated: 09/13/24 1418     COVID19 Not Detected     Influenza A PCR Not Detected     Influenza B PCR Not Detected    Narrative:      Fact sheet for providers: https://www.fda.gov/media/622038/download    Fact sheet for patients: https://www.fda.gov/media/512423/download    Test performed by PCR.            Reported Vancomycin Levels                         [employer] AUC Calculation:    Current AUC:   301 mg/L*hr    Predicted Steady State AUC on Current Dose: 413 mg/L*hr  _________________________________    Predicted Steady State AUC on New Dose:  413  mg/L*hr    Assessment/Plan:   Pharmacy consulted to " dose vancomycin for SSTI, goal -600 mg/L*hr.  Patient loaded with vancomycin 2000 ( ~ 21 mg/kg)  BC x 2 pending.  Patient remains afebrile, serum creatinine is improved at 0.83, BUN is 12, and WBC is 7.23.  Patient started on a maintenance regimen of vancomycin 1500 mg ( ~ 16 mg/kg) every 24 hours which corresponds to currently at an AUC of 413 which is therapeutic.  Will continue current dose and check vancomycin random prior to adjusting.  A vancomycin random will be assessed on 9/15 @ 0600 (prior to the 3rd dose).  Will continue to follow and adjust vancomycin dose as needed based on renal function, cultures, and patient clinical status.    Thank you,    Toño Og [facility]  9/14/2024  14:04 EDT

## 2024-09-14 NOTE — THERAPY EVALUATION
Patient Name: Guicho Blanco  : 1940    MRN: [id number]                              Today's Date: 2024       Admit Date: 2024    Visit Dx:     ICD-10-CM ICD-9-CM   1. Generalized weakness  R53.1 780.79   2. Cellulitis of right lower extremity  L03.115 682.6   3. Dysphagia, unspecified type  R13.10 787.20     Patient Active Problem List   Diagnosis    Osteomyelitis of toe of right foot    Osteomyelitis of right foot    PAF (paroxysmal atrial fibrillation)    Coronary artery disease involving native coronary artery of native heart without angina pectoris    Gastroesophageal reflux disease without esophagitis    Benign non-nodular prostatic hyperplasia without lower urinary tract symptoms    S/P appy    Essential hypertension    Simple chronic bronchitis    Mixed hyperlipidemia    COPD (chronic obstructive pulmonary disease)    BPH (benign prostatic hypertrophy)    Hearing loss    Chest pain    Vertigo    Snoring    Overweight    PLMD (periodic limb movement disorder)    Neuropathy    Periodic headache syndrome, not intractable    Acquired absence of other right toe(s)    Tremor    Bilateral impacted cerumen    Bilateral sensorineural hearing loss    Chronic otitis externa    Deviated nasal septum    Disorder of joint of foot    ED (erectile dysfunction) of organic origin    Hydrocele    Hydrocele of testis    Impacted cerumen    Mononeuropathy    Swelling of testicle    Venous retinal branch occlusion    Vitreous degeneration    Benign prostatic hyperplasia with urinary obstruction    OA (osteoarthritis) of knee    Status post total right knee replacement    Septic arthritis    Cellulitis    Cellulitis of right knee    Failure to thrive in adult    Insomnia     Past Medical History:   Diagnosis Date    Arthritis     Atrial fibrillation     BPH (benign prostatic hypertrophy)     Cataract     Cellulitis     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     bilateral    Coronary artery disease     Full dentures      Gall stones     GERD (gastroesophageal reflux disease)     Hearing loss     Hiatal hernia     Hyperlipidemia     Hypertension     Peripheral neuropathy     Peripheral vascular disease     Seasonal allergies     Skin cancer     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    TIA (transient ischemic attack)     2 times    Wears glasses      Past Surgical History:   Procedure Laterality Date    ABLATION OF DYSRHYTHMIC FOCUS      APPENDECTOMY      CARDIAC CATHETERIZATION N/A 10/05/2017    Procedure: Left Heart Cath;  Surgeon: Hector Urrutia MD;  Location:  [facility];  Service:     CATARACT EXTRACTION Bilateral     CHOLECYSTECTOMY      Remote    COCHLEAR IMPLANT REVISION  05/2019    COLONOSCOPY      PROSTATE SURGERY      SHOULDER SURGERY Right     SKIN BIOPSY      TOE AMPUTATION Right     2nd toe - Right Foot    TONSILLECTOMY      TOTAL KNEE ARTHROPLASTY Right 8/7/2024    Procedure: TOTAL KNEE ARTHROPLASTY WITH ASHLEY ROBOT RIGHT;  Surgeon: Christian Schwartz MD;  Location:  [facility];  Service: Robotics - Ortho;  Laterality: Right;      General Information       Row Name 09/14/24 1349          Physical Therapy Time and Intention    Document Type therapy note (daily note)  -AB     Mode of Treatment physical therapy  -AB       Row Name 09/14/24 1349          General Information    Patient Profile Reviewed yes  -AB     Prior Level of Function min assist:;gait;transfer;bed mobility;ADL's  Pt with progressive decline since rehab stay. Hx of falls. TKA 8/7/2024. Recent cognitive changes.  -AB     Existing Precautions/Restrictions fall;other (see comments)  TKA 8/7/2024, RLE WBAT  -AB     Barriers to Rehab medically complex;cognitive status;previous functional deficit  -AB       Row Name 09/14/24 1342          Living Environment    People in Home spouse  -AB       Row Name 09/14/24 1349          Home Main Entrance    Number of Stairs, Main Entrance one  -AB     Stair Railings, Main Entrance none  -AB       Row Name  09/14/24 1349          Stairs Within Home, Primary    Stairs, Within Home, Primary 1 step from kitchen into family room.  -AB     Number of Stairs, Within Home, Primary one  -AB     Stair Railings, Within Home, Primary none  -AB       Row Name 09/14/24 1349          Cognition    Orientation Status (Cognition) oriented x 3;other (see comments)  conversational confusion  -AB       Row Name 09/14/24 1349          Safety Issues, Functional Mobility    Safety Issues Affecting Function (Mobility) awareness of need for assistance;insight into deficits/self-awareness;safety precaution awareness;safety precautions follow-through/compliance;sequencing abilities;judgment;problem-solving;positioning of assistive device  -AB     Impairments Affecting Function (Mobility) balance;endurance/activity tolerance;pain;strength;range of motion (ROM)  -AB               User Key  (r) = Recorded By, (t) = Taken By, (c) = Cosigned By      Initials Name Provider Type    AB Ebony Loera, PT Physical Therapist                   Mobility       Row Name 09/14/24 1405          Bed Mobility    Bed Mobility supine-sit;scooting/bridging  -AB     Scooting/Bridging [place] (Bed Mobility) moderate assist (50% patient effort);1 person assist;verbal cues  -AB     Supine-Sit [place] (Bed Mobility) moderate assist (50% patient effort);1 person assist;verbal cues  -AB     Assistive Device (Bed Mobility) head of bed elevated  -AB     Comment, (Bed Mobility) Assist provided at trunk and with BLE management to sit EOB.  -AB       Row Name 09/14/24 1405          Transfers    Comment, (Transfers) Cues for hand placement and sequencing. Denied dizziness.  -AB       Row Name 09/14/24 1405          Sit-Stand Transfer    Sit-Stand [place] (Transfers) moderate assist (50% patient effort);2 person assist;verbal cues;nonverbal cues (demo/gesture)  -AB     Assistive Device (Sit-Stand Transfers) walker, front-wheeled  -AB     Comment, (Sit-Stand  Transfer) Cues to push up from bed. Boost to stand.  -AB       Row Name 09/14/24 1405          Gait/Stairs (Locomotion)    [place] Level (Gait) moderate assist (50% patient effort);2 person assist;verbal cues;nonverbal cues (demo/gesture)  -AB     Assistive Device (Gait) walker, front-wheeled  -AB     Patient was able to Ambulate yes  -AB     Distance in Feet (Gait) 5  -AB     Deviations/Abnormal Patterns (Gait) bilateral deviations;harleen decreased;gait speed decreased;stride length decreased;base of support, narrow;festinating/shuffling  -AB     Bilateral Gait Deviations forward flexed posture;heel strike decreased  -AB     [place] Level (Stairs) unable to assess  -AB     Comment, (Gait/Stairs) Pt ambulated with step through gait pattern, slowed harleen, and short/shuffling steps. Cues provided for walker positioning and sequencing of steps. Pt fearful of falling and required encouragement to take steps. No overt LOB or knee buckling. Further activity limited by weakness and fatigue. Chair brought up behind pt for safe return to sitting.  -AB       Row Name 09/14/24 1405          Mobility    Extremity Weight-bearing Status right lower extremity  -AB     Right Lower Extremity (Weight-bearing Status) weight-bearing as tolerated (WBAT)  -AB               User Key  (r) = Recorded By, (t) = Taken By, (c) = Cosigned By      Initials Name Provider Type    AB Ebony Loera, PT Physical Therapist                   Obj/Interventions       Row Name 09/14/24 1412          Range of Motion Comprehensive    General Range of Motion lower extremity range of motion deficits identified  -AB     Comment, General Range of Motion LLE ROM WFL; R knee AROM 5-115  -AB       Row Name 09/14/24 1412          Strength Comprehensive (MMT)    General Manual Muscle Testing (MMT) Assessment lower extremity strength deficits identified  -AB     Comment, General Manual Muscle Testing (MMT) Assessment LLE grossly 4-/5; RLE 3+/5  -AB        Row Name 09/14/24 1412          Motor Skills    Therapeutic Exercise knee;ankle  -AB       Row Name 09/14/24 1412          Knee (Therapeutic Exercise)    Knee (Therapeutic Exercise) isometric exercises;strengthening exercise  -AB     Knee Isometrics (Therapeutic Exercise) right;quad sets;10 repetitions  -AB     Knee Strengthening (Therapeutic Exercise) right;SLR (straight leg raise);SAQ (short arc quad);LAQ (long arc quad);heel slides;10 repetitions  -AB       Row Name 09/14/24 1412          Ankle (Therapeutic Exercise)    Ankle (Therapeutic Exercise) AROM (active range of motion)  -AB     Ankle AROM (Therapeutic Exercise) bilateral;dorsiflexion;plantarflexion;10 repetitions  -AB       Row Name 09/14/24 1412          Balance    Balance Assessment sitting static balance;standing static balance;standing dynamic balance;sitting dynamic balance  -AB     Static Sitting Balance contact guard  -AB     Dynamic Sitting Balance contact guard  -AB     Position, Sitting Balance unsupported;sitting edge of bed  -AB     Static Standing Balance moderate assist;2-person assist  -AB     Dynamic Standing Balance moderate assist;2-person assist;verbal cues;non-verbal cues (demo/gesture)  -AB     Position/Device Used, Standing Balance supported;walker, front-wheeled  -AB     Balance Interventions sitting;standing;sit to stand;supported;static;dynamic;occupation based/functional task  -AB     Comment, Balance No overt LOB or knee buckling. Mod A for improved stability in standing.  -AB       Row Name 09/14/24 1412          Sensory Assessment (Somatosensory)    Sensory Assessment (Somatosensory) LE sensation intact  -AB               User Key  (r) = Recorded By, (t) = Taken By, (c) = Cosigned By      Initials Name Provider Type    AB Ebony Loera, PT Physical Therapist                   Goals/Plan       Row Name 09/14/24 1420          Bed Mobility Goal 1 (PT)    Activity/Assistive Device (Bed Mobility Goal 1, PT) sit to  supine;supine to sit  -AB     [place] Level/Cues Needed (Bed Mobility Goal 1, PT) contact guard required  -AB     Time Frame (Bed Mobility Goal 1, PT) short term goal (STG);5 days  -AB       Row Name 09/14/24 1420          Transfer Goal 1 (PT)    Activity/Assistive Device (Transfer Goal 1, PT) sit-to-stand/stand-to-sit;bed-to-chair/chair-to-bed;walker, rolling  -AB     [place] Level/Cues Needed (Transfer Goal 1, PT) contact guard required  -AB     Time Frame (Transfer Goal 1, PT) long term goal (LTG);10 days  -AB       Row Name 09/14/24 1420          Gait Training Goal 1 (PT)    Activity/Assistive Device (Gait Training Goal 1, PT) gait (walking locomotion);assistive device use;walker, rolling  -AB     [place] Level (Gait Training Goal 1, PT) minimum assist (75% or more patient effort)  -AB     Distance (Gait Training Goal 1, PT) 100  -AB     Time Frame (Gait Training Goal 1, PT) long term goal (LTG);10 days  -AB       Row Name 09/14/24 1420          Therapy Assessment/Plan (PT)    Planned Therapy Interventions (PT) balance training;bed mobility training;gait training;home exercise program;patient/family education;postural re-education;transfer training;strengthening;stretching;ROM (range of motion)  -AB               User Key  (r) = Recorded By, (t) = Taken By, (c) = Cosigned By      Initials Name Provider Type    AB Ebony Loera, PT Physical Therapist                   Clinical Impression       Row Name 09/14/24 1415          Pain    Pretreatment Pain Rating 0/10 - no pain  -AB     Posttreatment Pain Rating 0/10 - no pain  -AB     Pre/Posttreatment Pain Comment denied pain throughout  -AB     Additional Documentation Pain Scale: Numbers Pre/Post-Treatment (Group)  -AB       Row Name 09/14/24 1415          Plan of Care Review    Plan of Care Reviewed With patient  -AB     Progress no change  -AB     Outcome Evaluation PT initial eval completed. Pt presents below baseline with generalized weakness,  impaired balance, and decreased activity tolerance. TKA on 8/7. Pt ambulated 5' with mod Ax2 and RW. Pt unsteady in standing with fear of falling. Further IPPT is warrented. Rec d/c to [facility] for best functional outcomes.  -AB       Row Name 09/14/24 1415          Therapy Assessment/Plan (PT)    Patient/Family Therapy Goals Statement (PT) get stronger  -AB     Rehab Potential (PT) good, to achieve stated therapy goals  -AB     Criteria for Skilled Interventions Met (PT) yes;meets criteria;skilled treatment is necessary  -AB     Therapy Frequency (PT) daily  -AB     Predicted Duration of Therapy Intervention (PT) 10 days  -AB       Row Name 09/14/24 1415          Vital Signs    Pre Systolic BP Rehab 121  -AB     Pre Treatment Diastolic BP 64  -AB     Pre SpO2 (%) 100  -AB     O2 Delivery Pre Treatment room air  -AB     O2 Delivery Intra Treatment room air  -AB     Post SpO2 (%) 100  -AB     O2 Delivery Post Treatment room air  -AB     Pre Patient Position Supine  -AB     Intra Patient Position Standing  -AB     Post Patient Position Sitting  -AB       Row Name 09/14/24 1415          Positioning and Restraints    Pre-Treatment Position in bed  -AB     Post Treatment Position chair  -AB     In Chair notified nsg;reclined;sitting;call light within reach;encouraged to call for assist;exit alarm on;legs elevated;waffle cushion;compression device  -AB               User Key  (r) = Recorded By, (t) = Taken By, (c) = Cosigned By      Initials Name Provider Type    AB Ebony Loera, PT Physical Therapist                   Outcome Measures       Row Name 09/14/24 1421          How much help from another person do you currently need...    Turning from your back to your side while in flat bed without using bedrails? 3  -AB     Moving from lying on back to sitting on the side of a flat bed without bedrails? 2  -AB     Moving to and from a bed to a chair (including a wheelchair)? 2  -AB     Standing up from a chair using your arms  (e.g., wheelchair, bedside chair)? 2  -AB     Climbing 3-5 steps with a railing? 1  -AB     To walk in hospital room? 2  -AB     AM-PAC 6 Clicks Score (PT) 12  -AB     Highest Level of Mobility Goal 4 --> Transfer to chair/commode  -AB       Row Name 09/14/24 1421          PADD    Diagnosis 1  -AB     Gender 2  -AB     Age Group 0  -AB     Gait Distance 0  -AB     Assist Level 0  -AB     Home Support 3  -AB     PADD Score 6  -AB     Patient Preference extended rehabilitation  -AB     Prediction by PADD Score extended rehabilitation  -AB       Row Name 09/14/24 1421          Functional Assessment    Outcome Measure Options AM-PAC 6 Clicks Basic Mobility (PT);PADD  -AB               User Key  (r) = Recorded By, (t) = Taken By, (c) = Cosigned By      Initials Name Provider Type    AB Ebony Loera, PT Physical Therapist                                 Physical Therapy Education       Title: PT OT SLP Therapies (In Progress)       Topic: Physical Therapy (Done)       Point: Mobility training (Done)       Learning Progress Summary             Patient Acceptance, E,D, VU,NR by AB at 9/14/2024 1422                         Point: Home exercise program (Done)       Learning Progress Summary             Patient Acceptance, E,D, VU,NR by AB at 9/14/2024 1422                         Point: Body mechanics (Done)       Learning Progress Summary             Patient Acceptance, E,D, VU,NR by AB at 9/14/2024 1422                         Point: Precautions (Done)       Learning Progress Summary             Patient Acceptance, E,D, VU,NR by AB at 9/14/2024 1422                                         User Key       Initials Effective Dates Name Provider Type [facility] 09/22/22 -  Ebony Loera, PT Physical Therapist PT                  PT Recommendation and Plan  Planned Therapy Interventions (PT): balance training, bed mobility training, gait training, home exercise program, patient/family education, postural re-education,  transfer training, strengthening, stretching, ROM (range of motion)  Plan of Care Reviewed With: patient  Progress: no change  Outcome Evaluation: PT initial eval completed. Pt presents below baseline with generalized weakness, impaired balance, and decreased activity tolerance. TKA on 8/7. Pt ambulated 5' with mod Ax2 and RW. Pt unsteady in standing with fear of falling. Further IPPT is warrented. Rec d/c to [facility] for best functional outcomes.     Time Calculation:   PT Evaluation Complexity  History, PT Evaluation Complexity: 3 or more personal factors and/or comorbidities  Examination of Body Systems (PT Eval Complexity): total of 4 or more elements  Clinical Presentation (PT Evaluation Complexity): evolving  Clinical Decision Making (PT Evaluation Complexity): moderate complexity  Overall Complexity (PT Evaluation Complexity): moderate complexity     PT Charges       Row Name 09/14/24 1423             Time Calculation    Start Time 1322  -AB      PT Received On 09/14/24  -AB      PT Goal Re-Cert Due Date 09/24/24  -AB         Timed Charges    61246 - PT Therapeutic Exercise Minutes 8  -AB         Untimed Charges    PT Eval/Re-eval Minutes 48  -AB         Total Minutes    Timed Charges Total Minutes 8  -AB      Untimed Charges Total Minutes 48  -AB       Total Minutes 56  -AB                User Key  (r) = Recorded By, (t) = Taken By, (c) = Cosigned By      Initials Name Provider Type    AB Ebony Loera, PT Physical Therapist                  Therapy Charges for Today       Code Description Service Date Service Provider Modifiers Qty    16909074354 HC PT THER PROC EA 15 MIN 9/14/2024 Ebony Loera, PT GP 1    73221764711 HC PT EVAL MOD COMPLEXITY 4 9/14/2024 Ebony Loera, PT GP 1    43679730380 HC PT THER SUPP EA 15 MIN 9/14/2024 Ebony Loera, PT GP 2            PT G-Codes  Outcome Measure Options: AM-PAC 6 Clicks Basic Mobility (PT), PADD  AM-PAC 6 Clicks Score (PT): 12  PT Discharge  Summary  Anticipated Discharge Disposition (PT): inpatient rehabilitation facility    Ebony Loera, PT  9/14/2024

## 2024-09-14 NOTE — PLAN OF CARE
Goal Outcome Evaluation:  Plan of Care Reviewed With: patient        Progress: no change  Outcome Evaluation: PT initial eval completed. Pt presents below baseline with generalized weakness, impaired balance, and decreased activity tolerance. TKA on 8/7. Pt ambulated 5' with mod Ax2 and RW. Pt unsteady in standing with fear of falling. Further IPPT is warrented. Rec d/c to [facility] for best functional outcomes.      Anticipated Discharge Disposition (PT): inpatient rehabilitation facility

## 2024-09-14 NOTE — PROGRESS NOTES
"      Orthopaedic Surgery Progress Note    CC: .  Weakness      Subjective     Interval History:   Patient  admitted yesterday for failure to thrive at home.  Seen in the office this past Thursday and some prominent Vicryl sutures were removed.  Had improving lateral knee erythema but new onset incisional erythema without drainage.  Denies any fevers or chills at home.  Patient continues to have no pain with motion of the joint.  Has had significant weakness at home and family cannot longer take care of him.      ROS: Denies fever, chills, nausea or vomiting    Objective     Vital Signs:  Temp (24hrs), Av.7 °F (36.5 °C), Min:97.2 °F (36.2 °C), Max:98 °F (36.7 °C)    /70 (BP Location: Right arm, Patient Position: Lying)   Pulse 91   Temp 97.9 °F (36.6 °C) (Oral)   Resp 16   Ht 182.9 cm (72\")   Wt 95.3 kg (210 lb)   SpO2 98%   BMI 28.48 kg/m²       Physical Exam:  Examination shows no knee effusion.  Painless active and passive range of motion.  Erythema this morning is improved.    Assessment and Plan:  S/p elective right robot-assisted TKA 2024.  Patient recently admitted for cellulitis on the lateral aspect of the knee that improved with vancomycin and Rocephin.  Was discharged home on oral antibiotics.  P.o. antibiotics changed to linezolid a few days prior to admission.  Patient was getting weaker at home and family can no longer take care of him.  Patient has a history of TIAs.  Agree with the possibility that the patient could have had a new cerebral event.  We have told him that he needs to continue to work aggressively with therapy and also increase his p.o. intake.  Appreciate Dr. Manzanares and Dr. Kee recs.  Will order Neuro consult.    Christian Schwartz MD  24  11:15 EDT        "

## 2024-09-14 NOTE — PLAN OF CARE
Goal Outcome Evaluation:  Plan of Care Reviewed With: patient, spouse, daughter                  Anticipated Discharge Disposition (SLP): inpatient rehabilitation facility          SLP Swallowing Diagnosis: moderate, oral dysphagia, suspect acute-on-chronic, pharyngeal dysphagia, esophageal dysphagia, other (see comments) (baseline pharyngoesophageal dysphagia; acute/worsened oral dysphagia (appeared cognitive-based) + ? acute pharyngeal dysphagia r/t weakness) (09/14/24 2442)

## 2024-09-15 PROBLEM — E44.0 MODERATE MALNUTRITION: Status: ACTIVE | Noted: 2024-09-15

## 2024-09-15 LAB
ANION GAP SERPL CALCULATED.3IONS-SCNC: 9 MMOL/L (ref 5–15)
BUN SERPL-MCNC: 6 MG/DL (ref 8–23)
BUN/CREAT SERPL: 7.1 (ref 7–25)
CALCIUM SPEC-SCNC: 9.2 MG/DL (ref 8.6–10.5)
CHLORIDE SERPL-SCNC: 103 MMOL/L (ref 98–107)
CO2 SERPL-SCNC: 25 MMOL/L (ref 22–29)
CREAT SERPL-MCNC: 0.84 MG/DL (ref 0.76–1.27)
EGFRCR SERPLBLD CKD-EPI 2021: 86.5 ML/MIN/1.73
FOLATE SERPL-MCNC: 18.9 NG/ML (ref 4.78–24.2)
GLUCOSE SERPL-MCNC: 111 MG/DL (ref 65–99)
POTASSIUM SERPL-SCNC: 4.2 MMOL/L (ref 3.5–5.2)
QT INTERVAL: 386 MS
QTC INTERVAL: 472 MS
SODIUM SERPL-SCNC: 137 MMOL/L (ref 136–145)
VANCOMYCIN SERPL-MCNC: 12.1 MCG/ML (ref 5–40)
VIT B12 BLD-MCNC: 459 PG/ML (ref 211–946)

## 2024-09-15 PROCEDURE — G0378 HOSPITAL OBSERVATION PER HR: HCPCS

## 2024-09-15 PROCEDURE — 25010000002 THIAMINE PER 100 MG: Performed by: STUDENT IN AN ORGANIZED HEALTH CARE EDUCATION/TRAINING PROGRAM

## 2024-09-15 PROCEDURE — 80202 ASSAY OF VANCOMYCIN: CPT

## 2024-09-15 PROCEDURE — 25010000002 VANCOMYCIN 1 G RECONSTITUTED SOLUTION 1 EACH VIAL

## 2024-09-15 PROCEDURE — 82607 VITAMIN B-12: CPT | Performed by: STUDENT IN AN ORGANIZED HEALTH CARE EDUCATION/TRAINING PROGRAM

## 2024-09-15 PROCEDURE — 80048 BASIC METABOLIC PNL TOTAL CA: CPT

## 2024-09-15 PROCEDURE — 82746 ASSAY OF FOLIC ACID SERUM: CPT | Performed by: STUDENT IN AN ORGANIZED HEALTH CARE EDUCATION/TRAINING PROGRAM

## 2024-09-15 PROCEDURE — 25810000003 SODIUM CHLORIDE 0.9 % SOLUTION 250 ML FLEX CONT

## 2024-09-15 RX ORDER — SULFAMETHOXAZOLE/TRIMETHOPRIM 800-160 MG
1 TABLET ORAL EVERY 12 HOURS SCHEDULED
Status: DISCONTINUED | OUTPATIENT
Start: 2024-09-15 | End: 2024-09-16

## 2024-09-15 RX ADMIN — THIAMINE HYDROCHLORIDE 250 MG: 100 INJECTION, SOLUTION INTRAMUSCULAR; INTRAVENOUS at 21:38

## 2024-09-15 RX ADMIN — POLYETHYLENE GLYCOL 3350 17 G: 17 POWDER, FOR SOLUTION ORAL at 08:43

## 2024-09-15 RX ADMIN — SULFAMETHOXAZOLE AND TRIMETHOPRIM 1 TABLET: 800; 160 TABLET ORAL at 20:22

## 2024-09-15 RX ADMIN — Medication 250 MG: at 20:22

## 2024-09-15 RX ADMIN — DOCUSATE SODIUM 100 MG: 100 CAPSULE, LIQUID FILLED ORAL at 08:42

## 2024-09-15 RX ADMIN — ASPIRIN 325 MG: 325 TABLET ORAL at 08:42

## 2024-09-15 RX ADMIN — FLECAINIDE ACETATE 100 MG: 50 TABLET ORAL at 20:22

## 2024-09-15 RX ADMIN — Medication 250 MG: at 08:42

## 2024-09-15 RX ADMIN — Medication 10 ML: at 08:43

## 2024-09-15 RX ADMIN — PRAVASTATIN SODIUM 20 MG: 20 TABLET ORAL at 08:42

## 2024-09-15 RX ADMIN — PANTOPRAZOLE SODIUM 40 MG: 40 TABLET, DELAYED RELEASE ORAL at 06:41

## 2024-09-15 RX ADMIN — MIRTAZAPINE 15 MG: 15 TABLET, FILM COATED ORAL at 20:22

## 2024-09-15 RX ADMIN — FLECAINIDE ACETATE 100 MG: 50 TABLET ORAL at 08:42

## 2024-09-15 RX ADMIN — THIAMINE HYDROCHLORIDE 250 MG: 100 INJECTION, SOLUTION INTRAMUSCULAR; INTRAVENOUS at 12:48

## 2024-09-15 RX ADMIN — SULFAMETHOXAZOLE AND TRIMETHOPRIM 1 TABLET: 800; 160 TABLET ORAL at 12:57

## 2024-09-15 RX ADMIN — Medication 10 ML: at 20:25

## 2024-09-15 RX ADMIN — NORTRIPTYLINE HYDROCHLORIDE 20 MG: 10 CAPSULE ORAL at 20:22

## 2024-09-15 RX ADMIN — VANCOMYCIN HYDROCHLORIDE 1000 MG: 1 INJECTION, POWDER, LYOPHILIZED, FOR SOLUTION INTRAVENOUS at 09:38

## 2024-09-15 RX ADMIN — ISOSORBIDE MONONITRATE 30 MG: 30 TABLET, EXTENDED RELEASE ORAL at 08:42

## 2024-09-15 NOTE — PROGRESS NOTES
ID Note:    I was informed by nursing of difficulty with IV access.  No need for PICC; will change to oral therapy.  Now on Remeron per neurology, so will avoid linezolid.    Stop vancomycin and ceftriaxone.  Start Bactrim DS po bid.    Will f/u tomorrow.    Jae Manzanares MD  9/15/2024  11:51 EDT

## 2024-09-15 NOTE — PLAN OF CARE
Goal Outcome Evaluation:  Plan of Care Reviewed With: patient        Progress: improving     Up with assist x1/2, sat in chair most of the afternoon, pivoted with 1, no c/o of pain, IV replaced with ultrasound guidance, BM this shift, daughter at bedside, AOx4, intermittent confusion, RA      Problem: Adult Inpatient Plan of Care  Goal: Optimal Comfort and Wellbeing  Outcome: Ongoing, Progressing  Intervention: Provide Person-Centered Care  Description: Use a family-focused approach to care.  Develop trust and rapport by proactively providing information, encouraging questions, addressing concerns and offering reassurance.  Acknowledge emotional response to hospitalization.  Recognize and utilize personal coping strategies.  Honor spiritual and cultural preferences.  Recent Flowsheet Documentation  Taken 9/15/2024 0842 by Dinora Cee, RN  Trust Relationship/Rapport:   care explained   choices provided

## 2024-09-15 NOTE — PROGRESS NOTES
Pharmacy Consult-Vancomycin Dosing  Guicho Blanco is a  83 y.o. male receiving vancomycin therapy.     Indication: SSTI  Consulting Provider: ID  ID Consult: Yes    Goal AUC: 400 - 600 mg/L*hr    Current Antimicrobial Therapy  Anti-Infectives (From admission, onward)      Ordered     Dose/Rate Route Frequency Start Stop    09/13/24 1758  cefTRIAXone (ROCEPHIN) 2,000 mg in sodium chloride 0.9 % 100 mL MBP        Ordering Provider: Jae Manzanares MD    2,000 mg  200 mL/hr over 30 Minutes Intravenous Every 24 Hours 09/14/24 1700 09/19/24 1659    09/13/24 1852  vancomycin IVPB 1500 mg in 0.9% NaCl (Premix) 500 mL        Ordering Provider: Charlene Wyatt RPH    1,500 mg  333.3 mL/hr over 90 Minutes Intravenous Every 24 Hours 09/14/24 1500 09/19/24 1459    09/13/24 1758  Pharmacy to dose vancomycin        Ordering Provider: Jae Manzanares MD     Does not apply Continuous PRN 09/13/24 1758 09/18/24 1757    09/13/24 1349  vancomycin IVPB 2000 mg in 0.9% Sodium Chloride 500 mL        Ordering Provider: Girma Jimenez MD    20 mg/kg × 95.3 kg  250 mL/hr over 120 Minutes Intravenous Once 09/13/24 1500 09/13/24 1729    09/13/24 1349  cefTRIAXone (ROCEPHIN) 1,000 mg in sodium chloride 0.9 % 100 mL MBP        Ordering Provider: Girma Jimenez MD    1,000 mg  200 mL/hr over 30 Minutes Intravenous Once 09/13/24 1430 09/13/24 1517            Allergies  Allergies as of 09/13/2024 - Reviewed 09/13/2024   Allergen Reaction Noted    Divalproex sodium (migraine) [valproic acid] Other (See Comments) 11/22/2021    Cymbalta [duloxetine hcl] Mental Status Change 04/28/2016    Duloxetine Unknown (See Comments) 10/04/2017    Metoprolol Other (See Comments) 10/02/2017    Neurontin [gabapentin] Other (See Comments) 10/02/2017       Labs    Results from last 7 days   Lab Units 09/15/24  0631 09/14/24  1145 09/13/24  1341   BUN mg/dL 6* 10 18   CREATININE mg/dL 0.84 0.83 0.97       Results from last 7 days   Lab Units 09/13/24  1232  "  WBC 10*3/mm3 7.23       Evaluation of Dosing     Last Dose Received in the ED/Outside Facility: Yes  Is Patient on Dialysis or Renal Replacement: No    Ht - 182.9 cm (72\")  Wt - 95.3 kg (210 lb)    Estimated Creatinine Clearance: 79.8 mL/min (by C-G formula based on SCr of 0.84 mg/dL).    I/O last 3 completed shifts:  In: 1550 [P.O.:950; IV Piggyback:600]  Out: 3225 [Urine:3225]    Microbiology and Radiology  Microbiology Results (last 10 days)       Procedure Component Value - Date/Time    Blood Culture - Blood, Arm, Right [[id number]]  (Normal) Collected: 09/13/24 1405    Lab Status: Preliminary result Specimen: Blood from Arm, Right Updated: 09/14/24 1430     Blood Culture No growth at 24 hours    COVID PRE-OP / PRE-PROCEDURE SCREENING ORDER (NO ISOLATION) - Swab, Nasopharynx [[id number]]  (Normal) Collected: 09/13/24 1344    Lab Status: Final result Specimen: Swab from Nasopharynx Updated: 09/13/24 1418    Narrative:      The following orders were created for panel order COVID PRE-OP / PRE-PROCEDURE SCREENING ORDER (NO ISOLATION) - Swab, Nasopharynx.  Procedure                               Abnormality         Status                     ---------                               -----------         ------                     COVID-19 and FLU A/B PCR...[[id number]]  Normal              Final result                 Please view results for these tests on the individual orders.    COVID-19 and FLU A/B PCR, 1 HR TAT - Swab, Nasopharynx [[id number]]  (Normal) Collected: 09/13/24 1344    Lab Status: Final result Specimen: Swab from Nasopharynx Updated: 09/13/24 1418     COVID19 Not Detected     Influenza A PCR Not Detected     Influenza B PCR Not Detected    Narrative:      Fact sheet for providers: https://www.fda.gov/media/592119/download    Fact sheet for patients: https://www.fda.gov/media/567162/download    Test performed by PCR.    Blood Culture - Blood, Arm, Right [[id number]]  (Normal) Collected: 09/13/24 1341    Lab " Status: Preliminary result Specimen: Blood from Arm, Right Updated: 09/14/24 1430     Blood Culture No growth at 24 hours            Reported Vancomycin Levels    Results from last 7 days   Lab Units 09/15/24  0631   VANCOMYCIN RM mcg/mL 12.10                      [employer] AUC Calculation:    Current AUC:   346 mg/L*hr    Predicted Steady State AUC on Current Dose: 395 mg/L*hr  _________________________________    Predicted Steady State AUC on New Dose:  521 mg/L*hr    Assessment/Plan:   Pharmacy consulted to dose vancomycin for SSTI, goal -600 mg/L*hr.  Patient loaded with vancomycin 2000 ( ~ 21 mg/kg)  BC x 2 pending.  Patient remains afebrile, serum creatinine is improved at 0.84, BUN is 6, and WBC is 7.23.  A vancomycin random came back as 12.1 on 9/15 at 0631.   Patient started on a maintenance regimen of vancomycin 1500 mg ( ~ 16 mg/kg) every 24 hours which currently corresponds to a steady state AUC of 395 which is subtherapeutic.  Will adjust vancomycin to be 1000 mg q12h which projects to an AUC of 521.  Current order set until 9/19. No vancomycin random to be ordered at this time unless extended.  Will continue to follow and adjust vancomycin dose as needed based on renal function, cultures, and patient clinical status.    Thank you,    Toño Og [facility]  9/15/2024  08:46 EDT

## 2024-09-15 NOTE — PROGRESS NOTES
"IM progress note      Guicho Blanco  [id number]  1940     LOS: 0 days     Attending: Berto Liu MD    Primary Care Provider: Mitchel Culver MD      Chief Complaint/Reason for visit: Right knee postop cellulitis    Subjective   Feels ok. No f/c/n/vom/sob.  Appreciate eval by neurology yesterday. MRI is pending clarification of cochlear implants.   Taking modified diet PO.  MBS tomorrow.    Objective      Visit Vitals  /86   Pulse 96   Temp 98 °F (36.7 °C) (Oral)   Resp 18   Ht 182.9 cm (72\")   Wt 95.3 kg (210 lb)   SpO2 97%   BMI 28.48 kg/m²     Temp (24hrs), Av.8 °F (36.6 °C), Min:97.5 °F (36.4 °C), Max:98.3 °F (36.8 °C)      Nutrition: P.o.    Respiratory: Room air    Physical Therapy:   .  Progress: no change  Outcome Evaluation: PT initial eval completed. Pt presents below baseline with generalized weakness, impaired balance, and decreased activity tolerance. TKA on . Pt ambulated 5' with mod Ax2 and RW. Pt unsteady in standing with fear of falling. Further IPPT is warrented. Rec d/c to IPR for best functional outcomes.        Anticipated Discharge Disposition (PT): inpatient rehabilitation facility    Physical Exam:     General Appearance:    Alert, cooperative, in no acute distress   Head:    Normocephalic, without obvious abnormality, atraumatic    Lungs:     Normal effort, symmetric chest rise, no crepitus, clear to      auscultation bilaterally             Heart:    Regular rhythm and normal rate, normal S1 and S2   Abdomen:     Normal bowel sounds, no masses, no organomegaly, soft        non-tender, non-distended, no guarding, no rebound                tenderness   Extremities:   Less erythema of right knee incision.   No pain with ROM.  No deformities.    Pulses:   Pulses palpable and equal bilaterally   Skin:   No bleeding, bruising or rash   Neurologic:   Moves all extremities with no obvious focal motor deficit.  Cranial nerves 2 - 12 grossly intact "     Results Review:     I reviewed the patient's new clinical results.   Results from last 7 days   Lab Units 09/13/24  1334 09/13/24  1232   WBC 10*3/mm3  --  7.23   HEMOGLOBIN g/dL  --  10.8*   HEMOGLOBIN, POC g/dL 10.9*  --    HEMATOCRIT %  --  34.8*   HEMATOCRIT POC % 32*  --    PLATELETS 10*3/mm3  --  197     Results from last 7 days   Lab Units 09/15/24  0631 09/14/24  1145 09/13/24  1341   SODIUM mmol/L 137 137 136   POTASSIUM mmol/L 4.2 4.1 3.9   CHLORIDE mmol/L 103 102 100   CO2 mmol/L 25.0 23.0 24.0   BUN mg/dL 6* 10 18   CREATININE mg/dL 0.84 0.83 0.97   CALCIUM mg/dL 9.2 8.9 9.2   BILIRUBIN mg/dL  --   --  0.5   ALK PHOS U/L  --   --  152*   ALT (SGPT) U/L  --   --  20   AST (SGOT) U/L  --   --  28   GLUCOSE mg/dL 111* 118* 88     I reviewed the patient's new imaging including images and reports.    All medications reviewed.   aspirin, 325 mg, Oral, Daily  cefTRIAXone, 2,000 mg, Intravenous, Q24H  flecainide, 100 mg, Oral, Q12H  isosorbide mononitrate, 30 mg, Oral, Daily  mirtazapine, 15 mg, Oral, Nightly  nortriptyline, 20 mg, Oral, Nightly  pantoprazole, 40 mg, Oral, Q AM  pravastatin, 20 mg, Oral, Daily  saccharomyces boulardii, 250 mg, Oral, BID  sodium chloride, 10 mL, Intravenous, Q12H  vancomycin, 1,000 mg, Intravenous, Q12H        Assessment & Plan     Cellulitis of right knee    Hearing loss    Neuropathy    Failure to thrive in adult    Insomnia    Moderate malnutrition      Plan  Admitted for further management.    Resumed IV antibiotics for cellulitis noting the absence of any difficulty with range of motion, absence of knee pain.  Also noting that WBC is normal so is procalcitonin and sed rate.  CRP is elevated but lower than it was on his prior visit.   [facility], IV vancomycin and ceftriaxone      HTN, Hyperlipidemia, afib, CAD  - Continue home Tambocor, Imdur and statin  - Monitor BP   - Holding parameters for BP meds  - Labetalol PRN for SBP>170      Physical therapy consultation.  DVT prophylax with p.o. aspirin  Bowel regimen.  Pain medication as needed.     1.  Physical therapy evaluation.  Likely Occupational Therapy and likely to need inpatient rehab following this hospitalization  2. Pain control-prns, avoid sedatives and opiates.      3. IS-encourage  4. DVT proph- mechanicals and aspirin  5. Bowel regimen  6. Resume home medications as appropriate     Urinalysis has been evaluated.  No significant findings.  Will check TSH and ammonia levels, orders placed. Will monitor for response to antibiotics.    History of TIAs  Recent increase in weakness  Consult neurology, appreciate assistance     Dysphagia   Worsening recently according to family  Speech consult, continue nectar thick liquids.   MBS test planned for Monday         no

## 2024-09-15 NOTE — PROGRESS NOTES
Malnutrition Severity Assessment    Patient Name:  Guicho Blanco  YOB: 1940  MRN: [id number]  Admit Date:  9/13/2024    Patient meets criteria for : Moderate (non-severe) Malnutrition (PO intakes <75% EEN >/= 1 month, moderate muscle wasting, and mild subcutaneous fat loss.)    Malnutrition Severity Assessment  Malnutrition Type: Chronic Disease - Related Malnutrition  Malnutrition Type (Last 8 Hours)       Malnutrition Severity Assessment       Row Name 09/15/24 0944       Malnutrition Severity Assessment    Malnutrition Type Chronic Disease - Related Malnutrition      Row Name 09/15/24 0944       Insufficient Energy Intake     Insufficient Energy Intake Findings Moderate    Insufficient Energy Intake  <75% of est. energy requirement for > or equal to 1 month      Row Name 09/15/24 0944       Unintentional Weight Loss     Unintentional Weight Loss Findings --  MULU await measured weight      Row Name 09/15/24 0944       Muscle Loss    Loss of Muscle Mass Findings Moderate    Clavicle Bone Region Moderate - some protrusion in females, visible in males    Acromion Bone Region Moderate - acromion may slightly protrude    Scapular Bone Region Moderate - mild depression, bones may show slightly    Dorsal Hand Region Moderate - slight depression      Row Name 09/15/24 0944       Fat Loss    Subcutaneous Fat Loss Findings Mild  mild orbital, UAR      Row Name 09/15/24 0944       Criteria Met (Must meet criteria for severity in at least 2 of these categories: M Wasting, Fat Loss, Fluid, Secondary Signs, Wt. Status, Intake)    Patient meets criteria for  Moderate (non-severe) Malnutrition  PO intakes <75% EEN >/= 1 month, moderate muscle wasting, and mild subcutaneous fat loss.                    Electronically signed by:  Britney Schmid RD  09/15/24 09:46 EDT

## 2024-09-15 NOTE — PLAN OF CARE
[facility] Neurology    Plan of Care Note    Patient Name: Guicho Blanco  : 1940  MRN: [id number]  Primary Care Physician:  Mitchel Culver MD  Date of admission: 2024    Reason for consult: History of TIAs and progressive weakness     Active Hospital Problems:  Suspected delirium  TIA  Encephalopathy   Cochlear implants   Peripheral neuropathy       Brief Patient Summary:  Guicho Blanco is a 83 y.o. male with history of hearing loss, peripheral neuropathy, TIA, atrial fibrillation, CAD, hypertension, hyperlipidemia, right knee arthroplasty complicated by cellulitis who is presenting with increased confusion.  Confusion appears to be waxing and waning, concern for delirium in the setting of recent hospitalizations, recent infections and surgery.  Will check an MRI brain to rule out possible stroke as a cause of his encephalopathy.  Given his recent weight loss and difficulty swallowing will check B12 and folate and give high-dose thiamine to ensure there is not a nutritional component to this.  Neurology will follow-up after MRI brain      Plan:   -MRI brain without contrast pending cochlear implant review  -Check B12, folate  -Start high-dose thiamine    Update: Unable to get MRI due to cochlear implants, will cancel imaging    Akhil Lira DO, MS

## 2024-09-15 NOTE — PROGRESS NOTES
"      Orthopaedic Surgery Progress Note    CC: Weakness      Subjective     Interval History:   Patient admitted Friday.  Seen by neurology in consultation yesterday.  Patient complaining about the thickened liquid diet he is receiving.  Denies pain.  Unsure whether or not he saw physical therapy yesterday (he did).      ROS: Denies fever, chills, nausea or vomiting    Objective     Vital Signs:  Temp (24hrs), Av.8 °F (36.6 °C), Min:97.5 °F (36.4 °C), Max:98.3 °F (36.8 °C)    /85 (BP Location: Right arm, Patient Position: Lying)   Pulse 87   Temp 98 °F (36.7 °C) (Oral)   Resp 18   Ht 182.9 cm (72\")   Wt 95.3 kg (210 lb)   SpO2 97%   BMI 28.48 kg/m²       Physical Exam:  Induration erythema improved.  No drainage.  No effusion.  Patient continues to move the knee without pain.    Assessment and Plan:  Hospital day #3    --Continue physical therapy  -- Patient unable to be cared for by family at home.  Will likely need skilled nursing facility versus potential transition to assisted living  -- Patient needs increased nutrition and I will consult dietetics  -- Appreciate neurology consult.  MRI brain ordered.    Christian Schwartz MD  09/15/24  08:20 EDT       "

## 2024-09-16 ENCOUNTER — HOME CARE VISIT (OUTPATIENT)
Dept: HOME HEALTH SERVICES | Facility: HOME HEALTHCARE | Age: 84
End: 2024-09-16
Payer: MEDICARE

## 2024-09-16 ENCOUNTER — APPOINTMENT (OUTPATIENT)
Dept: CT IMAGING | Facility: HOSPITAL | Age: 84
DRG: 871 | End: 2024-09-16
Payer: MEDICARE

## 2024-09-16 ENCOUNTER — APPOINTMENT (OUTPATIENT)
Dept: GENERAL RADIOLOGY | Facility: HOSPITAL | Age: 84
DRG: 871 | End: 2024-09-16
Payer: MEDICARE

## 2024-09-16 ENCOUNTER — HOME CARE VISIT (OUTPATIENT)
Dept: HOME HEALTH SERVICES | Facility: HOME HEALTHCARE | Age: 84
End: 2024-09-16
Payer: COMMERCIAL

## 2024-09-16 ENCOUNTER — APPOINTMENT (OUTPATIENT)
Dept: CARDIOLOGY | Facility: HOSPITAL | Age: 84
DRG: 871 | End: 2024-09-16
Payer: MEDICARE

## 2024-09-16 ENCOUNTER — APPOINTMENT (OUTPATIENT)
Dept: NEUROLOGY | Facility: HOSPITAL | Age: 84
DRG: 871 | End: 2024-09-16
Payer: MEDICARE

## 2024-09-16 PROBLEM — E87.20 LACTIC ACIDOSIS: Status: ACTIVE | Noted: 2024-09-16

## 2024-09-16 PROBLEM — A41.9 SEPSIS: Status: ACTIVE | Noted: 2024-09-16

## 2024-09-16 LAB
ANION GAP SERPL CALCULATED.3IONS-SCNC: 13 MMOL/L (ref 5–15)
ANION GAP SERPL CALCULATED.3IONS-SCNC: 14 MMOL/L (ref 5–15)
ANION GAP SERPL CALCULATED.3IONS-SCNC: 27 MMOL/L (ref 5–15)
APPEARANCE FLD: ABNORMAL
ARTERIAL PATENCY WRIST A: ABNORMAL
ARTERIAL PATENCY WRIST A: POSITIVE
ASCENDING AORTA: 3.1 CM
ATMOSPHERIC PRESS: ABNORMAL MM[HG]
ATMOSPHERIC PRESS: ABNORMAL MM[HG]
B-OH-BUTYR SERPL-SCNC: 0.08 MMOL/L (ref 0.02–0.27)
BACTERIA UR QL AUTO: ABNORMAL /HPF
BASE EXCESS BLDA CALC-SCNC: -0.7 MMOL/L (ref 0–2)
BASE EXCESS BLDA CALC-SCNC: -15 MMOL/L (ref 0–2)
BASOPHILS # BLD MANUAL: 0 10*3/MM3 (ref 0–0.2)
BASOPHILS NFR BLD MANUAL: 0 % (ref 0–1.5)
BDY SITE: ABNORMAL
BDY SITE: ABNORMAL
BH CV ECHO MEAS - AO MAX PG: 8.8 MMHG
BH CV ECHO MEAS - AO MEAN PG: 5.5 MMHG
BH CV ECHO MEAS - AO ROOT DIAM: 3 CM
BH CV ECHO MEAS - AO V2 MAX: 147.8 CM/SEC
BH CV ECHO MEAS - AO V2 VTI: 24.2 CM
BH CV ECHO MEAS - AVA(I,D): 2.8 CM2
BH CV ECHO MEAS - EDV(CUBED): 103.8 ML
BH CV ECHO MEAS - EDV(MOD-SP2): 106 ML
BH CV ECHO MEAS - EDV(MOD-SP4): 107 ML
BH CV ECHO MEAS - EF(MOD-BP): 61.3 %
BH CV ECHO MEAS - EF(MOD-SP2): 58.4 %
BH CV ECHO MEAS - EF(MOD-SP4): 62.6 %
BH CV ECHO MEAS - ESV(CUBED): 32.8 ML
BH CV ECHO MEAS - ESV(MOD-SP2): 44.1 ML
BH CV ECHO MEAS - ESV(MOD-SP4): 40 ML
BH CV ECHO MEAS - FS: 31.9 %
BH CV ECHO MEAS - IVS/LVPW: 1 CM
BH CV ECHO MEAS - IVSD: 1 CM
BH CV ECHO MEAS - LA DIMENSION: 3.9 CM
BH CV ECHO MEAS - LAT PEAK E' VEL: 12.6 CM/SEC
BH CV ECHO MEAS - LV MASS(C)D: 164.5 GRAMS
BH CV ECHO MEAS - LV MAX PG: 6.5 MMHG
BH CV ECHO MEAS - LV MEAN PG: 3.8 MMHG
BH CV ECHO MEAS - LV V1 MAX: 127.5 CM/SEC
BH CV ECHO MEAS - LV V1 VTI: 21.2 CM
BH CV ECHO MEAS - LVIDD: 4.7 CM
BH CV ECHO MEAS - LVIDS: 3.2 CM
BH CV ECHO MEAS - LVOT AREA: 3.1 CM2
BH CV ECHO MEAS - LVOT DIAM: 2 CM
BH CV ECHO MEAS - LVPWD: 1 CM
BH CV ECHO MEAS - MED PEAK E' VEL: 9.8 CM/SEC
BH CV ECHO MEAS - MV A MAX VEL: 67.6 CM/SEC
BH CV ECHO MEAS - MV DEC SLOPE: 1224 CM/SEC2
BH CV ECHO MEAS - MV DEC TIME: 0.11 SEC
BH CV ECHO MEAS - MV E MAX VEL: 66.6 CM/SEC
BH CV ECHO MEAS - MV E/A: 0.99
BH CV ECHO MEAS - MV MAX PG: 3.4 MMHG
BH CV ECHO MEAS - MV MEAN PG: 2.5 MMHG
BH CV ECHO MEAS - MV P1/2T: 21.9 MSEC
BH CV ECHO MEAS - MV V2 VTI: 16.8 CM
BH CV ECHO MEAS - MVA(P1/2T): 10 CM2
BH CV ECHO MEAS - MVA(VTI): 4 CM2
BH CV ECHO MEAS - PA ACC TIME: 0.12 SEC
BH CV ECHO MEAS - PA V2 MAX: 136.5 CM/SEC
BH CV ECHO MEAS - RAP SYSTOLE: 8 MMHG
BH CV ECHO MEAS - RVSP: 32 MMHG
BH CV ECHO MEAS - SV(LVOT): 66.5 ML
BH CV ECHO MEAS - SV(MOD-SP2): 61.9 ML
BH CV ECHO MEAS - SV(MOD-SP4): 67 ML
BH CV ECHO MEAS - TAPSE (>1.6): 2.42 CM
BH CV ECHO MEAS - TR MAX PG: 24 MMHG
BH CV ECHO MEAS - TR MAX VEL: 245 CM/SEC
BH CV ECHO MEASUREMENTS AVERAGE E/E' RATIO: 5.95
BH CV VAS BP LEFT ARM: NORMAL MMHG
BH CV XLRA - RV BASE: 3.6 CM
BH CV XLRA - RV LENGTH: 6.6 CM
BH CV XLRA - RV MID: 2.5 CM
BH CV XLRA - TDI S': 18.5 CM/SEC
BILIRUB UR QL STRIP: NEGATIVE
BODY TEMPERATURE: 37
BODY TEMPERATURE: 37
BUN SERPL-MCNC: 11 MG/DL (ref 8–23)
BUN SERPL-MCNC: 13 MG/DL (ref 8–23)
BUN SERPL-MCNC: 13 MG/DL (ref 8–23)
BUN/CREAT SERPL: 11.4 (ref 7–25)
BUN/CREAT SERPL: 12.1 (ref 7–25)
BUN/CREAT SERPL: 8.4 (ref 7–25)
CALCIUM SPEC-SCNC: 7.6 MG/DL (ref 8.6–10.5)
CALCIUM SPEC-SCNC: 8.4 MG/DL (ref 8.6–10.5)
CALCIUM SPEC-SCNC: 9.5 MG/DL (ref 8.6–10.5)
CHLORIDE SERPL-SCNC: 103 MMOL/L (ref 98–107)
CHLORIDE SERPL-SCNC: 104 MMOL/L (ref 98–107)
CHLORIDE SERPL-SCNC: 99 MMOL/L (ref 98–107)
CK SERPL-CCNC: 239 U/L (ref 20–200)
CLARITY UR: ABNORMAL
CO2 BLDA-SCNC: 10.4 MMOL/L (ref 22–33)
CO2 BLDA-SCNC: 23.8 MMOL/L (ref 22–33)
CO2 SERPL-SCNC: 12 MMOL/L (ref 22–29)
CO2 SERPL-SCNC: 22 MMOL/L (ref 22–29)
CO2 SERPL-SCNC: 23 MMOL/L (ref 22–29)
COHGB MFR BLD: 1.3 % (ref 0–2)
COHGB MFR BLD: 1.3 % (ref 0–2)
COLOR FLD: ABNORMAL
COLOR UR: YELLOW
CREAT SERPL-MCNC: 1.07 MG/DL (ref 0.76–1.27)
CREAT SERPL-MCNC: 1.14 MG/DL (ref 0.76–1.27)
CREAT SERPL-MCNC: 1.31 MG/DL (ref 0.76–1.27)
CRYSTALS FLD MICRO: NORMAL
D-LACTATE SERPL-SCNC: 1.6 MMOL/L (ref 0.5–2)
D-LACTATE SERPL-SCNC: 13.8 MMOL/L (ref 0.5–2)
D-LACTATE SERPL-SCNC: 2.2 MMOL/L (ref 0.5–2)
D-LACTATE SERPL-SCNC: 4.8 MMOL/L (ref 0.5–2)
DEPRECATED RDW RBC AUTO: 44.9 FL (ref 37–54)
EGFRCR SERPLBLD CKD-EPI 2021: 54 ML/MIN/1.73
EGFRCR SERPLBLD CKD-EPI 2021: 63.8 ML/MIN/1.73
EGFRCR SERPLBLD CKD-EPI 2021: 68.9 ML/MIN/1.73
EOSINOPHIL # BLD MANUAL: 0 10*3/MM3 (ref 0–0.4)
EOSINOPHIL NFR BLD MANUAL: 0 % (ref 0.3–6.2)
EOSINOPHIL NFR FLD MANUAL: 1 %
EPAP: 0
EPAP: 0
ERYTHROCYTE [DISTWIDTH] IN BLOOD BY AUTOMATED COUNT: 14 % (ref 12.3–15.4)
GLUCOSE BLDC GLUCOMTR-MCNC: 118 MG/DL (ref 70–130)
GLUCOSE SERPL-MCNC: 104 MG/DL (ref 65–99)
GLUCOSE SERPL-MCNC: 105 MG/DL (ref 65–99)
GLUCOSE SERPL-MCNC: 129 MG/DL (ref 65–99)
GLUCOSE UR STRIP-MCNC: NEGATIVE MG/DL
HCO3 BLDA-SCNC: 22.8 MMOL/L (ref 20–26)
HCO3 BLDA-SCNC: 9.8 MMOL/L (ref 20–26)
HCT VFR BLD AUTO: 39.2 % (ref 37.5–51)
HCT VFR BLD CALC: 32.9 % (ref 38–51)
HCT VFR BLD CALC: 38.4 % (ref 38–51)
HGB BLD-MCNC: 12.3 G/DL (ref 13–17.7)
HGB BLDA-MCNC: 10.7 G/DL (ref 13.5–17.5)
HGB BLDA-MCNC: 12.5 G/DL (ref 13.5–17.5)
HGB UR QL STRIP.AUTO: ABNORMAL
HYALINE CASTS UR QL AUTO: ABNORMAL /LPF
HYPOCHROMIA BLD QL: ABNORMAL
INHALED O2 CONCENTRATION: 28 %
INHALED O2 CONCENTRATION: 28 %
IPAP: 0
IPAP: 0
KETONES UR QL STRIP: ABNORMAL
LEFT ATRIUM VOLUME INDEX: 18 ML/M2
LEUKOCYTE ESTERASE UR QL STRIP.AUTO: NEGATIVE
LYMPHOCYTES # BLD MANUAL: 0.24 10*3/MM3 (ref 0.7–3.1)
LYMPHOCYTES NFR BLD MANUAL: 3 % (ref 5–12)
LYMPHOCYTES NFR FLD MANUAL: 34 %
MACROPHAGE FLUID: 52 %
MCH RBC QN AUTO: 27.3 PG (ref 26.6–33)
MCHC RBC AUTO-ENTMCNC: 31.4 G/DL (ref 31.5–35.7)
MCV RBC AUTO: 87.1 FL (ref 79–97)
METHGB BLD QL: 0.3 % (ref 0–1.5)
METHGB BLD QL: 0.6 % (ref 0–1.5)
MODALITY: ABNORMAL
MODALITY: ABNORMAL
MONOCYTES # BLD: 0.15 10*3/MM3 (ref 0.1–0.9)
MONOCYTES NFR FLD: 2 %
NEUTROPHILS # BLD AUTO: 4.45 10*3/MM3 (ref 1.7–7)
NEUTROPHILS NFR BLD MANUAL: 74 % (ref 42.7–76)
NEUTROPHILS NFR FLD MANUAL: 11 %
NEUTS BAND NFR BLD MANUAL: 18 % (ref 0–5)
NITRITE UR QL STRIP: NEGATIVE
OVALOCYTES BLD QL SMEAR: ABNORMAL
OXYHGB MFR BLDV: 95.9 % (ref 94–99)
OXYHGB MFR BLDV: 96.9 % (ref 94–99)
PAW @ PEAK INSP FLOW SETTING VENT: 0 CMH2O
PAW @ PEAK INSP FLOW SETTING VENT: 0 CMH2O
PCO2 BLDA: 21 MM HG (ref 35–45)
PCO2 BLDA: 32.7 MM HG (ref 35–45)
PCO2 TEMP ADJ BLD: 21 MM HG (ref 35–48)
PCO2 TEMP ADJ BLD: 32.7 MM HG (ref 35–48)
PH BLDA: 7.28 PH UNITS (ref 7.35–7.45)
PH BLDA: 7.45 PH UNITS (ref 7.35–7.45)
PH UR STRIP.AUTO: 6 [PH] (ref 5–8)
PH, TEMP CORRECTED: 7.28 PH UNITS
PH, TEMP CORRECTED: 7.45 PH UNITS
PLAT MORPH BLD: NORMAL
PLATELET # BLD AUTO: 172 10*3/MM3 (ref 140–450)
PMV BLD AUTO: 10.8 FL (ref 6–12)
PO2 BLDA: 101 MM HG (ref 83–108)
PO2 BLDA: 102 MM HG (ref 83–108)
PO2 TEMP ADJ BLD: 101 MM HG (ref 83–108)
PO2 TEMP ADJ BLD: 102 MM HG (ref 83–108)
POTASSIUM SERPL-SCNC: 3.9 MMOL/L (ref 3.5–5.2)
POTASSIUM SERPL-SCNC: 4.3 MMOL/L (ref 3.5–5.2)
POTASSIUM SERPL-SCNC: 4.6 MMOL/L (ref 3.5–5.2)
PROT UR QL STRIP: ABNORMAL
RBC # BLD AUTO: 4.5 10*6/MM3 (ref 4.14–5.8)
RBC # FLD AUTO: ABNORMAL /MM3
RBC # UR STRIP: ABNORMAL /HPF
REF LAB TEST METHOD: ABNORMAL
RENAL EPI CELLS #/AREA URNS HPF: ABNORMAL /HPF
SODIUM SERPL-SCNC: 138 MMOL/L (ref 136–145)
SODIUM SERPL-SCNC: 138 MMOL/L (ref 136–145)
SODIUM SERPL-SCNC: 141 MMOL/L (ref 136–145)
SP GR UR STRIP: 1.02 (ref 1–1.03)
SQUAMOUS #/AREA URNS HPF: ABNORMAL /HPF
TOTAL RATE: 0 BREATHS/MINUTE
TOTAL RATE: 0 BREATHS/MINUTE
TRANS CELLS #/AREA URNS HPF: ABNORMAL /HPF
UROBILINOGEN UR QL STRIP: ABNORMAL
VARIANT LYMPHS NFR BLD MANUAL: 1 % (ref 0–5)
VARIANT LYMPHS NFR BLD MANUAL: 4 % (ref 19.6–45.3)
WBC # FLD AUTO: 459 /MM3
WBC # UR STRIP: ABNORMAL /HPF
WBC MORPH BLD: NORMAL
WBC NRBC COR # BLD AUTO: 4.84 10*3/MM3 (ref 3.4–10.8)

## 2024-09-16 PROCEDURE — 36600 WITHDRAWAL OF ARTERIAL BLOOD: CPT

## 2024-09-16 PROCEDURE — 83605 ASSAY OF LACTIC ACID: CPT | Performed by: INTERNAL MEDICINE

## 2024-09-16 PROCEDURE — 25010000002 DAPTOMYCIN PER 1 MG: Performed by: INTERNAL MEDICINE

## 2024-09-16 PROCEDURE — 95816 EEG AWAKE AND DROWSY: CPT | Performed by: PSYCHIATRY & NEUROLOGY

## 2024-09-16 PROCEDURE — 89051 BODY FLUID CELL COUNT: CPT | Performed by: ORTHOPAEDIC SURGERY

## 2024-09-16 PROCEDURE — 80048 BASIC METABOLIC PNL TOTAL CA: CPT | Performed by: INTERNAL MEDICINE

## 2024-09-16 PROCEDURE — 0042T HC CT CEREBRAL PERFUSION W/WO CONTRAST: CPT

## 2024-09-16 PROCEDURE — 99291 CRITICAL CARE FIRST HOUR: CPT | Performed by: INTERNAL MEDICINE

## 2024-09-16 PROCEDURE — 82805 BLOOD GASES W/O2 SATURATION: CPT

## 2024-09-16 PROCEDURE — 82010 KETONE BODYS QUAN: CPT | Performed by: INTERNAL MEDICINE

## 2024-09-16 PROCEDURE — 99233 SBSQ HOSP IP/OBS HIGH 50: CPT | Performed by: STUDENT IN AN ORGANIZED HEALTH CARE EDUCATION/TRAINING PROGRAM

## 2024-09-16 PROCEDURE — 80048 BASIC METABOLIC PNL TOTAL CA: CPT

## 2024-09-16 PROCEDURE — 83050 HGB METHEMOGLOBIN QUAN: CPT

## 2024-09-16 PROCEDURE — 82375 ASSAY CARBOXYHB QUANT: CPT

## 2024-09-16 PROCEDURE — 25010000002 PIPERACILLIN SOD-TAZOBACTAM PER 1 G: Performed by: INTERNAL MEDICINE

## 2024-09-16 PROCEDURE — 71046 X-RAY EXAM CHEST 2 VIEWS: CPT

## 2024-09-16 PROCEDURE — 89060 EXAM SYNOVIAL FLUID CRYSTALS: CPT | Performed by: ORTHOPAEDIC SURGERY

## 2024-09-16 PROCEDURE — 93306 TTE W/DOPPLER COMPLETE: CPT

## 2024-09-16 PROCEDURE — 70450 CT HEAD/BRAIN W/O DYE: CPT

## 2024-09-16 PROCEDURE — 81001 URINALYSIS AUTO W/SCOPE: CPT | Performed by: INTERNAL MEDICINE

## 2024-09-16 PROCEDURE — 74230 X-RAY XM SWLNG FUNCJ C+: CPT

## 2024-09-16 PROCEDURE — 82550 ASSAY OF CK (CPK): CPT | Performed by: INTERNAL MEDICINE

## 2024-09-16 PROCEDURE — 87040 BLOOD CULTURE FOR BACTERIA: CPT | Performed by: INTERNAL MEDICINE

## 2024-09-16 PROCEDURE — 70496 CT ANGIOGRAPHY HEAD: CPT

## 2024-09-16 PROCEDURE — 85027 COMPLETE CBC AUTOMATED: CPT | Performed by: INTERNAL MEDICINE

## 2024-09-16 PROCEDURE — 25010000002 ACETAMINOPHEN 10 MG/ML SOLUTION

## 2024-09-16 PROCEDURE — 0S9C3ZX DRAINAGE OF RIGHT KNEE JOINT, PERCUTANEOUS APPROACH, DIAGNOSTIC: ICD-10-PCS | Performed by: ORTHOPAEDIC SURGERY

## 2024-09-16 PROCEDURE — 82948 REAGENT STRIP/BLOOD GLUCOSE: CPT

## 2024-09-16 PROCEDURE — 87205 SMEAR GRAM STAIN: CPT | Performed by: ORTHOPAEDIC SURGERY

## 2024-09-16 PROCEDURE — 25010000002 THIAMINE PER 100 MG: Performed by: STUDENT IN AN ORGANIZED HEALTH CARE EDUCATION/TRAINING PROGRAM

## 2024-09-16 PROCEDURE — 70498 CT ANGIOGRAPHY NECK: CPT

## 2024-09-16 PROCEDURE — 25810000003 SODIUM CHLORIDE 0.9 % SOLUTION: Performed by: INTERNAL MEDICINE

## 2024-09-16 PROCEDURE — 87075 CULTR BACTERIA EXCEPT BLOOD: CPT | Performed by: ORTHOPAEDIC SURGERY

## 2024-09-16 PROCEDURE — 85007 BL SMEAR W/DIFF WBC COUNT: CPT | Performed by: INTERNAL MEDICINE

## 2024-09-16 PROCEDURE — 25510000001 IOPAMIDOL PER 1 ML: Performed by: INTERNAL MEDICINE

## 2024-09-16 PROCEDURE — 93306 TTE W/DOPPLER COMPLETE: CPT | Performed by: INTERNAL MEDICINE

## 2024-09-16 PROCEDURE — 87070 CULTURE OTHR SPECIMN AEROBIC: CPT | Performed by: ORTHOPAEDIC SURGERY

## 2024-09-16 PROCEDURE — 95816 EEG AWAKE AND DROWSY: CPT

## 2024-09-16 PROCEDURE — 92611 MOTION FLUOROSCOPY/SWALLOW: CPT

## 2024-09-16 PROCEDURE — 25010000002 MIDAZOLAM PER 1 MG

## 2024-09-16 RX ORDER — SODIUM BICARBONATE 42 MG/ML
5 INJECTION, SOLUTION INTRAVENOUS ONCE
Status: DISCONTINUED | OUTPATIENT
Start: 2024-09-16 | End: 2024-09-16

## 2024-09-16 RX ORDER — MIDAZOLAM HYDROCHLORIDE 1 MG/ML
2 INJECTION INTRAMUSCULAR; INTRAVENOUS ONCE
Status: DISCONTINUED | OUTPATIENT
Start: 2024-09-16 | End: 2024-09-17

## 2024-09-16 RX ORDER — MIDAZOLAM HYDROCHLORIDE 1 MG/ML
INJECTION INTRAMUSCULAR; INTRAVENOUS
Status: COMPLETED
Start: 2024-09-16 | End: 2024-09-16

## 2024-09-16 RX ORDER — ACETAMINOPHEN 10 MG/ML
1000 INJECTION, SOLUTION INTRAVENOUS ONCE
Status: COMPLETED | OUTPATIENT
Start: 2024-09-16 | End: 2024-09-16

## 2024-09-16 RX ORDER — ACETAMINOPHEN 650 MG/1
650 SUPPOSITORY RECTAL EVERY 4 HOURS PRN
Status: DISCONTINUED | OUTPATIENT
Start: 2024-09-16 | End: 2024-09-23 | Stop reason: HOSPADM

## 2024-09-16 RX ORDER — IOPAMIDOL 755 MG/ML
150 INJECTION, SOLUTION INTRAVASCULAR
Status: COMPLETED | OUTPATIENT
Start: 2024-09-16 | End: 2024-09-16

## 2024-09-16 RX ORDER — LORAZEPAM 2 MG/ML
1 INJECTION INTRAMUSCULAR ONCE
Status: DISCONTINUED | OUTPATIENT
Start: 2024-09-16 | End: 2024-09-16

## 2024-09-16 RX ADMIN — BARIUM SULFATE 10 ML: 400 SUSPENSION ORAL at 09:14

## 2024-09-16 RX ADMIN — ACETAMINOPHEN 1000 MG: 10 INJECTION INTRAVENOUS at 17:38

## 2024-09-16 RX ADMIN — PIPERACILLIN AND TAZOBACTAM 3.38 G: 3; .375 INJECTION, POWDER, FOR SOLUTION INTRAVENOUS at 13:14

## 2024-09-16 RX ADMIN — PANTOPRAZOLE SODIUM 40 MG: 40 TABLET, DELAYED RELEASE ORAL at 06:32

## 2024-09-16 RX ADMIN — PIPERACILLIN AND TAZOBACTAM 3.38 G: 3; .375 INJECTION, POWDER, LYOPHILIZED, FOR SOLUTION INTRAVENOUS at 20:55

## 2024-09-16 RX ADMIN — THIAMINE HYDROCHLORIDE 250 MG: 100 INJECTION, SOLUTION INTRAMUSCULAR; INTRAVENOUS at 14:16

## 2024-09-16 RX ADMIN — SODIUM CHLORIDE 250 ML: 9 INJECTION, SOLUTION INTRAVENOUS at 12:00

## 2024-09-16 RX ADMIN — THIAMINE HYDROCHLORIDE 250 MG: 100 INJECTION, SOLUTION INTRAMUSCULAR; INTRAVENOUS at 20:55

## 2024-09-16 RX ADMIN — THIAMINE HYDROCHLORIDE 250 MG: 100 INJECTION, SOLUTION INTRAMUSCULAR; INTRAVENOUS at 04:12

## 2024-09-16 RX ADMIN — SODIUM BICARBONATE 50 MEQ: 84 INJECTION INTRAVENOUS at 15:18

## 2024-09-16 RX ADMIN — Medication 10 ML: at 12:01

## 2024-09-16 RX ADMIN — IOPAMIDOL 150 ML: 755 INJECTION, SOLUTION INTRAVENOUS at 16:14

## 2024-09-16 RX ADMIN — SODIUM CHLORIDE 500 ML: 9 INJECTION, SOLUTION INTRAVENOUS at 12:56

## 2024-09-16 RX ADMIN — BARIUM SULFATE 50 ML: 400 SUSPENSION ORAL at 09:14

## 2024-09-16 RX ADMIN — MIDAZOLAM HYDROCHLORIDE: 1 INJECTION, SOLUTION INTRAMUSCULAR; INTRAVENOUS at 16:27

## 2024-09-16 RX ADMIN — BARIUM SULFATE 100 ML: 0.81 POWDER, FOR SUSPENSION ORAL at 09:15

## 2024-09-16 RX ADMIN — ACETAMINOPHEN 1000 MG: 500 TABLET ORAL at 03:58

## 2024-09-16 RX ADMIN — DAPTOMYCIN 550 MG: 500 INJECTION, POWDER, LYOPHILIZED, FOR SOLUTION INTRAVENOUS at 17:41

## 2024-09-16 RX ADMIN — SODIUM BICARBONATE 75 MEQ: 84 INJECTION, SOLUTION INTRAVENOUS at 13:34

## 2024-09-16 RX ADMIN — BARIUM SULFATE 20 ML: 400 PASTE ORAL at 09:15

## 2024-09-16 RX ADMIN — SODIUM BICARBONATE 50 MEQ: 84 INJECTION, SOLUTION INTRAVENOUS at 17:13

## 2024-09-16 RX ADMIN — ACETAMINOPHEN 650 MG: 650 SUPPOSITORY RECTAL at 10:40

## 2024-09-16 NOTE — PLAN OF CARE
Goal Outcome Evaluation:      Patient is alert ,but confused.On 2 L through nasal cannula.IV inj  Sodium Bicarbonate infusion on flow.Lactate is 13.8.Patient was found unresponsive at 1020 am, Rapid Response was called and stroke team as well.His Temp was 101 , Acetaminophen suppository inserted.Saline Bolus given.Antibiotics started .Cooling blanket was initiated and Temp came down.Echo done .family came in.Patient was transferred  to do a CTA brain and Ct perfusion scan, but became unstable , so brought him back to room.    Opened  his eyes now, started responding few words .Transferred him to ICU for further Management.

## 2024-09-16 NOTE — SIGNIFICANT NOTE
Stroke called this morning @ 10:27 for acute change in mental status.  Evidently patient was at his neurologic baseline around 09:45 prior to going for a chest x-ray and swallow study.  Per bedside RN patient was alert and oriented x 3 at that time.  When he returned around 10:15 he was essentially obtunded, not following any commands with increased work of breathing and a rapid response was called for unresponsiveness.  House supervisor noted gaze deviation to the left prompting a code stroke to be called    Upon examination of the patient he is obtunded.  Makes no attempts at speech.  Gaze initially deviated to the left but crossed over to the right and back to the left.  No response to visual threat in any quadrant.  Face does appear symmetrical.  Withdraws to painful stimuli on the left and right upper extremity.  No response in the right lower extremity.  Positive Babinski on the right.  Patient also appears quite pale, labored breathing, heart rate is in the 140s, SpO2 97%, RN reported temperature of 103 around 3 AM which improved to 100.3 around 730 this morning.      NIH Stroke Scale  Time: 10:35 EDT  Person Administering Scale: LEEROY Castellon    1a  Level of consciousness: 2=not alert, requires repeated stimulation to attend, or is obtunded and requires strong or painful stimulation to make movements (not stereotyped)   1b. LOC questions:  2=Answers neither question   1c. LOC commands: 2=Performs neither task correctly   2.  Best Gaze: 1=partial gaze palsy   3.  Visual: 3=Bilateral hemianopia (blind including cortical blindness)   4. Facial Palsy: 0=Normal symmetric movement   5a.  Motor left arm: 3=No effort against gravity, limb falls   5b.  Motor right arm: 3=No effort against gravity, limb falls   6a. motor left leg: 3=No effort against gravity, limb falls   6b  Motor right le=No movement   7. Limb Ataxia: 0=Absent   8.  Sensory: 0=Normal; no sensory loss   9. Best Language:  3=Mute, global  aphasia; no usable speech or auditory comprehension   10. Dysarthria: 2=Severe; patient speech is so slurred as to be unintelligible in the absence of or our of proportion to any dysphagia, or is mute/anarthric   11. Extinction and Inattention: 1=Visual, tactile, auditory, spatial or personal inattention or extinction to bilateral simultaneous stimulation in one of the sensory modalities    Total:   29     Unresponsive  Febrile, tachycardic, increased work of breathing    -Appears encephalopathic, but exam is essentially nonfocal  -Stat labs pending and ABG at bedside  -Patient was switched to p.o. antibiotics yesterday per ID as he lost IV access.  Spiked temp of 103 overnight  -CT head performed 8/22 was negative for acute abnormality  -Has been evaluated by general neurology, Dr. Lira who recommended MRI which they were unable to obtain secondary to cochlear implants  -Question as to whether patient may have aspirated during FEES given this acute change  -Recommend stat CT head, CTA and perfusion.  However patient's respiratory status is not stable enough to transport to CT at this time (patient had a brief episode of apnea when laid flat).  Discussed this with Dr. VALENCIA who is addressing fever and respiratory status.  Discussed with nursing as well, asked to call CT once respiratory exam improves and transport patient for emergent scans.  -Discussed case with Dr. Lira who will be seeing patient later today as well  -Stroke neurology will follow-up on scans and make further recommendations if needed    Addendum 1630: Nursing attempted to transport patient to CT scanner earlier this afternoon but he had an apneic episode after and heart rate dropped from 130 to 70 while in the elevator and they had to return to the floor.  Patient was transferred to the ICU.  Shortly after arrival to ICU patient was transported to CT scan    Noncon CT is negative for acute abnormality, specifically no hemorrhage  CT perfusion is  essentially nondiagnostic due to motion degradation  CTA head and neck negative for flow-limiting stenosis.  Specifically basilar artery is open    I do not believe patient's change in altered mental status is due to a cerebrovascular event.  General neurology to follow in AM.    LEEROY Lazo

## 2024-09-16 NOTE — PROGRESS NOTES
" progress note      Guicho Blanco  [id number]  1940     LOS: 0 days     Attending: Berto Liu MD    Primary Care Provider: Mitchel Culver MD      Chief Complaint/Reason for visit: Right knee postop cellulitis    Subjective   Patient had high fevers above 103 earlier this morning.  Apparently was still interactive according to the staff.  He later spiked another temperature and his mentation worsened very rapidly.  A code stroke was called and he was noted to have somewhat of a left gaze.  He was markedly tachycardic at the time with a heart rate in the 130s along with a fever of 103+.  He was tachypneic and not responsive.  Tachycardic, regular    Objective      Visit Vitals  /66   Pulse 111   Temp (!) 101.1 °F (38.4 °C) (Rectal)   Resp 20   Ht 182.9 cm (72\")   Wt 95.3 kg (210 lb)   SpO2 98%   BMI 28.48 kg/m²     Temp (24hrs), Av.6 °F (38.1 °C), Min:97.6 °F (36.4 °C), Max:103.4 °F (39.7 °C)         Physical Exam:     General Appearance:  Tachypneic and lethargic.   Head:    Normocephalic, without obvious abnormality, atraumatic    Lungs:     Normal effort, symmetric chest rise, no crepitus, clear to      auscultation bilaterally             Heart:  Tachycardic, regular.  Normal S1 and S2   Abdomen:   Soft with no tenderness, no rigidity.  No rebound tenderness.   Extremities:   Less erythema of right knee incision.   No pain with ROM.  No deformities.    Pulses:   Pulses palpable and equal bilaterally   Skin:   No bleeding, bruising or rash   Neurologic: Lethargy and unresponsiveness at initial eval.  Multiple follow-up exams, more recently patient awakens to stimulation and acknowledges his daughter who is visiting.  Moving all extremities.     Results Review:     I reviewed the patient's new clinical results.   Results from last 7 days   Lab Units 24  1044 24  1334 24  1232   WBC 10*3/mm3 4.84  --  7.23   HEMOGLOBIN g/dL 12.3*  --  10.8*   HEMOGLOBIN, " POC g/dL  --  10.9*  --    HEMATOCRIT % 39.2  --  34.8*   HEMATOCRIT POC %  --  32*  --    PLATELETS 10*3/mm3 172  --  197   MONOCYTES % % 3.0*  --   --    EOSINOPHIL % % 0.0*  --   --      Results from last 7 days   Lab Units 09/16/24  1045 09/15/24  0631 09/14/24  1145 09/13/24  1341   SODIUM mmol/L 138 137 137 136   POTASSIUM mmol/L 4.3 4.2 4.1 3.9   CHLORIDE mmol/L 99 103 102 100   CO2 mmol/L 12.0* 25.0 23.0 24.0   BUN mg/dL 11 6* 10 18   CREATININE mg/dL 1.31* 0.84 0.83 0.97   CALCIUM mg/dL 9.5 9.2 8.9 9.2   BILIRUBIN mg/dL  --   --   --  0.5   ALK PHOS U/L  --   --   --  152*   ALT (SGPT) U/L  --   --   --  20   AST (SGOT) U/L  --   --   --  28   GLUCOSE mg/dL 129* 111* 118* 88     I reviewed the patient's new imaging including images and reports.   Latest Reference Range & Units 09/16/24 10:45   Lactate 0.5 - 2.0 mmol/L 13.8 (C)   (C): Data is critically high  All medications reviewed.   aspirin, 325 mg, Oral, Daily  DAPTOmycin, 6 mg/kg, Intravenous, Q24H  flecainide, 100 mg, Oral, Q12H  isosorbide mononitrate, 30 mg, Oral, Daily  nortriptyline, 20 mg, Oral, Nightly  pantoprazole, 40 mg, Oral, Q AM  piperacillin-tazobactam, 3.375 g, Intravenous, Once  piperacillin-tazobactam, 3.375 g, Intravenous, Q8H  pravastatin, 20 mg, Oral, Daily  saccharomyces boulardii, 250 mg, Oral, BID  sodium bicarbonate, 50 mEq, Intravenous, Once  sodium chloride, 10 mL, Intravenous, Q12H  thiamine (B-1) IV, 250 mg, Intravenous, Q8H        Assessment & Plan     Cellulitis of right knee    Hearing loss    Neuropathy    Failure to thrive in adult    Insomnia    Moderate malnutrition    Sepsis    Lactic acidosis  Acute kidney failure    Plan  Sepsis with lactic acidosis: Source is unclear as patient's right knee appeared to be clinically improving over the course of his hospitalization to date.    We have not been able to get CT scan of the head to rule out stroke as a factor of his worsening mentation but toxic metabolic  encephalopathy likely as well.    Sepsis workup has been initiated and IV fluid resuscitation started along with IV bicarb.    I have changed to IV antibiotics including daptomycin and Zosyn after discussing with Dr. Manzanares.    I have been in touch with patient's family throughout the morning including his wife and his daughter.  His previously stated goals of care included full support and CPR.    Patient's wife is aware of his wish not to be prolonged on life support and having watched him declined over a period of time and with it being likely if he required resuscitation for being on mechanical ventilation that could linger for a while she opted for no CPR or shocks or events but to continue medical treatment and support.    Noting that patient's renal function has worsened as well.  With creatinine up to 1.3 today.    Further workup may include head CT scan abdomen and pelvis CT scan if patient's situation allows.    For treatment set up and after attempting to keep patient on the floor initially it is felt more reasonable to transfer to an ICU setting.  I have discussed with Dr. Castro who is looking into ICU bed situation.    I have discussed with Dr. Ramos as well.    CCT 55 min.

## 2024-09-16 NOTE — PROGRESS NOTES
Patient seen this morning during rounds with chills having a fever spike to 103 earlier this morning.  Code stroke called this morning at 1030 for acute change of mental status.  Patient admitted to the ICU.      Current temperature 104.7.    Patient not intubated  But poorly responsive.  Family at bedside  Knee warm but not with effusion or induration.      Because of significant fever spike, right knee was aspirated under sterile conditions.  Fluid appeared to be serosanguineous.  Fluid sent for cultures, cell count, crystals.        Procedure Note:    I discussed with the patient the potential benefits of performing therapeutic aspiration and injections as well as potential risks including but not limited to infection, swelling, pain, bleeding, bruising, nerve/vessel damage, skin color changes, transient elevation in blood glucose levels, and fat atrophy. After informed consent and after the areas were prepped with chlorhexadine soap, ethyl chloride was used to numb the skin. Via the superiorlateral approach, 3mL of 1% lidocaine was injected followed by aspiration of 10 mL of serosanguineous colored fluid from the right knee joint.  The patient tolerated the procedure well. There were no complications. A sterile dressing was placed over the injection sites.         Addendum: Laboratory studies reviewed.  No crystals visualized.  White blood cell count 459.  Typical threshold for prosthetic joint infection at this point is around 27,800 or higher.  Will follow culture results.

## 2024-09-16 NOTE — PROGRESS NOTES
"  INFECTIOUS DISEASES  INPATIENT PROGRESS NOTE  2024      PATIENT NAME: Guicho Blanco  :  1940  MRN:  [id number]  Date of Admission:  2024      Antimicrobials:  Started 24  Daptomycin  Piperacillin/tazobactam  -----------------------------------  Bactrim 9/15/24  IV vancomycin/ceftriaxone - 24 - 9/15/24  Linezolid (outpatient 24 - 24)      MAR reviewed.     Reason for consultation:  fever, right knee cellulitis    Interval history: Patient lost IV access yesterday and was changed to oral Bactrim.  He had 2 doses.  He was also started on Remeron over the weekend per neurology.  Admitted fever this morning with increasing confusion, even higher this afternoon up to 40.4 °C.  He has been transferred to the ICU.  Orthopedics is at bedside to aspirate his right knee.    ROS:  Unable to obtain due to current medical condition and altered mental status.      Objective:  Temp (24hrs), Av.2 °F (38.4 °C), Min:99 °F (37.2 °C), Max:104.7 °F (40.4 °C)    /65   Pulse 116   Temp (!) 104.7 °F (40.4 °C) (Bladder)   Resp 20   Ht 182.9 cm (72.01\")   Wt 95.3 kg (210 lb)   SpO2 97%   BMI 28.47 kg/m²     Physical Examination:  GENERAL: Acutely ill-appearing elderly male, decreased level of responsiveness.  HEENT: Normocephalic, atraumatic.  Eyes closed  NECK: Supple without nuchal rigidity.   LUNGS: Decreased respiratory effort.  CV:  Tachycardic.  ABD:  NABS, soft, NT/ND.  SKIN: Right knee incision closed.  Patchy erythema and warmth about incision - ongoing.  Prior lateral cellulitis resolved.  MS:  Right knee enlarged.  NEURO: decreased level of responsiveness    Laboratory Data:    Results from last 7 days   Lab Units 24  1044 24  1334 24  1232   WBC 10*3/mm3 4.84  --  7.23   HEMOGLOBIN g/dL 12.3*  --  10.8*   HEMOGLOBIN, POC g/dL  --  10.9*  --    HEMATOCRIT % 39.2  --  34.8*   HEMATOCRIT POC %  --  32*  --    PLATELETS 10*3/mm3 172  --  197     Results " from last 7 days   Lab Units 09/16/24  1535   SODIUM mmol/L 141   POTASSIUM mmol/L 4.6   CHLORIDE mmol/L 104   CO2 mmol/L 23.0   BUN mg/dL 13   CREATININE mg/dL 1.14   GLUCOSE mg/dL 105*   CALCIUM mg/dL 8.4*     Results from last 7 days   Lab Units 09/13/24  1341   ALK PHOS U/L 152*   BILIRUBIN mg/dL 0.5   ALT (SGPT) U/L 20   AST (SGOT) U/L 28     Results from last 7 days   Lab Units 09/13/24  1232   SED RATE mm/hr 18     Results from last 7 days   Lab Units 09/13/24  1341   CRP mg/dL 0.70*     Estimated Creatinine Clearance: 58.8 mL/min (by C-G formula based on SCr of 1.14 mg/dL).  Results from last 7 days   Lab Units 09/16/24  1535   LACTATE mmol/L 4.8*         Results from last 7 days   Lab Units 09/15/24  0631   VANCOMYCIN RM mcg/mL 12.10           Microbiology:    Microbiology Results (last 10 days)       Procedure Component Value - Date/Time    Blood Culture - Blood, Arm, Right [[id number]]  (Normal) Collected: 09/13/24 1405    Lab Status: Preliminary result Specimen: Blood from Arm, Right Updated: 09/16/24 1430     Blood Culture No growth at 3 days    COVID PRE-OP / PRE-PROCEDURE SCREENING ORDER (NO ISOLATION) - Swab, Nasopharynx [[id number]]  (Normal) Collected: 09/13/24 1344    Lab Status: Final result Specimen: Swab from Nasopharynx Updated: 09/13/24 1418    Narrative:      The following orders were created for panel order COVID PRE-OP / PRE-PROCEDURE SCREENING ORDER (NO ISOLATION) - Swab, Nasopharynx.  Procedure                               Abnormality         Status                     ---------                               -----------         ------                     COVID-19 and FLU A/B PCR...[[id number]]  Normal              Final result                 Please view results for these tests on the individual orders.    COVID-19 and FLU A/B PCR, 1 HR TAT - Swab, Nasopharynx [[id number]]  (Normal) Collected: 09/13/24 1344    Lab Status: Final result Specimen: Swab from Nasopharynx Updated: 09/13/24 1416      COVID19 Not Detected     Influenza A PCR Not Detected     Influenza B PCR Not Detected    Narrative:      Fact sheet for providers: https://www.fda.gov/media/818427/download    Fact sheet for patients: https://www.fda.gov/media/630234/download    Test performed by PCR.    Blood Culture - Blood, Arm, Right [[id number]]  (Normal) Collected: 09/13/24 1341    Lab Status: Preliminary result Specimen: Blood from Arm, Right Updated: 09/16/24 1430     Blood Culture No growth at 3 days              Radiology:  EEG    Result Date: 9/16/2024  Diffuse cerebral dysfunction of at least mild degree, nonspecific.  This is most commonly seen due to toxic/metabolic cause This report is transcribed using the Dragon dictation system.      FL Video Swallow With Speech Single Contrast    Result Date: 9/16/2024  Impression: Fluoroscopy provided for a modified barium swallow. Aspiration was seen during swallowing evaluation. Please see speech therapy report for full details and recommendations. Report dictated by: Jess Sahu PA-c  I have personally reviewed this case and agree with the findings above: Electronically Signed: Roge Guillen MD  9/16/2024 4:40 PM EDT  Workstation ID: [id number]    CT Head Without Contrast Stroke Protocol    Result Date: 9/16/2024  Impression: The exam is somewhat limited owing to streak artifact and beam hardening relating to cochlear implants, and motion artifact. No acute intracranial findings. Chronic and senescent changes as above. Electronically Signed: Roge Guillen MD  9/16/2024 4:39 PM EDT  Workstation ID: [id number]    CT Angiogram Head w AI Analysis of LVO    Result Date: 9/16/2024  1.No acute abnormality is identified within the large arteries of the head or neck. 2.No significant stenosis of the bilateral internal carotid arteries. Electronically Signed: Tomy Dias MD  9/16/2024 4:27 PM EDT  Workstation ID: [id number]    CT Angiogram Neck    Result Date: 9/16/2024  1.No acute abnormality  is identified within the large arteries of the head or neck. 2.No significant stenosis of the bilateral internal carotid arteries. Electronically Signed: Tomy Dias MD  9/16/2024 4:27 PM EDT  Workstation ID: [id number]    CT CEREBRAL PERFUSION WITH & WITHOUT CONTRAST    Result Date: 9/16/2024  Impression: 1.Possible compromised examination secondary to streak artifact from metal devices along bilateral posterior scalp. 2.No definite evidence of acute infarct. 3.9 mL of abnormal Tmax perfusion corresponding to left temporal lobe, which could represent tissue at risk versus artifact. Electronically Signed: Sanjay Velasco MD  9/16/2024 4:26 PM EDT  Workstation ID: [id number]    XR Chest PA & Lateral    Result Date: 9/16/2024  Impression: No acute process. Electronically Signed: Sylwia Sampson MD  9/16/2024 8:55 AM EDT  Workstation ID: [id number]       DISCUSSION:  83 y.o. male with history of recent right knee cellulitis after TKA admitted with encephalopathy.       PROBLEM LIST:   -- Encephalopathy, worse.  New fever - hectic.  Lactic acidosis.  Patient with recent oral linezolid antibiotic use last week and was started on mirtazapine per neurology.  I suspect he has serotonin syndrome.  No evidence for pneumonia.  Urinalysis bland.  Would also consider knee as source of infection.  -- Right knee cellulitis.  Recent admission earlier this month with lateral right knee superficial cellulitis.  Did not appear to involve joint or incision.  Treated with IV vancomycin and ceftriaxone with improvement.  Discharged home on oral doxycycline and cephalexin.  I saw him back in office 2 days prior to admission, when he had new erythema and warmth about the incision itself.  I changed his antibiotic to oral linezolid. Continues to have erythema/warmth about incision; consider reaction to sutures.  Normal WBC, ESR, and PCT at admission.  S/p right knee aspiration per ortho 9/16/24.  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    PLAN:  -- Agree  with discontinuation of mirtazapine per neurology  -- Expand antibiotics to daptomycin and piperacillin/tazobactam for now  -- Follow synovial fluid cultures from the right knee  -- Recheck ESR; follow WBC and PCT  -- Supportive care in the ICU for likely serotonin syndrome    Complex case.  Case discussed with Dr. ANNIE rice today.  Plan discussed with family at bedside and Dr. Ramos.    Jae Manzanares MD  9/16/2024  17:47 EDT

## 2024-09-16 NOTE — PLAN OF CARE
Problem: Adult Inpatient Plan of Care  Goal: Absence of Hospital-Acquired Illness or Injury  Intervention: Identify and Manage Fall Risk  Recent Flowsheet Documentation  Taken 9/16/2024 0400 by Maryjo Quiroz RN  Safety Promotion/Fall Prevention:   activity supervised   assistive device/personal items within reach   clutter free environment maintained   lighting adjusted   nonskid shoes/slippers when out of bed   room organization consistent   safety round/check completed  Taken 9/16/2024 0246 by Maryjo Quiroz RN  Safety Promotion/Fall Prevention:   activity supervised   assistive device/personal items within reach   clutter free environment maintained   lighting adjusted   nonskid shoes/slippers when out of bed   room organization consistent   safety round/check completed  Taken 9/16/2024 0015 by Maryjo Quiroz RN  Safety Promotion/Fall Prevention:   activity supervised   assistive device/personal items within reach   clutter free environment maintained   lighting adjusted   nonskid shoes/slippers when out of bed   room organization consistent   safety round/check completed  Taken 9/15/2024 2240 by Maryjo Quiroz RN  Safety Promotion/Fall Prevention:   activity supervised   assistive device/personal items within reach   clutter free environment maintained   lighting adjusted   nonskid shoes/slippers when out of bed   room organization consistent   safety round/check completed  Taken 9/15/2024 2025 by Maryjo Quiroz RN  Safety Promotion/Fall Prevention:   assistive device/personal items within reach   clutter free environment maintained   activity supervised   lighting adjusted   nonskid shoes/slippers when out of bed   room organization consistent   safety round/check completed  Intervention: Prevent Skin Injury  Recent Flowsheet Documentation  Taken 9/16/2024 0400 by Maryjo Quiroz, RN  Body Position: sitting up in bed  Skin Protection:   adhesive use limited   incontinence pads utilized   tubing/devices free from  skin contact   skin-to-skin areas padded   skin-to-device areas padded   skin sealant/moisture barrier applied   silicone foam dressing in place  Taken 9/16/2024 0246 by Maryjo Quiroz RN  Skin Protection:   adhesive use limited   incontinence pads utilized   tubing/devices free from skin contact   skin-to-skin areas padded   skin-to-device areas padded   skin sealant/moisture barrier applied   silicone foam dressing in place  Taken 9/16/2024 0015 by Maryjo Quiroz RN  Body Position:   side-lying   right  Skin Protection:   adhesive use limited   incontinence pads utilized   tubing/devices free from skin contact   skin-to-device areas padded   skin-to-skin areas padded   skin sealant/moisture barrier applied  Taken 9/15/2024 2240 by Maryjo Quiroz RN  Skin Protection:   adhesive use limited   tubing/devices free from skin contact   skin-to-skin areas padded   skin-to-device areas padded   skin sealant/moisture barrier applied   silicone foam dressing in place   pulse oximeter probe site changed  Taken 9/15/2024 2025 by Maryjo Quiroz RN  Skin Protection:   adhesive use limited   incontinence pads utilized   tubing/devices free from skin contact   skin-to-skin areas padded   skin-to-device areas padded   skin sealant/moisture barrier applied  Intervention: Prevent and Manage VTE (Venous Thromboembolism) Risk  Recent Flowsheet Documentation  Taken 9/16/2024 0400 by Maryjo Quiroz RN  VTE Prevention/Management:   sequential compression devices off   patient refused intervention  Taken 9/16/2024 0246 by Maryjo Quiroz RN  VTE Prevention/Management:   sequential compression devices off   patient refused intervention  Taken 9/16/2024 0015 by Maryjo Quiroz RN  Activity Management: ambulated to bathroom  VTE Prevention/Management:   bilateral   sequential compression devices on  Taken 9/15/2024 2240 by Maryjo Quiroz RN  VTE Prevention/Management:   bilateral   sequential compression devices on  Taken 9/15/2024 2025 by Richie  AUSTIN Sibley  VTE Prevention/Management:   bilateral   sequential compression devices on  Range of Motion: ROM (range of motion) performed  Intervention: Prevent Infection  Recent Flowsheet Documentation  Taken 9/16/2024 0400 by Maryjo Quiroz RN  Infection Prevention:   environmental surveillance performed   hand hygiene promoted   rest/sleep promoted   single patient room provided  Taken 9/16/2024 0246 by Maryjo Quiroz RN  Infection Prevention:   environmental surveillance performed   hand hygiene promoted   rest/sleep promoted   single patient room provided  Taken 9/16/2024 0015 by Maryjo Quiroz RN  Infection Prevention:   environmental surveillance performed   hand hygiene promoted   rest/sleep promoted   single patient room provided  Taken 9/15/2024 2240 by Maryjo Quiroz RN  Infection Prevention:   environmental surveillance performed   hand hygiene promoted   rest/sleep promoted   single patient room provided  Taken 9/15/2024 2025 by Maryjo Quiroz RN  Infection Prevention:   environmental surveillance performed   hand hygiene promoted   rest/sleep promoted   single patient room provided  Goal: Optimal Comfort and Wellbeing  Intervention: Provide Person-Centered Care  Recent Flowsheet Documentation  Taken 9/15/2024 2025 by Maryjo Quiroz RN  Trust Relationship/Rapport:   care explained   choices provided   emotional support provided   empathic listening provided   questions answered   questions encouraged   reassurance provided   thoughts/feelings acknowledged   Goal Outcome Evaluation:

## 2024-09-16 NOTE — MBS/VFSS/FEES
Acute Care - Speech Language Pathology   Swallow Initial Evaluation  [facility]  Modified Barium Swallow Study (MBS)     Patient Name: Guicho Blanco  : 1940  MRN: [id number]  Today's Date: 2024               Admit Date: 2024    Visit Dx:     ICD-10-CM ICD-9-CM   1. Generalized weakness  R53.1 780.79   2. Cellulitis of right lower extremity  L03.115 682.6   3. Oropharyngeal dysphagia  R13.12 787.22     Patient Active Problem List   Diagnosis    Osteomyelitis of toe of right foot    Osteomyelitis of right foot    PAF (paroxysmal atrial fibrillation)    Coronary artery disease involving native coronary artery of native heart without angina pectoris    Gastroesophageal reflux disease without esophagitis    Benign non-nodular prostatic hyperplasia without lower urinary tract symptoms    S/P appy    Essential hypertension    Simple chronic bronchitis    Mixed hyperlipidemia    COPD (chronic obstructive pulmonary disease)    BPH (benign prostatic hypertrophy)    Hearing loss    Chest pain    Vertigo    Snoring    Overweight    PLMD (periodic limb movement disorder)    Neuropathy    Periodic headache syndrome, not intractable    Acquired absence of other right toe(s)    Tremor    Bilateral impacted cerumen    Bilateral sensorineural hearing loss    Chronic otitis externa    Deviated nasal septum    Disorder of joint of foot    ED (erectile dysfunction) of organic origin    Hydrocele    Hydrocele of testis    Impacted cerumen    Mononeuropathy    Swelling of testicle    Venous retinal branch occlusion    Vitreous degeneration    Benign prostatic hyperplasia with urinary obstruction    OA (osteoarthritis) of knee    Status post total right knee replacement    Septic arthritis    Cellulitis    Cellulitis of right knee    Failure to thrive in adult    Insomnia    Moderate malnutrition     Past Medical History:   Diagnosis Date    Arthritis     Atrial fibrillation     BPH (benign prostatic hypertrophy)      Cataract     Cellulitis     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     bilateral    Coronary artery disease     Full dentures     Gall stones     GERD (gastroesophageal reflux disease)     Hearing loss     Hiatal hernia     Hyperlipidemia     Hypertension     Peripheral neuropathy     Peripheral vascular disease     Seasonal allergies     Skin cancer     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    TIA (transient ischemic attack)     2 times    Wears glasses      Past Surgical History:   Procedure Laterality Date    ABLATION OF DYSRHYTHMIC FOCUS      APPENDECTOMY      CARDIAC CATHETERIZATION N/A 10/05/2017    Procedure: Left Heart Cath;  Surgeon: Hector Urrutia MD;  Location:  [facility];  Service:     CATARACT EXTRACTION Bilateral     CHOLECYSTECTOMY      Remote    COCHLEAR IMPLANT REVISION  05/2019    COLONOSCOPY      PROSTATE SURGERY      SHOULDER SURGERY Right     SKIN BIOPSY      TOE AMPUTATION Right     2nd toe - Right Foot    TONSILLECTOMY      TOTAL KNEE ARTHROPLASTY Right 8/7/2024    Procedure: TOTAL KNEE ARTHROPLASTY WITH ASHLEY ROBOT RIGHT;  Surgeon: Christian Schwartz MD;  Location:  [facility];  Service: Robotics - Ortho;  Laterality: Right;       SLP Recommendation and Plan  SLP Swallowing Diagnosis: mod-severe, oral dysphagia, severe, pharyngeal dysphagia (09/16/24 0900)  SLP Diet Recommendation: NPO, other (see comments) (hold PO intake for now until further GOC/POC discussions) (09/16/24 0900)     SLP Rec. for Method of Medication Administration: meds crushed, with puree (crush as able. Ok to provide tsp nectar-thick liquids if need assistance clearing mouth. Following full administration of meds, cue pt to cough a few times.) (09/16/24 0900)     Monitor for Signs of Aspiration: notify SLP if any concerns (09/16/24 0900)        Anticipated Discharge Disposition (SLP): other (see comments) (pending further GOC/POC discussions with pt/family) (09/16/24 0900)  Rehab Potential/Prognosis, Swallowing:  re-evaluate goals as necessary (09/16/24 0900)        Oral Care Recommendations: Oral Care BID/PRN, Toothbrush (09/16/24 0900)                                        Plan of Care Reviewed With: patient      SWALLOW EVALUATION (Last 72 Hours)       SLP Adult Swallow Evaluation       Row Name 09/16/24 0900 09/14/24 0940                Rehab Evaluation    Document Type evaluation  -SM evaluation  -       Subjective Information no complaints  -SM no complaints  -AC       Patient Observations alert;cooperative  -SM alert;cooperative  -AC       Patient/Family/Caregiver Comments/Observations -- Dtr present. Spouse on speaker phone during eval per pt/dtr request.  -AC       Patient Effort good  -SM good  -AC       Oral Care -- swabbed with antiseptic solution;dental appliance cleaned  -          General Information    Patient Profile Reviewed -- yes  -AC       Pertinent History Of Current Problem -- R knee cellulitis. Encephalopathy of unknown cause. Family reported cont'd decline--weaker, more confused, increased difficulty swallowing, weaker/hoarse/softer voice, some slurring. Hx AFTT, GERD, COPD, pharyngoesophageal dysphagia. 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 MBS during that admission revealed R facial droop, WFL swallow. Multiple MBSs since. Most recent completed 8/31/24. MBSs have consistently revealed anterior cervical spine osteophytes near C5, though minimal impact on swallowing, no penetration/aspiration. Most recently SLP recommended soft/chopped textures (for ease) and thin liquids after MBS (w/ suggestion to downgrade to nectar-thick if throat clearing/coughing noted per clinical assessment). Pt was evaluated by  SLP few days ago who did recommend downgrade to nectar-thick liquids w/ thin liquid between meals.  -AC       Current Method of Nutrition pureed;nectar/syrup-thick liquids;water between meals  -SM soft to chew textures;chopped;thin liquids  -AC       Precautions/Limitations,  Vision -- WFL;for purposes of eval  -AC       Precautions/Limitations, Hearing -- hearing impairment, bilaterally;other (see comments)  cochlear implants  -       Prior Level of Function-Communication -- other (see comments)  pt/family have noted possible motor speech/voice changes  -       Prior Level of Function-Swallowing -- soft to chew;nectar thick liquids;water between meals;ice between meals;esophageal concerns;concerns regarding malnutrition  -       Plans/Goals Discussed with patient  - patient and family;agreed upon  -       Barriers to Rehab cognitive status;previous functional deficit  - medically complex;previous functional deficit  -       Patient's Goals for Discharge --  preference to thin over thickened liquids but not otherwise able to provide insight.  - eat/drink without coughing/choking  -       Family Goals for Discharge -- patient able to eat/drink without coughing/choking  -          Pain    Additional Documentation -- Pain Scale: FACES Pre/Post-Treatment (Group)  -          Pain Scale: Numbers Pre/Post-Treatment    Pretreatment Pain Rating 0/10 - no pain  - --       Posttreatment Pain Rating 0/10 - no pain  - --          Pain Scale: FACES Pre/Post-Treatment    Pain: FACES Scale, Pretreatment -- 0-->no hurt  -AC       Posttreatment Pain Rating -- 0-->no hurt  -AC          Oral Motor Structure and Function    Dentition Assessment -- upper dentures/partial in place;lower dentures/partial in place;dentures are ill fitting  -       Secretion Management -- WNL/WFL  -AC       Mucosal Quality -- moist, healthy  -AC       Volitional Swallow -- weak  -AC       Volitional Cough -- weak  -AC          Oral Musculature and Cranial Nerve Assessment    Oral Labial or Buccal Impairment, Detail, Cranial Nerve VII (Facial): -- right labial droop;other (see comments)  slight--family denied noting acute changes; noted R labial weakness documented per SLP evals in 2017 and 2022  -           General Eating/Swallowing Observations    Respiratory Support Currently in Use -- room air  -       Eating/Swallowing Skills -- fed by SLP;self-fed;needed assist  -       Positioning During Eating -- upright 90 degree;upright in bed  -       Utensils Used -- spoon;cup;straw  -       Consistencies Trialed -- thin liquids;nectar/syrup-thick liquids;pureed;soft to chew textures;chopped  -AC          Clinical Swallow Eval    Oral Prep Phase -- impaired  -AC       Oral Transit -- impaired  -AC       Oral Residue -- impaired  -AC       Pharyngeal Phase -- suspected pharyngeal impairment  -AC       Esophageal Phase -- suspected esophageal impairment  -AC          Oral Prep Concerns    Oral Prep Concerns -- prolonged mastication;increased prep time  -AC       Prolonged Mastication -- mechanical soft  -AC       Increased Prep Time -- pudding;mechanical soft  -AC          Oral Transit Concerns    Oral Transit Concerns -- increased oral transit time;delayed initiation of bolus transit  -       Delayed Intiation of Bolus Transit -- all consistencies  -AC       Increased Oral Transit Time -- mechanical soft  -AC       Oral Transit Concerns, Comment -- Inability to fully transit soft/solid bolus despite max cues & multiple attempts. Ill-fitting dentures + appeared cognitive-based.  -AC          Oral Residue Concerns    Oral Residue Concerns -- lateral sulcus residue, right  -AC       Lateral Sulcus Residue, Right -- mechanical soft  -AC       Oral Residue Concerns, Comment -- moderate  -AC          Pharyngeal Phase Concerns    Pharyngeal Phase Concerns -- wet vocal quality;throat clear  -       Wet Vocal Quality -- thin  -AC       Throat Clear -- thin  -AC       Pharyngeal Phase Concerns, Comment -- Difficult to determine if sxs are r/t baseline/chronic pharyngoesophageal dysphagia resulting from presence of anterior c-spine osteophytes vs. acute pharyngeal dysphagia r/t weakness. No overt clinical s/sxs  aspiration w/ nectar or puree. Spent extensive time discussing dysphagia phases, possible etiologies, intervention options, risks. They're quite concerned about increased swallowing difficulty, weakness, confusion, weak/hoarse voice and slurred speech. Notified Dr. ZALDIVAR  -          Esophageal Phase Concerns    Esophageal Phase Concerns -- other (see comments)  -       Esophageal Phase Concerns, Comment -- Hx anterior cervical spine osteophytes.  -          MBS/VFSS    Utensils Used spoon;cup;straw  -SM --       Consistencies Trialed thin liquids;nectar/syrup-thick liquids;honey-thick liquids;pureed  -SM --          MBS/VFSS Interpretation    Oral Prep Phase impaired oral phase of swallowing  -SM --       Oral Transit Phase impaired  -SM --       Oral Residue impaired  -SM --          Oral Preparatory Phase    Oral Preparatory Phase oral holding;prolonged manipulation  -SM --       Oral Holding all consistencies tested;secondary to impaired cognitive status  -SM --       Prolonged Manipulation pudding/puree;secondary to impaired cognitive status;secondary to reduced lingual strength  -SM --          Oral Transit Phase    Impaired Oral Transit Phase unable to initiate bolus transit;delayed initiation of bolus transit;increased A-P transit time;premature spillage of liquids into pharynx  -SM --       Delayed Initiation of bolus transit all consistencies tested;secondary to impaired cognitive status;other (see comments)  often required subsequent presentation or empty cup to initiate swallow  -SM --       Increased A-P Transit Time all consistencies tested;secondary to impaired cognitive status;secondary to reduced lingual control  -SM --       Premature Spillage of Liquids into Pharynx thin liquids;nectar-thick liquids;honey-thick liquids;secondary to reduced lingual control;secondary to impaired cognitive status  -SM --          Oral Residue    Impaired Oral Residue diffuse residue throughout oral cavity  -SM --        Diffuse Residue throughout Oral Cavity all consistencies tested  -SM --       Response to Oral Residue cleared residue;other (see comments)  partially cleared with delayed subsequent swallow. Eventually cleared oral residue holding at end of study with presentation of empty cup.  -SM --          Initiation of Pharyngeal Swallow    Initiation of Pharyngeal Swallow bolus in pyriform sinuses  -SM --       Pharyngeal Phase impaired pharyngeal phase of swallowing  -SM --       Anatomical abnormalities noted osteophyte/bone spur per radiology report  restricting at level of the UES though not sole reason leading to aspiration  -SM --       Aspiration After the Swallow all consistencies tested;secondary to residue;in pyriform sinuses  -SM --       Response to Aspiration No  -SM --       No spontaneous response to aspiration and effective subglottic clearance with cue (see comments)  with cue to cough at end of study. Could not trial prior as mouth always full with trial/residue.  -SM --       Rosenbek's Scale all consistencies:;8--->level 8  -SM --       Pharyngeal Residue all consistencies tested;diffuse within pharynx;secondary to reduced base of tongue retraction;secondary to reduced posterior pharyngeal wall stripping;secondary to reduced laryngeal elevation;secondary to reduced hyolaryngeal excursion  -SM --       Response to Residue partial residue clearance;cleared residue with spontaneous subsequent swallow  -SM --       Attempted Compensatory Maneuvers other (see comments)  could not attempt 2' cognitive state x cough to clear subglottic and laryngeal vestibule residue, which was effective in clearing.  -SM --       Pharyngeal Phase, Comment DNT solids 2' choking risk given amount pf phayrngeal residue and presence of cervical osteophyte. Would only consider solids if pt/family wished to trial for QOL purposes. Aspiration with all consistencies trialed, all leads to residue, mixees and aspirates after the  swallow. Greatest amount see with thin liquids though was not observed to deepen much pat VFs. Was able to clear. SLP attempted to f/u in pt's room after study to discuss GOC/POC options based on today's assessment. No family present. Pt was able to convey he prefers thin over thickened liqudis though not able to provide much insight. Do not think he is able to make his own decisions in current cognitive state. Discussed with RN and MD. SLP to return when family present.  - --          Swallowing Quality of Life Assessment    Education and counseling provided -- Signs of aspiration;Risks of aspiration;Safest diet options;Comfort diet options;Compensatory strategy recommendations and rationale  -          SLP Evaluation Clinical Impression    SLP Swallowing Diagnosis mod-severe;oral dysphagia;severe;pharyngeal dysphagia  - moderate;oral dysphagia;suspect acute-on-chronic;pharyngeal dysphagia;esophageal dysphagia;other (see comments)  baseline pharyngoesophageal dysphagia; acute/worsened oral dysphagia (appeared cognitive-based) + ? acute pharyngeal dysphagia r/t weakness  -       Functional Impact risk of aspiration/pneumonia;risk of malnutrition;risk of dehydration  - risk of aspiration/pneumonia;risk of malnutrition;risk of dehydration  -       Rehab Potential/Prognosis, Swallowing re-evaluate goals as necessary  - re-evaluate goals as necessary  -       Swallow Criteria for Skilled Therapeutic Interventions Met -- demonstrates skilled criteria  -AC          Recommendations    SLP Diet Recommendation NPO;other (see comments)  hold PO intake for now until further GOC/POC discussions  -SM puree;nectar thick liquids;other (see comments)  If aversion/poor intake w/ this diet, resulting in more risk than benefit, may consider upgrading to more liberal textures of mechanical ground and thin liquids, if ok'd by provider.  -       Recommended Diagnostics -- VFSS (MBS)  -       Recommended Precautions  and Strategies -- upright posture during/after eating;general aspiration precautions;alternate between small bites of food and sips of liquid  -       Oral Care Recommendations Oral Care BID/PRN;Toothbrush  - Oral Care BID/PRN;Toothbrush  -AC       SLP Rec. for Method of Medication Administration meds crushed;with puree  crush as able. Ok to provide tsp nectar-thick liquids if need assistance clearing mouth. Following full administration of meds, cue pt to cough a few times.  - meds crushed;with puree;as tolerated  -       Monitor for Signs of Aspiration notify SLP if any concerns  - yes;notify SLP if any concerns  -       Anticipated Discharge Disposition (SLP) other (see comments)  pending further GOC/POC discussions with pt/family  - inpatient rehabilitation facility  -       Demonstrates Need for Referral to Another Service -- neurology  per provider discretion  -                 User Key  (r) = Recorded By, (t) = Taken By, (c) = Cosigned By      Initials Name Effective Dates    Tiffany Prieto MS CCC-SLP 02/03/23 -      Sendy Ryan MS CCC-SLP 02/03/23 -                     EDUCATION  The patient has been educated in the following areas:   Dysphagia (Swallowing Impairment) Oral Care/Hydration NPO rationale Modified Diet Instruction.                Time Calculation:    Time Calculation- SLP       Row Name 09/16/24 1010             Time Calculation- SLP    SLP Start Time 0900  -      SLP Received On 09/16/24  -         Untimed Charges    33874-[id number] Motion Fluoro Eval Swallow Minutes 69  -SM         Total Minutes    Untimed Charges Total Minutes 69  -SM       Total Minutes 69  -SM                User Key  (r) = Recorded By, (t) = Taken By, (c) = Cosigned By      Initials Name Provider Type    Tiffany Prieto, MS CCC-SLP Speech and Language Pathologist                    Therapy Charges for Today       Code Description Service Date Service Provider Modifiers Qty     [id number]  ST MOTION FLUORO EVAL SWALLOW 5 9/16/2024 Tiffany Fisher, MS CCC-SLP GN 1                 Tiffany Fisher, MS [place]-SLP  9/16/2024

## 2024-09-16 NOTE — CASE MANAGEMENT/SOCIAL WORK
Discharge Planning Assessment  [facility]     Patient Name: Guicho Blanco  MRN: [id number]  Today's Date: 9/16/2024    Admit Date: 9/13/2024    Plan: snf to possible long term   Discharge Needs Assessment       Row Name 09/16/24 1054       Living Environment    People in Home spouse    Current Living Arrangements home    Potentially Unsafe Housing Conditions none    In the past 12 months has the electric, gas, oil, or water company threatened to shut off services in your home? No    Primary Care Provided by self;spouse/significant other;homecare agency    Provides Primary Care For no one    Family Caregiver if Needed child(arsalan), adult;spouse    Family Caregiver Names Sandra Blanco    Quality of Family Relationships involved;helpful    Able to Return to Prior Arrangements other (see comments)       Resource/Environmental Concerns    Resource/Environmental Concerns home accessibility    Home Accessibility Concerns stairs to enter home    Transportation Concerns none       Transportation Needs    In the past 12 months, has lack of transportation kept you from medical appointments or from getting medications? no    In the past 12 months, has lack of transportation kept you from meetings, work, or from getting things needed for daily living? No       Food Insecurity    Within the past 12 months, you worried that your food would run out before you got the money to buy more. Never true    Within the past 12 months, the food you bought just didn't last and you didn't have money to get more. Never true       Transition Planning    Patient/Family Anticipates Transition to other (see comments)    Patient/Family Anticipated Services at Transition none    Transportation Anticipated family or friend will provide       Discharge Needs Assessment    Readmission Within the Last 30 Days no previous admission in last 30 days    Concerns to be Addressed discharge planning    Anticipated Changes Related to Illness none     Equipment Needed After Discharge none                   Discharge Plan       Row Name 09/16/24 1056       Plan    Plan snf to possible long term    Patient/Family in Agreement with Plan yes    Plan Comments Cm spoke with pts wife who is also his POA. She reports she has been caring for pt at home recently. He was able to walk short distances with a walker but required assitance with his ADLs. [facility]. Pt is followed by his PCP and has drug coverage. At this time wife reports she can no longer care for pt at his current level of functioning. She is agreeable to him going to rehab but if he does not improve enough to return home he will need to find a long term bed or possibly assisted living depending on level of functioning. CM discussed that [facility] would not be an option since they cannot accept anyone that does not have a safe discharge plan but referrals to other skilled facilities could be made. CM also encouraged wife to contact [facility] to discuss potential assisted living options. CM will continue to follow and will make referrals once updated therapy notes are available.    Final Discharge Disposition Code 03 - skilled nursing facility (SNF)                  Continued Care and Services - Admitted Since 9/13/2024    No active coordination exists for this encounter.       Selected Continued Care - Prior Encounters Includes continued care and service providers with selected services from prior encounters from 6/15/2024 to 9/16/2024      Discharged on 9/5/2024 Admission date: 8/30/2024 - [place] [facility] [place]       Service Provider Selected Services Address Phone Fax Patient Preferred    [facility] [address] [place] [address] [phone] --                      Discharged on 8/10/2024 Admission date: 8/7/2024 - Discharge disposition: Rehab Facility or Unit (DC - External)       Destination       Service Provider Selected Services Address Phone Fax Patient Preferred    [facility] [address] [address] [phone] --                             Demographic Summary       Row Name 09/16/24 1053       General Information    Admission Type inpatient    Referral Source physician    Reason for Consult discharge planning    Preferred Language English    General Information Comments PCP Mitchel Culver       Contact Information    Permission Granted to Share Info With family/designee    Contact Information Comments Sandra Blanco [phone]                   Functional Status       Row Name 09/16/24 1053       Functional Status    Usual Activity Tolerance moderate    Current Activity Tolerance moderate       Physical Activity    On average, how many days per week do you engage in moderate to strenuous exercise (like a brisk walk)? 0 days    On average, how many minutes do you engage in exercise at this level? 0 min    Number of minutes of exercise per week 0       Functional Status, IADL    Medications assistive person    Meal Preparation assistive person    Housekeeping assistive person    Laundry assistive person    Shopping assistive person    IADL Comments per wife she assist with his ADLs       Mental Status    General Appearance WDL WDL       Mental Status Summary    Recent Changes in Mental Status/Cognitive Functioning unable to assess       Employment/    Employment Status retired    Current or Previous Occupation not applicable                   Psychosocial    No documentation.                  Abuse/Neglect    No documentation.                  Legal    No documentation.                  Substance Abuse    No documentation.                  Patient Forms    No documentation.                     Shavonne Beaulieu RN

## 2024-09-16 NOTE — PLAN OF CARE
Goal Outcome Evaluation:  Plan of Care Reviewed With: patient                  Anticipated Discharge Disposition (SLP): other (see comments) (pending further GOC/POC discussions with pt/family)          SLP Swallowing Diagnosis: mod-severe, oral dysphagia, severe, pharyngeal dysphagia (09/16/24 0900)

## 2024-09-16 NOTE — SIGNIFICANT NOTE
Pt had apneic episode in the elevator while I was taking him down for a CT.   HR dropped and continuous cardiac monitor showed zero for respirations.  O2 sat dropped to 89%. Immediately took patient back to his room.  Pt started breathing again when we returned to the third floor.  Dr. ANNIE clay to come to bedside.  Pt's wife called and discussed code status.

## 2024-09-16 NOTE — PROGRESS NOTES
"      Orthopaedic Surgery Progress Note    CC: Chills      Subjective     Interval History:   Patient lost IV access yesterday.  Switch to p.o. antibiotic therapy per Dr. Manzanares.  Patient tells me he has had chills since this morning.  Denies pain in the right knee.  Patient spiked a fever to 103 between 3 and 4 AM today.      ROS: Denies fever, chills, nausea or vomiting    Objective     Vital Signs:  Temp (24hrs), Av.6 °F (37.6 °C), Min:97.6 °F (36.4 °C), Max:103 °F (39.4 °C)    /78 (BP Location: Right arm, Patient Position: Lying)   Pulse 115   Temp 100.3 °F (37.9 °C) (Axillary)   Resp 18   Ht 182.9 cm (72\")   Wt 95.3 kg (210 lb)   SpO2 90%   BMI 28.48 kg/m²       Physical Exam:  Patient appears to have a mild abrasion on the anterior aspect of his patella.  Continues to have painless range of motion both active and passive in the right knee.  No surrounding induration or erythema.  No drainage.  No effusion in the right knee.    Assessment and Plan:  Hospital day #4    --Continue PT  -- Patient switched to oral antibiotics.  Unsure if this correlates with his fever spike.  Not demonstrating any signs or symptoms consistent with septic arthritis or prosthetic joint infection.  Laboratory studies are not consistent with prosthetic joint infection either.  -- Follow temperature curve.    -- Blood cultures and UA ordered this morning.  -- Consider reestablishment of IV access this morning.    Christian Schwartz MD  24  08:24 EDT       "

## 2024-09-16 NOTE — PROGRESS NOTES
" [facility] Neurology    Progress Note    Patient Name: Guicho Blanco  : 1940  MRN: [id number]  Primary Care Physician:  Mitchel Culver MD  Date of admission: 2024    Subjective     Reason for consult: History of TIAs and progressive weakness     History of Present Illness   Stroke alert called this morning for acute change in mental status. Reportedly this morning he was at his baseline prior to a swallow study. When he returned afterwards he was obtunded and not following any commands. Left gaze deviation was noted and a rapid was called. Temp noted 103F over night.     Unfortunately he had an apneic episode in the elevator on the way to get CT head.     On my arrival later in the afternoon, his respiratory status appears improved. Rigors present. He is no longer tachypneic, but still febrile to 101.3F. Lactate 13.8.     Review of Systems   Unable to obtain     Objective     Vitals:   Temp:  [99 °F (37.2 °C)-103.4 °F (39.7 °C)] 99.3 °F (37.4 °C)  Heart Rate:  [] 120  Resp:  [18-22] 20  BP: (120-175)/(57-91) 130/66  Flow (L/min):  [2] 2    Neurologic Exam   Mental status/Cognition: Eyes closed, opens to voice, says \"not feeling too good\"   Speech/language: nonfluent, says 1-2 words; does not follow commands    Cranial nerves: PERRL. Gaze midline and conjugate. Face appears symmetric. Hoarse voice, hypophonic.     Motor: minimal spontaneous movement; rigorous     Current Medications    Current Facility-Administered Medications:     acetaminophen (TYLENOL) suppository 650 mg, 650 mg, Rectal, Q4H PRN, Berto Liu MD, 650 mg at 24 1040    acetaminophen (TYLENOL) tablet 1,000 mg, 1,000 mg, Oral, Q6H PRN, Berto Liu MD, 1,000 mg at 24 0358    [Held by provider] aspirin tablet 325 mg, 325 mg, Oral, Daily, Berto Liu MD, 325 mg at 09/15/24 0842    polyethylene glycol (MIRALAX) packet 17 g, 17 g, Oral, Daily PRN, 17 g at 09/15/24 0843 **AND** " bisacodyl (DULCOLAX) EC tablet 5 mg, 5 mg, Oral, Daily PRN **AND** bisacodyl (DULCOLAX) suppository 10 mg, 10 mg, Rectal, Daily PRN, Berto Liu MD    DAPTOmycin (CUBICIN) 550 mg in sodium chloride 0.9 % 50 mL IVPB, 6 mg/kg, Intravenous, Q24H, Berto Liu MD    docusate sodium (COLACE) capsule 100 mg, 100 mg, Oral, BID PRN, Berto Liu MD, 100 mg at 09/15/24 0842    flecainide (TAMBOCOR) tablet 100 mg, 100 mg, Oral, Q12H, Berto Liu MD, 100 mg at 09/15/24 2022    isosorbide mononitrate (IMDUR) 24 hr tablet 30 mg, 30 mg, Oral, Daily, Berto Liu MD, 30 mg at 09/15/24 0842    nitroglycerin (NITROSTAT) SL tablet 0.4 mg, 0.4 mg, Sublingual, Q5 Min PRN, Berto Liu MD    nortriptyline (PAMELOR) capsule 20 mg, 20 mg, Oral, Nightly, Berto Liu MD, 20 mg at 09/15/24 2022    pantoprazole (PROTONIX) EC tablet 40 mg, 40 mg, Oral, Q AM, Berto Liu MD, 40 mg at 09/16/24 0632    piperacillin-tazobactam (ZOSYN) 3.375 g IVPB in 100 mL NS MBP (CD), 3.375 g, Intravenous, Q8H, Berto Liu MD    pravastatin (PRAVACHOL) tablet 20 mg, 20 mg, Oral, Daily, Berto Liu MD, 20 mg at 09/15/24 0842    saccharomyces boulardii (FLORASTOR) capsule 250 mg, 250 mg, Oral, BID, Berto Liu MD, 250 mg at 09/15/24 2022    sodium bicarbonate 8.4 % 75 mEq in sodium chloride 0.45 % 1,000 mL infusion (less than/equal to 100 mEq), 75 mEq, Intravenous, Continuous, Berto Liu MD, Last Rate: 100 mL/hr at 09/16/24 1334, 75 mEq at 09/16/24 1334    sodium chloride 0.9 % flush 10 mL, 10 mL, Intravenous, Q12H, Berto Liu MD, 10 mL at 09/16/24 1201    sodium chloride 0.9 % flush 10 mL, 10 mL, Intravenous, PRN, Berto Liu MD    sodium chloride 0.9 % infusion 40 mL, 40 mL, Intravenous, PRN, Berto Liu MD    thiamine (B-1) 250 mg in sodium chloride 0.9 % 100 mL IVPB, 250 mg, Intravenous, Q8H, Akhil Lira DO, Last Rate: 200  mL/hr at 09/16/24 1416, 250 mg at 09/16/24 1416    Laboratory Results:   Lab Results   Component Value Date    GLUCOSE 129 (H) 09/16/2024    CALCIUM 9.5 09/16/2024     09/16/2024    K 4.3 09/16/2024    CO2 12.0 (L) 09/16/2024    CL 99 09/16/2024    BUN 11 09/16/2024    CREATININE 1.31 (H) 09/16/2024    EGFRIFNONA 69 06/19/2021    BCR 8.4 09/16/2024    ANIONGAP 27.0 (H) 09/16/2024     Lab Results   Component Value Date    WBC 4.84 09/16/2024    HGB 12.3 (L) 09/16/2024    HCT 39.2 09/16/2024    MCV 87.1 09/16/2024     09/16/2024     Lab Results   Component Value Date    CHOL 97 06/19/2021    CHOL 128 10/19/2017    CHOL 142 10/05/2017     Lab Results   Component Value Date    HDL 36 (L) 06/19/2021    HDL 38 (L) 10/19/2017    HDL 43 10/05/2017     Lab Results   Component Value Date    LDL 45 06/19/2021    LDL 83 10/19/2017    LDL 91 10/05/2017     Lab Results   Component Value Date    TRIG 75 06/19/2021    TRIG 77 10/19/2017    TRIG 116 10/05/2017     Lab Results   Component Value Date    HGBA1C 5.20 07/30/2024     Lab Results   Component Value Date    FOLATE 18.90 09/15/2024     Lab Results   Component Value Date    [id number] 459 09/15/2024     Lactate 13.8  Pro-Shyam 0.10  CRP 0.70  Ammonia 12  Blood culture pending  Images/Procedures   CT head 8/22/2024  Moderate chronic small vessel ischemic change and chronic lacunar infarcts    Assessment / Plan   Active Hospital Problems:  Suspected delirium  TIA  Encephalopathy   Cochlear implants   Peripheral neuropathy   Lactic acidosis    Brief Patient Summary:  Guicho Blanco is a 83 y.o. male with history of hearing loss, peripheral neuropathy, TIA, atrial fibrillation, CAD, hypertension, hyperlipidemia, right knee arthroplasty complicated by cellulitis who is presenting with increased confusion. Unable to get MRI due to cochlear implants.    Acute change in mental status today, new lactic acidosis, new LOS, febrile.  Unclear etiology.  CT head, CTA from  previous stroke alert are pending.  If no infectious process can be found, will consider lumbar puncture.    Plan:   -CT head, CTA brain and neck  -Routine EEG  -Consider lumbar puncture if no other infectious etiology found  -Discontinue Remeron        I have discussed the above with the patient, family, bedside RN  Time spent with patient: 45 minutes in face-to-face evaluation and management of the patient.      Akhil Lira, , MS

## 2024-09-16 NOTE — PROGRESS NOTES
INTENSIVIST   PROGRESS NOTE     Hospital:  LOS: 0 days     Mr. Guicho Blanco, 83 y.o. male is followed for a Chief Complaint of: Lactic Acidosis      Subjective   S     Interval History:  Guicho Blanco is an 83 year-old male with peripheral neuropathy, TIAs (data deficit), Afib, CAD, HTN, hyperlipidemia, and recent admission at [facility] from 8/7-8/10 following knee arthroplasty on 8/7. He was readmitted on 8/30 with cellulitis of right knee, was treated with ceftriaxone and vanc during stay and discharged home on 9/5 with PO doxycycline and cephalexin. Two days ago, Dr. Manzanares transitioned him to Zyvox for worsening erythema and warmth at incision site.      Patient represented to [facility] on 9/13/24 with weakness and AMS. Work-up in ED relatively benign with normal WBC, PCT, and ESR. He was admitted by Dr. Liu with ID consulted who transitioned patient back on IV therapy for cellulitis.     Neurology following for confusion. Unable to obtain MRI 2* cochlear implants. PT/OT working with patient. SLP worked with patient this AM with concern of aspiration during.     Antibiotics transitioned to PO on 9/15 after patient lost IV access. On 9/16/24, patient began to decline. Temp spiked to 103. Stroke Alert called for acute AMS around 1030 that morning. LKW 0945. Stroke team assessed patient and found him obtunded. No clear focal deficits noted. He was febrile, tachycardic with increased work of breathing. CT imaging recommended however he was unable to lay flat. Further work-up showed ABG with pH 7.276, pCO2 21, pO2 101, HCO3 9.8, base excess -15. Lactate 13.8, ammonia 12, BUN/Cr up to 11/1.31 when it was normal yesterday. Bicarbonate drip and IVF started. He has remained febrile today / otherwise vitals have been stable with adequate oxygenation on 2 L NC. ECHO, EEG pending.    Patient is being transferred to the ICU for higher level of care.     Time spent: 5 minutes  Electronically signed by Shannon Gagnon  APRN, 09/16/24, 2:59 PM EDT.     The patient's relevant past medical, surgical and social history were reviewed and updated in Epic as appropriate.      ROS: Unable to obtain secondary to mental status.     Objective   O     Vitals:  Temp  Min: 97.6 °F (36.4 °C)  Max: 103.4 °F (39.7 °C)  BP  Min: 120/69  Max: 175/66  Pulse  Min: 95  Max: 137  Resp  Min: 18  Max: 22  SpO2  Min: 89 %  Max: 100 % Flow (L/min)  Min: 2  Max: 2    Intake/Ouptut 24 hrs (7:00AM - 6:59 AM)  Intake & Output (last 3 days)         09/13 0701 09/14 0700 09/14 0701  09/15 0700 09/15 0701 09/16 0700 09/16 0701 09/17 0700    P.O.  950 410     IV Piggyback 100 600      Total Intake(mL/kg) 100 (1) 1550 (16.3) 410 (4.3)     Urine (mL/kg/hr) 1205 2175 (1) 625 (0.3)     Stool   0     Total Output 1205 2175 625     Net -1105 -625 -215             Urine Unmeasured Occurrence   4 x     Stool Unmeasured Occurrence   1 x             Medications (drips):  sodium bicarbonate 8.4 % 75 mEq in sodium chloride 0.45 % 1,000 mL infusion (less than/equal to 100 mEq), Last Rate: 75 mEq (09/16/24 1334)          Physical Examination  Telemetry:  Sinus tachycardia.    Constitutional:  No acute distress.  Resting in bed on nasal cannula.    Cardiovascular: Normal rate, regular and rhythm. Normal heart sounds.  No murmurs, gallop or rub.   Respiratory: No respiratory distress. Normal respiratory effort.  Normal breath sounds  Clear to ascultation and percussion.    Abdominal:  Soft. No masses. Non-tender. No distension. No HSM.   Extremities: No digital cyanosis. No clubbing.  Right knee is hot and swollen.    Neurological:   Minimally responsive.          Results from last 7 days   Lab Units 09/16/24  1044 09/13/24  1334 09/13/24  1232   WBC 10*3/mm3 4.84  --  7.23   HEMOGLOBIN g/dL 12.3*  --  10.8*   HEMOGLOBIN, POC g/dL  --  10.9*  --    MCV fL 87.1  --  86.4   PLATELETS 10*3/mm3 172  --  197     Results from last 7 days   Lab Units 09/16/24  1045 09/15/24  0631  09/14/24  1145   SODIUM mmol/L 138 137 137   POTASSIUM mmol/L 4.3 4.2 4.1   CO2 mmol/L 12.0* 25.0 23.0   CREATININE mg/dL 1.31* 0.84 0.83   GLUCOSE mg/dL 129* 111* 118*     Estimated Creatinine Clearance: 51.2 mL/min (A) (by C-G formula based on SCr of 1.31 mg/dL (H)).  Results from last 7 days   Lab Units 09/13/24  1341   ALK PHOS U/L 152*   BILIRUBIN mg/dL 0.5   ALT (SGPT) U/L 20   AST (SGOT) U/L 28       Results from last 7 days   Lab Units 09/16/24  1040   PH, ARTERIAL pH units 7.276*   PCO2, ARTERIAL mm Hg 21.0*   PO2 ART mm Hg 101.0   FIO2 % 28       Images:  Imaging Results (Last 24 Hours)       Procedure Component Value Units Date/Time    CT Head Without Contrast Stroke Protocol [[id number]] Resulted: 09/16/24 1518     Updated: 09/16/24 1518    FL Video Swallow With Speech Single Contrast [[id number]] Resulted: 09/16/24 0852     Updated: 09/16/24 0915    XR Chest PA & Lateral [[id number]] Collected: 09/16/24 0854     Updated: 09/16/24 0858    Narrative:      XR CHEST PA AND LATERAL    Date of Exam: 9/16/2024 8:44 AM EDT    Indication: febrile    Comparison: 9/13/2024    Findings:  Heart and pulmonary vessels and mediastinal contours appear within normal limits. Lung fields are clear of acute infiltrates or effusions. There is mild degenerative spurring of the thoracic spine.      Impression:      Impression:  No acute process.      Electronically Signed: Sylwia Sampson MD    9/16/2024 8:55 AM EDT    Workstation ID: [id number]               Results: Reviewed.  I reviewed the patient's new laboratory and imaging results.  I independently reviewed the patient's new images.    Medications: Reviewed.    Assessment & Plan   A / P     Mr. Blanco is an 82yo M with a history of peripheral neuropathy, TIA, AFib, CAD, HTN, HLD, and a recent admission to [facility] from 8/7-8/10/24 following a knee arthroplasty on 8/7/24. He was readmitted on 8/30/24 with cellulitis of the right knee and was treated with Rocephin and Vancomycin  and then discharged on 9/5/24 on PO Doxy and Keflex. Zyvox was restarted on 9/13 for worsening erythema and warmth at the incision site.     He presented back to [facility] on 9/13/24 with weakness and AMS. He was admitted to the Ortho Floor service with ID consulted and he was placed back on IV antibiotics.     Neurology has been following for altered mental status. MRI brain could not be performed secondary to cochlear implants.     On the morning of 9/16/24, a Rapid Response was called for altered mental status and a fever of 103. Labs were concerning for a metabolic acidosis with a lactate > 13 and he was transferred to the ICU for further care.       Nutrition:   Diet: Regular/House; Texture: Pureed (NDD 1); Fluid Consistency: Nectar Thick  Advance Directives:   Code Status and Medical Interventions: No CPR (Do Not Attempt to Resuscitate); Limited Support; No intubation (DNI), No cardioversion   Ordered at: 09/16/24 1206     Medical Intervention Limits:    No intubation (DNI)    No cardioversion     Level Of Support Discussed With:    Health Care Surrogate     Code Status (Patient has no pulse and is not breathing):    No CPR (Do Not Attempt to Resuscitate)     Medical Interventions (Patient has pulse or is breathing):    Limited Support       Active Hospital Problems    Diagnosis     **Cellulitis of right knee     Sepsis     Lactic acidosis     Moderate malnutrition     Failure to thrive in adult     Insomnia     Neuropathy     Hearing loss        Assessment / Plan:    Transfer to ICU  Continue bicarb infusion. Recheck lactate and BMP.   LOS new from 9/13. Monitor after IV fluids.   ID managing antibiotics.   Orthopedics following. May need I&D of right knee.   Neurology following.   Check ketones.   AM labs    Critically ill secondary to severe lactic acidosis in the setting of sepsis.     Plan of care and goals reviewed during interdisciplinary rounds.  I discussed the patient's findings and my recommendations with  nursing staff    Critical Care Time: was greater than 40 minutes.(This excludes time spent performing separately reportable procedures and services). including high complexity decision making to assess, manipulate, and support vital organ system failure in this individual who has impairment of one or more vital organ systems such that there is a high probability of imminent or life threatening deterioration in the patient’s condition.      Martha Case, DO    Intensive Care Medicine and Pulmonary Medicine

## 2024-09-16 NOTE — SIGNIFICANT NOTE
09/16/24 1640   SLP Deferred Reason   SLP Deferred Reason   (Note transfer to ICU. Discussed MBS results and recommendations with RN. Continue NPO. If becomes more alert/appropriate, consider PO meds as recommended with MBS. SLP will check back tomorrow. Thanks.)

## 2024-09-16 NOTE — PAYOR COMM NOTE
"Guicho Blanco \"Rad\" (83 y.o. Male)       Date of Birth   1940    Social Security Number       Address   [address] [place] [address]  [facility] [address]    Home Phone   [phone]    MRN   [id number]       [place]   Nondenominational    Marital Status                               Admission Date   24    Admission Type   Emergency    Admitting Provider   Berto Liu MD    Attending Provider   Berto Liu MD    Department, Room/Bed   [facility] 3H, S373/1       Discharge Date       Discharge Disposition       Discharge Destination                                 Attending Provider: Berto Liu MD    Allergies: Divalproex Sodium (Migraine) [Valproic Acid], Cymbalta [Duloxetine Hcl], Duloxetine, Metoprolol, Neurontin [Gabapentin]    Isolation: None   Infection: None   Code Status: Prior    Ht: 182.9 cm (72\")   Wt: 95.3 kg (210 lb)    Admission Cmt: None   Principal Problem: Cellulitis of right knee [L03.115]                   Active Insurance as of 2024       Primary Coverage       Payor Plan Insurance Group Employer/Plan Group    ANTHEM MEDICARE REPLACEMENT ANTHEM MEDICARE ADVANTAGE [id number]       Payor Plan Address Payor Plan Phone Number Payor Plan Fax Number Effective Dates    [address] [phone]  2024 - None Entered    [facility] [address]         Subscriber Name Subscriber Birth Date Member ID       GUICHO BLANCO 1940 [id number]                     Emergency Contacts        (Rel.) Home Phone Work Phone Mobile Phone    SAMEERA Cee (Daughter) [phone] -- [phone]    Sandra Blanco (Spouse) [phone] -- [phone]    Ashley Mejia (Grandchild) -- -- [phone]                 History & Physical        Berto Liu MD at 24 1751          Patient Name: Guicho Blanco  MRN: [id number]  : 1940  DOS: 2024    Attending: Berto Liu MD    Primary Care Provider: " Mitchel Culver MD      Chief complaint: Right knee postop cellulitis    Subjective  Patient is a pleasant 83 y.o. male who is known to me from recent hospitalizations following right total knee arthroplasty on 8/7/2024 at which point he was discharged to [facility] on 8/10/2024.  After spending several days there he was discharged home but was readmitted to [facility] on 8/30/2024 with cellulitis of his right knee.  He was treated with IV antibiotics under the direction of Dr. Manzanares with [facility].  Received ceftriaxone and vancomycin during his stay and was transition to p.o. antibiotics at discharge.    He worked with PT and OT and has made enough progress to be discharged home at that time.  Unfortunately at home he has not been sleeping well.  His mobility has fluctuated but he has had much difficulty getting up to initiate walking according to his wife.  He has had follow-ups with Dr. Manzanares and with Dr. Ramos.  His antibiotics were most recently transitioned to Zyvox on erythema worsening at his incision site.  Deeper sutures were removed by Dr. Ramos in the office yesterday for concern of possible reaction.  There has been no fever or chills and no nausea or vomiting though appetite has been very low and he has been spending extended periods of time chewing as opposed to eating.    Understandably his wife is exhausted and she and the family including the daughters are concerned with the decline and the lack of progress physically.     Allergies   Allergen Reactions    Divalproex Sodium (Migraine) [Valproic Acid] Other (See Comments)     Weak, lethargy and got worse    Cymbalta [Duloxetine Hcl] Mental Status Change     Depression, thoughts of Suicide.     Duloxetine Unknown (See Comments)    Metoprolol Other (See Comments)     UNKNOWN. Pt does not remember this being allergy    Neurontin [Gabapentin] Other (See Comments)     Lethargy & fatigue       Meds:  Medications  Prior to Admission   Medication Sig Dispense Refill Last Dose    acetaminophen (TYLENOL) 650 MG 8 hr tablet Take 1 tablet by mouth Every 8 (Eight) Hours. (Patient taking differently: Take 1 tablet by mouth Every 8 (Eight) Hours As Needed for Mild Pain, Moderate Pain or Headache. Indications: Pain) 90 tablet 0 Past Month    aspirin 325 MG tablet Take 1 tablet by mouth Daily. Indications: Disease involving Lipid Deposits in the Arteries   9/12/2024    B Complex Vitamins (VITAMIN B COMPLEX) capsule capsule Take 1 capsule by mouth Daily. Takes in evening  Indications: Vitamin Deficiency   9/12/2024    bisacodyl (DULCOLAX) 10 MG suppository Insert 1 suppository into the rectum Daily As Needed for Constipation (Use if bisacodyl oral is ineffective).   Past Week    Cholecalciferol (VITAMIN D3) 5000 UNITS capsule capsule Take 1 capsule by mouth Daily. Indications: Vitamin D Deficiency   9/12/2024    coenzyme Q10 100 MG capsule Take 1 capsule by mouth Daily. Indications: Heart Rhythm Disorder   9/12/2024    cycloSPORINE (Restasis) 0.05 % ophthalmic emulsion Administer 1 drop to both eyes Every 12 (Twelve) Hours. Indications: Drying and Inflammation of Cornea and Conjunctiva of Eyes   9/12/2024    docusate sodium (Colace) 100 MG capsule Take 1 capsule by mouth 2 (Two) Times a Day As Needed for Constipation. 60 capsule 0 Past Week    flecainide (TAMBOCOR) 100 MG tablet Take 1 tablet by mouth Every 12 (Twelve) Hours. 180 tablet 3 9/12/2024    isosorbide mononitrate (IMDUR) 30 MG 24 hr tablet Take 1 tablet by mouth Daily. 90 tablet 3 9/12/2024    linezolid (ZYVOX) 600 MG tablet Take 1 tablet by mouth 2 (Two) Times a Day.   9/12/2024    nortriptyline (PAMELOR) 10 MG capsule Take 2 capsules by mouth Every Night. Indications: Migraine Headache   9/12/2024    omeprazole OTC (PrilOSEC OTC) 20 MG EC tablet Take 1 tablet by mouth Daily. Indications: Heartburn   9/12/2024    pravastatin (PRAVACHOL) 20 MG tablet Take 1 tablet by mouth  Daily. Indications: High Amount of Fats in the Blood   9/12/2024    saccharomyces boulardii (FLORASTOR) 250 MG capsule Take 1 capsule by mouth 2 (Two) Times a Day. 42 capsule 0 9/12/2024    traMADol (ULTRAM) 50 MG tablet Take 1 tablet by mouth Every 8 (Eight) Hours As Needed for Moderate Pain. 30 tablet 1 Past Week    glucose (DEX4) 4 GM chewable tablet Chew 4 tablets As Needed.       nitroglycerin (NITROSTAT) 0.4 MG SL tablet 1 under the tongue as needed for angina, may repeat q5mins for up three doses 25 tablet 2 More than a month    ondansetron (Zofran) 4 MG tablet Take 1 tablet by mouth Every 8 (Eight) Hours As Needed for Nausea or Vomiting. (Patient not taking: Reported on 9/13/2024) 15 tablet 1 Not Taking        Past Medical History:   Diagnosis Date    Arthritis     Atrial fibrillation     BPH (benign prostatic hypertrophy)     Cataract     Cellulitis     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     bilateral    Coronary artery disease     Full dentures     Gall stones     GERD (gastroesophageal reflux disease)     Hearing loss     Hiatal hernia     Hyperlipidemia     Hypertension     Peripheral neuropathy     Peripheral vascular disease     Seasonal allergies     Skin cancer     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    TIA (transient ischemic attack)     2 times    Wears glasses      Past Surgical History:   Procedure Laterality Date    ABLATION OF DYSRHYTHMIC FOCUS      APPENDECTOMY      CARDIAC CATHETERIZATION N/A 10/05/2017    Procedure: Left Heart Cath;  Surgeon: Hector Urrutia MD;  Location: [facility] CATH INVASIVE LOCATION;  Service:     CATARACT EXTRACTION Bilateral     CHOLECYSTECTOMY      Remote    COCHLEAR IMPLANT REVISION  05/2019    COLONOSCOPY      PROSTATE SURGERY      SHOULDER SURGERY Right     SKIN BIOPSY      TOE AMPUTATION Right     2nd toe - Right Foot    TONSILLECTOMY      TOTAL KNEE ARTHROPLASTY Right 8/7/2024    Procedure: TOTAL KNEE ARTHROPLASTY WITH ASHLEY ROBOT RIGHT;  Surgeon: Christian Schwartz MD;   "Location: [facility];  Service: Robotics - Ortho;  Laterality: Right;     Family History   Problem Relation Age of Onset    Atrial fibrillation Mother     Dementia Mother     Lung cancer Father      Social History     Tobacco Use    Smoking status: Never     Passive exposure: Past    Smokeless tobacco: Never   Vaping Use    Vaping status: Never Used   Substance Use Topics    Alcohol use: No    Drug use: No       Review of Systems  Pertinent items are noted in HPI    Vital Signs  /62 (BP Location: Left arm, Patient Position: Lying)   Pulse 88   Temp 98 °F (36.7 °C) (Oral)   Resp 18   Ht 182.9 cm (72\")   Wt 95.3 kg (210 lb)   SpO2 100%   BMI 28.48 kg/m²     Physical Exam:    General Appearance:    Alert, cooperative, in no acute distress   Head:    Normocephalic, without obvious abnormality, atraumatic   Eyes:            Lids and lashes normal, conjunctivae and sclerae normal, no   icterus, no pallor, corneas clear,    Ears:    Ears appear intact with no abnormalities noted   Throat:   No oral lesions, no thrush, oral mucosa moist   Neck:   No adenopathy, supple, trachea midline, no thyromegaly         Lungs:     Clear to auscultation,respirations regular, even and                   unlabored    Heart:    Regular rhythm and normal rate, normal S1 and S2, no            murmur, no gallop   Abdomen:     Normal bowel sounds, no masses, no organomegaly, soft,        nontender, nondistended, no guarding, no rebound                 tenderness   Genitalia:    Deferred   Extremities:   Moves all extremities well, no edema, no cyanosis, no              redness   Pulses:   Pulses palpable and equal bilaterally   Skin:   No bleeding, bruising or rash   Neurologic:   Cranial nerves 2 - 12 grossly intact,       I reviewed the patient's new clinical results.       Results from last 7 days   Lab Units 09/13/24  1334 09/13/24  1232   WBC 10*3/mm3  --  7.23   HEMOGLOBIN g/dL  --  10.8*   HEMOGLOBIN, POC g/dL 10.9*  " --    HEMATOCRIT %  --  34.8*   HEMATOCRIT POC % 32*  --    PLATELETS 10*3/mm3  --  197     Results from last 7 days   Lab Units 09/13/24  1341 09/13/24  1334   SODIUM mmol/L 136  --    POTASSIUM mmol/L 3.9  --    CHLORIDE mmol/L 100  --    CO2 mmol/L 24.0  --    BUN mg/dL 18  --    CREATININE mg/dL 0.97 1.10   CALCIUM mg/dL 9.2  --    BILIRUBIN mg/dL 0.5  --    ALK PHOS U/L 152*  --    ALT (SGPT) U/L 20  --    AST (SGOT) U/L 28  --    GLUCOSE mg/dL 88  --      Lab Results   Component Value Date    HGBA1C 5.20 07/30/2024      Latest Reference Range & Units 09/13/24 13:41   C-Reactive Protein 0.00 - 0.50 mg/dL 0.70 (H)   Procalcitonin 0.00 - 0.25 ng/mL 0.10   (H): Data is abnormally high     Latest Reference Range & Units 09/13/24 13:08   Color, UA Yellow, Straw  Dark Yellow !   Appearance, UA Clear  Clear   Specific Gravity, UA 1.001 - 1.030  1.027   pH, UA 5.0 - 8.0  5.5   Glucose Negative  Negative   Ketones, UA Negative  Trace !   Blood, UA Negative  Negative   Nitrite, UA Negative  Negative   Leukocytes, UA Negative  Trace !   Protein, UA Negative  Trace !   Bilirubin, UA Negative  Negative   Urobilinogen, UA 0.2 - 1.0 E.U./dL  0.2 E.U./dL   RBC, UA None Seen, 0-2 /HPF 0-2   WBC, UA None Seen, 0-2 /HPF 3-5 !   Bacteria, UA None Seen, Trace /HPF None Seen   Calcium Oxalate Crystals, UA None Seen /HPF Moderate/2+   Squamous Epithelial Cells, UA None Seen, 0-2 /HPF 0-2   Hyaline Casts, UA 0 - 6 /LPF 0-6   !: Data is abnormal  Assessment and Plan:       Cellulitis of right knee    Hearing loss    Neuropathy    Failure to thrive in adult      Plan  Admit for further management.  Resume IV antibiotics for cellulitis noting the absence of any difficulty with range of motion, absence of knee pain.  Also noting that WBC is normal so is procalcitonin and sed rate.  CRP is elevated but lower than it was on his prior visit.    HTN, Hyperlipidemia, afib, CAD  - Continue home Tambocor, Imdur and statin  - Monitor BP   -  "Holding parameters for BP meds  - Labetalol PRN for SBP>170     Physical therapy consultation.  DVT prophylax with p.o. aspirin  Bowel regimen.  Pain medication as needed.    1.  Physical therapy evaluation.  Likely Occupational Therapy and likely to need inpatient rehab following this hospitalization  2. Pain control-prns, avoid sedatives and opiates.   3. IS-encourage  4. DVT proph- mechanicals and aspirin  5. Bowel regimen  6. Resume home medications as appropriate    Urinalysis has been evaluated.  No significant findings.  Will check TSH and ammonia levels in a.m.  Will monitor for response to antibiotics.    Discussed with patient, wife, and daughter.  Discussed with Dr. Ramos.  Dr. Manzanares is following, input appreciated.    Dragon disclaimer:  Part of this encounter note is an electronic transcription/translation of spoken language to printed text. The electronic translation of spoken language may permit erroneous, or at times, nonsensical words or phrases to be inadvertently transcribed; Although I have reviewed the note for such errors, some may still exist.    Berto Liu MD  24  17:51 EDT            Electronically signed by Berto Liu MD at 24 2146          Physician Progress Notes (all)        Christian Schwartz MD at 24 0824                Orthopaedic Surgery Progress Note    CC: Chills      Subjective     Interval History:   Patient lost IV access yesterday.  Switch to p.o. antibiotic therapy per Dr. Manzanares.  Patient tells me he has had chills since this morning.  Denies pain in the right knee.  Patient spiked a fever to 103 between 3 and 4 AM today.      ROS: Denies fever, chills, nausea or vomiting    Objective     Vital Signs:  Temp (24hrs), Av.6 °F (37.6 °C), Min:97.6 °F (36.4 °C), Max:103 °F (39.4 °C)    /78 (BP Location: Right arm, Patient Position: Lying)   Pulse 115   Temp 100.3 °F (37.9 °C) (Axillary)   Resp 18   Ht 182.9 cm (72\")   Wt " "95.3 kg (210 lb)   SpO2 90%   BMI 28.48 kg/m²       Physical Exam:  Patient appears to have a mild abrasion on the anterior aspect of his patella.  Continues to have painless range of motion both active and passive in the right knee.  No surrounding induration or erythema.  No drainage.  No effusion in the right knee.    Assessment and Plan:  Hospital day #4    --Continue PT  -- Patient switched to oral antibiotics.  Unsure if this correlates with his fever spike.  Not demonstrating any signs or symptoms consistent with septic arthritis or prosthetic joint infection.  Laboratory studies are not consistent with prosthetic joint infection either.  -- Follow temperature curve.    -- Blood cultures and UA ordered this morning.  -- Consider reestablishment of IV access this morning.    Christian Schwartz MD  09/16/24  08:24 EDT         Electronically signed by Christian Schwartz MD at 09/16/24 0830       Jae Manzanares MD at 09/15/24 1150          ID Note:    I was informed by nursing of difficulty with IV access.  No need for PICC; will change to oral therapy.  Now on Remeron per neurology, so will avoid linezolid.    Stop vancomycin and ceftriaxone.  Start Bactrim DS po bid.    Will f/u tomorrow.    Jae Manzanares MD  9/15/2024  11:51 EDT      Electronically signed by Jae Manzanares MD at 09/15/24 1151       Berto Liu MD at 09/15/24 1031          IM progress note      Guicho Blanco  [id number]  1940     LOS: 0 days     Attending: Berto Liu MD    Primary Care Provider: Mitchel Culver MD      Chief Complaint/Reason for visit: Right knee postop cellulitis    Subjective   Feels ok. No f/c/n/vom/sob.  Appreciate eval by neurology yesterday. MRI is pending clarification of cochlear implants.   Taking modified diet PO.  MBS tomorrow.    Objective      Visit Vitals  /86   Pulse 96   Temp 98 °F (36.7 °C) (Oral)   Resp 18   Ht 182.9 cm (72\")   Wt 95.3 kg (210 lb) "   SpO2 97%   BMI 28.48 kg/m²     Temp (24hrs), Av.8 °F (36.6 °C), Min:97.5 °F (36.4 °C), Max:98.3 °F (36.8 °C)      Nutrition: P.o.    Respiratory: Room air    Physical Therapy:   .  Progress: no change  Outcome Evaluation: PT initial eval completed. Pt presents below baseline with generalized weakness, impaired balance, and decreased activity tolerance. TKA on . Pt ambulated 5' with mod Ax2 and RW. Pt unsteady in standing with fear of falling. Further IPPT is warrented. Rec d/c to [facility] for best functional outcomes.        Anticipated Discharge Disposition (PT): inpatient rehabilitation facility    Physical Exam:     General Appearance:    Alert, cooperative, in no acute distress   Head:    Normocephalic, without obvious abnormality, atraumatic    Lungs:     Normal effort, symmetric chest rise, no crepitus, clear to      auscultation bilaterally             Heart:    Regular rhythm and normal rate, normal S1 and S2   Abdomen:     Normal bowel sounds, no masses, no organomegaly, soft        non-tender, non-distended, no guarding, no rebound                tenderness   Extremities:   Less erythema of right knee incision.   No pain with ROM.  No deformities.    Pulses:   Pulses palpable and equal bilaterally   Skin:   No bleeding, bruising or rash   Neurologic:   Moves all extremities with no obvious focal motor deficit.  Cranial nerves 2 - 12 grossly intact     Results Review:     I reviewed the patient's new clinical results.   Results from last 7 days   Lab Units 24  1334 24  1232   WBC 10*3/mm3  --  7.23   HEMOGLOBIN g/dL  --  10.8*   HEMOGLOBIN, POC g/dL 10.9*  --    HEMATOCRIT %  --  34.8*   HEMATOCRIT POC % 32*  --    PLATELETS 10*3/mm3  --  197     Results from last 7 days   Lab Units 09/15/24  0631 24  1145 24  1341   SODIUM mmol/L 137 137 136   POTASSIUM mmol/L 4.2 4.1 3.9   CHLORIDE mmol/L 103 102 100   CO2 mmol/L 25.0 23.0 24.0   BUN mg/dL 6* 10 18   CREATININE mg/dL 0.84  0.83 0.97   CALCIUM mg/dL 9.2 8.9 9.2   BILIRUBIN mg/dL  --   --  0.5   ALK PHOS U/L  --   --  152*   ALT (SGPT) U/L  --   --  20   AST (SGOT) U/L  --   --  28   GLUCOSE mg/dL 111* 118* 88     I reviewed the patient's new imaging including images and reports.    All medications reviewed.   aspirin, 325 mg, Oral, Daily  cefTRIAXone, 2,000 mg, Intravenous, Q24H  flecainide, 100 mg, Oral, Q12H  isosorbide mononitrate, 30 mg, Oral, Daily  mirtazapine, 15 mg, Oral, Nightly  nortriptyline, 20 mg, Oral, Nightly  pantoprazole, 40 mg, Oral, Q AM  pravastatin, 20 mg, Oral, Daily  saccharomyces boulardii, 250 mg, Oral, BID  sodium chloride, 10 mL, Intravenous, Q12H  vancomycin, 1,000 mg, Intravenous, Q12H        Assessment & Plan     Cellulitis of right knee    Hearing loss    Neuropathy    Failure to thrive in adult    Insomnia    Moderate malnutrition      Plan  Admitted for further management.    Resumed IV antibiotics for cellulitis noting the absence of any difficulty with range of motion, absence of knee pain.  Also noting that WBC is normal so is procalcitonin and sed rate.  CRP is elevated but lower than it was on his prior visit.   [facility] and [facility]      HTN, Hyperlipidemia, afib, CAD  - Continue home Tambocor, Imdur and statin  - Monitor BP   - Holding parameters for BP meds  - Labetalol PRN for SBP>170     Physical therapy consultation.  DVT prophylax with p.o. aspirin  Bowel regimen.  Pain medication as needed.     1.  Physical therapy evaluation.  Likely Occupational Therapy and likely to need inpatient rehab following this hospitalization  2. Pain control-prns, avoid sedatives and opiates.      3. IS-encourage  4. DVT proph- mechanicals and aspirin  5. Bowel regimen  6. Resume home medications as appropriate     Urinalysis has been evaluated.  No significant findings.  Will check TSH and ammonia levels, orders placed. Will monitor for response to  "antibiotics.    History of TIAs  Recent increase in weakness  Consult neurology, appreciate assistance     Dysphagia   Worsening recently according to family  Speech consult, continue nectar thick liquids.   MBS test planned for Monday          Electronically signed by Berto Liu MD at 09/15/24 1034       Christian Schwartz MD at 09/15/24 0819                Orthopaedic Surgery Progress Note    CC: Weakness      Subjective     Interval History:   Patient admitted Friday.  Seen by neurology in consultation yesterday.  Patient complaining about the thickened liquid diet he is receiving.  Denies pain.  Unsure whether or not he saw physical therapy yesterday (he did).      ROS: Denies fever, chills, nausea or vomiting    Objective     Vital Signs:  Temp (24hrs), Av.8 °F (36.6 °C), Min:97.5 °F (36.4 °C), Max:98.3 °F (36.8 °C)    /85 (BP Location: Right arm, Patient Position: Lying)   Pulse 87   Temp 98 °F (36.7 °C) (Oral)   Resp 18   Ht 182.9 cm (72\")   Wt 95.3 kg (210 lb)   SpO2 97%   BMI 28.48 kg/m²       Physical Exam:  Induration erythema improved.  No drainage.  No effusion.  Patient continues to move the knee without pain.    Assessment and Plan:  Hospital day #3    --Continue physical therapy  -- Patient unable to be cared for by family at home.  Will likely need skilled nursing facility versus potential transition to assisted living  -- Patient needs increased nutrition and I will consult dietetics  -- Appreciate neurology consult.  MRI brain ordered.    Christian Schwartz MD  09/15/24  08:20 EDT         Electronically signed by Christian Schwartz MD at 09/15/24 0822       Deena Velasquez APRN at 24 1654          IM progress note      Guicho Baldemar Bella  [id number]  1940     LOS: 0 days     Attending: Berto Liu MD    Primary Care Provider: Mitchel Culver MD      Chief Complaint/Reason for visit: Right knee postop " "cellulitis    Subjective   Doing okay.  Denies pain, nausea, shortness of breath or chest pain.  Evaluated by speech therapy today, recommending modified barium swallow test on Monday.  Tolerating nectar thick liquids    Objective     Vital Signs  Visit Vitals  /66 (BP Location: Right arm, Patient Position: Lying)   Pulse 85   Temp 97.7 °F (36.5 °C) (Oral)   Resp 16   Ht 182.9 cm (72\")   Wt 95.3 kg (210 lb)   SpO2 100%   BMI 28.48 kg/m²     Temp (24hrs), Av.6 °F (36.4 °C), Min:97.2 °F (36.2 °C), Max:98 °F (36.7 °C)      Nutrition: P.o.    Respiratory: Room air    Physical Therapy: Progress: no change  Outcome Evaluation: PT initial eval completed. Pt presents below baseline with generalized weakness, impaired balance, and decreased activity tolerance. TKA on . Pt ambulated 5' with mod Ax2 and RW. Pt unsteady in standing with fear of falling. Further IPPT is warrented. Rec d/c to [facility] for best functional outcomes.        Anticipated Discharge Disposition (PT): inpatient rehabilitation facility    Physical Exam:     General Appearance:    Alert, cooperative, in no acute distress   Head:    Normocephalic, without obvious abnormality, atraumatic    Lungs:     Normal effort, symmetric chest rise, no crepitus, clear to      auscultation bilaterally             Heart:    Regular rhythm and normal rate, normal S1 and S2   Abdomen:     Normal bowel sounds, no masses, no organomegaly, soft        non-tender, non-distended, no guarding, no rebound                tenderness   Extremities:   No clubbing, cyanosis or edema.  No deformities.    Pulses:   Pulses palpable and equal bilaterally   Skin:   No bleeding, bruising or rash   Neurologic:   Moves all extremities with no obvious focal motor deficit.  Cranial nerves 2 - 12 grossly intact     Results Review:     I reviewed the patient's new clinical results.   Results from last 7 days   Lab Units 24  1334 24  1232   WBC 10*3/mm3  --  7.23   HEMOGLOBIN " g/dL  --  10.8*   HEMOGLOBIN, POC g/dL 10.9*  --    HEMATOCRIT %  --  34.8*   HEMATOCRIT POC % 32*  --    PLATELETS 10*3/mm3  --  197     Results from last 7 days   Lab Units 09/14/24  1145 09/13/24  1341 09/13/24  1334   SODIUM mmol/L 137 136  --    POTASSIUM mmol/L 4.1 3.9  --    CHLORIDE mmol/L 102 100  --    CO2 mmol/L 23.0 24.0  --    BUN mg/dL 10 18  --    CREATININE mg/dL 0.83 0.97 1.10   CALCIUM mg/dL 8.9 9.2  --    BILIRUBIN mg/dL  --  0.5  --    ALK PHOS U/L  --  152*  --    ALT (SGPT) U/L  --  20  --    AST (SGOT) U/L  --  28  --    GLUCOSE mg/dL 118* 88  --      I reviewed the patient's new imaging including images and reports.    All medications reviewed.   aspirin, 325 mg, Oral, Daily  cefTRIAXone, 2,000 mg, Intravenous, Q24H  flecainide, 100 mg, Oral, Q12H  isosorbide mononitrate, 30 mg, Oral, Daily  nortriptyline, 20 mg, Oral, Nightly  pantoprazole, 40 mg, Oral, Q AM  pravastatin, 20 mg, Oral, Daily  saccharomyces boulardii, 250 mg, Oral, BID  sodium chloride, 10 mL, Intravenous, Q12H  vancomycin, 1,500 mg, Intravenous, Q24H        Assessment & Plan     Cellulitis of right knee    Hearing loss    Neuropathy    Failure to thrive in adult    Insomnia      Plan  Admit for further management.  Resume IV antibiotics for cellulitis noting the absence of any difficulty with range of motion, absence of knee pain.  Also noting that WBC is normal so is procalcitonin and sed rate.  CRP is elevated but lower than it was on his prior visit.   [facility] consulted for antibiotics management, IV vancomycin and ceftriaxone      HTN, Hyperlipidemia, afib, CAD  - Continue home Tambocor, Imdur and statin  - Monitor BP   - Holding parameters for BP meds  - Labetalol PRN for SBP>170     Physical therapy consultation.  DVT prophylax with p.o. aspirin  Bowel regimen.  Pain medication as needed.     1.  Physical therapy evaluation.  Likely Occupational Therapy and likely to need inpatient rehab following this hospitalization  2.  "Pain control-prns, avoid sedatives and opiates.      3. IS-encourage  4. DVT proph- mechanicals and aspirin  5. Bowel regimen  6. Resume home medications as appropriate     Urinalysis has been evaluated.  No significant findings.  Will check TSH and ammonia levels, orders placed. Will monitor for response to antibiotics.    History of TIAs  Recent increase in weakness  Consult neurology, appreciate assistance     Dysphagia   Worsening recently according to family  Speech consult, continue nectar thick liquids.   MBS test planned for Monday     Electronically signed by LEEROY Dow, 24, 4:55 PM EDT.      Electronically signed by Deena Velasquez APRN at 24 1706       Christian Schwartz MD at 24 1115                Orthopaedic Surgery Progress Note    CC: .  Weakness      Subjective     Interval History:   Patient  admitted yesterday for failure to thrive at home.  Seen in the office this past Thursday and some prominent Vicryl sutures were removed.  Had improving lateral knee erythema but new onset incisional erythema without drainage.  Denies any fevers or chills at home.  Patient continues to have no pain with motion of the joint.  Has had significant weakness at home and family cannot longer take care of him.      ROS: Denies fever, chills, nausea or vomiting    Objective     Vital Signs:  Temp (24hrs), Av.7 °F (36.5 °C), Min:97.2 °F (36.2 °C), Max:98 °F (36.7 °C)    /70 (BP Location: Right arm, Patient Position: Lying)   Pulse 91   Temp 97.9 °F (36.6 °C) (Oral)   Resp 16   Ht 182.9 cm (72\")   Wt 95.3 kg (210 lb)   SpO2 98%   BMI 28.48 kg/m²       Physical Exam:  Examination shows no knee effusion.  Painless active and passive range of motion.  Erythema this morning is improved.    Assessment and Plan:  S/p elective right robot-assisted TKA 2024.  Patient recently admitted for cellulitis on the lateral aspect of the knee that improved with vancomycin and " Rocephin.  Was discharged home on oral antibiotics.  P.o. antibiotics changed to linezolid a few days prior to admission.  Patient was getting weaker at home and family can no longer take care of him.  Patient has a history of TIAs.  Agree with the possibility that the patient could have had a new cerebral event.  We have told him that he needs to continue to work aggressively with therapy and also increase his p.o. intake.  Appreciate Dr. Manzanares and Dr. Kee recs.  Will order Neuro consult.    Christian Schwartz MD  24  11:15 EDT          Electronically signed by Christian Schwartz MD at 24 1121          Consult Notes (all)        Akhil Lira DO at 24 1157        Consult Orders    1. Inpatient Neurology Consult General [[id number]] ordered by Christian Schwartz MD at 24 1122                 [facility] Neurology  Consult Note    Patient Name: Guicho Blanco  : 1940  MRN: [id number]  Primary Care Physician:  Mitchel Culver MD  Referring Physician: No ref. provider found  Date of admission: 2024    Subjective     Reason for Consult: History of TIAs and progressive weakness    Guicho Blanco is a 83 y.o. male with history of hearing loss, peripheral neuropathy, TIA, atrial fibrillation, CAD, hypertension, hyperlipidemia, right knee arthroplasty complicated by cellulitis who is presenting with increased confusion    Patient presented to [facility] ED on  with increased confusion.  He was initially at [facility] for total knee arthroplasty on .  He was transferred to [facility] on 8/10 where he stayed for about 10 days.  He was doing well however noticed redness to his knee incision was started on antibiotics per infectious disease.  He was then discharged home on .  He was seen again by ID as well as orthopedics for concern for redness and swelling of his incision site.  He was switched to oral linezolid on .  He then presented to the ER  due to increased confusion on 9/13.    No family at bedside.  Patient reports episodic confusion.  He is only able to tell me about 1 time where he had his TV remote and the remote to an electric chair and could not figure out how to use either one of them.  He notes his wife has some other episodes.  He also told me about an episode of passing out while getting out of his car when coming to the ER.    He also says he has bone spurs and that is affecting his swallowing.  He has been having trouble with solid foods, no issues with liquids.  That has been going on for several months.  His appetite is very poor right now.  He is not really sure why.    He says his sleep is fine for the last several nights.  He recently moved from sleeping in the recliner to his bed.    Review Of Systems   Constitutional:   Cardiovascular: Negative for chest pain or palpitations.  Respiratory: Negative for shortness of breath or cough.  Gastrointestinal: Negative for nausea and vomiting.  Genitourinary: Negative for bladder incontinence.  Musculoskeletal: Negative for aches and pains in the muscles or joints.  Dermatological: Negative for skin breakdown.   Neurological: Negative for headache, pain, weakness or vision changes.     Personal History     Past Medical History:   Diagnosis Date    Arthritis     Atrial fibrillation     BPH (benign prostatic hypertrophy)     Cataract     Cellulitis     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     bilateral    Coronary artery disease     Full dentures     Gall stones     GERD (gastroesophageal reflux disease)     Hearing loss     Hiatal hernia     Hyperlipidemia     Hypertension     Peripheral neuropathy     Peripheral vascular disease     Seasonal allergies     Skin cancer     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    TIA (transient ischemic attack)     2 times    Wears glasses        Past Surgical History:   Procedure Laterality Date    ABLATION OF DYSRHYTHMIC FOCUS      APPENDECTOMY      CARDIAC CATHETERIZATION  N/A 10/05/2017    Procedure: Left Heart Cath;  Surgeon: Hector Urrutia MD;  Location:  [facility] CATH INVASIVE LOCATION;  Service:     CATARACT EXTRACTION Bilateral     CHOLECYSTECTOMY      Remote    COCHLEAR IMPLANT REVISION  05/2019    COLONOSCOPY      PROSTATE SURGERY      SHOULDER SURGERY Right     SKIN BIOPSY      TOE AMPUTATION Right     2nd toe - Right Foot    TONSILLECTOMY      TOTAL KNEE ARTHROPLASTY Right 8/7/2024    Procedure: TOTAL KNEE ARTHROPLASTY WITH ASHLEY ROBOT RIGHT;  Surgeon: Christian Schwartz MD;  Location:  [facility] OR;  Service: Robotics - Ortho;  Laterality: Right;       Family History: family history includes Atrial fibrillation in his mother; Dementia in his mother; Lung cancer in his father. Otherwise pertinent FHx was reviewed and not pertinent to current issue.    Social History:  reports that he has never smoked. He has been exposed to tobacco smoke. He has never used smokeless tobacco. He reports that he does not drink alcohol and does not use drugs.    Home Medications:   acetaminophen, aspirin, bisacodyl, coenzyme Q10, cycloSPORINE, docusate sodium, flecainide, glucose, isosorbide mononitrate, linezolid, nitroglycerin, nortriptyline, omeprazole OTC, pravastatin, saccharomyces boulardii, traMADol, vitamin D3, and vitamin b complex    Current Medications:     Current Facility-Administered Medications:     acetaminophen (TYLENOL) tablet 1,000 mg, 1,000 mg, Oral, Q6H PRN, Berto Liu MD    aspirin tablet 325 mg, 325 mg, Oral, Daily, Berto Liu MD, 325 mg at 09/14/24 0859    polyethylene glycol (MIRALAX) packet 17 g, 17 g, Oral, Daily PRN **AND** bisacodyl (DULCOLAX) EC tablet 5 mg, 5 mg, Oral, Daily PRN **AND** bisacodyl (DULCOLAX) suppository 10 mg, 10 mg, Rectal, Daily PRN, Berto Liu MD    cefTRIAXone (ROCEPHIN) 2,000 mg in sodium chloride 0.9 % 100 mL MBP, 2,000 mg, Intravenous, Q24H, Jae Manzanares MD    docusate sodium (COLACE) capsule 100 mg, 100 mg,  Oral, BID PRN, Berto Liu MD    flecainide (TAMBOCOR) tablet 100 mg, 100 mg, Oral, Q12H, Berto Liu MD, 100 mg at 09/14/24 0534    isosorbide mononitrate (IMDUR) 24 hr tablet 30 mg, 30 mg, Oral, Daily, Berto Liu MD, 30 mg at 09/14/24 0858    nitroglycerin (NITROSTAT) SL tablet 0.4 mg, 0.4 mg, Sublingual, Q5 Min PRN, Berto Liu MD    nortriptyline (PAMELOR) capsule 20 mg, 20 mg, Oral, Nightly, Berto Liu MD, 20 mg at 09/13/24 2158    pantoprazole (PROTONIX) EC tablet 40 mg, 40 mg, Oral, Q AM, Berto Liu MD, 40 mg at 09/14/24 0534    Pharmacy to dose vancomycin, , Does not apply, Continuous PRN, Jae Manzanares MD    pravastatin (PRAVACHOL) tablet 20 mg, 20 mg, Oral, Daily, Berto Liu MD, 20 mg at 09/14/24 0859    saccharomyces boulardii (FLORASTOR) capsule 250 mg, 250 mg, Oral, BID, Berto Liu MD, 250 mg at 09/14/24 0858    sodium chloride 0.9 % flush 10 mL, 10 mL, Intravenous, Q12H, Berto Liu MD, 10 mL at 09/14/24 0859    sodium chloride 0.9 % flush 10 mL, 10 mL, Intravenous, PRN, Berto Liu MD    sodium chloride 0.9 % infusion 40 mL, 40 mL, Intravenous, PRN, Berot Liu MD    vancomycin IVPB 1500 mg in 0.9% NaCl (Premix) 500 mL, 1,500 mg, Intravenous, Q24H, Charlene Wyatt, [facility]     Allergies:  Allergies   Allergen Reactions    Divalproex Sodium (Migraine) [Valproic Acid] Other (See Comments)     Weak, lethargy and got worse    Cymbalta [Duloxetine Hcl] Mental Status Change     Depression, thoughts of Suicide.     Duloxetine Unknown (See Comments)    Metoprolol Other (See Comments)     UNKNOWN. Pt does not remember this being allergy    Neurontin [Gabapentin] Other (See Comments)     Lethargy & fatigue       Objective     Vitals:  Temp:  [97.2 °F (36.2 °C)-98 °F (36.7 °C)] 97.5 °F (36.4 °C)  Heart Rate:  [79-91] 91  Resp:  [16-18] 16  BP: (118-138)/(60-77) 136/70    Neurologic Exam   Mental status/Cognition:  Initially asleep, wakes easily to voice, oriented to self, place, month, year, able to state days of the week backwards  Speech/language: fluent; follows commands    Cranial nerves: PERRL. EOMI. right lower facial droop at rest.  Facial sensation intact bilaterally in V1, V2, V3.  No dysarthria.  Shoulder shrug symmetric.  Tongue protrudes midline.    Motor: No drift in any extremities, able to raise all to antigravity.    Sensation: intact to light touch throughout    Reflexes: 1+ at bilateral biceps, brachioradialis, left patellas, achilles.  Did not assess right patella due to recent surgery and infection    Coordination/Complex Motor: Finger-to-nose intact bilaterally without dysmetria        Laboratory Results:   Lab Results   Component Value Date    GLUCOSE 88 09/13/2024    CALCIUM 9.2 09/13/2024     09/13/2024    K 3.9 09/13/2024    CO2 24.0 09/13/2024     09/13/2024    BUN 18 09/13/2024    CREATININE 0.97 09/13/2024    EGFRIFNONA 69 06/19/2021    BCR 18.6 09/13/2024    ANIONGAP 12.0 09/13/2024     Lab Results   Component Value Date    WBC 7.23 09/13/2024    HGB 10.9 (L) 09/13/2024    HCT 32 (L) 09/13/2024    MCV 86.4 09/13/2024     09/13/2024     Lab Results   Component Value Date    CHOL 97 06/19/2021    CHOL 128 10/19/2017    CHOL 142 10/05/2017     Lab Results   Component Value Date    HDL 36 (L) 06/19/2021    HDL 38 (L) 10/19/2017    HDL 43 10/05/2017     Lab Results   Component Value Date    LDL 45 06/19/2021    LDL 83 10/19/2017    LDL 91 10/05/2017     Lab Results   Component Value Date    TRIG 75 06/19/2021    TRIG 77 10/19/2017    TRIG 116 10/05/2017     Lab Results   Component Value Date    HGBA1C 5.20 07/30/2024     Lab Results   Component Value Date    EMBTBUXY57 535 05/27/2022     Lab Results   Component Value Date    FOLATE >20.0 05/27/2022         Images/Procedures   CT head 8/22/2024  Moderate chronic small vessel ischemic change and chronic lacunar infarcts    Assessment /  Plan   Active Hospital Problems:  Suspected delirium  TIA      Brief Patient Summary:  Guicho Blanco is a 83 y.o. male with history of hearing loss, peripheral neuropathy, TIA, atrial fibrillation, CAD, hypertension, hyperlipidemia, right knee arthroplasty complicated by cellulitis who is presenting with increased confusion.  His exam is intact, he does have what looks to be a right facial droop at rest.  He is also reporting decreased appetite and difficulty swallowing.  I suspect a delirium component due to his multiple hospitalizations, infections, poor sleep and poor nutrition.  He is on nortriptyline and was started briefly on linezolid, these can have serotonergic effects unsure if this was contributing, but he is no longer on linezolid and episodic confusion persists.  No other signs or symptoms of serotonin syndrome.  Will rule out stroke with MRI brain.  Will start Remeron to help with sleep as well as hopefully boost his appetite.    Plan:   -MRI brain without contrast  -Start Remeron 15 mg nightly  -Recommend PT/OT/SLP    I have discussed the above with the patient, bedside RN  Time spent with patient: 60 minutes in face-to-face evaluation and management of the patient.       Akhil Lira DO            Electronically signed by Akhil Lira DO at 09/14/24 1822       Jae Manzanares MD at 09/13/24 1757        Consult Orders    1. Inpatient Infectious Diseases Consult [[id number]] ordered by Berto Liu MD at 09/13/24 1752                     INFECTIOUS DISEASES  INPATIENT CONSULT NOTE / INITIAL HOSPITAL VISIT      PATIENT NAME:  Guicho Blanco  YOB: 1940  MEDICAL RECORD NUMBER:  [id number]    Date of Admission:  9/13/2024  Date of Consult: 9/13/2024    Requesting Provider: LANDON Jimenez MD  Evaluating Physician: Jae Manzanares MD    Chief Complaint   Patient presents with    Weakness - Generalized       Reason for Consultation:   right knee cellulitis    History of Present  Illness:  Patient is a 83 y.o. male with a past medical history significant for recent right knee arthroplasy complicated by cellulitis who was admitted to [facility] on 9/13/2024 after presenting to ED with complaints of increased confusion.   Patient previously known to me from his recent hospitalization.  Has lateral right knee superficial cellulitis.  Did not appear to involve joint or incision.  Treated with IV vancomycin and ceftriaxone with improvement.  Discharged home on oral doxycycline and cephalexin.  I saw him back in office 2 days ago, when he had new erythema and warmth about the incision itself.  I changed his antibiotic to oral linezolid.  He has taken that for 2 days.  Family brought him back to ED today as he had just slowly been declining at home.  He will have episodes of disorientation and is just generally weak.  He saw ortho just yesterday and some of the deeper sutures removed.  Afebrile today with normal WBC, PCT and ESR.    I have been consulted to assist in workup and antimicrobial management of right knee cellulitis.    Past Medical History:   Diagnosis Date    Arthritis     Atrial fibrillation     BPH (benign prostatic hypertrophy)     Cataract     Cellulitis     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     bilateral    Coronary artery disease     Full dentures     Gall stones     GERD (gastroesophageal reflux disease)     Hearing loss     Hiatal hernia     Hyperlipidemia     Hypertension     Peripheral neuropathy     Peripheral vascular disease     Seasonal allergies     Skin cancer     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    TIA (transient ischemic attack)     2 times    Wears glasses        Past Surgical History:   Procedure Laterality Date    ABLATION OF DYSRHYTHMIC FOCUS      APPENDECTOMY      CARDIAC CATHETERIZATION N/A 10/05/2017    Procedure: Left Heart Cath;  Surgeon: Hector Urrutia MD;  Location: [facility] CATH INVASIVE LOCATION;  Service:     CATARACT EXTRACTION Bilateral      CHOLECYSTECTOMY      Remote    COCHLEAR IMPLANT REVISION  05/2019    COLONOSCOPY      PROSTATE SURGERY      SHOULDER SURGERY Right     SKIN BIOPSY      TOE AMPUTATION Right     2nd toe - Right Foot    TONSILLECTOMY      TOTAL KNEE ARTHROPLASTY Right 8/7/2024    Procedure: TOTAL KNEE ARTHROPLASTY WITH ASHLEY ROBOT RIGHT;  Surgeon: Christian Schwartz MD;  [facility];  Service: Robotics - Ortho;  Laterality: Right;       Family History   Problem Relation Age of Onset    Atrial fibrillation Mother     Dementia Mother     Lung cancer Father        Social History     Socioeconomic History    Marital status:    Tobacco Use    Smoking status: Never     Passive exposure: Past    Smokeless tobacco: Never   Vaping Use    Vaping status: Never Used   Substance and Sexual Activity    Alcohol use: No    Drug use: No    Sexual activity: Defer         Allergies:  Allergies   Allergen Reactions    Divalproex Sodium (Migraine) [Valproic Acid] Other (See Comments)     Weak, lethargy and got worse    Cymbalta [Duloxetine Hcl] Mental Status Change     Depression, thoughts of Suicide.     Duloxetine Unknown (See Comments)    Metoprolol Other (See Comments)     UNKNOWN. Pt does not remember this being allergy    Neurontin [Gabapentin] Other (See Comments)     Lethargy & fatigue       Home Medications:  No current facility-administered medications on file prior to encounter.     Current Outpatient Medications on File Prior to Encounter   Medication Sig Dispense Refill    acetaminophen (TYLENOL) 650 MG 8 hr tablet Take 1 tablet by mouth Every 8 (Eight) Hours. (Patient taking differently: Take 1 tablet by mouth Every 8 (Eight) Hours As Needed for Mild Pain, Moderate Pain or Headache. Indications: Pain) 90 tablet 0    aspirin 325 MG tablet Take 1 tablet by mouth Daily. Indications: Disease involving Lipid Deposits in the Arteries      B Complex Vitamins (VITAMIN B COMPLEX) capsule capsule Take 1 capsule by mouth Daily.  Takes in evening  Indications: Vitamin Deficiency      bisacodyl (DULCOLAX) 10 MG suppository Insert 1 suppository into the rectum Daily As Needed for Constipation (Use if bisacodyl oral is ineffective).      Cholecalciferol (VITAMIN D3) 5000 UNITS capsule capsule Take 1 capsule by mouth Daily. Indications: Vitamin D Deficiency      coenzyme Q10 100 MG capsule Take 1 capsule by mouth Daily. Indications: Heart Rhythm Disorder      cycloSPORINE (Restasis) 0.05 % ophthalmic emulsion Administer 1 drop to both eyes Every 12 (Twelve) Hours. Indications: Drying and Inflammation of Cornea and Conjunctiva of Eyes      docusate sodium (Colace) 100 MG capsule Take 1 capsule by mouth 2 (Two) Times a Day As Needed for Constipation. 60 capsule 0    flecainide (TAMBOCOR) 100 MG tablet Take 1 tablet by mouth Every 12 (Twelve) Hours. 180 tablet 3    isosorbide mononitrate (IMDUR) 30 MG 24 hr tablet Take 1 tablet by mouth Daily. 90 tablet 3    linezolid (ZYVOX) 600 MG tablet Take 1 tablet by mouth 2 (Two) Times a Day.      nortriptyline (PAMELOR) 10 MG capsule Take 2 capsules by mouth Every Night. Indications: Migraine Headache      omeprazole OTC (PrilOSEC OTC) 20 MG EC tablet Take 1 tablet by mouth Daily. Indications: Heartburn      pravastatin (PRAVACHOL) 20 MG tablet Take 1 tablet by mouth Daily. Indications: High Amount of Fats in the Blood      saccharomyces boulardii (FLORASTOR) 250 MG capsule Take 1 capsule by mouth 2 (Two) Times a Day. 42 capsule 0    traMADol (ULTRAM) 50 MG tablet Take 1 tablet by mouth Every 8 (Eight) Hours As Needed for Moderate Pain. 30 tablet 1    glucose (DEX4) 4 GM chewable tablet Chew 4 tablets As Needed.      nitroglycerin (NITROSTAT) 0.4 MG SL tablet 1 under the tongue as needed for angina, may repeat q5mins for up three doses 25 tablet 2    ondansetron (Zofran) 4 MG tablet Take 1 tablet by mouth Every 8 (Eight) Hours As Needed for Nausea or Vomiting. (Patient not taking: Reported on 9/13/2024) 15  tablet 1    [DISCONTINUED] Acetaminophen 500 MG pack 500 mg.      [DISCONTINUED] bisacodyl (DULCOLAX) 5 MG EC tablet Take 1 tablet by mouth Daily As Needed for Constipation (Use if polyethylene glycol is ineffective).      [DISCONTINUED] Cholecalciferol (Vitamin D-3) 125 MCG (5000 UT) tablet 1 tablet.      [DISCONTINUED] polyethylene glycol (MIRALAX) 17 g packet Take 17 g by mouth Daily As Needed (Use if senna-docusate is ineffective).      [DISCONTINUED] Polyethylene Glycol 3350 (MIRALAX PO) 17 g.      [DISCONTINUED] promethazine (PHENERGAN) 12.5 MG tablet Take 1 tablet by mouth Every 6 (Six) Hours As Needed for Nausea or Vomiting. 12 tablet 0       Current Hospital Medications:  Current Facility-Administered Medications   Medication Dose Route Frequency Provider Last Rate Last Admin    acetaminophen (TYLENOL) tablet 1,000 mg  1,000 mg Oral Q6H PRN Berto Liu MD        aspirin tablet 325 mg  325 mg Oral Daily Berto Liu MD        polyethylene glycol (MIRALAX) packet 17 g  17 g Oral Daily PRN Berto Liu MD        And    bisacodyl (DULCOLAX) EC tablet 5 mg  5 mg Oral Daily PRN Berto Liu MD        And    bisacodyl (DULCOLAX) suppository 10 mg  10 mg Rectal Daily PRN Berto Liu MD        docusate sodium (COLACE) capsule 100 mg  100 mg Oral BID PRN Berto Liu MD        flecainide (TAMBOCOR) tablet 100 mg  100 mg Oral Q12H Berto Liu MD        isosorbide mononitrate (IMDUR) 24 hr tablet 30 mg  30 mg Oral Daily Berto Liu MD        nitroglycerin (NITROSTAT) SL tablet 0.4 mg  0.4 mg Sublingual Q5 Min PRN Berto Liu MD        nortriptyline (PAMELOR) capsule 20 mg  20 mg Oral Nightly Berto Liu MD        [START ON 9/14/2024] pantoprazole (PROTONIX) EC tablet 40 mg  40 mg Oral Q AM Berto Liu MD        pravastatin (PRAVACHOL) tablet 20 mg  20 mg Oral Daily Berto Liu MD saccharomyces boulardii  "(FLORASTOR) capsule 250 mg  250 mg Oral BID Berto Liu MD        sodium chloride 0.9 % flush 10 mL  10 mL Intravenous Q12H Berto Liu MD        sodium chloride 0.9 % flush 10 mL  10 mL Intravenous PRN Berto Liu MD        sodium chloride 0.9 % infusion 40 mL  40 mL Intravenous PRN Berto Liu MD           Antimicrobials:  Anti-Infectives (From admission, onward)      Ordered     Dose/Rate Route Frequency Start Stop    24 1349  vancomycin IVPB 2000 mg in 0.9% Sodium Chloride 500 mL        Ordering Provider: Girma Jimenez MD    20 mg/kg × 95.3 kg  250 mL/hr over 120 Minutes Intravenous Once 24 1500 24 1729    24 1349  cefTRIAXone (ROCEPHIN) 1,000 mg in sodium chloride 0.9 % 100 mL MBP        Ordering Provider: Girma Jimenez MD    1,000 mg  200 mL/hr over 30 Minutes Intravenous Once 24 1430 24 1517            Review of Systems:   Constitutional:  No fever, chills, or sweats.  Appetite poor.  +fatigue.  HEENT:  No new vision, hearing or throat complaints.  No oral sores.  No headache, photophobia or neck stiffness.  Hard of hearing.  CV:  No chest pain, palpitations, or syncope.  Respiratory:  No SOB, cough, or hemoptysis.  GI:  No nausea, vomiting, or diarrhea.  No hematochezia, melena, or hematemesis. No jaundice or liver disease.  :  No dysuria, hematuria, urgency, or flank pain.  Lymph:  No swollen lymph nodes in neck, axilla, or groin.   Hematologic:  No easy bruising or bleeding.  Endo:  No diabetes.  No thyroid issues.  Musculoskeletal:  recent right TKA  Neurologic:  No acute focal weakness or numbness.  No seizures.  +episodes of confusion.  Skin:  right knee redness.       Vital Signs:  Temp (24hrs), Av.9 °F (36.6 °C), Min:97.7 °F (36.5 °C), Max:98 °F (36.7 °C)    /62 (BP Location: Left arm, Patient Position: Lying)   Pulse 88   Temp 98 °F (36.7 °C) (Oral)   Resp 18   Ht 182.9 cm (72\")   Wt 95.3 kg (210 lb)   " SpO2 100%   BMI 28.48 kg/m²     Physical Examination:  GENERAL: Elderly-appearing male.  Awake and alert, in no acute distress.   HEENT: Normocephalic, atraumatic.  EOMI. No conjunctival injection or subconjunctival hemorrhage.   NECK: Supple without nuchal rigidity.   LUNGS: Normal respiratory effort.  SKIN: Right knee incision closed.  Patchy erythema and warmth about incision.    MS:  Right knee enlarged.  NEURO: A&O. No focal deficits.  Face symmetric.  Speech fluent.       Laboratory Data:    Results from last 7 days   Lab Units 09/13/24  1334 09/13/24  1232   WBC 10*3/mm3  --  7.23   HEMOGLOBIN g/dL  --  10.8*   HEMOGLOBIN, POC g/dL 10.9*  --    HEMATOCRIT %  --  34.8*   HEMATOCRIT POC % 32*  --    PLATELETS 10*3/mm3  --  197     Results from last 7 days   Lab Units 09/13/24  1341   SODIUM mmol/L 136   POTASSIUM mmol/L 3.9   CHLORIDE mmol/L 100   CO2 mmol/L 24.0   BUN mg/dL 18   CREATININE mg/dL 0.97   GLUCOSE mg/dL 88   CALCIUM mg/dL 9.2     Results from last 7 days   Lab Units 09/13/24  1341   ALK PHOS U/L 152*   BILIRUBIN mg/dL 0.5   ALT (SGPT) U/L 20   AST (SGOT) U/L 28     Results from last 7 days   Lab Units 09/13/24  1232   SED RATE mm/hr 18     Results from last 7 days   Lab Units 09/13/24  1341   CRP mg/dL 0.70*     Estimated Creatinine Clearance: 69.1 mL/min (by C-G formula based on SCr of 0.97 mg/dL).  Results from last 7 days   Lab Units 09/13/24  1232   LACTATE mmol/L 1.4             PCT 0.10      Microbiology:  Microbiology Results (last 10 days)       Procedure Component Value - Date/Time    COVID PRE-OP / PRE-PROCEDURE SCREENING ORDER (NO ISOLATION) - Swab, Nasopharynx [[id number]]  (Normal) Collected: 09/13/24 1344    Lab Status: Final result Specimen: Swab from Nasopharynx Updated: 09/13/24 1418    Narrative:      The following orders were created for panel order COVID PRE-OP / PRE-PROCEDURE SCREENING ORDER (NO ISOLATION) - Swab, Nasopharynx.  Procedure                                Abnormality         Status                     ---------                               -----------         ------                     COVID-19 and FLU A/B PCR...[[id number]]  Normal              Final result                 Please view results for these tests on the individual orders.    COVID-19 and FLU A/B PCR, 1 HR TAT - Swab, Nasopharynx [[id number]]  (Normal) Collected: 09/13/24 1344    Lab Status: Final result Specimen: Swab from Nasopharynx Updated: 09/13/24 1418     COVID19 Not Detected     Influenza A PCR Not Detected     Influenza B PCR Not Detected    Narrative:      Fact sheet for providers: https://www.fda.gov/media/188505/download    Fact sheet for patients: https://www.fda.gov/media/197788/download    Test performed by PCR.                Radiology:  Imaging Results (Last 72 Hours)       Procedure Component Value Units Date/Time    XR Chest 1 View [[id number]] Collected: 09/13/24 1425     Updated: 09/13/24 1430    Narrative:      XR CHEST 1 VW    Date of Exam: 9/13/2024 2:03 PM EDT    Indication: Weakness    Comparison: 6/18/2021    Findings:  Cardiomediastinal silhouette is unremarkable.  No airspace disease, pneumothorax, nor pleural effusion. There is multilevel thoracic spondylosis.      Impression:      Impression:  No acute cardiopulmonary abnormality.      Electronically Signed: Brit Landaverde MD    9/13/2024 2:27 PM EDT    Workstation ID: [id number]    XR Knee 1 or 2 View Right [[id number]] Collected: 09/13/24 1314     Updated: 09/13/24 1318    Narrative:      XR KNEE 1 OR 2 VW RIGHT    Date of Exam: 9/13/2024 1:06 PM EDT    Indication: Knee pain, 1 month status post replacement    Comparison: 8/30/2024 and prior    Findings:  Patient is status post total knee arthroplasty with patellar resurfacing. No acute fracture or dislocation noted. The orthopedic hardware appears to be intact. Mild soft tissue swelling noted. Small joint effusion noted. Findings appear to overall   decreased from comparison  and are nonspecific peripheral vascular calcification noted      Impression:      Impression:    1. Status post total knee arthroplasty with patellar resurfacing. Hardware appears to be intact.    2. Small joint effusion and mild soft tissue swelling, nonspecific.      Electronically Signed: Ramirez Mireles MD    9/13/2024 1:15 PM EDT    Workstation ID: [id number]              IMPRESSION:  83 y.o. male with history of recent right knee cellulitis after TKA admitted with encephalopathy.       PROBLEM LIST:   -- Encephalopathy, unclear etiology.  Gradual mental decline with short episodes of disorientation per family.  Doubt antibiotics contributing.  -- Right knee cellulitis.  Recent admission earlier this month with lateral right knee superficial cellulitis.  Did not appear to involve joint or incision.  Treated with IV vancomycin and ceftriaxone with improvement.  Discharged home on oral doxycycline and cephalexin.  I saw him back in office 2 days prior to admission, when he had new erythema and warmth about the incision itself.  I changed his antibiotic to oral linezolid. Continues to have erythema/warmth about incision; consider reaction to sutures.  Normal WBC, ESR, and PCT.  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       PLAN:  -- IV vancomycin and ceftriaxone  -- Follow exam  -- Encephalopathy workup per Dr. VALENCIA    Thank you for the consultation.  I will continue to follow along with you.  Plan discussed with ED physician and family.    Jae Manzanares MD  9/13/2024  17:57 EDT      Electronically signed by Jae Manzanares MD at 09/13/24 4856

## 2024-09-17 PROBLEM — G25.79 SEROTONIN SYNDROME: Status: ACTIVE | Noted: 2024-09-17

## 2024-09-17 PROBLEM — G90.81 SEROTONIN SYNDROME: Status: ACTIVE | Noted: 2024-09-17

## 2024-09-17 LAB
ANION GAP SERPL CALCULATED.3IONS-SCNC: 13 MMOL/L (ref 5–15)
BASOPHILS # BLD AUTO: 0.01 10*3/MM3 (ref 0–0.2)
BASOPHILS NFR BLD AUTO: 0.4 % (ref 0–1.5)
BUN SERPL-MCNC: 18 MG/DL (ref 8–23)
BUN/CREAT SERPL: 16.4 (ref 7–25)
CALCIUM SPEC-SCNC: 8.1 MG/DL (ref 8.6–10.5)
CHLORIDE SERPL-SCNC: 107 MMOL/L (ref 98–107)
CK SERPL-CCNC: 572 U/L (ref 20–200)
CO2 SERPL-SCNC: 21 MMOL/L (ref 22–29)
CREAT SERPL-MCNC: 1.1 MG/DL (ref 0.76–1.27)
DEPRECATED RDW RBC AUTO: 43.6 FL (ref 37–54)
EGFRCR SERPLBLD CKD-EPI 2021: 66.6 ML/MIN/1.73
EOSINOPHIL # BLD AUTO: 0 10*3/MM3 (ref 0–0.4)
EOSINOPHIL NFR BLD AUTO: 0 % (ref 0.3–6.2)
ERYTHROCYTE [DISTWIDTH] IN BLOOD BY AUTOMATED COUNT: 14.1 % (ref 12.3–15.4)
ERYTHROCYTE [SEDIMENTATION RATE] IN BLOOD: 11 MM/HR (ref 0–20)
GLUCOSE SERPL-MCNC: 89 MG/DL (ref 65–99)
HCT VFR BLD AUTO: 33.3 % (ref 37.5–51)
HGB BLD-MCNC: 10.4 G/DL (ref 13–17.7)
IMM GRANULOCYTES # BLD AUTO: 0.03 10*3/MM3 (ref 0–0.05)
IMM GRANULOCYTES NFR BLD AUTO: 1.1 % (ref 0–0.5)
LYMPHOCYTES # BLD AUTO: 0.09 10*3/MM3 (ref 0.7–3.1)
LYMPHOCYTES NFR BLD AUTO: 3.3 % (ref 19.6–45.3)
MAGNESIUM SERPL-MCNC: 1.8 MG/DL (ref 1.6–2.4)
MCH RBC QN AUTO: 26.5 PG (ref 26.6–33)
MCHC RBC AUTO-ENTMCNC: 31.2 G/DL (ref 31.5–35.7)
MCV RBC AUTO: 84.7 FL (ref 79–97)
MONOCYTES # BLD AUTO: 0.07 10*3/MM3 (ref 0.1–0.9)
MONOCYTES NFR BLD AUTO: 2.6 % (ref 5–12)
NEUTROPHILS NFR BLD AUTO: 2.51 10*3/MM3 (ref 1.7–7)
NEUTROPHILS NFR BLD AUTO: 92.6 % (ref 42.7–76)
NRBC BLD AUTO-RTO: 0 /100 WBC (ref 0–0.2)
OVALOCYTES BLD QL SMEAR: NORMAL
PHOSPHATE SERPL-MCNC: 2.9 MG/DL (ref 2.5–4.5)
PLAT MORPH BLD: NORMAL
PLATELET # BLD AUTO: 127 10*3/MM3 (ref 140–450)
PMV BLD AUTO: 10.8 FL (ref 6–12)
POTASSIUM SERPL-SCNC: 3.8 MMOL/L (ref 3.5–5.2)
PROCALCITONIN SERPL-MCNC: 1.08 NG/ML (ref 0–0.25)
RBC # BLD AUTO: 3.93 10*6/MM3 (ref 4.14–5.8)
SODIUM SERPL-SCNC: 141 MMOL/L (ref 136–145)
WBC MORPH BLD: NORMAL
WBC NRBC COR # BLD AUTO: 2.71 10*3/MM3 (ref 3.4–10.8)

## 2024-09-17 PROCEDURE — 25010000002 PIPERACILLIN SOD-TAZOBACTAM PER 1 G: Performed by: INTERNAL MEDICINE

## 2024-09-17 PROCEDURE — 25010000002 THIAMINE PER 100 MG: Performed by: STUDENT IN AN ORGANIZED HEALTH CARE EDUCATION/TRAINING PROGRAM

## 2024-09-17 PROCEDURE — 99233 SBSQ HOSP IP/OBS HIGH 50: CPT | Performed by: INTERNAL MEDICINE

## 2024-09-17 PROCEDURE — 85007 BL SMEAR W/DIFF WBC COUNT: CPT | Performed by: INTERNAL MEDICINE

## 2024-09-17 PROCEDURE — 92610 EVALUATE SWALLOWING FUNCTION: CPT

## 2024-09-17 PROCEDURE — 84145 PROCALCITONIN (PCT): CPT | Performed by: INTERNAL MEDICINE

## 2024-09-17 PROCEDURE — 84100 ASSAY OF PHOSPHORUS: CPT | Performed by: INTERNAL MEDICINE

## 2024-09-17 PROCEDURE — 85025 COMPLETE CBC W/AUTO DIFF WBC: CPT | Performed by: INTERNAL MEDICINE

## 2024-09-17 PROCEDURE — 80048 BASIC METABOLIC PNL TOTAL CA: CPT

## 2024-09-17 PROCEDURE — 82550 ASSAY OF CK (CPK): CPT | Performed by: INTERNAL MEDICINE

## 2024-09-17 PROCEDURE — 83735 ASSAY OF MAGNESIUM: CPT | Performed by: INTERNAL MEDICINE

## 2024-09-17 PROCEDURE — 25010000002 CEFTRIAXONE PER 250 MG: Performed by: INTERNAL MEDICINE

## 2024-09-17 PROCEDURE — 99232 SBSQ HOSP IP/OBS MODERATE 35: CPT | Performed by: PSYCHIATRY & NEUROLOGY

## 2024-09-17 PROCEDURE — 85652 RBC SED RATE AUTOMATED: CPT | Performed by: INTERNAL MEDICINE

## 2024-09-17 RX ORDER — HYDROCODONE BITARTRATE AND ACETAMINOPHEN 5; 325 MG/1; MG/1
1 TABLET ORAL EVERY 6 HOURS PRN
Status: DISPENSED | OUTPATIENT
Start: 2024-09-17 | End: 2024-09-22

## 2024-09-17 RX ORDER — PANTOPRAZOLE SODIUM 40 MG/10ML
40 INJECTION, POWDER, LYOPHILIZED, FOR SOLUTION INTRAVENOUS
Status: DISCONTINUED | OUTPATIENT
Start: 2024-09-17 | End: 2024-09-23 | Stop reason: HOSPADM

## 2024-09-17 RX ADMIN — FLECAINIDE ACETATE 100 MG: 50 TABLET ORAL at 09:33

## 2024-09-17 RX ADMIN — HYDROCODONE BITARTRATE AND ACETAMINOPHEN 1 TABLET: 5; 325 TABLET ORAL at 15:47

## 2024-09-17 RX ADMIN — Medication 10 ML: at 08:15

## 2024-09-17 RX ADMIN — THIAMINE HYDROCHLORIDE 250 MG: 100 INJECTION, SOLUTION INTRAMUSCULAR; INTRAVENOUS at 12:17

## 2024-09-17 RX ADMIN — FLECAINIDE ACETATE 100 MG: 50 TABLET ORAL at 20:34

## 2024-09-17 RX ADMIN — Medication 10 ML: at 20:35

## 2024-09-17 RX ADMIN — PRAVASTATIN SODIUM 20 MG: 20 TABLET ORAL at 08:15

## 2024-09-17 RX ADMIN — FLECAINIDE ACETATE 100 MG: 50 TABLET ORAL at 01:20

## 2024-09-17 RX ADMIN — Medication 250 MG: at 20:34

## 2024-09-17 RX ADMIN — Medication 250 MG: at 08:14

## 2024-09-17 RX ADMIN — SODIUM CHLORIDE 1000 MG: 900 INJECTION INTRAVENOUS at 14:36

## 2024-09-17 RX ADMIN — HYDROCODONE BITARTRATE AND ACETAMINOPHEN 1 TABLET: 5; 325 TABLET ORAL at 09:33

## 2024-09-17 RX ADMIN — SODIUM BICARBONATE 50 MEQ: 84 INJECTION, SOLUTION INTRAVENOUS at 02:45

## 2024-09-17 RX ADMIN — SODIUM BICARBONATE 50 MEQ: 84 INJECTION, SOLUTION INTRAVENOUS at 13:20

## 2024-09-17 RX ADMIN — PIPERACILLIN AND TAZOBACTAM 3.38 G: 3; .375 INJECTION, POWDER, LYOPHILIZED, FOR SOLUTION INTRAVENOUS at 04:17

## 2024-09-17 RX ADMIN — PANTOPRAZOLE SODIUM 40 MG: 40 INJECTION, POWDER, FOR SOLUTION INTRAVENOUS at 06:18

## 2024-09-17 RX ADMIN — ACETAMINOPHEN 1000 MG: 500 TABLET ORAL at 00:52

## 2024-09-17 RX ADMIN — ACETAMINOPHEN 1000 MG: 500 TABLET ORAL at 20:33

## 2024-09-17 RX ADMIN — ISOSORBIDE MONONITRATE 30 MG: 30 TABLET, EXTENDED RELEASE ORAL at 08:15

## 2024-09-17 RX ADMIN — THIAMINE HYDROCHLORIDE 250 MG: 100 INJECTION, SOLUTION INTRAMUSCULAR; INTRAVENOUS at 03:45

## 2024-09-17 RX ADMIN — ACETAMINOPHEN 1000 MG: 500 TABLET ORAL at 08:15

## 2024-09-17 RX ADMIN — NORTRIPTYLINE HYDROCHLORIDE 20 MG: 10 CAPSULE ORAL at 20:35

## 2024-09-17 RX ADMIN — THIAMINE HYDROCHLORIDE 250 MG: 100 INJECTION, SOLUTION INTRAMUSCULAR; INTRAVENOUS at 23:14

## 2024-09-17 NOTE — PROGRESS NOTES
INTENSIVIST   PROGRESS NOTE     Hospital:  LOS: 1 day     Mr. Guicho Blanco, 83 y.o. male is followed for a Chief Complaint of: Lactic Acidosis      Subjective   S     Interval History:  More alert this AM as compared to yesterday. Right knee aspirated yesterday.        The patient's relevant past medical, surgical and social history were reviewed and updated in Epic as appropriate.      ROS: Unable to obtain secondary to mental status.     Objective   O     Vitals:  Temp  Min: 99.3 °F (37.4 °C)  Max: 104.7 °F (40.4 °C)  BP  Min: 98/50  Max: 160/74  Pulse  Min: 91  Max: 126  Resp  Min: 18  Max: 22  SpO2  Min: 92 %  Max: 100 % Flow (L/min)  Min: 2  Max: 2    Intake/Ouptut 24 hrs (7:00AM - 6:59 AM)  Intake & Output (last 3 days)         09/13 0701  09/14 0700 09/14 0701  09/15 0700 09/15 0701  09/16 0700 09/16 0701  09/17 0700    P.O.  950 410     IV Piggyback 100 600      Total Intake(mL/kg) 100 (1) 1550 (16.3) 410 (4.3)     Urine (mL/kg/hr) 1205 2175 (1) 625 (0.3)     Stool   0     Total Output 1205 2175 625     Net -1105 -625 -215             Urine Unmeasured Occurrence   4 x     Stool Unmeasured Occurrence   1 x             Medications (drips):  sodium bicarbonate 8.4 % 50 mEq in sodium chloride 0.45 % 1,000 mL infusion (less than/equal to 100 mEq), Last Rate: 50 mEq (09/17/24 0245)          Physical Examination  Telemetry:  Sinus tachycardia.    Constitutional:  No acute distress.  Resting in bed on nasal cannula.    Cardiovascular: Normal rate, regular and rhythm. Normal heart sounds.  No murmurs, gallop or rub.   Respiratory: No respiratory distress. Normal respiratory effort.  Normal breath sounds  Clear to ascultation and   Abdominal:  Soft. No masses. Non-tender. No distension. No HSM.   Extremities: No digital cyanosis. No clubbing.  Right knee is hot and swollen.    Neurological:   Arouses to stimulation. Does not answer questions. May be a hearing issue.          Results from last 7 days   Lab Units  09/17/24  0327 09/16/24  1044 09/13/24  1334 09/13/24  1232   WBC 10*3/mm3 2.71* 4.84  --  7.23   HEMOGLOBIN g/dL 10.4* 12.3*  --  10.8*   HEMOGLOBIN, POC g/dL  --   --  10.9*  --    MCV fL 84.7 87.1  --  86.4   PLATELETS 10*3/mm3 127* 172  --  197     Results from last 7 days   Lab Units 09/17/24  0327 09/16/24  1852 09/16/24  1535   SODIUM mmol/L 141 138 141   POTASSIUM mmol/L 3.8 3.9 4.6   CO2 mmol/L 21.0* 22.0 23.0   CREATININE mg/dL 1.10 1.07 1.14   GLUCOSE mg/dL 89 104* 105*   MAGNESIUM mg/dL 1.8  --   --    PHOSPHORUS mg/dL 2.9  --   --      Estimated Creatinine Clearance: 61 mL/min (by C-G formula based on SCr of 1.1 mg/dL).  Results from last 7 days   Lab Units 09/13/24  1341   ALK PHOS U/L 152*   BILIRUBIN mg/dL 0.5   ALT (SGPT) U/L 20   AST (SGOT) U/L 28       Results from last 7 days   Lab Units 09/16/24  2204 09/16/24  1040   PH, ARTERIAL pH units 7.451* 7.276*   PCO2, ARTERIAL mm Hg 32.7* 21.0*   PO2 ART mm Hg 102.0 101.0   FIO2 % 28 28       Images:  Imaging Results (Last 24 Hours)       Procedure Component Value Units Date/Time    FL Video Swallow With Speech Single Contrast [[id number]] Collected: 09/16/24 0959     Updated: 09/16/24 1643    Narrative:      FL VIDEO SWALLOW W SPEECH SINGLE-CONTRAST    Date of Exam: 9/16/2024 8:52 AM EDT    Indication: dysphagia.       Comparison: None available.    Technique:   The speech pathologist administered food and/or liquid mixed with barium to the patient with cine/video imaging.  Imaging assistance was provided to the speech pathologist and an image was saved.    Fluoroscopic Time: 2 minutes and 12 seconds    Number of Images: 9 associated fluoroscopic loops were saved    Findings:  Aspiration was seen during fluoroscopic guided modified barium swallowing series. Please see speech therapy report for full details and recommendations.      Impression:      Impression:  Fluoroscopy provided for a modified barium swallow. Aspiration was seen during swallowing  evaluation. Please see speech therapy report for full details and recommendations.      Report dictated by: Jess Sahu PA-c      I have personally reviewed this case and agree with the findings above:    Electronically Signed: Roge Guillen MD    9/16/2024 4:40 PM EDT    Workstation ID: [id number]    CT Head Without Contrast Stroke Protocol [[id number]] Collected: 09/16/24 1635     Updated: 09/16/24 1642    Narrative:      CT HEAD WO CONTRAST STROKE PROTOCOL    Date of Exam: 9/16/2024 3:18 PM EDT    Indication: AMS, gaze deviation to the left, right sided weakness.    Comparison: Head CT 8/22/2024    Technique: Axial CT images were obtained of the head without contrast administration.  Reconstructed coronal images were also obtained. Automated exposure control and iterative construction methods were used.    Scan Time: 3:49 p.m. 9/16/2024  Results discussed with stroke navigator by Dr. Roge Guillen via telephone at at 4:35 p.m. 9/16/2024.      Findings:  The exam is somewhat limited owing to beam hardening and streak artifact relating to bilateral cochlear implants. Additionally there is some motion artifact. Accounting for this, no evidence of acute intracranial hemorrhage. No acute large territory   infarct. There are scattered and small confluent areas of subcortical and periventricular white matter hypodensity which is nonspecific and can be seen in the setting of chronic small vessel ischemic change. There is generalized cerebral volume loss. No   extra-axial collections. No midline shift or herniation. There is a chronic lacunar infarct in the right basal ganglia near the caudate head, unchanged. The paranasal sinuses and mastoid air cells are grossly clear. No acute or suspicious bony findings.      Impression:      Impression:  The exam is somewhat limited owing to streak artifact and beam hardening relating to cochlear implants, and motion artifact.    No acute intracranial findings.    Chronic and  senescent changes as above.        Electronically Signed: Roge Guillen MD    9/16/2024 4:39 PM EDT    Workstation ID: [id number]    CT Angiogram Head w AI Analysis of LVO [[id number]] Collected: 09/16/24 1621     Updated: 09/16/24 1631    Narrative:      CT ANGIOGRAM HEAD W AI ANALYSIS OF LVO, CT ANGIOGRAM NECK    Date of Exam: 9/16/2024 3:47 PM EDT    Indication: AMS, leftward gaze, right sided weakness.    Comparison: None available.    Technique: CTA of the head was performed after the uneventful intravenous administration of 115 mL Isovue-370. Reconstructed coronal and sagittal images were also obtained. In addition, a 3-D volume rendered image was created for interpretation.   Automated exposure control and iterative reconstruction methods were used.    FINDINGS:    Examination is somewhat limited due to beam hardening artifact arising from bilateral cochlear implants.    Vascular Findings:    The right common carotid, internal carotid, middle cerebral, anterior cerebral, vertebral, and posterior cerebral arteries are patent without abrupt cut off or aneurysmal dilation.    The left common carotid, internal carotid, middle cerebral, anterior cerebral, vertebral, and posterior cerebral arteries are patent without abrupt cut off or aneurysmal dilation.    Basilar artery appears patent and appears unremarkable.    Non-vascular Findings:    For description of nonvascular intracranial findings, please refer to the noncontrast head CT performed the same date.    No acute abnormality is identified within the visualized soft tissue or bony structures of the neck. Cervical spondylosis. Contiguous anterior osteophytes manage in the presence of diffuse idiopathic skeletal hyperostosis.    The visualized lung apices are clear.      Impression:      1.No acute abnormality is identified within the large arteries of the head or neck.  2.No significant stenosis of the bilateral internal carotid  arteries.          Electronically Signed: Tomy Dias MD    9/16/2024 4:27 PM EDT    Workstation ID: [id number]    CT Angiogram Neck [[id number]] Collected: 09/16/24 1621     Updated: 09/16/24 1631    Narrative:      CT ANGIOGRAM HEAD W AI ANALYSIS OF LVO, CT ANGIOGRAM NECK    Date of Exam: 9/16/2024 3:47 PM EDT    Indication: AMS, leftward gaze, right sided weakness.    Comparison: None available.    Technique: CTA of the head was performed after the uneventful intravenous administration of 115 mL Isovue-370. Reconstructed coronal and sagittal images were also obtained. In addition, a 3-D volume rendered image was created for interpretation.   Automated exposure control and iterative reconstruction methods were used.    FINDINGS:    Examination is somewhat limited due to beam hardening artifact arising from bilateral cochlear implants.    Vascular Findings:    The right common carotid, internal carotid, middle cerebral, anterior cerebral, vertebral, and posterior cerebral arteries are patent without abrupt cut off or aneurysmal dilation.    The left common carotid, internal carotid, middle cerebral, anterior cerebral, vertebral, and posterior cerebral arteries are patent without abrupt cut off or aneurysmal dilation.    Basilar artery appears patent and appears unremarkable.    Non-vascular Findings:    For description of nonvascular intracranial findings, please refer to the noncontrast head CT performed the same date.    No acute abnormality is identified within the visualized soft tissue or bony structures of the neck. Cervical spondylosis. Contiguous anterior osteophytes manage in the presence of diffuse idiopathic skeletal hyperostosis.    The visualized lung apices are clear.      Impression:      1.No acute abnormality is identified within the large arteries of the head or neck.  2.No significant stenosis of the bilateral internal carotid arteries.          Electronically Signed: Tomy Dias MD     9/16/2024 4:27 PM EDT    Workstation ID: [id number]    CT CEREBRAL PERFUSION WITH & WITHOUT CONTRAST [[id number]] Collected: 09/16/24 1618     Updated: 09/16/24 1629    Narrative:      CT CEREBRAL PERFUSION W WO CONTRAST    Date of Exam: 9/16/2024 3:47 PM EDT    Indication: AMS, leftward gaze, right sided weakness.     Comparison: CT head from earlier today    Technique: Axial CT images of the brain were obtained prior to and after the administration of 50 mL Isovue-370. Core blood volume, core blood flow, mean transit time, and Tmax images were obtained utilizing the Rapid software protocol. A limited CT   angiogram of the head was also performed to measure the blood vessel density.    The radiation dose reduction device was turned on for each scan per the ALARA (As Low as Reasonably Achievable) protocol.      Findings:  There may be artifact on this exam secondary to streak artifact from metal devices along the bilateral posterior scalp. Within this constraint, the cerebral blood flow appears normal. There is 9 mL of abnormal Tmax perfusion correspond to the left   temporal lobe.      Impression:      Impression:  1.Possible compromised examination secondary to streak artifact from metal devices along bilateral posterior scalp.  2.No definite evidence of acute infarct.  3.9 mL of abnormal Tmax perfusion corresponding to left temporal lobe, which could represent tissue at risk versus artifact.        Electronically Signed: Sanjay Velasco MD    9/16/2024 4:26 PM EDT    Workstation ID: [id number]               Results: Reviewed.  I reviewed the patient's new laboratory and imaging results.  I independently reviewed the patient's new images.    Medications: Reviewed.    Assessment & Plan   A / P     Mr. Blanco is an 82yo M with a history of peripheral neuropathy, TIA, AFib, CAD, HTN, HLD, and a recent admission to [facility] from 8/7-8/10/24 following a knee arthroplasty on 8/7/24. He was readmitted on 8/30/24 with cellulitis  of the right knee and was treated with Rocephin and Vancomycin and then discharged on 9/5/24 on PO Doxy and Keflex. Zyvox was restarted on 9/13 for worsening erythema and warmth at the incision site.     He presented back to [facility] on 9/13/24 with weakness and AMS. He was admitted to the Ortho Floor service with ID consulted and he was placed back on IV antibiotics.     Neurology has been following for altered mental status. MRI brain could not be performed secondary to cochlear implants.     On the morning of 9/16/24, a Rapid Response was called for altered mental status and a fever of 103. Labs were concerning for a metabolic acidosis with a lactate > 13 and he was transferred to the ICU for further care.     Right knee aspiration performed on 9/16 by Dr. Schwartz at the bedside. Fluid not indicative of sylvain septic joint. Cultures pending.       Nutrition:   Diet: Regular/House; Texture: Pureed (NDD 1); Fluid Consistency: Nectar Thick  Advance Directives:   Code Status and Medical Interventions: No CPR (Do Not Attempt to Resuscitate); Limited Support; No intubation (DNI), No cardioversion   Ordered at: 09/16/24 1206     Medical Intervention Limits:    No intubation (DNI)    No cardioversion     Level Of Support Discussed With:    Health Care Surrogate     Code Status (Patient has no pulse and is not breathing):    No CPR (Do Not Attempt to Resuscitate)     Medical Interventions (Patient has pulse or is breathing):    Limited Support       Active Hospital Problems    Diagnosis     **Cellulitis of right knee     Sepsis     Lactic acidosis     Moderate malnutrition     Failure to thrive in adult     Insomnia     Neuropathy     Hearing loss        Assessment / Plan:    Continue bicarb drip today. Bicarb level is marginal but improved.   Continue to monitor renal function. Improved with IV fluids.    ID managing antibiotics.   Orthopedics following and s/p tap of right knee. F/u cultures.    Neurology following. CT  imaging unremarkable. Cannot have an MRI secondary to cochlear implants.   Speech following. NPO for now. If he cannot pass a dysphagia evaluation, he will need tube feeds vs a comfort diet.   AM labs    Remains high risk secondary to sepsis with metabolic derangements.      Plan of care and goals reviewed during interdisciplinary rounds.  I discussed the patient's findings and my recommendations with patient, family, and nursing staff      Martha Parmar, DO    Intensive Care Medicine and Pulmonary Medicine

## 2024-09-17 NOTE — PROGRESS NOTES
"      Orthopaedic Surgery Progress Note    CC: My back hurts      Subjective     Interval History:   Patient with very eventful day yesterday.  Spiked a fever early yesterday morning to 103.  Code stroke called later in the morning.  Patient transferred to the ICU.  Continues to be in the ICU for now.  Right knee aspirated at the bedside yesterday secondary to his high fever.  White blood cell count in the aspirate not consistent with septic arthritis.  Patient alert this morning.  Amnestic to yesterday's events for the most part.  Daughter is at the bedside.      ROS: Denies fever, chills, nausea or vomiting    Objective     Vital Signs:  Temp (24hrs), Av.5 °F (38.6 °C), Min:99.3 °F (37.4 °C), Max:104.7 °F (40.4 °C)    /73   Pulse 97   Temp 100.5 °F (38.1 °C) (Bladder)   Resp 20   Ht 182.9 cm (72.01\")   Wt 95.3 kg (210 lb)   SpO2 100%   BMI 28.47 kg/m²       Physical Exam:  Patient alert  Moves right knee without difficulty.  Erythema unchanged    Assessment and Plan:  Hospital day #5    -- Patient in ICU for correction of lactic acidosis.  Feeling per ID is that this is serotonin syndrome related from the interaction between Remeron and Zyvox.  Both medications have been stopped.  -- No indication currently that the patient has an infected TKA.  Will follow the cultures.  White blood cell count and aspirate low overall.  Laboratory studies do not indicate infection  -- Will continue to follow.    Christian Schwartz MD  24  08:13 EDT        "

## 2024-09-17 NOTE — CASE MANAGEMENT/SOCIAL WORK
Continued Stay Note   L[place]     Patient Name: Guicho Blanco  MRN: [id number]  Today's Date: 9/17/2024    Admit Date: 9/13/2024    Plan: SNT-LTC   Discharge Plan       Row Name 09/17/24 1551       Plan    Plan SNT-LTC    Patient/Family in Agreement with Plan yes    Plan Comments Spoke with spouse and youngest daughter at bedside about therapy recs. Spouse would like SNF but if pt doesnt doesnt get better will need LTC. Will look for SNF to LTC for discharge. Patient Choice list given for facilities 20 and 40 miles from pts home. Discussed possibility of AL with spouse and she said if he is able to go to AL he is able to go back home. She states she is unable to take him back in the condition that he was in previuous. CM will follow up tomorrow allowing spouse and daughters to discuss where they would like referals sent.    Final Discharge Disposition Code 03 - skilled nursing facility (SNF)                   Discharge Codes    No documentation.                       Viridiana Paz RN

## 2024-09-17 NOTE — PROGRESS NOTES
"  INFECTIOUS DISEASES  INPATIENT PROGRESS NOTE  2024      PATIENT NAME: Guicho Blanco  :  1940  MRN:  [id number]  Date of Admission:  2024      Antimicrobials:  Started 24  Daptomycin  Piperacillin/tazobactam  -----------------------------------  Bactrim 9/15/24  IV vancomycin/ceftriaxone - 24 - 9/15/24  Linezolid (outpatient 24 - 24)      MAR reviewed.     Reason for consultation:  fever, right knee cellulitis    Interval history: Patient remains in the ICU.  Fever curve improving.  Much more alert today.      ROS:  Unable to obtain due to current medical condition and altered mental status.      Objective:  Temp (24hrs), Av.9 °F (38.8 °C), Min:99.3 °F (37.4 °C), Max:104.7 °F (40.4 °C)    /58 (BP Location: Right arm, Patient Position: Lying)   Pulse 97   Temp 100.2 °F (37.9 °C) (Bladder)   Resp 20   Ht 182.9 cm (72.01\")   Wt 95.3 kg (210 lb)   SpO2 99%   BMI 28.47 kg/m²     Physical Examination:  GENERAL: Improved-appearing elderly male, level of consciousness improved.  HEENT: Normocephalic, atraumatic.  Eyes open and interactive.  NECK: Supple without nuchal rigidity.   LUNGS: Decreased respiratory effort.  CV:  RRR.  ABD:  NABS, soft, NT/ND.  SKIN: Right knee incision closed.  Patchy erythema and warmth about incision - improved.  Prior lateral cellulitis resolved.  MS:  Right knee mildly enlarged.  NEURO: A&O. No focal deficits.  Face symmetric.  Speech fluent.    Laboratory Data:    Results from last 7 days   Lab Units 24  0327 24  1044 24  1334 24  1232   WBC 10*3/mm3 2.71* 4.84  --  7.23   HEMOGLOBIN g/dL 10.4* 12.3*  --  10.8*   HEMOGLOBIN, POC g/dL  --   --  10.9*  --    HEMATOCRIT % 33.3* 39.2  --  34.8*   HEMATOCRIT POC %  --   --  32*  --    PLATELETS 10*3/mm3 127* 172  --  197     Results from last 7 days   Lab Units 24  0327   SODIUM mmol/L 141   POTASSIUM mmol/L 3.8   CHLORIDE mmol/L 107   CO2 mmol/L 21.0* "   BUN mg/dL 18   CREATININE mg/dL 1.10   GLUCOSE mg/dL 89   CALCIUM mg/dL 8.1*     Results from last 7 days   Lab Units 09/13/24  1341   ALK PHOS U/L 152*   BILIRUBIN mg/dL 0.5   ALT (SGPT) U/L 20   AST (SGOT) U/L 28     Results from last 7 days   Lab Units 09/17/24  0327   SED RATE mm/hr 11     Results from last 7 days   Lab Units 09/13/24  1341   CRP mg/dL 0.70*     Estimated Creatinine Clearance: 61 mL/min (by C-G formula based on SCr of 1.1 mg/dL).  Results from last 7 days   Lab Units 09/16/24  2236   LACTATE mmol/L 1.6     Results from last 7 days   Lab Units 09/17/24  0327 09/16/24  1535   CK TOTAL U/L 572* 239*     Results from last 7 days   Lab Units 09/15/24  0631   VANCOMYCIN RM mcg/mL 12.10           Microbiology:    Microbiology Results (last 10 days)       Procedure Component Value - Date/Time    Body Fluid Culture - Synovial Fluid, Knee, Right [[id number]] Collected: 09/16/24 1723    Lab Status: Preliminary result Specimen: Synovial Fluid from Knee, Right Updated: 09/17/24 0752     Body Fluid Culture No growth     Gram Stain Many (4+) Red blood cells      Rare (1+) WBCs seen      No organisms seen    Blood Culture - Blood, Hand, Left [[id number]]  (Normal) Collected: 09/16/24 1044    Lab Status: Preliminary result Specimen: Blood from Hand, Left Updated: 09/17/24 1130     Blood Culture No growth at 24 hours    Narrative:      Less than seven (7) mL's of blood was collected.  Insufficient quantity may yield false negative results.    Blood Culture - Blood, Arm, Right [[id number]]  (Normal) Collected: 09/13/24 1405    Lab Status: Preliminary result Specimen: Blood from Arm, Right Updated: 09/16/24 1430     Blood Culture No growth at 3 days    COVID PRE-OP / PRE-PROCEDURE SCREENING ORDER (NO ISOLATION) - Swab, Nasopharynx [[id number]]  (Normal) Collected: 09/13/24 1344    Lab Status: Final result Specimen: Swab from Nasopharynx Updated: 09/13/24 1418    Narrative:      The following orders were created for  panel order COVID PRE-OP / PRE-PROCEDURE SCREENING ORDER (NO ISOLATION) - Swab, Nasopharynx.  Procedure                               Abnormality         Status                     ---------                               -----------         ------                     COVID-19 and FLU A/B PCR...[[id number]]  Normal              Final result                 Please view results for these tests on the individual orders.    COVID-19 and FLU A/B PCR, 1 HR TAT - Swab, Nasopharynx [[id number]]  (Normal) Collected: 09/13/24 1344    Lab Status: Final result Specimen: Swab from Nasopharynx Updated: 09/13/24 1418     COVID19 Not Detected     Influenza A PCR Not Detected     Influenza B PCR Not Detected    Narrative:      Fact sheet for providers: https://www.fda.gov/media/046904/download    Fact sheet for patients: https://www.fda.gov/media/038929/download    Test performed by PCR.    Blood Culture - Blood, Arm, Right [[id number]]  (Normal) Collected: 09/13/24 1341    Lab Status: Preliminary result Specimen: Blood from Arm, Right Updated: 09/16/24 1430     Blood Culture No growth at 3 days              Radiology:  EEG    Result Date: 9/16/2024  Diffuse cerebral dysfunction of at least mild degree, nonspecific.  This is most commonly seen due to toxic/metabolic cause This report is transcribed using the Dragon dictation system.      FL Video Swallow With Speech Single Contrast    Result Date: 9/16/2024  Impression: Fluoroscopy provided for a modified barium swallow. Aspiration was seen during swallowing evaluation. Please see speech therapy report for full details and recommendations. Report dictated by: Jess Sahu PA-c  I have personally reviewed this case and agree with the findings above: Electronically Signed: Roge Guillen MD  9/16/2024 4:40 PM EDT  Workstation ID: [id number]    CT Head Without Contrast Stroke Protocol    Result Date: 9/16/2024  Impression: The exam is somewhat limited owing to streak artifact and beam  hardening relating to cochlear implants, and motion artifact. No acute intracranial findings. Chronic and senescent changes as above. Electronically Signed: Roge Guillen MD  9/16/2024 4:39 PM EDT  Workstation ID: [id number]    CT Angiogram Head w AI Analysis of LVO    Result Date: 9/16/2024  1.No acute abnormality is identified within the large arteries of the head or neck. 2.No significant stenosis of the bilateral internal carotid arteries. Electronically Signed: Tomy Dias MD  9/16/2024 4:27 PM EDT  Workstation ID: [id number]    CT Angiogram Neck    Result Date: 9/16/2024  1.No acute abnormality is identified within the large arteries of the head or neck. 2.No significant stenosis of the bilateral internal carotid arteries. Electronically Signed: Tomy Dias MD  9/16/2024 4:27 PM EDT  Workstation ID: [id number]    CT CEREBRAL PERFUSION WITH & WITHOUT CONTRAST    Result Date: 9/16/2024  Impression: 1.Possible compromised examination secondary to streak artifact from metal devices along bilateral posterior scalp. 2.No definite evidence of acute infarct. 3.9 mL of abnormal Tmax perfusion corresponding to left temporal lobe, which could represent tissue at risk versus artifact. Electronically Signed: Sanjay Velasco MD  9/16/2024 4:26 PM EDT  Workstation ID: [id number]    XR Chest PA & Lateral    Result Date: 9/16/2024  Impression: No acute process. Electronically Signed: Sylwia Sampson MD  9/16/2024 8:55 AM EDT  Workstation ID: [id number]       DISCUSSION:  83 y.o. male with history of recent right knee cellulitis after TKA admitted with encephalopathy.       PROBLEM LIST:   -- Serotonin syndrome.  Encephalopathy, hectic fever, lactic acidosis - improving.  Patient with recent oral linezolid antibiotic use prior to admission and was started on mirtazapine per neurology after admission.  No evidence for pneumonia.  Urinalysis bland.  Synovial fluid from right knee not consistent with PJI.  Improving with  discontinuation of serotonergic agents.  -- Elevated CK.  -- Right knee cellulitis - improving.  Recent admission earlier this month with lateral right knee superficial cellulitis.  Did not appear to involve joint or incision.  Treated with IV vancomycin and ceftriaxone with improvement.  Discharged home on oral doxycycline and cephalexin.  I saw him back in office 2 days prior to admission, when he had new erythema and warmth about the incision itself.  I changed his antibiotic to oral linezolid. Continues to have erythema/warmth about incision; consider reaction to sutures.  Normal WBC, ESR, and PCT at admission.  S/p right knee aspiration per ortho 9/16/24; WBC only 459 with 11% neutrophils, gram stain negative, culture no growth; not c/w PJI.  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    PLAN:  -- Added linezolid as causing serotonin syndrome to allergy list and avoid serotonergic agents  -- Stop daptomycin due to rising CK  -- De-escalate pip/tazo to ceftriaxone 1 g iv daily  -- Continue to follow right knee exam - improving; no evidence for PJI on synovial fluid  -- Continue supportive care for serotonin syndrome    Complex case.  Plan discussed with family at bedside.  Synovial fluid results discussed with Dr Ramos yesterday.    Jae Manzanares MD  9/17/2024  14:02 EDT

## 2024-09-17 NOTE — PROGRESS NOTES
Neurology Note    Patient:  Guicho Blanco    YOB: 1940    REFERRING PHYSICIAN:  Dr. Parmar    CHIEF COMPLAINT:    AMS    HISTORY OF PRESENT ILLNESS:   The patient remains febrile, on a cooling blanket. He is more alert, able to talk some. Awaiting ST for diet clearance.    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     bilateral    Coronary artery disease     Full dentures     Gall stones     GERD (gastroesophageal reflux disease)     Hearing loss     Hiatal hernia     Hyperlipidemia     Hypertension     Peripheral neuropathy     Peripheral vascular disease     Seasonal allergies     Skin cancer     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    TIA (transient ischemic attack)     2 times    Wears glasses        Past Surgical History:  Past Surgical History:   Procedure Laterality Date    ABLATION OF DYSRHYTHMIC FOCUS      APPENDECTOMY      CARDIAC CATHETERIZATION N/A 10/05/2017    Procedure: Left Heart Cath;  Surgeon: Hector Urrutia MD;  Location:  [facility];  Service:     CATARACT EXTRACTION Bilateral     CHOLECYSTECTOMY      Remote    COCHLEAR IMPLANT REVISION  05/2019    COLONOSCOPY      PROSTATE SURGERY      SHOULDER SURGERY Right     SKIN BIOPSY      TOE AMPUTATION Right     2nd toe - Right Foot    TONSILLECTOMY      TOTAL KNEE ARTHROPLASTY Right 8/7/2024    Procedure: TOTAL KNEE ARTHROPLASTY WITH ASHLEY ROBOT RIGHT;  Surgeon: Christian Schwartz MD;  Location:  [facility];  Service: Robotics - Ortho;  Laterality: Right;       Social History:   Social History     Socioeconomic History    Marital status:    Tobacco Use    Smoking status: Never     Passive exposure: Past    Smokeless tobacco: Never   Vaping Use    Vaping status: Never Used   Substance and Sexual Activity    Alcohol use: No    Drug use: No    Sexual activity:  Defer        Family History:   Family History   Problem Relation Age of Onset    Atrial fibrillation Mother     Dementia Mother     Lung cancer Father        Medications Prior to Admission:    Prior to Admission medications    Medication Sig Start Date End Date Taking? Authorizing Provider   aspirin 325 MG tablet Take 1 tablet by mouth Daily. Indications: Disease involving Lipid Deposits in the Arteries 8/7/24  Yes Mykel Sanches MD   B Complex Vitamins (VITAMIN B COMPLEX) capsule capsule Take 1 capsule by mouth Daily. Takes in evening  Indications: Vitamin Deficiency 4/28/16  Yes Mykel Sanches MD   bisacodyl (DULCOLAX) 10 MG suppository Insert 1 suppository into the rectum Daily As Needed for Constipation (Use if bisacodyl oral is ineffective). 8/10/24  Yes Berto Liu MD   Cholecalciferol (VITAMIN D3) 5000 UNITS capsule capsule Take 1 capsule by mouth Daily. Indications: Vitamin D Deficiency 4/28/16  Yes Mykel Sanches MD   coenzyme Q10 100 MG capsule Take 1 capsule by mouth Daily. Indications: Heart Rhythm Disorder 4/28/16  Yes Mykel Sanches MD   cycloSPORINE (Restasis) 0.05 % ophthalmic emulsion Administer 1 drop to both eyes Every 12 (Twelve) Hours. Indications: Drying and Inflammation of Cornea and Conjunctiva of Eyes 11/22/21  Yes Mykel Sanches MD   docusate sodium (Colace) 100 MG capsule Take 1 capsule by mouth 2 (Two) Times a Day As Needed for Constipation. 8/7/24  Yes Christian Schwartz MD   flecainide (TAMBOCOR) 100 MG tablet Take 1 tablet by mouth Every 12 (Twelve) Hours. 4/8/24  Yes Hector Urrutia MD   isosorbide mononitrate (IMDUR) 30 MG 24 hr tablet Take 1 tablet by mouth Daily. 4/8/24  Yes Hector Urrutia MD   linezolid (ZYVOX) 600 MG tablet Take 1 tablet by mouth 2 (Two) Times a Day.   Yes Mykel Sanches MD   nortriptyline (PAMELOR) 10 MG capsule Take 2 capsules by mouth Every Night. Indications: Migraine Headache 4/12/23  Yes Myron  MD Mykel   omeprazole OTC (PrilOSEC OTC) 20 MG EC tablet Take 1 tablet by mouth Daily. Indications: Heartburn 7/25/24  Yes ProviderMykel MD   pravastatin (PRAVACHOL) 20 MG tablet Take 1 tablet by mouth Daily. Indications: High Amount of Fats in the Blood 9/4/24  Yes Berto Liu MD   saccharomyces boulardii (FLORASTOR) 250 MG capsule Take 1 capsule by mouth 2 (Two) Times a Day. 9/4/24  Yes Berto Liu MD   traMADol (ULTRAM) 50 MG tablet Take 1 tablet by mouth Every 8 (Eight) Hours As Needed for Moderate Pain. 8/27/24  Yes Christian Schwartz MD   acetaminophen (TYLENOL) 650 MG 8 hr tablet Take 1 tablet by mouth Every 8 (Eight) Hours As Needed for Mild Pain.    ProviderMykel MD   glucose (DEX4) 4 GM chewable tablet Chew 4 tablets As Needed.    ProviderMykel MD   nitroglycerin (NITROSTAT) 0.4 MG SL tablet 1 under the tongue as needed for angina, may repeat q5mins for up three doses 2/4/19   Hector Urrutia MD       Allergies:  Divalproex sodium (migraine) [valproic acid], Cymbalta [duloxetine hcl], Duloxetine, Metoprolol, and Neurontin [gabapentin]      Review of system  Review of Systems   Unable to perform ROS: Other       Vitals:    09/17/24 1100   BP: 128/67   Pulse: 99   Resp:    Temp:    SpO2: 99%       Physical exam  Physical Exam  Cardiovascular:      Rate and Rhythm: Normal rate and regular rhythm.   Pulmonary:      Effort: Pulmonary effort is normal.   Neurological:      Comments: Alert, [place], hearing aids not working, makes eye contact, follows commands, no facial droop, moves limbs well.           Lab Results   Component Value Date    WBC 2.71 (L) 09/17/2024    HGB 10.4 (L) 09/17/2024    HCT 33.3 (L) 09/17/2024    MCV 84.7 09/17/2024     (L) 09/17/2024     Lab Results   Component Value Date    GLUCOSE 89 09/17/2024    BUN 18 09/17/2024    CREATININE 1.10 09/17/2024    EGFRIFNONA 69 06/19/2021    BCR 16.4 09/17/2024    CO2 21.0 (L) 09/17/2024     CALCIUM 8.1 (L) 09/17/2024    PROTENTOTREF 6.8 05/27/2022    PROTENTOTREF 6.7 05/27/2022    ALBUMIN 4.0 09/13/2024    LABIL2 1.8 05/27/2022    LABIL2 1.4 05/27/2022    AST 28 09/13/2024    ALT 20 09/13/2024     ntains critical data STAT Lactic Acid, Reflex  Order: [id number] - Reflex for Order [id number]  Status: Final result       Visible to patient: No (not released)       Next appt: 10/10/2024 at 08:00 AM in [facility] ([facility] [employer])    Specimen Information: Blood   0 Result Notes         Component  Ref Range & Units 1 d ago  (9/16/24) 1 d ago  (9/16/24) 4 d ago  (9/13/24) 2 wk ago  (8/30/24)   Lactate  0.5 - 2.0 mmol/L 4.8 High Critical  13.8 High Critical  CM 1.4 CM 1.8 CM        TSH  0.270 - 4.200 uIU/mL 1.790     Vitamin B-12  211 - 946 pg/mL 459     Folate  4.78 - 24.20 ng/mL 18.90     Ammonia  16 - 60 umol/L 12 Low    Urinalysis, Microscopic Only - Urine, Clean Catch  Order: [id number] - Reflex for Order [id number]  Status: Final result       Visible to patient: No (not released)       Next appt: 10/10/2024 at 08:00 AM in [facility] ([facility] [employer], )    Specimen Information: Urine, Clean Catch   0 Result Notes      Component  Ref Range & Units 5 d ago   RBC, UA  None Seen, 0-2 /HPF 0-2   WBC, UA  None Seen, 0-2 /HPF 3-5 Abnormal    Bacteria, UA  None Seen, Trace /HPF None Seen   Squamous Epithelial Cells, UA  None Seen, 0-2 /HPF 0-2   Hyaline Casts, UA  0 - 6 /LPF 0-6   Calcium Oxalate Crystals, UA  None Seen /HPF Moderate/2+   Methodology Manual Light Micr        C-Reactive Protein  0.00 - 0.50 mg/dL 0.70     Procedure Abnormality Status   COVID-19 and FLU A/B PCR, 1 HR TAT - Swab, Nasopharynx Normal      Radiological Studies:   EEG    Result Date: 9/16/2024  Reason for referral: 83 y.o.male with altered mental status Technical Summary:  A 19 channel digital EEG was performed using the international 10-20 placement system, including eye leads and EKG leads. Duration: 20 minutes  Findings: The patient is shivering at the beginning of the study with a temperature of 104 degrees.  The EEG shows significant muscle artifact which largely posterior as the underlying background.  At times when artifact subsides, underlying diffuse low amplitude 4-5 Hz intermixed delta and theta activity are seen over both hemispheres.  As a study proceeds, and intermittent right arm tremor is noted, without EEG correlate.  No focal features are seen.  No epileptiform activity or electrographic seizures are present.  Photic stimulation and hyperventilation are not performed. Video: Available Technical quality: Good EKG: Regular, 80-90 bpm SUMMARY: Mild-moderate generalized slow No focal features or epileptiform activity are seen Somewhat suboptimal study due to extensive EMG artifact     Diffuse cerebral dysfunction of at least mild degree, nonspecific.  This is most commonly seen due to toxic/metabolic cause This report is transcribed using the Dragon dictation system.      Adult Transthoracic Echo Complete W/ Cont if Necessary Per Protocol    Result Date: 9/16/2024    Left ventricular systolic function is normal. Left ventricular ejection fraction appears to be 61 - 65%.   Left ventricular diastolic function was normal.     FL Video Swallow With Speech Single Contrast    Result Date: 9/16/2024  FL VIDEO SWALLOW W SPEECH SINGLE-CONTRAST Date of Exam: 9/16/2024 8:52 AM EDT Indication: dysphagia.   Comparison: None available. Technique:   The speech pathologist administered food and/or liquid mixed with barium to the patient with cine/video imaging.  Imaging assistance was provided to the speech pathologist and an image was saved. Fluoroscopic Time: 2 minutes and 12 seconds Number of Images: 9 associated fluoroscopic loops were saved Findings: Aspiration was seen during fluoroscopic guided modified barium swallowing series. Please see speech therapy report for full details and recommendations.     Impression:  Fluoroscopy provided for a modified barium swallow. Aspiration was seen during swallowing evaluation. Please see speech therapy report for full details and recommendations. Report dictated by: Jess Sahu PA-c  I have personally reviewed this case and agree with the findings above: Electronically Signed: Roge Guillen MD  9/16/2024 4:40 PM EDT  Workstation ID: [id number]    CT Head Without Contrast Stroke Protocol    Result Date: 9/16/2024  CT HEAD WO CONTRAST STROKE PROTOCOL Date of Exam: 9/16/2024 3:18 PM EDT Indication: AMS, gaze deviation to the left, right sided weakness. Comparison: Head CT 8/22/2024 Technique: Axial CT images were obtained of the head without contrast administration.  Reconstructed coronal images were also obtained. Automated exposure control and iterative construction methods were used. Scan Time: 3:49 p.m. 9/16/2024 Results discussed with stroke navigator by Dr. Roge Guillen via telephone at at 4:35 p.m. 9/16/2024. Findings: The exam is somewhat limited owing to beam hardening and streak artifact relating to bilateral cochlear implants. Additionally there is some motion artifact. Accounting for this, no evidence of acute intracranial hemorrhage. No acute large territory infarct. There are scattered and small confluent areas of subcortical and periventricular white matter hypodensity which is nonspecific and can be seen in the setting of chronic small vessel ischemic change. There is generalized cerebral volume loss. No extra-axial collections. No midline shift or herniation. There is a chronic lacunar infarct in the right basal ganglia near the caudate head, unchanged. The paranasal sinuses and mastoid air cells are grossly clear. No acute or suspicious bony findings.     Impression: The exam is somewhat limited owing to streak artifact and beam hardening relating to cochlear implants, and motion artifact. No acute intracranial findings. Chronic and senescent changes as above.  Electronically Signed: Roge Guillen MD  9/16/2024 4:39 PM EDT  Workstation ID: [id number]    CT Angiogram Head w AI Analysis of LVO    Result Date: 9/16/2024  CT ANGIOGRAM HEAD W AI ANALYSIS OF LVO, CT ANGIOGRAM NECK Date of Exam: 9/16/2024 3:47 PM EDT Indication: AMS, leftward gaze, right sided weakness. Comparison: None available. Technique: CTA of the head was performed after the uneventful intravenous administration of 115 mL Isovue-370. Reconstructed coronal and sagittal images were also obtained. In addition, a 3-D volume rendered image was created for interpretation. Automated exposure control and iterative reconstruction methods were used. FINDINGS: Examination is somewhat limited due to beam hardening artifact arising from bilateral cochlear implants. Vascular Findings: The right common carotid, internal carotid, middle cerebral, anterior cerebral, vertebral, and posterior cerebral arteries are patent without abrupt cut off or aneurysmal dilation. The left common carotid, internal carotid, middle cerebral, anterior cerebral, vertebral, and posterior cerebral arteries are patent without abrupt cut off or aneurysmal dilation. Basilar artery appears patent and appears unremarkable. Non-vascular Findings: For description of nonvascular intracranial findings, please refer to the noncontrast head CT performed the same date. No acute abnormality is identified within the visualized soft tissue or bony structures of the neck. Cervical spondylosis. Contiguous anterior osteophytes manage in the presence of diffuse idiopathic skeletal hyperostosis. The visualized lung apices are clear.     1.No acute abnormality is identified within the large arteries of the head or neck. 2.No significant stenosis of the bilateral internal carotid arteries. Electronically Signed: Tomy Dias MD  9/16/2024 4:27 PM EDT  Workstation ID: [id number]    CT Angiogram Neck    Result Date: 9/16/2024  CT ANGIOGRAM HEAD W AI ANALYSIS OF LVO,  CT ANGIOGRAM NECK Date of Exam: 9/16/2024 3:47 PM EDT Indication: AMS, leftward gaze, right sided weakness. Comparison: None available. Technique: CTA of the head was performed after the uneventful intravenous administration of 115 mL Isovue-370. Reconstructed coronal and sagittal images were also obtained. In addition, a 3-D volume rendered image was created for interpretation. Automated exposure control and iterative reconstruction methods were used. FINDINGS: Examination is somewhat limited due to beam hardening artifact arising from bilateral cochlear implants. Vascular Findings: The right common carotid, internal carotid, middle cerebral, anterior cerebral, vertebral, and posterior cerebral arteries are patent without abrupt cut off or aneurysmal dilation. The left common carotid, internal carotid, middle cerebral, anterior cerebral, vertebral, and posterior cerebral arteries are patent without abrupt cut off or aneurysmal dilation. Basilar artery appears patent and appears unremarkable. Non-vascular Findings: For description of nonvascular intracranial findings, please refer to the noncontrast head CT performed the same date. No acute abnormality is identified within the visualized soft tissue or bony structures of the neck. Cervical spondylosis. Contiguous anterior osteophytes manage in the presence of diffuse idiopathic skeletal hyperostosis. The visualized lung apices are clear.     1.No acute abnormality is identified within the large arteries of the head or neck. 2.No significant stenosis of the bilateral internal carotid arteries. Electronically Signed: Tomy Dias MD  9/16/2024 4:27 PM EDT  Workstation ID: [id number]    CT CEREBRAL PERFUSION WITH & WITHOUT CONTRAST    Result Date: 9/16/2024  CT CEREBRAL PERFUSION W WO CONTRAST Date of Exam: 9/16/2024 3:47 PM EDT Indication: AMS, leftward gaze, right sided weakness.  Comparison: CT head from earlier today Technique: Axial CT images of the brain were  obtained prior to and after the administration of 50 mL Isovue-370. Core blood volume, core blood flow, mean transit time, and Tmax images were obtained utilizing the Rapid software protocol. A limited CT angiogram of the head was also performed to measure the blood vessel density. The radiation dose reduction device was turned on for each scan per the ALARA (As Low as Reasonably Achievable) protocol. Findings: There may be artifact on this exam secondary to streak artifact from metal devices along the bilateral posterior scalp. Within this constraint, the cerebral blood flow appears normal. There is 9 mL of abnormal Tmax perfusion correspond to the left temporal lobe.     Impression: 1.Possible compromised examination secondary to streak artifact from metal devices along bilateral posterior scalp. 2.No definite evidence of acute infarct. 3.9 mL of abnormal Tmax perfusion corresponding to left temporal lobe, which could represent tissue at risk versus artifact. Electronically Signed: Sanjay Velasco MD  9/16/2024 4:26 PM EDT  Workstation ID: [id number]    XR Chest PA & Lateral    Result Date: 9/16/2024  XR CHEST PA AND LATERAL Date of Exam: 9/16/2024 8:44 AM EDT Indication: febrile Comparison: 9/13/2024 Findings: Heart and pulmonary vessels and mediastinal contours appear within normal limits. Lung fields are clear of acute infiltrates or effusions. There is mild degenerative spurring of the thoracic spine.     Impression: No acute process. Electronically Signed: Sylwia Sampson MD  9/16/2024 8:55 AM EDT  Workstation ID: [id number]    XR Chest 1 View    Result Date: 9/13/2024  XR CHEST 1 VW Date of Exam: 9/13/2024 2:03 PM EDT Indication: Weakness Comparison: 6/18/2021 Findings: Cardiomediastinal silhouette is unremarkable.  No airspace disease, pneumothorax, nor pleural effusion. There is multilevel thoracic spondylosis.     Impression: No acute cardiopulmonary abnormality. Electronically Signed: Brit Landaverde MD   9/13/2024 2:27 PM EDT  Workstation ID: [id number]    XR Knee 1 or 2 View Right    Result Date: 9/13/2024  XR KNEE 1 OR 2 VW RIGHT Date of Exam: 9/13/2024 1:06 PM EDT Indication: Knee pain, 1 month status post replacement Comparison: 8/30/2024 and prior Findings: Patient is status post total knee arthroplasty with patellar resurfacing. No acute fracture or dislocation noted. The orthopedic hardware appears to be intact. Mild soft tissue swelling noted. Small joint effusion noted. Findings appear to overall decreased from comparison and are nonspecific peripheral vascular calcification noted     Impression: 1. Status post total knee arthroplasty with patellar resurfacing. Hardware appears to be intact. 2. Small joint effusion and mild soft tissue swelling, nonspecific. Electronically Signed: Ramirez Mireles MD  9/13/2024 1:15 PM EDT  Workstation ID: [id number]    FL Video Swallow With Speech Single Contrast    Result Date: 8/31/2024  FL VIDEO SWALLOW W SPEECH SINGLE-CONTRAST Date of Exam: 8/31/2024 11:53 AM EDT Indication: swallowing concerns.   Comparison: None available. TECHNIQUE: 126 seconds of fluoroscopic time was used for this exam. 6 associated fluoroscopic loops were saved. The patient was evaluated in the seated lateral position while taking a variety of consistencies of barium by mouth under the direction of speech pathology.  COMPARISON: NONE FINDINGS: 126 seconds of fluoroscopy provided for a modified barium swallow. Please see speech therapy report for full details and recommendations.  No evidence of aspiration.     Fluoroscopy provided for a modified barium swallow. Please see speech therapy report for full details and recommendations. There was no evidence of sylvain aspiration during the course of the exam. Electronically Signed: Romero Rubio MD  8/31/2024 6:27 PM EDT  Workstation ID: [id number]    XR Knee 1 or 2 View Right    Result Date: 8/30/2024  XR KNEE 1 OR 2 VW RIGHT Date of Exam: 8/30/2024 5:04 PM  EDT Indication: right knee erythema, post op Comparison: Right knee radiographs 8/7/2024 Findings: There are faint scattered vascular calcifications. Normal appearance of the total knee arthroplasty which appears in satisfactory alignment. No hardware fracture or perihardware lucency. Small joint effusion is difficult to exclude, nonspecific.     Impression: There are faint scattered vascular calcifications. Normal appearance of the total knee arthroplasty which appears in satisfactory alignment. No hardware fracture or perihardware lucency. Small joint effusion is difficult to exclude, nonspecific. Electronically Signed: Roge Guillen MD  8/30/2024 5:12 PM EDT  Workstation ID: [id number]    XR Knee 3+ View With [place] Right    Result Date: 8/27/2024  Knee X-ray Indication: status-post TKA Study:  AP, Lateral, and Sunrise views of Right knee Comparison: Right knee 8/7/2024 Findings: No signs of acute fracture are visualized No signs of loosening are appreciated Components are well aligned Impression:  Status post Right cemented total knee arthroplasty. No signs of loosening or fracture.      CT Cervical Spine Without Contrast    Result Date: 8/22/2024  CT CERVICAL SPINE WO CONTRAST Date of Exam: 8/22/2024 4:31 AM EDT Indication: fall. Comparison: CT angiogram neck 6/18/2021. Technique: Axial CT images were obtained of the cervical spine without contrast administration.  Reconstructed coronal and sagittal images were also obtained. Automated exposure control and iterative construction methods were used. Findings: 7 cervical vertebrae are identified. There is bony fusion across the C6-C7 and C7-T1 discs. There is dense ossification of the anterior longitudinal ligament. There is slight focal lordosis at the C5-C6 level with otherwise straightening of the cervical lordosis. There is ossification of the posterior longitudinal ligament. Vertebral bodies maintain normal height. There is moderate right-sided facet  arthritis at C2-C3 and C5-6. There is ankylosis and moderate hypertrophy at the C4-C5 facet joint on the left. Spinous processes are intact. There is moderate multilevel bilateral uncovertebral arthritis. No evidence of fracture or acute subluxation. Temporomandibular joints are within normal limits. The patient is edentulous. No evidence of a neck mass or adenopathy. Lung apices are clear. No focal soft tissue inflammation or fluid collection. Partially visualized thyroid appears unremarkable. The salivary glands are symmetric. There is narrowing of the spinal canal at C4-C5. There is mild narrowing of the spinal canal at C3-C4 and C5-6. There is moderate left and mild right neuroforaminal narrowing at C3-C4, moderate right and mild left neuroforaminal narrowing at C4-C5, moderate right and mild left neuroforaminal narrowing at C5-C6 and mild left neuroforaminal narrowing at C7-T1. The odontoid process appears intact and the C1 ring is intact.     Impression: 1.No acute osseous abnormality. 2.Moderate multilevel disc, facet and uncovertebral joint degeneration resulting in varying degrees of spinal canal and neuroforaminal narrowing. 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 Electronically Signed: David Rod MD  8/22/2024 4:57 AM EDT  Workstation ID: [id number]    CT Head Without Contrast    Result Date: 8/22/2024  CT HEAD WO CONTRAST Date of Exam: 8/22/2024 4:31 AM EDT Indication: fall. Comparison: 6/18/2021 and brain MRI 10/18/2017. Technique: Axial CT images were obtained of the head without contrast administration.  Automated exposure control and iterative construction methods were used. Findings: There are bilateral cochlear implants, which resulted in significant streak artifact. There is stable moderate patchy white matter hypoattenuation. There our chronic lacunar infarctions in the right basal ganglia and right frontal periventricular  white matter. No acute intracranial hemorrhage. No mass effect, midline shift or abnormal extra-axial collection. There is evidence of bilateral orbital lens replacement. The calvarium appears intact. Superficial soft tissues are unremarkable. Paranasal sinuses are well aerated. There is a left mastoid effusion.     Impression: 1.No acute intracranial abnormality. 2.Moderate chronic small vessel ischemic change and chronic lacunar infarctions. 3.Left mastoid effusion. Electronically Signed: David Rod MD  8/22/2024 4:54 AM EDT  Workstation ID: [id number]       During this visit the following were done:  Labs Reviewed [x]    Labs Ordered []    Radiology Reports Reviewed [x]    Radiology Ordered []    EKG, echo, and/or stress test reviewed []    EEG results reviewed  [x]    EEG reviewed and interpreted per myself   []    Discussed case with neurointerventionalist or neuroradiologist []    Referring Provider Records Reviewed []    ER Records Reviewed []    Hospital Records Reviewed []    History Obtained From Family []    Radiological images view and Interpreted per myself [x]    Case Discussed with referring provider []     Decision to obtain and request outside records  []        Assessment and Plan     TME improved, recurrent fever, suspected serotonin syndrome. Normal WBC and nearly normal CRP. No nuchal rigidity to suggest meningitis. Right knee cellulitis.   - Neurologically stable for the floor.   - Empiric thiamine.   - ST, PT, OT.              Electronically signed by Nato Tse MD on 9/17/2024 at 12:04 EDT

## 2024-09-17 NOTE — NURSING NOTE
WOC consult for: Lateral foot nonblanchable, right    Assessed bilateral feet could not find any nonblanching areas.  There is an area on the left lower lateral shin that looks to be more so an abrasion.  Some broken capillaries, dry wound bed skin not open.    Also photo was taken of right knee as seen below.  Allevyn's in place, offloading pillows in place.

## 2024-09-17 NOTE — PROGRESS NOTES
Clinical Nutrition Assessment     Patient Name: Guicho Blanco  YOB: 1940  MRN: [id number]  Date of Encounter: 09/17/24 08:33 EDT  Admission date: 9/13/2024  Reason for Visit: MDR, Follow-up protocolICU admission assessment     Assessment   Nutrition Assessment   Admission Diagnosis:  Cellulitis of right knee [L03.115]    Problem List:    Cellulitis of right knee    Hearing loss    Neuropathy    Failure to thrive in adult    Insomnia    Moderate malnutrition    Sepsis    Lactic acidosis      PMH:   He  has a past medical history of Arthritis, Atrial fibrillation, BPH (benign prostatic hypertrophy), Cataract, Cellulitis, 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    PSH:  He  has a past surgical history that includes Ablation of dysrhythmic focus; Appendectomy; Shoulder surgery (Right); Prostate surgery; Cholecystectomy; Cataract extraction (Bilateral); Toe amputation (Right); Cardiac catheterization (N/A, 10/05/2017); Tonsillectomy; Cochlear implant revision (05/2019); Skin biopsy; Colonoscopy; and Total knee arthroplasty (Right, 8/7/2024).    Applicable Nutrition History:   (9/14)SLP Swallowing Diagnosis: moderate, oral dysphagia, suspect acute-on-chronic, pharyngeal dysphagia, esophageal dysphagia, other (see comments) (baseline pharyngoesophageal dysphagia; acute/worsened oral dysphagia (appeared cognitive-based) + ? acute pharyngeal dysphagia r/t weakness)   SLP Diet Recommendation: puree, nectar thick liquids, other (see comments) (If aversion/poor intake w/ this diet, resulting in more risk than benefit, may consider upgrading to more liberal textures of mechanical ground and thin liquids, if ok'd by  "provider.  (9/16) MBS:  SLP Diet Recommendation: NPO, other (see comments) (hold PO intake for now until further GOC/POC )  (9/16) Transferred to the ICU after a RR     Anthropometrics     Height: Height: 182.9 cm (72.01\")  Last Filed Weight: Weight: 95.3 kg (210 lb) (09/16/24 1450)  Method: Weight Method: Stated  BMI: BMI (Calculated): 28.5    UBW:  ~210 lb several years per EMR  Weight change: No significant changes    Nutrition Focused Physical Exam    Date:  9/14       Patient meets criteria for malnutrition diagnosis, see MSA note.     Subjective   Reported/Observed/Food/Nutrition Related History:   9/17  Had order for pureed diet, however not eating much per RN.  Per RN; + fever, c/o back pain.     Laying in bed at time of visit, patient daughter in room. Reports patient ate really well for dinner on 9/15, she feels he tolerated pureed food really well.  Not much intake yesterday or this morning. She would like to try boost with lunch, reports patient is more likely to drink vs eat when not feeling too well.     9/14  Pt sitting in chair at visit, pleasantly confused but able to tell me he has not been eating well at home and sometimes gets choked on food/liquids. He states he has lost some weight in the past ~6-8 months but then says actually 6 years. Per EMR weight stable. He states he did have a good meal today though he does not like the thickened liquids. Pt denies further dietary needs/preferences or nutritional questions/concerns at this time, NKFA. Per EMR, weight stable several years. 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 Neuro is following and has started him on remeron for sleeping problems which will hopefully encourage appetite as well.     Current Nutrition Prescription   PO: Diet: Regular/House; Texture: Pureed (NDD 1); Fluid Consistency: [place] Thick  Oral Nutrition Supplement: Magic cup BID   Intake: isuf data.     Assessment & Plan   Nutrition Diagnosis   Date:  9/15            Updated:  "   Problem Malnutrition  chronic/moderate   Etiology Decreased ability to consume sufficient energy 2/2 dysphagia, anorexia, AMS in setting of ongoing infection, multiple hospitalizations   Signs/Symptoms PO intakes <75% EEN >/= 1 month, moderate muscle wasting, and mild subcutaneous fat loss.   Status: New    Date: 9/17  Updated:  Problem Inadequate oral intake   Etiology Serotonin syndrome    Signs/Symptoms Lack of interest in food (not feeling well)    Status:      Goal:   Nutrition to support treatment and Increase intake    Nutrition Intervention      Follow treatment progress, Care plan reviewed, Advise alternate selection, Advised available snacks, Interview for preferences, Menu provided, Encourage intake, Supplement provided    Pt meets criteria malnutrition, see MSA.    Encourage PO/fluids, will send magic cup.    Boost plus BID (RN/RD to thicken prior to providing to patient)     Monitor swallow function/SLP recommendations     Monitoring/Evaluation:   Per protocol, I&O, PO intake, Supplement intake, Pertinent labs, Weight, GI status, Symptoms, POC/GOC, Swallow function    Avis Calderon RD  Time Spent: 30m

## 2024-09-17 NOTE — THERAPY RE-EVALUATION
Acute Care - Speech Language Pathology   Swallow Re-Evaluation [facility]     Patient Name: Guicho Blanco  : 1940  MRN: [id number]  Today's Date: 2024               Admit Date: 2024    Visit Dx:     ICD-10-CM ICD-9-CM   1. Generalized weakness  R53.1 780.79   2. Cellulitis of right lower extremity  L03.115 682.6   3. Oropharyngeal dysphagia  R13.12 787.22     Patient Active Problem List   Diagnosis    Osteomyelitis of toe of right foot    Osteomyelitis of right foot    PAF (paroxysmal atrial fibrillation)    Coronary artery disease involving native coronary artery of native heart without angina pectoris    Gastroesophageal reflux disease without esophagitis    Benign non-nodular prostatic hyperplasia without lower urinary tract symptoms    S/P appy    Essential hypertension    Simple chronic bronchitis    Mixed hyperlipidemia    COPD (chronic obstructive pulmonary disease)    BPH (benign prostatic hypertrophy)    Hearing loss    Chest pain    Vertigo    Snoring    Overweight    PLMD (periodic limb movement disorder)    Neuropathy    Periodic headache syndrome, not intractable    Acquired absence of other right toe(s)    Tremor    Bilateral impacted cerumen    Bilateral sensorineural hearing loss    Chronic otitis externa    Deviated nasal septum    Disorder of joint of foot    ED (erectile dysfunction) of organic origin    Hydrocele    Hydrocele of testis    Impacted cerumen    Mononeuropathy    Swelling of testicle    Venous retinal branch occlusion    Vitreous degeneration    Benign prostatic hyperplasia with urinary obstruction    OA (osteoarthritis) of knee    Status post total right knee replacement    Septic arthritis    Cellulitis    Cellulitis of right knee    Failure to thrive in adult    Insomnia    Moderate malnutrition    Sepsis    Lactic acidosis     Past Medical History:   Diagnosis Date    Arthritis     Atrial fibrillation     BPH (benign prostatic hypertrophy)     Cataract      Cellulitis     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     bilateral    Coronary artery disease     Full dentures     Gall stones     GERD (gastroesophageal reflux disease)     Hearing loss     Hiatal hernia     Hyperlipidemia     Hypertension     Peripheral neuropathy     Peripheral vascular disease     Seasonal allergies     Skin cancer     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    TIA (transient ischemic attack)     2 times    Wears glasses      Past Surgical History:   Procedure Laterality Date    ABLATION OF DYSRHYTHMIC FOCUS      APPENDECTOMY      CARDIAC CATHETERIZATION N/A 10/05/2017    Procedure: Left Heart Cath;  Surgeon: Hector Urrutia MD;  Location:  [facility];  Service:     CATARACT EXTRACTION Bilateral     CHOLECYSTECTOMY      Remote    COCHLEAR IMPLANT REVISION  05/2019    COLONOSCOPY      PROSTATE SURGERY      SHOULDER SURGERY Right     SKIN BIOPSY      TOE AMPUTATION Right     2nd toe - Right Foot    TONSILLECTOMY      TOTAL KNEE ARTHROPLASTY Right 8/7/2024    Procedure: TOTAL KNEE ARTHROPLASTY WITH ASHLEY ROBOT RIGHT;  Surgeon: Christian Schwartz MD;  Location:  [facility] OR;  Service: Robotics - Ortho;  Laterality: Right;       SLP Recommendation and Plan  SLP Swallowing Diagnosis: mod-severe, oral dysphagia, severe, pharyngeal dysphagia (09/17/24 1104)  SLP Diet Recommendation: NPO, other (see comments) (09/17/24 1104)  Recommended Precautions and Strategies: upright posture during/after eating, general aspiration precautions, alternate between small bites of food and sips of liquid (09/17/24 1104)  SLP Rec. for Method of Medication Administration: meds crushed, with puree (09/17/24 1104)     Monitor for Signs of Aspiration: notify SLP if any concerns (09/17/24 1104)  Recommended Diagnostics:  (Rec ice chips and meds only with repeat MBS pending GOC discussion) (09/17/24 1104)  Swallow Criteria for Skilled Therapeutic Interventions Met: demonstrates skilled criteria (09/17/24 1104)  Anticipated  Discharge Disposition (SLP): other (see comments) (pending [facility] discussion) (09/17/24 1104)  Rehab Potential/Prognosis, Swallowing: re-evaluate goals as necessary (09/17/24 1104)  Therapy Frequency (Swallow): 5 days per week (09/17/24 1104)  Predicted Duration Therapy Intervention (Days): 1 week (09/17/24 1104)  Oral Care Recommendations: Oral Care BID/PRN, Toothbrush (09/17/24 1104)                                        Plan of Care Reviewed With: patient  Progress: improving      SWALLOW EVALUATION (Last 72 Hours)       SLP Adult Swallow Evaluation       Row Name 09/17/24 1104 09/16/24 0900                Rehab Evaluation    Document Type re-evaluation  -MM evaluation  -       Subjective Information no complaints  -MM no complaints  -       Patient Observations alert;cooperative  -MM alert;cooperative  -       Patient/Family/Caregiver Comments/Observations Pt's spouse and daughter present at bedside and reported the pt has been alert this morning  -MM --       Patient Effort -- good  -SM       Comment RN reported pt accepted meds this am without difficulty and has been very alert today  -MM --          General Information    Patient Profile Reviewed yes  -MM --       Pertinent History Of Current Problem see initial eval.  -MM --       Current Method of Nutrition NPO;other (see comments)  except meds  -MM pureed;nectar/syrup-thick liquids;water between meals  -       Precautions/Limitations, Vision WFL;for purposes of eval  -MM --       Precautions/Limitations, Hearing hearing impairment, bilaterally;other (see comments)  -MM --       Plans/Goals Discussed with -- patient  -       Barriers to Rehab -- cognitive status;previous functional deficit  -       Patient's Goals for Discharge -- --  preference to thin over thickened liquids but not otherwise able to provide insight.  -SM          Pain    Additional Documentation Pain Scale: Numbers Pre/Post-Treatment (Group)  -MM --          Pain Scale: Numbers  Pre/Post-Treatment    Pretreatment Pain Rating 0/10 - no pain  -MM 0/10 - no pain  -SM       Posttreatment Pain Rating 0/10 - no pain  -MM 0/10 - no pain  -SM       Pre/Posttreatment Pain Comment Pt denied pain throughout evaluation  -MM --          Oral Motor Structure and Function    Dentition Assessment edentulous  -MM --       Secretion Management WNL/WFL  -MM --       Mucosal Quality dry  -MM --          General Eating/Swallowing Observations    Eating/Swallowing Skills fed by SLP  -MM --       Positioning During Eating upright 90 degree;upright in bed  -MM --       Utensils Used spoon;cup  -MM --       Consistencies Trialed thin liquids;nectar/syrup-thick liquids;pureed  -MM --          Clinical Swallow Eval    Oral Prep Phase impaired  -MM --       Oral Transit impaired  -MM --       Oral Residue WFL  -MM --       Pharyngeal Phase suspected pharyngeal impairment  -MM --       Esophageal Phase suspected esophageal impairment  -MM --          Oral Prep Concerns    Oral Prep Concerns increased prep time  -MM --       Increased Prep Time pudding  -MM --          Oral Transit Concerns    Oral Transit Concerns increased oral transit time;delayed initiation of bolus transit  -MM --       Delayed Intiation of Bolus Transit all consistencies  -MM --       Increased Oral Transit Time pudding  -MM --          Pharyngeal Phase Concerns    Pharyngeal Phase Concerns wet vocal quality;throat clear  -MM --       Wet Vocal Quality thin  -MM --       Throat Clear nectar  -MM --       Pharyngeal Phase Concerns, Comment Pt with wet vocal quality following trials of thin and throat clear following trials of NT via small cup sip. SLP discussed results from MBS on 9/16 and all questions were answered. SLP with plan for repeat MBS on 9/18 and will make goals pending results.  -MM --          MBS/VFSS    Utensils Used -- spoon;cup;straw  -SM       Consistencies Trialed -- thin liquids;nectar/syrup-thick liquids;honey-thick  liquids;pureed  -SM          MBS/VFSS Interpretation    Oral Prep Phase -- impaired oral phase of swallowing  -SM       Oral Transit Phase -- impaired  -SM       Oral Residue -- impaired  -SM          Oral Preparatory Phase    Oral Preparatory Phase -- oral holding;prolonged manipulation  -SM       Oral Holding -- all consistencies tested;secondary to impaired cognitive status  -SM       Prolonged Manipulation -- pudding/puree;secondary to impaired cognitive status;secondary to reduced lingual strength  -SM          Oral Transit Phase    Impaired Oral Transit Phase -- unable to initiate bolus transit;delayed initiation of bolus transit;increased A-P transit time;premature spillage of liquids into pharynx  -SM       Delayed Initiation of bolus transit -- all consistencies tested;secondary to impaired cognitive status;other (see comments)  often required subsequent presentation or empty cup to initiate swallow  -SM       Increased A-P Transit Time -- all consistencies tested;secondary to impaired cognitive status;secondary to reduced lingual control  -SM       Premature Spillage of Liquids into Pharynx -- thin liquids;nectar-thick liquids;honey-thick liquids;secondary to reduced lingual control;secondary to impaired cognitive status  -SM          Oral Residue    Impaired Oral Residue -- diffuse residue throughout oral cavity  -SM       Diffuse Residue throughout Oral Cavity -- all consistencies tested  -SM       Response to Oral Residue -- cleared residue;other (see comments)  partially cleared with delayed subsequent swallow. Eventually cleared oral residue holding at end of study with presentation of empty cup.  -SM          Initiation of Pharyngeal Swallow    Initiation of Pharyngeal Swallow -- bolus in pyriform sinuses  -SM       Pharyngeal Phase -- impaired pharyngeal phase of swallowing  -SM       Anatomical abnormalities noted -- osteophyte/bone spur per radiology report  restricting at level of the UES though  not sole reason leading to aspiration  -SM       Aspiration After the Swallow -- all consistencies tested;secondary to residue;in pyriform sinuses  -SM       Response to Aspiration -- No  -SM       No spontaneous response to aspiration and -- effective subglottic clearance with cue (see comments)  with cue to cough at end of study. Could not trial prior as mouth always full with trial/residue.  -SM       Rosenbek's Scale -- all consistencies:;8--->level 8  -SM       Pharyngeal Residue -- all consistencies tested;diffuse within pharynx;secondary to reduced base of tongue retraction;secondary to reduced posterior pharyngeal wall stripping;secondary to reduced laryngeal elevation;secondary to reduced hyolaryngeal excursion  -SM       Response to Residue -- partial residue clearance;cleared residue with spontaneous subsequent swallow  -SM       Attempted Compensatory Maneuvers -- other (see comments)  could not attempt 2' cognitive state x cough to clear subglottic and laryngeal vestibule residue, which was effective in clearing.  -SM       Pharyngeal Phase, Comment -- DNT solids 2' choking risk given amount pf phayrngeal residue and presence of cervical osteophyte. Would only consider solids if pt/family wished to trial for QOL purposes. Aspiration with all consistencies trialed, all leads to residue, mixees and aspirates after the swallow. Greatest amount see with thin liquids though was not observed to deepen much pat VFs. Was able to clear. SLP attempted to f/u in pt's room after study to discuss GOC/POC options based on today's assessment. No family present. Pt was able to convey he prefers thin over thickened liqudis though not able to provide much insight. Do not think he is able to make his own decisions in current cognitive state. Discussed with RN and MD. SLP to return when family present.  -SM          SLP Evaluation Clinical Impression    SLP Swallowing Diagnosis mod-severe;oral dysphagia;severe;pharyngeal  dysphagia  -MM mod-severe;oral dysphagia;severe;pharyngeal dysphagia  -       Functional Impact risk of aspiration/pneumonia;risk of malnutrition;risk of dehydration  -MM risk of aspiration/pneumonia;risk of malnutrition;risk of dehydration  -       Rehab Potential/Prognosis, Swallowing re-evaluate goals as necessary  - re-evaluate goals as necessary  -       Swallow Criteria for Skilled Therapeutic Interventions Met demonstrates skilled criteria  -MM --          Recommendations    Therapy Frequency (Swallow) 5 days per week  -MM --       Predicted Duration Therapy Intervention (Days) 1 week  -MM --       SLP Diet Recommendation NPO;other (see comments)  -MM NPO;other (see comments)  hold PO intake for now until further GOC/POC discussions  -       Recommended Diagnostics --  Rec ice chips and meds only with repeat MBS pending GOC discussion  -MM --       Recommended Precautions and Strategies upright posture during/after eating;general aspiration precautions;alternate between small bites of food and sips of liquid  - --       Oral Care Recommendations Oral Care BID/PRN;Toothbrush  -MM Oral Care BID/PRN;Toothbrush  -       SLP Rec. for Method of Medication Administration meds crushed;with puree  -MM meds crushed;with puree  crush as able. Ok to provide tsp nectar-thick liquids if need assistance clearing mouth. Following full administration of meds, cue pt to cough a few times.  -       Monitor for Signs of Aspiration notify SLP if any concerns  - notify SLP if any concerns  -       Anticipated Discharge Disposition (SLP) other (see comments)  pending GOC discussion  -MM other (see comments)  pending further GOC/POC discussions with pt/family  -                 User Key  (r) = Recorded By, (t) = Taken By, (c) = Cosigned By      Initials Name Effective Dates     Tiffany Fisher MS CCC-SLP 02/03/23 -     MM Angela Doyle MS CCC-SLP 08/30/24 -                     EDUCATION  The patient  has been educated in the following areas:   Dysphagia (Swallowing Impairment).                Time Calculation:    Time Calculation- SLP       Row Name 09/17/24 1156             Time Calculation- SLP    SLP Start Time 1104  -MM      SLP Received On 09/17/24  -MM         Untimed Charges    SLP Eval/Re-eval  ST Eval Oral Pharyng Swallow - 26547  -MM      [id number] Eval Oral Pharyng Swallow Minutes 54  -MM         Total Minutes    Untimed Charges Total Minutes 54  -MM       Total Minutes 54  -MM                User Key  (r) = Recorded By, (t) = Taken By, (c) = Cosigned By      Initials Name Provider Type    Angela Hoffman MS CCC-SLP Speech and Language Pathologist                    Therapy Charges for Today       Code Description Service Date Service Provider Modifiers Qty    [id number]  ST EVAL ORAL PHARYNG SWALLOW 4 9/17/2024 Angela Doyle MS CCC-SLP GN 1                 Angela Doyle MS CCC-SLP  9/17/2024

## 2024-09-18 ENCOUNTER — APPOINTMENT (OUTPATIENT)
Dept: GENERAL RADIOLOGY | Facility: HOSPITAL | Age: 84
DRG: 871 | End: 2024-09-18
Payer: MEDICARE

## 2024-09-18 LAB
ALBUMIN SERPL-MCNC: 2.8 G/DL (ref 3.5–5.2)
ALP SERPL-CCNC: 116 U/L (ref 39–117)
ALT SERPL W P-5'-P-CCNC: 59 U/L (ref 1–41)
ANION GAP SERPL CALCULATED.3IONS-SCNC: 10 MMOL/L (ref 5–15)
AST SERPL-CCNC: 137 U/L (ref 1–40)
BACTERIA SPEC AEROBE CULT: NORMAL
BACTERIA SPEC AEROBE CULT: NORMAL
BASOPHILS # BLD AUTO: 0.02 10*3/MM3 (ref 0–0.2)
BASOPHILS NFR BLD AUTO: 0.6 % (ref 0–1.5)
BILIRUB CONJ SERPL-MCNC: 0.2 MG/DL (ref 0–0.3)
BILIRUB INDIRECT SERPL-MCNC: 0.3 MG/DL
BILIRUB SERPL-MCNC: 0.5 MG/DL (ref 0–1.2)
BUN SERPL-MCNC: 23 MG/DL (ref 8–23)
BUN/CREAT SERPL: 24.2 (ref 7–25)
CALCIUM SPEC-SCNC: 8.2 MG/DL (ref 8.6–10.5)
CHLORIDE SERPL-SCNC: 103 MMOL/L (ref 98–107)
CO2 SERPL-SCNC: 24 MMOL/L (ref 22–29)
CREAT SERPL-MCNC: 0.95 MG/DL (ref 0.76–1.27)
DEPRECATED RDW RBC AUTO: 44.8 FL (ref 37–54)
EGFRCR SERPLBLD CKD-EPI 2021: 79.4 ML/MIN/1.73
EOSINOPHIL # BLD AUTO: 0.04 10*3/MM3 (ref 0–0.4)
EOSINOPHIL NFR BLD AUTO: 1.3 % (ref 0.3–6.2)
ERYTHROCYTE [DISTWIDTH] IN BLOOD BY AUTOMATED COUNT: 14.2 % (ref 12.3–15.4)
GLUCOSE SERPL-MCNC: 72 MG/DL (ref 65–99)
HCT VFR BLD AUTO: 31.8 % (ref 37.5–51)
HGB BLD-MCNC: 10 G/DL (ref 13–17.7)
IMM GRANULOCYTES # BLD AUTO: 0.02 10*3/MM3 (ref 0–0.05)
IMM GRANULOCYTES NFR BLD AUTO: 0.6 % (ref 0–0.5)
LYMPHOCYTES # BLD AUTO: 0.79 10*3/MM3 (ref 0.7–3.1)
LYMPHOCYTES NFR BLD AUTO: 25.2 % (ref 19.6–45.3)
MAGNESIUM SERPL-MCNC: 2 MG/DL (ref 1.6–2.4)
MCH RBC QN AUTO: 27 PG (ref 26.6–33)
MCHC RBC AUTO-ENTMCNC: 31.4 G/DL (ref 31.5–35.7)
MCV RBC AUTO: 85.7 FL (ref 79–97)
MONOCYTES # BLD AUTO: 0.23 10*3/MM3 (ref 0.1–0.9)
MONOCYTES NFR BLD AUTO: 7.3 % (ref 5–12)
NEUTROPHILS NFR BLD AUTO: 2.03 10*3/MM3 (ref 1.7–7)
NEUTROPHILS NFR BLD AUTO: 65 % (ref 42.7–76)
NRBC BLD AUTO-RTO: 0 /100 WBC (ref 0–0.2)
PHOSPHATE SERPL-MCNC: 2.5 MG/DL (ref 2.5–4.5)
PLATELET # BLD AUTO: 112 10*3/MM3 (ref 140–450)
PMV BLD AUTO: 10.9 FL (ref 6–12)
POTASSIUM SERPL-SCNC: 4.2 MMOL/L (ref 3.5–5.2)
PROT SERPL-MCNC: 4.9 G/DL (ref 6–8.5)
RBC # BLD AUTO: 3.71 10*6/MM3 (ref 4.14–5.8)
SODIUM SERPL-SCNC: 137 MMOL/L (ref 136–145)
WBC NRBC COR # BLD AUTO: 3.13 10*3/MM3 (ref 3.4–10.8)

## 2024-09-18 PROCEDURE — 25010000002 CEFTRIAXONE PER 250 MG: Performed by: INTERNAL MEDICINE

## 2024-09-18 PROCEDURE — 97530 THERAPEUTIC ACTIVITIES: CPT

## 2024-09-18 PROCEDURE — 97166 OT EVAL MOD COMPLEX 45 MIN: CPT

## 2024-09-18 PROCEDURE — 85025 COMPLETE CBC W/AUTO DIFF WBC: CPT | Performed by: INTERNAL MEDICINE

## 2024-09-18 PROCEDURE — 92611 MOTION FLUOROSCOPY/SWALLOW: CPT

## 2024-09-18 PROCEDURE — 25010000002 THIAMINE PER 100 MG: Performed by: STUDENT IN AN ORGANIZED HEALTH CARE EDUCATION/TRAINING PROGRAM

## 2024-09-18 PROCEDURE — 74230 X-RAY XM SWLNG FUNCJ C+: CPT

## 2024-09-18 PROCEDURE — 99232 SBSQ HOSP IP/OBS MODERATE 35: CPT | Performed by: PSYCHIATRY & NEUROLOGY

## 2024-09-18 PROCEDURE — 83735 ASSAY OF MAGNESIUM: CPT | Performed by: INTERNAL MEDICINE

## 2024-09-18 PROCEDURE — 80076 HEPATIC FUNCTION PANEL: CPT | Performed by: INTERNAL MEDICINE

## 2024-09-18 PROCEDURE — 97164 PT RE-EVAL EST PLAN CARE: CPT

## 2024-09-18 PROCEDURE — 99232 SBSQ HOSP IP/OBS MODERATE 35: CPT | Performed by: INTERNAL MEDICINE

## 2024-09-18 PROCEDURE — 80048 BASIC METABOLIC PNL TOTAL CA: CPT

## 2024-09-18 PROCEDURE — 84100 ASSAY OF PHOSPHORUS: CPT | Performed by: INTERNAL MEDICINE

## 2024-09-18 RX ADMIN — Medication 10 ML: at 08:46

## 2024-09-18 RX ADMIN — Medication 10 ML: at 20:35

## 2024-09-18 RX ADMIN — BARIUM SULFATE 20 ML: 400 PASTE ORAL at 13:56

## 2024-09-18 RX ADMIN — HYDROCODONE BITARTRATE AND ACETAMINOPHEN 1 TABLET: 5; 325 TABLET ORAL at 00:15

## 2024-09-18 RX ADMIN — ISOSORBIDE MONONITRATE 30 MG: 30 TABLET, EXTENDED RELEASE ORAL at 08:45

## 2024-09-18 RX ADMIN — Medication 250 MG: at 08:45

## 2024-09-18 RX ADMIN — SODIUM BICARBONATE 50 MEQ: 84 INJECTION, SOLUTION INTRAVENOUS at 02:15

## 2024-09-18 RX ADMIN — THIAMINE HYDROCHLORIDE 250 MG: 100 INJECTION, SOLUTION INTRAMUSCULAR; INTRAVENOUS at 04:03

## 2024-09-18 RX ADMIN — THIAMINE HYDROCHLORIDE 250 MG: 100 INJECTION, SOLUTION INTRAMUSCULAR; INTRAVENOUS at 12:32

## 2024-09-18 RX ADMIN — THIAMINE HYDROCHLORIDE 250 MG: 100 INJECTION, SOLUTION INTRAMUSCULAR; INTRAVENOUS at 20:34

## 2024-09-18 RX ADMIN — BARIUM SULFATE 50 ML: 400 SUSPENSION ORAL at 13:56

## 2024-09-18 RX ADMIN — NORTRIPTYLINE HYDROCHLORIDE 20 MG: 10 CAPSULE ORAL at 20:34

## 2024-09-18 RX ADMIN — PRAVASTATIN SODIUM 20 MG: 20 TABLET ORAL at 08:45

## 2024-09-18 RX ADMIN — BARIUM SULFATE 100 ML: 0.81 POWDER, FOR SUSPENSION ORAL at 13:56

## 2024-09-18 RX ADMIN — SODIUM CHLORIDE 1000 MG: 900 INJECTION INTRAVENOUS at 17:01

## 2024-09-18 RX ADMIN — FLECAINIDE ACETATE 100 MG: 50 TABLET ORAL at 08:45

## 2024-09-18 RX ADMIN — FLECAINIDE ACETATE 100 MG: 50 TABLET ORAL at 20:34

## 2024-09-18 RX ADMIN — Medication 250 MG: at 20:39

## 2024-09-18 RX ADMIN — PANTOPRAZOLE SODIUM 40 MG: 40 INJECTION, POWDER, FOR SOLUTION INTRAVENOUS at 05:57

## 2024-09-18 NOTE — THERAPY EVALUATION
Patient Name: Guicho Blanco  : 1940    MRN: [id number]                              Today's Date: 2024       Admit Date: 2024    Visit Dx:     ICD-10-CM ICD-9-CM   1. Generalized weakness  R53.1 780.79   2. Cellulitis of right lower extremity  L03.115 682.6   3. Oropharyngeal dysphagia  R13.12 787.22     Patient Active Problem List   Diagnosis    Osteomyelitis of toe of right foot    Osteomyelitis of right foot    PAF (paroxysmal atrial fibrillation)    Coronary artery disease involving native coronary artery of native heart without angina pectoris    Gastroesophageal reflux disease without esophagitis    Benign non-nodular prostatic hyperplasia without lower urinary tract symptoms    S/P appy    Essential hypertension    Simple chronic bronchitis    Mixed hyperlipidemia    COPD (chronic obstructive pulmonary disease)    BPH (benign prostatic hypertrophy)    Hearing loss    Chest pain    Vertigo    Snoring    Overweight    PLMD (periodic limb movement disorder)    Neuropathy    Periodic headache syndrome, not intractable    Acquired absence of other right toe(s)    Tremor    Bilateral impacted cerumen    Bilateral sensorineural hearing loss    Chronic otitis externa    Deviated nasal septum    Disorder of joint of foot    ED (erectile dysfunction) of organic origin    Hydrocele    Hydrocele of testis    Impacted cerumen    Mononeuropathy    Swelling of testicle    Venous retinal branch occlusion    Vitreous degeneration    Benign prostatic hyperplasia with urinary obstruction    OA (osteoarthritis) of knee    Status post total right knee replacement    Septic arthritis    Cellulitis    Cellulitis of right knee    Failure to thrive in adult    Insomnia    Moderate malnutrition    Sepsis    Lactic acidosis    Serotonin syndrome     Past Medical History:   Diagnosis Date    Arthritis     Atrial fibrillation     BPH (benign prostatic hypertrophy)     Cataract     Cellulitis     Cochlear implant  in place     bilateral    Coronary artery disease     Full dentures     Gall stones     GERD (gastroesophageal reflux disease)     Hearing loss     Hiatal hernia     Hyperlipidemia     Hypertension     Peripheral neuropathy     Peripheral vascular disease     Seasonal allergies     Skin cancer     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    TIA (transient ischemic attack)     2 times    Wears glasses      Past Surgical History:   Procedure Laterality Date    ABLATION OF DYSRHYTHMIC FOCUS      APPENDECTOMY      CARDIAC CATHETERIZATION N/A 10/05/2017    Procedure: Left Heart Cath;  Surgeon: Hector Urrutia MD;  Location:  [facility];  Service:     CATARACT EXTRACTION Bilateral     CHOLECYSTECTOMY      Remote    COCHLEAR IMPLANT REVISION  05/2019    COLONOSCOPY      PROSTATE SURGERY      SHOULDER SURGERY Right     SKIN BIOPSY      TOE AMPUTATION Right     2nd toe - Right Foot    TONSILLECTOMY      TOTAL KNEE ARTHROPLASTY Right 8/7/2024    Procedure: TOTAL KNEE ARTHROPLASTY WITH ASHLEY ROBOT RIGHT;  Surgeon: Christian Schwartz MD;  Location:  [facility];  Service: Robotics - Ortho;  Laterality: Right;      General Information       Row Name 09/18/24 1445          OT Time and Intention    Document Type evaluation  -AJ     Mode of Treatment occupational therapy  -AJ       Row Name 09/18/24 1445          General Information    Patient Profile Reviewed yes  -AJ     Prior Level of Function min assist:;all household mobility;bed mobility  -AJ     Existing Precautions/Restrictions fall  Prior R TKA, R LE WBAT  -AJ     Barriers to Rehab medically complex;previous functional deficit;cognitive status  -AJ       Row Name 09/18/24 1445          Living Environment    People in Home spouse  -AJ       Row Name 09/18/24 1445          Home Main Entrance    Number of Stairs, Main Entrance one  -AJ     Stair Railings, Main Entrance none  -AJ       Row Name 09/18/24 1445          Stairs Within Home, Primary    Number of Stairs,  Within Home, Primary one  -AJ     Stair Railings, Within Home, Primary none  -       Row Name 09/18/24 1445          Cognition    Orientation Status (Cognition) oriented x 3  -       Row Name 09/18/24 1445          Safety Issues, Functional Mobility    Safety Issues Affecting Function (Mobility) awareness of need for assistance;insight into deficits/self-awareness;safety precaution awareness;safety precautions follow-through/compliance;sequencing abilities;judgment  -     Impairments Affecting Function (Mobility) balance;endurance/activity tolerance;pain;range of motion (ROM);strength  -               User Key  (r) = Recorded By, (t) = Taken By, (c) = Cosigned By      Initials Name Provider Type    AJ Karmen Betancur OT Occupational Therapist                     Mobility/ADL's       Row Name 09/18/24 1513          Bed Mobility    Bed Mobility supine-sit  -     Supine-Sit [place] (Bed Mobility) moderate assist (50% patient effort);verbal cues;2 person assist  -     Bed Mobility, Safety Issues cognitive deficits limit understanding;decreased use of legs for bridging/pushing  -     Assistive Device (Bed Mobility) bed rails;head of bed elevated;draw sheet  -     Comment, (Bed Mobility) VC and tactile cue for hand placement and weight shifting for supine>sitting EOB.  -       Row Name 09/18/24 1513          Transfers    Transfers bed-chair transfer  -       Row Name 09/18/24 1513          Bed-Chair Transfer    Bed-Chair [place] (Transfers) maximum assist (25% patient effort);2 person assist  Max x2 with hip guide  -     Assistive Device (Bed-Chair Transfers) other (see comments)  BUE support  -       Row Name 09/18/24 1513          Sit-Stand Transfer    Sit-Stand [place] (Transfers) maximum assist (25% patient effort);2 person assist  -AJ     Assistive Device (Sit-Stand Transfers) other (see comments)  BUE support  -               User Key  (r) = Recorded By, (t) = Taken By, (c)  = Cosigned By      Initials Name Provider Type    Karmen Mensah OT Occupational Therapist                   Obj/Interventions       Row Name 09/18/24 1516          Sensory Assessment (Somatosensory)    Sensory Assessment (Somatosensory) UE sensation intact  -[facility] Name 09/18/24 1516          Vision Assessment/Intervention    Visual Impairment/Limitations WFL  -[facility] Name 09/18/24 1516          Range of Motion Comprehensive    General Range of Motion bilateral upper extremity ROM WFL  -[facility] Name 09/18/24 1516          Strength Comprehensive (MMT)    Comment, General Manual Muscle Testing (MMT) Assessment BUE MMT grossly 4+/5  -[facility] Name 09/18/24 1516          Balance    Balance Assessment sitting static balance;sitting dynamic balance;sit to stand dynamic balance;standing dynamic balance;standing static balance  -     Static Sitting Balance contact guard;1-person assist  -     Dynamic Sitting Balance moderate assist;2-person assist  -     Position, Sitting Balance sitting edge of bed  -     Sit to Stand Dynamic Balance maximum assist;2-person assist  -     Static Standing Balance 2-person assist;maximum assist  -     Dynamic Standing Balance maximum assist;2-person assist;other (see comments)  max A x2 with hip guide  -     Position/Device Used, Standing Balance other (see comments)  BUE support  -     Balance Interventions sitting;sit to stand;supported;standing;static;dynamic  -               User Key  (r) = Recorded By, (t) = Taken By, (c) = Cosigned By      Initials Name Provider Type    Karmen Mensah OT Occupational Therapist                   Goals/Plan       Row Name 09/18/24 1524          Bed Mobility Goal 1 (OT)    Activity/Assistive Device (Bed Mobility Goal 1, OT) sit to supine  -     [place] Level/Cues Needed (Bed Mobility Goal 1, OT) minimum assist (75% or more patient effort)  -     Time Frame (Bed Mobility Goal 1, OT) short term goal  (STG);5 days  -AJ     Progress/Outcomes (Bed Mobility Goal 1, OT) new goal  -AJ       Row Name 09/18/24 1524          Transfer Goal 1 (OT)    Activity/Assistive Device (Transfer Goal 1, OT) bed-to-chair/chair-to-bed  -AJ     [place] Level/Cues Needed (Transfer Goal 1, OT) minimum assist (75% or more patient effort)  -AJ     Time Frame (Transfer Goal 1, OT) long term goal (LTG);10 days  -AJ     Progress/Outcome (Transfer Goal 1, OT) new goal  -AJ       Row Name 09/18/24 1524          Dressing Goal 1 (OT)    Activity/Device (Dressing Goal 1, OT) upper body dressing  -AJ     [place]/Cues Needed (Dressing Goal 1, OT) minimum assist (75% or more patient effort)  -AJ     Time Frame (Dressing Goal 1, OT) long term goal (LTG);10 days  -AJ     Strategies/Barriers (Dressing Goal 1, OT) sitting EOB  -AJ     Progress/Outcome (Dressing Goal 1, OT) new goal  -       Row Name 09/18/24 1524          Therapy Assessment/Plan (OT)    Planned Therapy Interventions (OT) activity tolerance training;BADL retraining;functional balance retraining;occupation/activity based interventions;patient/caregiver education/training;strengthening exercise;transfer/mobility retraining;ROM/therapeutic exercise  -AJ               User Key  (r) = Recorded By, (t) = Taken By, (c) = Cosigned By      Initials Name Provider Type    Karmen Mensah, OT Occupational Therapist                   Clinical Impression       Row Name 09/18/24 1519          Pain Assessment    Pretreatment Pain Rating 0/10 - no pain  -AJ     Posttreatment Pain Rating 0/10 - no pain  -       Row Name 09/18/24 1519          Plan of Care Review    Plan of Care Reviewed With patient  -AJ     Progress no change  -AJ     Outcome Evaluation OT evaluation completed. Pt presents below fxl baseline with deficits in strength and activity tolerance for ADL routines, decreased safety awareness, and impaired balance with dynamic movements. Pt would benefit from ongoing skilled IPOT  services to progress to PLOF. Recommend d/c to SNF.  -       Row Name 09/18/24 1519          Therapy Assessment/Plan (OT)    Patient/Family Therapy Goal Statement (OT) Return to PLOF  -     Rehab Potential (OT) good, to achieve stated therapy goals  -     Criteria for Skilled Therapeutic Interventions Met (OT) yes;meets criteria;skilled treatment is necessary  -     Therapy Frequency (OT) daily  -     Predicted Duration of Therapy Intervention (OT) 10 days  -       Row Name 09/18/24 1519          Therapy Plan Review/Discharge Plan (OT)    Anticipated Discharge Disposition (OT) [facility] nursing facility  -       Row Name 09/18/24 1519          Vital Signs    Pre Systolic BP Rehab 142  -AJ     Pre Treatment Diastolic BP 69  -AJ     Post Systolic BP Rehab 148  -AJ     Post Treatment Diastolic BP 77  -AJ     Pretreatment Heart Rate (beats/min) 91  -AJ     Posttreatment Heart Rate (beats/min) 95  -AJ     Pre SpO2 (%) 97  -AJ     O2 Delivery Pre Treatment room air  -AJ     Post SpO2 (%) 98  -AJ     O2 Delivery Post Treatment room air  -AJ     Pre Patient Position Supine  -AJ     Intra Patient Position Standing  -AJ     Post Patient Position Sitting  -AJ       Row Name 09/18/24 1519          Positioning and Restraints    Pre-Treatment Position in bed  -AJ     Post Treatment Position chair  -AJ     In Chair notified nsg;reclined;sitting;call light within reach;encouraged to call for assist;exit alarm on;waffle cushion;legs elevated;with family/caregiver  -               User Key  (r) = Recorded By, (t) = Taken By, (c) = Cosigned By      Initials Name Provider Type    AJ Karmen Betancur, OT Occupational Therapist                   Outcome Measures       Row Name 09/18/24 1527          How much help from another is currently needed...    Putting on and taking off regular lower body clothing? 2  -AJ     Bathing (including washing, rinsing, and drying) 2  -AJ     Toileting (which includes using toilet bed pan or  urinal) 2  -AJ     Putting on and taking off regular upper body clothing 2  -AJ     Taking care of personal grooming (such as brushing teeth) 3  -AJ     Eating meals 3  -AJ     AM-PAC 6 Clicks Score (OT) 14  -AJ       Row Name 09/18/24 1539          How much help from another person do you currently need...    Turning from your back to your side while in flat bed without using bedrails? 2  -ES     Moving from lying on back to sitting on the side of a flat bed without bedrails? 2  -ES     Moving to and from a bed to a chair (including a wheelchair)? 2  -ES     Standing up from a chair using your arms (e.g., wheelchair, bedside chair)? 2  -ES     Climbing 3-5 steps with a railing? 1  -ES     To walk in hospital room? 1  -ES     AM-PAC 6 Clicks Score (PT) 10  -ES     Highest Level of Mobility Goal 4 --> Transfer to chair/commode  -ES       Row Name 09/18/24 1525          Modified Taliaferro Scale    Pre-Stroke Modified Taliaferro Scale --  -     Modified Taliaferro Scale --  -       Row Name 09/18/24 1539 09/18/24 1525       Functional Assessment    Outcome Measure Options AM-PAC 6 Clicks Basic Mobility (PT)  -ES AM-PAC 6 Clicks Daily Activity (OT)  -              User Key  (r) = Recorded By, (t) = Taken By, (c) = Cosigned By      Initials Name Provider Type    Joy Carl, PT Physical Therapist    Karmen Mensah, OT Occupational Therapist                    Occupational Therapy Education       Title: PT OT SLP Therapies (In Progress)       Topic: Occupational Therapy (In Progress)       Point: ADL training (Done)       Description:   Instruct learner(s) on proper safety adaptation and remediation techniques during self care or transfers.   Instruct in proper use of assistive devices.                  Learning Progress Summary             Patient Acceptance, E, VU,NR by GLORIA at 9/18/2024 1525                         Point: Home exercise program (Not Started)       Description:   Instruct learner(s) on appropriate  technique for monitoring, assisting and/or progressing therapeutic exercises/activities.                  Learner Progress:  Not documented in this visit.              Point: Precautions (Done)       Description:   Instruct learner(s) on prescribed precautions during self-care and functional transfers.                  Learning Progress Summary             Patient Acceptance, E, VU,NR by GLORIA at 9/18/2024 1525                         Point: Body mechanics (Done)       Description:   Instruct learner(s) on proper positioning and spine alignment during self-care, functional mobility activities and/or exercises.                  Learning Progress Summary             Patient Acceptance, E, VU,NR by GLORIA at 9/18/2024 1525                                         User Key       Initials Effective Dates Name Provider Type Discipline    GLORIA 08/26/24 -  Karmen Betancur, OT Occupational Therapist OT                  OT Recommendation and Plan  Planned Therapy Interventions (OT): activity tolerance training, BADL retraining, functional balance retraining, occupation/activity based interventions, patient/caregiver education/training, strengthening exercise, transfer/mobility retraining, ROM/therapeutic exercise  Therapy Frequency (OT): daily  Plan of Care Review  Plan of Care Reviewed With: patient  Progress: no change  Outcome Evaluation: OT evaluation completed. Pt presents below fxl baseline with deficits in strength and activity tolerance for ADL routines, decreased safety awareness, and impaired balance with dynamic movements. Pt would benefit from ongoing skilled IPOT services to progress to PLOF. Recommend d/c to SNF.     Time Calculation:   Evaluation Complexity (OT)  Review Occupational Profile/Medical/Therapy History Complexity: expanded/moderate complexity  Assessment, Occupational Performance/Identification of Deficit Complexity: 3-5 performance deficits  Clinical Decision Making Complexity (OT): detailed  assessment/moderate complexity  Overall Complexity of Evaluation (OT): moderate complexity     Time Calculation- OT       Row Name 09/18/24 1526             Time Calculation- OT    OT Start Time 1020  -AJ      OT Received On 09/18/24  -AJ      OT Goal Re-Cert Due Date 09/28/24  -AJ         Timed Charges    63342 - OT Therapeutic Activity Minutes 11  -AJ         Untimed Charges    OT Eval/Re-eval Minutes 46  -AJ         Total Minutes    Timed Charges Total Minutes 11  -AJ      Untimed Charges Total Minutes 46  -AJ       Total Minutes 57  -AJ                User Key  (r) = Recorded By, (t) = Taken By, (c) = Cosigned By      Initials Name Provider Type    AJ Karmen Betancur OT Occupational Therapist                  Therapy Charges for Today       Code Description Service Date Service Provider Modifiers Qty    73348243601 HC OT THERAPEUTIC ACT EA 15 MIN 9/18/2024 Karmen Betancur OT GO 1    58179338679 HC OT EVAL MOD COMPLEXITY 4 9/18/2024 Karmen Betancur OT GO 1                 Karmen Betancur OT  9/18/2024

## 2024-09-18 NOTE — CASE MANAGEMENT/SOCIAL WORK
Continued Stay Note  [facility]     Patient Name: Guicho Blanco  MRN: [id number]  Today's Date: 9/18/2024    Admit Date: 9/13/2024    Plan: SNT-LTC   Discharge Plan       Row Name 09/18/24 8847       Plan    Plan Comments Spoke with spouseagain today about pt choice list. Long discussion with spouse about making appropriate referrals if she is thinking the pt may require LTC after SNF. After an extended discussion with spouse and serveral attempts to get spouse to determine where she would like referrals sent, CM explained that our obligation is for pt to have a safe discharge plan. Spouse was made aware that we like to make sure that we attempt to provide pt and family preference on where pt is sent at discharge but if she is unable to decide we will send out to all facilities in [place] and see who is able to accept. Spouses daughter came into conversation and stated that she would go visit a couple of facilities today and CM will follow tomorrow about sending referrals. Made aware that if they have no preferences tomorrow [facility].                   Discharge Codes    No documentation.                       Viridiana Paz RN

## 2024-09-18 NOTE — PROGRESS NOTES
ID Note:    Came by to see patient but he was off of the floor for studies.  Chart reviewed.  Febrile again yesterday evening, better today.  ESR remains low at 11.  Blood and synovial fluid cultures remain negative.  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    Will see again tomorrow.    Jae Manzanares MD  9/18/2024  14:31 EDT

## 2024-09-18 NOTE — PLAN OF CARE
Goal Outcome Evaluation:  Plan of Care Reviewed With: patient        Progress: no change  Outcome Evaluation: OT evaluation completed. Pt presents below fxl baseline with deficits in strength and activity tolerance for ADL routines, decreased safety awareness, and impaired balance with dynamic movements. Pt would benefit from ongoing skilled IPOT services to progress to PLOF. Recommend d/c to SNF.      Anticipated Discharge Disposition (OT): skilled nursing facility

## 2024-09-18 NOTE — MBS/VFSS/FEES
Acute Care - Speech Language Pathology   Swallow Re-Evaluation  [place]  Modified Barium Swallow Study (MBS)     Patient Name: Guicho Blanco  : 1940  MRN: [id number]  Today's Date: 2024               Admit Date: 2024    Visit Dx:     ICD-10-CM ICD-9-CM   1. Generalized weakness  R53.1 780.79   2. Cellulitis of right lower extremity  L03.115 682.6   3. Oropharyngeal dysphagia  R13.12 787.22     Patient Active Problem List   Diagnosis    Osteomyelitis of toe of right foot    Osteomyelitis of right foot    PAF (paroxysmal atrial fibrillation)    Coronary artery disease involving native coronary artery of native heart without angina pectoris    Gastroesophageal reflux disease without esophagitis    Benign non-nodular prostatic hyperplasia without lower urinary tract symptoms    S/P appy    Essential hypertension    Simple chronic bronchitis    Mixed hyperlipidemia    COPD (chronic obstructive pulmonary disease)    BPH (benign prostatic hypertrophy)    Hearing loss    Chest pain    Vertigo    Snoring    Overweight    PLMD (periodic limb movement disorder)    Neuropathy    Periodic headache syndrome, not intractable    Acquired absence of other right toe(s)    Tremor    Bilateral impacted cerumen    Bilateral sensorineural hearing loss    Chronic otitis externa    Deviated nasal septum    Disorder of joint of foot    ED (erectile dysfunction) of organic origin    Hydrocele    Hydrocele of testis    Impacted cerumen    Mononeuropathy    Swelling of testicle    Venous retinal branch occlusion    Vitreous degeneration    Benign prostatic hyperplasia with urinary obstruction    OA (osteoarthritis) of knee    Status post total right knee replacement    Septic arthritis    Cellulitis    Cellulitis of right knee    Failure to thrive in adult    Insomnia    Moderate malnutrition    Sepsis    Lactic acidosis    Serotonin syndrome     Past Medical History:   Diagnosis Date    Arthritis     Atrial  fibrillation     BPH (benign prostatic hypertrophy)     Cataract     Cellulitis     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     bilateral    Coronary artery disease     Full dentures     Gall stones     GERD (gastroesophageal reflux disease)     Hearing loss     Hiatal hernia     Hyperlipidemia     Hypertension     Peripheral neuropathy     Peripheral vascular disease     Seasonal allergies     Skin cancer     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    TIA (transient ischemic attack)     2 times    Wears glasses      Past Surgical History:   Procedure Laterality Date    ABLATION OF DYSRHYTHMIC FOCUS      APPENDECTOMY      CARDIAC CATHETERIZATION N/A 10/05/2017    Procedure: Left Heart Cath;  Surgeon: Hector Urrutia MD;  Location:  [facility];  Service:     CATARACT EXTRACTION Bilateral     CHOLECYSTECTOMY      Remote    COCHLEAR IMPLANT REVISION  05/2019    COLONOSCOPY      PROSTATE SURGERY      SHOULDER SURGERY Right     SKIN BIOPSY      TOE AMPUTATION Right     2nd toe - Right Foot    TONSILLECTOMY      TOTAL KNEE ARTHROPLASTY Right 8/7/2024    Procedure: TOTAL KNEE ARTHROPLASTY WITH ASHLEY ROBOT RIGHT;  Surgeon: Christian Schwartz MD;  Location:  [facility];  Service: Robotics - Ortho;  Laterality: Right;       SLP Recommendation and Plan  SLP Swallowing Diagnosis: mild-moderate, pharyngeal dysphagia (09/18/24 1315)  SLP Diet Recommendation: puree, thin liquids (09/18/24 1315)  Recommended Precautions and Strategies: upright posture during/after eating, small bites of food and sips of liquid, general aspiration precautions (09/18/24 1315)  SLP Rec. for Method of Medication Administration: with puree (crush as needed. If any coughing or c/o sticking.) (09/18/24 1315)     Monitor for Signs of Aspiration: yes, notify SLP if any concerns (09/18/24 1315)     Swallow Criteria for Skilled Therapeutic Interventions Met: demonstrates skilled criteria (09/18/24 1315)  Anticipated Discharge Disposition (SLP): inpatient  rehabilitation facility (09/18/24 1315)  Rehab Potential/Prognosis, Swallowing: good, to achieve stated therapy goals (09/18/24 1315)  Therapy Frequency (Swallow): 5 days per week (09/18/24 1315)  Predicted Duration Therapy Intervention (Days): 1 week (09/18/24 1315)  Oral Care Recommendations: Oral Care BID/PRN, Toothbrush (09/18/24 1315)                                        Plan of Care Reviewed With: patient      SWALLOW EVALUATION (Last 72 Hours)       SLP Adult Swallow Evaluation       Row Name 09/18/24 1315 09/17/24 1104 09/16/24 0900             Rehab Evaluation    Document Type re-evaluation  -SM re-evaluation  -MM evaluation  -SM      Subjective Information no complaints  -SM no complaints  -MM no complaints  -SM      Patient Observations alert;cooperative  -SM alert;cooperative  -MM alert;cooperative  -SM      Patient/Family/Caregiver Comments/Observations -- Pt's spouse and daughter present at bedside and reported the pt has been alert this morning  -MM --      Patient Effort good  -SM -- good  -SM      Comment -- RN reported pt accepted meds this am without difficulty and has been very alert today  -MM --         General Information    Patient Profile Reviewed -- yes  -MM --      Pertinent History Of Current Problem -- see initial eval.  -MM --      Current Method of Nutrition NPO  -SM NPO;other (see comments)  except meds  -MM pureed;nectar/syrup-thick liquids;water between meals  -SM      Precautions/Limitations, Vision -- WFL;for purposes of eval  -MM --      Precautions/Limitations, Hearing -- hearing impairment, bilaterally;other (see comments)  -MM --      Plans/Goals Discussed with -- -- patient  -SM      Barriers to Rehab -- -- cognitive status;previous functional deficit  -SM      Patient's Goals for Discharge -- -- --  preference to thin over thickened liquids but not otherwise able to provide insight.  -         Pain    Additional Documentation -- Pain Scale: Numbers Pre/Post-Treatment  (Group)  -MM --         Pain Scale: Numbers Pre/Post-Treatment    Pretreatment Pain Rating 0/10 - no pain  -SM 0/10 - no pain  -MM 0/10 - no pain  -SM      Posttreatment Pain Rating 0/10 - no pain  -SM 0/10 - no pain  -MM 0/10 - no pain  -SM      Pre/Posttreatment Pain Comment -- Pt denied pain throughout evaluation  -MM --         Oral Motor Structure and Function    Dentition Assessment -- edentulous  -MM --      Secretion Management -- WNL/WFL  -MM --      Mucosal Quality -- dry  -MM --         General Eating/Swallowing Observations    Eating/Swallowing Skills -- fed by SLP  -MM --      Positioning During Eating -- upright 90 degree;upright in bed  -MM --      Utensils Used -- spoon;cup  -MM --      Consistencies Trialed -- thin liquids;nectar/syrup-thick liquids;pureed  -MM --         Clinical Swallow Eval    Oral Prep Phase -- impaired  -MM --      Oral Transit -- impaired  -MM --      Oral Residue -- WFL  -MM --      Pharyngeal Phase -- suspected pharyngeal impairment  -MM --      Esophageal Phase -- suspected esophageal impairment  -MM --         Oral Prep Concerns    Oral Prep Concerns -- increased prep time  -MM --      Increased Prep Time -- pudding  -MM --         Oral Transit Concerns    Oral Transit Concerns -- increased oral transit time;delayed initiation of bolus transit  -MM --      Delayed Intiation of Bolus Transit -- all consistencies  -MM --      Increased Oral Transit Time -- pudding  -MM --         Pharyngeal Phase Concerns    Pharyngeal Phase Concerns -- wet vocal quality;throat clear  -MM --      Wet Vocal Quality -- thin  -MM --      Throat Clear -- nectar  -MM --      Pharyngeal Phase Concerns, Comment -- Pt with wet vocal quality following trials of thin and throat clear following trials of NT via small cup sip. SLP discussed results from MBS on 9/16 and all questions were answered. SLP with plan for repeat MBS on 9/18 and will make goals pending results.  -MM --         MBS/VFSS     Utensils Used spoon;cup;straw  -SM -- spoon;cup;straw  -SM      Consistencies Trialed thin liquids;nectar/syrup-thick liquids;pureed;regular textures  -SM -- thin liquids;nectar/syrup-thick liquids;honey-thick liquids;pureed  -SM         MBS/VFSS Interpretation    Oral Prep Phase impaired oral phase of swallowing  -SM -- impaired oral phase of swallowing  -SM      Oral Transit Phase impaired  -SM -- impaired  -SM      Oral Residue impaired  -SM -- impaired  -SM         Oral Preparatory Phase    Oral Preparatory Phase prolonged manipulation;spits out food/liquid prior to swallow  -SM -- oral holding;prolonged manipulation  -SM      Oral Holding -- -- all consistencies tested;secondary to impaired cognitive status  -SM      Prolonged Manipulation pudding/puree;secondary to reduced lingual strength  -SM -- pudding/puree;secondary to impaired cognitive status;secondary to reduced lingual strength  -SM      Spits Out Food/Liquid Prior to Swallow regular textures;other (see comments)  did not have dentures present, requested to spit out. Did not push as general choking risk with solids.  -SM -- --         Oral Transit Phase    Impaired Oral Transit Phase increased A-P transit time;delayed initiation of bolus transit  -SM -- unable to initiate bolus transit;delayed initiation of bolus transit;increased A-P transit time;premature spillage of liquids into pharynx  -SM      Delayed Initiation of bolus transit pudding/puree;secondary to reduced lingual control;other (see comments)  provided liquid mix to assist propulsion  -SM -- all consistencies tested;secondary to impaired cognitive status;other (see comments)  often required subsequent presentation or empty cup to initiate swallow  -SM      Increased A-P Transit Time all consistencies tested;secondary to reduced lingual control  -SM -- all consistencies tested;secondary to impaired cognitive status;secondary to reduced lingual control  -SM      Premature Spillage of  Liquids into Pharynx -- -- thin liquids;nectar-thick liquids;honey-thick liquids;secondary to reduced lingual control;secondary to impaired cognitive status  -SM         Oral Residue    Impaired Oral Residue lingual residue  -SM -- diffuse residue throughout oral cavity  -SM      Lingual Residue pudding/puree;secondary to reduced lingual strength  -SM -- --      Diffuse Residue throughout Oral Cavity -- -- all consistencies tested  -SM      Response to Oral Residue cleared residue;with liquid wash  -SM -- cleared residue;other (see comments)  partially cleared with delayed subsequent swallow. Eventually cleared oral residue holding at end of study with presentation of empty cup.  -SM         Initiation of Pharyngeal Swallow    Initiation of Pharyngeal Swallow bolus in pyriform sinuses  -SM -- bolus in pyriform sinuses  -SM      Pharyngeal Phase impaired pharyngeal phase of swallowing  -SM -- impaired pharyngeal phase of swallowing  -SM      Anatomical abnormalities noted osteophyte/bone spur per radiology report  at level of C5, reducing PES opening and contributing to pyriform sinuses residue  -SM -- osteophyte/bone spur per radiology report  restricting at level of the UES though not sole reason leading to aspiration  -SM      Anatomical abnormalities functional impact functional impact on swallowing  choking risk solids 2' significantly reduced PES opening.  -SM -- --      Penetration After the Swallow thin liquids;secondary to residue;in pyriform sinuses  -SM -- --      Aspiration After the Swallow -- -- all consistencies tested;secondary to residue;in pyriform sinuses  -SM      Depth of Penetration other (see comments)  mid depth  -SM -- --      Response to Penetration Yes  -SM -- --      Responsed to penetration with throat clear;effective  -SM -- --      Response to Aspiration -- -- No  -SM      No spontaneous response to aspiration and -- -- effective subglottic clearance with cue (see comments)  with cue to  cough at end of study. Could not trial prior as mouth always full with trial/residue.  -SM      Rosenbek's Scale thin:;2--->level 2  -SM -- all consistencies:;8--->level 8  -SM      Pharyngeal Residue all consistencies tested;valleculae;secondary to reduced base of tongue retraction;secondary to reduced posterior pharyngeal wall stripping;thin liquids;nectar-thick liquids;pyriform sinuses;secondary to reduced hyolaryngeal excursion;other (see comments)  Increased with nectar c/t thin which increases risk aspiration  -SM -- all consistencies tested;diffuse within pharynx;secondary to reduced base of tongue retraction;secondary to reduced posterior pharyngeal wall stripping;secondary to reduced laryngeal elevation;secondary to reduced hyolaryngeal excursion  -SM      Response to Residue partial residue clearance;cleared residue with spontaneous subsequent swallow;cleared residue with liquid wash  -SM -- partial residue clearance;cleared residue with spontaneous subsequent swallow  -SM      Attempted Compensatory Maneuvers -- -- other (see comments)  could not attempt 2' cognitive state x cough to clear subglottic and laryngeal vestibule residue, which was effective in clearing.  -SM      Pharyngeal Phase, Comment -- -- DNT solids 2' choking risk given amount pf phayrngeal residue and presence of cervical osteophyte. Would only consider solids if pt/family wished to trial for QOL purposes. Aspiration with all consistencies trialed, all leads to residue, mixees and aspirates after the swallow. Greatest amount see with thin liquids though was not observed to deepen much pat VFs. Was able to clear. SLP attempted to f/u in pt's room after study to discuss GOC/POC options based on today's assessment. No family present. Pt was able to convey he prefers thin over thickened liqudis though not able to provide much insight. Do not think he is able to make his own decisions in current cognitive state. Discussed with RN and MD.  SLP to return when family present.  -         SLP Evaluation Clinical Impression    SLP Swallowing Diagnosis mild-moderate;pharyngeal dysphagia  -SM mod-severe;oral dysphagia;severe;pharyngeal dysphagia  -MM mod-severe;oral dysphagia;severe;pharyngeal dysphagia  -SM      Functional Impact risk of aspiration/pneumonia;risk of malnutrition  -SM risk of aspiration/pneumonia;risk of malnutrition;risk of dehydration  -MM risk of aspiration/pneumonia;risk of malnutrition;risk of dehydration  -SM      Rehab Potential/Prognosis, Swallowing good, to achieve stated therapy goals  -SM re-evaluate goals as necessary  -MM re-evaluate goals as necessary  -SM      Swallow Criteria for Skilled Therapeutic Interventions Met demonstrates skilled criteria  -SM demonstrates skilled criteria  -MM --         Recommendations    Therapy Frequency (Swallow) 5 days per week  -SM 5 days per week  -MM --      Predicted Duration Therapy Intervention (Days) 1 week  -SM 1 week  -MM --      SLP Diet Recommendation puree;thin liquids  -SM NPO;other (see comments)  -MM NPO;other (see comments)  hold PO intake for now until further GOC/POC discussions  -      Recommended Diagnostics -- --  Rec ice chips and meds only with repeat MBS pending GOC discussion  - --      Recommended Precautions and Strategies upright posture during/after eating;small bites of food and sips of liquid;general aspiration precautions  - upright posture during/after eating;general aspiration precautions;alternate between small bites of food and sips of liquid  -MM --      Oral Care Recommendations Oral Care BID/PRN;Toothbrush  -SM Oral Care BID/PRN;Toothbrush  -MM Oral Care BID/PRN;Toothbrush  -SM      SLP Rec. for Method of Medication Administration with puree  crush as needed. If any coughing or c/o sticking.  -SM meds crushed;with puree  -MM meds crushed;with puree  crush as able. Ok to provide tsp nectar-thick liquids if need assistance clearing mouth. Following  full administration of meds, cue pt to cough a few times.  -SM      Monitor for Signs of Aspiration yes;notify SLP if any concerns  -SM notify SLP if any concerns  -MM notify SLP if any concerns  -SM      Anticipated Discharge Disposition (SLP) inpatient rehabilitation facility  -SM other (see comments)  pending GOC discussion  -MM other (see comments)  pending further GOC/POC discussions with pt/family  -SM                User Key  (r) = Recorded By, (t) = Taken By, (c) = Cosigned By      Initials Name Effective Dates    SM Tiffany Fisher, MS CCC-SLP 02/03/23 -     MM Angela Doyle, MS CCC-SLP 08/30/24 -                     EDUCATION  The patient has been educated in the following areas:   Dysphagia (Swallowing Impairment) Modified Diet Instruction.        SLP GOALS       Row Name 09/18/24 1315             (LTG) Patient will demonstrate functional swallow for    Diet Texture (Demonstrate functional swallow) pureed textures  -SM      Liquid viscosity (Demonstrate functional swallow) thin liquids  -SM      [place] (Demonstrate functional swallow) independently (over 90% accuracy)  -SM      Time Frame (Demonstrate functional swallow) 1 week  -SM         (STG) Patient will tolerate trials of    Consistencies Trialed (Tolerate trials) pureed textures;thin liquids  -SM      Desired Outcome (Tolerate trials) with adequate oral prep/transit/clearance;without signs/symptoms of aspiration  throat clearing/coughing observed to be effective at preventing aspiration. Monitor lung status to assist  -SM      [place] (Tolerate trials) independently (over 90% accuracy)  -SM      Time Frame (Tolerate trials) 1 week  -SM         (STG) Patient will tolerate therapeutic trials of    Consistencies Trialed (Tolerate therapeutic trials) --  soft/cohesive solids (e.g., ground sausage would be choking risk)  -SM      Desired Outcome (Tolerate therapeutic trials) with adequate oral prep/transit/clearance;without  signs/symptoms of aspiration  -SM      [place] (Tolerate therapeutic trials) with 1:1 assist/ supervision  -SM      Time Frame (Tolerate therapeutic trials) 1 week  -SM         (STG) Lingual Strengthening Goal 1 (SLP)    Activity (Lingual Strengthening Goal 1, SLP) increase lingual tone/sensation/control/coordination/movement;increase tongue back strength  -SM      Increase Lingual Tone/Sensation/Control/Coordination/Movement lingual resistance exercises  -SM      Increase Tongue Back Strength lingual resistance exercises  -SM      [place]/Accuracy (Lingual Strengthening Goal 1, SLP) with minimal cues (75-90% accuracy)  -SM      Time Frame (Lingual Strengthening Goal 1, SLP) 1 week  -SM         (STG) Pharyngeal Strengthening Exercise Goal 1 (SLP)    Activity (Pharyngeal Strengthening Goal 1, SLP) increase superior movement of the hyolaryngeal complex;increase anterior movement of the hyolaryngeal complex;increase squeeze/positive pressure generation  -SM      Increase Superior Movement of the Hyolaryngeal Complex Mendelsohn;effortful pitch glide (falsetto + pharyngeal squeeze)  -SM      Increase Anterior Movement of the Hyolaryngeal Complex EMST;chin tuck against resistance (CTAR)  -SM      Increase Squeeze/Positive Pressure Generation hard effortful swallow  + tongue press  -SM      [place]/Accuracy (Pharyngeal Strengthening Goal 1, SLP) with moderate cues (50-74% accuracy)  -SM      Time Frame (Pharyngeal Strengthening Goal 1, SLP) 1 week  -SM         (STG) Swallow Management Recall Goal 1 (SLP)    Activity (Swallow Management Recall Goal 1, SLP) aspiration precautions;oral care recommendations;safe diet/liquid level;rationale for use of strategies/techniques;other (see comments)  + education on attempts at safe/cohesive soft solids once home.  -SM      [place]/Accuracy (Swallow Management Recall Goal 1, SLP) with 1:1 supervision/constant cues  -SM      Time Frame (Swallow Management Recall  Goal 1, SLP) 1 week  -                User Key  (r) = Recorded By, (t) = Taken By, (c) = Cosigned By      Initials Name Provider Type    Tiffany Prieto MS CCC-SLP Speech and Language Pathologist                         Time Calculation:    Time Calculation- SLP       Row Name 09/18/24 1641             Time Calculation- SLP    SLP Start Time 1315  -SM      SLP Received On 09/18/24  -         Untimed Charges    23121-TF Motion Fluoro Eval Swallow Minutes 59  -SM         Total Minutes    Untimed Charges Total Minutes 59  -SM       Total Minutes 59  -SM                User Key  (r) = Recorded By, (t) = Taken By, (c) = Cosigned By      Initials Name Provider Type    Tiffany Prieto MS CCC-SLP Speech and Language Pathologist                    Therapy Charges for Today       Code Description Service Date Service Provider Modifiers Qty    [id number]  ST MOTION FLUORO EVAL SWALLOW 4 9/18/2024 Tiffany Fisher MS CCC-SLP GN 1                 Tiffany Fisher MS CCC-SLP  9/18/2024

## 2024-09-18 NOTE — THERAPY RE-EVALUATION
Patient Name: Guicho Blanco  : 1940    MRN: [id number]                              Today's Date: 2024       Admit Date: 2024    Visit Dx:     ICD-10-CM ICD-9-CM   1. Generalized weakness  R53.1 780.79   2. Cellulitis of right lower extremity  L03.115 682.6   3. Oropharyngeal dysphagia  R13.12 787.22     Patient Active Problem List   Diagnosis    Osteomyelitis of toe of right foot    Osteomyelitis of right foot    PAF (paroxysmal atrial fibrillation)    Coronary artery disease involving native coronary artery of native heart without angina pectoris    Gastroesophageal reflux disease without esophagitis    Benign non-nodular prostatic hyperplasia without lower urinary tract symptoms    S/P appy    Essential hypertension    Simple chronic bronchitis    Mixed hyperlipidemia    COPD (chronic obstructive pulmonary disease)    BPH (benign prostatic hypertrophy)    Hearing loss    Chest pain    Vertigo    Snoring    Overweight    PLMD (periodic limb movement disorder)    Neuropathy    Periodic headache syndrome, not intractable    Acquired absence of other right toe(s)    Tremor    Bilateral impacted cerumen    Bilateral sensorineural hearing loss    Chronic otitis externa    Deviated nasal septum    Disorder of joint of foot    ED (erectile dysfunction) of organic origin    Hydrocele    Hydrocele of testis    Impacted cerumen    Mononeuropathy    Swelling of testicle    Venous retinal branch occlusion    Vitreous degeneration    Benign prostatic hyperplasia with urinary obstruction    OA (osteoarthritis) of knee    Status post total right knee replacement    Septic arthritis    Cellulitis    Cellulitis of right knee    Failure to thrive in adult    Insomnia    Moderate malnutrition    Sepsis    Lactic acidosis    Serotonin syndrome     Past Medical History:   Diagnosis Date    Arthritis     Atrial fibrillation     BPH (benign prostatic hypertrophy)     Cataract     Cellulitis     Cochlear implant  in place     bilateral    Coronary artery disease     Full dentures     Gall stones     GERD (gastroesophageal reflux disease)     Hearing loss     Hiatal hernia     Hyperlipidemia     Hypertension     Peripheral neuropathy     Peripheral vascular disease     Seasonal allergies     Skin cancer     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    TIA (transient ischemic attack)     2 times    Wears glasses      Past Surgical History:   Procedure Laterality Date    ABLATION OF DYSRHYTHMIC FOCUS      APPENDECTOMY      CARDIAC CATHETERIZATION N/A 10/05/2017    Procedure: Left Heart Cath;  Surgeon: Hector Urrutia MD;  Location:  [facility];  Service:     CATARACT EXTRACTION Bilateral     CHOLECYSTECTOMY      Remote    COCHLEAR IMPLANT REVISION  05/2019    COLONOSCOPY      PROSTATE SURGERY      SHOULDER SURGERY Right     SKIN BIOPSY      TOE AMPUTATION Right     2nd toe - Right Foot    TONSILLECTOMY      TOTAL KNEE ARTHROPLASTY Right 8/7/2024    Procedure: TOTAL KNEE ARTHROPLASTY WITH ASHLEY ROBOT RIGHT;  Surgeon: Christian Schwartz MD;  Location:  [facility] OR;  Service: Robotics - Ortho;  Laterality: Right;      General Information       Row Name 09/18/24 1533          Physical Therapy Time and Intention    Document Type re-evaluation  -ES     Mode of Treatment physical therapy  -ES       Row Name 09/18/24 1533          General Information    Patient Profile Reviewed yes  -ES     Prior Level of Function --  see IE  -ES     Existing Precautions/Restrictions fall  Recent R TKA, R LE WBAT  -ES     Barriers to Rehab medically complex;previous functional deficit;cognitive status  -ES       Row Name 09/18/24 1533          Living Environment    People in Home --  see IE  -ES       Row Name 09/18/24 1533          Cognition    Orientation Status (Cognition) oriented x 3  -ES       Row Name 09/18/24 1533          Safety Issues, Functional Mobility    Safety Issues Affecting Function (Mobility) awareness of need for  assistance;insight into deficits/self-awareness;safety precaution awareness;sequencing abilities;safety precautions follow-through/compliance  -ES     Impairments Affecting Function (Mobility) balance;endurance/activity tolerance;pain;range of motion (ROM);strength  -ES               User Key  (r) = Recorded By, (t) = Taken By, (c) = Cosigned By      Initials Name Provider Type    ES Joy Ayala PT Physical Therapist                   Mobility       Row Name 09/18/24 1534          Bed Mobility    Comment, (Bed Mobility) received EOB  -ES       Row Name 09/18/24 1534          Bed-Chair Transfer    Bed-Chair [place] (Transfers) maximum assist (25% patient effort);2 person assist  -ES     Assistive Device (Bed-Chair Transfers) other (see comments)  BUE support  -ES     Comment, (Bed-Chair Transfer) SPT from EOB to chair. Pt unable to reach full upright posture w/ r knee buckling noted. further mob limited by fatigue and weakness  -ES       Row Name 09/18/24 1534          Sit-Stand Transfer    Sit-Stand [place] (Transfers) maximum assist (25% patient effort);2 person assist  -ES     Assistive Device (Sit-Stand Transfers) other (see comments)  BUE support  -ES     Comment, (Sit-Stand Transfer) v/c to promote upright posture  -ES       Row Name 09/18/24 1534          Gait/Stairs (Locomotion)    [place] Level (Gait) unable to assess  -ES       Row Name 09/18/24 1534          Mobility    Extremity Weight-bearing Status right lower extremity  -ES     Right Lower Extremity (Weight-bearing Status) weight-bearing as tolerated (WBAT)  -ES               User Key  (r) = Recorded By, (t) = Taken By, (c) = Cosigned By      Initials Name Provider Type    ES Joy Ayala PT Physical Therapist                   Obj/Interventions       Row Name 09/18/24 1536          Range of Motion Comprehensive    General Range of Motion lower extremity range of motion deficits identified  -ES     Comment, General Range of  Motion R knee limited ~25% due to pain, LLE WFL  -ES       Row Name 09/18/24 1536          Strength Comprehensive (MMT)    General Manual Muscle Testing (MMT) Assessment lower extremity strength deficits identified  -ES     Comment, General Manual Muscle Testing (MMT) Assessment RLE 3+/5, LLE 4-/5  -ES       Row Name 09/18/24 1536          Motor Skills    Therapeutic Exercise hip;knee;ankle  -ES       Row Name 09/18/24 1536          Hip (Therapeutic Exercise)    Hip (Therapeutic Exercise) strengthening exercise  -ES     Hip Strengthening (Therapeutic Exercise) right;heel slides;10 repetitions  -ES       Row Name 09/18/24 1536          Knee (Therapeutic Exercise)    Knee (Therapeutic Exercise) isometric exercises;strengthening exercise  -ES     Knee Isometrics (Therapeutic Exercise) right;quad sets;10 repetitions  -ES     Knee Strengthening (Therapeutic Exercise) right;heel slides;SLR (straight leg raise);LAQ (long arc quad);10 repetitions  -ES       Row Name 09/18/24 1536          Ankle (Therapeutic Exercise)    Ankle (Therapeutic Exercise) AROM (active range of motion)  -ES     Ankle AROM (Therapeutic Exercise) bilateral;dorsiflexion;plantarflexion;10 repetitions  -ES       Row Name 09/18/24 1536          Balance    Balance Assessment sitting static balance;sitting dynamic balance;sit to stand dynamic balance;standing static balance;standing dynamic balance  -ES     Static Sitting Balance contact guard  -ES     Dynamic Sitting Balance minimal assist;2-person assist;verbal cues  -ES     Position, Sitting Balance supported;sitting in chair;sitting edge of bed  -ES     Sit to Stand Dynamic Balance maximum assist;2-person assist;verbal cues  -ES     Static Standing Balance moderate assist;2-person assist;verbal cues  -ES     Dynamic Standing Balance maximum assist;2-person assist;verbal cues  -ES     Position/Device Used, Standing Balance supported  -ES     Balance Interventions sitting;standing;sit to  stand;supported;static;dynamic;occupation based/functional task  -ES       Row Name 09/18/24 1536          Sensory Assessment (Somatosensory)    Sensory Assessment (Somatosensory) LE sensation intact  -ES               User Key  (r) = Recorded By, (t) = Taken By, (c) = Cosigned By      Initials Name Provider Type    Joy Carl, PT Physical Therapist                   Goals/Plan       Row Name 09/18/24 1539          Bed Mobility Goal 1 (PT)    Activity/Assistive Device (Bed Mobility Goal 1, PT) sit to supine;supine to sit  -ES     [place] Level/Cues Needed (Bed Mobility Goal 1, PT) contact guard required  -ES     Time Frame (Bed Mobility Goal 1, PT) short term goal (STG);5 days  -ES     Progress/Outcomes (Bed Mobility Goal 1, PT) goal ongoing  -ES       Row Name 09/18/24 1539          Transfer Goal 1 (PT)    Activity/Assistive Device (Transfer Goal 1, PT) sit-to-stand/stand-to-sit;bed-to-chair/chair-to-bed;walker, rolling  -ES     [place] Level/Cues Needed (Transfer Goal 1, PT) moderate assist (50-74% patient effort)  -ES     Time Frame (Transfer Goal 1, PT) long term goal (LTG);10 days  -ES     Progress/Outcome (Transfer Goal 1, PT) goal revised this date  -ES       Row Name 09/18/24 1539          Gait Training Goal 1 (PT)    Activity/Assistive Device (Gait Training Goal 1, PT) gait (walking locomotion);assistive device use;walker, rolling  -ES     [place] Level (Gait Training Goal 1, PT) moderate assist (50-74% patient effort)  -ES     Distance (Gait Training Goal 1, PT) 50  -ES     Time Frame (Gait Training Goal 1, PT) long term goal (LTG);10 days  -ES     Progress/Outcome (Gait Training Goal 1, PT) goal revised this date  -ES               User Key  (r) = Recorded By, (t) = Taken By, (c) = Cosigned By      Initials Name Provider Type    Joy Carl, PT Physical Therapist                   Clinical Impression       Row Name 09/18/24 1537          Pain    Pain Intervention(s)  Repositioned;Ambulation/increased activity  -ES     Additional Documentation Pain Scale: FACES Pre/Post-Treatment (Group)  -ES       Row Name 09/18/24 1537          Pain Scale: FACES Pre/Post-Treatment    Pain: FACES Scale, Pretreatment 0-->no hurt  -ES     Posttreatment Pain Rating 0-->no hurt  -ES     Pre/Posttreatment Pain Comment tolerated  -ES       Row Name 09/18/24 1537          Plan of Care Review    Plan of Care Reviewed With patient  -ES     Progress no change  -ES     Outcome Evaluation PT re-eval complete. Pt presents with recent R TKA with ROM deficits, balance deficits, and generalized weakness warranting skilled IPPT services. Pt required maxA x2 for mobility with R knee buckling noted. PT rec SNF at d/c.  -ES       Row Name 09/18/24 1537          Therapy Assessment/Plan (PT)    Rehab Potential (PT) good, to achieve stated therapy goals  -ES     Criteria for Skilled Interventions Met (PT) yes;meets criteria;skilled treatment is necessary  -ES     Therapy Frequency (PT) daily  -ES     Predicted Duration of Therapy Intervention (PT) 10 days  -ES       Row Name 09/18/24 1537          Vital Signs    Pre Systolic BP Rehab 142  -ES     Pre Treatment Diastolic BP 69  -ES     Post Systolic BP Rehab 148  -ES     Post Treatment Diastolic BP 77  -ES     Pretreatment Heart Rate (beats/min) 90  -ES     Posttreatment Heart Rate (beats/min) 95  -ES     Pre SpO2 (%) 96  -ES     O2 Delivery Pre Treatment room air  -ES     O2 Delivery Intra Treatment room air  -ES     Post SpO2 (%) 98  -ES     O2 Delivery Post Treatment room air  -ES     Pre Patient Position Sitting  -ES     Intra Patient Position Standing  -ES     Post Patient Position Sitting  -ES       Row Name 09/18/24 1537          Positioning and Restraints    Pre-Treatment Position in bed  -ES     Post Treatment Position chair  -ES     In Chair notified nsg;reclined;sitting;call light within reach;encouraged to call for assist;exit alarm on;waffle cushion;on  mechanical lift sling;with family/caregiver;legs elevated;heels elevated  -ES               User Key  (r) = Recorded By, (t) = Taken By, (c) = Cosigned By      Initials Name Provider Type    Joy Carl PT Physical Therapist                   Outcome Measures       Row Name 09/18/24 1539          How much help from another person do you currently need...    Turning from your back to your side while in flat bed without using bedrails? 2  -ES     Moving from lying on back to sitting on the side of a flat bed without bedrails? 2  -ES     Moving to and from a bed to a chair (including a wheelchair)? 2  -ES     Standing up from a chair using your arms (e.g., wheelchair, bedside chair)? 2  -ES     Climbing 3-5 steps with a railing? 1  -ES     To walk in hospital room? 1  -ES     AM-PAC 6 Clicks Score (PT) 10  -ES     Highest Level of Mobility Goal 4 --> Transfer to chair/commode  -ES       Row Name 09/18/24 1525          Modified Jefferson Scale    Pre-Stroke Modified Jefferson Scale 6 - Unable to determine (UTD) from the medical record documentation  -     Modified [place] Scale 4 - Moderately severe disability.  Unable to walk without assistance, and unable to attend to own bodily needs without assistance.  -       Row Name 09/18/24 1539 09/18/24 1525       Functional Assessment    Outcome Measure Options AM-PAC 6 Clicks Basic Mobility (PT)  -ES AM-PAC 6 Clicks Daily Activity (OT);Modified [place]  -              User Key  (r) = Recorded By, (t) = Taken By, (c) = Cosigned By      Initials Name Provider Type    Joy Carl PT Physical Therapist    Karmen Mensah, OT Occupational Therapist                                 Physical Therapy Education       Title: PT OT SLP Therapies (In Progress)       Topic: Physical Therapy (In Progress)       Point: Mobility training (In Progress)       Learning Progress Summary             Patient Acceptance, E,TB, NR by BHAVNA at 9/18/2024 1540    Acceptance, E,D, VU,NR  by AB at 9/14/2024 1422                         Point: Home exercise program (In Progress)       Learning Progress Summary             Patient Acceptance, E,TB, NR by ES at 9/18/2024 1540    Acceptance, E,D, VU,NR by AB at 9/14/2024 1422                         Point: Body mechanics (In Progress)       Learning Progress Summary             Patient Acceptance, E,TB, NR by ES at 9/18/2024 1540    Acceptance, E,D, VU,NR by AB at 9/14/2024 1422                         Point: Precautions (In Progress)       Learning Progress Summary             Patient Acceptance, E,TB, NR by ES at 9/18/2024 1540    Acceptance, E,D, VU,NR by AB at 9/14/2024 1422                                         User Key       Initials Effective Dates Name Provider Type Discipline    AB 09/22/22 -  Ebony Loera, PT Physical Therapist PT    ES 08/11/22 -  Joy Ayala PT Physical Therapist PT                  PT Recommendation and Plan     Plan of Care Reviewed With: patient  Progress: no change  Outcome Evaluation: PT re-eval complete. Pt presents with recent R TKA with ROM deficits, balance deficits, and generalized weakness warranting skilled IPPT services. Pt required maxA x2 for mobility with R knee buckling noted. PT rec SNF at d/c.     Time Calculation:         PT Charges       Row Name 09/18/24 1540             Time Calculation    Start Time 1016  -ES      PT Received On 09/18/24  -ES      PT Goal Re-Cert Due Date 09/28/24  -ES         Time Calculation- PT    Total Timed Code Minutes- PT 23 minute(s)  -ES         Timed Charges    04635 - PT Therapeutic Activity Minutes 23  -ES         Untimed Charges    PT Eval/Re-eval Minutes 20  -ES         Total Minutes    Timed Charges Total Minutes 23  -ES      Untimed Charges Total Minutes 20  -ES       Total Minutes 43  -ES                User Key  (r) = Recorded By, (t) = Taken By, (c) = Cosigned By      Initials Name Provider Type    ES Joy Ayala, EL Physical Therapist                   Therapy Charges for Today       Code Description Service Date Service Provider Modifiers Qty    [id number]  PT THERAPEUTIC ACT EA 15 MIN 9/18/2024 Joy Ayala, PT GP 2    [id number]  PT RE-EVAL ESTABLISHED PLAN 2 9/18/2024 Joy Ayala PT GP 1            PT G-Codes  Outcome Measure Options: AM-PAC 6 Clicks Basic Mobility (PT)  AM-PAC 6 Clicks Score (PT): 10  AM-PAC 6 Clicks Score (OT): 14  Modified [place] Scale: 4 - Moderately severe disability.  Unable to walk without assistance, and unable to attend to own bodily needs without assistance.  PT Discharge Summary  Anticipated Discharge Disposition (PT): skilled nursing facility    Joy Ayala PT  9/18/2024

## 2024-09-18 NOTE — PROGRESS NOTES
Neurology Note    Patient:  Guicho Blanco    YOB: 1940    REFERRING PHYSICIAN:  Dr. Parmar    CHIEF COMPLAINT:    AMS    HISTORY OF PRESENT ILLNESS:   The patient is much more alert, oriented x 3 today, able to eat, get up with help.    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     bilateral    Coronary artery disease     Full dentures     Gall stones     GERD (gastroesophageal reflux disease)     Hearing loss     Hiatal hernia     Hyperlipidemia     Hypertension     Peripheral neuropathy     Peripheral vascular disease     Seasonal allergies     Skin cancer     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    TIA (transient ischemic attack)     2 times    Wears glasses        Past Surgical History:  Past Surgical History:   Procedure Laterality Date    ABLATION OF DYSRHYTHMIC FOCUS      APPENDECTOMY      CARDIAC CATHETERIZATION N/A 10/05/2017    Procedure: Left Heart Cath;  Surgeon: Hector Urrutia MD;  Location:  [facility];  Service:     CATARACT EXTRACTION Bilateral     CHOLECYSTECTOMY      Remote    COCHLEAR IMPLANT REVISION  05/2019    COLONOSCOPY      PROSTATE SURGERY      SHOULDER SURGERY Right     SKIN BIOPSY      TOE AMPUTATION Right     2nd toe - Right Foot    TONSILLECTOMY      TOTAL KNEE ARTHROPLASTY Right 8/7/2024    Procedure: TOTAL KNEE ARTHROPLASTY WITH ASHLEY ROBOT RIGHT;  Surgeon: Christian Schwartz MD;  Location:  [facility];  Service: Robotics - Ortho;  Laterality: Right;       Social History:   Social History     Socioeconomic History    Marital status:    Tobacco Use    Smoking status: Never     Passive exposure: Past    Smokeless tobacco: Never   Vaping Use    Vaping status: Never Used   Substance and Sexual Activity    Alcohol use: No    Drug use: No    Sexual activity: Defer        Family History:   Family  History   Problem Relation Age of Onset    Atrial fibrillation Mother     Dementia Mother     Lung cancer Father        Medications Prior to Admission:    Prior to Admission medications    Medication Sig Start Date End Date Taking? Authorizing Provider   aspirin 325 MG tablet Take 1 tablet by mouth Daily. Indications: Disease involving Lipid Deposits in the Arteries 8/7/24  Yes Mykel Sanches MD   B Complex Vitamins (VITAMIN B COMPLEX) capsule capsule Take 1 capsule by mouth Daily. Takes in evening  Indications: Vitamin Deficiency 4/28/16  Yes Mykel Sanchse MD   bisacodyl (DULCOLAX) 10 MG suppository Insert 1 suppository into the rectum Daily As Needed for Constipation (Use if bisacodyl oral is ineffective). 8/10/24  Yes Berto Liu MD   Cholecalciferol (VITAMIN D3) 5000 UNITS capsule capsule Take 1 capsule by mouth Daily. Indications: Vitamin D Deficiency 4/28/16  Yes Mykel Sanches MD   coenzyme Q10 100 MG capsule Take 1 capsule by mouth Daily. Indications: Heart Rhythm Disorder 4/28/16  Yes Mykel Sanches MD   cycloSPORINE (Restasis) 0.05 % ophthalmic emulsion Administer 1 drop to both eyes Every 12 (Twelve) Hours. Indications: Drying and Inflammation of Cornea and Conjunctiva of Eyes 11/22/21  Yes Mykel Sanches MD   docusate sodium (Colace) 100 MG capsule Take 1 capsule by mouth 2 (Two) Times a Day As Needed for Constipation. 8/7/24  Yes Christian Schwartz MD   flecainide (TAMBOCOR) 100 MG tablet Take 1 tablet by mouth Every 12 (Twelve) Hours. 4/8/24  Yes Hector Urrutia MD   isosorbide mononitrate (IMDUR) 30 MG 24 hr tablet Take 1 tablet by mouth Daily. 4/8/24  Yes Hector Urrutia MD   linezolid (ZYVOX) 600 MG tablet Take 1 tablet by mouth 2 (Two) Times a Day.   Yes Mykel Sanches MD   nortriptyline (PAMELOR) 10 MG capsule Take 2 capsules by mouth Every Night. Indications: Migraine Headache 4/12/23  Yes Mykel Sanches MD   omeprazole OTC (PrilOSEC  OTC) 20 MG EC tablet Take 1 tablet by mouth Daily. Indications: Heartburn 7/25/24  Yes ProviderMykel MD   pravastatin (PRAVACHOL) 20 MG tablet Take 1 tablet by mouth Daily. Indications: High Amount of Fats in the Blood 9/4/24  Yes Berto Liu MD   shantell topetedii (FLORASTOR) 250 MG capsule Take 1 capsule by mouth 2 (Two) Times a Day. 9/4/24  Yes Berto Liu MD   traMADol (ULTRAM) 50 MG tablet Take 1 tablet by mouth Every 8 (Eight) Hours As Needed for Moderate Pain. 8/27/24  Yes Christian Schwartz MD   acetaminophen (TYLENOL) 650 MG 8 hr tablet Take 1 tablet by mouth Every 8 (Eight) Hours As Needed for Mild Pain.    ProviderMykel MD   glucose (DEX4) 4 GM chewable tablet Chew 4 tablets As Needed.    ProviderMykel MD   nitroglycerin (NITROSTAT) 0.4 MG SL tablet 1 under the tongue as needed for angina, may repeat q5mins for up three doses 2/4/19   Hector Urrutia MD       Allergies:  Linezolid, Divalproex sodium (migraine) [valproic acid], Cymbalta [duloxetine hcl], Duloxetine, Metoprolol, and Neurontin [gabapentin]      Review of system  Review of Systems   Unable to perform ROS: Mental status change       Vitals:    09/18/24 1200   BP: 141/73   Pulse: 93   Resp: 18   Temp: 99.5 °F (37.5 °C)   SpO2: 99%       Physical exam  Physical Exam  Cardiovascular:      Rate and Rhythm: Normal rate and regular rhythm.   Pulmonary:      Effort: Pulmonary effort is normal.   Neurological:      General: No focal deficit present.      Mental Status: He is oriented to person, place, and time.      Comments: Speech clear, no facial droop, no tremor noted, moves limbs well on command.           Lab Results   Component Value Date    WBC 3.13 (L) 09/18/2024    HGB 10.0 (L) 09/18/2024    HCT 31.8 (L) 09/18/2024    MCV 85.7 09/18/2024     (L) 09/18/2024     Lab Results   Component Value Date    GLUCOSE 72 09/18/2024    BUN 23 09/18/2024    CREATININE 0.95 09/18/2024    EGFRIFNONA  69 06/19/2021    BCR 24.2 09/18/2024    CO2 24.0 09/18/2024    CALCIUM 8.2 (L) 09/18/2024    PROTENTOTREF 6.8 05/27/2022    PROTENTOTREF 6.7 05/27/2022    ALBUMIN 2.8 (L) 09/18/2024    LABIL2 1.8 05/27/2022    LABIL2 1.4 05/27/2022     (H) 09/18/2024    ALT 59 (H) 09/18/2024         Radiological Studies:       During this visit the following were done:  Labs Reviewed [x]    Labs Ordered []    Radiology Reports Reviewed []    Radiology Ordered []    EKG, echo, and/or stress test reviewed []    EEG results reviewed  []    EEG reviewed and interpreted per myself   []    Discussed case with neurointerventionalist or neuroradiologist []    Referring Provider Records Reviewed []    ER Records Reviewed []    Hospital Records Reviewed []    History Obtained From Family []    Radiological images view and Interpreted per myself []    Case Discussed with referring provider []     Decision to obtain and request outside records  []        Assessment and Plan     TME improved, recurrent fever, suspected serotonin syndrome. Normal WBC and nearly normal CRP. No nuchal rigidity to suggest meningitis. Right knee cellulitis.   - Neurologically stable for the floor.   - Empiric thiamine.   - Avoid serotonergic agents.   - ST, PT, OT.   - Outpatient neurology F/U.    Call for questions, will see prn. Thanks.                 Electronically signed by Nato Tse MD on 9/18/2024 at 13:27 EDT

## 2024-09-18 NOTE — PLAN OF CARE
Goal Outcome Evaluation:  Plan of Care Reviewed With: patient        Progress: no change  Outcome Evaluation: PT re-eval complete. Pt presents with recent R TKA with ROM deficits, balance deficits, and generalized weakness warranting skilled IPPT services. Pt required maxA x2 for mobility with R knee buckling noted. PT rec SNF at d/c.      Anticipated Discharge Disposition (PT): skilled nursing facility

## 2024-09-18 NOTE — PLAN OF CARE
Goal Outcome Evaluation:               Pt had a great day. Sat in chair most of the day. Worked with pt. Max assist. Sitting in chair with lift underneath him. Passed MBS now on pureed diet. Family at bedside. Regan taken out. Transfer orders.

## 2024-09-18 NOTE — PLAN OF CARE
Goal Outcome Evaluation:  Plan of Care Reviewed With: patient                  Anticipated Discharge Disposition (SLP): inpatient rehabilitation facility          SLP Swallowing Diagnosis: mild-moderate, pharyngeal dysphagia (09/18/24 1315)         MBS completed. Safe for puree and thin liquids.

## 2024-09-18 NOTE — PROGRESS NOTES
INTENSIVIST   PROGRESS NOTE     Hospital:  LOS: 2 days     Mr. Guicho Blanco, 83 y.o. male is followed for a Chief Complaint of: Lactic Acidosis      Subjective   S     Interval History:  Afebrile this AM.        The patient's relevant past medical, surgical and social history were reviewed and updated in Epic as appropriate.      ROS:   Constitutional: Negative for fever.   Respiratory: Negative for dyspnea.   Cardiovascular: Negative for chest pain.   Gastrointestinal: Negative for  nausea, vomiting and diarrhea.       Objective   O     Vitals:  Temp  Min: 98.4 °F (36.9 °C)  Max: 104 °F (40 °C)  BP  Min: 109/85  Max: 157/86  Pulse  Min: 87  Max: 122  Resp  Min: 16  Max: 22  SpO2  Min: 91 %  Max: 99 % No data recorded    Intake/Ouptut 24 hrs (7:00AM - 6:59 AM)  Intake & Output (last 3 days)         09/13 0701  09/14 0700 09/14 0701  09/15 0700 09/15 0701  09/16 0700 09/16 0701  09/17 0700    P.O.  950 410     IV Piggyback 100 600      Total Intake(mL/kg) 100 (1) 1550 (16.3) 410 (4.3)     Urine (mL/kg/hr) 1205 2175 (1) 625 (0.3)     Stool   0     Total Output 1205 2175 625     Net -1105 -625 -215             Urine Unmeasured Occurrence   4 x     Stool Unmeasured Occurrence   1 x             Medications (drips):           Physical Examination  Telemetry:  Sinus tachycardia.    Constitutional:  No acute distress.  Resting in bed on nasal cannula.    Cardiovascular: Normal rate, regular and rhythm. Normal heart sounds.  No murmurs, gallop or rub.   Respiratory: No respiratory distress. Normal respiratory effort.  Normal breath sounds  Clear to auscultation   Abdominal:  Soft. No masses. Non-tender. No distension. No HSM.   Extremities: No digital cyanosis. No clubbing.  Right knee is hot and swollen.    Neurological:   Alert and interactive.           Results from last 7 days   Lab Units 09/18/24  0408 09/17/24  0327 09/16/24  1044   WBC 10*3/mm3 3.13* 2.71* 4.84   HEMOGLOBIN g/dL 10.0* 10.4* 12.3*   MCV fL 85.7  84.7 87.1   PLATELETS 10*3/mm3 112* 127* 172     Results from last 7 days   Lab Units 09/18/24  0408 09/17/24  0327 09/16/24  1852   SODIUM mmol/L 137 141 138   POTASSIUM mmol/L 4.2 3.8 3.9   CO2 mmol/L 24.0 21.0* 22.0   CREATININE mg/dL 0.95 1.10 1.07   GLUCOSE mg/dL 72 89 104*   MAGNESIUM mg/dL 2.0 1.8  --    PHOSPHORUS mg/dL 2.5 2.9  --      Estimated Creatinine Clearance: 76.7 mL/min (by C-G formula based on SCr of 0.95 mg/dL).  Results from last 7 days   Lab Units 09/18/24  0408 09/13/24  1341   ALK PHOS U/L 116 152*   BILIRUBIN mg/dL 0.5 0.5   BILIRUBIN DIRECT mg/dL 0.2  --    ALT (SGPT) U/L 59* 20   AST (SGOT) U/L 137* 28       Results from last 7 days   Lab Units 09/16/24  2204 09/16/24  1040   PH, ARTERIAL pH units 7.451* 7.276*   PCO2, ARTERIAL mm Hg 32.7* 21.0*   PO2 ART mm Hg 102.0 101.0   FIO2 % 28 28       Images:  Imaging Results (Last 24 Hours)       Procedure Component Value Units Date/Time    FL Video Swallow With Speech Single Contrast [[id number]] Resulted: 09/18/24 1326     Updated: 09/18/24 1356               Results: Reviewed.  I reviewed the patient's new laboratory and imaging results.  I independently reviewed the patient's new images.    Medications: Reviewed.    Assessment & Plan   A / P     Mr. Blanco is an 82yo M with a history of peripheral neuropathy, TIA, AFib, CAD, HTN, HLD, and a recent admission to [facility] from 8/7-8/10/24 following a knee arthroplasty on 8/7/24. He was readmitted on 8/30/24 with cellulitis of the right knee and was treated with Rocephin and Vancomycin and then discharged on 9/5/24 on PO Doxy and Keflex. Zyvox was restarted on 9/13 for worsening erythema and warmth at the incision site.     He presented back to [facility] on 9/13/24 with weakness and AMS. He was admitted to the Ortho Floor service with ID consulted and he was placed back on IV antibiotics.     Neurology has been following for altered mental status. MRI brain could not be performed secondary to cochlear  implants.     On the morning of 9/16/24, a Rapid Response was called for altered mental status and a fever of 103. Labs were concerning for a metabolic acidosis with a lactate > 13 and he was transferred to the ICU for further care.     Right knee aspiration performed on 9/16 by Dr. Schwartz at the bedside. Fluid not indicative of sylvain septic joint. Cultures remain negative.     Fever felt to be secondary to serotonin syndrome from Linezolid/Mirtazapine.       Nutrition:   Diet: Cardiac; Healthy Heart (2-3 Na+); Texture: Pureed (NDD 1); Fluid Consistency: Thin (IDDSI 0)  Advance Directives:   Code Status and Medical Interventions: No CPR (Do Not Attempt to Resuscitate); Limited Support; No intubation (DNI), No cardioversion   Ordered at: 09/16/24 1206     Medical Intervention Limits:    No intubation (DNI)    No cardioversion     Level Of Support Discussed With:    Health Care Surrogate     Code Status (Patient has no pulse and is not breathing):    No CPR (Do Not Attempt to Resuscitate)     Medical Interventions (Patient has pulse or is breathing):    Limited Support       Active Hospital Problems    Diagnosis     **Cellulitis of right knee     Serotonin syndrome     Sepsis     Lactic acidosis     Moderate malnutrition     Failure to thrive in adult     Insomnia     Neuropathy     Hearing loss        Assessment / Plan:    Stop Bicarb drip.    Continue to monitor renal function. Improved with IV fluids.    ID managing antibiotics.   Orthopedics following and s/p tap of right knee. Cultures negative so far.   Neurology following. CT imaging unremarkable. Cannot have an MRI secondary to cochlear implants.   Modified diet per Speech Therapy   AM labs  Okay to transfer back to telemetry.       Plan of care and goals reviewed during interdisciplinary rounds.  I discussed the patient's findings and my recommendations with patient, family, and nursing staff      Martha Parmar, DO    Intensive Care Medicine and Pulmonary  Medicine

## 2024-09-19 LAB
ANION GAP SERPL CALCULATED.3IONS-SCNC: 10 MMOL/L (ref 5–15)
BUN SERPL-MCNC: 18 MG/DL (ref 8–23)
BUN/CREAT SERPL: 23.1 (ref 7–25)
CALCIUM SPEC-SCNC: 8.3 MG/DL (ref 8.6–10.5)
CHLORIDE SERPL-SCNC: 104 MMOL/L (ref 98–107)
CK SERPL-CCNC: 763 U/L (ref 20–200)
CO2 SERPL-SCNC: 24 MMOL/L (ref 22–29)
CREAT SERPL-MCNC: 0.78 MG/DL (ref 0.76–1.27)
EGFRCR SERPLBLD CKD-EPI 2021: 88.5 ML/MIN/1.73
GLUCOSE SERPL-MCNC: 85 MG/DL (ref 65–99)
POTASSIUM SERPL-SCNC: 3.6 MMOL/L (ref 3.5–5.2)
SODIUM SERPL-SCNC: 138 MMOL/L (ref 136–145)

## 2024-09-19 PROCEDURE — 97110 THERAPEUTIC EXERCISES: CPT

## 2024-09-19 PROCEDURE — 25010000002 THIAMINE PER 100 MG: Performed by: STUDENT IN AN ORGANIZED HEALTH CARE EDUCATION/TRAINING PROGRAM

## 2024-09-19 PROCEDURE — 92526 ORAL FUNCTION THERAPY: CPT

## 2024-09-19 PROCEDURE — 97116 GAIT TRAINING THERAPY: CPT

## 2024-09-19 PROCEDURE — 82550 ASSAY OF CK (CPK): CPT | Performed by: INTERNAL MEDICINE

## 2024-09-19 PROCEDURE — 99232 SBSQ HOSP IP/OBS MODERATE 35: CPT | Performed by: INTERNAL MEDICINE

## 2024-09-19 PROCEDURE — 80048 BASIC METABOLIC PNL TOTAL CA: CPT

## 2024-09-19 PROCEDURE — 25010000002 CEFTRIAXONE PER 250 MG: Performed by: INTERNAL MEDICINE

## 2024-09-19 PROCEDURE — 25010000002 HYDRALAZINE PER 20 MG: Performed by: NURSE PRACTITIONER

## 2024-09-19 RX ORDER — HYDRALAZINE HYDROCHLORIDE 20 MG/ML
10 INJECTION INTRAMUSCULAR; INTRAVENOUS EVERY 6 HOURS PRN
Status: DISCONTINUED | OUTPATIENT
Start: 2024-09-19 | End: 2024-09-23 | Stop reason: HOSPADM

## 2024-09-19 RX ADMIN — PANTOPRAZOLE SODIUM 40 MG: 40 INJECTION, POWDER, FOR SOLUTION INTRAVENOUS at 06:01

## 2024-09-19 RX ADMIN — PRAVASTATIN SODIUM 20 MG: 20 TABLET ORAL at 09:19

## 2024-09-19 RX ADMIN — Medication 250 MG: at 20:11

## 2024-09-19 RX ADMIN — HYDRALAZINE HYDROCHLORIDE 10 MG: 20 INJECTION INTRAMUSCULAR; INTRAVENOUS at 21:05

## 2024-09-19 RX ADMIN — FLECAINIDE ACETATE 100 MG: 50 TABLET ORAL at 09:19

## 2024-09-19 RX ADMIN — NORTRIPTYLINE HYDROCHLORIDE 20 MG: 10 CAPSULE ORAL at 20:11

## 2024-09-19 RX ADMIN — ISOSORBIDE MONONITRATE 30 MG: 30 TABLET, EXTENDED RELEASE ORAL at 09:19

## 2024-09-19 RX ADMIN — Medication 10 ML: at 09:19

## 2024-09-19 RX ADMIN — THIAMINE HYDROCHLORIDE 250 MG: 100 INJECTION, SOLUTION INTRAMUSCULAR; INTRAVENOUS at 12:03

## 2024-09-19 RX ADMIN — Medication 10 ML: at 20:11

## 2024-09-19 RX ADMIN — THIAMINE HYDROCHLORIDE 250 MG: 100 INJECTION, SOLUTION INTRAMUSCULAR; INTRAVENOUS at 20:11

## 2024-09-19 RX ADMIN — THIAMINE HYDROCHLORIDE 250 MG: 100 INJECTION, SOLUTION INTRAMUSCULAR; INTRAVENOUS at 05:21

## 2024-09-19 RX ADMIN — HYDROCODONE BITARTRATE AND ACETAMINOPHEN 1 TABLET: 5; 325 TABLET ORAL at 20:11

## 2024-09-19 RX ADMIN — Medication 250 MG: at 09:19

## 2024-09-19 RX ADMIN — FLECAINIDE ACETATE 100 MG: 50 TABLET ORAL at 20:11

## 2024-09-19 RX ADMIN — SODIUM CHLORIDE 1000 MG: 900 INJECTION INTRAVENOUS at 19:50

## 2024-09-19 NOTE — PLAN OF CARE
Goal Outcome Evaluation:  Plan of Care Reviewed With: patient, spouse        Progress: improving  Outcome Evaluation: Pt demonstrated improved mobility this date, increasing ambulation distance with modA x2 and FWW. Pt continues to be limited by generalized weakness, decreased activity tolerance, and ROM deficits warranting skilled IPPT services. PT rec SNF at d/c.      Anticipated Discharge Disposition (PT): skilled nursing facility

## 2024-09-19 NOTE — PROGRESS NOTES
Clinical Nutrition Assessment     Patient Name: Guicho Blanco  YOB: 1940  MRN: [id number]  Date of Encounter: 09/19/24 10:28 EDT  Admission date: 9/13/2024  Reason for Visit: MDR, Follow-up protocolICU admission assessment     Assessment   Nutrition Assessment   Admission Diagnosis:  Cellulitis of right knee [L03.115]    Problem List:    Cellulitis of right knee    Hearing loss    Neuropathy    Failure to thrive in adult    Insomnia    Moderate malnutrition    Sepsis    Lactic acidosis    Serotonin syndrome      PMH:   He  has a past medical history of Arthritis, Atrial fibrillation, BPH (benign prostatic hypertrophy), Cataract, Cellulitis, 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    PSH:  He  has a past surgical history that includes Ablation of dysrhythmic focus; Appendectomy; Shoulder surgery (Right); Prostate surgery; Cholecystectomy; Cataract extraction (Bilateral); Toe amputation (Right); Cardiac catheterization (N/A, 10/05/2017); Tonsillectomy; Cochlear implant revision (05/2019); Skin biopsy; Colonoscopy; and Total knee arthroplasty (Right, 8/7/2024).    Applicable Nutrition History:   (9/14)SLP Swallowing Diagnosis: moderate, oral dysphagia, suspect acute-on-chronic, pharyngeal dysphagia, esophageal dysphagia, other (see comments) (baseline pharyngoesophageal dysphagia; acute/worsened oral dysphagia (appeared cognitive-based) + ? acute pharyngeal dysphagia r/t weakness)   SLP Diet Recommendation: puree, nectar thick liquids, other (see comments) (If aversion/poor intake w/ this diet, resulting in more risk than benefit, may consider upgrading to more liberal textures of mechanical ground and thin liquids,  "if ok'd by provider.  (9/16) MBS:  SLP Diet Recommendation: NPO, other (see comments) (hold PO intake for now until further GOC/POC )  (9/16) Transferred to the ICU after a RR   (9/18) MBS: Pureed, thin liquids     Anthropometrics     Height: Height: 182.9 cm (72.01\")  Last Filed Weight: Weight: 92 kg (202 lb 13.2 oz) (09/18/24 0600)  Method: Weight Method: Bed scale  BMI: BMI (Calculated): 27.5    UBW:  ~210 lb several years per EMR  Weight change: No significant changes    Nutrition Focused Physical Exam    Date:  9/14       Patient meets criteria for malnutrition diagnosis, see MSA note.     Subjective   Reported/Observed/Food/Nutrition Related History:   9/19  Alert and oriented.  Just finished eating breakfast, about 75% consumed. Tolerating pureed food well.  Received boost last night and drank.  Overall great improvement.     9/17  Had order for pureed diet, however not eating much per RN.  Per RN; + fever, c/o back pain.     Laying in bed at time of visit, patient daughter in room. Reports patient ate really well for dinner on 9/15, she feels he tolerated pureed food really well.  Not much intake yesterday or this morning. She would like to try boost with lunch, reports patient is more likely to drink vs eat when not feeling too well.     9/14  Pt sitting in chair at visit, pleasantly confused but able to tell me he has not been eating well at home and sometimes gets choked on food/liquids. He states he has lost some weight in the past ~6-8 months but then says actually 6 years. Per EMR weight stable. He states he did have a good meal today though he does not like the thickened liquids. Pt denies further dietary needs/preferences or nutritional questions/concerns at this time, NKFA. Per EMR, weight stable several years. 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 Neuro is following and has started him on remeron for sleeping problems which will hopefully encourage appetite as well.     Current Nutrition " Prescription   PO: Diet: Cardiac; Healthy Heart (2-3 Na+); Texture: Pureed (NDD 1); Fluid Consistency: Thin (IDDSI 0)  Oral Nutrition Supplement: Boost plus   Intake: 75% x 1 meal     Assessment & Plan   Nutrition Diagnosis   Date:  9/15            Updated:    Problem Malnutrition  chronic/moderate   Etiology Decreased ability to consume sufficient energy 2/2 dysphagia, anorexia, AMS in setting of ongoing infection, multiple hospitalizations   Signs/Symptoms PO intakes <75% EEN >/= 1 month, moderate muscle wasting, and mild subcutaneous fat loss.   Status: New    Date: 9/17  Updated: 9/19  Problem Inadequate oral intake   Etiology Serotonin syndrome    Signs/Symptoms Lack of interest in food (not feeling well)    Status: resolved; intake improved and is accepting supplement       Goal:   Nutrition to support treatment and Continue positive trend    Nutrition Intervention      Follow treatment progress, Care plan reviewed, Encourage intake, Supplement provided    Boost plus BID     Monitoring/Evaluation:   Per protocol, I&O, PO intake, Supplement intake, Pertinent labs, Weight, GI status, Symptoms, POC/GOC, Swallow function    Avis Calderon RD  Time Spent: 20m

## 2024-09-19 NOTE — PROGRESS NOTES
INTENSIVIST   PROGRESS NOTE     Hospital:  LOS: 3 days     Mr. Guicho Blanco, 83 y.o. male is followed for a Chief Complaint of: Lactic Acidosis      Subjective   S     Interval History:  No acute events overnight.        The patient's relevant past medical, surgical and social history were reviewed and updated in Epic as appropriate.      ROS:   Constitutional: Negative for fever.   Respiratory: Negative for dyspnea.   Cardiovascular: Negative for chest pain.   Gastrointestinal: Negative for  nausea, vomiting and diarrhea.       Objective   O     Vitals:  Temp  Min: 97.7 °F (36.5 °C)  Max: 99.9 °F (37.7 °C)  BP  Min: 119/97  Max: 155/79  Pulse  Min: 89  Max: 102  Resp  Min: 16  Max: 18  SpO2  Min: 97 %  Max: 100 % No data recorded    Intake/Ouptut 24 hrs (7:00AM - 6:59 AM)  Intake & Output (last 3 days)         09/13 0701  09/14 0700 09/14 0701  09/15 0700 09/15 0701  09/16 0700 09/16 0701  09/17 0700    P.O.  950 410     IV Piggyback 100 600      Total Intake(mL/kg) 100 (1) 1550 (16.3) 410 (4.3)     Urine (mL/kg/hr) 1205 2175 (1) 625 (0.3)     Stool   0     Total Output 1205 2175 625     Net -1105 -625 -215             Urine Unmeasured Occurrence   4 x     Stool Unmeasured Occurrence   1 x             Medications (drips):           Physical Examination  Telemetry:  Sinus tachycardia.    Constitutional:  No acute distress.  Sitting up in a chair.    Cardiovascular: Normal rate, regular and rhythm. Normal heart sounds.  No murmurs, gallop or rub.   Respiratory: No respiratory distress. Normal respiratory effort.  Normal breath sounds  Clear to auscultation   Abdominal:  Soft. No masses. Non-tender. No distension. No HSM.   Extremities: No digital cyanosis. No clubbing.  Right knee is hot and swollen.    Neurological:   Alert and interactive.           Results from last 7 days   Lab Units 09/18/24  0408 09/17/24  0327 09/16/24  1044   WBC 10*3/mm3 3.13* 2.71* 4.84   HEMOGLOBIN g/dL 10.0* 10.4* 12.3*   MCV fL  85.7 84.7 87.1   PLATELETS 10*3/mm3 112* 127* 172     Results from last 7 days   Lab Units 09/19/24  0416 09/18/24  0408 09/17/24  0327   SODIUM mmol/L 138 137 141   POTASSIUM mmol/L 3.6 4.2 3.8   CO2 mmol/L 24.0 24.0 21.0*   CREATININE mg/dL 0.78 0.95 1.10   GLUCOSE mg/dL 85 72 89   MAGNESIUM mg/dL  --  2.0 1.8   PHOSPHORUS mg/dL  --  2.5 2.9     Estimated Creatinine Clearance: 93.4 mL/min (by C-G formula based on SCr of 0.78 mg/dL).  Results from last 7 days   Lab Units 09/18/24  0408 09/13/24  1341   ALK PHOS U/L 116 152*   BILIRUBIN mg/dL 0.5 0.5   BILIRUBIN DIRECT mg/dL 0.2  --    ALT (SGPT) U/L 59* 20   AST (SGOT) U/L 137* 28       Results from last 7 days   Lab Units 09/16/24  2204 09/16/24  1040   PH, ARTERIAL pH units 7.451* 7.276*   PCO2, ARTERIAL mm Hg 32.7* 21.0*   PO2 ART mm Hg 102.0 101.0   FIO2 % 28 28       Images:  Imaging Results (Last 24 Hours)       Procedure Component Value Units Date/Time    FL Video Swallow With Speech Single Contrast [[id number]] Collected: 09/18/24 1418     Updated: 09/18/24 1711    Narrative:      FL VIDEO SWALLOW W SPEECH SINGLE-CONTRAST    Date of Exam: 9/18/2024 1:26 PM EDT    Indication: dysphagia.       Comparison: None available.    Technique:   The speech pathologist administered food and/or liquid mixed with barium to the patient with cine/video imaging.  Imaging assistance was provided to the speech pathologist and an image was saved.    Fluoroscopic Time: 1 minute and 36 seconds    Number of Images: 6 associated fluoroscopic loops were saved    Findings:  Penetration of barium was seen during fluoroscopic guided modified barium swallowing series. Please see speech therapy report for full details and recommendations.      Impression:      Impression:  Fluoroscopy provided for a modified barium swallow. Penetration of barium was seen during swallowing evaluation. Please see speech therapy report for full details and recommendations.      Report dictated by: Jess  James Sahu      I have personally reviewed this case and agree with the findings above:    Electronically Signed: Roge Guillen MD    9/18/2024 5:07 PM EDT    Workstation ID: [id number]               Results: Reviewed.  I reviewed the patient's new laboratory and imaging results.  I independently reviewed the patient's new images.    Medications: Reviewed.    Assessment & Plan   A / P     Mr. Blanco is an 84yo M with a history of peripheral neuropathy, TIA, AFib, CAD, HTN, HLD, and a recent admission to [facility] from 8/7-8/10/24 following a knee arthroplasty on 8/7/24. He was readmitted on 8/30/24 with cellulitis of the right knee and was treated with Rocephin and Vancomycin and then discharged on 9/5/24 on PO Doxy and Keflex. Zyvox was restarted on 9/13 for worsening erythema and warmth at the incision site.     He presented back to [facility] on 9/13/24 with weakness and AMS. He was admitted to the Ortho Floor service with ID consulted and he was placed back on IV antibiotics.     Neurology has been following for altered mental status. MRI brain could not be performed secondary to cochlear implants.     On the morning of 9/16/24, a Rapid Response was called for altered mental status and a fever of 103. Labs were concerning for a metabolic acidosis with a lactate > 13 and he was transferred to the ICU for further care.     Right knee aspiration performed on 9/16 by Dr. Schwartz at the bedside. Fluid not indicative of sylvain septic joint. Cultures remain negative.     Fever felt to be secondary to serotonin syndrome from Linezolid/Mirtazapine.       Nutrition:   Diet: Cardiac; Healthy Heart (2-3 Na+); Texture: Pureed (NDD 1); Fluid Consistency: Thin (IDDSI 0)  Advance Directives:   Code Status and Medical Interventions: No CPR (Do Not Attempt to Resuscitate); Limited Support; No intubation (DNI), No cardioversion   Ordered at: 09/16/24 1206     Medical Intervention Limits:    No intubation (DNI)    No cardioversion     Level  Of Support Discussed With:    Health Care Surrogate     Code Status (Patient has no pulse and is not breathing):    No CPR (Do Not Attempt to Resuscitate)     Medical Interventions (Patient has pulse or is breathing):    Limited Support       Active Hospital Problems    Diagnosis     **Cellulitis of right knee     Serotonin syndrome     Sepsis     Lactic acidosis     Moderate malnutrition     Failure to thrive in adult     Insomnia     Neuropathy     Hearing loss        Assessment / Plan:    ID managing antibiotics.   Orthopedics following and s/p tap of right knee. Cultures negative so far.   Neurology following. CT imaging unremarkable. Cannot have an MRI secondary to cochlear implants.   Modified diet per Speech Therapy   PT/OT. Needs rehab.   AM labs  Okay to transfer back to telemetry.       Plan of care and goals reviewed during interdisciplinary rounds.  I discussed the patient's findings and my recommendations with patient, family, and nursing staff      Martha Parmar, DO    Intensive Care Medicine and Pulmonary Medicine

## 2024-09-19 NOTE — THERAPY TREATMENT NOTE
Patient Name: Guicho Blanco  : 1940    MRN: [id number]                              Today's Date: 2024       Admit Date: 2024    Visit Dx:     ICD-10-CM ICD-9-CM   1. Generalized weakness  R53.1 780.79   2. Cellulitis of right lower extremity  L03.115 682.6   3. Oropharyngeal dysphagia  R13.12 787.22     Patient Active Problem List   Diagnosis    Osteomyelitis of toe of right foot    Osteomyelitis of right foot    PAF (paroxysmal atrial fibrillation)    Coronary artery disease involving native coronary artery of native heart without angina pectoris    Gastroesophageal reflux disease without esophagitis    Benign non-nodular prostatic hyperplasia without lower urinary tract symptoms    S/P appy    Essential hypertension    Simple chronic bronchitis    Mixed hyperlipidemia    COPD (chronic obstructive pulmonary disease)    BPH (benign prostatic hypertrophy)    Hearing loss    Chest pain    Vertigo    Snoring    Overweight    PLMD (periodic limb movement disorder)    Neuropathy    Periodic headache syndrome, not intractable    Acquired absence of other right toe(s)    Tremor    Bilateral impacted cerumen    Bilateral sensorineural hearing loss    Chronic otitis externa    Deviated nasal septum    Disorder of joint of foot    ED (erectile dysfunction) of organic origin    Hydrocele    Hydrocele of testis    Impacted cerumen    Mononeuropathy    Swelling of testicle    Venous retinal branch occlusion    Vitreous degeneration    Benign prostatic hyperplasia with urinary obstruction    OA (osteoarthritis) of knee    Status post total right knee replacement    Septic arthritis    Cellulitis    Cellulitis of right knee    Failure to thrive in adult    Insomnia    Moderate malnutrition    Sepsis    Lactic acidosis    Serotonin syndrome     Past Medical History:   Diagnosis Date    Arthritis     Atrial fibrillation     BPH (benign prostatic hypertrophy)     Cataract     Cellulitis     Cochlear implant  in place     bilateral    Coronary artery disease     Full dentures     Gall stones     GERD (gastroesophageal reflux disease)     Hearing loss     Hiatal hernia     Hyperlipidemia     Hypertension     Peripheral neuropathy     Peripheral vascular disease     Seasonal allergies     Skin cancer     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    TIA (transient ischemic attack)     2 times    Wears glasses      Past Surgical History:   Procedure Laterality Date    ABLATION OF DYSRHYTHMIC FOCUS      APPENDECTOMY      CARDIAC CATHETERIZATION N/A 10/05/2017    Procedure: Left Heart Cath;  Surgeon: Hector Urrutia MD;  Location:  [facility];  Service:     CATARACT EXTRACTION Bilateral     CHOLECYSTECTOMY      Remote    COCHLEAR IMPLANT REVISION  05/2019    COLONOSCOPY      PROSTATE SURGERY      SHOULDER SURGERY Right     SKIN BIOPSY      TOE AMPUTATION Right     2nd toe - Right Foot    TONSILLECTOMY      TOTAL KNEE ARTHROPLASTY Right 8/7/2024    Procedure: TOTAL KNEE ARTHROPLASTY WITH ASHLEY ROBOT RIGHT;  Surgeon: Christian Schwartz MD;  Location:  [facility];  Service: Robotics - Ortho;  Laterality: Right;      General Information       Row Name 09/19/24 1453          Physical Therapy Time and Intention    Document Type therapy note (daily note)  -ES     Mode of Treatment physical therapy  -ES       Row Name 09/19/24 1453          General Information    Patient Profile Reviewed yes  -ES     Existing Precautions/Restrictions fall  Recent R TKA, R LE WBAT  -ES     Barriers to Rehab medically complex;previous functional deficit;cognitive status  -ES       Row Name 09/19/24 1453          Cognition    Orientation Status (Cognition) oriented x 3  -ES       Row Name 09/19/24 1453          Safety Issues, Functional Mobility    Safety Issues Affecting Function (Mobility) awareness of need for assistance;insight into deficits/self-awareness;safety precaution awareness;safety precautions follow-through/compliance;sequencing  abilities  -ES     Impairments Affecting Function (Mobility) balance;endurance/activity tolerance;pain;range of motion (ROM);strength  -ES               User Key  (r) = Recorded By, (t) = Taken By, (c) = Cosigned By      Initials Name Provider Type    ES Joy Ayala, EL Physical Therapist                   Mobility       Row Name 09/19/24 1453          Bed Mobility    Comment, (Bed Mobility) UIC  -ES       Row Name 09/19/24 1453          Sit-Stand Transfer    Sit-Stand [place] (Transfers) moderate assist (50% patient effort);2 person assist;other (see comments)  progressed to [place] x2  -ES     Assistive Device (Sit-Stand Transfers) walker, front-wheeled  -ES     Comment, (Sit-Stand Transfer) v/c for hand placement. STS x3 from chair  -ES       Row Name 09/19/24 1453          Gait/Stairs (Locomotion)    [place] Level (Gait) moderate assist (50% patient effort);2 person assist;verbal cues  -ES     Assistive Device (Gait) walker, front-wheeled  -ES     Patient was able to Ambulate yes  -ES     Distance in Feet (Gait) 45  -ES     Deviations/Abnormal Patterns (Gait) bilateral deviations;harleen decreased;gait speed decreased;stride length decreased;base of support, narrow  -ES     Bilateral Gait Deviations forward flexed posture;heel strike decreased  -ES     Comment, (Gait/Stairs) Pt amb w/ modA x2 and FWW. Demo'd narrow BHARATH w/ decreased stride length and decreased weight shifting toward RLE. Required v/c to promote upright posture. Further mob limited by fatigue.  -ES       Row Name 09/19/24 1453          Mobility    Extremity Weight-bearing Status right lower extremity  -ES     Right Lower Extremity (Weight-bearing Status) weight-bearing as tolerated (WBAT)  -ES               User Key  (r) = Recorded By, (t) = Taken By, (c) = Cosigned By      Initials Name Provider Type    ES Joy Ayala PT Physical Therapist                   Obj/Interventions       Row Name 09/19/24 1456          Motor Skills     Therapeutic Exercise hip;knee;ankle  -ES       Row Name 09/19/24 1456          Hip (Therapeutic Exercise)    Hip (Therapeutic Exercise) strengthening exercise  -ES     Hip Strengthening (Therapeutic Exercise) right;heel slides;10 repetitions  -ES       Row Name 09/19/24 1456          Knee (Therapeutic Exercise)    Knee (Therapeutic Exercise) strengthening exercise;isometric exercises  -ES     Knee Isometrics (Therapeutic Exercise) right;quad sets;10 repetitions  -ES     Knee Strengthening (Therapeutic Exercise) right;heel slides;SLR (straight leg raise);LAQ (long arc quad);10 repetitions  -ES       Row Name 09/19/24 1456          Ankle (Therapeutic Exercise)    Ankle (Therapeutic Exercise) AROM (active range of motion)  -ES     Ankle AROM (Therapeutic Exercise) bilateral;dorsiflexion;plantarflexion;10 repetitions  -ES       Row Name 09/19/24 1456          Balance    Balance Assessment sitting static balance;sitting dynamic balance;sit to stand dynamic balance;standing static balance;standing dynamic balance  -ES     Static Sitting Balance contact guard  -ES     Dynamic Sitting Balance minimal assist;verbal cues  -ES     Position, Sitting Balance supported;sitting in chair  -ES     Sit to Stand Dynamic Balance moderate assist;2-person assist;verbal cues  -ES     Static Standing Balance minimal assist;2-person assist  -ES     Dynamic Standing Balance moderate assist;2-person assist;verbal cues  -ES     Position/Device Used, Standing Balance supported  -ES     Balance Interventions sitting;standing;sit to stand;supported;static;dynamic;occupation based/functional task  -ES               User Key  (r) = Recorded By, (t) = Taken By, (c) = Cosigned By      Initials Name Provider Type    ES Joy Ayala PT Physical Therapist                   Goals/Plan    No documentation.                  Clinical Impression       Row Name 09/19/24 1457          Pain    Pretreatment Pain Rating 0/10 - no pain  -ES     Posttreatment  Pain Rating 0/10 - no pain  -ES     Pre/Posttreatment Pain Comment tolerated  -ES     Pain Intervention(s) Repositioned;Ambulation/increased activity  -ES       Row Name 09/19/24 1453          Plan of Care Review    Plan of Care Reviewed With patient;spouse  -ES     Progress improving  -ES     Outcome Evaluation Pt demonstrated improved mobility this date, increasing ambulation distance with modA x2 and FWW. Pt continues to be limited by generalized weakness, decreased activity tolerance, and ROM deficits warranting skilled IPPT services. PT rec SNF at d/c.  -ES       Row Name 09/19/24 1459          Therapy Assessment/Plan (PT)    Rehab Potential (PT) good, to achieve stated therapy goals  -ES     Criteria for Skilled Interventions Met (PT) yes;meets criteria;skilled treatment is necessary  -ES     Therapy Frequency (PT) daily  -ES     Predicted Duration of Therapy Intervention (PT) 10 days  -ES       Row Name 09/19/24 1453          Vital Signs    Pre Systolic BP Rehab 155  -ES     Pre Treatment Diastolic BP 79  -ES     Post Systolic BP Rehab 132  -ES     Post Treatment Diastolic BP 77  -ES     Pretreatment Heart Rate (beats/min) 96  -ES     Posttreatment Heart Rate (beats/min) 103  -ES     Pre SpO2 (%) 97  -ES     O2 Delivery Pre Treatment room air  -ES     O2 Delivery Intra Treatment room air  -ES     Post SpO2 (%) 97  -ES     O2 Delivery Post Treatment room air  -ES     Pre Patient Position Sitting  -ES     Intra Patient Position Standing  -ES     Post Patient Position Sitting  -ES       Row Name 09/19/24 1458          Positioning and Restraints    Pre-Treatment Position in bed  -ES     Post Treatment Position chair  -ES     In Chair notified nsg;reclined;sitting;call light within reach;encouraged to call for assist;exit alarm on;waffle cushion;on mechanical lift sling;legs elevated;with family/caregiver  -ES               User Key  (r) = Recorded By, (t) = Taken By, (c) = Cosigned By      Initials Name Provider  Type    ES Joy Ayala PT Physical Therapist                   Outcome Measures       Row Name 09/19/24 1458          How much help from another person do you currently need...    Turning from your back to your side while in flat bed without using bedrails? 3  -ES     Moving from lying on back to sitting on the side of a flat bed without bedrails? 3  -ES     Moving to and from a bed to a chair (including a wheelchair)? 2  -ES     Standing up from a chair using your arms (e.g., wheelchair, bedside chair)? 2  -ES     Climbing 3-5 steps with a railing? 1  -ES     To walk in hospital room? 2  -ES     AM-PAC 6 Clicks Score (PT) 13  -ES     Highest Level of Mobility Goal 4 --> Transfer to chair/commode  -ES       Row Name 09/19/24 1458          Functional Assessment    Outcome Measure Options AM-PAC 6 Clicks Basic Mobility (PT)  -ES               User Key  (r) = Recorded By, (t) = Taken By, (c) = Cosigned By      Initials Name Provider Type    Joy Carl PT Physical Therapist                                 Physical Therapy Education       Title: PT OT SLP Therapies (In Progress)       Topic: Physical Therapy (In Progress)       Point: Mobility training (In Progress)       Learning Progress Summary             Patient Acceptance, E,TB, NR by ES at 9/18/2024 1540    Acceptance, E,D, VU,NR by AB at 9/14/2024 1422                         Point: Home exercise program (In Progress)       Learning Progress Summary             Patient Acceptance, E,TB, NR by ES at 9/18/2024 1540    Acceptance, E,D, VU,NR by AB at 9/14/2024 1422                         Point: Body mechanics (In Progress)       Learning Progress Summary             Patient Acceptance, E,TB, NR by ES at 9/18/2024 1540    Acceptance, E,D, VU,NR by AB at 9/14/2024 1422                         Point: Precautions (In Progress)       Learning Progress Summary             Patient Acceptance, E,TB, NR by ES at 9/18/2024 1540    Acceptance, E,D, VU,NR by AB  at 9/14/2024 1422                                         User Key       Initials Effective Dates Name Provider Type Discipline    AB 09/22/22 -  Ebony Loera, PT Physical Therapist PT    ES 08/11/22 -  Joy Ayala PT Physical Therapist PT                  PT Recommendation and Plan     Plan of Care Reviewed With: patient, spouse  Progress: improving  Outcome Evaluation: Pt demonstrated improved mobility this date, increasing ambulation distance with modA x2 and FWW. Pt continues to be limited by generalized weakness, decreased activity tolerance, and ROM deficits warranting skilled IPPT services. PT rec SNF at d/c.     Time Calculation:         PT Charges       Row Name 09/19/24 1459             Time Calculation    Start Time 0958  -ES      PT Received On 09/19/24  -ES      PT Goal Re-Cert Due Date 09/28/24  -ES         Time Calculation- PT    Total Timed Code Minutes- PT 32 minute(s)  -ES         Timed Charges    72688 - PT Therapeutic Exercise Minutes 15  -ES      17995 - Gait Training Minutes  17  -ES         Total Minutes    Timed Charges Total Minutes 32  -ES       Total Minutes 32  -ES                User Key  (r) = Recorded By, (t) = Taken By, (c) = Cosigned By      Initials Name Provider Type    ES Joy Ayala, EL Physical Therapist                  Therapy Charges for Today       Code Description Service Date Service Provider Modifiers Qty    67153870520 HC PT THERAPEUTIC ACT EA 15 MIN 9/18/2024 Joy Ayala, PT GP 2    14658061625 HC PT RE-EVAL ESTABLISHED PLAN 2 9/18/2024 Joy Ayala, PT GP 1    45063811545 HC PT THER SUPP EA 15 MIN 9/19/2024 Joy Ayala, PT GP 2    19190680236 HC PT THER PROC EA 15 MIN 9/19/2024 Joy Ayala, PT GP 1    73990107458 HC GAIT TRAINING EA 15 MIN 9/19/2024 Joy Ayala, PT GP 1            PT G-Codes  Outcome Measure Options: AM-PAC 6 Clicks Basic Mobility (PT)  AM-PAC 6 Clicks Score (PT): 13  AM-PAC 6 Clicks Score (OT): 14  PT Discharge  Summary  Anticipated Discharge Disposition (PT): skilled nursing facility    Joy Ayala, PT  9/19/2024

## 2024-09-19 NOTE — CASE MANAGEMENT/SOCIAL WORK
Continued Stay Note  [facility]     Patient Name: Guicho Blanco  MRN: [id number]  Today's Date: 9/19/2024    Admit Date: 9/13/2024    Plan: SNF to LTC   Discharge Plan       Row Name 09/19/24 1541       Plan    Plan SNF to LTC    Plan Comments Spoke with spouse and her daughter at bedside with pt present. All facilities of choice are not currenly options for patient and pt is nearly medical readiness for discharge. Informed Sandra spouse that I wll be reaching out to all facilities in [place] to find an accepting facility. CM will cont to follow.                   Discharge Codes    No documentation.                       Viridiana Paz RN

## 2024-09-19 NOTE — PLAN OF CARE
Goal Outcome Evaluation:      Pt worked with therapy today. Ambulated in [place]. Up in chair. Remains on room air. Tolerating diet. Afebrile. No complaints of pain.                Problem: Adult Inpatient Plan of Care  Goal: Plan of Care Review  Outcome: Ongoing, Progressing  Goal: Patient-Specific Goal (Individualized)  Outcome: Ongoing, Progressing  Goal: Absence of Hospital-Acquired Illness or Injury  Outcome: Ongoing, Progressing  Intervention: Identify and Manage Fall Risk  Recent Flowsheet Documentation  Taken 9/19/2024 1600 by Angela Tabares RN  Safety Promotion/Fall Prevention: safety round/check completed  Taken 9/19/2024 1400 by Angela Tabares RN  Safety Promotion/Fall Prevention: safety round/check completed  Taken 9/19/2024 1200 by Angela Tabares RN  Safety Promotion/Fall Prevention: safety round/check completed  Taken 9/19/2024 1000 by Angela Tabares RN  Safety Promotion/Fall Prevention: safety round/check completed  Taken 9/19/2024 0800 by Angela Tabares RN  Safety Promotion/Fall Prevention: safety round/check completed  Intervention: Prevent Skin Injury  Recent Flowsheet Documentation  Taken 9/19/2024 1600 by Angela Tabares RN  Body Position: position changed independently  Skin Protection:   adhesive use limited   incontinence pads utilized   tubing/devices free from skin contact   skin-to-device areas padded  Taken 9/19/2024 1400 by Angela Tabares RN  Body Position: position changed independently  Skin Protection:   adhesive use limited   incontinence pads utilized   tubing/devices free from skin contact   skin-to-device areas padded  Taken 9/19/2024 1200 by Angela Tabares RN  Body Position: position changed independently  Skin Protection:   adhesive use limited   incontinence pads utilized   tubing/devices free from skin contact   skin-to-device areas padded  Taken 9/19/2024 1000 by Angela Tabares RN  Body Position: position changed independently  Skin Protection:    adhesive use limited   incontinence pads utilized   tubing/devices free from skin contact   skin-to-device areas padded  Taken 9/19/2024 0800 by Angela Tabares RN  Body Position: position changed independently  Skin Protection:   adhesive use limited   incontinence pads utilized   tubing/devices free from skin contact   skin-to-device areas padded  Intervention: Prevent and Manage VTE (Venous Thromboembolism) Risk  Recent Flowsheet Documentation  Taken 9/19/2024 1600 by Angela Tabares RN  Activity Management: up in chair  Taken 9/19/2024 1400 by Angela Tabares RN  Activity Management:   up to bedside commode   up in chair  Taken 9/19/2024 1200 by Angela Tabares RN  Activity Management: up in chair  Taken 9/19/2024 1000 by Angela Tabares RN  Activity Management: (per PT/OT)   ambulated outside room   up in chair  Taken 9/19/2024 0800 by Angela Tabares RN  Activity Management: up in chair  Goal: Optimal Comfort and Wellbeing  Outcome: Ongoing, Progressing  Intervention: Provide Person-Centered Care  Recent Flowsheet Documentation  Taken 9/19/2024 1600 by Angela Tabares RN  Trust Relationship/Rapport:   care explained   emotional support provided   questions answered  Taken 9/19/2024 1400 by Angela Tabares RN  Trust Relationship/Rapport:   care explained   questions answered  Taken 9/19/2024 1200 by Angela Tabares RN  Trust Relationship/Rapport:   care explained   emotional support provided   questions answered  Taken 9/19/2024 1000 by Angela Tabares RN  Trust Relationship/Rapport:   care explained   emotional support provided   questions answered  Taken 9/19/2024 0800 by Angela Tabares RN  Trust Relationship/Rapport:   care explained   emotional support provided   questions answered   reassurance provided   thoughts/feelings acknowledged   empathic listening provided  Goal: Readiness for Transition of Care  Outcome: Ongoing, Progressing     Problem: Fall Injury Risk  Goal: Absence of  Fall and Fall-Related Injury  Outcome: Ongoing, Progressing  Intervention: Identify and Manage Contributors  Recent Flowsheet Documentation  Taken 9/19/2024 0800 by Angela Tabares RN  Medication Review/Management: medications reviewed  Intervention: Promote Injury-Free Environment  Recent Flowsheet Documentation  Taken 9/19/2024 1600 by Angela Tabares RN  Safety Promotion/Fall Prevention: safety round/check completed  Taken 9/19/2024 1400 by Angela Tabares RN  Safety Promotion/Fall Prevention: safety round/check completed  Taken 9/19/2024 1200 by Angela Tabares RN  Safety Promotion/Fall Prevention: safety round/check completed  Taken 9/19/2024 1000 by Angela Tabares RN  Safety Promotion/Fall Prevention: safety round/check completed  Taken 9/19/2024 0800 by Angela Tabares RN  Safety Promotion/Fall Prevention: safety round/check completed     Problem: Hypertension Comorbidity  Goal: Blood Pressure in Desired Range  Outcome: Ongoing, Progressing  Intervention: Maintain Blood Pressure Management  Recent Flowsheet Documentation  Taken 9/19/2024 0800 by Angela Tabares RN  Medication Review/Management: medications reviewed     Problem: Skin Injury Risk Increased  Goal: Skin Health and Integrity  Outcome: Ongoing, Progressing  Intervention: Optimize Skin Protection  Recent Flowsheet Documentation  Taken 9/19/2024 1600 by Angela Tabares RN  Pressure Reduction Techniques:   frequent weight shift encouraged   heels elevated off bed   pressure points protected  Pressure Reduction Devices:   chair cushion utilized   feet on footrest/footstool   positioning supports utilized  Skin Protection:   adhesive use limited   incontinence pads utilized   tubing/devices free from skin contact   skin-to-device areas padded  Taken 9/19/2024 1400 by Angela Tabares RN  Pressure Reduction Techniques:   heels elevated off bed   weight shift assistance provided   pressure points protected   frequent weight shift  encouraged  Pressure Reduction Devices:   chair cushion utilized   feet on footrest/footstool   positioning supports utilized  Skin Protection:   adhesive use limited   incontinence pads utilized   tubing/devices free from skin contact   skin-to-device areas padded  Taken 9/19/2024 1200 by Angela Tabares RN  Pressure Reduction Techniques:   frequent weight shift encouraged   pressure points protected   heels elevated off bed  Pressure Reduction Devices:   chair cushion utilized   feet on footrest/footstool   positioning supports utilized  Skin Protection:   adhesive use limited   incontinence pads utilized   tubing/devices free from skin contact   skin-to-device areas padded  Taken 9/19/2024 1000 by Angela Tabares RN  Pressure Reduction Techniques:   frequent weight shift encouraged   weight shift assistance provided   pressure points protected   heels elevated off bed  Pressure Reduction Devices:   chair cushion utilized   feet on footrest/footstool   positioning supports utilized  Skin Protection:   adhesive use limited   incontinence pads utilized   tubing/devices free from skin contact   skin-to-device areas padded  Taken 9/19/2024 0800 by Angela Tabares RN  Pressure Reduction Techniques:   frequent weight shift encouraged   weight shift assistance provided   pressure points protected   heels elevated off bed  Pressure Reduction Devices:   pressure-redistributing mattress utilized   positioning supports utilized   chair cushion utilized   feet on footrest/footstool  Skin Protection:   adhesive use limited   incontinence pads utilized   tubing/devices free from skin contact   skin-to-device areas padded     Problem: Adjustment to Illness (Sepsis/Septic Shock)  Goal: Optimal Coping  Outcome: Ongoing, Progressing  Intervention: Optimize Psychosocial Adjustment to Illness  Recent Flowsheet Documentation  Taken 9/19/2024 1600 by Angela Tabares RN  Family/Support System Care: support provided  Taken  9/19/2024 1400 by Angela Tabares RN  Family/Support System Care: support provided  Taken 9/19/2024 1200 by Angela Tabares RN  Family/Support System Care: support provided  Taken 9/19/2024 1000 by Angela Tabares RN  Family/Support System Care: support provided  Taken 9/19/2024 0800 by Angela Tabares RN  Family/Support System Care: support provided     Problem: Bleeding (Sepsis/Septic Shock)  Goal: Absence of Bleeding  Outcome: Ongoing, Progressing     Problem: Glycemic Control Impaired (Sepsis/Septic Shock)  Goal: Blood Glucose Level Within Desired Range  Outcome: Ongoing, Progressing     Problem: Infection Progression (Sepsis/Septic Shock)  Goal: Absence of Infection Signs and Symptoms  Outcome: Ongoing, Progressing  Intervention: Promote Recovery  Recent Flowsheet Documentation  Taken 9/19/2024 1600 by Angela Tabares RN  Activity Management: up in chair  Taken 9/19/2024 1400 by Angela Tabares RN  Activity Management:   up to bedside commode   up in chair  Taken 9/19/2024 1200 by Angela Tabares RN  Activity Management: up in chair  Taken 9/19/2024 1000 by Angela Tabares RN  Activity Management: (per PT/OT)   ambulated outside room   up in chair  Taken 9/19/2024 0800 by Angela Tabares RN  Activity Management: up in chair     Problem: Nutrition Impaired (Sepsis/Septic Shock)  Goal: Optimal Nutrition Intake  Outcome: Ongoing, Progressing

## 2024-09-19 NOTE — THERAPY TREATMENT NOTE
Acute Care - Speech Language Pathology   Swallow Treatment Note [facility]     Patient Name: Guicho Blanco  : 1940  MRN: [id number]  Today's Date: 2024               Admit Date: 2024    Visit Dx:     ICD-10-CM ICD-9-CM   1. Generalized weakness  R53.1 780.79   2. Cellulitis of right lower extremity  L03.115 682.6   3. Oropharyngeal dysphagia  R13.12 787.22     Patient Active Problem List   Diagnosis    Osteomyelitis of toe of right foot    Osteomyelitis of right foot    PAF (paroxysmal atrial fibrillation)    Coronary artery disease involving native coronary artery of native heart without angina pectoris    Gastroesophageal reflux disease without esophagitis    Benign non-nodular prostatic hyperplasia without lower urinary tract symptoms    S/P appy    Essential hypertension    Simple chronic bronchitis    Mixed hyperlipidemia    COPD (chronic obstructive pulmonary disease)    BPH (benign prostatic hypertrophy)    Hearing loss    Chest pain    Vertigo    Snoring    Overweight    PLMD (periodic limb movement disorder)    Neuropathy    Periodic headache syndrome, not intractable    Acquired absence of other right toe(s)    Tremor    Bilateral impacted cerumen    Bilateral sensorineural hearing loss    Chronic otitis externa    Deviated nasal septum    Disorder of joint of foot    ED (erectile dysfunction) of organic origin    Hydrocele    Hydrocele of testis    Impacted cerumen    Mononeuropathy    Swelling of testicle    Venous retinal branch occlusion    Vitreous degeneration    Benign prostatic hyperplasia with urinary obstruction    OA (osteoarthritis) of knee    Status post total right knee replacement    Septic arthritis    Cellulitis    Cellulitis of right knee    Failure to thrive in adult    Insomnia    Moderate malnutrition    Sepsis    Lactic acidosis    Serotonin syndrome     Past Medical History:   Diagnosis Date    Arthritis     Atrial fibrillation     BPH (benign prostatic  hypertrophy)     Cataract     Cellulitis     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     bilateral    Coronary artery disease     Full dentures     Gall stones     GERD (gastroesophageal reflux disease)     Hearing loss     Hiatal hernia     Hyperlipidemia     Hypertension     Peripheral neuropathy     Peripheral vascular disease     Seasonal allergies     Skin cancer     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    TIA (transient ischemic attack)     2 times    Wears glasses      Past Surgical History:   Procedure Laterality Date    ABLATION OF DYSRHYTHMIC FOCUS      APPENDECTOMY      CARDIAC CATHETERIZATION N/A 10/05/2017    Procedure: Left Heart Cath;  Surgeon: Hector Urrutia MD;  Location:  [facility];  Service:     CATARACT EXTRACTION Bilateral     CHOLECYSTECTOMY      Remote    COCHLEAR IMPLANT REVISION  05/2019    COLONOSCOPY      PROSTATE SURGERY      SHOULDER SURGERY Right     SKIN BIOPSY      TOE AMPUTATION Right     2nd toe - Right Foot    TONSILLECTOMY      TOTAL KNEE ARTHROPLASTY Right 8/7/2024    Procedure: TOTAL KNEE ARTHROPLASTY WITH ASHLEY ROBOT RIGHT;  Surgeon: Christian Schwartz MD;  Location:  [facility] OR;  Service: Robotics - Ortho;  Laterality: Right;       SLP Recommendation and Plan     SLP Diet Recommendation: puree, thin liquids (09/19/24 1050)  Recommended Precautions and Strategies: upright posture during/after eating, small bites of food and sips of liquid, general aspiration precautions (09/19/24 1050)  SLP Rec. for Method of Medication Administration: meds crushed, with puree, as tolerated (09/19/24 1050)     Monitor for Signs of Aspiration: yes, notify SLP if any concerns (09/19/24 1050)        Anticipated Discharge Disposition (SLP): inpatient rehabilitation facility (09/19/24 1050)     Therapy Frequency (Swallow): 5 days per week (09/19/24 1050)  Predicted Duration Therapy Intervention (Days): 1 week (09/19/24 1050)  Oral Care Recommendations: Oral Care BID/PRN, Toothbrush (09/19/24  1050)        Daily Summary of Progress (SLP): progress toward functional goals is good (09/19/24 1050)               Treatment Assessment (SLP): continued, pharyngeal dysphagia, toleration of diet (09/19/24 1050)  Treatment Assessment Comments (SLP): patient was monitored on current diet without overt s/sx. trialing cohensive diet texture (i.e., ignacia cracker in apple sauce) without overt s/sx and using residue clearing strategies of alternating liquids and solids. Patient's family provided with education regarding cohsive diet options. Introduced, reviewed, and completed pharyngeal exercises. (09/19/24 1050)  Plan for Continued Treatment (SLP): continue treatment per plan of care (09/19/24 1050)         Plan of Care Reviewed With: patient, family  Progress: improving      SWALLOW EVALUATION (Last 72 Hours)       SLP Adult Swallow Evaluation       Row Name 09/19/24 1050 09/18/24 1315 09/17/24 1104             Rehab Evaluation    Document Type therapy note (daily note)  -GA re-evaluation  -SM re-evaluation  -MM      Subjective Information no complaints  -GA no complaints  -SM no complaints  -MM      Patient Observations alert;cooperative;agree to therapy  -GA alert;cooperative  -SM alert;cooperative  -MM      Patient/Family/Caregiver Comments/Observations grandson present  -GA -- Pt's spouse and daughter present at bedside and reported the pt has been alert this morning  -MM      Patient Effort good  -GA good  -SM --      Comment Provided with extensive education regarding MBS results, cohesive diet recommendations, and pharyngeal exercises.  -GA -- RN reported pt accepted meds this am without difficulty and has been very alert today  -MM      Symptoms Noted During/After Treatment none  -GA -- --         General Information    Patient Profile Reviewed yes  -GA -- yes  -MM      Pertinent History Of Current Problem see initial eval  -GA -- see initial eval.  -MM      Current Method of Nutrition pureed;thin liquids   -GA NPO  -SM NPO;other (see comments)  except meds  -MM      Precautions/Limitations, Vision WFL;for purposes of eval  -GA -- WFL;for purposes of eval  -MM      Precautions/Limitations, Hearing hearing impairment, bilaterally;other (see comments)  -GA -- hearing impairment, bilaterally;other (see comments)  -MM      Prior Level of Function-Communication other (see comments)  -GA -- --      Prior Level of Function-Swallowing soft to chew;nectar thick liquids;water between meals;ice between meals;esophageal concerns;concerns regarding malnutrition  -GA -- --      Plans/Goals Discussed with patient and family;agreed upon  -GA -- --      Barriers to Rehab cognitive status;previous functional deficit  -GA -- --      Patient's Goals for Discharge patient did not state  -GA -- --      Family Goals for Discharge family did not state  -GA -- --         Pain    Additional Documentation Pain Scale: FACES Pre/Post-Treatment (Group)  -GA -- Pain Scale: Numbers Pre/Post-Treatment (Group)  -MM         Pain Scale: Numbers Pre/Post-Treatment    Pretreatment Pain Rating -- 0/10 - no pain  -SM 0/10 - no pain  -MM      Posttreatment Pain Rating -- 0/10 - no pain  -SM 0/10 - no pain  -MM      Pre/Posttreatment Pain Comment -- -- Pt denied pain throughout evaluation  -MM         Pain Scale: FACES Pre/Post-Treatment    Pain: FACES Scale, Pretreatment 0-->no hurt  -GA -- --      Posttreatment Pain Rating 0-->no hurt  -GA -- --         Oral Motor Structure and Function    Dentition Assessment -- -- edentulous  -MM      Secretion Management -- -- WNL/WFL  -MM      Mucosal Quality -- -- dry  -MM         General Eating/Swallowing Observations    Eating/Swallowing Skills -- -- fed by SLP  -MM      Positioning During Eating -- -- upright 90 degree;upright in bed  -MM      Utensils Used -- -- spoon;cup  -MM      Consistencies Trialed -- -- thin liquids;nectar/syrup-thick liquids;pureed  -MM         Clinical Swallow Eval    Oral Prep Phase -- --  impaired  -MM      Oral Transit -- -- impaired  -MM      Oral Residue -- -- WFL  -MM      Pharyngeal Phase -- -- suspected pharyngeal impairment  -MM      Esophageal Phase -- -- suspected esophageal impairment  -MM         Oral Prep Concerns    Oral Prep Concerns -- -- increased prep time  -MM      Increased Prep Time -- -- pudding  -MM         Oral Transit Concerns    Oral Transit Concerns -- -- increased oral transit time;delayed initiation of bolus transit  -MM      Delayed Intiation of Bolus Transit -- -- all consistencies  -MM      Increased Oral Transit Time -- -- pudding  -MM         Pharyngeal Phase Concerns    Pharyngeal Phase Concerns -- -- wet vocal quality;throat clear  -MM      Wet Vocal Quality -- -- thin  -MM      Throat Clear -- -- nectar  -MM      Pharyngeal Phase Concerns, Comment -- -- Pt with wet vocal quality following trials of thin and throat clear following trials of NT via small cup sip. SLP discussed results from MBS on 9/16 and all questions were answered. SLP with plan for repeat MBS on 9/18 and will make goals pending results.  -MM         MBS/VFSS    Utensils Used -- spoon;cup;straw  -SM --      Consistencies Trialed -- thin liquids;nectar/syrup-thick liquids;pureed;regular textures  -SM --         MBS/VFSS Interpretation    Oral Prep Phase -- impaired oral phase of swallowing  -SM --      Oral Transit Phase -- impaired  -SM --      Oral Residue -- impaired  -SM --         Oral Preparatory Phase    Oral Preparatory Phase -- prolonged manipulation;spits out food/liquid prior to swallow  -SM --      Prolonged Manipulation -- pudding/puree;secondary to reduced lingual strength  -SM --      Spits Out Food/Liquid Prior to Swallow -- regular textures;other (see comments)  did not have dentures present, requested to spit out. Did not push as general choking risk with solids.  -SM --         Oral Transit Phase    Impaired Oral Transit Phase -- increased A-P transit time;delayed initiation of  bolus transit  -SM --      Delayed Initiation of bolus transit -- pudding/puree;secondary to reduced lingual control;other (see comments)  provided liquid mix to assist propulsion  -SM --      Increased A-P Transit Time -- all consistencies tested;secondary to reduced lingual control  -SM --         Oral Residue    Impaired Oral Residue -- lingual residue  -SM --      Lingual Residue -- pudding/puree;secondary to reduced lingual strength  -SM --      Response to Oral Residue -- cleared residue;with liquid wash  -SM --         Initiation of Pharyngeal Swallow    Initiation of Pharyngeal Swallow -- bolus in pyriform sinuses  -SM --      Pharyngeal Phase -- impaired pharyngeal phase of swallowing  -SM --      Anatomical abnormalities noted -- osteophyte/bone spur per radiology report  at level of C5, reducing PES opening and contributing to pyriform sinuses residue  -SM --      Anatomical abnormalities functional impact -- functional impact on swallowing  choking risk solids 2' significantly reduced PES opening.  -SM --      Penetration After the Swallow -- thin liquids;secondary to residue;in pyriform sinuses  -SM --      Depth of Penetration -- other (see comments)  mid depth  -SM --      Response to Penetration -- Yes  -SM --      Responsed to penetration with -- throat clear;effective  -SM --      Rosenbek's Scale -- thin:;2--->level 2  -SM --      Pharyngeal Residue -- all consistencies tested;valleculae;secondary to reduced base of tongue retraction;secondary to reduced posterior pharyngeal wall stripping;thin liquids;nectar-thick liquids;pyriform sinuses;secondary to reduced hyolaryngeal excursion;other (see comments)  Increased with nectar c/t thin which increases risk aspiration  -SM --      Response to Residue -- partial residue clearance;cleared residue with spontaneous subsequent swallow;cleared residue with liquid wash  -SM --         SLP Evaluation Clinical Impression    SLP Swallowing Diagnosis --  mild-moderate;pharyngeal dysphagia  -SM mod-severe;oral dysphagia;severe;pharyngeal dysphagia  -MM      Functional Impact -- risk of aspiration/pneumonia;risk of malnutrition  -SM risk of aspiration/pneumonia;risk of malnutrition;risk of dehydration  -MM      Rehab Potential/Prognosis, Swallowing -- good, to achieve stated therapy goals  -SM re-evaluate goals as necessary  -MM      Swallow Criteria for Skilled Therapeutic Interventions Met -- demonstrates skilled criteria  -SM demonstrates skilled criteria  -MM         SLP Treatment Clinical Impressions    Treatment Assessment (SLP) continued;pharyngeal dysphagia;toleration of diet  -GA -- --      Treatment Assessment Comments (SLP) patient was monitored on current diet without overt s/sx. trialing cohensive diet texture (i.e., ignacia cracker in apple sauce) without overt s/sx and using residue clearing strategies of alternating liquids and solids. Patient's family provided with education regarding cohsive diet options. Introduced, reviewed, and completed pharyngeal exercises.  -GA -- --      Daily Summary of Progress (SLP) progress toward functional goals is good  -GA -- --      Barriers to Overall Progress (SLP) Cognitive status;Baseline deficits  -GA -- --      Plan for Continued Treatment (SLP) continue treatment per plan of care  -GA -- --      Care Plan Review patient/other agree to care plan  -GA -- --      Care Plan Review, Other Participant(s) other (see comments)  grandson  -GA -- --         Recommendations    Therapy Frequency (Swallow) 5 days per week  -GA 5 days per week  -SM 5 days per week  -MM      Predicted Duration Therapy Intervention (Days) 1 week  -GA 1 week  -SM 1 week  -      SLP Diet Recommendation puree;thin liquids  -GA puree;thin liquids  -SM NPO;other (see comments)  -      Recommended Diagnostics -- -- --  Rec ice chips and meds only with repeat MBS pending [facility] discussion  -      Recommended Precautions and Strategies upright  posture during/after eating;small bites of food and sips of liquid;general aspiration precautions  -GA upright posture during/after eating;small bites of food and sips of liquid;general aspiration precautions  -SM upright posture during/after eating;general aspiration precautions;alternate between small bites of food and sips of liquid  -MM      Oral Care Recommendations Oral Care BID/PRN;Toothbrush  -GA Oral Care BID/PRN;Toothbrush  -SM Oral Care BID/PRN;Toothbrush  -MM      SLP Rec. for Method of Medication Administration meds crushed;with puree;as tolerated  -GA with puree  crush as needed. If any coughing or c/o sticking.  -SM meds crushed;with puree  -MM      Monitor for Signs of Aspiration yes;notify SLP if any concerns  -GA yes;notify SLP if any concerns  -SM notify SLP if any concerns  -MM      Anticipated Discharge Disposition (SLP) inpatient rehabilitation facility  -GA inpatient rehabilitation facility  -SM other (see comments)  pending [facility] discussion  -                User Key  (r) = Recorded By, (t) = Taken By, (c) = Cosigned By      Initials Name Effective Dates    Tiffany Prieto MS CCC-SLP 02/03/23 -     GA Carrie Wise, MS CCC-SLP 09/14/23 -     MM Angela Doyle, MS CCC-SLP 08/30/24 -                     EDUCATION  The patient has been educated in the following areas:   Dysphagia (Swallowing Impairment) Modified Diet Instruction.        SLP GOALS       Row Name 09/19/24 1050 09/18/24 1315          (LTG) Patient will demonstrate functional swallow for    Diet Texture (Demonstrate functional swallow) pureed textures  -GA pureed textures  -SM     Liquid viscosity (Demonstrate functional swallow) thin liquids  -GA thin liquids  -SM     [place] (Demonstrate functional swallow) independently (over 90% accuracy)  -GA independently (over 90% accuracy)  -SM     Time Frame (Demonstrate functional swallow) 1 week  -GA 1 week  -SM     Progress/Outcomes (Demonstrate functional swallow)  continuing progress toward goal  -GA --     Comment (Demonstrate functional swallow) patient tolerating trials  -GA --        (STG) Patient will tolerate trials of    Consistencies Trialed (Tolerate trials) pureed textures;thin liquids  -GA pureed textures;thin liquids  -SM     Desired Outcome (Tolerate trials) with adequate oral prep/transit/clearance;without signs/symptoms of aspiration  -GA with adequate oral prep/transit/clearance;without signs/symptoms of aspiration  throat clearing/coughing observed to be effective at preventing aspiration. Monitor lung status to assist  -SM     [place] (Tolerate trials) independently (over 90% accuracy)  -GA independently (over 90% accuracy)  -SM     Time Frame (Tolerate trials) 1 week  -GA 1 week  -SM     Progress/Outcomes (Tolerate trials) continuing progress toward goal  -GA --     Comment (Tolerate trials) no overt s/sx  -GA --        (STG) Patient will tolerate therapeutic trials of    Consistencies Trialed (Tolerate therapeutic trials) --  soft/cohesive solids (e.g., ground sausage would be choking risk)  -GA --  soft/cohesive solids (e.g., ground sausage would be choking risk)  -SM     Desired Outcome (Tolerate therapeutic trials) with adequate oral prep/transit/clearance;without signs/symptoms of aspiration  -GA with adequate oral prep/transit/clearance;without signs/symptoms of aspiration  -SM     [place] (Tolerate therapeutic trials) with 1:1 assist/ supervision  -GA with 1:1 assist/ supervision  -SM     Time Frame (Tolerate therapeutic trials) 1 week  -GA 1 week  -SM     Progress/Outcomes (Tolerate therapeutic trials) continuing progress toward goal  -GA --     Comment (Tolerate therapeutic trials) trialed cohesive/soft diet without overt s/sx of aspiration/penetration. Implemented residue clearing strategies  -GA --        (STG) Lingual Strengthening Goal 1 (SLP)    Activity (Lingual Strengthening Goal 1, SLP) increase lingual  tone/sensation/control/coordination/movement;increase tongue back strength  -GA increase lingual tone/sensation/control/coordination/movement;increase tongue back strength  -SM     Increase Lingual Tone/Sensation/Control/Coordination/Movement lingual resistance exercises  -GA lingual resistance exercises  -SM     Increase Tongue Back Strength lingual resistance exercises  -GA lingual resistance exercises  -SM     [place]/Accuracy (Lingual Strengthening Goal 1, SLP) with minimal cues (75-90% accuracy)  -GA with minimal cues (75-90% accuracy)  -SM     Time Frame (Lingual Strengthening Goal 1, SLP) 1 week  -GA 1 week  -SM     Progress/Outcomes (Lingual Strengthening Goal 1, SLP) continuing progress toward goal  -GA --     Comment (Lingual Strengthening Goal 1, SLP) completed exercises and provided with education  -GA --        ([facility]) Pharyngeal Strengthening Exercise Goal 1 (SLP)    Activity (Pharyngeal Strengthening Goal 1, SLP) increase superior movement of the hyolaryngeal complex;increase anterior movement of the hyolaryngeal complex;increase squeeze/positive pressure generation  -GA increase superior movement of the hyolaryngeal complex;increase anterior movement of the hyolaryngeal complex;increase squeeze/positive pressure generation  -SM     Increase Superior Movement of the Hyolaryngeal Complex Mendelsohn;effortful pitch glide (falsetto + pharyngeal squeeze)  -GA Mendelsohn;effortful pitch glide (falsetto + pharyngeal squeeze)  -SM     Increase Anterior Movement of the Hyolaryngeal Complex EMST;chin tuck against resistance (CTAR)  -GA EMST;chin tuck against resistance (CTAR)  -SM     Increase Squeeze/Positive Pressure Generation hard effortful swallow  -GA hard effortful swallow  + tongue press  -SM     [place]/Accuracy (Pharyngeal Strengthening Goal 1, SLP) with moderate cues (50-74% accuracy)  -GA with moderate cues (50-74% accuracy)  -SM     Time Frame (Pharyngeal Strengthening Goal 1, SLP) 1 week   -GA 1 week  -SM     Progress/Outcomes (Pharyngeal Strengthening Goal 1, SLP) continuing progress toward goal  -GA --     Comment (Pharyngeal Strengthening Goal 1, SLP) patient provided with exercises, completed education, and then completed exercises.  -GA --        (STG) Swallow Management Recall Goal 1 (SLP)    Activity (Swallow Management Recall Goal 1, SLP) aspiration precautions;oral care recommendations;safe diet/liquid level;rationale for use of strategies/techniques;other (see comments)  + education on attempts at safe/cohesive soft solids once home.  -GA aspiration precautions;oral care recommendations;safe diet/liquid level;rationale for use of strategies/techniques;other (see comments)  + education on attempts at safe/cohesive soft solids once home.  -SM     [place]/Accuracy (Swallow Management Recall Goal 1, SLP) with 1:1 supervision/constant cues  -GA with 1:1 supervision/constant cues  -SM     Time Frame (Swallow Management Recall Goal 1, SLP) 1 week  -GA 1 week  -SM     Progress/Outcomes (Swallow Management Recall Goal 1, SLP) continuing progress toward goal  -GA --     Comment (Swallow Management Recall Goal 1, SLP) provided with education about diet, results of MBS, and home diet recommendations  -GA --               User Key  (r) = Recorded By, (t) = Taken By, (c) = Cosigned By      Initials Name Provider Type    Tiffany Prieto, MS CCC-SLP Speech and Language Pathologist    Carrie Cooney MS CCC-SLP Speech and Language Pathologist                         Time Calculation:    Time Calculation- SLP       Row Name 09/19/24 1334             Time Calculation- SLP    SLP Start Time 1050  -GA      SLP Received On 09/19/24  -GA         Untimed Charges    62262-QA Treatment Swallow Minutes 50  -GA         Total Minutes    Untimed Charges Total Minutes 50  -GA       Total Minutes 50  -GA                User Key  (r) = Recorded By, (t) = Taken By, (c) = Cosigned By      Initials Name  Provider Type    Carrie Cooney MS CCC-SLP Speech and Language Pathologist                    Therapy Charges for Today       Code Description Service Date Service Provider Modifiers Qty    [id number] HC ST TREATMENT SWALLOW 3 9/19/2024 Carrie Wise MS CCC-SLP GN 1                 Carrie Wise MS CCC-SLP  9/19/2024

## 2024-09-19 NOTE — PROGRESS NOTES
"  INFECTIOUS DISEASES  INPATIENT PROGRESS NOTE  2024      PATIENT NAME: Guicho Blanco  :  1940  MRN:  [id number]  Date of Admission:  2024      Antimicrobials:  Ceftriaxone 1 g iv daily - started 24    Started 24 - 24  Daptomycin  Piperacillin/tazobactam  -----------------------------------  Bactrim 9/15/24  IV vancomycin/ceftriaxone - 24 - 9/15/24  Linezolid (outpatient 24 - 24)      MAR reviewed.     Reason for consultation:  fever, right knee cellulitis    Interval history: Patient doing quite well.  Awake and alert, oriented.  Up in the chair.  High fevers much improved.  Afebrile today.  Blood cultures and synovial fluid culture from the right knee remain no growth.    ROS:  as above      Objective:  Temp (24hrs), Av.3 °F (36.8 °C), Min:97.7 °F (36.5 °C), Max:98.8 °F (37.1 °C)    /72   Pulse 91   Temp 97.7 °F (36.5 °C) (Axillary)   Resp 18   Ht 182.9 cm (72.01\")   Wt 92 kg (202 lb 13.2 oz)   SpO2 99%   BMI 27.50 kg/m²     Physical Examination:  GENERAL: Improved-appearing elderly male, awake and alert, up in chair  HEENT: Normocephalic, atraumatic.    LUNGS: Normal respiratory effort.  SKIN: Right knee incision closed.  Patchy erythema and warmth about incision - resolving.  Prior lateral cellulitis resolved.  MS:  Right knee mildly enlarged - improved.  NEURO: A&O. No focal deficits.  Face symmetric.  Speech fluent.    Laboratory Data:    Results from last 7 days   Lab Units 24  0408 24  0327 24  1044   WBC 10*3/mm3 3.13* 2.71* 4.84   HEMOGLOBIN g/dL 10.0* 10.4* 12.3*   HEMATOCRIT % 31.8* 33.3* 39.2   PLATELETS 10*3/mm3 112* 127* 172     Results from last 7 days   Lab Units 24  0416   SODIUM mmol/L 138   POTASSIUM mmol/L 3.6   CHLORIDE mmol/L 104   CO2 mmol/L 24.0   BUN mg/dL 18   CREATININE mg/dL 0.78   GLUCOSE mg/dL 85   CALCIUM mg/dL 8.3*     Results from last 7 days   Lab Units 24  0408   ALK PHOS U/L 116 "   BILIRUBIN mg/dL 0.5   BILIRUBIN DIRECT mg/dL 0.2   ALT (SGPT) U/L 59*   AST (SGOT) U/L 137*     Results from last 7 days   Lab Units 09/17/24  0327   SED RATE mm/hr 11     Results from last 7 days   Lab Units 09/13/24  1341   CRP mg/dL 0.70*     Estimated Creatinine Clearance: 93.4 mL/min (by C-G formula based on SCr of 0.78 mg/dL).  Results from last 7 days   Lab Units 09/16/24  2236   LACTATE mmol/L 1.6     Results from last 7 days   Lab Units 09/19/24  0416 09/17/24  0327 09/16/24  1535   CK TOTAL U/L 763* 572* 239*     Results from last 7 days   Lab Units 09/15/24  0631   VANCOMYCIN RM mcg/mL 12.10           Microbiology:    Microbiology Results (last 10 days)       Procedure Component Value - Date/Time    Body Fluid Culture - Synovial Fluid, Knee, Right [[id number]] Collected: 09/16/24 1723    Lab Status: Preliminary result Specimen: Synovial Fluid from Knee, Right Updated: 09/19/24 0702     Body Fluid Culture No growth at 3 days     Gram Stain Many (4+) Red blood cells      Rare (1+) WBCs seen      No organisms seen    Anaerobic Culture - Synovial Fluid, Knee, Right [[id number]]  (Normal) Collected: 09/16/24 1723    Lab Status: Preliminary result Specimen: Synovial Fluid from Knee, Right Updated: 09/19/24 0734     Anaerobic Culture No anaerobes isolated at 3 days    Blood Culture - Blood, Hand, Left [[id number]]  (Normal) Collected: 09/16/24 1044    Lab Status: Preliminary result Specimen: Blood from Hand, Left Updated: 09/19/24 1130     Blood Culture No growth at 3 days    Narrative:      Less than seven (7) mL's of blood was collected.  Insufficient quantity may yield false negative results.    Blood Culture - Blood, Arm, Right [[id number]]  (Normal) Collected: 09/13/24 1405    Lab Status: Final result Specimen: Blood from Arm, Right Updated: 09/18/24 1430     Blood Culture No growth at 5 days    COVID PRE-OP / PRE-PROCEDURE SCREENING ORDER (NO ISOLATION) - Swab, Nasopharynx [[id number]]  (Normal) Collected:  09/13/24 1344    Lab Status: Final result Specimen: Swab from Nasopharynx Updated: 09/13/24 1418    Narrative:      The following orders were created for panel order COVID PRE-OP / PRE-PROCEDURE SCREENING ORDER (NO ISOLATION) - Swab, Nasopharynx.  Procedure                               Abnormality         Status                     ---------                               -----------         ------                     COVID-19 and FLU A/B PCR...[[id number]]  Normal              Final result                 Please view results for these tests on the individual orders.    COVID-19 and FLU A/B PCR, 1 HR TAT - Swab, Nasopharynx [[id number]]  (Normal) Collected: 09/13/24 1344    Lab Status: Final result Specimen: Swab from Nasopharynx Updated: 09/13/24 1418     COVID19 Not Detected     Influenza A PCR Not Detected     Influenza B PCR Not Detected    Narrative:      Fact sheet for providers: https://www.fda.gov/media/774358/download    Fact sheet for patients: https://www.fda.gov/media/836370/download    Test performed by PCR.    Blood Culture - Blood, Arm, Right [[id number]]  (Normal) Collected: 09/13/24 1341    Lab Status: Final result Specimen: Blood from Arm, Right Updated: 09/18/24 1430     Blood Culture No growth at 5 days              Radiology:  FL Video Swallow With Speech Single Contrast    Result Date: 9/18/2024  Impression: Fluoroscopy provided for a modified barium swallow. Penetration of barium was seen during swallowing evaluation. Please see speech therapy report for full details and recommendations. Report dictated by: Jess Sahu PA-c  I have personally reviewed this case and agree with the findings above: Electronically Signed: Roge Guillen MD  9/18/2024 5:07 PM EDT  Workstation ID: [id number]       DISCUSSION:  83 y.o. male with history of recent right knee cellulitis after TKA admitted with encephalopathy.       PROBLEM LIST:   -- Serotonin syndrome.  Encephalopathy, hectic fever, lactic acidosis -  improving.  Patient with recent oral linezolid antibiotic use prior to admission and was started on mirtazapine per neurology after admission.  No evidence for pneumonia.  Urinalysis bland.  Synovial fluid from right knee not consistent with PJI.  Improving with discontinuation of serotonergic agents.  -- Elevated CK.  -- Right knee cellulitis - improving.  Recent admission earlier this month with lateral right knee superficial cellulitis.  Did not appear to involve joint or incision.  Treated with IV vancomycin and ceftriaxone with improvement.  Discharged home on oral doxycycline and cephalexin.  I saw him back in office 2 days prior to admission, when he had new erythema and warmth about the incision itself.  I changed his antibiotic to oral linezolid. Continues to have erythema/warmth about incision; consider reaction to sutures.  Normal WBC, ESR, and PCT at admission.  S/p right knee aspiration per ortho 9/16/24; WBC only 459 with 11% neutrophils, gram stain negative, culture no growth; not c/w PJI.  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    PLAN:  -- Added linezolid as causing serotonin syndrome to allergy list and continue to avoid serotonergic agents  -- Continue supportive care for serotonin syndrome - fever finally resolved  -- Continue ceftriaxone for right knee cellulitis but may stop tomorrow as the looking improved.  -- Noted blood and synovial fluid cultures from the right knee remain no growth    Plan discussed with family at bedside.    Jae Manzanares MD  9/19/2024  17:01 EDT

## 2024-09-19 NOTE — PROGRESS NOTES
"      Orthopaedic Surgery Progress Note    CC: Leg pain      Subjective     Interval History:   Attempted to see patient yesterday but he was off the floor getting a barium swallow.  This morning, he complains of some mild leg soreness.  Has a lot of questions about being taken off restraints.  Afebrile last 24 hours.      ROS: Denies fever, chills, nausea or vomiting    Objective     Vital Signs:  Temp (24hrs), Av.2 °F (37.3 °C), Min:98.5 °F (36.9 °C), Max:99.9 °F (37.7 °C)    /79   Pulse 89   Temp 98.8 °F (37.1 °C) (Oral)   Resp 16   Ht 182.9 cm (72.01\")   Wt 92 kg (202 lb 13.2 oz)   SpO2 98%   BMI 27.50 kg/m²       Physical Exam:  Patient's induration erythema is much better overall.  No pain with passive and active range of motion of the knee  No effusion    Labs show no growth day 3 from right knee aspirate    Assessment and Plan:  Hospital day #7    --Patient is scheduled to leave the ICU today  -- Continue to monitor cultures  -- Erythema significantly better  -- Antibiotics per Dr. Manzanares  -- Agree with placement after discharge from hospital.  Continue PT and OT.    Christian Schwartz MD  24  08:06 EDT        "

## 2024-09-19 NOTE — DISCHARGE PLACEMENT REQUEST
"Guicho Blanco \"Rad\" (83 y.o. Male)     Viridiana Paz [phone]  [facility]        Date of Birth   1940    Social Security Number       [place]   [address] [place] [facility] [address]    Home Phone   [phone]    MRN   [id number]       Latter-day   Orthodox    Marital Status                               Admission Date   24    Admission Type   Emergency    Admitting Provider   Berto Liu MD    Attending Provider   Martha Parmar DO    Department, Room/Bed   [facility] 2B ICU, N229/1       Discharge Date       Discharge Disposition       Discharge Destination                                 Attending Provider: Martha Parmar DO    Allergies: Linezolid, Divalproex Sodium (Migraine) [Valproic Acid], Cymbalta [Duloxetine Hcl], Duloxetine, Metoprolol, Neurontin [Gabapentin]    Isolation: None   Infection: None   Code Status: No CPR    Ht: 182.9 cm (72.01\")   Wt: 92 kg (202 lb 13.2 oz)    Admission Cmt: None   Principal Problem: Cellulitis of right knee [L03.115]                   Active Insurance as of 2024       Primary Coverage       Payor Plan Insurance Group Employer/Plan Group    ANTHEM MEDICARE REPLACEMENT ANTHEM MEDICARE ADVANTAGE [id number]       Payor Plan Address Payor Plan Phone Number Payor Plan Fax Number Effective Dates    [address] [phone]  2024 - None Entered    [facility] [id number]         Subscriber Name Subscriber Birth Date Member ID       GUICHO BLANCO 1940 [id number]                     Emergency Contacts        (Rel.) Home Phone Work Phone Mobile Phone    Sylwia Cee (Daughter) [phone] -- [phone]    BlancoSandra (Spouse) [phone] -- [phone]    Ashley Mejia (Grandchild) -- -- [phone]                 History & Physical        Berto Liu MD at 24 1751          Patient Name: Guicho Blanco  MRN: [id number]  : 1940  DOS: " 9/13/2024    Attending: Berto Liu MD    Primary Care Provider: Mitchel Culver MD      Chief complaint: Right knee postop cellulitis    Subjective  Patient is a pleasant 83 y.o. male who is known to me from recent hospitalizations following right total knee arthroplasty on 8/7/2024 at which point he was discharged to [facility] on 8/10/2024.  After spending several days there he was discharged home but was readmitted to [facility] on 8/30/2024 with cellulitis of his right knee.  He was treated with IV antibiotics under the direction of Dr. Manzanares with [facility].  Received ceftriaxone and vancomycin during his stay and was transition to p.o. antibiotics at discharge.    He worked with PT and OT and has made enough progress to be discharged home at that time.  Unfortunately at home he has not been sleeping well.  His mobility has fluctuated but he has had much difficulty getting up to initiate walking according to his wife.  He has had follow-ups with Dr. Manzanares and with Dr. Ramos.  His antibiotics were most recently transitioned to Zyvox on erythema worsening at his incision site.  Deeper sutures were removed by Dr. Ramos in the office yesterday for concern of possible reaction.  There has been no fever or chills and no nausea or vomiting though appetite has been very low and he has been spending extended periods of time chewing as opposed to eating.    Understandably his wife is exhausted and she and the family including the daughters are concerned with the decline and the lack of progress physically.     Allergies   Allergen Reactions    Divalproex Sodium (Migraine) [Valproic Acid] Other (See Comments)     Weak, lethargy and got worse    Cymbalta [Duloxetine Hcl] Mental Status Change     Depression, thoughts of Suicide.     Duloxetine Unknown (See Comments)    Metoprolol Other (See Comments)     UNKNOWN. Pt does not remember this being allergy    Neurontin  [Gabapentin] Other (See Comments)     Lethargy & fatigue       Meds:  Medications Prior to Admission   Medication Sig Dispense Refill Last Dose    acetaminophen (TYLENOL) 650 MG 8 hr tablet Take 1 tablet by mouth Every 8 (Eight) Hours. (Patient taking differently: Take 1 tablet by mouth Every 8 (Eight) Hours As Needed for Mild Pain, Moderate Pain or Headache. Indications: Pain) 90 tablet 0 Past Month    aspirin 325 MG tablet Take 1 tablet by mouth Daily. Indications: Disease involving Lipid Deposits in the Arteries   9/12/2024    B Complex Vitamins (VITAMIN B COMPLEX) capsule capsule Take 1 capsule by mouth Daily. Takes in evening  Indications: Vitamin Deficiency   9/12/2024    bisacodyl (DULCOLAX) 10 MG suppository Insert 1 suppository into the rectum Daily As Needed for Constipation (Use if bisacodyl oral is ineffective).   Past Week    Cholecalciferol (VITAMIN D3) 5000 UNITS capsule capsule Take 1 capsule by mouth Daily. Indications: Vitamin D Deficiency   9/12/2024    coenzyme Q10 100 MG capsule Take 1 capsule by mouth Daily. Indications: Heart Rhythm Disorder   9/12/2024    cycloSPORINE (Restasis) 0.05 % ophthalmic emulsion Administer 1 drop to both eyes Every 12 (Twelve) Hours. Indications: Drying and Inflammation of Cornea and Conjunctiva of Eyes   9/12/2024    docusate sodium (Colace) 100 MG capsule Take 1 capsule by mouth 2 (Two) Times a Day As Needed for Constipation. 60 capsule 0 Past Week    flecainide (TAMBOCOR) 100 MG tablet Take 1 tablet by mouth Every 12 (Twelve) Hours. 180 tablet 3 9/12/2024    isosorbide mononitrate (IMDUR) 30 MG 24 hr tablet Take 1 tablet by mouth Daily. 90 tablet 3 9/12/2024    linezolid (ZYVOX) 600 MG tablet Take 1 tablet by mouth 2 (Two) Times a Day.   9/12/2024    nortriptyline (PAMELOR) 10 MG capsule Take 2 capsules by mouth Every Night. Indications: Migraine Headache   9/12/2024    omeprazole OTC (PrilOSEC OTC) 20 MG EC tablet Take 1 tablet by mouth Daily. Indications:  Heartburn   9/12/2024    pravastatin (PRAVACHOL) 20 MG tablet Take 1 tablet by mouth Daily. Indications: High Amount of Fats in the Blood   9/12/2024    saccharomyces boulardii (FLORASTOR) 250 MG capsule Take 1 capsule by mouth 2 (Two) Times a Day. 42 capsule 0 9/12/2024    traMADol (ULTRAM) 50 MG tablet Take 1 tablet by mouth Every 8 (Eight) Hours As Needed for Moderate Pain. 30 tablet 1 Past Week    glucose (DEX4) 4 GM chewable tablet Chew 4 tablets As Needed.       nitroglycerin (NITROSTAT) 0.4 MG SL tablet 1 under the tongue as needed for angina, may repeat q5mins for up three doses 25 tablet 2 More than a month    ondansetron (Zofran) 4 MG tablet Take 1 tablet by mouth Every 8 (Eight) Hours As Needed for Nausea or Vomiting. (Patient not taking: Reported on 9/13/2024) 15 tablet 1 Not Taking        Past Medical History:   Diagnosis Date    Arthritis     Atrial fibrillation     BPH (benign prostatic hypertrophy)     Cataract     Cellulitis     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     bilateral    Coronary artery disease     Full dentures     Gall stones     GERD (gastroesophageal reflux disease)     Hearing loss     Hiatal hernia     Hyperlipidemia     Hypertension     Peripheral neuropathy     Peripheral vascular disease     Seasonal allergies     Skin cancer     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    TIA (transient ischemic attack)     2 times    Wears glasses      Past Surgical History:   Procedure Laterality Date    ABLATION OF DYSRHYTHMIC FOCUS      APPENDECTOMY      CARDIAC CATHETERIZATION N/A 10/05/2017    Procedure: Left Heart Cath;  Surgeon: Hector Urrutia MD;  Location: [facility] CATH INVASIVE LOC[facility];  Service:     CATARACT EXTRACTION Bilateral     CHOLECYSTECTOMY      Remote    COCHLEAR IMPLANT REVISION  05/2019    COLONOSCOPY      PROSTATE SURGERY      SHOULDER SURGERY Right     SKIN BIOPSY      TOE AMPUTATION Right     2nd toe - Right Foot    TONSILLECTOMY      TOTAL KNEE ARTHROPLASTY Right 8/7/2024    Procedure:  "TOTAL KNEE ARTHROPLASTY WITH ASHLEY ROBOT RIGHT;  Surgeon: Christian Schwartz MD;  Location: [facility];  Service: Robotics - Ortho;  Laterality: Right;     Family History   Problem Relation Age of Onset    Atrial fibrillation Mother     Dementia Mother     Lung cancer Father      Social History     Tobacco Use    Smoking status: Never     Passive exposure: Past    Smokeless tobacco: Never   Vaping Use    Vaping status: Never Used   Substance Use Topics    Alcohol use: No    Drug use: No       Review of Systems  Pertinent items are noted in HPI    Vital Signs  /62 (BP Location: Left arm, Patient Position: Lying)   Pulse 88   Temp 98 °F (36.7 °C) (Oral)   Resp 18   Ht 182.9 cm (72\")   Wt 95.3 kg (210 lb)   SpO2 100%   BMI 28.48 kg/m²     Physical Exam:    General Appearance:    Alert, cooperative, in no acute distress   Head:    Normocephalic, without obvious abnormality, atraumatic   Eyes:            Lids and lashes normal, conjunctivae and sclerae normal, no   icterus, no pallor, corneas clear,    Ears:    Ears appear intact with no abnormalities noted   Throat:   No oral lesions, no thrush, oral mucosa moist   Neck:   No adenopathy, supple, trachea midline, no thyromegaly         Lungs:     Clear to auscultation,respirations regular, even and                   unlabored    Heart:    Regular rhythm and normal rate, normal S1 and S2, no            murmur, no gallop   Abdomen:     Normal bowel sounds, no masses, no organomegaly, soft,        nontender, nondistended, no guarding, no rebound                 tenderness   Genitalia:    Deferred   Extremities:   Moves all extremities well, no edema, no cyanosis, no              redness   Pulses:   Pulses palpable and equal bilaterally   Skin:   No bleeding, bruising or rash   Neurologic:   Cranial nerves 2 - 12 grossly intact,       I reviewed the patient's new clinical results.       Results from last 7 days   Lab Units 09/13/24  1334 " 09/13/24  1232   WBC 10*3/mm3  --  7.23   HEMOGLOBIN g/dL  --  10.8*   HEMOGLOBIN, POC g/dL 10.9*  --    HEMATOCRIT %  --  34.8*   HEMATOCRIT POC % 32*  --    PLATELETS 10*3/mm3  --  197     Results from last 7 days   Lab Units 09/13/24  1341 09/13/24  1334   SODIUM mmol/L 136  --    POTASSIUM mmol/L 3.9  --    CHLORIDE mmol/L 100  --    CO2 mmol/L 24.0  --    BUN mg/dL 18  --    CREATININE mg/dL 0.97 1.10   CALCIUM mg/dL 9.2  --    BILIRUBIN mg/dL 0.5  --    ALK PHOS U/L 152*  --    ALT (SGPT) U/L 20  --    AST (SGOT) U/L 28  --    GLUCOSE mg/dL 88  --      Lab Results   Component Value Date    HGBA1C 5.20 07/30/2024      Latest Reference Range & Units 09/13/24 13:41   C-Reactive Protein 0.00 - 0.50 mg/dL 0.70 (H)   Procalcitonin 0.00 - 0.25 ng/mL 0.10   (H): Data is abnormally high     Latest Reference Range & Units 09/13/24 13:08   Color, UA Yellow, Straw  Dark Yellow !   Appearance, UA Clear  Clear   Specific Gravity, UA 1.001 - 1.030  1.027   pH, UA 5.0 - 8.0  5.5   Glucose Negative  Negative   Ketones, UA Negative  Trace !   Blood, UA Negative  Negative   Nitrite, UA Negative  Negative   Leukocytes, UA Negative  Trace !   Protein, UA Negative  Trace !   Bilirubin, UA Negative  Negative   Urobilinogen, UA 0.2 - 1.0 E.U./dL  0.2 E.U./dL   RBC, UA None Seen, 0-2 /HPF 0-2   WBC, UA None Seen, 0-2 /HPF 3-5 !   Bacteria, UA None Seen, Trace /HPF None Seen   Calcium Oxalate Crystals, UA None Seen /HPF Moderate/2+   Squamous Epithelial Cells, UA None Seen, 0-2 /HPF 0-2   Hyaline Casts, UA 0 - 6 /LPF 0-6   !: Data is abnormal  Assessment and Plan:       Cellulitis of right knee    Hearing loss    Neuropathy    Failure to thrive in adult      Plan  Admit for further management.  Resume IV antibiotics for cellulitis noting the absence of any difficulty with range of motion, absence of knee pain.  Also noting that WBC is normal so is procalcitonin and sed rate.  CRP is elevated but lower than it was on his prior  visit.    HTN, Hyperlipidemia, afib, CAD  - Continue home Tambocor, Imdur and statin  - Monitor BP   - Holding parameters for BP meds  - Labetalol PRN for SBP>170     Physical therapy consultation.  DVT prophylax with p.o. aspirin  Bowel regimen.  Pain medication as needed.    1.  Physical therapy evaluation.  Likely Occupational Therapy and likely to need inpatient rehab following this hospitalization  2. Pain control-prns, avoid sedatives and opiates.   3. IS-encourage  4. DVT proph- mechanicals and aspirin  5. Bowel regimen  6. Resume home medications as appropriate    Urinalysis has been evaluated.  No significant findings.  Will check TSH and ammonia levels in a.m.  Will monitor for response to antibiotics.    Discussed with patient, wife, and daughter.  Discussed with Dr. Ramos.  Dr. Manzanares is following, input appreciated.    Dragon disclaimer:  Part of this encounter note is an electronic transcription/translation of spoken language to printed text. The electronic translation of spoken language may permit erroneous, or at times, nonsensical words or phrases to be inadvertently transcribed; Although I have reviewed the note for such errors, some may still exist.    Berto Liu MD  09/13/24  17:51 EDT            Electronically signed by Berto Liu MD at 09/13/24 2146       Current Facility-Administered Medications   Medication Dose Route Frequency Provider Last Rate Last Admin    acetaminophen (TYLENOL) suppository 650 mg  650 mg Rectal Q4H PRN Berto Liu MD   650 mg at 09/16/24 1040    acetaminophen (TYLENOL) tablet 1,000 mg  1,000 mg Oral Q6H PRN Berto Liu MD   1,000 mg at 09/17/24 2033    [Held by provider] aspirin tablet 325 mg  325 mg Oral Daily Berto Liu MD   325 mg at 09/15/24 0842    polyethylene glycol (MIRALAX) packet 17 g  17 g Oral Daily PRN Berto Liu MD   17 g at 09/15/24 0843    And    bisacodyl (DULCOLAX) EC tablet 5 mg  5 mg Oral  Daily PRN Berto Liu MD        And    bisacodyl (DULCOLAX) suppository 10 mg  10 mg Rectal Daily PRN Berto Liu MD        cefTRIAXone (ROCEPHIN) 1,000 mg in sodium chloride 0.9 % 100 mL MBP  1,000 mg Intravenous Q24H Jae Manzanares  mL/hr at 09/18/24 1701 1,000 mg at 09/18/24 1701    docusate sodium (COLACE) capsule 100 mg  100 mg Oral BID PRN Berto Liu MD   100 mg at 09/15/24 0842    flecainide (TAMBOCOR) tablet 100 mg  100 mg Oral Q12H Berto Liu MD   100 mg at 09/19/24 0919    HYDROcodone-acetaminophen (NORCO) 5-325 MG per tablet 1 tablet  1 tablet Oral Q6H PRN Martha Parmar V., DO   1 tablet at 09/18/24 0015    isosorbide mononitrate (IMDUR) 24 hr tablet 30 mg  30 mg Oral Daily Berto Liu MD   30 mg at 09/19/24 0919    nitroglycerin (NITROSTAT) SL tablet 0.4 mg  0.4 mg Sublingual Q5 Min PRN Berto Liu MD        nortriptyline (PAMELOR) capsule 20 mg  20 mg Oral Nightly Berto Liu MD   20 mg at 09/18/24 2034    pantoprazole (PROTONIX) injection 40 mg  40 mg Intravenous Q AM Martha Parmar, DO   40 mg at 09/19/24 0601    pravastatin (PRAVACHOL) tablet 20 mg  20 mg Oral Daily Berto Liu MD   20 mg at 09/19/24 0919    saccharomyces boulardii (FLORASTOR) capsule 250 mg  250 mg Oral BID Berto Liu MD   250 mg at 09/19/24 0919    sodium chloride 0.9 % flush 10 mL  10 mL Intravenous Q12H Berto Liu MD   10 mL at 09/19/24 0919    sodium chloride 0.9 % flush 10 mL  10 mL Intravenous PRN Berto Liu MD        sodium chloride 0.9 % infusion 40 mL  40 mL Intravenous PRN Berto Liu MD        thiamine (B-1) 250 mg in sodium chloride 0.9 % 100 mL IVPB  250 mg Intravenous Q8H Akhil Lira  mL/hr at 09/19/24 1203 250 mg at 09/19/24 1203        Physician Progress Notes (most recent note)        Ghulam, Martha RILEY DO at 09/19/24 1212            INTENSIVIST   PROGRESS NOTE     Hospital:  LOS: 3 days      Mr. Guicho Blanco, 83 y.o. male is followed for a Chief Complaint of: Lactic Acidosis      Subjective   S     Interval History:  No acute events overnight.        The patient's relevant past medical, surgical and social history were reviewed and updated in Epic as appropriate.      ROS:   Constitutional: Negative for fever.   Respiratory: Negative for dyspnea.   Cardiovascular: Negative for chest pain.   Gastrointestinal: Negative for  nausea, vomiting and diarrhea.       Objective   O     Vitals:  Temp  Min: 97.7 °F (36.5 °C)  Max: 99.9 °F (37.7 °C)  BP  Min: 119/97  Max: 155/79  Pulse  Min: 89  Max: 102  Resp  Min: 16  Max: 18  SpO2  Min: 97 %  Max: 100 % No data recorded    Intake/Ouptut 24 hrs (7:00AM - 6:59 AM)  Intake & Output (last 3 days)         09/13 0701  09/14 0700 09/14 0701  09/15 0700 09/15 0701  09/16 0700 09/16 0701  09/17 0700    P.O.  950 410     IV Piggyback 100 600      Total Intake(mL/kg) 100 (1) 1550 (16.3) 410 (4.3)     Urine (mL/kg/hr) 1205 2175 (1) 625 (0.3)     Stool   0     Total Output 1205 2175 625     Net -1105 -625 -215             Urine Unmeasured Occurrence   4 x     Stool Unmeasured Occurrence   1 x             Medications (drips):           Physical Examination  Telemetry:  Sinus tachycardia.    Constitutional:  No acute distress.  Sitting up in a chair.    Cardiovascular: Normal rate, regular and rhythm. Normal heart sounds.  No murmurs, gallop or rub.   Respiratory: No respiratory distress. Normal respiratory effort.  Normal breath sounds  Clear to auscultation   Abdominal:  Soft. No masses. Non-tender. No distension. No HSM.   Extremities: No digital cyanosis. No clubbing.  Right knee is hot and swollen.    Neurological:   Alert and interactive.           Results from last 7 days   Lab Units 09/18/24  0408 09/17/24  0327 09/16/24  1044   WBC 10*3/mm3 3.13* 2.71* 4.84   HEMOGLOBIN g/dL 10.0* 10.4* 12.3*   MCV fL 85.7 84.7 87.1   PLATELETS 10*3/mm3 112* 127* 172      Results from last 7 days   Lab Units 09/19/24  0416 09/18/24  0408 09/17/24  0327   SODIUM mmol/L 138 137 141   POTASSIUM mmol/L 3.6 4.2 3.8   CO2 mmol/L 24.0 24.0 21.0*   CREATININE mg/dL 0.78 0.95 1.10   GLUCOSE mg/dL 85 72 89   MAGNESIUM mg/dL  --  2.0 1.8   PHOSPHORUS mg/dL  --  2.5 2.9     Estimated Creatinine Clearance: 93.4 mL/min (by C-G formula based on SCr of 0.78 mg/dL).  Results from last 7 days   Lab Units 09/18/24  0408 09/13/24  1341   ALK PHOS U/L 116 152*   BILIRUBIN mg/dL 0.5 0.5   BILIRUBIN DIRECT mg/dL 0.2  --    ALT (SGPT) U/L 59* 20   AST (SGOT) U/L 137* 28       Results from last 7 days   Lab Units 09/16/24  2204 09/16/24  1040   PH, ARTERIAL pH units 7.451* 7.276*   PCO2, ARTERIAL mm Hg 32.7* 21.0*   PO2 ART mm Hg 102.0 101.0   FIO2 % 28 28       Images:  Imaging Results (Last 24 Hours)       Procedure Component Value Units Date/Time    FL Video Swallow With Speech Single Contrast [[id number]] Collected: 09/18/24 1418     Updated: 09/18/24 1711    Narrative:      FL VIDEO SWALLOW W SPEECH SINGLE-CONTRAST    Date of Exam: 9/18/2024 1:26 PM EDT    Indication: dysphagia.       Comparison: None available.    Technique:   The speech pathologist administered food and/or liquid mixed with barium to the patient with cine/video imaging.  Imaging assistance was provided to the speech pathologist and an image was saved.    Fluoroscopic Time: 1 minute and 36 seconds    Number of Images: 6 associated fluoroscopic loops were saved    Findings:  Penetration of barium was seen during fluoroscopic guided modified barium swallowing series. Please see speech therapy report for full details and recommendations.      Impression:      Impression:  Fluoroscopy provided for a modified barium swallow. Penetration of barium was seen during swallowing evaluation. Please see speech therapy report for full details and recommendations.      Report dictated by: Jess Sahu PA-c      I have personally reviewed this  case and agree with the findings above:    Electronically Signed: Roge Guillen MD    9/18/2024 5:07 PM EDT    Workstation ID: [id number]               Results: Reviewed.  I reviewed the patient's new laboratory and imaging results.  I independently reviewed the patient's new images.    Medications: Reviewed.    Assessment & Plan   A / P     Mr. Blanco is an 82yo M with a history of peripheral neuropathy, TIA, AFib, CAD, HTN, HLD, and a recent admission to [facility] from 8/7-8/10/24 following a knee arthroplasty on 8/7/24. He was readmitted on 8/30/24 with cellulitis of the right knee and was treated with Rocephin and Vancomycin and then discharged on 9/5/24 on PO Doxy and Keflex. Zyvox was restarted on 9/13 for worsening erythema and warmth at the incision site.     He presented back to [facility] on 9/13/24 with weakness and AMS. He was admitted to the Ortho Floor service with ID consulted and he was placed back on IV antibiotics.     Neurology has been following for altered mental status. MRI brain could not be performed secondary to cochlear implants.     On the morning of 9/16/24, a Rapid Response was called for altered mental status and a fever of 103. Labs were concerning for a metabolic acidosis with a lactate > 13 and he was transferred to the ICU for further care.     Right knee aspiration performed on 9/16 by Dr. Schwartz at the bedside. Fluid not indicative of sylvain septic joint. Cultures remain negative.     Fever felt to be secondary to serotonin syndrome from Linezolid/Mirtazapine.       Nutrition:   Diet: Cardiac; Healthy Heart (2-3 Na+); Texture: Pureed (NDD 1); Fluid Consistency: Thin (IDDSI 0)  Advance Directives:   Code Status and Medical Interventions: No CPR (Do Not Attempt to Resuscitate); Limited Support; No intubation (DNI), No cardioversion   Ordered at: 09/16/24 1206     Medical Intervention Limits:    No intubation (DNI)    No cardioversion     Level Of Support Discussed With:    Health Care  Surrogate     Code Status (Patient has no pulse and is not breathing):    No CPR (Do Not Attempt to Resuscitate)     Medical Interventions (Patient has pulse or is breathing):    Limited Support       Active Hospital Problems    Diagnosis     **Cellulitis of right knee     Serotonin syndrome     Sepsis     Lactic acidosis     Moderate malnutrition     Failure to thrive in adult     Insomnia     Neuropathy     Hearing loss        Assessment / Plan:    ID managing antibiotics.   Orthopedics following and s/p tap of right knee. Cultures negative so far.   Neurology following. CT imaging unremarkable. Cannot have an MRI secondary to cochlear implants.   Modified diet per Speech Therapy   PT/OT. Needs rehab.   AM labs  Okay to transfer back to telemetry.       Plan of care and goals reviewed during interdisciplinary rounds.  I discussed the patient's findings and my recommendations with patient, family, and nursing staff      Martha Parmar DO    Intensive Care Medicine and Pulmonary Medicine        Electronically signed by Martha Parmar DO at 24 1214          Physical Therapy Notes (most recent note)        Joy Ayala, PT at 24 0958  Version 1 of 1         Patient Name: Guicho Blanco  : 1940    MRN: [id number]                              Today's Date: 2024       Admit Date: 2024    Visit Dx:     ICD-10-CM ICD-9-CM   1. Generalized weakness  R53.1 780.79   2. Cellulitis of right lower extremity  L03.115 682.6   3. Oropharyngeal dysphagia  R13.12 787.22     Patient Active Problem List   Diagnosis    Osteomyelitis of toe of right foot    Osteomyelitis of right foot    PAF (paroxysmal atrial fibrillation)    Coronary artery disease involving native coronary artery of native heart without angina pectoris    Gastroesophageal reflux disease without esophagitis    Benign non-nodular prostatic hyperplasia without lower urinary tract symptoms    S/P appy    Essential hypertension     Simple chronic bronchitis    Mixed hyperlipidemia    COPD (chronic obstructive pulmonary disease)    BPH (benign prostatic hypertrophy)    Hearing loss    Chest pain    Vertigo    Snoring    Overweight    PLMD (periodic limb movement disorder)    Neuropathy    Periodic headache syndrome, not intractable    Acquired absence of other right toe(s)    Tremor    Bilateral impacted cerumen    Bilateral sensorineural hearing loss    Chronic otitis externa    Deviated nasal septum    Disorder of joint of foot    ED (erectile dysfunction) of organic origin    Hydrocele    Hydrocele of testis    Impacted cerumen    Mononeuropathy    Swelling of testicle    Venous retinal branch occlusion    Vitreous degeneration    Benign prostatic hyperplasia with urinary obstruction    OA (osteoarthritis) of knee    Status post total right knee replacement    Septic arthritis    Cellulitis    Cellulitis of right knee    Failure to thrive in adult    Insomnia    Moderate malnutrition    Sepsis    Lactic acidosis    Serotonin syndrome     Past Medical History:   Diagnosis Date    Arthritis     Atrial fibrillation     BPH (benign prostatic hypertrophy)     Cataract     Cellulitis     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     bilateral    Coronary artery disease     Full dentures     Gall stones     GERD (gastroesophageal reflux disease)     Hearing loss     Hiatal hernia     Hyperlipidemia     Hypertension     Peripheral neuropathy     Peripheral vascular disease     Seasonal allergies     Skin cancer     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    TIA (transient ischemic attack)     2 times    Wears glasses      Past Surgical History:   Procedure Laterality Date    ABLATION OF DYSRHYTHMIC FOCUS      APPENDECTOMY      CARDIAC CATHETERIZATION N/A 10/05/2017    Procedure: Left Heart Cath;  Surgeon: Hector Urrutia MD;  Location: [facility] CATH INVASIVE LOCATION;  Service:     CATARACT EXTRACTION Bilateral     CHOLECYSTECTOMY      Remote    COCHLEAR IMPLANT REVISION   05/2019    COLONOSCOPY      PROSTATE SURGERY      SHOULDER SURGERY Right     SKIN BIOPSY      TOE AMPUTATION Right     2nd toe - Right Foot    TONSILLECTOMY      TOTAL KNEE ARTHROPLASTY Right 8/7/2024    Procedure: TOTAL KNEE ARTHROPLASTY WITH ASHLEY ROBOT RIGHT;  Surgeon: Christian Schwartz MD;  Location: [facility];  Service: Robotics - Ortho;  Laterality: Right;      General Information       Row Name 09/19/24 1453          Physical Therapy Time and Intention    Document Type therapy note (daily note)  -ES     Mode of Treatment physical therapy  -ES       Row Name 09/19/24 1453          General Information    Patient Profile Reviewed yes  -ES     Existing Precautions/Restrictions fall  Recent R TKA, R LE WBAT  -ES     Barriers to Rehab medically complex;previous functional deficit;cognitive status  -ES       Row Name 09/19/24 1453          Cognition    Orientation Status (Cognition) oriented x 3  -ES       Row Name 09/19/24 1453          Safety Issues, Functional Mobility    Safety Issues Affecting Function (Mobility) awareness of need for assistance;insight into deficits/self-awareness;safety precaution awareness;safety precautions follow-through/compliance;sequencing abilities  -ES     Impairments Affecting Function (Mobility) balance;endurance/activity tolerance;pain;range of motion (ROM);strength  -ES               User Key  (r) = Recorded By, (t) = Taken By, (c) = Cosigned By      Initials Name Provider Type    ES Joy Ayala PT Physical Therapist                   Mobility       Row Name 09/19/24 1453          Bed Mobility    Comment, (Bed Mobility) UIC  -ES       Row Name 09/19/24 1453          Sit-Stand Transfer    Sit-Stand [place] (Transfers) moderate assist (50% patient effort);2 person assist;other (see comments)  progressed to [place] x2  -ES     Assistive Device (Sit-Stand Transfers) walker, front-wheeled  -ES     Comment, (Sit-Stand Transfer) v/c for hand placement. STS x3 from chair   -ES       Row Name 09/19/24 1453          Gait/Stairs (Locomotion)    [place] Level (Gait) moderate assist (50% patient effort);2 person assist;verbal cues  -ES     Assistive Device (Gait) walker, front-wheeled  -ES     Patient was able to Ambulate yes  -ES     Distance in Feet (Gait) 45  -ES     Deviations/Abnormal Patterns (Gait) bilateral deviations;harleen decreased;gait speed decreased;stride length decreased;base of support, narrow  -ES     Bilateral Gait Deviations forward flexed posture;heel strike decreased  -ES     Comment, (Gait/Stairs) Pt amb w/ modA x2 and FWW. Demo'd narrow BHARATH w/ decreased stride length and decreased weight shifting toward RLE. Required v/c to promote upright posture. Further mob limited by fatigue.  -ES       Row Name 09/19/24 1453          Mobility    Extremity Weight-bearing Status right lower extremity  -ES     Right Lower Extremity (Weight-bearing Status) weight-bearing as tolerated (WBAT)  -ES               User Key  (r) = Recorded By, (t) = Taken By, (c) = Cosigned By      Initials Name Provider Type    ES Joy Ayala, PT Physical Therapist                   Obj/Interventions       Row Name 09/19/24 1456          Motor Skills    Therapeutic Exercise hip;knee;ankle  -ES       Row Name 09/19/24 1456          Hip (Therapeutic Exercise)    Hip (Therapeutic Exercise) strengthening exercise  -ES     Hip Strengthening (Therapeutic Exercise) right;heel slides;10 repetitions  -ES       Row Name 09/19/24 1456          Knee (Therapeutic Exercise)    Knee (Therapeutic Exercise) strengthening exercise;isometric exercises  -ES     Knee Isometrics (Therapeutic Exercise) right;quad sets;10 repetitions  -ES     Knee Strengthening (Therapeutic Exercise) right;heel slides;SLR (straight leg raise);LAQ (long arc quad);10 repetitions  -ES       Row Name 09/19/24 1456          Ankle (Therapeutic Exercise)    Ankle (Therapeutic Exercise) AROM (active range of motion)  -ES     Ankle AROM  (Therapeutic Exercise) bilateral;dorsiflexion;plantarflexion;10 repetitions  -ES       Row Name 09/19/24 1456          Balance    Balance Assessment sitting static balance;sitting dynamic balance;sit to stand dynamic balance;standing static balance;standing dynamic balance  -ES     Static Sitting Balance contact guard  -ES     Dynamic Sitting Balance minimal assist;verbal cues  -ES     Position, Sitting Balance supported;sitting in chair  -ES     Sit to Stand Dynamic Balance moderate assist;2-person assist;verbal cues  -ES     Static Standing Balance minimal assist;2-person assist  -ES     Dynamic Standing Balance moderate assist;2-person assist;verbal cues  -ES     Position/Device Used, Standing Balance supported  -ES     Balance Interventions sitting;standing;sit to stand;supported;static;dynamic;occupation based/functional task  -ES               User Key  (r) = Recorded By, (t) = Taken By, (c) = Cosigned By      Initials Name Provider Type    ES Joy Ayala, PT Physical Therapist                   Goals/Plan    No documentation.                  Clinical Impression       Row Name 09/19/24 1450          Pain    Pretreatment Pain Rating 0/10 - no pain  -ES     Posttreatment Pain Rating 0/10 - no pain  -ES     Pre/Posttreatment Pain Comment tolerated  -ES     Pain Intervention(s) Repositioned;Ambulation/increased activity  -ES       Row Name 09/19/24 145          Plan of Care Review    Plan of Care Reviewed With patient;spouse  -ES     Progress improving  -ES     Outcome Evaluation Pt demonstrated improved mobility this date, increasing ambulation distance with modA x2 and FWW. Pt continues to be limited by generalized weakness, decreased activity tolerance, and ROM deficits warranting skilled IPPT services. PT rec SNF at d/c.  -ES       Row Name 09/19/24 2635          Therapy Assessment/Plan (PT)    Rehab Potential (PT) good, to achieve stated therapy goals  -ES     Criteria for Skilled Interventions Met  (PT) yes;meets criteria;skilled treatment is necessary  -ES     Therapy Frequency (PT) daily  -ES     Predicted Duration of Therapy Intervention (PT) 10 days  -ES       Row Name 09/19/24 1457          Vital Signs    Pre Systolic BP Rehab 155  -ES     Pre Treatment Diastolic BP 79  -ES     Post Systolic BP Rehab 132  -ES     Post Treatment Diastolic BP 77  -ES     Pretreatment Heart Rate (beats/min) 96  -ES     Posttreatment Heart Rate (beats/min) 103  -ES     Pre SpO2 (%) 97  -ES     O2 Delivery Pre Treatment room air  -ES     O2 Delivery Intra Treatment room air  -ES     Post SpO2 (%) 97  -ES     O2 Delivery Post Treatment room air  -ES     Pre Patient Position Sitting  -ES     Intra Patient Position Standing  -ES     Post Patient Position Sitting  -ES       Row Name 09/19/24 1451          Positioning and Restraints    Pre-Treatment Position in bed  -ES     Post Treatment Position chair  -ES     In Chair notified nsg;reclined;sitting;call light within reach;encouraged to call for assist;exit alarm on;waffle cushion;on mechanical lift sling;legs elevated;with family/caregiver  -ES               User Key  (r) = Recorded By, (t) = Taken By, (c) = Cosigned By      Initials Name Provider Type    ES Joy Ayala, PT Physical Therapist                   Outcome Measures       Row Name 09/19/24 3768          How much help from another person do you currently need...    Turning from your back to your side while in flat bed without using bedrails? 3  -ES     Moving from lying on back to sitting on the side of a flat bed without bedrails? 3  -ES     Moving to and from a bed to a chair (including a wheelchair)? 2  -ES     Standing up from a chair using your arms (e.g., wheelchair, bedside chair)? 2  -ES     Climbing 3-5 steps with a railing? 1  -ES     To walk in hospital room? 2  -ES     AM-PAC 6 Clicks Score (PT) 13  -ES     Highest Level of Mobility Goal 4 --> Transfer to chair/commode  -ES       Row Name 09/19/24 4340           Functional Assessment    Outcome Measure Options AM-PAC 6 Clicks Basic Mobility (PT)  -ES               User Key  (r) = Recorded By, (t) = Taken By, (c) = Cosigned By      Initials Name Provider Type    ES Joy Ayala PT Physical Therapist                                 Physical Therapy Education       Title: PT OT SLP Therapies (In Progress)       Topic: Physical Therapy (In Progress)       Point: Mobility training (In Progress)       Learning Progress Summary             Patient Acceptance, E,TB, NR by ES at 9/18/2024 1540    Acceptance, E,D, VU,NR by AB at 9/14/2024 1422                         Point: Home exercise program (In Progress)       Learning Progress Summary             Patient Acceptance, E,TB, NR by ES at 9/18/2024 1540    Acceptance, E,D, VU,NR by AB at 9/14/2024 1422                         Point: Body mechanics (In Progress)       Learning Progress Summary             Patient Acceptance, E,TB, NR by ES at 9/18/2024 1540    Acceptance, E,D, VU,NR by AB at 9/14/2024 1422                         Point: Precautions (In Progress)       Learning Progress Summary             Patient Acceptance, E,TB, NR by ES at 9/18/2024 1540    Acceptance, E,D, VU,NR by AB at 9/14/2024 1422                                         User Key       Initials Effective Dates Name Provider Type Discipline    AB 09/22/22 -  Ebony Loera, PT Physical Therapist PT    ES 08/11/22 -  Joy Ayala PT Physical Therapist PT                  PT Recommendation and Plan     Plan of Care Reviewed With: patient, spouse  Progress: improving  Outcome Evaluation: Pt demonstrated improved mobility this date, increasing ambulation distance with modA x2 and FWW. Pt continues to be limited by generalized weakness, decreased activity tolerance, and ROM deficits warranting skilled IPPT services. PT rec SNF at d/c.     Time Calculation:         PT Charges       Row Name 09/19/24 2914             Time Calculation    Start Time  0958  -ES      PT Received On 24  -ES      PT Goal Re-Cert Due Date 24  -ES         Time Calculation- PT    Total Timed Code Minutes- PT 32 minute(s)  -ES         Timed Charges    43197 - PT Therapeutic Exercise Minutes 15  -ES      78437 - Gait Training Minutes  17  -ES         Total Minutes    Timed Charges Total Minutes 32  -ES       Total Minutes 32  -ES                User Key  (r) = Recorded By, (t) = Taken By, (c) = Cosigned By      Initials Name Provider Type    ES Joy Ayala PT Physical Therapist                  Therapy Charges for Today       Code Description Service Date Service Provider Modifiers Qty    48203698192  PT THERAPEUTIC ACT EA 15 MIN 2024 Joy Ayala, PT GP 2    36059359565 HC PT RE-EVAL ESTABLISHED PLAN 2 2024 Joy Ayala, PT GP 1    [id number]  PT THER SUPP EA 15 MIN 2024 Joy Ayala, PT GP 2    58068314783 HC PT THER PROC EA 15 MIN 2024 Joy Ayala, PT GP 1    36336136498 HC GAIT TRAINING EA 15 MIN 2024 Joy Ayala, PT GP 1            PT G-Codes  Outcome Measure Options: AM-PAC 6 Clicks Basic Mobility (PT)  AM-PAC 6 Clicks Score (PT): 13  AM-PAC 6 Clicks Score (OT): 14  PT Discharge Summary  Anticipated Discharge Disposition (PT): skilled nursing facility    Joy Ayala PT  2024      Electronically signed by Joy Ayala PT at 24 1500          Occupational Therapy Notes (most recent note)        Karmen Betancur, OT at 24 1020          Patient Name: Guicho Blanco  : 1940    MRN: [id number]                              Today's Date: 2024       Admit Date: 2024    Visit Dx:     ICD-10-CM ICD-9-CM   1. Generalized weakness  R53.1 780.79   2. Cellulitis of right lower extremity  L03.115 682.6   3. Oropharyngeal dysphagia  R13.12 787.22     Patient Active Problem List   Diagnosis    Osteomyelitis of toe of right foot    Osteomyelitis of right foot    PAF (paroxysmal  atrial fibrillation)    Coronary artery disease involving native coronary artery of native heart without angina pectoris    Gastroesophageal reflux disease without esophagitis    Benign non-nodular prostatic hyperplasia without lower urinary tract symptoms    S/P appy    Essential hypertension    Simple chronic bronchitis    Mixed hyperlipidemia    COPD (chronic obstructive pulmonary disease)    BPH (benign prostatic hypertrophy)    Hearing loss    Chest pain    Vertigo    Snoring    Overweight    PLMD (periodic limb movement disorder)    Neuropathy    Periodic headache syndrome, not intractable    Acquired absence of other right toe(s)    Tremor    Bilateral impacted cerumen    Bilateral sensorineural hearing loss    Chronic otitis externa    Deviated nasal septum    Disorder of joint of foot    ED (erectile dysfunction) of organic origin    Hydrocele    Hydrocele of testis    Impacted cerumen    Mononeuropathy    Swelling of testicle    Venous retinal branch occlusion    Vitreous degeneration    Benign prostatic hyperplasia with urinary obstruction    OA (osteoarthritis) of knee    Status post total right knee replacement    Septic arthritis    Cellulitis    Cellulitis of right knee    Failure to thrive in adult    Insomnia    Moderate malnutrition    Sepsis    Lactic acidosis    Serotonin syndrome     Past Medical History:   Diagnosis Date    Arthritis     Atrial fibrillation     BPH (benign prostatic hypertrophy)     Cataract     Cellulitis     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     bilateral    Coronary artery disease     Full dentures     Gall stones     GERD (gastroesophageal reflux disease)     Hearing loss     Hiatal hernia     Hyperlipidemia     Hypertension     Peripheral neuropathy     Peripheral vascular disease     Seasonal allergies     Skin cancer     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    TIA (transient ischemic attack)     2 times    Wears glasses      Past Surgical History:   Procedure Laterality Date     ABLATION OF DYSRHYTHMIC FOCUS      APPENDECTOMY      CARDIAC CATHETERIZATION N/A 10/05/2017    Procedure: Left Heart Cath;  Surgeon: Hector Urrutia MD;  Location:  [facility] CATH INVASIVE LOCATION;  Service:     CATARACT EXTRACTION Bilateral     CHOLECYSTECTOMY      Remote    COCHLEAR IMPLANT REVISION  05/2019    COLONOSCOPY      PROSTATE SURGERY      SHOULDER SURGERY Right     SKIN BIOPSY      TOE AMPUTATION Right     2nd toe - Right Foot    TONSILLECTOMY      TOTAL KNEE ARTHROPLASTY Right 8/7/2024    Procedure: TOTAL KNEE ARTHROPLASTY WITH ASHLEY ROBOT RIGHT;  Surgeon: Christian Schwartz MD;  Location:  [facility] OR;  Service: Robotics - Ortho;  Laterality: Right;      General Information       Row Name 09/18/24 1445          OT Time and Intention    Document Type evaluation  -     Mode of Treatment occupational therapy  -       Row Name 09/18/24 1445          General Information    Patient Profile Reviewed yes  -AJ     Prior Level of Function min assist:;all household mobility;bed mobility  -     Existing Precautions/Restrictions fall  Prior R TKA, R LE WBAT  -     Barriers to Rehab medically complex;previous functional deficit;cognitive status  -       Row Name 09/18/24 1445          Living Environment    People in Home spouse  -       Row Name 09/18/24 1445          Home Main Entrance    Number of Stairs, Main Entrance one  -AJ     Stair Railings, Main Entrance none  -       Row Name 09/18/24 1445          Stairs Within Home, Primary    Number of Stairs, Within Home, Primary one  -AJ     Stair Railings, Within Home, Primary none  -       Row Name 09/18/24 1445          Cognition    Orientation Status (Cognition) oriented x 3  -       Row Name 09/18/24 1445          Safety Issues, Functional Mobility    Safety Issues Affecting Function (Mobility) awareness of need for assistance;insight into deficits/self-awareness;safety precaution awareness;safety precautions follow-through/compliance;sequencing  abilities;judgment  -     Impairments Affecting Function (Mobility) balance;endurance/activity tolerance;pain;range of motion (ROM);strength  -               User Key  (r) = Recorded By, (t) = Taken By, (c) = Cosigned By      Initials Name Provider Type    Karmen Mensah OT Occupational Therapist                     Mobility/ADL's       Row Name 09/18/24 1513          Bed Mobility    Bed Mobility supine-sit  -     Supine-Sit [place] (Bed Mobility) moderate assist (50% patient effort);verbal cues;2 person assist  -     Bed Mobility, Safety Issues cognitive deficits limit understanding;decreased use of legs for bridging/pushing  -     Assistive Device (Bed Mobility) bed rails;head of bed elevated;draw sheet  -     Comment, (Bed Mobility) VC and tactile cue for hand placement and weight shifting for supine>sitting EOB.  -       Row Name 09/18/24 1513          Transfers    Transfers bed-chair transfer  -       Row Name 09/18/24 1513          Bed-Chair Transfer    Bed-Chair [place] (Transfers) maximum assist (25% patient effort);2 person assist  Max x2 with hip guide  -     Assistive Device (Bed-Chair Transfers) other (see comments)  BUE support  -       Row Name 09/18/24 1513          Sit-Stand Transfer    Sit-Stand [place] (Transfers) maximum assist (25% patient effort);2 person assist  -     Assistive Device (Sit-Stand Transfers) other (see comments)  BUE support  -               User Key  (r) = Recorded By, (t) = Taken By, (c) = Cosigned By      Initials Name Provider Type    Karmen Mensah OT Occupational Therapist                   Obj/Interventions       Row Name 09/18/24 1516          Sensory Assessment (Somatosensory)    Sensory Assessment (Somatosensory) UE sensation intact  -       Row Name 09/18/24 1516          Vision Assessment/Intervention    Visual Impairment/Limitations WFL  -       Row Name 09/18/24 1516          Range of Motion Comprehensive     General Range of Motion bilateral upper extremity ROM WFL  -       Row Name 09/18/24 1516          Strength Comprehensive (MMT)    Comment, General Manual Muscle Testing (MMT) Assessment BUE MMT grossly 4+/5  -       Row Name 09/18/24 1516          Balance    Balance Assessment sitting static balance;sitting dynamic balance;sit to stand dynamic balance;standing dynamic balance;standing static balance  -     Static Sitting Balance contact guard;1-person assist  -AJ     Dynamic Sitting Balance moderate assist;2-person assist  -AJ     Position, Sitting Balance sitting edge of bed  -     Sit to Stand Dynamic Balance maximum assist;2-person assist  -AJ     Static Standing Balance 2-person assist;maximum assist  -AJ     Dynamic Standing Balance maximum assist;2-person assist;other (see comments)  max A x2 with hip guide  -     Position/Device Used, Standing Balance other (see comments)  BUE support  -     Balance Interventions sitting;sit to stand;supported;standing;static;dynamic  -               User Key  (r) = Recorded By, (t) = Taken By, (c) = Cosigned By      Initials Name Provider Type    AJ Karmen Betancur OT Occupational Therapist                   Goals/Plan       Row Name 09/18/24 1524          Bed Mobility Goal 1 (OT)    Activity/Assistive Device (Bed Mobility Goal 1, OT) sit to supine  -AJ     [place] Level/Cues Needed (Bed Mobility Goal 1, OT) minimum assist (75% or more patient effort)  -AJ     Time Frame (Bed Mobility Goal 1, OT) short term goal (STG);5 days  -AJ     Progress/Outcomes (Bed Mobility Goal 1, OT) new goal  -       Row Name 09/18/24 1524          Transfer Goal 1 (OT)    Activity/Assistive Device (Transfer Goal 1, OT) bed-to-chair/chair-to-bed  -AJ     [place] Level/Cues Needed (Transfer Goal 1, OT) minimum assist (75% or more patient effort)  -AJ     Time Frame (Transfer Goal 1, OT) long term goal (LTG);10 days  -AJ     Progress/Outcome (Transfer Goal 1, OT) new goal   -AJ       Row Name 09/18/24 1524          Dressing Goal 1 (OT)    Activity/Device (Dressing Goal 1, OT) upper body dressing  -AJ     [place]/Cues Needed (Dressing Goal 1, OT) minimum assist (75% or more patient effort)  -AJ     Time Frame (Dressing Goal 1, OT) long term goal (LTG);10 days  -AJ     Strategies/Barriers (Dressing Goal 1, OT) sitting EOB  -AJ     Progress/Outcome (Dressing Goal 1, OT) new goal  -       Row Name 09/18/24 1524          Therapy Assessment/Plan (OT)    Planned Therapy Interventions (OT) activity tolerance training;BADL retraining;functional balance retraining;occupation/activity based interventions;patient/caregiver education/training;strengthening exercise;transfer/mobility retraining;ROM/therapeutic exercise  -               User Key  (r) = Recorded By, (t) = Taken By, (c) = Cosigned By      Initials Name Provider Type    Karmen Mensah, OT Occupational Therapist                   Clinical Impression       Row Name 09/18/24 1519          Pain Assessment    Pretreatment Pain Rating 0/10 - no pain  -AJ     Posttreatment Pain Rating 0/10 - no pain  -       Row Name 09/18/24 1519          Plan of Care Review    Plan of Care Reviewed With patient  -AJ     Progress no change  -AJ     Outcome Evaluation OT evaluation completed. Pt presents below fxl baseline with deficits in strength and activity tolerance for ADL routines, decreased safety awareness, and impaired balance with dynamic movements. Pt would benefit from ongoing skilled IPOT services to progress to PLOF. Recommend d/c to SNF.  -       Row Name 09/18/24 1519          Therapy Assessment/Plan (OT)    Patient/Family Therapy Goal Statement (OT) Return to PLOF  -AJ     Rehab Potential (OT) good, to achieve stated therapy goals  -AJ     Criteria for Skilled Therapeutic Interventions Met (OT) yes;meets criteria;skilled treatment is necessary  -AJ     Therapy Frequency (OT) daily  -AJ     Predicted Duration of Therapy  Intervention (OT) 10 days  -       Row Name 09/18/24 1519          Therapy Plan Review/Discharge Plan (OT)    Anticipated Discharge Disposition (OT) skilled nursing facility  -       Row Name 09/18/24 1519          Vital Signs    Pre Systolic BP Rehab 142  -AJ     Pre Treatment Diastolic BP 69  -AJ     Post Systolic BP Rehab 148  -AJ     Post Treatment Diastolic BP 77  -AJ     Pretreatment Heart Rate (beats/min) 91  -AJ     Posttreatment Heart Rate (beats/min) 95  -AJ     Pre SpO2 (%) 97  -AJ     O2 Delivery Pre Treatment room air  -AJ     Post SpO2 (%) 98  -AJ     O2 Delivery Post Treatment room air  -AJ     Pre Patient Position Supine  -AJ     Intra Patient Position Standing  -AJ     Post Patient Position Sitting  -AJ       Row Name 09/18/24 1519          Positioning and Restraints    Pre-Treatment Position in bed  -AJ     Post Treatment Position chair  -AJ     In Chair notified nsg;reclined;sitting;call light within reach;encouraged to call for assist;exit alarm on;waffle cushion;legs elevated;with family/caregiver  -AJ               User Key  (r) = Recorded By, (t) = Taken By, (c) = Cosigned By      Initials Name Provider Type    Karmen Mensah, OT Occupational Therapist                   Outcome Measures       Row Name 09/18/24 1525          How much help from another is currently needed...    Putting on and taking off regular lower body clothing? 2  -AJ     Bathing (including washing, rinsing, and drying) 2  -AJ     Toileting (which includes using toilet bed pan or urinal) 2  -AJ     Putting on and taking off regular upper body clothing 2  -AJ     Taking care of personal grooming (such as brushing teeth) 3  -AJ     Eating meals 3  -AJ     AM-PAC 6 Clicks Score (OT) 14  -AJ       Row Name 09/18/24 1539          How much help from another person do you currently need...    Turning from your back to your side while in flat bed without using bedrails? 2  -ES     Moving from lying on back to sitting on the  side of a flat bed without bedrails? 2  -ES     Moving to and from a bed to a chair (including a wheelchair)? 2  -ES     Standing up from a chair using your arms (e.g., wheelchair, bedside chair)? 2  -ES     Climbing 3-5 steps with a railing? 1  -ES     To walk in hospital room? 1  -ES     AM-PAC 6 Clicks Score (PT) 10  -ES     Highest Level of Mobility Goal 4 --> Transfer to chair/commode  -ES       Row Name 09/18/24 1525          Modified [place] Scale    Pre-Stroke Modified [place] Scale --  -     Modified [place] Scale --  -       Row Name 09/18/24 1539 09/18/24 1525       Functional Assessment    Outcome Measure Options AM-PAC 6 Clicks Basic Mobility (PT)  -ES AM-PAC 6 Clicks Daily Activity (OT)  -              User Key  (r) = Recorded By, (t) = Taken By, (c) = Cosigned By      Initials Name Provider Type    Joy Carl, PT Physical Therapist    Karmen Mensah, OT Occupational Therapist                    Occupational Therapy Education       Title: PT OT SLP Therapies (In Progress)       Topic: Occupational Therapy (In Progress)       Point: ADL training (Done)       Description:   Instruct learner(s) on proper safety adaptation and remediation techniques during self care or transfers.   Instruct in proper use of assistive devices.                  Learning Progress Summary             Patient Acceptance, E, VU,NR by GLORIA at 9/18/2024 1525                         Point: Home exercise program (Not Started)       Description:   Instruct learner(s) on appropriate technique for monitoring, assisting and/or progressing therapeutic exercises/activities.                  Learner Progress:  Not documented in this visit.              Point: Precautions (Done)       Description:   Instruct learner(s) on prescribed precautions during self-care and functional transfers.                  Learning Progress Summary             Patient Acceptance, E, VU,NR by GLORIA at 9/18/2024 1525                         Point:  Body mechanics (Done)       Description:   Instruct learner(s) on proper positioning and spine alignment during self-care, functional mobility activities and/or exercises.                  Learning Progress Summary             Patient Acceptance, E, VU,NR by  at 9/18/2024 1525                                         User Key       Initials Effective Dates Name Provider Type Discipline     08/26/24 -  Karmen Betancur OT Occupational Therapist OT                  OT Recommendation and Plan  Planned Therapy Interventions (OT): activity tolerance training, BADL retraining, functional balance retraining, occupation/activity based interventions, patient/caregiver education/training, strengthening exercise, transfer/mobility retraining, ROM/therapeutic exercise  Therapy Frequency (OT): daily  Plan of Care Review  Plan of Care Reviewed With: patient  Progress: no change  Outcome Evaluation: OT evaluation completed. Pt presents below fxl baseline with deficits in strength and activity tolerance for ADL routines, decreased safety awareness, and impaired balance with dynamic movements. Pt would benefit from ongoing skilled IPOT services to progress to PLOF. Recommend d/c to SNF.     Time Calculation:   Evaluation Complexity (OT)  Review Occupational Profile/Medical/Therapy History Complexity: expanded/moderate complexity  Assessment, Occupational Performance/Identification of Deficit Complexity: 3-5 performance deficits  Clinical Decision Making Complexity (OT): detailed assessment/moderate complexity  Overall Complexity of Evaluation (OT): moderate complexity     Time Calculation- OT       Row Name 09/18/24 1526             Time Calculation- OT    OT Start Time 1020  -AJ      OT Received On 09/18/24  -      OT Goal Re-Cert Due Date 09/28/24  -         Timed Charges    05550 - OT Therapeutic Activity Minutes 11  -AJ         Untimed Charges    OT Eval/Re-eval Minutes 46  -AJ         Total Minutes    Timed Charges Total  Minutes 11  -AJ      Untimed Charges Total Minutes 46  -AJ       Total Minutes 57  -AJ                User Key  (r) = Recorded By, (t) = Taken By, (c) = Cosigned By      Initials Name Provider Type    Karmen Mensah OT Occupational Therapist                  Therapy Charges for Today       Code Description Service Date Service Provider Modifiers Qty    [id number]  OT THERAPEUTIC ACT EA 15 MIN 9/18/2024 Karmen Betancur OT GO 1    [id number]  OT EVAL MOD COMPLEXITY 4 9/18/2024 Karmen Betancur OT GO 1                 Karmen Betancur OT  9/18/2024    Electronically signed by Karmen Betancur OT at 09/18/24 7864

## 2024-09-19 NOTE — PLAN OF CARE
Goal Outcome Evaluation:  Plan of Care Reviewed With: patient, family        Progress: improving         Anticipated Discharge Disposition (SLP): inpatient rehabilitation facility             Treatment Assessment (SLP): continued, pharyngeal dysphagia, toleration of diet (09/19/24 1050)  Treatment Assessment Comments (SLP): patient was monitored on current diet without overt s/sx. trialing cohensive diet texture (i.e., ignacia cracker in apple sauce) without overt s/sx and using residue clearing strategies of alternating liquids and solids. Patient's family provided with education regarding cohsive diet options. Introduced, reviewed, and completed pharyngeal exercises. (09/19/24 1050)  Plan for Continued Treatment (SLP): continue treatment per plan of care (09/19/24 1050)

## 2024-09-20 LAB
ANION GAP SERPL CALCULATED.3IONS-SCNC: 13 MMOL/L (ref 5–15)
BUN SERPL-MCNC: 11 MG/DL (ref 8–23)
BUN/CREAT SERPL: 16.9 (ref 7–25)
CALCIUM SPEC-SCNC: 8.4 MG/DL (ref 8.6–10.5)
CHLORIDE SERPL-SCNC: 104 MMOL/L (ref 98–107)
CK SERPL-CCNC: 305 U/L (ref 20–200)
CO2 SERPL-SCNC: 21 MMOL/L (ref 22–29)
CREAT SERPL-MCNC: 0.65 MG/DL (ref 0.76–1.27)
DEPRECATED RDW RBC AUTO: 42.3 FL (ref 37–54)
EGFRCR SERPLBLD CKD-EPI 2021: 93.5 ML/MIN/1.73
ERYTHROCYTE [DISTWIDTH] IN BLOOD BY AUTOMATED COUNT: 14 % (ref 12.3–15.4)
GLUCOSE SERPL-MCNC: 101 MG/DL (ref 65–99)
HCT VFR BLD AUTO: 32.3 % (ref 37.5–51)
HGB BLD-MCNC: 10.4 G/DL (ref 13–17.7)
MAGNESIUM SERPL-MCNC: 1.7 MG/DL (ref 1.6–2.4)
MCH RBC QN AUTO: 26.7 PG (ref 26.6–33)
MCHC RBC AUTO-ENTMCNC: 32.2 G/DL (ref 31.5–35.7)
MCV RBC AUTO: 83 FL (ref 79–97)
PHOSPHATE SERPL-MCNC: 2.7 MG/DL (ref 2.5–4.5)
PLATELET # BLD AUTO: 129 10*3/MM3 (ref 140–450)
PMV BLD AUTO: 10.7 FL (ref 6–12)
POTASSIUM SERPL-SCNC: 3.4 MMOL/L (ref 3.5–5.2)
RBC # BLD AUTO: 3.89 10*6/MM3 (ref 4.14–5.8)
SODIUM SERPL-SCNC: 138 MMOL/L (ref 136–145)
WBC NRBC COR # BLD AUTO: 4.12 10*3/MM3 (ref 3.4–10.8)

## 2024-09-20 PROCEDURE — 25010000002 THIAMINE PER 100 MG: Performed by: STUDENT IN AN ORGANIZED HEALTH CARE EDUCATION/TRAINING PROGRAM

## 2024-09-20 PROCEDURE — 25010000002 MAGNESIUM SULFATE 2 GM/50ML SOLUTION: Performed by: INTERNAL MEDICINE

## 2024-09-20 PROCEDURE — 85027 COMPLETE CBC AUTOMATED: CPT | Performed by: INTERNAL MEDICINE

## 2024-09-20 PROCEDURE — 92526 ORAL FUNCTION THERAPY: CPT

## 2024-09-20 PROCEDURE — 82550 ASSAY OF CK (CPK): CPT | Performed by: INTERNAL MEDICINE

## 2024-09-20 PROCEDURE — 84100 ASSAY OF PHOSPHORUS: CPT | Performed by: INTERNAL MEDICINE

## 2024-09-20 PROCEDURE — 83735 ASSAY OF MAGNESIUM: CPT | Performed by: INTERNAL MEDICINE

## 2024-09-20 PROCEDURE — 99232 SBSQ HOSP IP/OBS MODERATE 35: CPT | Performed by: INTERNAL MEDICINE

## 2024-09-20 PROCEDURE — 80048 BASIC METABOLIC PNL TOTAL CA: CPT

## 2024-09-20 RX ORDER — POTASSIUM CHLORIDE 1.5 G/1.58G
40 POWDER, FOR SOLUTION ORAL ONCE
Status: COMPLETED | OUTPATIENT
Start: 2024-09-20 | End: 2024-09-20

## 2024-09-20 RX ORDER — LISINOPRIL 10 MG/1
10 TABLET ORAL
Status: DISCONTINUED | OUTPATIENT
Start: 2024-09-20 | End: 2024-09-23 | Stop reason: HOSPADM

## 2024-09-20 RX ORDER — HYDROXYZINE HYDROCHLORIDE 25 MG/1
25 TABLET, FILM COATED ORAL ONCE
Status: COMPLETED | OUTPATIENT
Start: 2024-09-20 | End: 2024-09-20

## 2024-09-20 RX ORDER — MAGNESIUM SULFATE HEPTAHYDRATE 40 MG/ML
2 INJECTION, SOLUTION INTRAVENOUS ONCE
Status: COMPLETED | OUTPATIENT
Start: 2024-09-20 | End: 2024-09-20

## 2024-09-20 RX ORDER — POTASSIUM CHLORIDE 1.5 G/1.58G
40 POWDER, FOR SOLUTION ORAL ONCE
Status: DISCONTINUED | OUTPATIENT
Start: 2024-09-20 | End: 2024-09-20

## 2024-09-20 RX ORDER — POTASSIUM CHLORIDE 750 MG/1
40 CAPSULE, EXTENDED RELEASE ORAL EVERY 4 HOURS
Status: DISCONTINUED | OUTPATIENT
Start: 2024-09-20 | End: 2024-09-20

## 2024-09-20 RX ADMIN — HYDROXYZINE HYDROCHLORIDE 25 MG: 25 TABLET, FILM COATED ORAL at 01:22

## 2024-09-20 RX ADMIN — LISINOPRIL 10 MG: 10 TABLET ORAL at 08:53

## 2024-09-20 RX ADMIN — PANTOPRAZOLE SODIUM 40 MG: 40 INJECTION, POWDER, FOR SOLUTION INTRAVENOUS at 06:44

## 2024-09-20 RX ADMIN — POTASSIUM CHLORIDE 40 MEQ: 750 CAPSULE, EXTENDED RELEASE ORAL at 08:53

## 2024-09-20 RX ADMIN — Medication 250 MG: at 08:53

## 2024-09-20 RX ADMIN — THIAMINE HYDROCHLORIDE 250 MG: 100 INJECTION, SOLUTION INTRAMUSCULAR; INTRAVENOUS at 04:08

## 2024-09-20 RX ADMIN — PRAVASTATIN SODIUM 20 MG: 20 TABLET ORAL at 08:53

## 2024-09-20 RX ADMIN — Medication 10 ML: at 22:14

## 2024-09-20 RX ADMIN — FLECAINIDE ACETATE 100 MG: 50 TABLET ORAL at 22:12

## 2024-09-20 RX ADMIN — ACETAMINOPHEN 1000 MG: 500 TABLET ORAL at 18:08

## 2024-09-20 RX ADMIN — ACETAMINOPHEN 1000 MG: 500 TABLET ORAL at 01:22

## 2024-09-20 RX ADMIN — Medication 250 MG: at 22:13

## 2024-09-20 RX ADMIN — FLECAINIDE ACETATE 100 MG: 50 TABLET ORAL at 08:53

## 2024-09-20 RX ADMIN — MAGNESIUM SULFATE HEPTAHYDRATE 2 G: 40 INJECTION, SOLUTION INTRAVENOUS at 08:54

## 2024-09-20 RX ADMIN — Medication 10 ML: at 08:54

## 2024-09-20 RX ADMIN — ISOSORBIDE MONONITRATE 30 MG: 30 TABLET, EXTENDED RELEASE ORAL at 08:53

## 2024-09-20 RX ADMIN — POTASSIUM CHLORIDE 40 MEQ: 1.5 FOR SOLUTION ORAL at 14:16

## 2024-09-20 NOTE — PROGRESS NOTES
"      Orthopaedic Surgery Progress Note    CC: weakness      Subjective     Interval History:   Pt remains afebrile.  Up in recliner eating lunch.  Wife at bedside.  Pt accepted at [facility].        ROS: Denies fever, chills, nausea or vomiting    Objective     Vital Signs:  Temp (24hrs), Av.7 °F (36.5 °C), Min:97.6 °F (36.4 °C), Max:98 °F (36.7 °C)    /87 (BP Location: Left arm, Patient Position: Lying)   Pulse 96   Temp 97.6 °F (36.4 °C) (Oral)   Resp 16   Ht 182.9 cm (72.01\")   Wt 92 kg (202 lb 13.2 oz)   SpO2 99%   BMI 27.50 kg/m²       Physical Exam:  Right knee incision free of erythema or drainage.  Knee continues to be benign with regards to her painless range of motion.    Assessment and Plan:  Patient doing well overall.  Hospital day #8    Cultures show no growth day 4.  No anaerobes isolated at 3 days    Erythema continues to improve  Antibiotics per Dr. Manzanares  Per patient, sounds like he will be discharged to [facility] in the next few days.  Upon discharge, patient will need follow-up with me in 2 weeks.        Christian Schwartz MD  24  13:45 EDT       "

## 2024-09-20 NOTE — PLAN OF CARE
Problem: Adult Inpatient Plan of Care  Goal: Plan of Care Review  Outcome: Ongoing, Progressing  Flowsheets (Taken 9/20/2024 3754)  Plan of Care Reviewed With: patient (Pended)   Goal Outcome Evaluation:  Plan of Care Reviewed With: (P) patient                          Treatment Assessment (SLP): (P) continued, suspected, pharyngeal dysphagia (Pt reports poor intake due to adversion to pureed diet.) (09/20/24 9667)  Treatment Assessment Comments (SLP): (P) Observed pt at mealtime. Poor intake noted. Gived some sherbet and a ignacia cracker coated in apple sauce. No pharyngeal s/sxs observed, prolonged mastication of solid/puree. Addressed aversion to puree and suggested trying mechanical ground with extra gravy/sauce. Pt in agreement. Educated/demonstrated swallowing exercises with pt. (09/20/24 3167)  Plan for Continued Treatment (SLP): (P) continue treatment per plan of care (09/20/24 7247)

## 2024-09-20 NOTE — CASE MANAGEMENT/SOCIAL WORK
Continued Stay Note  [facility]     Patient Name: Guicho Blanco  MRN: [id number]  Today's Date: 9/20/2024    Admit Date: 9/13/2024    Plan: [facility]   Discharge Plan       Row Name 09/20/24 1040       Plan    Plan [facility]    Plan Comments Discharge plan is [facility]  with transition to LTC.  Discussed in MDR and patient deemed medically ready to discharge.  [place] is starting insurance pre-cert today; patient can be accepted over the weekend if insurance approved.  Facility pharmacy is [facility] in [place]. Weekend CM to follow; patient will need WC ride arranged to transport. IMM given to spouse.                  Discharge Codes    No documentation.                 Expected Discharge Date and Time       Expected Discharge Date Expected Discharge Time    Sep 21, 2024               Lissy Galvez RN

## 2024-09-20 NOTE — PROGRESS NOTES
INTENSIVIST   PROGRESS NOTE     Hospital:  LOS: 4 days     Mr. Guicho Blanco, 83 y.o. male is followed for a Chief Complaint of: Lactic Acidosis      Subjective   S     Interval History:  No acute events overnight. Bed at [facility] pending insurance pre-cert.        The patient's relevant past medical, surgical and social history were reviewed and updated in Epic as appropriate.      ROS:   Constitutional: Negative for fever.   Respiratory: Negative for dyspnea.   Cardiovascular: Negative for chest pain.   Gastrointestinal: Negative for  nausea, vomiting and diarrhea.       Objective   O     Vitals:  Temp  Min: 97.6 °F (36.4 °C)  Max: 98 °F (36.7 °C)  BP  Min: 140/87  Max: 177/89  Pulse  Min: 81  Max: 96  Resp  Min: 16  Max: 16  SpO2  Min: 96 %  Max: 99 % No data recorded    Intake/Ouptut 24 hrs (7:00AM - 6:59 AM)  Intake & Output (last 3 days)         09/13 0701  09/14 0700 09/14 0701  09/15 0700 09/15 0701  09/16 0700 09/16 0701  09/17 0700    P.O.  950 410     IV Piggyback 100 600      Total Intake(mL/kg) 100 (1) 1550 (16.3) 410 (4.3)     Urine (mL/kg/hr) 1205 2175 (1) 625 (0.3)     Stool   0     Total Output 1205 2175 625     Net -1105 -625 -215             Urine Unmeasured Occurrence   4 x     Stool Unmeasured Occurrence   1 x             Medications (drips):           Physical Examination  Telemetry:  Sinus tachycardia.    Constitutional:  No acute distress.  Sitting up in bed eating breakfast.    Cardiovascular: Normal rate, regular and rhythm. Normal heart sounds.  No murmurs, gallop or rub.   Respiratory: No respiratory distress. Normal respiratory effort.  Normal breath sounds  Clear to auscultation   Abdominal:  Soft. No masses. Non-tender. No distension. No HSM.   Extremities: No digital cyanosis. No clubbing.  Right knee with no erythema or drainage.    Neurological:   Alert and interactive.           Results from last 7 days   Lab Units 09/20/24  0331 09/18/24  0408 09/17/24  0325    WBC 10*3/mm3 4.12 3.13* 2.71*   HEMOGLOBIN g/dL 10.4* 10.0* 10.4*   MCV fL 83.0 85.7 84.7   PLATELETS 10*3/mm3 129* 112* 127*     Results from last 7 days   Lab Units 09/20/24  0331 09/19/24  0416 09/18/24  0408 09/17/24  0327   SODIUM mmol/L 138 138 137 141   POTASSIUM mmol/L 3.4* 3.6 4.2 3.8   CO2 mmol/L 21.0* 24.0 24.0 21.0*   CREATININE mg/dL 0.65* 0.78 0.95 1.10   GLUCOSE mg/dL 101* 85 72 89   MAGNESIUM mg/dL 1.7  --  2.0 1.8   PHOSPHORUS mg/dL 2.7  --  2.5 2.9     Estimated Creatinine Clearance: 112.1 mL/min (A) (by C-G formula based on SCr of 0.65 mg/dL (L)).  Results from last 7 days   Lab Units 09/18/24  0408   ALK PHOS U/L 116   BILIRUBIN mg/dL 0.5   BILIRUBIN DIRECT mg/dL 0.2   ALT (SGPT) U/L 59*   AST (SGOT) U/L 137*       Results from last 7 days   Lab Units 09/16/24  2204 09/16/24  1040   PH, ARTERIAL pH units 7.451* 7.276*   PCO2, ARTERIAL mm Hg 32.7* 21.0*   PO2 ART mm Hg 102.0 101.0   FIO2 % 28 28       Images:  Imaging Results (Last 24 Hours)       ** No results found for the last 24 hours. **               Results: Reviewed.  I reviewed the patient's new laboratory and imaging results.  I independently reviewed the patient's new images.    Medications: Reviewed.    Assessment & Plan   A / P     Mr. Blanco is an 84yo M with a history of peripheral neuropathy, TIA, AFib, CAD, HTN, HLD, and a recent admission to [facility] from 8/7-8/10/24 following a knee arthroplasty on 8/7/24. He was readmitted on 8/30/24 with cellulitis of the right knee and was treated with Rocephin and Vancomycin and then discharged on 9/5/24 on PO Doxy and Keflex. Zyvox was restarted on 9/13 for worsening erythema and warmth at the incision site.     He presented back to [facility] on 9/13/24 with weakness and AMS. He was admitted to the Ortho Floor service with ID consulted and he was placed back on IV antibiotics.     Neurology has been following for altered mental status. MRI brain could not be performed secondary to cochlear  implants.     On the morning of 9/16/24, a Rapid Response was called for altered mental status and a fever of 103. Labs were concerning for a metabolic acidosis with a lactate > 13 and he was transferred to the ICU for further care.     Right knee aspiration performed on 9/16 by Dr. Schwartz at the bedside. Fluid not indicative of sylvain septic joint. Cultures remain negative.     Fever felt to be secondary to serotonin syndrome from Linezolid/Mirtazapine.       Nutrition:   Diet: Cardiac; Healthy Heart (2-3 Na+); Texture: Mechanical Ground (NDD 2); Fluid Consistency: Thin (IDDSI 0)  Advance Directives:   Code Status and Medical Interventions: No CPR (Do Not Attempt to Resuscitate); Limited Support; No intubation (DNI), No cardioversion   Ordered at: 09/16/24 1206     Medical Intervention Limits:    No intubation (DNI)    No cardioversion     Level Of Support Discussed With:    Health Care Surrogate     Code Status (Patient has no pulse and is not breathing):    No CPR (Do Not Attempt to Resuscitate)     Medical Interventions (Patient has pulse or is breathing):    Limited Support       Active Hospital Problems    Diagnosis     **Cellulitis of right knee     Serotonin syndrome     Sepsis     Lactic acidosis     Moderate malnutrition     Failure to thrive in adult     Insomnia     Neuropathy     Hearing loss        Assessment / Plan:    ID managing antibiotics.   Add Lisinopril for hypertension.   Orthopedics following and s/p tap of right knee. Cultures remained negative.   Modified diet per Speech Therapy   PT/OT. Needs rehab.   AM labs  Okay to transfer back to telemetry.       Plan of care and goals reviewed during interdisciplinary rounds.  I discussed the patient's findings and my recommendations with patient, family, and nursing staff      Martha Parmar, DO    Intensive Care Medicine and Pulmonary Medicine

## 2024-09-20 NOTE — THERAPY TREATMENT NOTE
Acute Care - Speech Language Pathology   Swallow Treatment Note [facility]     Patient Name: Guicho Blanco  : 1940  MRN: [id number]  Today's Date: 2024               Admit Date: 2024    Visit Dx:     ICD-10-CM ICD-9-CM   1. Generalized weakness  R53.1 780.79   2. Cellulitis of right lower extremity  L03.115 682.6   3. Oropharyngeal dysphagia  R13.12 787.22     Patient Active Problem List   Diagnosis    Osteomyelitis of toe of right foot    Osteomyelitis of right foot    PAF (paroxysmal atrial fibrillation)    Coronary artery disease involving native coronary artery of native heart without angina pectoris    Gastroesophageal reflux disease without esophagitis    Benign non-nodular prostatic hyperplasia without lower urinary tract symptoms    S/P appy    Essential hypertension    Simple chronic bronchitis    Mixed hyperlipidemia    COPD (chronic obstructive pulmonary disease)    BPH (benign prostatic hypertrophy)    Hearing loss    Chest pain    Vertigo    Snoring    Overweight    PLMD (periodic limb movement disorder)    Neuropathy    Periodic headache syndrome, not intractable    Acquired absence of other right toe(s)    Tremor    Bilateral impacted cerumen    Bilateral sensorineural hearing loss    Chronic otitis externa    Deviated nasal septum    Disorder of joint of foot    ED (erectile dysfunction) of organic origin    Hydrocele    Hydrocele of testis    Impacted cerumen    Mononeuropathy    Swelling of testicle    Venous retinal branch occlusion    Vitreous degeneration    Benign prostatic hyperplasia with urinary obstruction    OA (osteoarthritis) of knee    Status post total right knee replacement    Septic arthritis    Cellulitis    Cellulitis of right knee    Failure to thrive in adult    Insomnia    Moderate malnutrition    Sepsis    Lactic acidosis    Serotonin syndrome     Past Medical History:   Diagnosis Date    Arthritis     Atrial fibrillation     BPH (benign prostatic  hypertrophy)     Cataract     Cellulitis     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     bilateral    Coronary artery disease     Full dentures     Gall stones     GERD (gastroesophageal reflux disease)     Hearing loss     Hiatal hernia     Hyperlipidemia     Hypertension     Peripheral neuropathy     Peripheral vascular disease     Seasonal allergies     Skin cancer     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    TIA (transient ischemic attack)     2 times    Wears glasses      Past Surgical History:   Procedure Laterality Date    ABLATION OF DYSRHYTHMIC FOCUS      APPENDECTOMY      CARDIAC CATHETERIZATION N/A 10/05/2017    Procedure: Left Heart Cath;  Surgeon: Hector Urrutia MD;  Location:  [facility];  Service:     CATARACT EXTRACTION Bilateral     CHOLECYSTECTOMY      Remote    COCHLEAR IMPLANT REVISION  05/2019    COLONOSCOPY      PROSTATE SURGERY      SHOULDER SURGERY Right     SKIN BIOPSY      TOE AMPUTATION Right     2nd toe - Right Foot    TONSILLECTOMY      TOTAL KNEE ARTHROPLASTY Right 8/7/2024    Procedure: TOTAL KNEE ARTHROPLASTY WITH ASHLEY ROBOT RIGHT;  Surgeon: Christian Schwartz MD;  Location:  [facility] OR;  Service: Robotics - Ortho;  Laterality: Right;       SLP Recommendation and Plan                                               Daily Summary of Progress (SLP): (P) progress toward functional goals is good (09/20/24 1347)               Treatment Assessment (SLP): (P) continued, suspected, pharyngeal dysphagia (Pt reports poor intake due to adversion to pureed diet.) (09/20/24 1347)  Treatment Assessment Comments (SLP): (P) Observed pt at mealtime. Poor intake noted. Gived some sherbet and a ignacia cracker coated in apple sauce. No pharyngeal s/sxs observed, prolonged mastication of solid/puree. Addressed aversion to puree and suggested trying mechanical ground with extra gravy/sauce. Pt in agreement. Educated/demonstrated swallowing exercises with pt. (09/20/24 1347)  Plan for Continued Treatment  (SLP): (P) continue treatment per plan of care (09/20/24 1347)         Plan of Care Reviewed With: (P) patient      SWALLOW EVALUATION (Last 72 Hours)       SLP Adult Swallow Evaluation       Row Name 09/20/24 1347 09/19/24 1050 09/18/24 1315             Rehab Evaluation    Document Type therapy note (daily note) (P)   -SM therapy note (daily note)  -GA re-evaluation  -[facility]      Subjective Information no complaints (P)   -SM no complaints  -GA no complaints  -SMA      Patient Observations alert;cooperative (P)   -SM alert;cooperative;agree to therapy  -GA alert;cooperative  -SMA      Patient/Family/Caregiver Comments/Observations Family present prior to session, left room before starting (P)   -SM grandson present  -GA --      Patient Effort good (P)   -SM good  -GA good  -SMA      Comment -- Provided with extensive education regarding MBS results, cohesive diet recommendations, and pharyngeal exercises.  -GA --      Symptoms Noted During/After Treatment none (P)   -SM none  -GA --         General Information    Patient Profile Reviewed yes (P)   -SM yes  -GA --      Pertinent History Of Current Problem See previous eval (P)   -SM see initial eval  -GA --      Current Method of Nutrition pureed;thin liquids (P)   -SM pureed;thin liquids  -GA NPO  -SMA      Precautions/Limitations, Vision WFL with corrective lenses;for purposes of eval (P)   -SM WFL;for purposes of eval  -GA --      Precautions/Limitations, Hearing hearing impairment, bilaterally (P)   -SM hearing impairment, bilaterally;other (see comments)  -GA --      Prior Level of Function-Communication other (see comments) (P)   pt/family have noted possible motor speech/voice changes  -SM other (see comments)  -GA --      Prior Level of Function-Swallowing soft to chew;nectar thick liquids;water between meals;ice between meals;esophageal concerns;concerns regarding malnutrition (P)   -SM soft to chew;nectar thick liquids;water between meals;ice between  meals;esophageal concerns;concerns regarding malnutrition  -GA --      Plans/Goals Discussed with patient (P)   - patient and family;agreed upon  -GA --      Barriers to Rehab none identified (P)   - cognitive status;previous functional deficit  -GA --      Patient's Goals for Discharge patient did not state (P)   - patient did not state  -GA --      Family Goals for Discharge -- family did not state  -GA --         Pain    Additional Documentation Pain Scale: Numbers Pre/Post-Treatment (Group) (P)   - Pain Scale: FACES Pre/Post-Treatment (Group)  -GA --         Pain Scale: Numbers Pre/Post-Treatment    Pretreatment Pain Rating 0/10 - no pain (P)   -SM -- 0/10 - no pain  -[facility]      Posttreatment Pain Rating 0/10 - no pain (P)   - -- 0/10 - no pain  -[facility]         Pain Scale: FACES Pre/Post-Treatment    Pain: FACES Scale, Pretreatment -- 0-->no hurt  -GA --      Posttreatment Pain Rating -- 0-->no hurt  -GA --         MBS/VFSS    Utensils Used -- -- spoon;cup;straw  -[facility]      Consistencies Trialed -- -- thin liquids;nectar/syrup-thick liquids;pureed;regular textures  -[facility]         MBS/VFSS Interpretation    Oral Prep Phase -- -- impaired oral phase of swallowing  -[facility]      Oral Transit Phase -- -- impaired  -[facility]      Oral Residue -- -- impaired  -[facility]         Oral Preparatory Phase    Oral Preparatory Phase -- -- prolonged manipulation;spits out food/liquid prior to swallow  -[facility]      Prolonged Manipulation -- -- pudding/puree;secondary to reduced lingual strength  -[facility]      Spits Out Food/Liquid Prior to Swallow -- -- regular textures;other (see comments)  did not have dentures present, requested to spit out. Did not push as general choking risk with solids.  -[facility]         Oral Transit Phase    Impaired Oral Transit Phase -- -- increased A-P transit time;delayed initiation of bolus transit  -[facility]      Delayed Initiation of bolus transit -- -- pudding/puree;secondary to reduced lingual control;other (see comments)   provided liquid mix to assist propulsion  -SMA      Increased A-P Transit Time -- -- all consistencies tested;secondary to reduced lingual control  -SMA         Oral Residue    Impaired Oral Residue -- -- lingual residue  -SMA      Lingual Residue -- -- pudding/puree;secondary to reduced lingual strength  -SMA      Response to Oral Residue -- -- cleared residue;with liquid wash  -SMA         Initiation of Pharyngeal Swallow    Initiation of Pharyngeal Swallow -- -- bolus in pyriform sinuses  -SMA      Pharyngeal Phase -- -- impaired pharyngeal phase of swallowing  -[facility]MA      Anatomical abnormalities noted -- -- osteophyte/bone spur per radiology report  at level of C5, reducing PES opening and contributing to pyriform sinuses residue  -SMA      Anatomical abnormalities functional impact -- -- functional impact on swallowing  choking risk solids 2' significantly reduced PES opening.  -SMA      Penetration After the Swallow -- -- thin liquids;secondary to residue;in pyriform sinuses  -SMA      Depth of Penetration -- -- other (see comments)  mid depth  -SMA      Response to Penetration -- -- Yes  -SMA      Responsed to penetration with -- -- throat clear;effective  -SMA      Rosenbek's Scale -- -- thin:;2--->level 2  -SMA      Pharyngeal Residue -- -- all consistencies tested;valleculae;secondary to reduced base of tongue retraction;secondary to reduced posterior pharyngeal wall stripping;thin liquids;nectar-thick liquids;pyriform sinuses;secondary to reduced hyolaryngeal excursion;other (see comments)  Increased with nectar c/t thin which increases risk aspiration  -SMA      Response to Residue -- -- partial residue clearance;cleared residue with spontaneous subsequent swallow;cleared residue with liquid wash  -SMA         SLP Evaluation Clinical Impression    SLP Swallowing Diagnosis -- -- mild-moderate;pharyngeal dysphagia  -SMA      Functional Impact -- -- risk of aspiration/pneumonia;risk of malnutrition  -SMA       Rehab Potential/Prognosis, Swallowing -- -- good, to achieve stated therapy goals  -[facility]      Swallow Criteria for Skilled Therapeutic Interventions Met -- -- demonstrates skilled criteria  -[facility]         SLP Treatment Clinical Impressions    Treatment Assessment (SLP) continued;suspected;pharyngeal dysphagia (P)   Pt reports poor intake due to adversion to pureed diet.  - continued;pharyngeal dysphagia;toleration of diet  -GA --      Treatment Assessment Comments (SLP) Observed pt at mealtime. Poor intake noted. Gived some sherbet and a ignacia cracker coated in apple sauce. No pharyngeal s/sxs observed, prolonged mastication of solid/puree. Addressed aversion to puree and suggested trying mechanical ground with extra gravy/sauce. Pt in agreement. Educated/demonstrated swallowing exercises with pt. (P)   -SM patient was monitored on current diet without overt s/sx. trialing cohensive diet texture (i.e., ignacia cracker in apple sauce) without overt s/sx and using residue clearing strategies of alternating liquids and solids. Patient's family provided with education regarding cohsive diet options. Introduced, reviewed, and completed pharyngeal exercises.  -GA --      Daily Summary of Progress (SLP) progress toward functional goals is good (P)   - progress toward functional goals is good  -GA --      Barriers to Overall Progress (SLP) -- Cognitive status;Baseline deficits  -GA --      Plan for Continued Treatment (SLP) continue treatment per plan of care (P)   - continue treatment per plan of care  -GA --      Care Plan Review patient/other agree to care plan (P)   - patient/other agree to care plan  -GA --      Care Plan Review, Other Participant(s) -- other (see comments)  grandson  -GA --         Recommendations    Therapy Frequency (Swallow) -- 5 days per week  -GA 5 days per week  -[facility]      Predicted Duration Therapy Intervention (Days) -- 1 week  -GA 1 week  -[facility]      SLP Diet Recommendation -- puree;thin  liquids  -GA puree;thin liquids  -[facility]      Recommended Precautions and Strategies -- upright posture during/after eating;small bites of food and sips of liquid;general aspiration precautions  -GA upright posture during/after eating;small bites of food and sips of liquid;general aspiration precautions  -[facility]      Oral Care Recommendations -- Oral Care BID/PRN;Toothbrush  -GA Oral Care BID/PRN;Toothbrush  -[facility]      SLP Rec. for Method of Medication Administration -- meds crushed;with puree;as tolerated  -GA with puree  crush as needed. If any coughing or c/o sticking.  -[facility]      Monitor for Signs of Aspiration -- yes;notify SLP if any concerns  -GA yes;notify SLP if any concerns  -[facility]      Anticipated Discharge Disposition (SLP) -- inpatient rehabilitation facility  -GA inpatient rehabilitation facility  -[facility]                User Key  (r) = Recorded By, (t) = Taken By, (c) = Cosigned By      Initials Name Effective Dates    Tiffany Quiroga, MS CCC-SLP 02/03/23 -     Carrie Cooney MS CCC-SLP 09/14/23 -     Mireille Mendez, Speech Therapy Student 07/30/24 -                     EDUCATION  The patient has been educated in the following areas:   Dysphagia (Swallowing Impairment).        SLP GOALS       Row Name 09/20/24 1347 09/19/24 1050 09/18/24 1315       (LTG) Patient will demonstrate functional swallow for    Diet Texture (Demonstrate functional swallow) mechanical ground textures (P)   -SM pureed textures  -GA pureed textures  -[facility]    Liquid viscosity (Demonstrate functional swallow) thin liquids (P)   - thin liquids  -GA thin liquids  -[facility]    [place] (Demonstrate functional swallow) independently (over 90% accuracy) (P)   -SM independently (over 90% accuracy)  -GA independently (over 90% accuracy)  -SMA    Time Frame (Demonstrate functional swallow) 1 week (P)   -SM 1 week  -GA 1 week  -SMA    Progress/Outcomes (Demonstrate functional swallow) goal revised this date;continuing progress  toward goal (P)   -SM continuing progress toward goal  -GA --    Comment (Demonstrate functional swallow) Pt expressed adversion to puree, in agreement with SLP to try upgrading to [facility]. ground with extra sauce/gravy. (P)   -SM patient tolerating trials  -GA --       (STG) Patient will tolerate trials of    Consistencies Trialed (Tolerate trials) thin liquids;mechanical ground textures (P)   -SM pureed textures;thin liquids  -GA pureed textures;thin liquids  -SMA    Desired Outcome (Tolerate trials) with adequate oral prep/transit/clearance;without signs/symptoms of aspiration (P)   -SM with adequate oral prep/transit/clearance;without signs/symptoms of aspiration  -GA with adequate oral prep/transit/clearance;without signs/symptoms of aspiration  throat clearing/coughing observed to be effective at preventing aspiration. Monitor lung status to assist  -SMA    [place] (Tolerate trials) independently (over 90% accuracy) (P)   -SM independently (over 90% accuracy)  -GA independently (over 90% accuracy)  -SMA    Time Frame (Tolerate trials) 1 week (P)   -SM 1 week  -GA 1 week  -SMA    Progress/Outcomes (Tolerate trials) continuing progress toward goal;goal revised this date (P)   -SM continuing progress toward goal  -GA --    Comment (Tolerate trials) no overt s/sx (P)   -SM no overt s/sx  -GA --       (STG) Patient will tolerate therapeutic trials of    Consistencies Trialed (Tolerate therapeutic trials) -- (P)   Soft cohesive solids  -SM --  soft/cohesive solids (e.g., ground sausage would be choking risk)  -GA --  soft/cohesive solids (e.g., ground sausage would be choking risk)  -SMA    Desired Outcome (Tolerate therapeutic trials) without signs/symptoms of aspiration;with adequate oral prep/transit/clearance (P)   -SM with adequate oral prep/transit/clearance;without signs/symptoms of aspiration  -GA with adequate oral prep/transit/clearance;without signs/symptoms of aspiration  -SMA    [place] (Tolerate  therapeutic trials) with 1:1 assist/ supervision (P)   -SM with 1:1 assist/ supervision  -GA with 1:1 assist/ supervision  -SMA    Time Frame (Tolerate therapeutic trials) 1 week (P)   -SM 1 week  -GA 1 week  -SMA    Progress/Outcomes (Tolerate therapeutic trials) goal met (P)   -SM continuing progress toward goal  -GA --    Comment (Tolerate therapeutic trials) trialed cohesive/soft diet without overt s/sx of aspiration/penetration. (P)   -SM trialed cohesive/soft diet without overt s/sx of aspiration/penetration. Implemented residue clearing strategies  -GA --       (STG) Lingual Strengthening Goal 1 (SLP)    Activity (Lingual Strengthening Goal 1, SLP) increase lingual tone/sensation/control/coordination/movement;increase tongue back strength (P)   -SM increase lingual tone/sensation/control/coordination/movement;increase tongue back strength  -GA increase lingual tone/sensation/control/coordination/movement;increase tongue back strength  -SMA    Increase Lingual Tone/Sensation/Control/Coordination/Movement lingual resistance exercises (P)   -SM lingual resistance exercises  -GA lingual resistance exercises  -SMA    Increase Tongue Back Strength lingual resistance exercises (P)   -SM lingual resistance exercises  -GA lingual resistance exercises  -SMA    [place]/Accuracy (Lingual Strengthening Goal 1, SLP) with minimal cues (75-90% accuracy) (P)   -SM with minimal cues (75-90% accuracy)  -GA with minimal cues (75-90% accuracy)  -SMA    Time Frame (Lingual Strengthening Goal 1, SLP) 1 week (P)   -SM 1 week  -GA 1 week  -SMA    Progress/Outcomes (Lingual Strengthening Goal 1, SLP) continuing progress toward goal (P)   -SM continuing progress toward goal  -GA --    Comment (Lingual Strengthening Goal 1, SLP) completed exercises and provided with education (P)   -SM completed exercises and provided with education  -GA --       (STG) Pharyngeal Strengthening Exercise Goal 1 (SLP)    Activity (Pharyngeal  Strengthening Goal 1, SLP) increase superior movement of the hyolaryngeal complex;increase anterior movement of the hyolaryngeal complex;increase squeeze/positive pressure generation (P)   -SM increase superior movement of the hyolaryngeal complex;increase anterior movement of the hyolaryngeal complex;increase squeeze/positive pressure generation  -GA increase superior movement of the hyolaryngeal complex;increase anterior movement of the hyolaryngeal complex;increase squeeze/positive pressure generation  -SMA    Increase Superior Movement of the Hyolaryngeal Complex effortful pitch glide (falsetto + pharyngeal squeeze) (P)   -SM Mendelsohn;effortful pitch glide (falsetto + pharyngeal squeeze)  -GA Mendelsohn;effortful pitch glide (falsetto + pharyngeal squeeze)  -SMA    Increase Anterior Movement of the Hyolaryngeal Complex EMST;chin tuck against resistance (CTAR) (P)   -SM EMST;chin tuck against resistance (CTAR)  -GA EMST;chin tuck against resistance (CTAR)  -SMA    Increase Squeeze/Positive Pressure Generation hard effortful swallow (P)   -SM hard effortful swallow  -GA hard effortful swallow  + tongue press  -SMA    [place]/Accuracy (Pharyngeal Strengthening Goal 1, SLP) with moderate cues (50-74% accuracy) (P)   -SM with moderate cues (50-74% accuracy)  -GA with moderate cues (50-74% accuracy)  -SMA    Time Frame (Pharyngeal Strengthening Goal 1, SLP) 1 week (P)   -SM 1 week  -GA 1 week  -SMA    Progress/Outcomes (Pharyngeal Strengthening Goal 1, SLP) continuing progress toward goal;goal revised this date (P)   -SM continuing progress toward goal  -GA --    Comment (Pharyngeal Strengthening Goal 1, SLP) Mendelsohn no longer appropriate, pt with difficulty comprehending instructions to complete. Progress on other exercises. EMST provided (P)   -SM patient provided with exercises, completed education, and then completed exercises.  -GA --       (STG) Swallow Management Recall Goal 1 (SLP)    Activity  (Swallow Management Recall Goal 1, SLP) aspiration precautions;oral care recommendations;safe diet/liquid level;rationale for use of strategies/techniques;other (see comments) (P)   -SM aspiration precautions;oral care recommendations;safe diet/liquid level;rationale for use of strategies/techniques;other (see comments)  + education on attempts at safe/cohesive soft solids once home.  -GA aspiration precautions;oral care recommendations;safe diet/liquid level;rationale for use of strategies/techniques;other (see comments)  + education on attempts at safe/cohesive soft solids once home.  -SMA    [place]/Accuracy (Swallow Management Recall Goal 1, SLP) with 1:1 supervision/constant cues (P)   -SM with 1:1 supervision/constant cues  -GA with 1:1 supervision/constant cues  -SMA    Time Frame (Swallow Management Recall Goal 1, SLP) 1 week (P)   -SM 1 week  -GA 1 week  -SMA    Progress/Outcomes (Swallow Management Recall Goal 1, SLP) goal ongoing (P)   -SM continuing progress toward goal  -GA --    Comment (Swallow Management Recall Goal 1, SLP) -- provided with education about diet, results of MBS, and home diet recommendations  -GA --              User Key  (r) = Recorded By, (t) = Taken By, (c) = Cosigned By      Initials Name Provider Type    Tiffany Quiroga, MS CCC-SLP Speech and Language Pathologist    Carrie Cooney, MS CCC-SLP Speech and Language Pathologist    Mireille Mendez, Speech Therapy Student SLP Student                         Time Calculation:    Time Calculation- SLP       Row Name 09/20/24 3753             Time Calculation- SLP    SLP Start Time 1347 (P)   -SM      SLP Received On 09/20/24 (P)   -SM         Untimed Charges    69185-PV Treatment Swallow Minutes 55 (P)   -SM         Total Minutes    Untimed Charges Total Minutes 55 (P)   -SM       Total Minutes 55 (P)   -SM                User Key  (r) = Recorded By, (t) = Taken By, (c) = Cosigned By      Initials Name Provider  Type    SM Mireille Gaming, Speech Therapy Student SLP Student                    Therapy Charges for Today       Code Description Service Date Service Provider Modifiers Qty    [id number] HC ST TREATMENT SWALLOW 4 9/20/2024 Mireille Gaming, Speech Therapy Student GN 1                 Mireille Gaming, Speech Therapy Student  9/20/2024

## 2024-09-20 NOTE — CASE MANAGEMENT/SOCIAL WORK
Continued Stay Note  [facility]     Patient Name: Guicho Blanco  MRN: [id number]  Today's Date: 9/20/2024    Admit Date: 9/13/2024    Plan: SNF to LTC @ [place]   Discharge Plan       Row Name 09/20/24 1330       Plan    Plan SNF to LTC @ [place]    Patient/Family in Agreement with Plan yes    Provided Post Acute Provider List? Yes    Post Acute Provider List Long Term Acute Care;Inpatient Rehab    Provided Post Acute Provider Quality & Resource List? Yes    Post Acute Provider Quality and Resource List Inpatient Rehab    Delivered To Support Person    Support Person Sandra    Method of Delivery In person    Plan Comments Recieved call from Marian at [facility] that they are able to offer pt a SNF to LT bed and initiate precert. Sandra and Guicho notified via phone and would like to stop previous precert and accept bed at [facility]. All parties notified of change with [facility] to initate new precert. CM will cont to follow.    Final Discharge Disposition Code 03 - skilled nursing facility (SNF)      Row Name 09/20/24 1040       Plan    Plan [facility]    Plan Comments [facility]charge [facility] with transition to LTC.  Discussed in MDR and patient deemed medically ready to discharge.  [place] is starting insurance pre-cert today; patient can be accepted over the weekend if insurance approved.  Weekend CM to follow; patient may need WC ride arranged if family unable to trasnport.                   Discharge Codes    No documentation.                 Expected Discharge Date and Time       Expected Discharge Date Expected Discharge Time    Sep 21, 2024               Viridiana Paz RN

## 2024-09-20 NOTE — CASE MANAGEMENT/SOCIAL WORK
Continued Stay Note   [facility]     Patient Name: Guicho Blanoc  MRN: [id number]  Today's Date: 9/20/2024    Admit Date: 9/13/2024    Plan: SNF to LTC   Discharge Plan       Row Name 09/20/24 1030       Plan    Plan SNF to LTC    Patient/Family in Agreement with Plan yes    Plan Comments Spoke with wife and pt at bedside. Pt has bed offer at [facility] for  with bed accepted. Precert to be initated by facility today.    Final Discharge Disposition Code 03 - skilled nursing facility (SNF)                   Discharge Codes    No documentation.                 Expected Discharge Date and Time       Expected Discharge Date Expected Discharge Time    Sep 21, 2024               Viridiana Paz RN

## 2024-09-20 NOTE — PROGRESS NOTES
"  INFECTIOUS DISEASES  INPATIENT PROGRESS NOTE  2024      PATIENT NAME: Guicho Blanco  :  1940  MRN:  [id number]  Date of Admission:  2024      Antimicrobials:  Ceftriaxone 1 g iv daily - started 24    Started 24 - 24  Daptomycin  Piperacillin/tazobactam  -----------------------------------  Bactrim 9/15/24  IV vancomycin/ceftriaxone - 24 - 9/15/24  Linezolid (outpatient 24 - 24)      MAR reviewed.     Reason for consultation:  fever, right knee cellulitis    Interval history: Patient transferred to the ICU.  No further fevers.  Has been accepted for rehab.    ROS:  as above      Objective:  Temp (24hrs), Av.7 °F (36.5 °C), Min:97.6 °F (36.4 °C), Max:98 °F (36.7 °C)    /60 (BP Location: Left arm, Patient Position: Lying)   Pulse 90   Temp 97.7 °F (36.5 °C) (Oral)   Resp 16   Ht 182.9 cm (72.01\")   Wt 92 kg (202 lb 13.2 oz)   SpO2 98%   BMI 27.50 kg/m²     Physical Examination:  GENERAL: Improved-appearing elderly male, awake and alert, up in chair  HEENT: Normocephalic, atraumatic.    LUNGS: Normal respiratory effort.  SKIN: Right knee incision closed.  Patchy erythema and warmth about incision -essentially resolved.  Prior lateral cellulitis resolved.  MS:  Right knee mildly enlarged - improved.  NEURO: A&O. No focal deficits.  Face symmetric.  Speech fluent.    Laboratory Data:    Results from last 7 days   Lab Units 24  0331 24  0408 24  0327   WBC 10*3/mm3 4.12 3.13* 2.71*   HEMOGLOBIN g/dL 10.4* 10.0* 10.4*   HEMATOCRIT % 32.3* 31.8* 33.3*   PLATELETS 10*3/mm3 129* 112* 127*     Results from last 7 days   Lab Units 24  0331   SODIUM mmol/L 138   POTASSIUM mmol/L 3.4*   CHLORIDE mmol/L 104   CO2 mmol/L 21.0*   BUN mg/dL 11   CREATININE mg/dL 0.65*   GLUCOSE mg/dL 101*   CALCIUM mg/dL 8.4*     Results from last 7 days   Lab Units 24  0408   ALK PHOS U/L 116   BILIRUBIN mg/dL 0.5   BILIRUBIN DIRECT mg/dL 0.2   ALT " (SGPT) U/L 59*   AST (SGOT) U/L 137*     Results from last 7 days   Lab Units 09/17/24  0327   SED RATE mm/hr 11           Estimated Creatinine Clearance: 112.1 mL/min (A) (by C-G formula based on SCr of 0.65 mg/dL (L)).  Results from last 7 days   Lab Units 09/16/24  2236   LACTATE mmol/L 1.6     Results from last 7 days   Lab Units 09/20/24  0331 09/19/24  0416 09/17/24  0327   CK TOTAL U/L 305* 763* 572*     Results from last 7 days   Lab Units 09/15/24  0631   VANCOMYCIN RM mcg/mL 12.10           Microbiology:    Microbiology Results (last 10 days)       Procedure Component Value - Date/Time    Body Fluid Culture - Synovial Fluid, Knee, Right [[id number]] Collected: 09/16/24 1723    Lab Status: Preliminary result Specimen: Synovial Fluid from Knee, Right Updated: 09/20/24 0807     Body Fluid Culture No growth at 4 days     Gram Stain Many (4+) Red blood cells      Rare (1+) WBCs seen      No organisms seen    Anaerobic Culture - Synovial Fluid, Knee, Right [[id number]]  (Normal) Collected: 09/16/24 1723    Lab Status: Preliminary result Specimen: Synovial Fluid from Knee, Right Updated: 09/19/24 0734     Anaerobic Culture No anaerobes isolated at 3 days    Blood Culture - Blood, Hand, Left [[id number]]  (Normal) Collected: 09/16/24 1044    Lab Status: Preliminary result Specimen: Blood from Hand, Left Updated: 09/20/24 1130     Blood Culture No growth at 4 days    Narrative:      Less than seven (7) mL's of blood was collected.  Insufficient quantity may yield false negative results.    Blood Culture - Blood, Arm, Right [[id number]]  (Normal) Collected: 09/13/24 1405    Lab Status: Final result Specimen: Blood from Arm, Right Updated: 09/18/24 1430     Blood Culture No growth at 5 days    COVID PRE-OP / PRE-PROCEDURE SCREENING ORDER (NO ISOLATION) - Swab, Nasopharynx [[id number]]  (Normal) Collected: 09/13/24 1344    Lab Status: Final result Specimen: Swab from Nasopharynx Updated: 09/13/24 1418    Narrative:       The following orders were created for panel order COVID PRE-OP / PRE-PROCEDURE SCREENING ORDER (NO ISOLATION) - Swab, Nasopharynx.  Procedure                               Abnormality         Status                     ---------                               -----------         ------                     COVID-19 and FLU A/B PCR...[[id number]]  Normal              Final result                 Please view results for these tests on the individual orders.    COVID-19 and FLU A/B PCR, 1 HR TAT - Swab, Nasopharynx [[id number]]  (Normal) Collected: 09/13/24 1344    Lab Status: Final result Specimen: Swab from Nasopharynx Updated: 09/13/24 1418     COVID19 Not Detected     Influenza A PCR Not Detected     Influenza B PCR Not Detected    Narrative:      Fact sheet for providers: https://www.fda.gov/media/628557/download    Fact sheet for patients: https://www.fda.gov/media/741692/download    Test performed by PCR.    Blood Culture - Blood, Arm, Right [[id number]]  (Normal) Collected: 09/13/24 1341    Lab Status: Final result Specimen: Blood from Arm, Right Updated: 09/18/24 1430     Blood Culture No growth at 5 days              Radiology:  No radiology results for the last day      DISCUSSION:  83 y.o. male with history of recent right knee cellulitis after TKA admitted with encephalopathy.       PROBLEM LIST:   -- Serotonin syndrome.  Encephalopathy, hectic fever, lactic acidosis - resolved.  Patient with recent oral linezolid antibiotic use prior to admission and was started on mirtazapine per neurology after admission.  No evidence for pneumonia.  Urinalysis bland.  Synovial fluid from right knee not consistent with PJI.  Improving with discontinuation of serotonergic agents.  -- Elevated CK.  Improving.  -- Right knee cellulitis - improving.  Recent admission earlier this month with lateral right knee superficial cellulitis.  Did not appear to involve joint or incision.  Treated with IV vancomycin and ceftriaxone with  improvement.  Discharged home on oral doxycycline and cephalexin.  I saw him back in office 2 days prior to admission, when he had new erythema and warmth about the incision itself.  I changed his antibiotic to oral linezolid. Continues to have erythema/warmth about incision; consider reaction to sutures.  Normal WBC, ESR, and PCT at admission.  S/p right knee aspiration per ortho 9/16/24; WBC only 459 with 11% neutrophils, gram stain negative, culture no growth; not c/w PJI.  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    PLAN:  -- Added linezolid as causing serotonin syndrome to allergy list and continue to avoid serotonergic agents  -- Discontinue ceftriaxone and monitor off of antibiotics.  Right knee cellulitis appears resolved.  No evidence for joint infection.    Plan discussed with family at bedside.    I will sign off at this time.  Please call back if I can be of further assistance.      Jae Manzanares MD  9/20/2024  16:02 EDT

## 2024-09-21 LAB
ANION GAP SERPL CALCULATED.3IONS-SCNC: 12 MMOL/L (ref 5–15)
BACTERIA FLD CULT: NORMAL
BACTERIA SPEC AEROBE CULT: NORMAL
BACTERIA SPEC ANAEROBE CULT: NORMAL
BUN SERPL-MCNC: 7 MG/DL (ref 8–23)
BUN/CREAT SERPL: 10.3 (ref 7–25)
CALCIUM SPEC-SCNC: 8.4 MG/DL (ref 8.6–10.5)
CHLORIDE SERPL-SCNC: 103 MMOL/L (ref 98–107)
CO2 SERPL-SCNC: 22 MMOL/L (ref 22–29)
CREAT SERPL-MCNC: 0.68 MG/DL (ref 0.76–1.27)
EGFRCR SERPLBLD CKD-EPI 2021: 92.2 ML/MIN/1.73
GLUCOSE SERPL-MCNC: 103 MG/DL (ref 65–99)
GRAM STN SPEC: NORMAL
POTASSIUM SERPL-SCNC: 3.6 MMOL/L (ref 3.5–5.2)
SODIUM SERPL-SCNC: 137 MMOL/L (ref 136–145)

## 2024-09-21 PROCEDURE — 80048 BASIC METABOLIC PNL TOTAL CA: CPT | Performed by: INTERNAL MEDICINE

## 2024-09-21 RX ADMIN — NORTRIPTYLINE HYDROCHLORIDE 20 MG: 10 CAPSULE ORAL at 20:31

## 2024-09-21 RX ADMIN — PANTOPRAZOLE SODIUM 40 MG: 40 INJECTION, POWDER, FOR SOLUTION INTRAVENOUS at 05:32

## 2024-09-21 RX ADMIN — FLECAINIDE ACETATE 100 MG: 50 TABLET ORAL at 10:06

## 2024-09-21 RX ADMIN — Medication 10 ML: at 20:31

## 2024-09-21 RX ADMIN — Medication 250 MG: at 20:31

## 2024-09-21 RX ADMIN — NORTRIPTYLINE HYDROCHLORIDE 20 MG: 10 CAPSULE ORAL at 00:38

## 2024-09-21 RX ADMIN — ISOSORBIDE MONONITRATE 30 MG: 30 TABLET, EXTENDED RELEASE ORAL at 10:52

## 2024-09-21 RX ADMIN — ACETAMINOPHEN 1000 MG: 500 TABLET ORAL at 00:38

## 2024-09-21 RX ADMIN — FLECAINIDE ACETATE 100 MG: 50 TABLET ORAL at 20:31

## 2024-09-21 RX ADMIN — LISINOPRIL 10 MG: 10 TABLET ORAL at 10:53

## 2024-09-21 RX ADMIN — PRAVASTATIN SODIUM 20 MG: 20 TABLET ORAL at 10:53

## 2024-09-21 RX ADMIN — Medication 250 MG: at 10:53

## 2024-09-21 RX ADMIN — Medication 10 ML: at 10:53

## 2024-09-21 RX ADMIN — ACETAMINOPHEN 500 MG: 500 TABLET ORAL at 06:54

## 2024-09-21 NOTE — PLAN OF CARE
Goal Outcome Evaluation:  Plan of Care Reviewed With: patient, spouse, daughter   Pt a/o x 4 on RA VSS NSR. Pt up in chair most of shift. Pt ambulated in [place] approx 100 ft x 2 w/min assist x 2, walker, and gait belt. Family encouraging intake pt eating small amounts and drinking all of supplemental drinks for each meal. No c/o pain this shift.        Progress: improving

## 2024-09-21 NOTE — PROGRESS NOTES
"IM progress note      Guicho Blanco  [id number]  1940     LOS: 5 days     Attending: Berto Liu MD    Primary Care Provider: Mitchel Culver MD      Chief Complaint/Reason for visit:    Chief Complaint   Patient presents with    Weakness - Generalized       Subjective    Reviewed events and progress since transfer to ICU, discussed with pt and family.   Feels better today. No c/o pain, nausea.   Is helping himself to eat modified diet.      Objective        Visit Vitals  /77 (BP Location: Left arm, Patient Position: Lying)   Pulse 90   Temp 97.9 °F (36.6 °C) (Oral)   Resp 18   Ht 182.9 cm (72.01\")   Wt 92 kg (202 lb 13.2 oz)   SpO2 97%   BMI 27.50 kg/m²     Temp (24hrs), Av.8 °F (36.6 °C), Min:97.7 °F (36.5 °C), Max:97.9 °F (36.6 °C)      Intake/Output:    Intake/Output Summary (Last 24 hours) at 2024 1010  Last data filed at 2024 0755  Gross per 24 hour   Intake --   Output 800 ml   Net -800 ml        Physical Therapy:  Was seen last on 2024    Physical Exam:     General Appearance:    Alert, cooperative, in no acute distress   Head:    Normocephalic, without obvious abnormality, atraumatic    Lungs:     Normal effort, symmetric chest rise,  clear to      auscultation bilaterally              Heart:    Regular rhythm and normal rate, normal S1 and S2    Abdomen:     Normal bowel sounds, no masses, no organomegaly, soft        nontender, nondistended, no guarding, no rebound                tenderness   Extremities: No clubbing, cyanosis or edema.  No deformities.    Pulses:   Pulses palpable and equal bilaterally   Skin:   No bleeding, bruising or rash          Results Review:     I reviewed the patient's new clinical results.   Results from last 7 days   Lab Units 24  0331 24  0408 24  0327 24  1044   WBC 10*3/mm3 4.12 3.13* 2.71* 4.84   HEMOGLOBIN g/dL 10.4* 10.0* 10.4* 12.3*   HEMATOCRIT % 32.3* 31.8* 33.3* 39.2   PLATELETS 10*3/mm3 " 129* 112* 127* 172   MONOCYTES % %  --   --   --  3.0*   EOSINOPHIL % %  --   --   --  0.0*     Results from last 7 days   Lab Units 09/21/24  0554 09/20/24  0331 09/19/24  0416 09/18/24  0408   SODIUM mmol/L 137 138 138 137   POTASSIUM mmol/L 3.6 3.4* 3.6 4.2   CHLORIDE mmol/L 103 104 104 103   CO2 mmol/L 22.0 21.0* 24.0 24.0   BUN mg/dL 7* 11 18 23   CREATININE mg/dL 0.68* 0.65* 0.78 0.95   CALCIUM mg/dL 8.4* 8.4* 8.3* 8.2*   BILIRUBIN mg/dL  --   --   --  0.5   ALK PHOS U/L  --   --   --  116   ALT (SGPT) U/L  --   --   --  59*   AST (SGOT) U/L  --   --   --  137*   GLUCOSE mg/dL 103* 101* 85 72     I reviewed the patient's new imaging including images and reports.    All medications reviewed.   [Held by provider] aspirin, 325 mg, Oral, Daily  flecainide, 100 mg, Oral, Q12H  isosorbide mononitrate, 30 mg, Oral, Daily  lisinopril, 10 mg, Oral, Q24H  nortriptyline, 20 mg, Oral, Nightly  pantoprazole, 40 mg, Intravenous, Q AM  pravastatin, 20 mg, Oral, Daily  saccharomyces boulardii, 250 mg, Oral, BID  sodium chloride, 10 mL, Intravenous, Q12H      acetaminophen, 650 mg, Q4H PRN  acetaminophen, 1,000 mg, Q6H PRN  polyethylene glycol, 17 g, Daily PRN   And  bisacodyl, 5 mg, Daily PRN   And  bisacodyl, 10 mg, Daily PRN  docusate sodium, 100 mg, BID PRN  hydrALAZINE, 10 mg, Q6H PRN  HYDROcodone-acetaminophen, 1 tablet, Q6H PRN  nitroglycerin, 0.4 mg, Q5 Min PRN  sodium chloride, 10 mL, PRN  sodium chloride, 40 mL, PRN        Assessment & Plan       Cellulitis of right knee    Hearing loss    Neuropathy    Failure to thrive in adult    Insomnia    Moderate malnutrition    Sepsis    Lactic acidosis    Serotonin syndrome         Plan  1. PT/OT  2. Pain control-prns   3. IS-encouraged  4. DVT proph-aspirin resumed  5. Bowel regimen  6. Monitor post-op labs  7. DC planning    -Avoid steroids or genic agents.  -Monitor off antibiotics  -Continue modified diet with speech therapy following, monitor p.o. intake, encouraged,  encouraged use of supplement.  -Monitor blood pressure on current regimen.    Discharge planning, inpatient rehab.  Likely early next week.    Discussed at length with patient and family and with RN.  Berto Liu MD  09/21/24  10:10 EDT

## 2024-09-22 PROCEDURE — 97110 THERAPEUTIC EXERCISES: CPT

## 2024-09-22 PROCEDURE — 97116 GAIT TRAINING THERAPY: CPT

## 2024-09-22 RX ADMIN — Medication 250 MG: at 20:02

## 2024-09-22 RX ADMIN — ACETAMINOPHEN 1000 MG: 500 TABLET ORAL at 11:50

## 2024-09-22 RX ADMIN — FLECAINIDE ACETATE 100 MG: 50 TABLET ORAL at 09:46

## 2024-09-22 RX ADMIN — NORTRIPTYLINE HYDROCHLORIDE 20 MG: 10 CAPSULE ORAL at 20:02

## 2024-09-22 RX ADMIN — Medication 10 ML: at 09:47

## 2024-09-22 RX ADMIN — ACETAMINOPHEN 1000 MG: 500 TABLET ORAL at 03:38

## 2024-09-22 RX ADMIN — PANTOPRAZOLE SODIUM 40 MG: 40 INJECTION, POWDER, FOR SOLUTION INTRAVENOUS at 05:38

## 2024-09-22 RX ADMIN — ASPIRIN 325 MG: 325 TABLET ORAL at 09:46

## 2024-09-22 RX ADMIN — ISOSORBIDE MONONITRATE 30 MG: 30 TABLET, EXTENDED RELEASE ORAL at 09:46

## 2024-09-22 RX ADMIN — FLECAINIDE ACETATE 100 MG: 50 TABLET ORAL at 20:02

## 2024-09-22 RX ADMIN — PRAVASTATIN SODIUM 20 MG: 20 TABLET ORAL at 09:46

## 2024-09-22 RX ADMIN — Medication 250 MG: at 09:46

## 2024-09-22 RX ADMIN — LISINOPRIL 10 MG: 10 TABLET ORAL at 09:46

## 2024-09-22 RX ADMIN — ACETAMINOPHEN 1000 MG: 500 TABLET ORAL at 20:01

## 2024-09-22 RX ADMIN — Medication 10 ML: at 20:02

## 2024-09-22 NOTE — PROGRESS NOTES
"IM progress note      Guicho Blanco  [id number]  1940     LOS: 6 days     Attending: Berto Liu MD    Primary Care Provider: Mitchel Culver MD      Chief Complaint/Reason for visit:    Chief Complaint   Patient presents with    Weakness - Generalized       Subjective    24:  Reviewed events and progress since transfer to ICU, discussed with pt and family.   Feels better today. No c/o pain, nausea.   Is helping himself to eat modified diet.    24:   Doing well. No n/vom. No sob. Good pain control. Ambulated with PT.    Objective        Visit Vitals  /69 (BP Location: Left arm, Patient Position: Sitting)   Pulse 101   Temp 97.4 °F (36.3 °C) (Oral)   Resp 18   Ht 182.9 cm (72.01\")   Wt 92 kg (202 lb 13.2 oz)   SpO2 98%   BMI 27.50 kg/m²     Temp (24hrs), Av.8 °F (36.6 °C), Min:97.4 °F (36.3 °C), Max:98 °F (36.7 °C)      Intake/Output:    Intake/Output Summary (Last 24 hours) at 2024 1248  Last data filed at 2024 0500  Gross per 24 hour   Intake 240 ml   Output 500 ml   Net -260 ml        Physical Therapy:   Date of Service: 24  Creation Time: 24 1022     Signed         Goal Outcome Evaluation:  Plan of Care Reviewed With: patient, spouse, daughter  Progress: improving  Outcome Evaluation: Pt amb 45ft + 60ft with RW and CGA +1 for recliner in tow. Pt amb with forward flexed posture, bent knees, and decreased heel strike with some improvement after pt given visual/verbal/tactile cues for improving posture and RW position. Further distance limited by weakness and fatigue. Good effort with therex. Pt remains below baseline level of function for mobility and IPPT services continue to be warranted at this time                Physical Exam:     General Appearance:    Alert, cooperative, in no acute distress   Head:    Normocephalic, without obvious abnormality, atraumatic    Lungs:     Normal effort, symmetric chest rise,  clear to      auscultation " bilaterally              Heart:    Regular rhythm and normal rate, normal S1 and S2    Abdomen:     Normal bowel sounds, no masses, no organomegaly, soft        nontender, nondistended, no guarding, no rebound      tenderness   Extremities: No clubbing, cyanosis or edema.  No deformities. Healing right knee incision.    Pulses:   Pulses palpable and equal bilaterally   Skin:   No bleeding, bruising or rash          Results Review:     I reviewed the patient's new clinical results.   Results from last 7 days   Lab Units 09/20/24  0331 09/18/24  0408 09/17/24  0327 09/16/24  1044   WBC 10*3/mm3 4.12 3.13* 2.71* 4.84   HEMOGLOBIN g/dL 10.4* 10.0* 10.4* 12.3*   HEMATOCRIT % 32.3* 31.8* 33.3* 39.2   PLATELETS 10*3/mm3 129* 112* 127* 172   MONOCYTES % %  --   --   --  3.0*   EOSINOPHIL % %  --   --   --  0.0*     Results from last 7 days   Lab Units 09/21/24  0554 09/20/24  0331 09/19/24  0416 09/18/24  0408   SODIUM mmol/L 137 138 138 137   POTASSIUM mmol/L 3.6 3.4* 3.6 4.2   CHLORIDE mmol/L 103 104 104 103   CO2 mmol/L 22.0 21.0* 24.0 24.0   BUN mg/dL 7* 11 18 23   CREATININE mg/dL 0.68* 0.65* 0.78 0.95   CALCIUM mg/dL 8.4* 8.4* 8.3* 8.2*   BILIRUBIN mg/dL  --   --   --  0.5   ALK PHOS U/L  --   --   --  116   ALT (SGPT) U/L  --   --   --  59*   AST (SGOT) U/L  --   --   --  137*   GLUCOSE mg/dL 103* 101* 85 72     I reviewed the patient's new imaging including images and reports.    All medications reviewed.   aspirin, 325 mg, Oral, Daily  flecainide, 100 mg, Oral, Q12H  isosorbide mononitrate, 30 mg, Oral, Daily  lisinopril, 10 mg, Oral, Q24H  nortriptyline, 20 mg, Oral, Nightly  pantoprazole, 40 mg, Intravenous, Q AM  pravastatin, 20 mg, Oral, Daily  saccharomyces boulardii, 250 mg, Oral, BID  sodium chloride, 10 mL, Intravenous, Q12H      acetaminophen, 650 mg, Q4H PRN  acetaminophen, 1,000 mg, Q6H PRN  polyethylene glycol, 17 g, Daily PRN   And  bisacodyl, 5 mg, Daily PRN   And  bisacodyl, 10 mg, Daily  PRN  docusate sodium, 100 mg, BID PRN  hydrALAZINE, 10 mg, Q6H PRN  nitroglycerin, 0.4 mg, Q5 Min PRN  sodium chloride, 10 mL, PRN  sodium chloride, 40 mL, PRN        Assessment & Plan       Cellulitis of right knee    Hearing loss    Neuropathy    Failure to thrive in adult    Insomnia    Moderate malnutrition    Sepsis    Lactic acidosis    Serotonin syndrome         Plan  1. PT/OT, continue.   2. Pain control-prns   3. IS-encouraged  4. DVT proph-aspirin resumed  5. Bowel regimen  6. Monitor post-op labs  7. DC planning    -Avoid steroids or genic agents.  -Monitor off antibiotics  -Continue modified diet with speech therapy following, monitor p.o. intake, encouraged, encouraged use of supplement.  -Monitor blood pressure on current regimen.    Discharge planning, inpatient rehab.  When bed is available/approved.    Discussed with pt and family.     Berto Liu MD  09/22/24  12:48 EDT

## 2024-09-22 NOTE — THERAPY TREATMENT NOTE
Patient Name: Guicho Blanco  : 1940    MRN: [id number]                              Today's Date: 2024       Admit Date: 2024    Visit Dx:     ICD-10-CM ICD-9-CM   1. Generalized weakness  R53.1 780.79   2. Cellulitis of right lower extremity  L03.115 682.6   3. Oropharyngeal dysphagia  R13.12 787.22     Patient Active Problem List   Diagnosis    Osteomyelitis of toe of right foot    Osteomyelitis of right foot    PAF (paroxysmal atrial fibrillation)    Coronary artery disease involving native coronary artery of native heart without angina pectoris    Gastroesophageal reflux disease without esophagitis    Benign non-nodular prostatic hyperplasia without lower urinary tract symptoms    S/P appy    Essential hypertension    Simple chronic bronchitis    Mixed hyperlipidemia    COPD (chronic obstructive pulmonary disease)    BPH (benign prostatic hypertrophy)    Hearing loss    Chest pain    Vertigo    Snoring    Overweight    PLMD (periodic limb movement disorder)    Neuropathy    Periodic headache syndrome, not intractable    Acquired absence of other right toe(s)    Tremor    Bilateral impacted cerumen    Bilateral sensorineural hearing loss    Chronic otitis externa    Deviated nasal septum    Disorder of joint of foot    ED (erectile dysfunction) of organic origin    Hydrocele    Hydrocele of testis    Impacted cerumen    Mononeuropathy    Swelling of testicle    Venous retinal branch occlusion    Vitreous degeneration    Benign prostatic hyperplasia with urinary obstruction    OA (osteoarthritis) of knee    Status post total right knee replacement    Septic arthritis    Cellulitis    Cellulitis of right knee    Failure to thrive in adult    Insomnia    Moderate malnutrition    Sepsis    Lactic acidosis    Serotonin syndrome     Past Medical History:   Diagnosis Date    Arthritis     Atrial fibrillation     BPH (benign prostatic hypertrophy)     Cataract     Cellulitis     Cochlear implant  in place     bilateral    Coronary artery disease     Full dentures     Gall stones     GERD (gastroesophageal reflux disease)     Hearing loss     Hiatal hernia     Hyperlipidemia     Hypertension     Peripheral neuropathy     Peripheral vascular disease     Seasonal allergies     Skin cancer     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    TIA (transient ischemic attack)     2 times    Wears glasses      Past Surgical History:   Procedure Laterality Date    ABLATION OF DYSRHYTHMIC FOCUS      APPENDECTOMY      CARDIAC CATHETERIZATION N/A 10/05/2017    Procedure: Left Heart Cath;  Surgeon: Hector Urrutia MD;  Location:  [facility];  Service:     CATARACT EXTRACTION Bilateral     CHOLECYSTECTOMY      Remote    COCHLEAR IMPLANT REVISION  05/2019    COLONOSCOPY      PROSTATE SURGERY      SHOULDER SURGERY Right     SKIN BIOPSY      TOE AMPUTATION Right     2nd toe - Right Foot    TONSILLECTOMY      TOTAL KNEE ARTHROPLASTY Right 8/7/2024    Procedure: TOTAL KNEE ARTHROPLASTY WITH ASHLEY ROBOT RIGHT;  Surgeon: Christian Schwartz MD;  Location:  [facility];  Service: Robotics - Ortho;  Laterality: Right;      General Information       Row Name 09/22/24 0925          Physical Therapy Time and Intention    Document Type therapy note (daily note)  -SD     Mode of Treatment physical therapy;individual therapy  -SD       Row Name 09/22/24 0925          General Information    Existing Precautions/Restrictions fall  Recent R TKA, R LE WBAT, cochlear implant  -SD       Row Name 09/22/24 0925          Cognition    Orientation Status (Cognition) oriented x 3  -SD       Row Name 09/22/24 0925          Safety Issues, Functional Mobility    Safety Issues Affecting Function (Mobility) insight into deficits/self-awareness;judgment;positioning of assistive device;sequencing abilities;safety precaution awareness;safety precautions follow-through/compliance  -SD     Impairments Affecting Function (Mobility)  balance;endurance/activity tolerance;pain;range of motion (ROM);strength;postural/trunk control;sensation/sensory awareness  -SD     Comment, Safety Issues/Impairments (Mobility) peripheral neuropathy  -SD               User Key  (r) = Recorded By, (t) = Taken By, (c) = Cosigned By      Initials Name Provider Type    SD Mireille Silva, PT Physical Therapist                   Mobility       Row Name 09/22/24 1014          Bed Mobility    Comment, (Bed Mobility) UIC  -SD       Row Name 09/22/24 1014          Transfers    Comment, (Transfers) STS from recliner x3 reps with cues for hand placement and sequencing.  -SD       Row Name 09/22/24 1014          Sit-Stand Transfer    Sit-Stand [place] (Transfers) minimum assist (75% patient effort);1 person assist;verbal cues  -SD     Assistive Device (Sit-Stand Transfers) walker, front-wheeled  -SD       Row Name 09/22/24 1014          Gait/Stairs (Locomotion)    [place] Level (Gait) 1 person assist;1 person to manage equipment;contact guard;verbal cues;nonverbal cues (demo/gesture)  -SD     Assistive Device (Gait) walker, front-wheeled  -SD     Distance in Feet (Gait) 45  -SD     Deviations/Abnormal Patterns (Gait) bilateral deviations;harleen decreased;gait speed decreased;stride length decreased;base of support, narrow  -SD     Bilateral Gait Deviations forward flexed posture;heel strike decreased  -SD     Comment, (Gait/Stairs) Pt amb 45ft + 60ft with RW and CGA +1 for recliner in tow. Pt amb with forward flexed posture, bent knees, and decreased heel strike with some improvement after pt given visual/verbal/tactile cues for improving posture and RW position. Further distance limited by weakness and fatigue.  -SD       Row Name 09/22/24 1014          Mobility    Extremity Weight-bearing Status right lower extremity  -SD     Right Lower Extremity (Weight-bearing Status) weight-bearing as tolerated (WBAT)  -SD               User Key  (r) = Recorded By, (t) =  Taken By, (c) = Cosigned By      Initials Name Provider Type    Mireille Paul PT Physical Therapist                   Obj/Interventions       Row Name 09/22/24 1017          Motor Skills    Therapeutic Exercise shoulder;hip;knee;ankle  -SD       Row Name 09/22/24 1017          Shoulder (Therapeutic Exercise)    Shoulder (Therapeutic Exercise) AROM (active range of motion)  -SD     Shoulder AROM (Therapeutic Exercise) bilateral;flexion;horizontal aBduction/aDduction;sitting;5 repetitions  -SD       Row Name 09/22/24 1017          Hip (Therapeutic Exercise)    Hip Strengthening (Therapeutic Exercise) heel slides;right  -SD       Row Name 09/22/24 1017          Knee (Therapeutic Exercise)    Knee Isometrics (Therapeutic Exercise) right;quad sets;sitting;10 repetitions  -SD     Knee Strengthening (Therapeutic Exercise) right;SLR (straight leg raise);hamstring curls;sitting;5 repetitions;10 repetitions  -SD       Row Name 09/22/24 1017          Ankle (Therapeutic Exercise)    Ankle (Therapeutic Exercise) AROM (active range of motion)  -SD     Ankle AROM (Therapeutic Exercise) bilateral;plantarflexion;sitting;10 repetitions  -SD       Row Name 09/22/24 1017          Balance    Balance Assessment sitting static balance;standing static balance  -SD     Static Sitting Balance standby assist  -SD     Position, Sitting Balance unsupported;sitting in chair  -SD     Static Standing Balance contact guard;1-person assist  -SD     Position/Device Used, Standing Balance supported;walker, front-wheeled  -SD               User Key  (r) = Recorded By, (t) = Taken By, (c) = Cosigned By      Initials Name Provider Type    Mireille Paul PT Physical Therapist                   Goals/Plan    No documentation.                  Clinical Impression       [facility] Name 09/22/24 1019          Pain    Pretreatment Pain Rating 3/10  -SD     Posttreatment Pain Rating 3/10  -SD     Pain Location - Side/Orientation Right  -SD      Pain Location - knee  -SD     Pain Intervention(s) Repositioned;Ambulation/increased activity;Nursing Notified  -SD       Row Name 09/22/24 1019          Plan of Care Review    Plan of Care Reviewed With patient;spouse;daughter  -SD     Progress improving  -SD     Outcome Evaluation Pt amb 45ft + 60ft with RW and CGA +1 for recliner in tow. Pt amb with forward flexed posture, bent knees, and decreased heel strike with some improvement after pt given visual/verbal/tactile cues for improving posture and RW position. Further distance limited by weakness and fatigue. Good effort with therex. Pt remains below baseline level of function for mobility and IPPT services continue to be warranted at this time.  -SD       Row Name 09/22/24 1019          Vital Signs    Pretreatment Heart Rate (beats/min) 107  -SD     Posttreatment Heart Rate (beats/min) 108  -SD     Pre Patient Position Sitting  -SD     Post Patient Position Sitting  -SD       Row Name 09/22/24 1019          Positioning and Restraints    Pre-Treatment Position sitting in chair/recliner  -SD     Post Treatment Position chair  -SD     In Chair reclined;call light within reach;encouraged to call for assist;with family/caregiver;waffle cushion;heels elevated;with nsg  -SD               User Key  (r) = Recorded By, (t) = Taken By, (c) = Cosigned By      Initials Name Provider Type    Mireille Paul, PT Physical Therapist                   Outcome Measures       Row Name 09/22/24 1021          How much help from another person do you currently need...    Turning from your back to your side while in flat bed without using bedrails? 3  -SD     Moving from lying on back to sitting on the side of a flat bed without bedrails? 3  -SD     Moving to and from a bed to a chair (including a wheelchair)? 3  -SD     Standing up from a chair using your arms (e.g., wheelchair, bedside chair)? 3  -SD     Climbing 3-5 steps with a railing? 2  -SD     To walk in hospital room?  3  -SD     AM-PAC 6 Clicks Score (PT) 17  -SD     Highest Level of Mobility Goal 5 --> Static standing  -SD       Row Name 09/22/24 1021          Functional Assessment    Outcome Measure Options AM-PAC 6 Clicks Basic Mobility (PT)  -SD               User Key  (r) = Recorded By, (t) = Taken By, (c) = Cosigned By      Initials Name Provider Type    SD Mireille Sliva, PT Physical Therapist                                 Physical Therapy Education       Title: PT OT SLP Therapies (In Progress)       Topic: Physical Therapy (Done)       Point: Mobility training (Done)       Learning Progress Summary             Patient Eager, E,D, VU by SD at 9/22/2024 1022    Acceptance, E,TB, NR by ES at 9/18/2024 1540    Acceptance, E,D, VU,NR by AB at 9/14/2024 1422   Family Eager, E,D, VU by SD at 9/22/2024 1022                         Point: Home exercise program (Done)       Learning Progress Summary             Patient Eager, E,D, VU by SD at 9/22/2024 1022    Acceptance, E,TB, NR by ES at 9/18/2024 1540    Acceptance, E,D, VU,NR by AB at 9/14/2024 1422   Family Eager, E,D, VU by SD at 9/22/2024 1022                         Point: Body mechanics (Done)       Learning Progress Summary             Patient Eager, E,D, VU by SD at 9/22/2024 1022    Acceptance, E,TB, NR by ES at 9/18/2024 1540    Acceptance, E,D, VU,NR by AB at 9/14/2024 1422   Family Eager, E,D, VU by SD at 9/22/2024 1022                         Point: Precautions (Done)       Learning Progress Summary             Patient Eager, E,D, VU by SD at 9/22/2024 1022    Acceptance, E,TB, NR by ES at 9/18/2024 1540    Acceptance, E,D, VU,NR by AB at 9/14/2024 1422   Family Eager, E,D, VU by SD at 9/22/2024 1022                                         User Key       Initials Effective Dates Name Provider Type Discipline    SD 03/13/23 -  Mireille Silva, PT Physical Therapist PT    AB 09/22/22 -  Bayes, Ebony N, PT Physical Therapist PT    ES 08/11/22 -  Jamie,  EL Hamilton Physical Therapist PT                  PT Recommendation and Plan     Plan of Care Reviewed With: patient, spouse, daughter  Progress: improving  Outcome Evaluation: Pt amb 45ft + 60ft with RW and CGA +1 for recliner in tow. Pt amb with forward flexed posture, bent knees, and decreased heel strike with some improvement after pt given visual/verbal/tactile cues for improving posture and RW position. Further distance limited by weakness and fatigue. Good effort with therex. Pt remains below baseline level of function for mobility and IPPT services continue to be warranted at this time.     Time Calculation:         PT Charges       Row Name 09/22/24 1022             Time Calculation    Start Time 0925  -SD      PT Received On 09/22/24  -SD      PT Goal Re-Cert Due Date 09/28/24  -SD         Time Calculation- PT    Total Timed Code Minutes- PT 30 minute(s)  -SD         Timed Charges    42476 - PT Therapeutic Exercise Minutes 9  -SD      33903 - Gait Training Minutes  21  -SD         Total Minutes    Timed Charges Total Minutes 30  -SD       Total Minutes 30  -SD                User Key  (r) = Recorded By, (t) = Taken By, (c) = Cosigned By      Initials Name Provider Type    SD Mireille Silva, EL Physical Therapist                  Therapy Charges for Today       Code Description Service Date Service Provider Modifiers Qty    54245719392 HC PT THER PROC EA 15 MIN 9/22/2024 Mireille Silva, PT GP 1    49309303089 HC GAIT TRAINING EA 15 MIN 9/22/2024 Mireille Silva PT GP 1            PT G-Codes  Outcome Measure Options: AM-PAC 6 Clicks Basic Mobility (PT)  AM-PAC 6 Clicks Score (PT): 17  AM-PAC 6 Clicks Score (OT): 14       Mireille Silva PT  9/22/2024

## 2024-09-22 NOTE — PLAN OF CARE
Goal Outcome Evaluation:  Plan of Care Reviewed With: patient, spouse, daughter        Progress: improving  Outcome Evaluation: Pt amb 45ft + 60ft with RW and CGA +1 for recliner in tow. Pt amb with forward flexed posture, bent knees, and decreased heel strike with some improvement after pt given visual/verbal/tactile cues for improving posture and RW position. Further distance limited by weakness and fatigue. Good effort with therex. Pt remains below baseline level of function for mobility and IPPT services continue to be warranted at this time.

## 2024-09-23 VITALS
HEART RATE: 96 BPM | WEIGHT: 202.82 LBS | DIASTOLIC BLOOD PRESSURE: 81 MMHG | RESPIRATION RATE: 18 BRPM | OXYGEN SATURATION: 97 % | BODY MASS INDEX: 27.47 KG/M2 | HEIGHT: 72 IN | TEMPERATURE: 98 F | SYSTOLIC BLOOD PRESSURE: 104 MMHG

## 2024-09-23 PROBLEM — R13.10 DYSPHAGIA: Status: ACTIVE | Noted: 2024-09-23

## 2024-09-23 PROCEDURE — 92526 ORAL FUNCTION THERAPY: CPT

## 2024-09-23 RX ORDER — BISACODYL 5 MG/1
5 TABLET, DELAYED RELEASE ORAL DAILY PRN
Start: 2024-09-23

## 2024-09-23 RX ORDER — SACCHAROMYCES BOULARDII 250 MG
250 CAPSULE ORAL 2 TIMES DAILY
Start: 2024-09-23 | End: 2024-09-30

## 2024-09-23 RX ORDER — POLYETHYLENE GLYCOL 3350 17 G/17G
17 POWDER, FOR SOLUTION ORAL DAILY PRN
Start: 2024-09-23

## 2024-09-23 RX ORDER — BISACODYL 10 MG
10 SUPPOSITORY, RECTAL RECTAL DAILY PRN
Start: 2024-09-23

## 2024-09-23 RX ADMIN — Medication 10 ML: at 09:43

## 2024-09-23 RX ADMIN — LISINOPRIL 10 MG: 10 TABLET ORAL at 09:38

## 2024-09-23 RX ADMIN — PRAVASTATIN SODIUM 20 MG: 20 TABLET ORAL at 09:40

## 2024-09-23 RX ADMIN — ISOSORBIDE MONONITRATE 30 MG: 30 TABLET, EXTENDED RELEASE ORAL at 09:38

## 2024-09-23 RX ADMIN — Medication 250 MG: at 09:43

## 2024-09-23 RX ADMIN — ASPIRIN 325 MG: 325 TABLET ORAL at 09:40

## 2024-09-23 RX ADMIN — PANTOPRAZOLE SODIUM 40 MG: 40 INJECTION, POWDER, FOR SOLUTION INTRAVENOUS at 06:09

## 2024-09-23 RX ADMIN — FLECAINIDE ACETATE 100 MG: 50 TABLET ORAL at 09:40

## 2024-09-23 NOTE — PLAN OF CARE
Goal Outcome Evaluation:         VSS overnight. Pt incontinent of urine intermittently/had to be changed several times. Pt appeared to have a good night overall, but c/o right knee pain this AM. When attempting to administer PRN tylenol, pt became paranoid and said we shouldn't trust the medication and need to have the pharmacist check it. Will reassess pt willingness to take PRN med shortly.

## 2024-09-23 NOTE — PROGRESS NOTES
"      Orthopaedic Surgery Progress Note    CC: Ready to go      Subjective     Interval History:   Patient scheduled for discharge to [facility] today.  Had some pain this morning but this has resolved.      ROS: Denies fever, chills, nausea or vomiting    Objective     Vital Signs:  Temp (24hrs), Av.6 °F (36.4 °C), Min:96.8 °F (36 °C), Max:98 °F (36.7 °C)    /81 (BP Location: Right arm, Patient Position: Lying)   Pulse 96   Temp 98 °F (36.7 °C) (Oral)   Resp 18   Ht 182.9 cm (72.01\")   Wt 92 kg (202 lb 13.2 oz)   SpO2 97%   BMI 27.50 kg/m²       Physical Exam:  Incision is benign appearing.  1 small prominent Vicryl suture is noted.  No drainage.  Moves knee freely without any pain    Assessment and Plan:  Hospital day 11    Patient ready for discharge.  Follow-up in 2 weeks  Continue weight-bear as tolerated in physical therapy for strengthening.    Christian Schwartz MD  24  14:24 EDT        "

## 2024-09-23 NOTE — THERAPY TREATMENT NOTE
Acute Care - Speech Language Pathology   Swallow Treatment Note [facility]     Patient Name: Guicho Blanco  : 1940  MRN: [id number]  Today's Date: 2024               Admit Date: 2024    Visit Dx:     ICD-10-CM ICD-9-CM   1. Generalized weakness  R53.1 780.79   2. Cellulitis of right lower extremity  L03.115 682.6   3. Oropharyngeal dysphagia  R13.12 787.22     Patient Active Problem List   Diagnosis    Osteomyelitis of toe of right foot    Osteomyelitis of right foot    PAF (paroxysmal atrial fibrillation)    Coronary artery disease involving native coronary artery of native heart without angina pectoris    Gastroesophageal reflux disease without esophagitis    Benign non-nodular prostatic hyperplasia without lower urinary tract symptoms    S/P appy    Essential hypertension    Simple chronic bronchitis    Mixed hyperlipidemia    COPD (chronic obstructive pulmonary disease)    BPH (benign prostatic hypertrophy)    Hearing loss    Chest pain    Vertigo    Snoring    Overweight    PLMD (periodic limb movement disorder)    Neuropathy    Periodic headache syndrome, not intractable    Acquired absence of other right toe(s)    Tremor    Bilateral impacted cerumen    Bilateral sensorineural hearing loss    Chronic otitis externa    Deviated nasal septum    Disorder of joint of foot    ED (erectile dysfunction) of organic origin    Hydrocele    Hydrocele of testis    Impacted cerumen    Mononeuropathy    Swelling of testicle    Venous retinal branch occlusion    Vitreous degeneration    Benign prostatic hyperplasia with urinary obstruction    OA (osteoarthritis) of knee    Status post total right knee replacement    Septic arthritis    Cellulitis    Cellulitis of right knee    Failure to thrive in adult    Insomnia    Moderate malnutrition    Sepsis    Lactic acidosis    Serotonin syndrome     Past Medical History:   Diagnosis Date    Arthritis     Atrial fibrillation     BPH (benign prostatic  hypertrophy)     Cataract     Cellulitis     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     bilateral    Coronary artery disease     Full dentures     Gall stones     GERD (gastroesophageal reflux disease)     Hearing loss     Hiatal hernia     Hyperlipidemia     Hypertension     Peripheral neuropathy     Peripheral vascular disease     Seasonal allergies     Skin cancer     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    TIA (transient ischemic attack)     2 times    Wears glasses      Past Surgical History:   Procedure Laterality Date    ABLATION OF DYSRHYTHMIC FOCUS      APPENDECTOMY      CARDIAC CATHETERIZATION N/A 10/05/2017    Procedure: Left Heart Cath;  Surgeon: Hector Urrutia MD;  Location:  [facility];  Service:     CATARACT EXTRACTION Bilateral     CHOLECYSTECTOMY      Remote    COCHLEAR IMPLANT REVISION  05/2019    COLONOSCOPY      PROSTATE SURGERY      SHOULDER SURGERY Right     SKIN BIOPSY      TOE AMPUTATION Right     2nd toe - Right Foot    TONSILLECTOMY      TOTAL KNEE ARTHROPLASTY Right 8/7/2024    Procedure: TOTAL KNEE ARTHROPLASTY WITH ASHLEY ROBOT RIGHT;  Surgeon: Christian Schwartz MD;  Location:  [facility] OR;  Service: Robotics - Ortho;  Laterality: Right;       SLP Recommendation and Plan  SLP Swallowing Diagnosis: mild, oral dysphagia, mild-moderate, pharyngeal dysphagia (09/23/24 1045)  SLP Diet Recommendation: mechanical ground textures, thin liquids (09/23/24 1045)  Recommended Precautions and Strategies: upright posture during/after eating, small bites of food and sips of liquid, general aspiration precautions (09/23/24 1045)  SLP Rec. for Method of Medication Administration: meds crushed, with puree, as tolerated (09/23/24 1045)     Monitor for Signs of Aspiration: yes, notify SLP if any concerns (09/23/24 1045)  Recommended Diagnostics: reassess via VFSS (MBS) (09/23/24 1045)  Swallow Criteria for Skilled Therapeutic Interventions Met: demonstrates skilled criteria (09/23/24 1045)  Anticipated  Discharge Disposition (SLP): anticipate therapy at next level of care, unknown (09/23/24 1045)  Rehab Potential/Prognosis, Swallowing: good, to achieve stated therapy goals (09/23/24 1045)  Therapy Frequency (Swallow): 5 days per week (09/23/24 1045)  Predicted Duration Therapy Intervention (Days): 1 week (09/23/24 1045)  Oral Care Recommendations: Oral Care BID/PRN, Toothbrush, Denture Care (09/23/24 1045)        Daily Summary of Progress (SLP): progress toward functional goals as expected (09/23/24 1045)               Treatment Assessment (SLP): continued, oral dysphagia, pharyngeal dysphagia, toleration of diet (09/23/24 1045)  Treatment Assessment Comments (SLP): Trialed with soft to chew mixed with pudding.  Slightly increased oral prep but functional. Pt enjoyed having some soft foods- no c/o food sticking.  Wet vocal quality following thins intermittently- per instrumental this is pt's response to penetration and is effective in preventing aspiration. (09/23/24 1045)  Plan for Continued Treatment (SLP): continue treatment per plan of care (09/23/24 1045)         Outcome Evaluation: SLP continues to follow- treat- and encourage.  Oral and pharyngeal signs persist. Continued ST services indicated.      SWALLOW EVALUATION (Last 72 Hours)       SLP Adult Swallow Evaluation       Row Name 09/23/24 1045 09/20/24 1347                Rehab Evaluation    Document Type therapy note (daily note)  -ML therapy note (daily note)  -SM (r) SMA (t) SM (c)       Subjective Information no complaints  -ML no complaints  -SM (r) SMA (t) SM (c)       Patient Observations alert;cooperative;agree to therapy  -ML alert;cooperative  -SM (r) SMA (t) SM (c)       Patient/Family/Caregiver Comments/Observations family present  -ML Family present prior to session, left room before starting  -SM (r) SMA (t) SM (c)       Patient Effort adequate  -ML good  -SM (r) SMA (t) SM (c)       Comment alert, cooperative- likes to talk and tell stories  but slow to recall/deliver information  -ML --       Symptoms Noted During/After Treatment none  -ML none  -SM (r) SMA (t) SM (c)       Oral Care oral rinse provided  -ML --          General Information    Patient Profile Reviewed yes  -ML yes  -SM (r) SMA (t) SM (c)       Pertinent History Of Current Problem See ST hx  -ML See previous eval  -SM (r) SMA (t) SM (c)       Current Method of Nutrition mechanical ground textures;thin liquids  -ML pureed;thin liquids  -SM (r) SMA (t) SM (c)       Precautions/Limitations, Vision -- WFL with corrective lenses;for purposes of eval  -SM (r) SMA (t) SM (c)       Precautions/Limitations, Hearing -- hearing impairment, bilaterally  -SM (r) SMA (t) SM (c)       Prior Level of Function-Communication -- other (see comments)  pt/family have noted possible motor speech/voice changes  -SM (r) SMA (t) SM (c)       Prior Level of Function-Swallowing -- soft to chew;nectar thick liquids;water between meals;ice between meals;esophageal concerns;concerns regarding malnutrition  -SM (r) SMA (t) SM (c)       Plans/Goals Discussed with -- patient  -SM (r) SMA (t) SM (c)       Barriers to Rehab -- none identified  -SM (r) SMA (t) SM (c)       Patient's Goals for Discharge -- patient did not state  -SM (r) SMA (t) SM (c)          Pain    Additional Documentation Pain Scale: Numbers Pre/Post-Treatment (Group)  -ML Pain Scale: Numbers Pre/Post-Treatment (Group)  -SM (r) SMA (t) SM (c)          Pain Scale: Numbers Pre/Post-Treatment    Pretreatment Pain Rating 2/10  -ML 0/10 - no pain  -SM (r) SMA (t) SM (c)       Posttreatment Pain Rating 2/10  -ML 0/10 - no pain  -SM (r) SMA (t) SM (c)       Pain Location - --  legs  -ML --       Pain Intervention(s) Repositioned  Stretched legs  -ML --          Oral Motor Structure and Function    Dentition Assessment upper dentures/partial in place;lower dentures/partial in place  -ML --       Secretion Management WNL/WFL  -ML --       Mucosal Quality moist,  healthy  -ML --          General Eating/Swallowing Observations    Respiratory Support Currently in Use room air  -ML --       Eating/Swallowing Skills self-fed;fed by SLP  -ML --       Positioning During Eating upright 90 degree  -ML --       Utensils Used spoon;straw  -ML --       Consistencies Trialed soft to chew textures;thin liquids  -ML --          SLP Evaluation Clinical Impression    SLP Swallowing Diagnosis mild;oral dysphagia;mild-moderate;pharyngeal dysphagia  -ML --       Functional Impact risk of malnutrition;other  Listed as malnourished- diet advanced slightly due to poor intake with puree- agreeable to boosts-  -ML --       Rehab Potential/Prognosis, Swallowing good, to achieve stated therapy goals  -ML --       Swallow Criteria for Skilled Therapeutic Interventions Met demonstrates skilled criteria  -ML --          SLP Treatment Clinical Impressions    Treatment Assessment (SLP) continued;oral dysphagia;pharyngeal dysphagia;toleration of diet  -ML continued;suspected;pharyngeal dysphagia  Pt reports poor intake due to adversion to pureed diet.  -SM (r) SMA (t) SM (c)       Treatment Assessment Comments (SLP) Trialed with soft to chew mixed with pudding.  Slightly increased oral prep but functional. Pt enjoyed having some soft foods- no c/o food sticking.  Wet vocal quality following thins intermittently- per instrumental this is pt's response to penetration and is effective in preventing aspiration.  -ML Observed pt at mealtime. Poor intake noted. Gived some sherbet and a ignacia cracker coated in apple sauce. No pharyngeal s/sxs observed, prolonged mastication of solid/puree. Addressed aversion to puree and suggested trying mechanical ground with extra gravy/sauce. Pt in agreement. Educated/demonstrated swallowing exercises with pt.  -SM (r) SMA (t) SM (c)       Daily Summary of Progress (SLP) progress toward functional goals as expected  -ML progress toward functional goals is good  -SM (r) SMA  (t) SM (c)       Barriers to Overall Progress (SLP) Cognitive status  -ML --       Plan for Continued Treatment (SLP) continue treatment per plan of care  -ML continue treatment per plan of care  -SM (r) SMA (t) SM (c)       Care Plan Review care plan/treatment goals reviewed;patient/other agree to care plan  -ML patient/other agree to care plan  -SM (r) SMA (t) SM (c)       Care Plan Review, Other Participant(s) spouse;family  -ML --          Recommendations    Therapy Frequency (Swallow) 5 days per week  -ML --       Predicted Duration Therapy Intervention (Days) 1 week  -ML --       SLP Diet Recommendation mechanical ground textures;thin liquids  -ML --       Recommended Diagnostics reassess via VFSS (MBS)  -ML --       Recommended Precautions and Strategies upright posture during/after eating;small bites of food and sips of liquid;general aspiration precautions  -ML --       Oral Care Recommendations Oral Care BID/PRN;Toothbrush;Denture Care  -ML --       SLP Rec. for Method of Medication Administration meds crushed;with puree;as tolerated  -ML --       Monitor for Signs of Aspiration yes;notify SLP if any concerns  -ML --       Anticipated Discharge Disposition (SLP) anticipate therapy at next level of care;unknown  -ML --                 User Key  (r) = Recorded By, (t) = Taken By, (c) = Cosigned By      Initials Name Effective Dates     Tiffany Fisher, MS CCC-SLP 02/03/23 -     ML Joy Saldaña, MS CCC-SLP 08/30/24 -     Mireille Parry, Speech Therapy Student 07/30/24 -                     EDUCATION  The patient has been educated in the following areas:   Dysphagia (Swallowing Impairment) Oral Care/Hydration Modified Diet Instruction swallowing exercises .        SLP GOALS       Row Name 09/23/24 1045 09/20/24 1347          (LTG) Patient will demonstrate functional swallow for    Diet Texture (Demonstrate functional swallow) mechanical ground textures  - mechanical ground textures  -  (r) SMA (t) SM (c)     Liquid viscosity (Demonstrate functional swallow) thin liquids  -ML thin liquids  -SM (r) SMA (t) SM (c)     [place] (Demonstrate functional swallow) independently (over 90% accuracy)  -ML independently (over 90% accuracy)  -SM (r) SMA (t) SM (c)     Time Frame (Demonstrate functional swallow) 1 week  -ML 1 week  -SM (r) SMA (t) SM (c)     Progress/Outcomes (Demonstrate functional swallow) good progress toward goal  -ML goal revised this date;continuing progress toward goal  -SM (r) SMA (t) SM (c)     Comment (Demonstrate functional swallow) Tolerating current diet- demonstrates improved speed of oral manipulation of soft to chew AND no c/o food sticking. Intermittent pharyngeal signs with thin with throat clear reaction as exhibited during instrumental exam.  -ML Pt expressed adversion to puree, in agreement with SLP to try upgrading to [facility]. ground with extra sauce/gravy.  -SM (r) SMA (t) SM (c)        (STG) Patient will tolerate trials of    Consistencies Trialed (Tolerate trials) thin liquids;mechanical ground textures  -ML thin liquids;mechanical ground textures  -SM (r) SMA (t) SM (c)     Desired Outcome (Tolerate trials) with adequate oral prep/transit/clearance;without signs/symptoms of aspiration  -ML with adequate oral prep/transit/clearance;without signs/symptoms of aspiration  -SM (r) SMA (t) SM (c)     [place] (Tolerate trials) independently (over 90% accuracy)  -ML independently (over 90% accuracy)  -SM (r) SMA (t) SM (c)     Time Frame (Tolerate trials) 1 week  -ML 1 week  -SM (r) SMA (t) SM (c)     Progress/Outcomes (Tolerate trials) good progress toward goal  -ML continuing progress toward goal;goal revised this date  -SM (r) SMA (t) SM (c)     Comment (Tolerate trials) Tolerating current diet and even some soft to chew. Pharyngeal signs with thin- wet vocal quality and throat clear.  -ML no overt s/sx  -SM (r) SMA (t) SM (c)        (STG) Patient will tolerate  therapeutic trials of    Consistencies Trialed (Tolerate therapeutic trials) -- --  Soft cohesive solids  -SM (r) SMA (t) SM (c)     Desired Outcome (Tolerate therapeutic trials) -- without signs/symptoms of aspiration;with adequate oral prep/transit/clearance  -SM (r) SMA (t) SM (c)     [place] (Tolerate therapeutic trials) -- with 1:1 assist/ supervision  -SM (r) SMA (t) SM (c)     Time Frame (Tolerate therapeutic trials) -- 1 week  -SM (r) SMA (t) SM (c)     Progress/Outcomes (Tolerate therapeutic trials) -- goal met  -SM (r) SMA (t) SM (c)     Comment (Tolerate therapeutic trials) -- trialed cohesive/soft diet without overt s/sx of aspiration/penetration.  -SM (r) SMA (t) SM (c)        (STG) Lingual Strengthening Goal 1 (SLP)    Activity (Lingual Strengthening Goal 1, SLP) increase lingual tone/sensation/control/coordination/movement;increase tongue back strength  -ML increase lingual tone/sensation/control/coordination/movement;increase tongue back strength  -SM (r) SMA (t) SM (c)     Increase Lingual Tone/Sensation/Control/Coordination/Movement lingual resistance exercises  -ML lingual resistance exercises  -SM (r) SMA (t) SM (c)     Increase Tongue Back Strength lingual resistance exercises  -ML lingual resistance exercises  -SM (r) SMA (t) SM (c)     [place]/Accuracy (Lingual Strengthening Goal 1, SLP) with minimal cues (75-90% accuracy)  -ML with minimal cues (75-90% accuracy)  -SM (r) SMA (t) SM (c)     Time Frame (Lingual Strengthening Goal 1, SLP) 1 week;short term goal (STG)  -ML 1 week  -SM (r) SMA (t) SM (c)     Progress/Outcomes (Lingual Strengthening Goal 1, SLP) good progress toward goal  -ML continuing progress toward goal  -SM (r) SMA (t) SM (c)     Comment (Lingual Strengthening Goal 1, SLP) Improved oral clearance - intermittent oral holding which appears cognitive.  Between trials, consistently cleared oral cavity completely.  -ML completed exercises and provided with education  -SM  (r) SMA (t) SM (c)        (STG) Pharyngeal Strengthening Exercise Goal 1 (SLP)    Activity (Pharyngeal Strengthening Goal 1, SLP) increase superior movement of the hyolaryngeal complex;increase anterior movement of the hyolaryngeal complex;increase squeeze/positive pressure generation  -ML increase superior movement of the hyolaryngeal complex;increase anterior movement of the hyolaryngeal complex;increase squeeze/positive pressure generation  -SM (r) SMA (t) SM (c)     Increase Superior Movement of the Hyolaryngeal Complex effortful pitch glide (falsetto + pharyngeal squeeze)  -ML effortful pitch glide (falsetto + pharyngeal squeeze)  -SM (r) SMA (t) SM (c)     Increase Anterior Movement of the Hyolaryngeal Complex EMST;chin tuck against resistance (CTAR)  -ML EMST;chin tuck against resistance (CTAR)  -SM (r) SMA (t) SM (c)     Increase Squeeze/Positive Pressure Generation hard effortful swallow  -ML hard effortful swallow  -SM (r) SMA (t) SM (c)     [place]/Accuracy (Pharyngeal Strengthening Goal 1, SLP) with moderate cues (50-74% accuracy)  -ML with moderate cues (50-74% accuracy)  -SM (r) SMA (t) SM (c)     Time Frame (Pharyngeal Strengthening Goal 1, SLP) 1 week;short term goal (STG)  -ML 1 week  -SM (r) SMA (t) SM (c)     Progress/Outcomes (Pharyngeal Strengthening Goal 1, SLP) progress slower than expected  -ML continuing progress toward goal;goal revised this date  -SM (r) SMA (t) SM (c)     Comment (Pharyngeal Strengthening Goal 1, SLP) Difficulty keeping pt focussed for successful completion of exercises.  -ML [place] no longer appropriate, pt with difficulty comprehending instructions to complete. Progress on other exercises. EMST provided  -SM (r) SMA (t) SM (c)        (STG) Swallow Management Recall Goal 1 (SLP)    Activity (Swallow Management Recall Goal 1, SLP) aspiration precautions;oral care recommendations;safe diet/liquid level;rationale for use of strategies/techniques;other (see  comments)  -ML aspiration precautions;oral care recommendations;safe diet/liquid level;rationale for use of strategies/techniques;other (see comments)  -SM (r) SMA (t) SM (c)     [place]/Accuracy (Swallow Management Recall Goal 1, SLP) with 1:1 supervision/constant cues  -ML with 1:1 supervision/constant cues  -SM (r) SMA (t) SM (c)     Time Frame (Swallow Management Recall Goal 1, SLP) short term goal (STG);1 week  -ML 1 week  -SM (r) SMA (t) SM (c)     Progress/Outcomes (Swallow Management Recall Goal 1, SLP) goal ongoing  -ML goal ongoing  -SM (r) SMA (t) SM (c)     Comment (Swallow Management Recall Goal 1, SLP) Family verbalizing understanding re: oral care/diet  -ML --               User Key  (r) = Recorded By, (t) = Taken By, (c) = Cosigned By      Initials Name Provider Type    Tiffany Prieto, MS CCC-SLP Speech and Language Pathologist    Joy Mckeon, MS CCC-SLP Speech and Language Pathologist    Mireille Parry, Speech Therapy Student SLP Student                         Time Calculation:    Time Calculation- SLP       Row Name 09/23/24 1207             Time Calculation- SLP    SLP Start Time 1045  -ML      SLP Received On 09/23/24  -ML                User Key  (r) = Recorded By, (t) = Taken By, (c) = Cosigned By      Initials Name Provider Type    Joy Mckeon, MS CCC-SLP Speech and Language Pathologist                    Therapy Charges for Today       Code Description Service Date Service Provider Modifiers Qty    [id number]  ST TREATMENT SWALLOW 3 9/23/2024 Joy Saldaña MS CCC-SLP GN 1                 Joy Saldaña MS CCC-SLP  9/23/2024

## 2024-09-23 NOTE — CASE MANAGEMENT/SOCIAL WORK
Case Management Discharge Note      Final Note: Spoke with Marian at [facility].  Insurance precert has been approved and a bed is available today.  RN to call report to [phone] and fax DC summary to [phone].  Wife advised that neither she nor family can transport.  [facility] van to transport today at 1430.      Wife is aware that pt/family will incur the cost of $105.55 for [employer].  Wife will arrange payment via phone prior to pick-up today.  SK      Provided Post Acute Provider List?: Yes  Post Acute Provider List: Long Term Acute Care, Inpatient Rehab  Provided Post Acute Provider Quality & Resource List?: Yes  Post Acute Provider Quality and Resource List: Inpatient Rehab  Delivered To: Support Person  Support Person: Sandra  Method of Delivery: In person    Selected Continued Care - Admitted Since 9/13/2024       Destination Coordination complete.      Service Provider Selected Services Address Phone Fax Patient Preferred    [facility] Sk[facility] [address] [facility] [facility] [address] [phone] --              Durable Medical Equipment    No services have been selected for the patient.                Dialysis/Infusion    No services have been selected for the patient.                Home Medical Care    No services have been selected for the patient.                Therapy    No services have been selected for the patient.                Community Resources    No services have been selected for the patient.                Community & DME    No services have been selected for the patient.                    Selected Continued Care - Prior Encounters Includes continued care and service providers with selected services from prior encounters from 6/15/2024 to 9/23/2024      Discharged on 9/5/2024 Admission date: 8/30/2024 - Discharge disposition: Home-Health Care Svc      Home Medical Care       Service Provider Selected Services Address  Phone Fax Patient Preferred    [facility] [address] [facility] [id number] [phone] --                      Discharged on 8/10/2024 Admission date: 8/7/2024 - Discharge disposition: Rehab Facility or Unit (DC - External)      Destination       Service Provider Selected Services Address Phone Fax Patient Preferred    [facility] Skilled Nursing [address] [facility] [address] [phone] --                          Transportation Services  W/C Van:  ([facility])    Final Discharge Disposition Code: 03 - skilled nursing facility (SNF)

## 2024-09-23 NOTE — PLAN OF CARE
Goal Outcome Evaluation:              Outcome Evaluation: SLP continues to follow- treat- and encourage.  Oral and pharyngeal signs persist. Continued ST services indicated.      Anticipated Discharge Disposition (SLP): anticipate therapy at next level of care, unknown          SLP Swallowing Diagnosis: mild, oral dysphagia, mild-moderate, pharyngeal dysphagia (09/23/24 1045)  Treatment Assessment (SLP): continued, oral dysphagia, pharyngeal dysphagia, toleration of diet (09/23/24 1045)  Treatment Assessment Comments (SLP): Trialed with soft to chew mixed with pudding.  Slightly increased oral prep but functional. Pt enjoyed having some soft foods- no c/o food sticking.  Wet vocal quality following thins intermittently- per instrumental this is pt's response to penetration and is effective in preventing aspiration. (09/23/24 1045)  Plan for Continued Treatment (SLP): continue treatment per plan of care (09/23/24 1045)

## 2024-09-23 NOTE — DISCHARGE SUMMARY
Patient Name: Guicho Blanco  MRN: [id number]  : 1940  DOS: 2024    Attending: Berto Liu MD    Primary Care Provider: Mitchel Culver MD    Date of Admission:.2024 12:15 PM    Date of Discharge:  2024    Discharge Diagnosis:       Cellulitis of right knee, treated.     Serotonin syndrome    Dysphagia    Hearing loss    Neuropathy    Failure to thrive in adult    Insomnia    Moderate malnutrition    Sepsis, resolved.     Lactic acidosis, resolved , treated.       Consults:   Critical care consult while patient was in ICU  Infectious disease, Jae Manzanares MD.  Neurology consult Akhil Lira DO  Orthopedic surgery consult: Christian Ramos MD    Hospital Course  At admit:  Patient is a pleasant 83 y.o. male who is known to me from recent hospitalizations following right total knee arthroplasty on 2024 at which point he was discharged to [facility] on 8/10/2024.  After spending several days there he was discharged home but was readmitted to [facility] on 2024 with cellulitis of his right knee.  He was treated with IV antibiotics under the direction of Dr. Manzanares with [facility].  Received ceftriaxone and vancomycin during his stay and was transition to p.o. antibiotics at discharge.     He worked with PT and OT and has made enough progress to be discharged home at that time.  Unfortunately at home he has not been sleeping well.  His mobility has fluctuated but he has had much difficulty getting up to initiate walking according to his wife.  He has had follow-ups with Dr. Manzanares and with Dr. Ramos.  His antibiotics were most recently transitioned to Zyvox on erythema worsening at his incision site.  Deeper sutures were removed by Dr. Ramos in the office yesterday for concern of possible reaction.  There has been no fever or chills and no nausea or vomiting though appetite has been very low and he has been spending extended periods of  time chewing as opposed to eating.     Understandably his wife is exhausted and she and the family including the daughters are concerned with the decline and the lack of progress physically.    Following admit:  Patient was maintained on IV antibiotics of ceftriaxone and vancomycin and was followed by Dr. Manzanares with [facility].  He was followed for clinical response of his right knee cellulitis which appears to be improving.  He was seen by physical therapy and was starting to participate with rehab.    He was seen by neurology due to history of TIA and progressive weakness and dysphagia.  Workup was to include an MRI which could not be done due to cochlear implants.    Due to insomnia Remeron was added to his regimen.    During his stay he on 9/16/2024 started having very high fevers to temperatures of 104 along with severe encephalopathy.  Code stroke was called on the floor and extensive workup was initiated including that for sepsis. He had marked lactic acidosis ( this has resolved over the following day )    After being monitored on the floor for few hours he was transferred to the intensive care unit with antibiotics modified to a dose of daptomycin given that day and Zosyn.    He had aspiration of his right knee by Dr. Ramos and no evidence of infection was found.  His blood cultures remained negative throughout his stay.    It was felt that his rapid deterioration was related to a serotonin syndrome related to him receiving Remeron in the hospital after being on Zyvox as an outpatient for couple days prior to his presentation.    Zyvox was added to his allergy list and SSRIs and SNRIs or to be avoided going forward.    After supportive measures in the ICU for a few days his fevers subsided and his alertness level improved significantly.    He was able to participate with speech therapy, physical therapy and Occupational Therapy.    Copying the recommendations from the speech therapist at the bottom of this  dictation for his recommended diet and he will continue to need further speech therapy following discharge.    Extended discussion took place with patient's family throughout his stay regarding goals of care.  At this point we are pursuing medical management including aggressive measures however CODE STATUS was changed during his stay to DO NOT RESUSCITATE and DO NOT INTUBATE and no defibrillation.    With regard to patient's antibiotics, patient has finished a course of treatment for his right knee cellulitis, this has essentially resolved and his incision is healing appropriate stage of healing following his knee replacement.    At this point his pain is controlled and I would continue to avoid opiates as much as possible.  He remains on appropriate bowel regimen which will need to continue.          Pertinent Test Results:    I reviewed the patient's new clinical results.   Results from last 7 days   Lab Units 09/20/24  0331 09/18/24  0408 09/17/24  0327   WBC 10*3/mm3 4.12 3.13* 2.71*   HEMOGLOBIN g/dL 10.4* 10.0* 10.4*   HEMATOCRIT % 32.3* 31.8* 33.3*   PLATELETS 10*3/mm3 129* 112* 127*     Results from last 7 days   Lab Units 09/21/24  0554 09/20/24  0331 09/19/24  0416 09/18/24  0408   SODIUM mmol/L 137 138 138 137   POTASSIUM mmol/L 3.6 3.4* 3.6 4.2   CHLORIDE mmol/L 103 104 104 103   CO2 mmol/L 22.0 21.0* 24.0 24.0   BUN mg/dL 7* 11 18 23   CREATININE mg/dL 0.68* 0.65* 0.78 0.95   CALCIUM mg/dL 8.4* 8.4* 8.3* 8.2*   BILIRUBIN mg/dL  --   --   --  0.5   ALK PHOS U/L  --   --   --  116   ALT (SGPT) U/L  --   --   --  59*   AST (SGOT) U/L  --   --   --  137*   GLUCOSE mg/dL 103* 101* 85 72     I reviewed the patient's new imaging including images and reports.    Narrative   CT CEREBRAL PERFUSION W WO CONTRAST    Date of Exam: 9/16/2024 3:47 PM EDT    Indication: AMS, leftward gaze, right sided weakness.     Comparison: CT head from earlier today    Technique: Axial CT images of the brain were obtained prior to  and after the administration of 50 mL Isovue-370. Core blood volume, core blood flow, mean transit time, and Tmax images were obtained utilizing the Rapid software protocol. A limited CT  angiogram of the head was also performed to measure the blood vessel density. ...   Impression   Impression:  1.Possible compromised examination secondary to streak artifact from metal devices along bilateral posterior scalp.  2.No definite evidence of acute infarct.  3.9 mL of abnormal Tmax perfusion corresponding to left temporal lobe, which could represent tissue at risk versus artifact.        Electronically Signed: Sanjay Velasco MD   9/16/2024 4:26 PM EDT   Workstation ID: [id number]           EEG    Technical quality: Good     EKG: Regular, 80-90 bpm     SUMMARY:     Mild-moderate generalized slow     No focal features or epileptiform activity are seen     Somewhat suboptimal study due to extensive EMG artifact     IMPRESSION:     Diffuse cerebral dysfunction of at least mild degree, nonspecific.  This is most commonly seen due to toxic/metabolic cause     This report is transcribed using the Dragon dictation system.                Signed    Electronically signed by Sanjay Hazel MD on 9/16/24 at 1716 EDT       Physical therapy  Mireille Silva, PT   Physical Therapist  Physical Therapy     Plan of Care      Signed     Date of Service: 09/22/24 0925  Creation Time: 09/22/24 1022     Signed         Goal Outcome Evaluation:  Plan of Care Reviewed With: patient, spouse, daughter  Progress: improving  Outcome Evaluation: Pt amb 45ft + 60ft with RW and CGA +1 for recliner in tow. Pt amb with forward flexed posture, bent knees, and decreased heel strike with some improvement after pt given visual/verbal/tactile cues for improving posture and RW position. Further distance limited by weakness and fatigue. Good effort with therex. Pt remains below baseline level of function for mobility and IPPT services continue to be  "warranted at this time.                 Discharge Assessment:    Vital Signs  Visit Vitals  /81 (BP Location: Right arm, Patient Position: Lying)   Pulse 94   Temp 98 °F (36.7 °C) (Oral)   Resp 18   Ht 182.9 cm (72.01\")   Wt 92 kg (202 lb 13.2 oz)   SpO2 97%   BMI 27.50 kg/m²     Temp (24hrs), Av.6 °F (36.4 °C), Min:96.8 °F (36 °C), Max:98 °F (36.7 °C)      General Appearance:    Alert, cooperative, in no acute distress   Lungs:     Clear to auscultation,respirations regular, even and                   unlabored    Heart:    Regular rhythm and normal rate, normal S1 and S2   Abdomen:     Normal bowel sounds, no masses, no organomegaly, soft        non-tender, non-distended, no guarding, no rebound          tenderness   Extremities:   Moves all extremities well, no edema . Healing right knee incision with resolved cellulitis.   Pulses:   Pulses palpable and equal bilaterally   Skin:   No bleeding, bruising or rash            Discharge Disposition: [facility] nursing facility        Discharge Medications        New Medications        Instructions Start Date   polyethylene glycol 17 g packet  Commonly known as: MIRALAX   17 g, Oral, Daily PRN             Changes to Medications        Instructions Start Date   bisacodyl 10 MG suppository  Commonly known as: DULCOLAX  What changed: Another medication with the same name was added. Make sure you understand how and when to take each.   10 mg, Rectal, Daily PRN      bisacodyl 5 MG EC tablet  Commonly known as: DULCOLAX  What changed: You were already taking a medication with the same name, and this prescription was added. Make sure you understand how and when to take each.   5 mg, Oral, Daily PRN      bisacodyl 10 MG suppository  Commonly known as: DULCOLAX  What changed: You were already taking a medication with the same name, and this prescription was added. Make sure you understand how and when to take each.   10 mg, Rectal, Daily PRN    "          Continue These Medications        Instructions Start Date   acetaminophen 650 MG 8 hr tablet  Commonly known as: TYLENOL   650 mg, Oral, Every 8 Hours PRN      aspirin 325 MG tablet   1 tablet, Oral, Daily      coenzyme Q10 100 MG capsule   1 capsule, Oral, Daily      docusate sodium 100 MG capsule  Commonly known as: Colace   100 mg, Oral, 2 Times Daily PRN      flecainide 100 MG tablet  Commonly known as: TAMBOCOR   100 mg, Oral, Every 12 Hours      glucose 4 GM chewable tablet  Commonly known as: DEX4   16 g, Oral, As Needed      isosorbide mononitrate 30 MG 24 hr tablet  Commonly known as: IMDUR   30 mg, Oral, Daily      nitroglycerin 0.4 MG SL tablet  Commonly known as: NITROSTAT   1 under the tongue as needed for angina, may repeat q5mins for up three doses      pravastatin 20 MG tablet  Commonly known as: PRAVACHOL   20 mg, Oral, Daily      PrilOSEC OTC 20 MG EC tablet  Generic drug: omeprazole OTC   1 tablet, Oral, Daily      Restasis 0.05 % ophthalmic emulsion  Generic drug: cycloSPORINE   Administer 1 drop to both eyes Every 12 (Twelve) Hours. Indications: Drying and Inflammation of Cornea and Conjunctiva of Eyes      saccharomyces boulardii 250 MG capsule  Commonly known as: FLORASTOR   250 mg, Oral, 2 Times Daily      vitamin b complex capsule capsule   1 capsule, Oral, Daily, Takes in evening      vitamin D3 125 MCG (5000 UT) capsule capsule   1 capsule, Oral, Daily             Stop These Medications      linezolid 600 MG tablet  Commonly known as: ZYVOX     nortriptyline 10 MG capsule  Commonly known as: PAMELOR     traMADol 50 MG tablet  Commonly known as: ULTRAM              Discharge Diet: Dysphagia diet to include mechanical ground textures with thin liquids.     From speech therapy note (SLP Recommendation and Plan  SLP Swallowing Diagnosis: mild, oral dysphagia, mild-moderate, pharyngeal dysphagia (09/23/24 1045)  SLP Diet Recommendation: mechanical ground textures, thin liquids  (09/23/24 1045)  Recommended Precautions and Strategies: upright posture during/after eating, small bites of food and sips of liquid, general aspiration precautions (09/23/24 1045)  SLP Rec. for Method of Medication Administration: meds crushed, with puree, as tolerated (09/23/24 1045)  Monitor for Signs of Aspiration: yes, notify SLP if any concerns (09/23/24 1045)  Recommended Diagnostics: reassess via [facility] ([facility] [place] [facility]) (09/23/24 1045)  Swallow Criteria for Skilled Therapeutic Interventions Met: demonstrates skilled criteria (09/23/24 1045)  Anticipated Discharge Disposition (SLP): anticipate therapy at next level of care, unknown (09/23/24 1045)  Rehab Potential/Prognosis, Swallowing: good, to achieve stated therapy goals (09/23/24 1045)  Therapy Frequency (Swallow): 5 days per week (09/23/24 1045)  Predicted Duration Therapy Intervention (Days): 1 week (09/23/24 1045)  Oral Care Recommendations: Oral Care BID/PRN, Toothbrush, Denture Care (09/23/24 1045)  Daily Summary of Progress (SLP): progress toward functional goals as expected (09/23/24 1045)  )  End of copied portion from speech therapy     Activity at Discharge:   Weightbearing as tolerated  Follow-up Appointments:    Primary care provider Dr. Mitchel Culver,  postdischarge from rehab facility.     Christian Ramos MD, in 2 weeks.    Discharge took over 30 min    Berto Liu MD  09/23/24  12:25 EDT

## 2024-10-08 ENCOUNTER — OFFICE VISIT (OUTPATIENT)
Dept: ORTHOPEDIC SURGERY | Facility: CLINIC | Age: 84
End: 2024-10-08
Payer: MEDICARE

## 2024-10-08 DIAGNOSIS — Z96.651 STATUS POST TOTAL RIGHT KNEE REPLACEMENT: Primary | ICD-10-CM

## 2024-10-08 DIAGNOSIS — L03.115 CELLULITIS OF RIGHT LOWER EXTREMITY: ICD-10-CM

## 2024-10-08 PROCEDURE — 99024 POSTOP FOLLOW-UP VISIT: CPT | Performed by: ORTHOPAEDIC SURGERY

## 2024-10-08 NOTE — PROGRESS NOTES
[facility] Note        Subjective     Post-op (3.5 week follow up---9 weeks s/p TOTAL KNEE ARTHROPLASTY WITH ASHLEY ROBOT RIGHT DOS 8/7/24/))/ /)       HPI    Guicho Blanco is a 83 y.o. male.  Patient is here today with his wife and granddaughter for follow-up.  He is now about 2 months out from robot-assisted right TKA on 8/7/2024.  Has had a couple of readmissions for cellulitis and for failure to thrive.  Had serotonin syndrome at his last admission.  He is at [facility] doing rehab.  Still having difficulty getting up and down from a seated position.          Objective      Physical Exam  There were no vitals taken for this visit.    There is no height or weight on file to calculate BMI.        Ortho Exam  Patient is oriented to person and place  Patient has about a 10 to 15 degree flexion contracture that appears to be muscular more than anything.  Flexes to 120 degrees  Incision is benign in appearance  He is in a wheelchair    Imaging Reviewed and Interpreted:  Imaging Results (Last 24 Hours)       ** No results found for the last 24 hours. **              Assessment    Assessment:  1. Status post total right knee replacement    2. Cellulitis of right lower extremity        Plan:  Cellulitis right lower extremity--resolved  Status post right TKA--patient is stiffer than he was while in the hospital.  I would recommend that he continue to be stretched aggressively by the therapy department at [facility].  They need to continue to work on core strengthening as well.  Follow-up with me in 2 months.      Christian Schwartz MD  10/08/24  14:38 EDT      Dictated Utilizing Dragon Dictation.

## 2024-10-19 ENCOUNTER — TRANSCRIBE ORDERS (OUTPATIENT)
Dept: HOME HEALTH SERVICES | Facility: HOME HEALTHCARE | Age: 84
End: 2024-10-19
Payer: MEDICARE

## 2024-10-19 DIAGNOSIS — M00.861: Primary | ICD-10-CM

## 2024-10-21 ENCOUNTER — HOME CARE VISIT (OUTPATIENT)
Dept: HOME HEALTH SERVICES | Facility: HOME HEALTHCARE | Age: 84
End: 2024-10-21
Payer: COMMERCIAL

## 2024-10-21 PROCEDURE — G0299 HHS/HOSPICE OF RN EA 15 MIN: HCPCS

## 2024-10-22 ENCOUNTER — HOME CARE VISIT (OUTPATIENT)
Dept: HOME HEALTH SERVICES | Facility: HOME HEALTHCARE | Age: 84
End: 2024-10-22
Payer: COMMERCIAL

## 2024-10-22 VITALS
SYSTOLIC BLOOD PRESSURE: 100 MMHG | DIASTOLIC BLOOD PRESSURE: 67 MMHG | OXYGEN SATURATION: 98 % | HEART RATE: 85 BPM | TEMPERATURE: 98.1 F | RESPIRATION RATE: 18 BRPM

## 2024-10-22 VITALS
HEART RATE: 78 BPM | DIASTOLIC BLOOD PRESSURE: 68 MMHG | TEMPERATURE: 98.5 F | RESPIRATION RATE: 16 BRPM | SYSTOLIC BLOOD PRESSURE: 126 MMHG | OXYGEN SATURATION: 97 %

## 2024-10-22 PROCEDURE — G0153 HHCP-SVS OF S/L PATH,EA 15MN: HCPCS

## 2024-10-22 NOTE — HOME HEALTH
Resumption of Care Note:     Reason for hospitalization/new problems: Patient rehospitalized following hip surgery after developing cellulitis of RLE. Patient hospitalized for extended amount of time and discharged to [facility] for rehab. Patient's incision now healed. Currently struggling with increased weightloss due to alteration in nutrition status r/t swallowing difficulty. PT, OT, SN, and SLP ordered upon discharge home.     STEVO [place] Findings:     New/changed medications: no new medications    New/changed orders: NA    Educated on Emergency Plan, steps to take prior to going to the ER and when to Call Home Health [facility]:  yes    Plan/Focus of Care and Skilled need: education related to dysphagia and nutrition     60 Day Summary  Home Health need continues for: education related to dysphagia and nutrition  FOC: education r/t dysphagia and nutrition  Primary diagnoses/co-morbidities/recent procedures in past 60 days that impact current episode: patient rehospitalized following hip surgery after developing cellulitis of RLE. Patient hospitalized for extended amount of time and discharged to [facility] for rehab. Patient's incision now healed. Currently struggling with increased weightloss due to alteration in nutrition status r/t swallowing difficulty  Current level of functional ability: ambulates with 1 assist and a walker  Homebound status and living arrangements: lives at home with wife, intermittent assistance from daughter  Goals accomplished and/or measurable progress toward unmet goals in past 60 days: NA  Focus of care for next 60 days for each discipline ordered: dysphagia and nutrition education  Skin integrity/wound status: draining abscess on right upper back, called MD to request advice. MD sent in antibiotic. PAtient advised to keep area clean and dry at all times  Estimated date when home care services will end - approx 4 weeks for nursing  SDOH changes/barriers (i.e.  Caregiver availability, social isolation, environment, income, transportation access, food insecurity etc.)NA  Need for MSW referral? no

## 2024-10-23 ENCOUNTER — HOME CARE VISIT (OUTPATIENT)
Dept: HOME HEALTH SERVICES | Facility: HOME HEALTHCARE | Age: 84
End: 2024-10-23
Payer: MEDICARE

## 2024-10-23 VITALS
HEART RATE: 70 BPM | SYSTOLIC BLOOD PRESSURE: 117 MMHG | DIASTOLIC BLOOD PRESSURE: 68 MMHG | TEMPERATURE: 98.4 F | OXYGEN SATURATION: 99 % | RESPIRATION RATE: 16 BRPM

## 2024-10-23 PROCEDURE — G0151 HHCP-SERV OF PT,EA 15 MIN: HCPCS

## 2024-10-23 RX ORDER — PRAVASTATIN SODIUM 20 MG
20 TABLET ORAL DAILY
Start: 2024-10-23

## 2024-10-23 NOTE — CASE COMMUNICATION
route to Dr. Culver    Resumption of Care Note:     Reason for hospitalization/new problems: Patient rehospitalized following hip surgery after developing cellulitis of RLE. Patient hospitalized for extended amount of time and discharged to [facility] for rehab. Patient's incision now healed. Currently struggling with increased weightloss due to alteration in nutrition status r/t swallowing difficulty. PT, OT, SN, and  SLP ordered upon discharge home.     STEVO [place] Findings:     New/changed medications: no new medications    New/changed orders: NA    Educated on Emergency Plan, steps to take prior to going to the ER and when to Call Home Health [facility]:  yes    Plan/Focus of Care and Skilled need: education related to dysphagia and nutrition     60 Day Summary  Home Health need continues for: education related to dysphagia and nutrition  FOC: education  r/t dysphagia and nutrition  Primary diagnoses/co-morbidities/recent procedures in past 60 days that impact current episode: patient rehospitalized following hip surgery after developing cellulitis of RLE. Patient hospitalized for extended amount of time and discharged to [facility] for rehab. Patient's incision now healed. Currently struggling with increased weightloss due to alteration in nutrition status r/t swallowing  difficulty  Current level of functional ability: ambulates with 1 assist and a walker  Homebound status and living arrangements: lives at home with wife, intermittent assistance from daughter  Goals accomplished and/or measurable progress toward unmet goals in past 60 days: NA  Focus of care for next 60 days for each discipline ordered: dysphagia and nutrition education  Skin integrity/wound status: draining abscess on right upper back,  called MD to request advice. MD sent in antibiotic. PAtient advised to keep area clean and dry at all times  Estimated date when home care services will end - approx 4 weeks for  nursing  SDOH changes/barriers (i.e. Caregiver availability, social isolation, environment, income, transportation access, food insecurity etc.)NA  Need for MSW referral? no

## 2024-10-24 ENCOUNTER — HOME CARE VISIT (OUTPATIENT)
Dept: HOME HEALTH SERVICES | Facility: HOME HEALTHCARE | Age: 84
End: 2024-10-24
Payer: COMMERCIAL

## 2024-10-24 VITALS
DIASTOLIC BLOOD PRESSURE: 65 MMHG | OXYGEN SATURATION: 100 % | TEMPERATURE: 98.4 F | SYSTOLIC BLOOD PRESSURE: 125 MMHG | HEART RATE: 100 BPM | RESPIRATION RATE: 17 BRPM

## 2024-10-24 PROCEDURE — G0152 HHCP-SERV OF OT,EA 15 MIN: HCPCS

## 2024-10-25 ENCOUNTER — HOME CARE VISIT (OUTPATIENT)
Dept: HOME HEALTH SERVICES | Facility: HOME HEALTHCARE | Age: 84
End: 2024-10-25
Payer: COMMERCIAL

## 2024-10-29 ENCOUNTER — HOME CARE VISIT (OUTPATIENT)
Dept: HOME HEALTH SERVICES | Facility: HOME HEALTHCARE | Age: 84
End: 2024-10-29
Payer: COMMERCIAL

## 2024-10-29 VITALS
DIASTOLIC BLOOD PRESSURE: 56 MMHG | SYSTOLIC BLOOD PRESSURE: 90 MMHG | HEART RATE: 102 BPM | RESPIRATION RATE: 16 BRPM | TEMPERATURE: 98.3 F | OXYGEN SATURATION: 99 %

## 2024-10-29 PROCEDURE — G0151 HHCP-SERV OF PT,EA 15 MIN: HCPCS

## 2024-10-29 PROCEDURE — G0153 HHCP-SVS OF S/L PATH,EA 15MN: HCPCS

## 2024-10-29 NOTE — HOME HEALTH
Pt's wife is concerned pt. is not eating enough, he had another weight loss when he saw Dr. Culver on 10.28.24.  Pt. and wife have been educated on increasing caloric intake with each meal using supplements.  Pt. is not eating an entire meal and it takes him 1-2 hours to eat half a meal.  SLP suggested smaller, more frequent meals to improve caloric intake.  Per wife, Dr. GREEN wants to see pt. again in 4 weeks and expects him to gain 5 lbs.

## 2024-10-30 ENCOUNTER — HOME CARE VISIT (OUTPATIENT)
Dept: HOME HEALTH SERVICES | Facility: HOME HEALTHCARE | Age: 84
End: 2024-10-30
Payer: COMMERCIAL

## 2024-10-30 VITALS
SYSTOLIC BLOOD PRESSURE: 90 MMHG | TEMPERATURE: 97.5 F | HEART RATE: 111 BPM | OXYGEN SATURATION: 98 % | DIASTOLIC BLOOD PRESSURE: 73 MMHG

## 2024-10-30 VITALS
OXYGEN SATURATION: 97 % | TEMPERATURE: 98.1 F | RESPIRATION RATE: 16 BRPM | SYSTOLIC BLOOD PRESSURE: 132 MMHG | DIASTOLIC BLOOD PRESSURE: 66 MMHG | HEART RATE: 72 BPM

## 2024-10-30 VITALS
OXYGEN SATURATION: 99 % | RESPIRATION RATE: 14 BRPM | TEMPERATURE: 98.1 F | DIASTOLIC BLOOD PRESSURE: 63 MMHG | SYSTOLIC BLOOD PRESSURE: 96 MMHG | HEART RATE: 103 BPM

## 2024-10-30 VITALS
OXYGEN SATURATION: 96 % | TEMPERATURE: 96.9 F | HEART RATE: 109 BPM | RESPIRATION RATE: 18 BRPM | DIASTOLIC BLOOD PRESSURE: 62 MMHG | SYSTOLIC BLOOD PRESSURE: 108 MMHG

## 2024-10-30 PROCEDURE — G0299 HHS/HOSPICE OF RN EA 15 MIN: HCPCS

## 2024-10-30 PROCEDURE — G0152 HHCP-SERV OF OT,EA 15 MIN: HCPCS

## 2024-10-30 PROCEDURE — G0156 HHCP-SVS OF AIDE,EA 15 MIN: HCPCS

## 2024-10-30 NOTE — CASE COMMUNICATION
"    [facility] Medicine Services  DISCHARGE SUMMARY    Patient Name: Shauna Valencia  : 1957  MRN: [id number]    Date of Admission: 10/6/2019  Date of Discharge:  10/13/2019  Primary Care Physician: Vidya Chávez MD    Consults     Date and Time Order Name Status Description    10/10/2019 1050 Inpatient Palliative Care MD Consult Completed     10/7/2019 0043 Inpatient Hematology & Oncology Consult Completed     10/7/2019 0034 Inpatient Nephrology Consult            Hospital Course     Presenting Problem:   Hyponatremia [E87.1]    Active Hospital Problems    Diagnosis  POA   • **Acute hyponatremia [E87.1]  Yes   • CAD (coronary artery disease) [I25.10]  Yes   • COPD (chronic obstructive pulmonary disease) (CMS/HCC) [J44.9]  Yes   • Hyponatremia [E87.1]  Yes   • CKD (chronic kidney disease) stage 3, GFR 30-59 ml/min (CMS/HCC) [N18.3]  Yes   • Generalized weakness [R53.1]  Yes   • Anemia [D64.9]  Yes   • Small cell lung cancer, left upper lobe (CMS/HCC) [C34.12]  Yes   • Hyperlipidemia [E78.5]  Yes   • Type 2 diabetes mellitus (CMS/HCC) [E11.9]  Yes   • Tobacco abuse [Z72.0]  Yes      Resolved Hospital Problems   No resolved problems to display.          Hospital Course:  Shauna Valencia is a 62 y.o. female with a past medical history significant for DM2, Small cell lung cancer ( left lung), anemia, CKD, III, COPD, CAD, HTN, and HLD who presents with complaints progressively worsening left lower quadrant abdominal pain going on for the past month. Pain radiates from left lower quadrant to lower back. No modifying factors. Rated 7/10 at its worst. There is associated nausea with non bloody vomiting and constipation. Last bowel movement was today. However yesterday was her first bowel movement in a week. She reports \"extremely hard\" stool when she does have a bowel movement and reports difficulty getting it out. States today she had to \" rock out her stool\" while squatting over the " Pt wife called medical exchange at 05:48 on 10/30/2024. Pt spouse states her  has had two falls through the night and that she had called 911 to help get him up. Falls were unwitnessed and unsure of any LOC of of pt hitting his head. She reports he was also hypotensive according to 911 visit. I did advise the wife to get the patient evaluated at the ER due to the multiple unwitnessed falls he has had this date and with him being  hypotensive, the spouse agreed with that suggestion and reported she would have him evaluated at ER. commode. Patient ended up losing her balance and falling in the bathroom. She is not anticoagulated. Denies syncope, head trauma or LOC.  Pertinent surgical history includes partial colectomy in 2014 for diverticulitis ( gene) then hernia repair in 2015. She is scheduled to Dr. Olson at the end of October for evaluation of chronic constipation.    Patient was admitted to hospital medicine for further evaluation. 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 Nephrology followed patient. She was started on a fluid restriction and sodium chloride tablets. Sodium has improved and normalized at discharge. Patient will be sent home on Sodium chloride tables daily and no fluid restriction per nephrology recommendations. Patient was treated with hydrocortisone and Fludrocortisone while in hospital. Morning cortisol level was 3.9 indicative of adrenal insufficiency questionable primary or secondary AI and patient will need to follow up with Endocrinologist as outpatient. She will be sent home on Fludrocortisone and a  taper of hydrocortisone from 50 mg daily for 4 days to 25 mg daily until follow up with Endocrinology.     Patient was followed by oncology while in hospital. Patient has symptomatic anemia and was given one unit of PRBC and hemoglobin remained stable. CT chest with progressive lymphadenopathy. PET scan ordered as outpatient to further evaluate.     Patient has CKD and creatinine has remained stable and improved prior to discharge. Patient was also followed by palliative medicine for pain control. Patient was started on scheduled pain meds, Cymbalta and xanax with improvement. Patient was referred to outpatient Palliative medicine for further follow up.     Patient has remained clinically stable and will be discharged home today with . Patient will need to follow up with PCP one week. Follow up with Dr. Cardoso on 10/17/19. Follow up with DANNY in 2 weeks. Follow up with  Endocrinology first available. Follow up Palliative medicine.       Discharge Follow Up Recommendations for labs/diagnostics:  PCP one week  Endocrinology follow up first available   Dr. Cardoso Thursday 10/17/19  Follow up with NAL 2 weeks   Follow up with Palliative medicine   PET scan on 10/16/19    Day of Discharge     HPI:   Patient is resting in bed in NAD. She slept well. Tolerating diet. Pain controlled.     Review of Systems  Gen- No fevers, chills  CV- No chest pain, palpitations  Resp- No cough, dyspnea  GI- No N/V/D, abd pain      Otherwise ROS is negative except as mentioned in the HPI.    Vital Signs:   Temp:  [98.5 °F (36.9 °C)] 98.5 °F (36.9 °C)  Heart Rate:  [84-96] 84  Resp:  [18] 18  BP: (123-144)/(70-85) 144/85     Physical Exam:  Constitutional: Awake, alert, awake and alert   Eyes: PERRLA, sclerae anicteric, no conjunctival injection  HENT: NCAT, mucous membranes moist  Neck: Supple, trachea midline  Respiratory: Clear to auscultation bilaterally, nonlabored respirations, room air    Cardiovascular: RRR, no murmurs, rubs, or gallops, palpable pedal pulses bilaterally  Gastrointestinal: Positive bowel sounds, soft, nontender, nondistended  Musculoskeletal: No bilateral ankle edema, no clubbing or cyanosis to extremities  Psychiatric: Appropriate affect, cooperative  Neurologic: Oriented x 3, strength symmetric in all extremities, Cranial Nerves grossly intact to confrontation, speech clear  Skin: No rashes      Pertinent  and/or Most Recent Results     Results from last 7 days   Lab Units 10/13/19  0555 10/12/19  2342 10/12/19  1744 10/12/19  1139 10/12/19  0729 10/12/19  0025 10/11/19  1732  10/11/19  0255  10/10/19  0438  10/09/19  0410  10/08/19  0655  10/07/19  0541   WBC 10*3/mm3 5.97  --   --   --  5.19  --   --   --  10.65  --  8.57  --  7.67  --  4.74  --  5.46   HEMOGLOBIN g/dL 9.1*  --   --   --  9.2* 9.0*  --   --  7.5*  --  7.4*  --  7.7*  --  8.0*  --  8.1*   HEMATOCRIT % 27.2*  --    --   --  29.3* 27.6*  --   --  22.0*  --  22.1*  --  22.4*  --  22.5*  --  23.4*   PLATELETS 10*3/mm3 229  --   --   --  222  --   --   --  259  --  280  --  266  --  239  --  253   SODIUM mmol/L 137 136 136 137 133* 136 135*   < >  --    < > 129*   < >  --    < > 120*   < > 120*   POTASSIUM mmol/L 3.6 3.8 3.5 4.0 3.8 3.9 3.9   < >  --    < > 4.2   < >  --    < > 4.6   < > 4.6   CHLORIDE mmol/L 98 99 97* 97* 97* 101 100   < >  --    < > 92*   < >  --    < > 86*   < > 84*   CO2 mmol/L 26.0 26.0 29.0 29.0 25.0 25.0 26.0   < >  --    < > 24.0   < >  --    < > 22.0   < > 23.0   BUN mg/dL 27* 30* 29* 27* 28* 29* 28*   < >  --    < > 23   < >  --    < > 23   < > 22   CREATININE mg/dL 1.19* 1.32* 1.47* 1.45* 1.52* 1.52* 1.38*   < >  --    < > 1.21*   < >  --    < > 1.36*   < > 1.40*   GLUCOSE mg/dL 166* 176* 235* 127* 128* 164* 157*   < >  --    < > 194*   < >  --    < > 197*   < > 122*   CALCIUM mg/dL 9.1 9.0 9.0 9.4 9.1 9.0 9.3   < >  --    < > 9.4   < >  --    < > 9.7   < > 9.2    < > = values in this interval not displayed.     Results from last 7 days   Lab Units 10/12/19  0729 10/06/19  2039   BILIRUBIN mg/dL 0.2 0.4   ALK PHOS U/L 58 60   ALT (SGPT) U/L 11 12   AST (SGOT) U/L 15 16           Invalid input(s): TG, LDLCALC, LDLREALC  Results from last 7 days   Lab Units 10/07/19  0541 10/06/19  2039   TSH uIU/mL 1.580  --    CORTISOL mcg/dL 3.93  --    PROBNP pg/mL  --  163.3   TROPONIN T ng/mL  --  <0.010       Brief Urine Lab Results  (Last result in the past 365 days)      Color   Clarity   Blood   Leuk Est   Nitrite   Protein   CREAT   Urine HCG        10/06/19 2132 Yellow Cloudy Negative Negative Negative Negative               Microbiology Results Abnormal     None          Imaging Results (all)     Procedure Component Value Units Date/Time    CT Chest Without Contrast [[id number]] Collected:  10/08/19 0823     Updated:  10/09/19 2258    Narrative:       EXAMINATION: CT CHEST WO CONTRAST-10/07/2019:       INDICATION: Lung cancer; E87.3-Mqct-[id number] and hyponatremia;  R53.1-Weakness; R10.32-Left lower quadrant pain; N18.9-Chronic kidney  disease, unspecified; D64.9-Anemia, unspecified.     TECHNIQUE: 5 mm unenhanced images through the chest.     The radiation dose reduction device was turned on for each scan per the  ALARA (As Low as Reasonably Achievable) protocol.     COMPARISON: 08/27/2019 chest CT scan.     FINDINGS: Linear scarring in the left suprahilar region of the left  upper lobe, and somewhat more discoid scarring in the superior segment  of the left lower lobe, both appear unchanged. There is no evidence of  new pulmonary parenchymal disease. Trace pleural thickening in the  posterior left mid chest is similar to the prior study. There is trace  pericardial effusion. No significant mediastinal adenopathy is  identified.     Included images of the upper abdomen show interval development of a left  retrocrural nodule, possibly invading the left diaphragmatic rojas  itself, and measuring approximately 3.1 x 2.7 mm. Previously, this was  seen as a slightly thickened crural base, 15 mm in diameter. There is  adjacent, new soft tissue thickening along the left lateral margin of  the T12 vertebral body, over an approximately 2.5 x 11.2 cm area. There  is now a left paraspinous mass adjacent to T10, approximately 3.3 cm in  diameter. No pathologic paraspinous mass is seen elsewhere. Separately,  there is now a round 3 cm soft tissue mass interposed between the distal  esophagus, and the aorta, apparently an enlarging lymph node. This  previously appeared to be a part of the distal esophagus, but was  apparently a separate 15 mm nodule on the prior scan. No gross  abnormalities are seen of the included portions of the liver, spleen,  pancreatic tail, adrenal glands, or upper renal poles.       Impression:       1. Stable left perihilar lung scarring compared to 08/27/2019 exam. No  new disease is seen in the  thorax.  2. Interval development of left retrocrural, left lower thoracic  paraspinous and left lower paraesophageal masses, consistent with  metastatic disease.     D:  10/08/2019  E:  10/08/2019           This report was finalized on 10/9/2019 10:55 PM by DR. Maynor Weaver MD.       CT Abdomen Pelvis Without Contrast [[id number]] Collected:  10/06/19 2238     Updated:  10/06/19 2240    Narrative:       CT Abdomen Pelvis WO    INDICATION:   Left lower quadrant pain for one month. History of diverticulitis    TECHNIQUE:   CT of the abdomen and pelvis without IV contrast. Coronal and sagittal reconstructions were obtained.  Radiation dose reduction techniques included automated exposure control or exposure modulation based on body size. Count of known CT and cardiac nuc  med studies performed in previous 12 months: 5.     COMPARISON:   9/11/2019    FINDINGS:  Abdomen: Lung bases are clear. There is a left paraspinal mass measuring 2.3 x 1.8 cm. This is adjacent to the T10 vertebral body on the left side.. Is unchanged the more recent study but new from study from one year ago. Is also a large node anterior to  the aorta at the level the diaphragm measuring 3.0 cm which is developed since 2018.. There is left paratracheal adenopathy in the upper abdomen to the T12  Measuring about 2.6 m in diameter. The liver, spleen, pancreas and adrenal glands are normal. The right kidney is normal. Left kidney has superior cysts which are stable. Aorta is normal in size. Bowel is normal.    Pelvis: Bladder is normal. Uterus been removed. There are no adnexal masses. There is a right hip prosthesis present      Impression:       Adenopathy is present anterior to the distal thoracic aorta, left of the T10 vertebral body and at the T12 level in the upper abdomen. These findings are all unchanged from the recent study from 9/11/2019 there are new from 2018 exam. Patient apparently  has a history of lung cancer.    There is no evidence of  diverticulitis.          Signer Name: Karan Hicks MD   Signed: 10/6/2019 10:38 PM   Workstation Name: [facility]    [facility]    CT Head Without Contrast [[id number]] Collected:  10/06/19 2222     Updated:  10/06/19 2224    Narrative:       CT Head WO    HISTORY:   62-year-old female who fell in bathroom today and hit for head on ground. In the ED tonight. No reported loss of consciousness.    TECHNIQUE:   Axial unenhanced head CT. Radiation dose reduction techniques included automated exposure control or exposure modulation based on body size. Count of known CT and cardiac nuc med studies performed in previous 12 months: 11.     Time of scan: 2209 hours    COMPARISON:   None.    FINDINGS:   No intracranial hemorrhage, mass, or infarct. No hydrocephalus or extra-axial fluid collection. There are senescent changes, including volume loss and nonspecific white matter change, but no acute abnormality is seen. The skull base, calvarium, and  extracranial soft tissues are normal.      Impression:       Senescent changes without acute abnormality.          Signer Name: Jose Simmons MD   Signed: 10/6/2019 10:22 PM   Workstation Name: [facility]    [facility]    XR Chest 1 View [[id number]] Collected:  10/06/19 2121     Updated:  10/06/19 2123    Narrative:       CR Chest 1 Vw    INDICATION:   Shortness of air. Left-sided abdominal pain.     COMPARISON:    9/11/2018, CT chest 6/30/2019    FINDINGS:  Single portable AP view(s) of the chest.  Heart size and vascular normal. There is increased in the left suprahilar region. This was seen on the CT scan 6/20/2019 and represented atelectasis or perhaps radiation therapy change involving the left lower  lobe superior segment. It is unchanged from that examination as well as an old chest x-ray dating back to November 9018      Impression:       No change. Stable left perihilar scarring. No active disease    Signer Name: Karan Hicks  MD   Signed: 10/6/2019 9:21 PM   Workstation Name: [email or web address]    [facility]          Results for orders placed during the hospital encounter of 12/05/18   Duplex Venous Lower Extremity - Right CAR    Narrative · Normal right lower extremity venous duplex scan.          Results for orders placed during the hospital encounter of 12/05/18   Duplex Venous Lower Extremity - Right CAR    Narrative · Normal right lower extremity venous duplex scan.                Order Current Status    Basic Metabolic Panel Collected (10/13/19 1145)        Discharge Details        Discharge Medications      New Medications      Instructions Start Date   ALPRAZolam 0.25 MG tablet  Commonly known as:  XANAX   0.25 mg, Oral, 3 Times Daily PRN      DULoxetine 30 MG capsule  Commonly known as:  CYMBALTA   30 mg, Oral, Daily   Start Date:  10/14/2019     fludrocortisone 0.1 MG tablet   0.1 mg, Oral, Daily   Start Date:  10/14/2019     HYDROcodone-acetaminophen 5-325 MG per tablet  Commonly known as:  NORCO  Replaces:  HYDROcodone-acetaminophen 7.5-325 MG per tablet   1 tablet, Oral, Every 8 Hours      HYDROcodone-acetaminophen 5-325 MG per tablet  Commonly known as:  NORCO   1 tablet, Oral, Every 6 Hours PRN      hydrocortisone 10 MG tablet  Commonly known as:  CORTEF   50 mg, Oral, Daily With Lunch   Start Date:  10/14/2019     hydrocortisone 5 MG tablet  Commonly known as:  CORTEF   25 mg, Oral, Daily, Start on 10/17/19   Start Date:  10/17/2019     lidocaine 5 %  Commonly known as:  LIDODERM   1 patch, Transdermal, Every 24 Hours Scheduled, Remove & Discard patch within 12 hours or as directed by MD      sodium chloride 1 g tablet   1 g, Oral, Daily         Changes to Medications      Instructions Start Date   STOOL SOFTENER 100 MG capsule  Generic drug:  docusate sodium  What changed:    · when to take this  · reasons to take this   100 mg, Oral, 2 Times Daily         Continue These Medications      Instructions  Start Date   aspirin 81 MG EC tablet   81 mg, Oral, Daily      cetirizine 10 MG tablet  Commonly known as:  zyrTEC   10 mg, Oral, Daily      COREG 6.25 MG tablet  Generic drug:  carvedilol   6.25 mg, Oral, 2 Times Daily With Meals      cyclobenzaprine 10 MG tablet  Commonly known as:  FLEXERIL   10 mg, Oral, 3 Times Daily PRN      dicyclomine 20 MG tablet  Commonly known as:  BENTYL   20 mg, Oral, Every 8 Hours PRN      famotidine 20 MG tablet  Commonly known as:  PEPCID   20 mg, Oral, 2 Times Daily      insulin aspart 100 UNIT/ML solution pen-injector sc pen  Commonly known as:  novoLOG FLEXPEN   0-10 Units, Subcutaneous, 3 Times Daily With Meals, SEE instructions for Sliding Scale      lactulose 10 GM/15ML solution  Commonly known as:  CHRONULAC   20 g, Oral, 3 Times Daily      levothyroxine 75 MCG tablet  Commonly known as:  SYNTHROID, LEVOTHROID   75 mcg, Oral, Daily      Magic mouthwash Radonc   5-10 mL, Swish & Swallow, 4 Times Daily Before Meals & Nightly      ondansetron 8 MG tablet  Commonly known as:  ZOFRAN   TAKE ONE TABLET BY MOUTH THREE TIMES A DAY AS NEEDED FOR NAUSEA AND VOMITING      ondansetron 4 MG tablet  Commonly known as:  ZOFRAN   4 mg, Oral, Every 8 Hours PRN      pantoprazole 40 MG EC tablet  Commonly known as:  PROTONIX   40 mg, Oral, Daily      promethazine 25 MG tablet  Commonly known as:  PHENERGAN   25 mg, Oral, Every 6 Hours PRN      promethazine 12.5 MG tablet  Commonly known as:  PHENERGAN   25 mg, Oral, Every 8 Hours PRN         Stop These Medications    HYDROcodone-acetaminophen 7.5-325 MG per tablet  Commonly known as:  NORCO  Replaced by:  HYDROcodone-acetaminophen 5-325 MG per tablet            Allergies   Allergen Reactions   • Plavix [Clopidogrel Bisulfate] Anaphylaxis   • Buspar [Buspirone] Other (See Comments)     Daughter states it did not work for her   • Codeine Other (See Comments)     Pt has tolerated Norco during 10/6/19 admission   • Penicillins Other (See Comments)      Pt does not remember reaction         Discharge Disposition:  Home-Health Care [facility]    Diet:  Hospital:  Diet Order   Procedures   • Diet Regular; Cardiac, Consistent Carbohydrate, Daily Fluid Restriction; 1500 mL Fluid Per Day     Discharge:   Diet Instructions     Diet: Cardiac, Consistent Carbohydrate; Thin      Discharge Diet:   Cardiac  Consistent Carbohydrate       Fluid Consistency:  Thin    Diet Regular; Cardiac, Consistent Carbohydrate,          Discharge Activity:   Activity Instructions     Activity as Tolerated      Measure Blood Pressure      Measure Weight              CODE STATUS:    Code Status and Medical Interventions:   Ordered at: 10/06/19 0966     Level Of Support Discussed With:    Patient     Code Status:    CPR     Medical Interventions (Level of Support Prior to Arrest):    Full         Future Appointments   Date Time Provider Department Center   10/16/2019  1:15 PM DO[facility] [place] PET ADMIN RM2  [facility] PET[facility]   10/16/2019  2:15 PM [facility] [place] PET[facility] 1  [facility] PET[facility]   10/17/2019  8:45 AM Desmond Cardoso MD [facility]   11/26/2019  2:30 PM Olive Trujillo APRN [facility]   12/30/2019  2:00 PM Ramone Huggins MD [facility] RAON DOE None       Additional Instructions for the Follow-ups that You Need to Schedule     Ambulatory Referral to Home Health   As directed      Face to Face Visit Date:  10/11/2019    Follow-up provider for Plan of Care?:  I treated the patient in an acute care facility and will not continue treatment after discharge.    Follow-up provider:  CARROL PALAFOX [[id number]]    Reason/Clinical Findings:  s/p hospitalization for hyponatremia, weakness, debility    Describe mobility limitations that make leaving home difficult:  weakness, debility, short of breath, pain    Nursing/Therapeutic Services Requested:  Skilled Nursing Physical Therapy Occupational Therapy    Skilled nursing orders:  Medication education Pain management    PT orders:   Therapeutic exercise Strengthening Transfer training    Occupational orders:  Activities of daily living Energy conservation Strengthening Home safety assessment    Frequency:  1 Week 1         Discharge Follow-up with PCP   As directed       Currently Documented PCP:    Vidya Chávez MD    PCP Phone Number:    None     Follow Up Details:  PCP one week         Discharge Follow-up with Specialty: PET scan on 10/16   As directed      Specialty:  PET scan on 10/16         Discharge Follow-up with Specified Provider: DR. Cardoso 10/17/19   As directed      To:  DR. Cardoso 10/17/19         Discharge Follow-up with Specified Provider: Endocrinologist for Adrenal insufficiency   As directed      To:  Endocrinologist for Adrenal insufficiency    Follow Up Details:  first available appt         Discharge Follow-up with Specified Provider: NAL; 2 Weeks   As directed      To:  DANNY    Follow Up:  2 Weeks               Time Spent on Discharge:  35 minutes    Electronically signed by MATTHEW Murphy, 10/13/19, 11:10 AM.

## 2024-10-31 ENCOUNTER — HOME CARE VISIT (OUTPATIENT)
Dept: HOME HEALTH SERVICES | Facility: HOME HEALTHCARE | Age: 84
End: 2024-10-31
Payer: COMMERCIAL

## 2024-11-01 ENCOUNTER — HOME CARE VISIT (OUTPATIENT)
Dept: HOME HEALTH SERVICES | Facility: HOME HEALTHCARE | Age: 84
End: 2024-11-01
Payer: COMMERCIAL

## 2024-11-01 VITALS
SYSTOLIC BLOOD PRESSURE: 115 MMHG | HEART RATE: 94 BPM | RESPIRATION RATE: 17 BRPM | DIASTOLIC BLOOD PRESSURE: 68 MMHG | TEMPERATURE: 97.2 F

## 2024-11-01 PROCEDURE — G0157 HHC PT ASSISTANT EA 15: HCPCS

## 2024-11-04 ENCOUNTER — HOME CARE VISIT (OUTPATIENT)
Dept: HOME HEALTH SERVICES | Facility: HOME HEALTHCARE | Age: 84
End: 2024-11-04
Payer: COMMERCIAL

## 2024-11-04 VITALS
SYSTOLIC BLOOD PRESSURE: 121 MMHG | HEART RATE: 82 BPM | DIASTOLIC BLOOD PRESSURE: 65 MMHG | OXYGEN SATURATION: 97 % | RESPIRATION RATE: 18 BRPM | TEMPERATURE: 98.2 F

## 2024-11-04 VITALS
DIASTOLIC BLOOD PRESSURE: 57 MMHG | TEMPERATURE: 98.4 F | RESPIRATION RATE: 16 BRPM | HEART RATE: 83 BPM | OXYGEN SATURATION: 98 % | SYSTOLIC BLOOD PRESSURE: 140 MMHG

## 2024-11-04 VITALS — DIASTOLIC BLOOD PRESSURE: 65 MMHG | SYSTOLIC BLOOD PRESSURE: 121 MMHG | RESPIRATION RATE: 16 BRPM

## 2024-11-04 PROCEDURE — G0152 HHCP-SERV OF OT,EA 15 MIN: HCPCS

## 2024-11-04 PROCEDURE — G0299 HHS/HOSPICE OF RN EA 15 MIN: HCPCS

## 2024-11-04 PROCEDURE — G0153 HHCP-SVS OF S/L PATH,EA 15MN: HCPCS

## 2024-11-04 NOTE — CASE COMMUNICATION
Had long conversation with patient and wife about plan of care and what their goals are. Wife is struggling to care for him, unable to get him up out of bed/chair on her own and limited help from family/friends. Ashlie did consult hospice who will be coming out to [facility] soon. Patient says he wants to get better but we discussed wanting to get better vs actually doing the work to make that happen. Not sure how hospice is going to pan out  with him saying he wants to get better... Wife says she wants him placed somewhere until he can get stronger if hospice is unable to admit.

## 2024-11-04 NOTE — CASE COMMUNICATION
Patients wife states he has not had another fall since reported on 10/30.  Discussed additional safety measures to implement including bed alarms/monitors and additional BSC to have next to bed, she verbalized understanding.  She indicates he and his daughter are discussing other options for higher level of care.  Also recommended to patient and wife he prioritize hydration and nutrition, wife verbalized patient is not eating or drinkin g enough.  She reports he has not had a bowel movement since returning home from rehab.

## 2024-11-05 ENCOUNTER — HOSPITAL ENCOUNTER (OUTPATIENT)
Facility: HOSPITAL | Age: 84
Setting detail: OBSERVATION
Discharge: NURSING, REHAB OR LONG-TERM CARE | End: 2024-11-13
Attending: EMERGENCY MEDICINE | Admitting: INTERNAL MEDICINE
Payer: MEDICARE

## 2024-11-05 ENCOUNTER — APPOINTMENT (OUTPATIENT)
Dept: CT IMAGING | Facility: HOSPITAL | Age: 84
End: 2024-11-05
Payer: MEDICARE

## 2024-11-05 ENCOUNTER — HOME CARE VISIT (OUTPATIENT)
Dept: HOME HEALTH SERVICES | Facility: HOME HEALTHCARE | Age: 84
End: 2024-11-05
Payer: COMMERCIAL

## 2024-11-05 ENCOUNTER — APPOINTMENT (OUTPATIENT)
Dept: GENERAL RADIOLOGY | Facility: HOSPITAL | Age: 84
End: 2024-11-05
Payer: MEDICARE

## 2024-11-05 DIAGNOSIS — A49.9 BACTERIAL UTI: ICD-10-CM

## 2024-11-05 DIAGNOSIS — R53.1 GENERALIZED WEAKNESS: Primary | ICD-10-CM

## 2024-11-05 DIAGNOSIS — R13.12 OROPHARYNGEAL DYSPHAGIA: ICD-10-CM

## 2024-11-05 DIAGNOSIS — N39.0 BACTERIAL UTI: ICD-10-CM

## 2024-11-05 DIAGNOSIS — R63.0 ANOREXIA: ICD-10-CM
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
## 2024-11-05 LAB
ALBUMIN SERPL-MCNC: 3.6 G/DL (ref 3.5–5.2)
ALBUMIN/GLOB SERPL: 1.3 G/DL
ALP SERPL-CCNC: 132 U/L (ref 39–117)
ALT SERPL W P-5'-P-CCNC: 16 U/L (ref 1–41)
ANION GAP SERPL CALCULATED.3IONS-SCNC: 13 MMOL/L (ref 5–15)
AST SERPL-CCNC: 30 U/L (ref 1–40)
BACTERIA UR QL AUTO: ABNORMAL /HPF
BASOPHILS # BLD AUTO: 0.05 10*3/MM3 (ref 0–0.2)
BASOPHILS NFR BLD AUTO: 0.9 % (ref 0–1.5)
BILIRUB SERPL-MCNC: 0.5 MG/DL (ref 0–1.2)
BILIRUB UR QL STRIP: NEGATIVE
BUN SERPL-MCNC: 19 MG/DL (ref 8–23)
BUN/CREAT SERPL: 22.1 (ref 7–25)
CALCIUM SPEC-SCNC: 9.4 MG/DL (ref 8.6–10.5)
CHLORIDE SERPL-SCNC: 103 MMOL/L (ref 98–107)
CLARITY UR: CLEAR
CO2 SERPL-SCNC: 23 MMOL/L (ref 22–29)
COD CRY URNS QL: ABNORMAL /HPF
COLOR UR: ABNORMAL
CREAT BLDA-MCNC: 0.9 MG/DL (ref 0.6–1.3)
CREAT BLDA-MCNC: 0.9 MG/DL (ref 0.6–1.3)
CREAT SERPL-MCNC: 0.86 MG/DL (ref 0.76–1.27)
D-LACTATE SERPL-SCNC: 1.2 MMOL/L (ref 0.5–2)
DEPRECATED RDW RBC AUTO: 44.4 FL (ref 37–54)
EGFRCR SERPLBLD CKD-EPI 2021: 85.9 ML/MIN/1.73
EOSINOPHIL # BLD AUTO: 0.43 10*3/MM3 (ref 0–0.4)
EOSINOPHIL NFR BLD AUTO: 7.3 % (ref 0.3–6.2)
ERYTHROCYTE [DISTWIDTH] IN BLOOD BY AUTOMATED COUNT: 14.6 % (ref 12.3–15.4)
GLOBULIN UR ELPH-MCNC: 2.8 GM/DL
GLUCOSE SERPL-MCNC: 99 MG/DL (ref 65–99)
GLUCOSE UR STRIP-MCNC: NEGATIVE MG/DL
HCT VFR BLD AUTO: 37 % (ref 37.5–51)
HGB BLD-MCNC: 11.6 G/DL (ref 13–17.7)
HGB UR QL STRIP.AUTO: NEGATIVE
HYALINE CASTS UR QL AUTO: ABNORMAL /LPF
IMM GRANULOCYTES # BLD AUTO: 0.02 10*3/MM3 (ref 0–0.05)
IMM GRANULOCYTES NFR BLD AUTO: 0.3 % (ref 0–0.5)
KETONES UR QL STRIP: ABNORMAL
LEUKOCYTE ESTERASE UR QL STRIP.AUTO: ABNORMAL
LYMPHOCYTES # BLD AUTO: 1.41 10*3/MM3 (ref 0.7–3.1)
LYMPHOCYTES NFR BLD AUTO: 24 % (ref 19.6–45.3)
MCH RBC QN AUTO: 26.4 PG (ref 26.6–33)
MCHC RBC AUTO-ENTMCNC: 31.4 G/DL (ref 31.5–35.7)
MCV RBC AUTO: 84.3 FL (ref 79–97)
MONOCYTES # BLD AUTO: 0.5 10*3/MM3 (ref 0.1–0.9)
MONOCYTES NFR BLD AUTO: 8.5 % (ref 5–12)
MUCOUS THREADS URNS QL MICRO: ABNORMAL /HPF
NEUTROPHILS NFR BLD AUTO: 3.47 10*3/MM3 (ref 1.7–7)
NEUTROPHILS NFR BLD AUTO: 59 % (ref 42.7–76)
NITRITE UR QL STRIP: NEGATIVE
NRBC BLD AUTO-RTO: 0 /100 WBC (ref 0–0.2)
PH UR STRIP.AUTO: 5.5 [PH] (ref 5–8)
PLATELET # BLD AUTO: 230 10*3/MM3 (ref 140–450)
PMV BLD AUTO: 10.9 FL (ref 6–12)
POTASSIUM SERPL-SCNC: 3.9 MMOL/L (ref 3.5–5.2)
PROT SERPL-MCNC: 6.4 G/DL (ref 6–8.5)
PROT UR QL STRIP: ABNORMAL
QT INTERVAL: 400 MS
QTC INTERVAL: 484 MS
RBC # BLD AUTO: 4.39 10*6/MM3 (ref 4.14–5.8)
RBC # UR STRIP: ABNORMAL /HPF
REF LAB TEST METHOD: ABNORMAL
SODIUM SERPL-SCNC: 139 MMOL/L (ref 136–145)
SP GR UR STRIP: 1.04 (ref 1–1.03)
SQUAMOUS #/AREA URNS HPF: ABNORMAL /HPF
UROBILINOGEN UR QL STRIP: ABNORMAL
WBC # UR STRIP: ABNORMAL /HPF
WBC NRBC COR # BLD AUTO: 5.88 10*3/MM3 (ref 3.4–10.8)
YEAST URNS QL MICRO: ABNORMAL /HPF

## 2024-11-05 PROCEDURE — 83605 ASSAY OF LACTIC ACID: CPT | Performed by: EMERGENCY MEDICINE

## 2024-11-05 PROCEDURE — 96372 THER/PROPH/DIAG INJ SC/IM: CPT

## 2024-11-05 PROCEDURE — 99285 EMERGENCY DEPT VISIT HI MDM: CPT

## 2024-11-05 PROCEDURE — 82565 ASSAY OF CREATININE: CPT

## 2024-11-05 PROCEDURE — 25510000001 IOPAMIDOL 61 % SOLUTION: Performed by: EMERGENCY MEDICINE

## 2024-11-05 PROCEDURE — 93005 ELECTROCARDIOGRAM TRACING: CPT | Performed by: EMERGENCY MEDICINE

## 2024-11-05 PROCEDURE — 85025 COMPLETE CBC W/AUTO DIFF WBC: CPT | Performed by: EMERGENCY MEDICINE

## 2024-11-05 PROCEDURE — 25010000002 ENOXAPARIN PER 10 MG: Performed by: INTERNAL MEDICINE

## 2024-11-05 PROCEDURE — G0378 HOSPITAL OBSERVATION PER HR: HCPCS

## 2024-11-05 PROCEDURE — 73030 X-RAY EXAM OF SHOULDER: CPT

## 2024-11-05 PROCEDURE — 80053 COMPREHEN METABOLIC PANEL: CPT | Performed by: EMERGENCY MEDICINE

## 2024-11-05 PROCEDURE — 36415 COLL VENOUS BLD VENIPUNCTURE: CPT

## 2024-11-05 PROCEDURE — 87086 URINE CULTURE/COLONY COUNT: CPT | Performed by: EMERGENCY MEDICINE

## 2024-11-05 PROCEDURE — 99223 1ST HOSP IP/OBS HIGH 75: CPT | Performed by: INTERNAL MEDICINE

## 2024-11-05 PROCEDURE — 74177 CT ABD & PELVIS W/CONTRAST: CPT

## 2024-11-05 PROCEDURE — P9612 CATHETERIZE FOR URINE SPEC: HCPCS

## 2024-11-05 PROCEDURE — 97162 PT EVAL MOD COMPLEX 30 MIN: CPT

## 2024-11-05 PROCEDURE — 81001 URINALYSIS AUTO W/SCOPE: CPT | Performed by: EMERGENCY MEDICINE

## 2024-11-05 PROCEDURE — 87040 BLOOD CULTURE FOR BACTERIA: CPT | Performed by: EMERGENCY MEDICINE

## 2024-11-05 RX ORDER — CYCLOSPORINE 0.5 MG/ML
1 EMULSION OPHTHALMIC EVERY 12 HOURS
Status: DISCONTINUED | OUTPATIENT
Start: 2024-11-05 | End: 2024-11-13 | Stop reason: HOSPADM

## 2024-11-05 RX ORDER — POLYETHYLENE GLYCOL 3350 17 G/17G
17 POWDER, FOR SOLUTION ORAL DAILY PRN
Status: DISCONTINUED | OUTPATIENT
Start: 2024-11-05 | End: 2024-11-09

## 2024-11-05 RX ORDER — BISACODYL 5 MG/1
5 TABLET, DELAYED RELEASE ORAL DAILY PRN
Status: DISCONTINUED | OUTPATIENT
Start: 2024-11-05 | End: 2024-11-09

## 2024-11-05 RX ORDER — ACETAMINOPHEN 325 MG/1
650 TABLET ORAL EVERY 6 HOURS PRN
Status: DISCONTINUED | OUTPATIENT
Start: 2024-11-05 | End: 2024-11-06 | Stop reason: ALTCHOICE

## 2024-11-05 RX ORDER — AMOXICILLIN 250 MG
2 CAPSULE ORAL 2 TIMES DAILY PRN
Status: DISCONTINUED | OUTPATIENT
Start: 2024-11-05 | End: 2024-11-09

## 2024-11-05 RX ORDER — BISACODYL 5 MG/1
5 TABLET, DELAYED RELEASE ORAL DAILY PRN
Status: DISCONTINUED | OUTPATIENT
Start: 2024-11-05 | End: 2024-11-07 | Stop reason: SDUPTHER

## 2024-11-05 RX ORDER — CEFUROXIME AXETIL 250 MG/1
500 TABLET ORAL EVERY 12 HOURS SCHEDULED
Status: COMPLETED | OUTPATIENT
Start: 2024-11-05 | End: 2024-11-09

## 2024-11-05 RX ORDER — BISACODYL 10 MG
10 SUPPOSITORY, RECTAL RECTAL DAILY PRN
Status: DISCONTINUED | OUTPATIENT
Start: 2024-11-05 | End: 2024-11-09

## 2024-11-05 RX ORDER — NORTRIPTYLINE HYDROCHLORIDE 25 MG/1
30 CAPSULE ORAL NIGHTLY
COMMUNITY
Start: 2024-10-28 | End: 2024-11-13 | Stop reason: HOSPADM

## 2024-11-05 RX ORDER — SODIUM CHLORIDE 0.9 % (FLUSH) 0.9 %
10 SYRINGE (ML) INJECTION AS NEEDED
Status: DISCONTINUED | OUTPATIENT
Start: 2024-11-05 | End: 2024-11-13 | Stop reason: HOSPADM

## 2024-11-05 RX ORDER — NORTRIPTYLINE HYDROCHLORIDE 10 MG/1
30 CAPSULE ORAL NIGHTLY
Status: DISCONTINUED | OUTPATIENT
Start: 2024-11-05 | End: 2024-11-13

## 2024-11-05 RX ORDER — SODIUM CHLORIDE 0.9 % (FLUSH) 0.9 %
10 SYRINGE (ML) INJECTION EVERY 12 HOURS SCHEDULED
Status: DISCONTINUED | OUTPATIENT
Start: 2024-11-05 | End: 2024-11-13 | Stop reason: HOSPADM

## 2024-11-05 RX ORDER — IOPAMIDOL 612 MG/ML
100 INJECTION, SOLUTION INTRAVASCULAR
Status: COMPLETED | OUTPATIENT
Start: 2024-11-05 | End: 2024-11-05

## 2024-11-05 RX ORDER — ISOSORBIDE MONONITRATE 30 MG/1
30 TABLET, EXTENDED RELEASE ORAL DAILY
Status: DISCONTINUED | OUTPATIENT
Start: 2024-11-05 | End: 2024-11-06 | Stop reason: ALTCHOICE

## 2024-11-05 RX ORDER — SODIUM CHLORIDE 9 MG/ML
40 INJECTION, SOLUTION INTRAVENOUS AS NEEDED
Status: DISCONTINUED | OUTPATIENT
Start: 2024-11-05 | End: 2024-11-13 | Stop reason: HOSPADM

## 2024-11-05 RX ORDER — FLECAINIDE ACETATE 50 MG/1
100 TABLET ORAL EVERY 12 HOURS
Status: DISCONTINUED | OUTPATIENT
Start: 2024-11-05 | End: 2024-11-13 | Stop reason: HOSPADM

## 2024-11-05 RX ORDER — PRAVASTATIN SODIUM 20 MG
20 TABLET ORAL DAILY
Status: DISCONTINUED | OUTPATIENT
Start: 2024-11-05 | End: 2024-11-13 | Stop reason: HOSPADM

## 2024-11-05 RX ORDER — NITROGLYCERIN 0.4 MG/1
0.4 TABLET SUBLINGUAL
Status: DISCONTINUED | OUTPATIENT
Start: 2024-11-05 | End: 2024-11-13 | Stop reason: HOSPADM

## 2024-11-05 RX ORDER — CEFUROXIME AXETIL 250 MG/1
500 TABLET ORAL ONCE
Status: COMPLETED | OUTPATIENT
Start: 2024-11-05 | End: 2024-11-05

## 2024-11-05 RX ORDER — BISACODYL 10 MG
10 SUPPOSITORY, RECTAL RECTAL DAILY PRN
Status: DISCONTINUED | OUTPATIENT
Start: 2024-11-05 | End: 2024-11-07 | Stop reason: SDUPTHER

## 2024-11-05 RX ORDER — DOCUSATE SODIUM 100 MG/1
100 CAPSULE, LIQUID FILLED ORAL 2 TIMES DAILY
Status: DISCONTINUED | OUTPATIENT
Start: 2024-11-05 | End: 2024-11-06 | Stop reason: ALTCHOICE

## 2024-11-05 RX ORDER — PANTOPRAZOLE SODIUM 40 MG/1
40 TABLET, DELAYED RELEASE ORAL
Status: DISCONTINUED | OUTPATIENT
Start: 2024-11-06 | End: 2024-11-06 | Stop reason: ALTCHOICE

## 2024-11-05 RX ORDER — ENOXAPARIN SODIUM 100 MG/ML
40 INJECTION SUBCUTANEOUS DAILY
Status: DISCONTINUED | OUTPATIENT
Start: 2024-11-05 | End: 2024-11-13 | Stop reason: HOSPADM

## 2024-11-05 RX ORDER — ASPIRIN 325 MG
325 TABLET ORAL DAILY
Status: DISCONTINUED | OUTPATIENT
Start: 2024-11-05 | End: 2024-11-13 | Stop reason: HOSPADM

## 2024-11-05 RX ADMIN — ASPIRIN 325 MG: 325 TABLET ORAL at 15:09

## 2024-11-05 RX ADMIN — ENOXAPARIN SODIUM 40 MG: 40 INJECTION SUBCUTANEOUS at 15:44

## 2024-11-05 RX ADMIN — CEFUROXIME AXETIL 500 MG: 250 TABLET, FILM COATED ORAL at 11:46

## 2024-11-05 RX ADMIN — ISOSORBIDE MONONITRATE 30 MG: 30 TABLET, EXTENDED RELEASE ORAL at 15:09

## 2024-11-05 RX ADMIN — FLECAINIDE ACETATE 100 MG: 50 TABLET ORAL at 15:09

## 2024-11-05 RX ADMIN — NORTRIPTYLINE HYDROCHLORIDE 30 MG: 10 CAPSULE ORAL at 20:54

## 2024-11-05 RX ADMIN — IOPAMIDOL 85 ML: 612 INJECTION, SOLUTION INTRAVENOUS at 07:51

## 2024-11-05 RX ADMIN — CEFUROXIME AXETIL 500 MG: 250 TABLET, FILM COATED ORAL at 20:54

## 2024-11-05 RX ADMIN — CYCLOSPORINE 1 DROP: 0.5 EMULSION OPHTHALMIC at 20:54

## 2024-11-05 RX ADMIN — DOCUSATE SODIUM 100 MG: 100 CAPSULE, LIQUID FILLED ORAL at 20:54

## 2024-11-05 RX ADMIN — Medication 10 ML: at 21:05

## 2024-11-05 RX ADMIN — PRAVASTATIN SODIUM 20 MG: 20 TABLET ORAL at 15:09

## 2024-11-05 NOTE — H&P
[facility]  HISTORY AND PHYSICAL    Patient Name: Guicho Blanco  : 1940  MRN: [id number]  Primary Care Physician: Mitchel Culver MD  Date of admission: 2024      Subjective   Subjective     Chief Complaint:  Falls at home     HPI:  Guicho Blanco is a 83 y.o. male, retired , comes from home by EMS after a fall.  It is his fourth fall in the past two weeks; he was alone in the bedroom, apparently got up on his own and was found on hte floor by his wife soon afterward.  He hit his R shoulder.  He currently denies pain.     He has progressive weakness and failure to thrive and has been declining since knee replacement in early 2024, complicated by readmissions  and  for R knee cellulitis with high fever and encephalopathy, also suspected serotonin syndrome.  He has been in rehab but is currently at home and his wife is having increasing trouble caring for him.      Issues include:    - Poor appetite/poor intake, with prolonged rumination.  His PCP is slowly increasing his nortryptilene for appetite stimulation (now at 30mg QHS) but no results thus far.    - slow weight loss x past 3 years  - Up every hour in the night, which his wife has to help with.  Sometimes this is due to nocturia   - Constipation:  better on aggressive bowel regimen     His wife requests a hospice consult in hopes of getting more help at home .,        Personal History     Past Medical History:   Diagnosis Date    Arthritis     Atrial fibrillation     BPH (benign prostatic hypertrophy)     Cataract     Cellulitis     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     bilateral    Coronary artery disease     Full dentures     Gall stones     GERD (gastroesophageal reflux disease)     Hearing loss     Hiatal hernia     Hyperlipidemia     Hypertension     Peripheral neuropathy     Peripheral vascular disease     Seasonal allergies     Skin cancer     squamous cell removed  from back    TIA (transient ischemic attack)     2 times    Wears glasses            Past Surgical History:   Procedure Laterality Date    ABLATION OF DYSRHYTHMIC FOCUS      APPENDECTOMY      CARDIAC CATHETERIZATION N/A 10/05/2017    Procedure: Left Heart Cath;  Surgeon: Hector Urrutia MD;  Location:  [facility];  Service:     CATARACT EXTRACTION Bilateral     CHOLECYSTECTOMY      Remote    COCHLEAR IMPLANT REVISION  05/2019    COLONOSCOPY      PROSTATE SURGERY      SHOULDER SURGERY Right     SKIN BIOPSY      TOE AMPUTATION Right     2nd toe - Right Foot    TONSILLECTOMY      TOTAL KNEE ARTHROPLASTY Right 8/7/2024    Procedure: TOTAL KNEE ARTHROPLASTY WITH ASHLEY ROBOT RIGHT;  Surgeon: Christian Schwartz MD;  Location:  [facility] OR;  Service: Robotics - Ortho;  Laterality: Right;       Family History: family history includes Atrial fibrillation in his mother; Dementia in his mother; Lung cancer in his father.     Social History:  reports that he has never smoked. He has been exposed to tobacco smoke. He has never used smokeless tobacco. He reports that he does not drink alcohol and does not use drugs.  Social History     Social History Narrative    Pt does not consume caffeine.        Medications:  Available home medication information reviewed.  Lactobacillus, acetaminophen, aspirin, bisacodyl, coenzyme Q10, cycloSPORINE, docusate sodium, flecainide, glucose, isosorbide mononitrate, nitroglycerin, omeprazole OTC, polyethylene glycol, pravastatin, vitamin D3, and vitamin b complex    Allergies   Allergen Reactions    Linezolid Other (See Comments)     Serotonin syndrome    Divalproex Sodium (Migraine) [Valproic Acid] Other (See Comments)     Weak, lethargy and got worse    Cymbalta [Duloxetine Hcl] Mental Status Change     Depression, thoughts of Suicide.     Duloxetine Unknown (See Comments)    Metoprolol Other (See Comments)     UNKNOWN. Pt does not remember this being allergy    Neurontin  [Gabapentin] Other (See Comments)     Lethargy & fatigue       Objective   Objective     Vital Signs:   Temp:  [97.4 °F (36.3 °C)] 97.4 °F (36.3 °C)  Heart Rate:  [82-97] 85  Resp:  [16] 16  BP: (123-130)/(65-75) 123/75       Physical Exam   Gen:  thin, flat affect but gives history, NAD.  Wife present   Neuro: alert and oriented, clear speech, follows commands, no tremor, no rigidity, flat affect.    HEENT:  NC/AT PERRL, OP benign  Neck:  Supple,  Heart RRR no murmur  Lungs not labored   Abd:  Soft, nontender, no rebound or guarding, pos BS  Extrem:  No c/c/e; R knee w healing incision, no redness       Result Review:  I have personally reviewed the results from the time of this admission to 11/5/2024 11:25 EST and agree with these findings:  [x]  Laboratory list / accordion  []  Microbiology  [x]  Radiology  []  EKG/Telemetry   []  Cardiology/Vascular   []  Pathology  [x]  Old records  []  Other:  Most notable findings include: shoulder no fx.   Cr nl.        LAB RESULTS:      Lab 11/05/24  0738   WBC 5.88   HEMOGLOBIN 11.6*   HEMATOCRIT 37.0*   PLATELETS 230   NEUTROS ABS 3.47   IMMATURE GRANS (ABS) 0.02   LYMPHS ABS 1.41   MONOS ABS 0.50   EOS ABS 0.43*   MCV 84.3   LACTATE 1.2         Lab 11/05/24  0745 11/05/24  0738 11/05/24  0736   SODIUM  --  139  --    POTASSIUM  --  3.9  --    CHLORIDE  --  103  --    CO2  --  23.0  --    ANION GAP  --  13.0  --    BUN  --  19  --    CREATININE 0.90 0.86 0.90   EGFR  --  85.9  --    GLUCOSE  --  99  --    CALCIUM  --  9.4  --          Lab 11/05/24  0738   TOTAL PROTEIN 6.4   ALBUMIN 3.6   GLOBULIN 2.8   ALT (SGPT) 16   AST (SGOT) 30   BILIRUBIN 0.5   ALK PHOS 132*                     UA          9/13/2024    13:08 9/16/2024    15:19 11/5/2024    09:43   Urinalysis   Squamous Epithelial Cells, UA 0-2  3-6  0-2    Specific Gravity, UA 1.027  1.021  1.041    Ketones, UA Trace  Trace  15 mg/dL (1+)    Blood, UA Negative  Small (1+)  Negative    Leukocytes, UA Trace  Negative   Moderate (2+)    Nitrite, UA Negative  Negative  Negative    RBC, UA 0-2  3-5  3-5    WBC, UA 3-5  0-2  Too Numerous to Count    Bacteria, UA None Seen  None Seen  None Seen        Microbiology Results (last 10 days)       ** No results found for the last 240 hours. **            CT Abdomen Pelvis With Contrast    Result Date: 11/5/2024  CT ABDOMEN PELVIS W CONTRAST Date of Exam: 11/5/2024 7:51 AM EST Indication: Weakness, fall, low abdomen pain. Comparison: 1/11/2016 Technique: Axial CT images were obtained of the abdomen and pelvis following the uneventful intravenous administration of 85 cc Isovue-300. Reconstructed coronal and sagittal images were also obtained. Automated exposure control and iterative construction methods were used. Findings: Lower Chest: Emphysema in the lung bases. Moderate hiatal hernia Organs: Liver, spleen, kidneys, adrenal glands unremarkable. 3 cm cystic lesion arising from the neck of the pancreas, with a thin wall and some internal dependent high density material. Gallbladder surgically absent Gastrointestinal: Possible rectal wall thickening. No intestinal distention. Colonic diverticula without inflammation. Pelvis: No abnormal fluid collection. Urinary bladder unremarkable Peritoneum/Retroperitoneum: No ascites or pneumoperitoneum. Normal caliber Bones/Soft Tissues: No acute bony abnormality. Lumbar scoliosis with diffuse degenerative disease     Impression: 1. Possible rectal wall thickening, correlate for proctitis 2. 3 cm cystic lesion of the neck of the pancreas. This was present on the comparison study from 2016 but has enlarged in the interval, previously approximately 2 cm 3. Hiatal hernia 4. Colonic diverticulosis without evidence of diverticulitis Electronically Signed: Jace Davila  11/5/2024 9:34 AM EST  Workstation ID: [id number]    XR Shoulder 2+ View Right    Result Date: 11/5/2024  XR SHOULDER 2+ VW RIGHT Date of Exam: 11/5/2024 7:27 AM EST Indication: Trauma  Comparison: None available. Findings: No evidence of fracture or dislocation. Glenohumeral joint appears within normal limits. Irregularity and cystic change of the greater noted. AC joint osteoarthritis with caudally oriented osteophyte     Impression: Impression: 1. No evidence of fracture or dislocation 2. Appearance of the greater tuberosity could reflect chronic shoulder impingement 3. AC joint osteoarthritis Electronically Signed: Jace Davila  11/5/2024 7:30 AM EST  Workstation ID: [id number]     Results for orders placed during the hospital encounter of 09/13/24    Adult Transthoracic Echo Complete W/ Cont if Necessary Per Protocol    Interpretation Summary    Left ventricular systolic function is normal. Left ventricular ejection fraction appears to be 61 - 65%.    Left ventricular diastolic function was normal.      Assessment & Plan   Assessment & Plan       Generalized weakness    83 yr old man with history of afib, CAD, HTN, TIA, cochlear implants, and R knee replacement in Aug 2024 with 2 readmissions for cellulitis and progressive weakness/frailty in recent months.  Admitted for fall at home, and general failure to thrive.      Fall at home  - PT OT  - CM consult.  Wife having difficulty caring for him  - r shoulder: no fracture    Weakness  Poor intake, rumination  Chronic constipation  Frequent waking at night  - hospice consult per wife's request   - Boost TID  - nutrition consult       Pyuria, will treat empirically for now  - cefuroxime; watch culture    History of R knee replacement/cellulitis 8-9/2024  - no redness currently ; nl WBC, no fever     CAD, Afib, HTN  - continue home meds     VTE Prophylaxis:  No VTE prophylaxis order currently exists.          CODE STATUS:    There are no questions and answers to display.       Expected Discharge   Expected discharge date/ time has not been documented.     Zofia Esparza MD  11/05/24

## 2024-11-05 NOTE — CASE COMMUNICATION
Patient missed a PT home visit from [facility] on 11/5/2024.    Reason: Per spouse, patient in the ER currently.        For your records only.   Per CMS Guidance, MD must be notified of missed/cancelled visits; therefore the prescribed frequency was not met.

## 2024-11-05 NOTE — ED NOTES
Guicho Blanco    Nursing Report ED to Floor:  Mental status: AOx3  Ambulatory status: assist x2  Oxygen Therapy:  RA  Cardiac Rhythm: NSR  Admitted from: home  Safety Concerns:  fall risk  Social Issues: none  ED Room #:  05    ED Nurse Phone Extension - [phone] or may call [phone].      HPI:   Chief Complaint   Patient presents with    Fall       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     bilateral    Coronary artery disease     Full dentures     Gall stones     GERD (gastroesophageal reflux disease)     Hearing loss     Hiatal hernia     Hyperlipidemia     Hypertension     Peripheral neuropathy     Peripheral vascular disease     Seasonal allergies     Skin cancer     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    TIA (transient ischemic attack)     2 times    Wears glasses         Past Surgical History:  Past Surgical History:   Procedure Laterality Date    ABLATION OF DYSRHYTHMIC FOCUS      APPENDECTOMY      CARDIAC CATHETERIZATION N/A 10/05/2017    Procedure: Left Heart Cath;  Surgeon: Hector Urrutia MD;  Location:  [facility];  Service:     CATARACT EXTRACTION Bilateral     CHOLECYSTECTOMY      Remote    COCHLEAR IMPLANT REVISION  05/2019    COLONOSCOPY      PROSTATE SURGERY      SHOULDER SURGERY Right     SKIN BIOPSY      TOE AMPUTATION Right     2nd toe - Right Foot    TONSILLECTOMY      TOTAL KNEE ARTHROPLASTY Right 8/7/2024    Procedure: TOTAL KNEE ARTHROPLASTY WITH ASHLEY ROBOT RIGHT;  Surgeon: Christian Schwartz MD;  Location:  [facility];  Service: Robotics - Ortho;  Laterality: Right;        Admitting Doctor:   Zofia Esparza MD    Consulting Provider(s):  Consults       No orders found from 10/7/2024 to 11/6/2024.             Admitting Diagnosis:   The primary encounter diagnosis was Generalized weakness. Diagnoses of Fall in home, initial  encounter, Anorexia, and Bacterial UTI were also pertinent to this visit.    Most Recent Vitals:   Vitals:    11/05/24 1001 11/05/24 1030 11/05/24 1100 11/05/24 1130   BP:  123/75 117/60 109/90   BP Location:       Patient Position:       Pulse: 82 85 81 81   Resp:       Temp:       SpO2: 100% 100% 100% 100%   Weight:       Height:           Active LDAs/IV Access:   Lines, Drains & Airways       Active LDAs       Name Placement date Placement time Site Days    Peripheral IV 11/05/24 0733 Right Antecubital 11/05/24 0733  Antecubital  less than 1                    Labs (abnormal labs have a star):   Labs Reviewed   COMPREHENSIVE METABOLIC PANEL - Abnormal; Notable for the following components:       Result Value    Alkaline Phosphatase 132 (*)     All other components within normal limits    Narrative:     GFR Normal >60  Chronic Kidney Disease <60  Kidney Failure <15    The GFR formula is only valid for adults with stable renal function between ages 18 and 70.   URINALYSIS W/ MICROSCOPIC IF INDICATED (NO CULTURE) - Abnormal; Notable for the following components:    Color, UA Dark Yellow (*)     Specific Gravity, UA 1.041 (*)     Ketones, UA 15 mg/dL (1+) (*)     Protein, UA 30 mg/dL (1+) (*)     Leuk Esterase, UA Moderate (2+) (*)     All other components within normal limits   CBC WITH AUTO DIFFERENTIAL - Abnormal; Notable for the following components:    Hemoglobin 11.6 (*)     Hematocrit 37.0 (*)     MCH 26.4 (*)     MCHC 31.4 (*)     Eosinophil % 7.3 (*)     Eosinophils, Absolute 0.43 (*)     All other components within normal limits   URINALYSIS, MICROSCOPIC ONLY - Abnormal; Notable for the following components:    RBC, UA 3-5 (*)     WBC, UA Too Numerous to Count (*)     Yeast, UA Small/1+ Budding Yeast (*)     All other components within normal limits   LACTIC ACID, PLASMA - Normal   POCT CREATININE - Normal   POCT CREATININE - Normal   BLOOD CULTURE   BLOOD CULTURE   URINE CULTURE   CBC AND DIFFERENTIAL     Narrative:     The following orders were created for panel order CBC & Differential.  Procedure                               Abnormality         Status                     ---------                               -----------         ------                     CBC Auto Differential[[id number]]        Abnormal            Final result                 Please view results for these tests on the individual orders.       Meds Given in ED:   Medications   cefuroxime (CEFTIN) tablet 500 mg (has no administration in time range)   iopamidol (ISOVUE-300) 61 % injection 100 mL (85 mL Intravenous Given 11/5/24 0751)           Last NIH score:                                                          Dysphagia screening results:  Patient Factors Component (Dysphagia:Stroke or Rule-out)  Best Eye Response: 4-->(E4) spontaneous (11/05/24 0700)  Best Motor Response: 6-->(M6) obeys commands (11/05/24 0700)  Best Verbal Response: 5-->(V5) oriented (11/05/24 0700)  [place] Coma Scale Score: 15 (11/05/24 0700)     [place] Coma Scale:  No data recorded     CIWA:        Restraint Type:            Isolation Status:  No active isolations

## 2024-11-05 NOTE — LETTER
EMS Transport Request  For use at [facility], [place], [place], [place], and [place] only   Patient Name: Guicho Blanco : 1940   Weight:69.1 kg (152 lb 4.8 oz) Pick-up Location: [facility] BLS/ALS: BLS/ALS: BLS   Insurance: [employer] MEDICARE REPLACEMENT Auth End Date:    Pre-Cert #: D/C Summary complete:    Destination: Other [place]   Contact Precautions: None   Equipment (O2, Fluids, etc.): None   Arrive By Date/Time: Wednesday,  after 1400 (bed will not be ready) Stretcher/WC: Stretcher   CM Requesting: Patt Salomon RN Ext: [phone]   Notes/Medical Necessity: pt unable to sit/stand without assistance     ______________________________________________________________________    *Only 2 patient bags OR 1 carry-on size bag are permitted.  Wheelchairs and walkers CANNOT transported with the patient. Acknowledge: Yes

## 2024-11-05 NOTE — THERAPY EVALUATION
Patient Name: Guicho Blanco  : 1940    MRN: [id number]                              Today's Date: 2024       Admit Date: 2024    Visit Dx:     ICD-10-CM ICD-9-CM   1. Generalized weakness  R53.1 780.79   2. Fall in home, initial encounter  W19.XXXA E888.9    Y92.009 E849.0   3. Anorexia  R63.0 783.0   4. Bacterial UTI  N39.0 599.0    A49.9 041.9     Patient Active Problem List   Diagnosis    Osteomyelitis of toe of right foot    Osteomyelitis of right foot    PAF (paroxysmal atrial fibrillation)    Coronary artery disease involving native coronary artery of native heart without angina pectoris    Gastroesophageal reflux disease without esophagitis    Benign non-nodular prostatic hyperplasia without lower urinary tract symptoms    S/P appy    Essential hypertension    Simple chronic bronchitis    Mixed hyperlipidemia    COPD (chronic obstructive pulmonary disease)    BPH (benign prostatic hypertrophy)    Hearing loss    Chest pain    Vertigo    Snoring    Overweight    PLMD (periodic limb movement disorder)    Neuropathy    Periodic headache syndrome, not intractable    Acquired absence of other right toe(s)    Tremor    Bilateral impacted cerumen    Bilateral sensorineural hearing loss    Chronic otitis externa    Deviated nasal septum    Disorder of joint of foot    ED (erectile dysfunction) of organic origin    Hydrocele    Hydrocele of testis    Impacted cerumen    Mononeuropathy    Swelling of testicle    Venous retinal branch occlusion    Vitreous degeneration    Benign prostatic hyperplasia with urinary obstruction    OA (osteoarthritis) of knee    Status post total right knee replacement    Septic arthritis    Cellulitis    Cellulitis of right knee    Failure to thrive in adult    Insomnia    Moderate malnutrition    Sepsis    Lactic acidosis    Serotonin syndrome    Dysphagia    Generalized weakness     Past Medical History:   Diagnosis Date    Arthritis     Atrial fibrillation      BPH (benign prostatic hypertrophy)     Cataract     Cellulitis     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     bilateral    Coronary artery disease     Full dentures     Gall stones     GERD (gastroesophageal reflux disease)     Hearing loss     Hiatal hernia     Hyperlipidemia     Hypertension     Peripheral neuropathy     Peripheral vascular disease     Seasonal allergies     Skin cancer     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    TIA (transient ischemic attack)     2 times    Wears glasses      Past Surgical History:   Procedure Laterality Date    ABLATION OF DYSRHYTHMIC FOCUS      APPENDECTOMY      CARDIAC CATHETERIZATION N/A 10/05/2017    Procedure: Left Heart Cath;  Surgeon: Hector Urrutia MD;  Location:  [facility];  Service:     CATARACT EXTRACTION Bilateral     CHOLECYSTECTOMY      Remote    COCHLEAR IMPLANT REVISION  05/2019    COLONOSCOPY      PROSTATE SURGERY      SHOULDER SURGERY Right     SKIN BIOPSY      TOE AMPUTATION Right     2nd toe - Right Foot    TONSILLECTOMY      TOTAL KNEE ARTHROPLASTY Right 8/7/2024    Procedure: TOTAL KNEE ARTHROPLASTY WITH ASHLEY ROBOT RIGHT;  Surgeon: Christian Schwartz MD;  Location:  [facility];  Service: Robotics - Ortho;  Laterality: Right;      General Information       Row Name 11/05/24 1558          Physical Therapy Time and Intention    Document Type evaluation  -AC     Mode of Treatment physical therapy  -AC       Row Name 11/05/24 1554          General Information    Patient Profile Reviewed yes  -AC     Prior Level of Function independent:;community mobility;gait;transfer;bed mobility;min assist:;ADL's;dressing;all household mobility  Pt has FWW, SPC, and w/c at home. Reports he mostly uses FWW at home however pt is poor historian  -AC     Existing Precautions/Restrictions fall  [place], cochlear implant  -AC     Barriers to Rehab medically complex;previous functional deficit;cognitive status;hearing deficit  -AC       Row Name 11/05/24 0906          Living  Environment    People in Home spouse  -       Row Name 11/05/24 1558          Home Main Entrance    Number of Stairs, Main Entrance two  -AC     Stair Railings, Main Entrance railings on both sides of stairs  -       Row Name 11/05/24 1558          Stairs Within Home, Primary    Number of Stairs, Within Home, Primary one  one step into family room  -       Row Name 11/05/24 1558          Cognition    Orientation Status (Cognition) oriented x 3  -       Row Name 11/05/24 1558          Safety Issues/Impairments Affecting Functional Mobility    Safety Issues Affecting Function (Mobility) insight into deficits/self-awareness;safety precautions follow-through/compliance;awareness of need for assistance;safety precaution awareness;sequencing abilities;problem-solving  -     Impairments Affecting Function (Mobility) balance;cognition;endurance/activity tolerance;strength  -     Cognitive Impairments, Mobility Safety/Performance awareness, need for assistance;safety precaution awareness;insight into deficits/self-awareness;safety precaution follow-through;sequencing abilities;problem-solving/reasoning  -               User Key  (r) = Recorded By, (t) = Taken By, (c) = Cosigned By      Initials Name Provider Type    AC Cary Umana, PT Physical Therapist                   Mobility       Row Name 11/05/24 1602          Bed Mobility    Bed Mobility supine-sit  -AC     Supine-Sit [place] (Bed Mobility) minimum assist (75% patient effort);1 person assist  -AC     Assistive Device (Bed Mobility) head of bed elevated  -AC     Comment, (Bed Mobility) Pt required extended time and minaX1 to transition to upright sitting. Pt did not have any c/o dizziness w/ sitting EOB  -       Row Name 11/05/24 1602          Bed-Chair Transfer    Bed-Chair [place] (Transfers) minimum assist (75% patient effort);1 person assist  -AC     Assistive Device (Bed-Chair Transfers) walker, front-wheeled  -AC     Comment, (Bed-Chair  Transfer) Pt took short shuffling steps to bedside chair w/ FWW and minaX1  -AC       Row Name 11/05/24 1602          Sit-Stand Transfer    Sit-Stand [place] (Transfers) minimum assist (75% patient effort);1 person assist  -AC     Assistive Device (Sit-Stand Transfers) walker, front-wheeled  -AC     Comment, (Sit-Stand Transfer) Pt completed 2 STS transfers w/ VC for hand placement and minAx1  -AC               User Key  (r) = Recorded By, (t) = Taken By, (c) = Cosigned By      Initials Name Provider Type    AC Cary Umana PT Physical Therapist                   Obj/Interventions       Row Name 11/05/24 1603          Range of Motion Comprehensive    General Range of Motion bilateral lower extremity ROM WNL  -       Row Name 11/05/24 1603          Strength Comprehensive (MMT)    General Manual Muscle Testing (MMT) Assessment lower extremity strength deficits identified  -AC     Comment, General Manual Muscle Testing (MMT) Assessment BLE's grossly 3+/5  -AC       Row Name 11/05/24 1603          Balance    Balance Assessment sitting static balance;sitting dynamic balance;standing static balance;standing dynamic balance  -AC     Static Sitting Balance standby assist  -AC     Dynamic Sitting Balance contact guard  -AC     Position, Sitting Balance sitting edge of bed;unsupported  -AC     Static Standing Balance contact guard  -AC     Dynamic Standing Balance minimal assist;1-person assist  -AC     Position/Device Used, Standing Balance supported;walker, front-wheeled  -AC     Balance Interventions sitting;standing;sit to stand;supported;static;dynamic  -AC       Row Name 11/05/24 1603          Sensory Assessment (Somatosensory)    Sensory Assessment (Somatosensory) LE sensation intact  -               User Key  (r) = Recorded By, (t) = Taken By, (c) = Cosigned By      Initials Name Provider Type    AC Cary Umana, PT Physical Therapist                   Goals/Plan       Row Name 11/05/24 1607          Bed  Mobility Goal 1 (PT)    Activity/Assistive Device (Bed Mobility Goal 1, PT) sit to supine/supine to sit  -AC     [place] Level/Cues Needed (Bed Mobility Goal 1, PT) standby assist  -AC     Time Frame (Bed Mobility Goal 1, PT) short term goal (STG);5 days  -       Row Name 11/05/24 1607          Transfer Goal 1 (PT)    Activity/Assistive Device (Transfer Goal 1, PT) bed-to-chair/chair-to-bed  -AC     [place] Level/Cues Needed (Transfer Goal 1, PT) standby assist  -AC     Time Frame (Transfer Goal 1, PT) long term goal (LTG);10 days  -       Row Name 11/05/24 1607          Gait Training Goal 1 (PT)    Activity/Assistive Device (Gait Training Goal 1, PT) gait (walking locomotion);improve balance and speed;increase endurance/gait distance;increase energy conservation;decrease fall risk  -AC     [place] Level (Gait Training Goal 1, PT) standby assist  -AC     Distance (Gait Training Goal 1, PT) 80  -AC     Time Frame (Gait Training Goal 1, PT) long term goal (LTG);10 days  -       Row Name 11/05/24 1607          Therapy Assessment/Plan (PT)    Planned Therapy Interventions (PT) balance training;bed mobility training;gait training;patient/family education;strengthening;transfer training  -               User Key  (r) = Recorded By, (t) = Taken By, (c) = Cosigned By      Initials Name Provider Type    AC Cary Umana, PT Physical Therapist                   Clinical Impression       Row Name 11/05/24 1603          Pain    Pretreatment Pain Rating 0/10 - no pain  -AC     Posttreatment Pain Rating 0/10 - no pain  -AC       Row Name 11/05/24 1603          Plan of Care Review    Plan of Care Reviewed With patient  -AC     Progress no change  -AC     Outcome Evaluation PT initial evaluation complete. Pt presents with decreased activity tolerance, impaired cognition, and generalized weakness. Pt took short shuffling steps w/ FWW to bedside chair w/ minAx1. Pt would benefit from continued skilled therapy  while hospitalized to maximize functional independence. D/c rec is SNF for best outcome.  -AC       Row Name 11/05/24 1603          Therapy Assessment/Plan (PT)    Rehab Potential (PT) good  -AC     Criteria for Skilled Interventions Met (PT) yes  -AC     Therapy Frequency (PT) daily  -AC     Predicted Duration of Therapy Intervention (PT) 10 days  -AC       Row Name 11/05/24 1603          Vital Signs    Pre Systolic BP Rehab 127  -AC     Pre Treatment Diastolic BP 88  -AC     Post Systolic BP Rehab 135  -AC     Post Treatment Diastolic BP 77  -AC     Pretreatment Heart Rate (beats/min) 88  -AC     Posttreatment Heart Rate (beats/min) 92  -AC     Pre SpO2 (%) 98  -AC     O2 Delivery Pre Treatment room air  -AC     O2 Delivery Intra Treatment room air  -AC     Post SpO2 (%) 100  -AC     O2 Delivery Post Treatment room air  -AC     Pre Patient Position Supine  -AC     Intra Patient Position Standing  -AC     Post Patient Position Sitting  -AC       Row Name 11/05/24 1603          Positioning and Restraints    Pre-Treatment Position in bed  -AC     Post Treatment Position chair  -AC     In Chair notified nsg;reclined;sitting;call light within reach;encouraged to call for assist;exit alarm on;waffle cushion;legs elevated  -AC               User Key  (r) = Recorded By, (t) = Taken By, (c) = Cosigned By      Initials Name Provider Type    Cary Daley, PT Physical Therapist                   Outcome Measures       Row Name 11/05/24 1607 11/05/24 1400       How much help from another person do you currently need...    Turning from your back to your side while in flat bed without using bedrails? 3  -AC 3  -JM    Moving from lying on back to sitting on the side of a flat bed without bedrails? 3  -AC 3  -JM    Moving to and from a bed to a chair (including a wheelchair)? 3  -AC 3  -JM    Standing up from a chair using your arms (e.g., wheelchair, bedside chair)? 3  -AC 3  -JM    Climbing 3-5 steps with a railing? 2  -AC 2   -    To walk in hospital room? 3  - 3  -JM    AM-PAC 6 Clicks Score (PT) 17  - 17  -    Highest Level of Mobility Goal 5 --> Static standing  - 5 --> Static standing  -      Row Name 11/05/24 1607          Functional Assessment    Outcome Measure Options AM-PAC 6 Clicks Basic Mobility (PT)  -               User Key  (r) = Recorded By, (t) = Taken By, (c) = Cosigned By      Initials Name Provider Type    Tran Cruz, RN Registered Nurse     Cary Umana, PT Physical Therapist                                 Physical Therapy Education       Title: PT OT SLP Therapies (In Progress)       Topic: Physical Therapy (In Progress)       Point: Mobility training (In Progress)       Learning Progress Summary            Patient Acceptance, E, NR by  at 11/5/2024 1608                      Point: Home exercise program (In Progress)       Learning Progress Summary            Patient Acceptance, E, NR by  at 11/5/2024 1608                      Point: Body mechanics (In Progress)       Learning Progress Summary            Patient Acceptance, E, NR by  at 11/5/2024 1608                      Point: Precautions (In Progress)       Learning Progress Summary            Patient Acceptance, E, NR by  at 11/5/2024 1608                                      User Key       Initials Effective Dates Name Provider Type Discipline     07/11/24 -  Cary Umana, PT Physical Therapist PT                  PT Recommendation and Plan  Planned Therapy Interventions (PT): balance training, bed mobility training, gait training, patient/family education, strengthening, transfer training  Progress: no change  Outcome Evaluation: PT initial evaluation complete. Pt presents with decreased activity tolerance, impaired cognition, and generalized weakness. Pt took short shuffling steps w/ FWW to bedside chair w/ minAx1. Pt would benefit from continued skilled therapy while hospitalized to maximize functional independence. D/c rec  is SNF for best outcome.     Time Calculation:   PT Evaluation Complexity  History, PT Evaluation Complexity: 1-2 personal factors and/or comorbidities  Examination of Body Systems (PT Eval Complexity): total of 3 or more elements  Clinical Presentation (PT Evaluation Complexity): evolving  Clinical Decision Making (PT Evaluation Complexity): moderate complexity  Overall Complexity (PT Evaluation Complexity): moderate complexity     PT Charges       Row Name 11/05/24 1608             Time Calculation    Start Time 1533  -AC      PT Received On 11/05/24  -AC      PT Goal Re-Cert Due Date 11/15/24  -AC         Untimed Charges    PT Eval/Re-eval Minutes 50  -AC         Total Minutes    Untimed Charges Total Minutes 50  -AC       Total Minutes 50  -AC                User Key  (r) = Recorded By, (t) = Taken By, (c) = Cosigned By      Initials Name Provider Type    AC Cary Umana, PT Physical Therapist                  Therapy Charges for Today       Code Description Service Date Service Provider Modifiers Qty    40780326289 HC PT EVAL MOD COMPLEXITY 4 11/5/2024 Cary Umana, PT GP 1            PT G-Codes  Outcome Measure Options: AM-PAC 6 Clicks Basic Mobility (PT)  AM-PAC 6 Clicks Score (PT): 17  PT Discharge Summary  Anticipated Discharge Disposition (PT): skilled nursing facility    Cary Umana PT  11/5/2024

## 2024-11-05 NOTE — CONSULTS
Continued Stay Note  [facility]     Patient Name: Guicho Blanco  MRN: [id number]  Today's Date: 11/5/2024    Admit Date: 11/5/2024    Plan: To be determined   Discharge Plan       Row Name 11/05/24 1716       Plan    Plan To be determined    Plan Comments Hospice referral received, chart reviewed. Visit made pt, pt sitting up in chair, wife not present. Pt stated wife  has left the hospital and may be back tomorrow. Telephone call to wife regarding hospice referral, wife stated is aware of the referral. Wife stated will be at the hospital tomorrow at 1300, plan made to meet at that time to discuss hospice services. Please call [phone] if can be of further assistance.                   Discharge Codes    No documentation.                       Amira Hernandez RN

## 2024-11-05 NOTE — CASE MANAGEMENT/SOCIAL WORK
Discharge Planning Assessment  [facility]     Patient Name: Guicho Blanco  MRN: [id number]  Today's Date: 11/5/2024    Admit Date: 11/5/2024    Plan: IDP   Discharge Needs Assessment       Row Name 11/05/24 1155       Living Environment    People in Home spouse    Name(s) of People in Home Sandra Blanco    Current Living Arrangements home    Potentially Unsafe Housing Conditions unable to assess    In the past 12 months has the electric, gas, oil, or water company threatened to shut off services in your home? No    Primary Care Provided by spouse/significant other    Provides Primary Care For no one, unable/limited ability to care for self    Family Caregiver if Needed spouse;child(arsalan), adult    Family Caregiver Names Sandra Blanco-spouse/Sylwia Cee-daughter    Quality of Family Relationships supportive    Able to Return to Prior Arrangements yes       Resource/Environmental Concerns    Resource/Environmental Concerns none    Transportation Concerns none       Transportation Needs    In the past 12 months, has lack of transportation kept you from medical appointments or from getting medications? no    In the past 12 months, has lack of transportation kept you from meetings, work, or from getting things needed for daily living? No       Food Insecurity    Within the past 12 months, you worried that your food would run out before you got the money to buy more. Never true    Within the past 12 months, the food you bought just didn't last and you didn't have money to get more. Never true       Transition Planning    Patient/Family Anticipates Transition to home with family    Transportation Anticipated other (see comments)  may need assistance with transportation       Discharge Needs Assessment    Readmission Within the Last 30 Days unable to assess    Equipment Currently Used at Home cane, straight;wheelchair;walker, standard;shower chair;commode;hospital bed    Do you want help finding or keeping work  or a job? I do not need or want help    Do you want help with school or training? For example, starting or completing job training or getting a high school diploma, GED or equivalent No                   Discharge Plan       Row Name 11/05/24 1159       Plan    Plan IDP    Plan Comments MSW met with pt. and his spouse Sandra Blanco. Pt. lives with his spouse in [facility]. Pt.'s PCP is Mitchel Culver. Pt.'s pharmacy is [facility]. Pt.'s insurance is [employer]. Pt's spouse states that pt. needs assistance at baseline. Pt. uses a cane, walker, wheelchair, hospital bed, BSC, and shower chair. Pt.'s spouse denies O2. Pt. is current with [facility]. Pt. may need assistance with transportation when medically ready to d/c. Pt. has an advanced directive and ACP documentation on file. CM will continue to follow pt. throughout his stay.                  Continued Care and Services - Admitted Since 11/5/2024    No active coordination exists for this encounter.       Selected Continued Care - Prior Encounters Includes continued care and service providers with selected services from prior encounters from 8/7/2024 to 11/5/2024      Discharged on 9/23/2024 Admission date: 9/13/2024 - Discharge disposition: Skilled Nursing Facility (DC - External)      Destination       Service Provider Services Address Phone Fax Patient Preferred    [facility] [address], [place] [address] [phone] --                      Discharged on 9/5/2024 Admission date: 8/30/2024 - Discharge disposition: Home-Health Care [facility]      Home Medical Care       Service Provider Services Address Phone Fax Patient Preferred    [facility] [address], [facility] [id number] [phone] --                      Discharged on 8/10/2024 Admission date: 8/7/2024 - Discharge disposition: Rehab Facility or Unit (DC -  External)      Destination       Service Provider Services Address Phone Fax Patient Preferred    [facility] [address] [facility] [address] [phone] --                             Demographic Summary       Row Name 11/05/24 1149       General Information    Admission Type observation    Arrived From home    Referral Source admission list;emergency department    Reason for Consult discharge planning    Preferred Language English                   Functional Status       Row Name 11/05/24 1149       Physical Activity    On average, how many days per week do you engage in moderate to strenuous exercise (like a brisk walk)? 0 days    On average, how many minutes do you engage in exercise at this level? 0 min    Number of minutes of exercise per week 0       Functional Status, IADL    Medications assistive equipment and person    Meal Preparation assistive equipment and person    Housekeeping assistive equipment and person    Laundry assistive equipment and person    Shopping assistive equipment and person    If for any reason you need help with day-to-day activities such as bathing, preparing meals, shopping, managing finances, etc., do you get the help you need? I get all the help I need       Mental Status Summary    Recent Changes in Mental Status/Cognitive Functioning unable to assess       Employment/    Employment Status retired                   Psychosocial       Row Name 11/05/24 1155       Mental Health    Little interest or pleasure in doing things Not at all    Feeling down, depressed, or hopeless Not at all       Stress    Do you feel stress - tense, restless, nervous, or anxious, or unable to sleep at night because your mind is troubled all the time - these days? Not at all                   Abuse/Neglect    No documentation.                  Legal       Row Name 11/05/24 1154       Financial Resource Strain    How hard is it for  you to pay for the very basics like food, housing, medical care, and heating? Not very                   Substance Abuse    No documentation.                  Patient Forms    No documentation.                     GENE Gutiérrez

## 2024-11-05 NOTE — PLAN OF CARE
Goal Outcome Evaluation:  Plan of Care Reviewed With: patient        Progress: no change  Outcome Evaluation: PT initial evaluation complete. Pt presents with decreased activity tolerance, impaired cognition, and generalized weakness. Pt took short shuffling steps w/ FWW to bedside chair w/ minAx1. Pt would benefit from continued skilled therapy while hospitalized to maximize functional independence. D/c rec is SNF for best outcome.    Anticipated Discharge Disposition (PT): skilled nursing facility

## 2024-11-05 NOTE — ED PROVIDER NOTES
Subjective   History of Present Illness  Mr. Blanco is brought from home via EMS after falling.  EMS reports that family was concerned about right shoulder pain.  Mr. Blanco is not able to give clear details.  He denies shoulder pain currently.  He does report some intermittent sharp pain across his low abdomen.  He tells me he has lost his appetite.  Wife tells me later that he had upper endoscopy by Dr. Chen a couple of weeks ago that was normal       He had right-sided knee replacement in August of this year.  Has had several admissions subsequently for cellulitis and weakness.  Has been at [facility].  Apparently is home now.  Review of records indicates that he was seen yesterday by home health and wife indicated could not take care of him and requested hospice referral.  It looks like a referral was put in place.      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     Duloxetine Unknown (See Comments)    Metoprolol Other (See Comments)     UNKNOWN. Pt does not remember this being allergy    Neurontin [Gabapentin] Other (See Comments)     Lethargy & fatigue       Past  Surgical History:   Procedure Laterality Date    ABLATION OF DYSRHYTHMIC FOCUS      APPENDECTOMY      CARDIAC CATHETERIZATION N/A 10/05/2017    Procedure: Left Heart Cath;  Surgeon: Hector Urrutia MD;  Location:  [facility];  Service:     CATARACT EXTRACTION Bilateral     CHOLECYSTECTOMY      Remote    COCHLEAR IMPLANT REVISION  05/2019    COLONOSCOPY      PROSTATE SURGERY      SHOULDER SURGERY Right     SKIN BIOPSY      TOE AMPUTATION Right     2nd toe - Right Foot    TONSILLECTOMY      TOTAL KNEE ARTHROPLASTY Right 8/7/2024    Procedure: TOTAL KNEE ARTHROPLASTY WITH ASHLEY ROBOT RIGHT;  Surgeon: Christian Schwartz MD;  Location:  [facility] OR;  Service: Robotics - Ortho;  Laterality: Right;       Family History   Problem Relation Age of Onset    Atrial fibrillation Mother     Dementia Mother     Lung cancer Father        Social History     Socioeconomic History    Marital status:    Tobacco Use    Smoking status: Never     Passive exposure: Past    Smokeless tobacco: Never   Vaping Use    Vaping status: Never Used   Substance and Sexual Activity    Alcohol use: No    Drug use: No    Sexual activity: Defer           Objective   Physical Exam  Vitals and nursing note reviewed.   Constitutional:       General: He is not in acute distress.     Comments: He is sitting up in bed in no distress.  Speech is somewhat difficult to understand.  He is hard of hearing and has cochlear implant.  He is in no distress   HENT:      Head: Normocephalic and atraumatic.      Nose: Nose normal. No congestion or rhinorrhea.   Eyes:      General: No scleral icterus.     Conjunctiva/sclera: Conjunctivae normal.   Neck:      Comments: No JVD   Cardiovascular:      Rate and Rhythm: Normal rate and regular rhythm.      Heart sounds: No murmur heard.     No friction rub.   Pulmonary:      Effort: Pulmonary effort is normal.      Breath sounds: Normal breath sounds. No wheezing or rales.   Abdominal:      General:  Bowel sounds are normal.      Palpations: Abdomen is soft.      Tenderness: There is no abdominal tenderness. There is no guarding or rebound.   Musculoskeletal:         General: No tenderness.      Cervical back: Normal range of motion and neck supple.      Right lower leg: No edema.      Left lower leg: No edema.      Comments: He has no tenderness over either shoulder.  There is bony protuberance over his right shoulder, not tender, allows me to move his shoulder without pain, seems chronic.  Do not see any bruising over the skin.  Inspection of his knees shows that there is a healing incision over his right knee.  No significant erythema.  His knee is warm to touch.  No pain with flexion of his knee   Skin:     General: Skin is warm and dry.      Coloration: Skin is not pale.      Findings: No erythema.   Neurological:      General: No focal deficit present.      Mental Status: He is alert.      Motor: No weakness.      Coordination: Coordination normal.   Psychiatric:         Mood and Affect: Mood normal.         Behavior: Behavior normal.         Thought Content: Thought content normal.         Procedures           ED Course  ED Course as of 11/05/24 1340   Tue Nov 05, 2024   0936 Called CT re ct report. [DT]   1012 CT shows possible proctitis but overall nothing dramatic [DT]   1030 Wife is present now.  She tells me he got up about 5 times last night and the last time she did not hearing get up until he fell.  She reports that he is not eating and drinking and is getting weaker and weaker.  Urine has been obtained and we are awaiting results of that. [DT]   1031 Called Dr. VALENCIA [DT]   1045 Urine looks concerning for infection.  Will get urine culture, blood cultures, and give antibiotics. [DT]   1051 Discussed with Dr. VALENCIA who asks that the hospitalist admit.  I have advised Mr. Blanco and his wife of findings and plan to treat for urinary tract infection [DT]      ED Course User Index  [DT] Girma Jimenez MD                                                Medical Decision Making  Please see course notes.  I ordered and interpreted labs, prior records, obtained history from his wife.  Ordered and interpreted results of CT scan and cath urine.  Gave antibiotics.  Had multiple reevaluations.  Admitted him to the hospital    Problems Addressed:  Anorexia: chronic illness or injury with exacerbation, progression, or side effects of treatment  Bacterial UTI: complicated acute illness or injury that poses a threat to life or bodily functions  Fall in home, initial encounter: complicated acute illness or injury that poses a threat to life or bodily functions  Generalized weakness: chronic illness or injury with exacerbation, progression, or side effects of treatment    Amount and/or Complexity of Data Reviewed  External Data Reviewed: notes.  Labs: ordered. Decision-making details documented in ED Course.  Radiology: ordered. Decision-making details documented in ED Course.  ECG/medicine tests: ordered. Decision-making details documented in ED Course.    Risk  Prescription drug management.  Decision regarding hospitalization.        Final diagnoses:   Generalized weakness   Fall in home, initial encounter   Anorexia   Bacterial UTI       ED Disposition  ED Disposition       ED Disposition   Decision to Admit    Condition   --    Comment   Level of Care: Med/Surg [1]   Diagnosis: Generalized weakness [231317]   Admitting Physician: BOBBY DOSS [[id number]]   Attending Physician: BOBBY DOSS [[id number]]   Is patient appropriate for Inpatient Observation Unit?: No [0]                 No follow-up provider specified.       Medication List      No changes were made to your prescriptions during this visit.            Girma Jimenez MD  11/05/24 [id number]

## 2024-11-05 NOTE — Clinical Note
Level of Care: Med/Surg [1]   Diagnosis: Generalized weakness [167244]   Admitting Physician: BOBBY DOSS [[id number]]   Attending Physician: BOBBY DOSS [[id number]]

## 2024-11-06 ENCOUNTER — APPOINTMENT (OUTPATIENT)
Dept: GENERAL RADIOLOGY | Facility: HOSPITAL | Age: 84
End: 2024-11-06
Payer: MEDICARE

## 2024-11-06 ENCOUNTER — APPOINTMENT (OUTPATIENT)
Dept: GENERAL RADIOLOGY | Facility: HOSPITAL | Age: 84
End: 2024-11-06
Payer: COMMERCIAL

## 2024-11-06 ENCOUNTER — HOME CARE VISIT (OUTPATIENT)
Dept: HOME HEALTH SERVICES | Facility: HOME HEALTHCARE | Age: 84
End: 2024-11-06
Payer: COMMERCIAL

## 2024-11-06 LAB
ANION GAP SERPL CALCULATED.3IONS-SCNC: 14 MMOL/L (ref 5–15)
BACTERIA SPEC AEROBE CULT: NO GROWTH
BUN SERPL-MCNC: 14 MG/DL (ref 8–23)
BUN/CREAT SERPL: 20.6 (ref 7–25)
CALCIUM SPEC-SCNC: 9.1 MG/DL (ref 8.6–10.5)
CHLORIDE SERPL-SCNC: 103 MMOL/L (ref 98–107)
CO2 SERPL-SCNC: 19 MMOL/L (ref 22–29)
CREAT SERPL-MCNC: 0.68 MG/DL (ref 0.76–1.27)
DEPRECATED RDW RBC AUTO: 44.5 FL (ref 37–54)
EGFRCR SERPLBLD CKD-EPI 2021: 92.2 ML/MIN/1.73
ERYTHROCYTE [DISTWIDTH] IN BLOOD BY AUTOMATED COUNT: 14.6 % (ref 12.3–15.4)
GLUCOSE SERPL-MCNC: 103 MG/DL (ref 65–99)
HCT VFR BLD AUTO: 33.6 % (ref 37.5–51)
HGB BLD-MCNC: 10.5 G/DL (ref 13–17.7)
MCH RBC QN AUTO: 26.3 PG (ref 26.6–33)
MCHC RBC AUTO-ENTMCNC: 31.3 G/DL (ref 31.5–35.7)
MCV RBC AUTO: 84 FL (ref 79–97)
PLATELET # BLD AUTO: 205 10*3/MM3 (ref 140–450)
PMV BLD AUTO: 11 FL (ref 6–12)
POTASSIUM SERPL-SCNC: 3.5 MMOL/L (ref 3.5–5.2)
RBC # BLD AUTO: 4 10*6/MM3 (ref 4.14–5.8)
SODIUM SERPL-SCNC: 136 MMOL/L (ref 136–145)
WBC NRBC COR # BLD AUTO: 7.01 10*3/MM3 (ref 3.4–10.8)

## 2024-11-06 PROCEDURE — 85027 COMPLETE CBC AUTOMATED: CPT | Performed by: INTERNAL MEDICINE

## 2024-11-06 PROCEDURE — 25010000002 ENOXAPARIN PER 10 MG: Performed by: INTERNAL MEDICINE

## 2024-11-06 PROCEDURE — 92610 EVALUATE SWALLOWING FUNCTION: CPT

## 2024-11-06 PROCEDURE — 74230 X-RAY XM SWLNG FUNCJ C+: CPT

## 2024-11-06 PROCEDURE — 97165 OT EVAL LOW COMPLEX 30 MIN: CPT

## 2024-11-06 PROCEDURE — 80048 BASIC METABOLIC PNL TOTAL CA: CPT | Performed by: INTERNAL MEDICINE

## 2024-11-06 PROCEDURE — 92611 MOTION FLUOROSCOPY/SWALLOW: CPT

## 2024-11-06 PROCEDURE — G0378 HOSPITAL OBSERVATION PER HR: HCPCS

## 2024-11-06 PROCEDURE — 99232 SBSQ HOSP IP/OBS MODERATE 35: CPT | Performed by: INTERNAL MEDICINE

## 2024-11-06 PROCEDURE — 96372 THER/PROPH/DIAG INJ SC/IM: CPT

## 2024-11-06 PROCEDURE — 97110 THERAPEUTIC EXERCISES: CPT

## 2024-11-06 PROCEDURE — 97116 GAIT TRAINING THERAPY: CPT

## 2024-11-06 RX ORDER — PANTOPRAZOLE SODIUM 40 MG/10ML
40 INJECTION, POWDER, LYOPHILIZED, FOR SOLUTION INTRAVENOUS
Status: DISCONTINUED | OUTPATIENT
Start: 2024-11-07 | End: 2024-11-11

## 2024-11-06 RX ORDER — ACETAMINOPHEN 160 MG/5ML
650 SOLUTION ORAL EVERY 6 HOURS PRN
Status: DISCONTINUED | OUTPATIENT
Start: 2024-11-06 | End: 2024-11-13 | Stop reason: HOSPADM

## 2024-11-06 RX ORDER — ISOSORBIDE DINITRATE 10 MG/1
10 TABLET ORAL
Status: DISCONTINUED | OUTPATIENT
Start: 2024-11-07 | End: 2024-11-11

## 2024-11-06 RX ORDER — DOCUSATE SODIUM 50 MG/5 ML
100 LIQUID (ML) ORAL 2 TIMES DAILY
Status: DISCONTINUED | OUTPATIENT
Start: 2024-11-06 | End: 2024-11-07

## 2024-11-06 RX ADMIN — Medication 10 ML: at 20:14

## 2024-11-06 RX ADMIN — DOCUSATE SODIUM 100 MG: 50 LIQUID ORAL at 20:14

## 2024-11-06 RX ADMIN — ENOXAPARIN SODIUM 40 MG: 40 INJECTION SUBCUTANEOUS at 10:00

## 2024-11-06 RX ADMIN — ISOSORBIDE MONONITRATE 30 MG: 30 TABLET, EXTENDED RELEASE ORAL at 10:35

## 2024-11-06 RX ADMIN — CEFUROXIME AXETIL 500 MG: 250 TABLET, FILM COATED ORAL at 20:14

## 2024-11-06 RX ADMIN — BARIUM SULFATE 100 ML: 0.81 POWDER, FOR SUSPENSION ORAL at 14:29

## 2024-11-06 RX ADMIN — CYCLOSPORINE 1 DROP: 0.5 EMULSION OPHTHALMIC at 10:00

## 2024-11-06 RX ADMIN — ASPIRIN 325 MG: 325 TABLET ORAL at 10:35

## 2024-11-06 RX ADMIN — DOCUSATE SODIUM 100 MG: 100 CAPSULE, LIQUID FILLED ORAL at 09:59

## 2024-11-06 RX ADMIN — FLECAINIDE ACETATE 100 MG: 50 TABLET ORAL at 04:51

## 2024-11-06 RX ADMIN — NORTRIPTYLINE HYDROCHLORIDE 30 MG: 10 CAPSULE ORAL at 20:14

## 2024-11-06 RX ADMIN — FLECAINIDE ACETATE 100 MG: 50 TABLET ORAL at 16:54

## 2024-11-06 RX ADMIN — CYCLOSPORINE 1 DROP: 0.5 EMULSION OPHTHALMIC at 22:12

## 2024-11-06 RX ADMIN — PRAVASTATIN SODIUM 20 MG: 20 TABLET ORAL at 10:35

## 2024-11-06 RX ADMIN — Medication 10 ML: at 10:03

## 2024-11-06 RX ADMIN — CEFUROXIME AXETIL 500 MG: 250 TABLET, FILM COATED ORAL at 10:35

## 2024-11-06 RX ADMIN — BARIUM SULFATE 20 ML: 400 PASTE ORAL at 14:29

## 2024-11-06 RX ADMIN — PANTOPRAZOLE SODIUM 40 MG: 40 TABLET, DELAYED RELEASE ORAL at 06:27

## 2024-11-06 NOTE — MBS/VFSS/FEES
Acute Care - Speech Language Pathology   Swallow Initial Evaluation [facility]  Modified Barium Swallow Study (MBS)  & Clinical Swallow Evaluation       Patient Name: Guicho Blanco  : 1940  MRN: [id number]  Today's Date: 2024               Admit Date: 2024    Visit Dx:     ICD-10-CM ICD-9-CM   1. Generalized weakness  R53.1 780.79   2. Fall in home, initial encounter  W19.XXXA E888.9    Y92.009 E849.0   3. Anorexia  R63.0 783.0   4. Bacterial UTI  N39.0 599.0    A49.9 041.9   5. Oropharyngeal dysphagia  R13.12 787.22     Patient Active Problem List   Diagnosis    Osteomyelitis of toe of right foot    Osteomyelitis of right foot    PAF (paroxysmal atrial fibrillation)    Coronary artery disease involving native coronary artery of native heart without angina pectoris    Gastroesophageal reflux disease without esophagitis    Benign non-nodular prostatic hyperplasia without lower urinary tract symptoms    S/P appy    Essential hypertension    Simple chronic bronchitis    Mixed hyperlipidemia    COPD (chronic obstructive pulmonary disease)    BPH (benign prostatic hypertrophy)    Hearing loss    Chest pain    Vertigo    Snoring    Overweight    PLMD (periodic limb movement disorder)    Neuropathy    Periodic headache syndrome, not intractable    Acquired absence of other right toe(s)    Tremor    Bilateral impacted cerumen    Bilateral sensorineural hearing loss    Chronic otitis externa    Deviated nasal septum    Disorder of joint of foot    ED (erectile dysfunction) of organic origin    Hydrocele    Hydrocele of testis    Impacted cerumen    Mononeuropathy    Swelling of testicle    Venous retinal branch occlusion    Vitreous degeneration    Benign prostatic hyperplasia with urinary obstruction    OA (osteoarthritis) of knee    Status post total right knee replacement    Septic arthritis    Cellulitis    Cellulitis of right knee    Failure to thrive in adult    Insomnia    Moderate  malnutrition    Sepsis    Lactic acidosis    Serotonin syndrome    Dysphagia    Generalized weakness     Past Medical History:   Diagnosis Date    Arthritis     Atrial fibrillation     BPH (benign prostatic hypertrophy)     Cataract     Cellulitis     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     bilateral    Coronary artery disease     Full dentures     Gall stones     GERD (gastroesophageal reflux disease)     Hearing loss     Hiatal hernia     Hyperlipidemia     Hypertension     Peripheral neuropathy     Peripheral vascular disease     Seasonal allergies     Skin cancer     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    TIA (transient ischemic attack)     2 times    Wears glasses      Past Surgical History:   Procedure Laterality Date    ABLATION OF DYSRHYTHMIC FOCUS      APPENDECTOMY      CARDIAC CATHETERIZATION N/A 10/05/2017    Procedure: Left Heart Cath;  Surgeon: Hector Urrutia MD;  Location:  [facility];  Service:     CATARACT EXTRACTION Bilateral     CHOLECYSTECTOMY      Remote    COCHLEAR IMPLANT REVISION  05/2019    COLONOSCOPY      PROSTATE SURGERY      SHOULDER SURGERY Right     SKIN BIOPSY      TOE AMPUTATION Right     2nd toe - Right Foot    TONSILLECTOMY      TOTAL KNEE ARTHROPLASTY Right 8/7/2024    Procedure: TOTAL KNEE ARTHROPLASTY WITH ASHLEY ROBOT RIGHT;  Surgeon: Christian Schwartz MD;  Location:  [facility] OR;  Service: Robotics - Ortho;  Laterality: Right;       SLP Recommendation and Plan  SLP Swallowing Diagnosis: mod-severe, oral dysphagia, moderate, pharyngeal dysphagia (11/06/24 1415)  SLP Diet Recommendation: mechanical ground textures, thin liquids (11/06/24 1415)  Recommended Precautions and Strategies: upright posture during/after eating, no straw, multiple swallows per bite of food, multiple swallows per sip of liquid, alternate between small bites of food and sips of liquid, volitional throat clear, general aspiration precautions (11/06/24 1415)  SLP Rec. for Method of Medication  Administration: meds crushed, with puree, as tolerated (discussed with RN about ability to obtain liquid medication) (11/06/24 1415)     Monitor for Signs of Aspiration: yes, notify SLP if any concerns (11/06/24 1415)  Recommended Diagnostics: VFSS (MBS) (11/06/24 0940)  Swallow Criteria for Skilled Therapeutic Interventions Met: demonstrates skilled criteria (11/06/24 1415)  Anticipated Discharge Disposition (SLP): home with home health (11/06/24 1415)  Rehab Potential/Prognosis, Swallowing: good, to achieve stated therapy goals (11/06/24 1415)  Therapy Frequency (Swallow): 5 days per week (11/06/24 1415)  Predicted Duration Therapy Intervention (Days): 1 week (11/06/24 1415)  Oral Care Recommendations: Oral Care BID/PRN, Toothbrush (11/06/24 1415)                                        Progress: no change      SWALLOW EVALUATION (Last 72 Hours)       SLP Adult Swallow Evaluation       Row Name 11/06/24 1415 11/06/24 0940                Rehab Evaluation    Document Type evaluation  -GA evaluation  -GA       Subjective Information no complaints  -GA no complaints  -GA       Patient Observations alert;cooperative;agree to therapy  -GA alert;cooperative;agree to therapy  -GA       Patient/Family/Caregiver Comments/Observations none present  -GA brother present  -GA       Patient Effort good  -GA good  -GA       Comment -- patient actively eating breakfast upon arrival  -GA       Symptoms Noted During/After Treatment none  -GA none  -GA       Oral Care -- other (see comments)  eating breakfast already  -GA          General Information    Patient Profile Reviewed yes  -GA yes  -GA       Pertinent History Of Current Problem see previous hx  -GA 83 yr old man with history of afib, CAD, HTN, TIA, cochlear implants, and R knee replacement in Aug 2024 with 2 readmissions for cellulitis and progressive weakness/frailty in recent months.  Admitted for fall at home, and general failure to thrive.  -GA       Current Method of  Nutrition soft to chew textures;ground;thin liquids  no straw  -GA regular textures;thin liquids  -GA       Precautions/Limitations, Vision WFL;for purposes of eval  -GA WFL;for purposes of eval  -GA       Precautions/Limitations, Hearing hearing impairment, bilaterally  -GA hearing impairment, bilaterally  -GA       Prior Level of Function-Communication unknown  -GA unknown  -GA       Prior Level of Function-Swallowing other (see comments)  MBS and tx complete during hospital stay 9/2024. MBS recommended puree/thin with medication crushed in puree. At risk for choking d/t pharyngeal residue. At d/c patient upgraded to [facility] ground/puree. Patient reports no longer crushing medication at home  -GA other (see comments)  MBS and tx complete during hospital stay 9/2024. MBS recommended puree/thin with medication crushed in puree. At risk for choking d/t pharyngeal residue. At d/c patient upgraded to [facility] ground/puree. Patient reports no longer crushing medication at home  -GA       Plans/Goals Discussed with patient;agreed upon  -GA patient and family;agreed upon  -GA       Barriers to Rehab medically complex;previous functional deficit;hearing deficit  -GA medically complex;previous functional deficit;hearing deficit  -GA       Patient's Goals for Discharge patient did not state  -GA patient did not state  -GA       Family Goals for Discharge -- family did not state  -GA          Pain    Pretreatment Pain Rating 0/10 - no pain  -GA 0/10 - no pain  -GA       Posttreatment Pain Rating 0/10 - no pain  -GA 0/10 - no pain  -GA          Oral Motor Structure and Function    Dentition Assessment -- upper dentures/partial in place;lower dentures/partial in place  -GA       Secretion Management -- WNL/WFL  -GA       Volitional Swallow -- WFL  -GA          Oral Musculature and Cranial Nerve Assessment    Oral Motor General Assessment -- generalized oral motor weakness  -GA          General Eating/Swallowing Observations     Respiratory Support Currently in Use -- room air  -GA       Eating/Swallowing Skills -- self-fed  -GA       Positioning During Eating -- upright 90 degree;upright in chair  -GA       Utensils Used -- spoon;cup;straw  -GA       Consistencies Trialed -- soft to chew textures;chopped;mechanical ground textures;thin liquids  -GA          Respiratory    Respiratory Status -- WFL;room air  -GA          Clinical Swallow Eval    Oral Prep Phase -- impaired  -GA       Oral Transit -- impaired  -GA       Oral Residue -- impaired  -GA       Pharyngeal Phase -- suspected pharyngeal impairment  -GA       Esophageal Phase -- unremarkable  -GA       Clinical Swallow Evaluation Summary -- patient with overt s/sx with throat clear and multiple/audible swallows during straw sips of thin, large cup sips, and when taking pills with liquids. patient with significantly prolonged masticaiton of soft solids (i.e., eggs) for ~5 minutes. pocketing into chew and rumination. patient downgraded to medication whole in puree, small single sips of liquids via cup, and soft solids/ground meats  -GA          Oral Prep Concerns    Oral Prep Concerns -- oral holding;prolonged mastication;increased prep time  -GA       Oral Holding -- mechanical soft  -GA       Prolonged Mastication -- mechanical soft  -GA       Increased Prep Time -- mechanical soft  -GA          Oral Transit Concerns    Oral Transit Concerns -- increased oral transit time  -GA       Increased Oral Transit Time -- all consistencies  -GA          Oral Residue Concerns    Oral Residue Concerns -- sulci residue, bilateral  -GA       Sulci Residue, Bilateral -- regular consistencies;mechanical soft  -GA          Pharyngeal Phase Concerns    Pharyngeal Phase Concerns -- multiple swallows;throat clear  -GA       Multiple Swallows -- thin  -GA       Throat Clear -- thin  -GA          MBS/VFSS    Utensils Used spoon;cup;straw  -GA --       Consistencies Trialed barium pill;thin liquids;pureed   -GA --          MBS/VFSS Interpretation    Oral Prep Phase impaired oral phase of swallowing  -GA --       Oral Transit Phase impaired  -GA --       Oral Residue impaired  -GA --       VFSS Summary Patient with deep penetration d/t mixing puree solids and thin liquids because patient had difficulty trigger swallow of puree and using liquid wash to assist with triggering swallow. All other aspiration events with shallow or transient penetration that was able to be cleared upon secondary swallow or intermittent throat clear. moderate -significant valleculae, pyriform sinus, and diffused pharyngeal residue placing patient at increased risk for choking/aspiration/penetration. able to paritially clear with multiple swallows and liquid washes. Due to prolonged oral phase and manipulation difficulties patient also at risk for malnutrition.  -GA --          Oral Preparatory Phase    Oral Preparatory Phase oral holding;prolonged manipulation;inadequate manipulation  -GA --       Oral Holding pudding/puree;secondary to reduced labial seal;secondary to reduced lingual strength;secondary to reduced lingual range of motion  -GA --       Prolonged Manipulation all consistencies tested;secondary to reduced labial seal;secondary to reduced lingual strength;secondary to reduced lingual range of motion  -GA --       Inadequate Manipulation all consistencies tested;secondary to reduced labial seal;secondary to reduced lingual strength;secondary to reduced lingual range of motion  -GA --          Oral Transit Phase    Impaired Oral Transit Phase delayed initiation of bolus transit;increased A-P transit time;tongue pumping;piecemeal oral transit;premature spillage of liquids into pharynx  -GA --       Delayed Initiation of bolus transit all consistencies tested;secondary to reduced lingual control;discoordination of lingual movement  -GA --       Increased A-P Transit Time all consistencies tested;secondary to reduced lingual  control;discoordination of lingual movement  -GA --       Tongue Pumping all consistencies tested;secondary to reduced lingual control;discoordination of lingual movement  -GA --       Piecemeal Oral Transit all consistencies tested;secondary to reduced lingual control;discoordination of lingual movement  -GA --       Premature Spillage of Liquids into Pharynx all consistencies tested;secondary to reduced lingual control;discoordination of lingual movement  -GA --          Oral Residue    Impaired Oral Residue diffuse residue throughout oral cavity;lateral sulci residue  -GA --       Lateral Sulci Residue thin liquids;secondary to reduced lingual strength;secondary to reduced lingual range of motion  -GA --       Diffuse Residue throughout Oral Cavity all consistencies tested;secondary to reduced lingual strength;secondary to reduced lingual range of motion  -GA --          Initiation of Pharyngeal Swallow    Initiation of Pharyngeal Swallow bolus in pyriform sinuses  -GA --       Pharyngeal Phase impaired pharyngeal phase of swallowing  -GA --       Anatomical abnormalities noted osteophyte/bone spur per radiology report  -GA --       Anatomical abnormalities functional impact functional impact on swallowing;other (see comments)  cuasing pharyngeal residue and immeidately under UES  -GA --       Penetration During the Swallow thin liquids;all consistencies tested;secondary to delayed swallow initiation or mistiming;secondary to reduced laryngeal elevation;secondary to reduced vestibular closure  -GA --       Depth of Penetration deep;shallow;transient;other (see comments)  deep penetration occuring when mixing liquids and solids. patient demonstrating difficulty triggering a swallow with puree consistenices. used liquid wash to clear causing deep penetration. All other thin  trials w/ no, shallow or transient penetration  -GA --       Response to Penetration Yes  -GA --       Responsed to penetration with throat  clear;effective;non-effective;other (see comments)  mostly effective despite 1x with deep penetration with mixed consistecnies (puree and liquid wash)  -GA --       Rosenbek's Scale mixed:;thin:;5--->level 5;pudding/puree:;1--->level 1  5 with 1x trial of thin/puree mixed consistency to level of folds.  -GA --       Pharyngeal Residue thin liquids;laryngeal vestibule;pudding/puree;diffuse within pharynx;pyriform sinuses;valleculae;base of tongue;posterior pharyngeal wall;secondary to reduced base of tongue retraction;secondary to reduced posterior pharyngeal wall stripping;secondary to reduced laryngeal elevation;secondary to reduced hyolaryngeal excursion  -GA --       Response to Residue partial residue clearance;cleared residue with liquid wash;cleared residue with spontaneous subsequent swallow;cleared residue with compensatory maneuver (see comments)  throat clear  -GA --       Attempted Compensatory Maneuvers bolus size;bolus presentation style;throat clear after swallow;additional subsequent swallow  -GA --       Response to Attempted Compensatory Maneuvers reduced residue;prevented penetration;did not prevent penetration  -GA --       Successful Compensatory Maneuver Competency patient able to;demonstrate compensations;with cues  -GA --          Esophageal Phase    Esophageal Phase see radiology report for further details  -GA --          SLP Evaluation Clinical Impression    SLP Swallowing Diagnosis mod-severe;oral dysphagia;moderate;pharyngeal dysphagia  -GA R/O pharyngeal dysphagia;moderate;oral dysphagia  -GA       Functional Impact risk of aspiration/pneumonia;risk of malnutrition;risk of dehydration  -GA risk of aspiration/pneumonia;risk of malnutrition;risk of dehydration  -GA       Rehab Potential/Prognosis, Swallowing good, to achieve stated therapy goals  -GA good, to achieve stated therapy goals  -GA       Swallow Criteria for Skilled Therapeutic Interventions Met demonstrates skilled criteria   -GA demonstrates skilled criteria  -GA          Recommendations    Therapy Frequency (Swallow) 5 days per week  -GA --       Predicted Duration Therapy Intervention (Days) 1 week  -GA --       SLP Diet Recommendation mechanical ground textures;thin liquids  -GA soft to chew textures;ground;thin liquids;other (see comments)  single sips via cup only  -GA       Recommended Diagnostics -- VFSS (MBS)  -GA       Recommended Precautions and Strategies upright posture during/after eating;no straw;multiple swallows per bite of food;multiple swallows per sip of liquid;alternate between small bites of food and sips of liquid;volitional throat clear;general aspiration precautions  -GA upright posture during/after eating;no straw;multiple swallows per bite of food;general aspiration precautions  -GA       Oral Care Recommendations Oral Care BID/PRN;Toothbrush  -GA Oral Care BID/PRN;Suction toothbrush  -GA       SLP Rec. for Method of Medication Administration meds crushed;with puree;as tolerated  discussed with RN about ability to obtain liquid medication  -GA meds whole;meds crushed;with puree;as tolerated  -GA       Monitor for Signs of Aspiration yes;notify SLP if any concerns  -GA yes;notify SLP if any concerns  -GA       Anticipated Discharge Disposition (SLP) home with home health  -GA home with home health  -GA                 User Key  (r) = Recorded By, (t) = Taken By, (c) = Cosigned By      Initials Name Effective Dates    Carrie Cooney, MS CCC-SLP 09/14/23 -                     EDUCATION  The patient has been educated in the following areas:   Dysphagia (Swallowing Impairment) Modified Diet Instruction.        SLP GOALS       Row Name 11/06/24 1415             (LTG) Patient will demonstrate functional swallow for    Diet Texture (Demonstrate functional swallow) soft to chew (chopped) textures  -GA      Liquid viscosity (Demonstrate functional swallow) thin liquids  -GA      Weber (Demonstrate functional  swallow) with minimal cues (75-90% accuracy)  -GA      Time Frame (Demonstrate functional swallow) 1 week  -GA      Progress/Outcomes (Demonstrate functional swallow) new goal  -GA         (STG) Patient will tolerate trials of    Consistencies Trialed (Tolerate trials) soft to chew (ground) textures;thin liquids  -GA      Desired Outcome (Tolerate trials) without signs/symptoms of aspiration;with adequate oral prep/transit/clearance  -GA      Leland (Tolerate trials) with moderate cues (50-74% accuracy)  -GA      Time Frame (Tolerate trials) 1 week  -GA      Progress/Outcomes (Tolerate trials) new goal  -GA         (STG) Patient will tolerate therapeutic trials of    Consistencies Trialed (Tolerate therapeutic trials) soft to chew (chopped) textures  -GA      Desired Outcome (Tolerate therapeutic trials) without signs/symptoms of aspiration;with adequate oral prep/transit/clearance  -GA      [place]eland (Tolerate therapeutic trials) with moderate cues (50-74% accuracy)  -GA      Time Frame (Tolerate therapeutic trials) 1 week  -GA      Progress/Outcomes (Tolerate therapeutic trials) new goal  -GA         (STG) Lingual Strengthening Goal 1 (SLP)    Activity (Lingual Strengthening Goal 1, SLP) increase lingual tone/sensation/control/coordination/movement;increase buccal tension or tone;increase tongue back strength  -GA      Increase Lingual Tone/Sensation/Control/Coordination/Movement lingual movement exercises;swallow trials;lingual resistance exercises  -GA      Increase Buccal Tension or Tone lingual movement exercises;swallow trials;lingual resistance exercises  -GA      Increase Tongue Back Strength lingual movement exercises;swallow trials;lingual resistance exercises  -GA      [place]eland/Accuracy (Lingual Strengthening Goal 1, SLP) with moderate cues (50-74% accuracy)  -GA      Time Frame (Lingual Strengthening Goal 1, SLP) 1 week  -GA      Progress/Outcomes (Lingual Strengthening Goal 1, SLP) new  goal  -GA         (STG) Pharyngeal Strengthening Exercise Goal 1 (SLP)    Activity (Pharyngeal Strengthening Goal 1, SLP) increase timing;increase superior movement of the hyolaryngeal complex;increase anterior movement of the hyolaryngeal complex;increase closure at entrance to airway/closure of airway at glottis;increase squeeze/positive pressure generation;increase tongue base retraction  -GA      Increase Timing prepping - 3 second prep or suck swallow or 3-step swallow  -GA      Increase Superior Movement of the Hyolaryngeal Complex falsetto;Mendelsohn  -GA      Increase Anterior Movement of the Hyolaryngeal Complex hard effortful swallow  -GA      Increase Closure at Entrance to Airway/Closure of Airway at Glottis chin tuck against resistance (CTAR)  -GA      Increase Squeeze/Positive Pressure Generation hard effortful swallow  -GA      Increase Tongue Base Retraction gerry  -GA      [place]/Accuracy (Pharyngeal Strengthening Goal 1, SLP) with moderate cues (50-74% accuracy)  -GA      Time Frame (Pharyngeal Strengthening Goal 1, SLP) 1 week  -GA      Progress/Outcomes (Pharyngeal Strengthening Goal 1, SLP) new goal  -GA                User Key  (r) = Recorded By, (t) = Taken By, (c) = Cosigned By      Initials Name Provider Type    Carrie Cooney MS CCC-SLP Speech and Language Pathologist                         Time Calculation:    Time Calculation- SLP       Row Name 11/06/24 1616 11/06/24 1308          Time Calculation- SLP    SLP Start Time 1415  -GA 0940  -GA     SLP Received On 11/06/24  -GA 11/06/24  -GA        Untimed Charges    21677-HS Eval Oral Pharyng Swallow Minutes -- 40  -GA     70894-KS Motion Fluoro Eval Swallow Minutes 70  -GA --        Total Minutes    Untimed Charges Total Minutes 70  -GA 40  -GA      Total Minutes 70  -GA 40  -GA               User Key  (r) = Recorded By, (t) = Taken By, (c) = Cosigned By      Initials Name Provider Type    Carrie Cooney MS CCC-SLP  Speech and Language Pathologist                    Therapy Charges for Today       Code Description Service Date Service Provider Modifiers Qty    [id number] HC ST EVAL ORAL PHARYNG SWALLOW 3 11/6/2024 Carrie Wise, MS CCC-SLP GN 1    [id number]  ST MOTION FLUORO EVAL SWALLOW 5 11/6/2024 Carrie Wise, MS CCC-SLP GN 1                 Carrie Wise MS CCC-SLP  11/6/2024

## 2024-11-06 NOTE — PLAN OF CARE
Goal Outcome Evaluation:  Plan of Care Reviewed With: patient        Progress: no change  Outcome Evaluation: Pt continues to present below baseline with generalized weakness, impaired balance, and decreased endurance. Pt ambulated 60' with min Ax2 and RW. Further IPPT is warrented. PT will progress as able per POC.    Anticipated Discharge Disposition (PT): skilled nursing facility

## 2024-11-06 NOTE — CASE MANAGEMENT/SOCIAL WORK
Continued Stay Note  [facility]     Patient Name: Guicho Blanco  MRN: [id number]  Today's Date: 11/6/2024    Admit Date: 11/5/2024    Plan: TBD   Discharge Plan       Row Name 11/06/24 1342       Plan    Plan TBD    Patient/Family in Agreement with Plan yes    Plan Comments Spoke to spouse and patient at bedside along with the Hospice nurse. Spouse has mixed feelings about putting the patient in a LTC bed but would like a referral made with [facility] for now. Spouse wants to speak with the family about what to do. Spouse has also contacted [facility] about at home care. CM faxed a referral to [facility] today. Plan is still TBD. CM will continue to follow.    Final Discharge Disposition Code 04 - intermediate care facility                   Discharge Codes    No documentation.                 Expected Discharge Date and Time       Expected Discharge Date Expected Discharge Time    Nov 8, 2024               Gabriel Calero RN

## 2024-11-06 NOTE — PROGRESS NOTES
Malnutrition Severity Assessment    Patient Name:  Guicho Blanco  YOB: 1940  MRN: [id number]  Admit Date:  11/5/2024    Patient meets criteria for : Severe Malnutrition    Comments:  Patient meets criteria for chronic disease related severe malnutrition with indicators for severe insufficient energy intake and severe weight loss.    Malnutrition Severity Assessment  Malnutrition Type: Chronic Disease - Related Malnutrition  Malnutrition Type (Last 8 Hours)       Malnutrition Severity Assessment       Row Name 11/06/24 1719       Malnutrition Severity Assessment    Malnutrition Type Chronic Disease - Related Malnutrition      Row Name 11/06/24 1719       Insufficient Energy Intake     Insufficient Energy Intake Findings Severe    Insufficient Energy Intake  <75% of est. energy requirement for > or equal to 1 month      Row Name 11/06/24 1719       Unintentional Weight Loss     Unintentional Weight Loss Findings Severe    Unintentional Weight Loss  Weight loss greater than 7.5% in three months      Row Name 11/06/24 1719       Muscle Loss    Loss of Muscle Mass Findings --  MULU      Row Name 11/06/24 1719       Fat Loss    Subcutaneous Fat Loss Findings --  MULU      Row Name 11/06/24 1719       Declining Functional Status    Declining Functional Status Findings N/A      Row Name 11/06/24 1719       Criteria Met (Must meet criteria for severity in at least 2 of these categories: M Wasting, Fat Loss, Fluid, Secondary Signs, Wt. Status, Intake)    Patient meets criteria for  Severe Malnutrition                    Electronically signed by:  Gertrude Lowry MS,RD,LD  11/06/24 17:19 EST

## 2024-11-06 NOTE — THERAPY EVALUATION
Patient Name: Guicho Blanco  : 1940    MRN: [id number]                              Today's Date: 2024       Admit Date: 2024    Visit Dx:     ICD-10-CM ICD-9-CM   1. Generalized weakness  R53.1 780.79   2. Fall in home, initial encounter  W19.XXXA E888.9    Y92.009 E849.0   3. Anorexia  R63.0 783.0   4. Bacterial UTI  N39.0 599.0    A49.9 041.9     Patient Active Problem List   Diagnosis    Osteomyelitis of toe of right foot    Osteomyelitis of right foot    PAF (paroxysmal atrial fibrillation)    Coronary artery disease involving native coronary artery of native heart without angina pectoris    Gastroesophageal reflux disease without esophagitis    Benign non-nodular prostatic hyperplasia without lower urinary tract symptoms    S/P appy    Essential hypertension    Simple chronic bronchitis    Mixed hyperlipidemia    COPD (chronic obstructive pulmonary disease)    BPH (benign prostatic hypertrophy)    Hearing loss    Chest pain    Vertigo    Snoring    Overweight    PLMD (periodic limb movement disorder)    Neuropathy    Periodic headache syndrome, not intractable    Acquired absence of other right toe(s)    Tremor    Bilateral impacted cerumen    Bilateral sensorineural hearing loss    Chronic otitis externa    Deviated nasal septum    Disorder of joint of foot    ED (erectile dysfunction) of organic origin    Hydrocele    Hydrocele of testis    Impacted cerumen    Mononeuropathy    Swelling of testicle    Venous retinal branch occlusion    Vitreous degeneration    Benign prostatic hyperplasia with urinary obstruction    OA (osteoarthritis) of knee    Status post total right knee replacement    Septic arthritis    Cellulitis    Cellulitis of right knee    Failure to thrive in adult    Insomnia    Moderate malnutrition    Sepsis    Lactic acidosis    Serotonin syndrome    Dysphagia    Generalized weakness     Past Medical History:   Diagnosis Date    Arthritis     Atrial fibrillation      BPH (benign prostatic hypertrophy)     Cataract     Cellulitis     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     bilateral    Coronary artery disease     Full dentures     Gall stones     GERD (gastroesophageal reflux disease)     Hearing loss     Hiatal hernia     Hyperlipidemia     Hypertension     Peripheral neuropathy     Peripheral vascular disease     Seasonal allergies     Skin cancer     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    TIA (transient ischemic attack)     2 times    Wears glasses      Past Surgical History:   Procedure Laterality Date    ABLATION OF DYSRHYTHMIC FOCUS      APPENDECTOMY      CARDIAC CATHETERIZATION N/A 10/05/2017    Procedure: Left Heart Cath;  Surgeon: Hector Urrutia MD;  Location:  [facility];  Service:     CATARACT EXTRACTION Bilateral     CHOLECYSTECTOMY      Remote    COCHLEAR IMPLANT REVISION  05/2019    COLONOSCOPY      PROSTATE SURGERY      SHOULDER SURGERY Right     SKIN BIOPSY      TOE AMPUTATION Right     2nd toe - Right Foot    TONSILLECTOMY      TOTAL KNEE ARTHROPLASTY Right 8/7/2024    Procedure: TOTAL KNEE ARTHROPLASTY WITH ASHLEY ROBOT RIGHT;  Surgeon: Christian Schwartz MD;  Location:  [facility];  Service: Robotics - Ortho;  Laterality: Right;      General Information       Row Name 11/06/24 0843          OT Time and Intention    Document Type evaluation (P)   -     Mode of Treatment occupational therapy (P)   -       Row Name 11/06/24 0843          General Information    Patient Profile Reviewed yes (P)   -     Prior Level of Function min assist:;bed mobility;ADL's (P)   Pt has FWW, SPC, and w/c at home. Reports he mostly uses FWW at home however pt is poor historian  -     Existing Precautions/Restrictions fall (P)   [place], cochlear implant  -     Barriers to Rehab medically complex;previous functional deficit;cognitive status (P)   -       Row Name 11/06/24 0843          Living Environment    People in Home spouse (P)   -       Row Name 11/06/24 0839           Home Main Entrance    Number of Stairs, Main Entrance two (P)   -     Stair Railings, Main Entrance railings on both sides of stairs (P)   -       Row Name 11/06/24 0843          Stairs Within Home, Primary    Number of Stairs, Within Home, Primary one (P)   one step into family room  -       Row Name 11/06/24 0843          Cognition    Orientation Status (Cognition) oriented to;person;time;disoriented to;place;situation (P)   Pt very confused on location- able to state he was in a hospital, but a few moments later said he was in the wrong room.  -       Row Name 11/06/24 0843          Safety Issues/Impairments Affecting Functional Mobility    Safety Issues Affecting Function (Mobility) insight into deficits/self-awareness;safety precaution awareness;safety precautions follow-through/compliance;awareness of need for assistance;sequencing abilities;problem-solving (P)   -     Impairments Affecting Function (Mobility) balance;cognition;endurance/activity tolerance;strength (P)   -     Cognitive Impairments, Mobility Safety/Performance awareness, need for assistance;insight into deficits/self-awareness;problem-solving/reasoning;safety precaution awareness;safety precaution follow-through;sequencing abilities (P)   -               User Key  (r) = Recorded By, (t) = Taken By, (c) = Cosigned By      Initials Name Provider Type     Jeny Harrison OT Student OT Student                     Mobility/ADL's       Row Name 11/06/24 0846          Bed Mobility    Bed Mobility supine-sit (P)   -     Supine-Sit [place] (Bed Mobility) minimum assist (75% patient effort);1 person assist (P)   -     Assistive Device (Bed Mobility) head of bed elevated (P)   -       Row Name 11/06/24 0846          Transfers    Transfers bed-chair transfer;sit-stand transfer;stand-sit transfer (P)   -       Row Name 11/06/24 0846          Bed-Chair Transfer    Bed-Chair [place] (Transfers) minimum assist (75%  patient effort);1 person assist (P)   -     Assistive Device (Bed-Chair Transfers) walker, front-wheeled (P)   -     Comment, (Bed-Chair Transfer) Few steps from EOB to chair (P)   -       Row Name 11/06/24 0846          Sit-Stand Transfer    Sit-Stand [place] (Transfers) minimum assist (75% patient effort);1 person assist (P)   -     Assistive Device (Sit-Stand Transfers) walker, front-wheeled (P)   -       Row Name 11/06/24 0846          Stand-Sit Transfer    Stand-Sit [place] (Transfers) minimum assist (75% patient effort);1 person assist (P)   -     Assistive Device (Stand-Sit Transfers) walker, front-wheeled (P)   -       Row Name 11/06/24 0846          Activities of Daily Living    BADL Assessment/Intervention upper body dressing;toileting;lower body dressing (P)   -       Row Name 11/06/24 0846          Upper Body Dressing Assessment/Training    [place] Level (Upper Body Dressing) don;minimum assist (75% patient effort) (P)   Gown  -     Position (Upper Body Dressing) edge of bed sitting (P)   -       Row Name 11/06/24 0846          Toileting Assessment/Training    [place] Level (Toileting) maximum assist (25% patient effort) (P)   -     Assistive Devices (Toileting) urinal (P)   -     Position (Toileting) supported standing (P)   -       Row Name 11/06/24 0846          Lower Body Dressing Assessment/Training    [place] Level (Lower Body Dressing) don;socks;maximum assist (25% patient effort) (P)   -     Position (Lower Body Dressing) supine (P)   -               User Key  (r) = Recorded By, (t) = Taken By, (c) = Cosigned By      Initials Name Provider Type     Jeny Harrison OT Student OT Student                   Obj/Interventions       Row Name 11/06/24 0847          Sensory Assessment (Somatosensory)    Sensory Assessment (Somatosensory) sensation intact (P)   -       Row Name 11/06/24 0847          Vision Assessment/Intervention    Visual  Impairment/Limitations WFL (P)   -       Row Name 11/06/24 0847          Range of Motion Comprehensive    General Range of Motion bilateral upper extremity ROM WFL (P)   -       Row Name 11/06/24 0847          Strength Comprehensive (MMT)    General Manual Muscle Testing (MMT) Assessment upper extremity strength deficits identified (P)   -     Comment, General Manual Muscle Testing (MMT) Assessment BUE MMT grossly 4/5 as observed through functional tasks (P)   -       Row Name 11/06/24 0847          Balance    Balance Assessment sitting static balance;sitting dynamic balance;sit to stand dynamic balance;standing static balance;standing dynamic balance (P)   -     Static Sitting Balance standby assist (P)   -     Dynamic Sitting Balance contact guard (P)   -     Position, Sitting Balance unsupported;sitting edge of bed (P)   -     Sit to Stand Dynamic Balance minimal assist;1-person assist (P)   -     Static Standing Balance contact guard (P)   -     Dynamic Standing Balance minimal assist;1-person assist (P)   -     Position/Device Used, Standing Balance supported;walker, front-wheeled (P)   -     Balance Interventions sitting;standing;sit to stand;supported;static;dynamic;occupation based/functional task (P)   -     Comment, Balance No overt LOB (P)   -               User Key  (r) = Recorded By, (t) = Taken By, (c) = Cosigned By      Initials Name Provider Type     Jeny Harrison, OT Student OT Student                   Goals/Plan       Row Name 11/06/24 0857          Bed Mobility Goal 1 (OT)    Activity/Assistive Device (Bed Mobility Goal 1, OT) sit to supine/supine to sit (P)   -     [place] Level/Cues Needed (Bed Mobility Goal 1, OT) standby assist (P)   -     Time Frame (Bed Mobility Goal 1, OT) short term goal (STG);3 days (P)   -     Progress/Outcomes (Bed Mobility Goal 1, OT) new goal (P)   -       Row Name 11/06/24 0857          Transfer Goal 1 (OT)     Activity/Assistive Device (Transfer Goal 1, OT) bed-to-chair/chair-to-bed;toilet (P)   -     [place] Level/Cues Needed (Transfer Goal 1, OT) contact guard required (P)   -     Time Frame (Transfer Goal 1, OT) short term goal (STG);3 days (P)   -     Progress/Outcome (Transfer Goal 1, OT) new goal (P)   -       Row Name 11/06/24 0857          Dressing Goal 1 (OT)    Activity/Device (Dressing Goal 1, OT) lower body dressing (P)   -     [place]/Cues Needed (Dressing Goal 1, OT) minimum assist (75% or more patient effort) (P)   -     Time Frame (Dressing Goal 1, OT) long term goal (LTG);5 days (P)   -     Progress/Outcome (Dressing Goal 1, OT) new goal (P)   -       Row Name 11/06/24 0857          Therapy Assessment/Plan (OT)    Planned Therapy Interventions (OT) activity tolerance training;adaptive equipment training;BADL retraining;functional balance retraining;occupation/activity based interventions;patient/caregiver education/training;ROM/therapeutic exercise;strengthening exercise;transfer/mobility retraining (P)   -               User Key  (r) = Recorded By, (t) = Taken By, (c) = Cosigned By      Initials Name Provider Type     Jeny Harrison, OT Student OT Student                   Clinical Impression       Row Name 11/06/24 0848          Pain Assessment    Pretreatment Pain Rating 0/10 - no pain (P)   -     Posttreatment Pain Rating 0/10 - no pain (P)   -       Row Name 11/06/24 0848          Plan of Care Review    Plan of Care Reviewed With patient (P)   -     Progress no change (P)   -     Outcome Evaluation OT eval complete. Pt presenting below functional baseline w/ bed mobility, transfers, and ADL tasks d/t generalized weakness, decreased activity tolerance, and balance deficits. Pt was Min A x1 w/ FWW for bed > chair transfer this date. Continue IP OT to address current deficits. Rec SNF at d/c for best functional outcome. (P)   -       Row Name 11/06/24 0865           Therapy Assessment/Plan (OT)    Patient/Family Therapy Goal Statement (OT) Return to PLOF (P)   -     Rehab Potential (OT) good (P)   -     Criteria for Skilled Therapeutic Interventions Met (OT) yes;meets criteria;skilled treatment is necessary (P)   -     Therapy Frequency (OT) daily (P)   -     Predicted Duration of Therapy Intervention (OT) 5 days (P)   -       Row Name 11/06/24 0848          Therapy Plan Review/Discharge Plan (OT)    Anticipated Discharge Disposition (OT) skilled nursing facility (P)   -       Row Name 11/06/24 0848          Vital Signs    Pretreatment Heart Rate (beats/min) 91 (P)   -     Pre SpO2 (%) 100 (P)   -     O2 Delivery Pre Treatment room air (P)   -     O2 Delivery Intra Treatment room air (P)   -     O2 Delivery Post Treatment room air (P)   -     Pre Patient Position Supine (P)   -     Intra Patient Position Standing (P)   -     Post Patient Position Sitting (P)   -       Row Name 11/06/24 0848          Positioning and Restraints    Pre-Treatment Position in bed (P)   -     Post Treatment Position chair (P)   -     In Chair reclined;call light within reach;encouraged to call for assist;exit alarm on;waffle cushion;legs elevated (P)   -               User Key  (r) = Recorded By, (t) = Taken By, (c) = Cosigned By      Initials Name Provider Type     Jeny Harrison, OT Student OT Student                   Outcome Measures       Row Name 11/06/24 0900          How much help from another is currently needed...    Putting on and taking off regular lower body clothing? 2 (P)   -     Bathing (including washing, rinsing, and drying) 2 (P)   -     Toileting (which includes using toilet bed pan or urinal) 2 (P)   -     Putting on and taking off regular upper body clothing 3 (P)   -     Taking care of personal grooming (such as brushing teeth) 3 (P)   -     Eating meals 4 (P)   -     AM-PAC 6 Clicks Score (OT) 16 (P)   -[facility]  Name 11/06/24 0900          Functional Assessment    Outcome Measure Options AM-PAC 6 Clicks Daily Activity (OT) (P)   -               User Key  (r) = Recorded By, (t) = Taken By, (c) = Cosigned By      Initials Name Provider Type     Jeny Harrison, OT Student OT Student                    Occupational Therapy Education       Title: PT OT SLP Therapies (In Progress)       Topic: Occupational Therapy (In Progress)       Point: ADL training (Done)       Description:   Instruct learner(s) on proper safety adaptation and remediation techniques during self care or transfers.   Instruct in proper use of assistive devices.                  Learning Progress Summary            Patient Acceptance, E, VU by  at 11/6/2024 0900                      Point: Home exercise program (Not Started)       Description:   Instruct learner(s) on appropriate technique for monitoring, assisting and/or progressing therapeutic exercises/activities.                  Learner Progress:  Not documented in this visit.              Point: Precautions (Done)       Description:   Instruct learner(s) on prescribed precautions during self-care and functional transfers.                  Learning Progress Summary            Patient Acceptance, E, VU by  at 11/6/2024 0900                      Point: Body mechanics (Done)       Description:   Instruct learner(s) on proper positioning and spine alignment during self-care, functional mobility activities and/or exercises.                  Learning Progress Summary            Patient Acceptance, E, VU by  at 11/6/2024 0900                                      User Key       Initials Effective Dates Name Provider Type [facility] 08/08/24 -  Jeny Harrison, OT Student OT Student OT                  OT Recommendation and Plan  Planned Therapy Interventions (OT): (P) activity tolerance training, adaptive equipment training, BADL retraining, functional balance retraining, occupation/activity  based interventions, patient/caregiver education/training, ROM/therapeutic exercise, strengthening exercise, transfer/mobility retraining  Therapy Frequency (OT): (P) daily  Plan of Care Review  Plan of Care Reviewed With: (P) patient  Progress: (P) no change  Outcome Evaluation: (P) OT eval complete. Pt presenting below functional baseline w/ bed mobility, transfers, and ADL tasks d/t generalized weakness, decreased activity tolerance, and balance deficits. Pt was Min A x1 w/ FWW for bed > chair transfer this date. Continue IP OT to address current deficits. Rec SNF at d/c for best functional outcome.     Time Calculation:   Evaluation Complexity (OT)  Review Occupational Profile/Medical/Therapy History Complexity: (P) brief/low complexity  Assessment, Occupational Performance/Identification of Deficit Complexity: (P) 1-3 performance deficits  Clinical Decision Making Complexity (OT): (P) problem focused assessment/low complexity  Overall Complexity of Evaluation (OT): (P) low complexity     Time Calculation- OT       Row Name 11/06/24 0901             Time Calculation-     OT Start Time 0814 (P)   -      OT Received On 11/06/24 (P)   -      OT Goal Re-Cert Due Date 11/16/24 (P)   -         Untimed Charges    OT Eval/Re-eval Minutes 48 (P)   -         Total Minutes    Untimed Charges Total Minutes 48 (P)   -       Total Minutes 48 (P)   -                User Key  (r) = Recorded By, (t) = Taken By, (c) = Cosigned By      Initials Name Provider Type     Jeny Harrison, OT Student OT Student                  Therapy Charges for Today       Code Description Service Date Service Provider Modifiers Qty    [id number]  OT EVAL LOW COMPLEXITY 4 11/6/2024 Jeny Harrison OT Student GO 1                 MICA Irizarry  11/6/2024

## 2024-11-06 NOTE — THERAPY TREATMENT NOTE
Patient Name: Guicho Blanco  : 1940    MRN: [id number]                              Today's Date: 2024       Admit Date: 2024    Visit Dx:     ICD-10-CM ICD-9-CM   1. Generalized weakness  R53.1 780.79   2. Fall in home, initial encounter  W19.XXXA E888.9    Y92.009 E849.0   3. Anorexia  R63.0 783.0   4. Bacterial UTI  N39.0 599.0    A49.9 041.9   5. Dysphagia, unspecified type  R13.10 787.20     Patient Active Problem List   Diagnosis    Osteomyelitis of toe of right foot    Osteomyelitis of right foot    PAF (paroxysmal atrial fibrillation)    Coronary artery disease involving native coronary artery of native heart without angina pectoris    Gastroesophageal reflux disease without esophagitis    Benign non-nodular prostatic hyperplasia without lower urinary tract symptoms    S/P appy    Essential hypertension    Simple chronic bronchitis    Mixed hyperlipidemia    COPD (chronic obstructive pulmonary disease)    BPH (benign prostatic hypertrophy)    Hearing loss    Chest pain    Vertigo    Snoring    Overweight    PLMD (periodic limb movement disorder)    Neuropathy    Periodic headache syndrome, not intractable    Acquired absence of other right toe(s)    Tremor    Bilateral impacted cerumen    Bilateral sensorineural hearing loss    Chronic otitis externa    Deviated nasal septum    Disorder of joint of foot    ED (erectile dysfunction) of organic origin    Hydrocele    Hydrocele of testis    Impacted cerumen    Mononeuropathy    Swelling of testicle    Venous retinal branch occlusion    Vitreous degeneration    Benign prostatic hyperplasia with urinary obstruction    OA (osteoarthritis) of knee    Status post total right knee replacement    Septic arthritis    Cellulitis    Cellulitis of right knee    Failure to thrive in adult    Insomnia    Moderate malnutrition    Sepsis    Lactic acidosis    Serotonin syndrome    Dysphagia    Generalized weakness     Past Medical History:   Diagnosis  Date    Arthritis     Atrial fibrillation     BPH (benign prostatic hypertrophy)     Cataract     Cellulitis     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     bilateral    Coronary artery disease     Full dentures     Gall stones     GERD (gastroesophageal reflux disease)     Hearing loss     Hiatal hernia     Hyperlipidemia     Hypertension     Peripheral neuropathy     Peripheral vascular disease     Seasonal allergies     Skin cancer     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    TIA (transient ischemic attack)     2 times    Wears glasses      Past Surgical History:   Procedure Laterality Date    ABLATION OF DYSRHYTHMIC FOCUS      APPENDECTOMY      CARDIAC CATHETERIZATION N/A 10/05/2017    Procedure: Left Heart Cath;  Surgeon: Hector Urrutia MD;  Location:  [facility] CATH INVASIVE LOCATION;  Service:     CATARACT EXTRACTION Bilateral     CHOLECYSTECTOMY      Remote    COCHLEAR IMPLANT REVISION  05/2019    COLONOSCOPY      PROSTATE SURGERY      SHOULDER SURGERY Right     SKIN BIOPSY      TOE AMPUTATION Right     2nd toe - Right Foot    TONSILLECTOMY      TOTAL KNEE ARTHROPLASTY Right 8/7/2024    Procedure: TOTAL KNEE ARTHROPLASTY WITH ASHLEY ROBOT RIGHT;  Surgeon: Christian Schwartz MD;  Location:  [facility];  Service: Robotics - Ortho;  Laterality: Right;      General Information       Row Name 11/06/24 1542          Physical Therapy Time and Intention    Document Type therapy note (daily note)  -AB     Mode of Treatment physical therapy  -AB       Row Name 11/06/24 1542          General Information    Patient Profile Reviewed yes  -AB     Existing Precautions/Restrictions fall  [place], cochlear implant  -AB     Barriers to Rehab medically complex;previous functional deficit;cognitive status  -AB       Row Name 11/06/24 1542          Cognition    Orientation Status (Cognition) oriented to;person;place;disoriented to;time  -AB       Row Name 11/06/24 1542          Safety Issues/Impairments Affecting Functional Mobility    Safety Issues  Affecting Function (Mobility) awareness of need for assistance;insight into deficits/self-awareness;safety precaution awareness;safety precautions follow-through/compliance;sequencing abilities;problem-solving;judgment  -AB     Impairments Affecting Function (Mobility) balance;cognition;endurance/activity tolerance;strength  -AB     Cognitive Impairments, Mobility Safety/Performance awareness, need for assistance;insight into deficits/self-awareness;problem-solving/reasoning;judgment;safety precaution awareness;safety precaution follow-through;sequencing abilities  -AB               User Key  (r) = Recorded By, (t) = Taken By, (c) = Cosigned By      Initials Name Provider Type    AB Ebony Loera, PT Physical Therapist                   Mobility       Row Name 11/06/24 1543          Bed Mobility    Comment, (Bed Mobility) received and left UIC  -AB       Row Name 11/06/24 1543          Transfers    Comment, (Transfers) Cues for hand placement and sequencing. Pt with decreased insight into deficits.  -AB       Row Name 11/06/24 1543          Sit-Stand Transfer    Sit-Stand [place] (Transfers) minimum assist (75% patient effort);1 person assist;verbal cues;nonverbal cues (demo/gesture)  -AB     Assistive Device (Sit-Stand Transfers) walker, front-wheeled  -AB     Comment, (Sit-Stand Transfer) Cues to push up from chair. Boost to stand.  -AB       Row Name 11/06/24 1543          Gait/Stairs (Locomotion)    [place] Level (Gait) minimum assist (75% patient effort);1 person assist;verbal cues;nonverbal cues (demo/gesture)  -AB     Assistive Device (Gait) walker, front-wheeled  -AB     Patient was able to Ambulate yes  -AB     Distance in Feet (Gait) 60  -AB     Deviations/Abnormal Patterns (Gait) bilateral deviations;harleen decreased;gait speed decreased;stride length decreased;base of support, narrow  -AB     Bilateral Gait Deviations forward flexed posture;heel strike decreased  -AB     Comment,  (Gait/Stairs) Pt ambulated with step through gait pattern, decreased foot clearance, and forward flexed posture. Cues for upright posture, keeping walker closer to body, and increased knee flexion during swing phase. Min A to steady. No overt LOB or knee buckling. Gait mechanics deteriorated with fatigue. Further activity limited by weakness and fatigue.  -AB               User Key  (r) = Recorded By, (t) = Taken By, (c) = Cosigned By      Initials Name Provider Type    AB Ebony Loera, PT Physical Therapist                   Obj/Interventions       Row Name 11/06/24 1547          Motor Skills    Therapeutic Exercise hip;knee;ankle  -AB       Row Name 11/06/24 1547          Hip (Therapeutic Exercise)    Hip (Therapeutic Exercise) isometric exercises;strengthening exercise  -AB     Hip Isometrics (Therapeutic Exercise) bilateral;gluteal sets;10 repetitions  -AB     Hip Strengthening (Therapeutic Exercise) bilateral;marching while standing;10 repetitions;3 sets  -AB       Row Name 11/06/24 1547          Knee (Therapeutic Exercise)    Knee (Therapeutic Exercise) isometric exercises;strengthening exercise  -AB     Knee Isometrics (Therapeutic Exercise) bilateral;quad sets;10 repetitions  -AB     Knee Strengthening (Therapeutic Exercise) bilateral;LAQ (long arc quad);SLR (straight leg raise);10 repetitions  -AB       Row Name 11/06/24 1547          Ankle (Therapeutic Exercise)    Ankle (Therapeutic Exercise) AROM (active range of motion)  -AB     Ankle AROM (Therapeutic Exercise) bilateral;dorsiflexion;plantarflexion;10 repetitions  -AB       Row Name 11/06/24 1547          Balance    Balance Assessment sitting static balance;sitting dynamic balance;standing static balance;standing dynamic balance  -AB     Static Sitting Balance standby assist  -AB     Dynamic Sitting Balance contact guard  -AB     Position, Sitting Balance unsupported;sitting in chair  -AB     Static Standing Balance minimal assist  -AB     Dynamic  Standing Balance minimal assist;1-person assist;verbal cues;non-verbal cues (demo/gesture)  -AB     Position/Device Used, Standing Balance supported;walker, front-wheeled  -AB     Balance Interventions sitting;standing;sit to stand;supported;static;dynamic;occupation based/functional task  -AB     Comment, Balance No overt LOB or knee buckling.  -AB               User Key  (r) = Recorded By, (t) = Taken By, (c) = Cosigned By      Initials Name Provider Type    AB Ebony Loera, PT Physical Therapist                   Goals/Plan    No documentation.                  Clinical Impression       Row Name 11/06/24 1549          Pain    Pretreatment Pain Rating 0/10 - no pain  -AB     Posttreatment Pain Rating 0/10 - no pain  -AB       Row Name 11/06/24 1549          Plan of Care Review    Plan of Care Reviewed With patient  -AB     Progress no change  -AB     Outcome Evaluation Pt continues to present below baseline with generalized weakness, impaired balance, and decreased endurance. Pt ambulated 60' with min Ax2 and RW. Further IPPT is warrented. PT will progress as able per POC.  -AB       Row Name 11/06/24 1549          Vital Signs    Pre Systolic BP Rehab 107  -AB     Pre Treatment Diastolic BP 58  -AB     Pretreatment Heart Rate (beats/min) 99  -AB     Posttreatment Heart Rate (beats/min) 100  -AB     Pre SpO2 (%) 100  -AB     O2 Delivery Pre Treatment room air  -AB     O2 Delivery Intra Treatment room air  -AB     Post SpO2 (%) 100  -AB     O2 Delivery Post Treatment room air  -AB     Pre Patient Position Sitting  -AB     Intra Patient Position Standing  -AB     Post Patient Position Sitting  -AB       Row Name 11/06/24 1549          Positioning and Restraints    Pre-Treatment Position sitting in chair/recliner  -AB     Post Treatment Position chair  -AB     In Chair notified nsg;reclined;sitting;call light within reach;encouraged to call for assist;exit alarm on;with family/caregiver;legs elevated;waffle cushion   -AB               User Key  (r) = Recorded By, (t) = Taken By, (c) = Cosigned By      Initials Name Provider Type    AB Ebony Loera, PT Physical Therapist                   Outcome Measures       Row Name 11/06/24 1550 11/06/24 0800       How much help from another person do you currently need...    Turning from your back to your side while in flat bed without using bedrails? 3  -AB 3  -HK    Moving from lying on back to sitting on the side of a flat bed without bedrails? 3  -AB 3  -HK    Moving to and from a bed to a chair (including a wheelchair)? 3  -AB 2  -HK    Standing up from a chair using your arms (e.g., wheelchair, bedside chair)? 3  -AB 2  -HK    Climbing 3-5 steps with a railing? 2  -AB 1  -HK    To walk in hospital room? 3  -AB 3  -HK    AM-PAC 6 Clicks Score (PT) 17  -AB 14  -HK    Highest Level of Mobility Goal 5 --> Static standing  -AB 4 --> Transfer to chair/commode  -HK      Row Name 11/06/24 1550 11/06/24 0900       Functional Assessment    Outcome Measure Options AM-PAC 6 Clicks Basic Mobility (PT)  -AB AM-PAC 6 Clicks Daily Activity (OT)  -MR (r) BH (t) MR (c)              User Key  (r) = Recorded By, (t) = Taken By, (c) = Cosigned By      Initials Name Provider Type     Shayna Choi, RN Registered Nurse    Joy Bynum, OT Occupational Therapist    Ebony Lee, PT Physical Therapist    Jeny Cordova, OT Student OT Student                                 Physical Therapy Education       Title: PT OT SLP Therapies (In Progress)       Topic: Physical Therapy (Done)       Point: Mobility training (Done)       Learning Progress Summary            Patient Acceptance, E,D, VU,NR by AB at 11/6/2024 1551    Acceptance, E, NR by  at 11/5/2024 1608                      Point: Home exercise program (Done)       Learning Progress Summary            Patient Acceptance, E,D, VU,NR by AB at 11/6/2024 1551    Acceptance, E, NR by AC at 11/5/2024 1608                       Point: Body mechanics (Done)       Learning Progress Summary            Patient Acceptance, E,D, VU,NR by AB at 11/6/2024 1551    Acceptance, E, NR by  at 11/5/2024 1608                      Point: Precautions (Done)       Learning Progress Summary            Patient Acceptance, E,D, VU,NR by AB at 11/6/2024 1551    Acceptance, E, NR by  at 11/5/2024 1608                                      User Key       Initials Effective Dates Name Provider Type Discipline    AB 09/22/22 -  Ebony Loera, PT Physical Therapist PT    AC 07/11/24 -  Cary Umana PT Physical Therapist PT                  PT Recommendation and Plan     Progress: no change  Outcome Evaluation: Pt continues to present below baseline with generalized weakness, impaired balance, and decreased endurance. Pt ambulated 60' with min Ax2 and RW. Further IPPT is warrented. PT will progress as able per POC.     Time Calculation:         PT Charges       Row Name 11/06/24 1551             Time Calculation    Start Time 1502  -AB      PT Received On 11/06/24  -AB         Timed Charges    80937 - PT Therapeutic Exercise Minutes 10  -AB      14746 - Gait Training Minutes  8  -AB      77368 - PT Therapeutic Activity Minutes 5  -AB         Total Minutes    Timed Charges Total Minutes 23  -AB       Total Minutes 23  -AB                User Key  (r) = Recorded By, (t) = Taken By, (c) = Cosigned By      Initials Name Provider Type    AB Ebony Loera PT Physical Therapist                  Therapy Charges for Today       Code Description Service Date Service Provider Modifiers Qty    82659295429 HC PT THER PROC EA 15 MIN 11/6/2024 Ebony Loera, PT GP 1    96514362334 HC GAIT TRAINING EA 15 MIN 11/6/2024 Ebony Loera, PT GP 1            PT G-Codes  Outcome Measure Options: AM-PAC 6 Clicks Basic Mobility (PT)  AM-PAC 6 Clicks Score (PT): 17  AM-PAC 6 Clicks Score (OT): 16  PT Discharge Summary  Anticipated Discharge Disposition (PT): skilled nursing  facility    Ebony Loera, PT  11/6/2024

## 2024-11-06 NOTE — PLAN OF CARE
Goal Outcome Evaluation:  Plan of Care Reviewed With: patient        Progress: no change       Anticipated Discharge Disposition (SLP): home with home health          SLP Swallowing Diagnosis: mod-severe, oral dysphagia, moderate, pharyngeal dysphagia (11/06/24 0843)

## 2024-11-06 NOTE — CASE COMMUNICATION
Please fax to Dr. Mitchel Culver MD    Patient missed a home health PT visit from [facility] on 11/6/2024.    Reason: Patient currently at [facility], observation status.       For your records only.   Per CMS Guidance, MD must be notified of missed/cancelled visits; therefore the prescribed frequency was not met.

## 2024-11-06 NOTE — PROGRESS NOTES
[facility] Medicine Services  PROGRESS NOTE    Patient Name: Guicho Blanco  : 1940  MRN: [id number]    Date of Admission: 2024  Primary Care Physician: Mitchel Culver MD    Subjective   Subjective     CC: f/u weakness    HPI: Up in chair eating. Says he's very weak but overall doing well.      Objective   Objective     Vital Signs:   Temp:  [97.7 °F (36.5 °C)-97.8 °F (36.6 °C)] 97.8 °F (36.6 °C)  Heart Rate:  [81-99] 91  Resp:  [16-17] 17  BP: (109-137)/(66-90) 125/72     Physical Exam:  Constitutional: No acute distress, awake, alert, elderly male, up in chair  HENT: NCAT, mucous membranes moist  Respiratory: Clear to auscultation bilaterally, respiratory effort normal   Cardiovascular: RRR, no murmurs, rubs, or gallops  Gastrointestinal: Positive bowel sounds, soft, nontender, nondistended  Musculoskeletal: No bilateral ankle edema  Psychiatric: Appropriate affect, cooperative  Neurologic: Oriented x 3, strength symmetric in all extremities, Cranial Nerves grossly intact to confrontation, speech clear  Skin: No rashes     Results Reviewed:  LAB RESULTS:      Lab 24  0617 24  0738   WBC 7.01 5.88   HEMOGLOBIN 10.5* 11.6*   HEMATOCRIT 33.6* 37.0*   PLATELETS 205 230   NEUTROS ABS  --  3.47   IMMATURE GRANS (ABS)  --  0.02   LYMPHS ABS  --  1.41   MONOS ABS  --  0.50   EOS ABS  --  0.43*   MCV 84.0 84.3   LACTATE  --  1.2         Lab 24  0617 24  0745 24  0738 24  0736   SODIUM 136  --  139  --    POTASSIUM 3.5  --  3.9  --    CHLORIDE 103  --  103  --    CO2 19.0*  --  23.0  --    ANION GAP 14.0  --  13.0  --    BUN 14  --  19  --    CREATININE 0.68* 0.90 0.86 0.90   EGFR 92.2  --  85.9  --    GLUCOSE 103*  --  99  --    CALCIUM 9.1  --  9.4  --          Lab 11/05/24  0738   TOTAL PROTEIN 6.4   ALBUMIN 3.6   GLOBULIN 2.8   ALT (SGPT) 16   AST (SGOT) 30   BILIRUBIN 0.5   ALK PHOS 132*                     Brief Urine Lab  Results  (Last result in the past 365 days)        Color   Clarity   Blood   Leuk Est   Nitrite   Protein   CREAT   Urine HCG        11/05/24 0943 Dark Yellow   Clear   Negative   Moderate (2+)   Negative   30 mg/dL (1+)                   Microbiology Results Abnormal       None            CT Abdomen Pelvis With Contrast    Result Date: 11/5/2024  CT ABDOMEN PELVIS W CONTRAST Date of Exam: 11/5/2024 7:51 AM EST Indication: Weakness, fall, low abdomen pain. Comparison: 1/11/2016 Technique: Axial CT images were obtained of the abdomen and pelvis following the uneventful intravenous administration of 85 cc Isovue-300. Reconstructed coronal and sagittal images were also obtained. Automated exposure control and iterative construction methods were used. Findings: Lower Chest: Emphysema in the lung bases. Moderate hiatal hernia Organs: Liver, spleen, kidneys, adrenal glands unremarkable. 3 cm cystic lesion arising from the neck of the pancreas, with a thin wall and some internal dependent high density material. Gallbladder surgically absent Gastrointestinal: Possible rectal wall thickening. No intestinal distention. Colonic diverticula without inflammation. Pelvis: No abnormal fluid collection. Urinary bladder unremarkable Peritoneum/Retroperitoneum: No ascites or pneumoperitoneum. Normal caliber Bones/Soft Tissues: No acute bony abnormality. Lumbar scoliosis with diffuse degenerative disease     Impression: 1. Possible rectal wall thickening, correlate for proctitis 2. 3 cm cystic lesion of the neck of the pancreas. This was present on the comparison study from 2016 but has enlarged in the interval, previously approximately 2 cm 3. Hiatal hernia 4. Colonic diverticulosis without evidence of diverticulitis Electronically Signed: Jace Davila  11/5/2024 9:34 AM EST  Workstation ID: [id number]    XR Shoulder 2+ View Right    Result Date: 11/5/2024  XR SHOULDER 2+ VW RIGHT Date of Exam: 11/5/2024 7:27 AM EST Indication: Trauma  Comparison: None available. Findings: No evidence of fracture or dislocation. Glenohumeral joint appears within normal limits. Irregularity and cystic change of the greater noted. AC joint osteoarthritis with caudally oriented osteophyte     Impression: Impression: 1. No evidence of fracture or dislocation 2. Appearance of the greater tuberosity could reflect chronic shoulder impingement 3. AC joint osteoarthritis Electronically Signed: Jace Davila  11/5/2024 7:30 AM EST  Workstation ID: [id number]     Results for orders placed during the hospital encounter of 09/13/24    Adult Transthoracic Echo Complete W/ Cont if Necessary Per Protocol    Interpretation Summary    Left ventricular systolic function is normal. Left ventricular ejection fraction appears to be 61 - 65%.    Left ventricular diastolic function was normal.      Current medications:  Scheduled Meds:aspirin, 325 mg, Oral, Daily  cefuroxime, 500 mg, Oral, Q12H  cycloSPORINE, 1 drop, Both Eyes, Q12H  docusate sodium, 100 mg, Oral, BID  enoxaparin, 40 mg, Subcutaneous, Daily  flecainide, 100 mg, Oral, Q12H  isosorbide mononitrate, 30 mg, Oral, Daily  nortriptyline, 30 mg, Oral, Nightly  pantoprazole, 40 mg, Oral, Q AM  pravastatin, 20 mg, Oral, Daily  sodium chloride, 10 mL, Intravenous, Q12H      Continuous Infusions:   PRN Meds:.  acetaminophen    senna-docusate sodium **AND** polyethylene glycol **AND** bisacodyl **AND** bisacodyl    bisacodyl    bisacodyl    nitroglycerin    sodium chloride    sodium chloride    Assessment & Plan   Assessment & Plan     Active Hospital Problems    Diagnosis  POA    **Generalized weakness [R53.1]  Yes      Resolved Hospital Problems   No resolved problems to display.        Brief Hospital Course to date:  Guicho Blnaco is a 83 yr old man with history of afib, CAD, HTN, TIA, cochlear implants, and R knee replacement in Aug 2024 with 2 readmissions for cellulitis and progressive weakness/frailty in recent months.   Admitted for fall at home, and general failure to thrive.       Fall at home  - PT OT  - CM consult.  Wife having difficulty caring for him - currently has many home resources - unclear if wife looking for LTC? Hospice to meet with them at 1300 today.     Weakness  Poor intake, rumination  Chronic constipation  Frequent waking at night  - hospice consult per wife's request   - Boost TID  - nutrition consult      Pyuria, will treat empirically for now  - cefuroxime; watch culture     History of R knee replacement/cellulitis 8-9/2024  - no redness currently ; nl WBC, no fever      CAD, Afib, HTN  - continue home meds     Expected Discharge Location and Transportation:   Expected Discharge   Expected Discharge Date: 11/8/2024; Expected Discharge Time:      VTE Prophylaxis:  Pharmacologic VTE prophylaxis orders are present.         AM-PAC 6 Clicks Score (PT): 17 (11/05/24 2000)    CODE STATUS:   Code Status and Medical Interventions: No CPR (Do Not Attempt to Resuscitate); Limited Support; No intubation (DNI)   Ordered at: 11/05/24 1137     Medical Intervention Limits:    No intubation (DNI)     Level Of Support Discussed With:    Patient     Code Status (Patient has no pulse and is not breathing):    No CPR (Do Not Attempt to Resuscitate)     Medical Interventions (Patient has pulse or is breathing):    Limited Support       Jagruti Polanco II, DO  11/06/24

## 2024-11-06 NOTE — CASE COMMUNICATION
Patient missed a AIDE visit from [facility] on 11-6-24.     Reason: Current Inpatient.       For your records only.   Per CMS Guidance, MD must be notified of missed/cancelled visits; therefore the prescribed frequency was not met.

## 2024-11-06 NOTE — DISCHARGE PLACEMENT REQUEST
"Guicho Blanco \"Rad\" (83 y.o. Male)   Abram Calero, RN Case Manager  [phone]  Looking for private pay to possible Medicaid pending with Hospice      Date of Birth   1940    Social Security Number       Address   [address]  [facility] [address]    Home Phone   [phone]    MRN   [id number]       Gnosticist   Religion    Marital Status                               Admission Date   11/5/24    Admission Type   Emergency    Admitting Provider   Jagruti Polanco II, DO    Attending Provider   Jagruti Polanco II, DO    Department, Room/Bed   [facility] 3E, S342/1       Discharge Date       Discharge Disposition       Discharge Destination                                 Attending Provider: Jagruti Polanco II, DO    Allergies: Linezolid, Divalproex Sodium (Migraine) [Valproic Acid], Cymbalta [Duloxetine Hcl], Duloxetine, Metoprolol, Neurontin [Gabapentin]    Isolation: None   Infection: None   Code Status: No CPR    Ht: 182.9 cm (72\")   Wt: 69.1 kg (152 lb 4.8 oz)    Admission Cmt: None   Principal Problem: Generalized weakness [R53.1]                   Active Insurance as of 11/5/2024       Primary Coverage       Payor Plan Insurance Group Employer/Plan Group    ANTHEM MEDICARE REPLACEMENT ANTHEM MEDICARE ADVANTAGE [id number]       Payor Plan Address Payor Plan Phone Number Payor Plan Fax Number Effective Dates    [address] [phone]  1/1/2024 - None Entered    [facility] [id number]         Subscriber Name Subscriber Birth Date Member ID       GUICHO BLANCO FRANCIS 1940 [id number]                     Emergency Contacts        (Rel.) Home Phone Work Phone Mobile Phone    Sandra Blanco (Spouse) -- -- [phone]    Sylwia Cee (Daughter) [phone] -- [phone]    Ashley Mejia (Grandchild) -- -- [phone]                 History & Physical        Mini, MD Zofia at 11/05/24 [facility]5              [facility] " Medicine Services  HISTORY AND PHYSICAL    Patient Name: Guicho Blanco  : 1940  MRN: [id number]  Primary Care Physician: Mitchel Culver MD  Date of admission: 2024      Subjective  Subjective     Chief Complaint:  Falls at home     HPI:  Guicho Blanco is a 83 y.o. male, retired , comes from home by EMS after a fall.  It is his fourth fall in the past two weeks; he was alone in the bedroom, apparently got up on his own and was found on hte floor by his wife soon afterward.  He hit his R shoulder.  He currently denies pain.     He has progressive weakness and failure to thrive and has been declining since knee replacement in early 2024, complicated by readmissions  and  for R knee cellulitis with high fever and encephalopathy, also suspected serotonin syndrome.  He has been in rehab but is currently at home and his wife is having increasing trouble caring for him.      Issues include:    - Poor appetite/poor intake, with prolonged rumination.  His PCP is slowly increasing his nortryptilene for appetite stimulation (now at 30mg QHS) but no results thus far.    - slow weight loss x past 3 years  - Up every hour in the night, which his wife has to help with.  Sometimes this is due to nocturia   - Constipation:  better on aggressive bowel regimen     His wife requests a hospice consult in hopes of getting more help at home .,        Personal History     Past Medical History:   Diagnosis Date    Arthritis     Atrial fibrillation     BPH (benign prostatic hypertrophy)     Cataract     Cellulitis     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     bilateral    Coronary artery disease     Full dentures     Gall stones     GERD (gastroesophageal reflux disease)     Hearing loss     Hiatal hernia     Hyperlipidemia     Hypertension     Peripheral neuropathy     Peripheral vascular disease     Seasonal allergies     Skin cancer     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    TIA (transient ischemic  attack)     2 times    Wears glasses            Past Surgical History:   Procedure Laterality Date    ABLATION OF DYSRHYTHMIC FOCUS      APPENDECTOMY      CARDIAC CATHETERIZATION N/A 10/05/2017    Procedure: Left Heart Cath;  Surgeon: Hector Urrutia MD;  Location:  [facility];  Service:     CATARACT EXTRACTION Bilateral     CHOLECYSTECTOMY      Remote    COCHLEAR IMPLANT REVISION  05/2019    COLONOSCOPY      PROSTATE SURGERY      SHOULDER SURGERY Right     SKIN BIOPSY      TOE AMPUTATION Right     2nd toe - Right Foot    TONSILLECTOMY      TOTAL KNEE ARTHROPLASTY Right 8/7/2024    Procedure: TOTAL KNEE ARTHROPLASTY WITH ASHLEY ROBOT RIGHT;  Surgeon: Christian Schwartz MD;  Location:  [facility] OR;  Service: Robotics - Ortho;  Laterality: Right;       Family History: family history includes Atrial fibrillation in his mother; Dementia in his mother; Lung cancer in his father.     Social History:  reports that he has never smoked. He has been exposed to tobacco smoke. He has never used smokeless tobacco. He reports that he does not drink alcohol and does not use drugs.  Social History     Social History Narrative    Pt does not consume caffeine.        Medications:  Available home medication information reviewed.  Lactobacillus, acetaminophen, aspirin, bisacodyl, coenzyme Q10, cycloSPORINE, docusate sodium, flecainide, glucose, isosorbide mononitrate, nitroglycerin, omeprazole OTC, polyethylene glycol, pravastatin, vitamin D3, and vitamin b complex    Allergies   Allergen Reactions    Linezolid Other (See Comments)     Serotonin syndrome    Divalproex Sodium (Migraine) [Valproic Acid] Other (See Comments)     Weak, lethargy and got worse    Cymbalta [Duloxetine Hcl] Mental Status Change     Depression, thoughts of Suicide.     Duloxetine Unknown (See Comments)    Metoprolol Other (See Comments)     UNKNOWN. Pt does not remember this being allergy    Neurontin [Gabapentin] Other (See Comments)      Lethargy & fatigue       Objective  Objective     Vital Signs:   Temp:  [97.4 °F (36.3 °C)] 97.4 °F (36.3 °C)  Heart Rate:  [82-97] 85  Resp:  [16] 16  BP: (123-130)/(65-75) 123/75       Physical Exam   Gen:  thin, flat affect but gives history, NAD.  Wife present   Neuro: alert and oriented, clear speech, follows commands, no tremor, no rigidity, flat affect.    HEENT:  NC/AT PERRL, OP benign  Neck:  Supple,  Heart RRR no murmur  Lungs not labored   Abd:  Soft, nontender, no rebound or guarding, pos BS  Extrem:  No c/c/e; R knee w healing incision, no redness       Result Review:  I have personally reviewed the results from the time of this admission to 11/5/2024 11:25 EST and agree with these findings:  [x]  Laboratory list / accordion  []  Microbiology  [x]  Radiology  []  EKG/Telemetry   []  Cardiology/Vascular   []  Pathology  [x]  Old records  []  Other:  Most notable findings include: shoulder no fx.   Cr nl.        LAB RESULTS:      Lab 11/05/24  0738   WBC 5.88   HEMOGLOBIN 11.6*   HEMATOCRIT 37.0*   PLATELETS 230   NEUTROS ABS 3.47   IMMATURE GRANS (ABS) 0.02   LYMPHS ABS 1.41   MONOS ABS 0.50   EOS ABS 0.43*   MCV 84.3   LACTATE 1.2         Lab 11/05/24  0745 11/05/24  0738 11/05/24  0736   SODIUM  --  139  --    POTASSIUM  --  3.9  --    CHLORIDE  --  103  --    CO2  --  23.0  --    ANION GAP  --  13.0  --    BUN  --  19  --    CREATININE 0.90 0.86 0.90   EGFR  --  85.9  --    GLUCOSE  --  99  --    CALCIUM  --  9.4  --          Lab 11/05/24  0738   TOTAL PROTEIN 6.4   ALBUMIN 3.6   GLOBULIN 2.8   ALT (SGPT) 16   AST (SGOT) 30   BILIRUBIN 0.5   ALK PHOS 132*                     UA          9/13/2024    13:08 9/16/2024    15:19 11/5/2024    09:43   Urinalysis   Squamous Epithelial Cells, UA 0-2  3-6  0-2    Specific Gravity, UA 1.027  1.021  1.041    Ketones, UA Trace  Trace  15 mg/dL (1+)    Blood, UA Negative  Small (1+)  Negative    Leukocytes, UA Trace  Negative  Moderate (2+)    Nitrite, UA Negative   Negative  Negative    RBC, UA 0-2  3-5  3-5    WBC, UA 3-5  0-2  Too Numerous to Count    Bacteria, UA None Seen  None Seen  None Seen        Microbiology Results (last 10 days)       ** No results found for the last 240 hours. **            CT Abdomen Pelvis With Contrast    Result Date: 11/5/2024  CT ABDOMEN PELVIS W CONTRAST Date of Exam: 11/5/2024 7:51 AM EST Indication: Weakness, fall, low abdomen pain. Comparison: 1/11/2016 Technique: Axial CT images were obtained of the abdomen and pelvis following the uneventful intravenous administration of 85 cc Isovue-300. Reconstructed coronal and sagittal images were also obtained. Automated exposure control and iterative construction methods were used. Findings: Lower Chest: Emphysema in the lung bases. Moderate hiatal hernia Organs: Liver, spleen, kidneys, adrenal glands unremarkable. 3 cm cystic lesion arising from the neck of the pancreas, with a thin wall and some internal dependent high density material. Gallbladder surgically absent Gastrointestinal: Possible rectal wall thickening. No intestinal distention. Colonic diverticula without inflammation. Pelvis: No abnormal fluid collection. Urinary bladder unremarkable Peritoneum/Retroperitoneum: No ascites or pneumoperitoneum. Normal caliber Bones/Soft Tissues: No acute bony abnormality. Lumbar scoliosis with diffuse degenerative disease     Impression: 1. Possible rectal wall thickening, correlate for proctitis 2. 3 cm cystic lesion of the neck of the pancreas. This was present on the comparison study from 2016 but has enlarged in the interval, previously approximately 2 cm 3. Hiatal hernia 4. Colonic diverticulosis without evidence of diverticulitis Electronically Signed: Jace Davila  11/5/2024 9:34 AM EST  Workstation ID: [id number]    XR Shoulder 2+ View Right    Result Date: 11/5/2024  XR SHOULDER 2+ VW RIGHT Date of Exam: 11/5/2024 7:27 AM EST Indication: Trauma Comparison: None available. Findings: No  evidence of fracture or dislocation. Glenohumeral joint appears within normal limits. Irregularity and cystic change of the greater noted. AC joint osteoarthritis with caudally oriented osteophyte     Impression: Impression: 1. No evidence of fracture or dislocation 2. Appearance of the greater tuberosity could reflect chronic shoulder impingement 3. AC joint osteoarthritis Electronically Signed: Jace Davila  11/5/2024 7:30 AM EST  Workstation ID: [id number]     Results for orders placed during the hospital encounter of 09/13/24    Adult Transthoracic Echo Complete W/ Cont if Necessary Per Protocol    Interpretation Summary    Left ventricular systolic function is normal. Left ventricular ejection fraction appears to be 61 - 65%.    Left ventricular diastolic function was normal.      Assessment & Plan  Assessment & Plan       Generalized weakness    83 yr old man with history of afib, CAD, HTN, TIA, cochlear implants, and R knee replacement in Aug 2024 with 2 readmissions for cellulitis and progressive weakness/frailty in recent months.  Admitted for fall at home, and general failure to thrive.      Fall at home  - PT OT  - CM consult.  Wife having difficulty caring for him  - r shoulder: no fracture    Weakness  Poor intake, rumination  Chronic constipation  Frequent waking at night  - hospice consult per wife's request   - Boost TID  - nutrition consult       Pyuria, will treat empirically for now  - cefuroxime; watch culture    History of R knee replacement/cellulitis 8-9/2024  - no redness currently ; nl WBC, no fever     CAD, Afib, HTN  - continue home meds     VTE Prophylaxis:  No VTE prophylaxis order currently exists.          CODE STATUS:    There are no questions and answers to display.       Expected Discharge   Expected discharge date/ time has not been documented.     Zofia Esparza MD  11/05/24      Electronically signed by Zofia Esparza MD at 11/05/24 1305       Vital Signs (last day)       Date/Time  Temp Temp src Pulse Resp BP Patient Position SpO2    11/06/24 0451 -- -- 91 -- 125/72 -- --    11/05/24 2310 97.8 (36.6) Oral -- 17 -- Lying --    11/05/24 1550 97.7 (36.5) Axillary 99 16 135/77 Sitting 99    11/05/24 1330 -- -- 91 -- 137/70 -- 99    11/05/24 1300 -- -- 84 -- 125/66 -- 100    11/05/24 1230 -- -- 84 -- 117/71 -- --    11/05/24 1200 -- -- 81 -- 120/70 -- 96    11/05/24 1130 -- -- 81 -- 109/90 -- 100    11/05/24 1100 -- -- 81 -- 117/60 -- 100    11/05/24 1030 -- -- 85 -- 123/75 -- 100    11/05/24 1001 -- -- 82 -- -- -- 100    11/05/24 1000 -- -- 82 -- 126/65 -- 100    11/05/24 0835 -- -- 85 -- -- -- 100    11/05/24 0700 -- -- 93 -- -- -- 94    11/05/24 0654 97.4 (36.3) -- 97 16 130/72 Lying 100          Current Facility-Administered Medications   Medication Dose Route Frequency Provider Last Rate Last Admin    acetaminophen (TYLENOL) tablet 650 mg  650 mg Oral Q6H PRN Zofia Esparza MD        aspirin tablet 325 mg  325 mg Oral Daily Zofia Esparza MD   325 mg at 11/06/24 1035    sennosides-docusate (PERICOLACE) 8.6-50 MG per tablet 2 tablet  2 tablet Oral BID PRN Zofia Esparza MD        And    polyethylene glycol (MIRALAX) packet 17 g  17 g Oral Daily PRN Zofia Esparza MD        And    bisacodyl (DULCOLAX) EC tablet 5 mg  5 mg Oral Daily PRN Zofia Esparza MD        And    bisacodyl (DULCOLAX) suppository 10 mg  10 mg Rectal Daily PRN Zofia Esparza MD        bisacodyl (DULCOLAX) EC tablet 5 mg  5 mg Oral Daily PRN Zofia Esparza MD        bisacodyl (DULCOLAX) suppository 10 mg  10 mg Rectal Daily PRN Zofia Esparza MD        cefuroxime (CEFTIN) tablet 500 mg  500 mg Oral Q12H Zofia Esparza MD   500 mg at 11/06/24 1035    cycloSPORINE (RESTASIS) 0.05 % ophthalmic emulsion 1 drop  1 drop Both Eyes Q12H Zofia Esparza MD   1 drop at 11/06/24 1000    docusate sodium (COLACE) capsule 100 mg  100 mg Oral BID Zofia Esparza MD   100 mg at 11/06/24 0959    Enoxaparin Sodium (LOVENOX) syringe 40 mg  40 mg  Subcutaneous Daily Zofia Esparza MD   40 mg at 24 1000    flecainide (TAMBOCOR) tablet 100 mg  100 mg Oral Q12H Zofia Esparza MD   100 mg at 24 0451    isosorbide mononitrate (IMDUR) 24 hr tablet 30 mg  30 mg Oral Daily Zofia Esparza MD   30 mg at 24 1035    nitroglycerin (NITROSTAT) SL tablet 0.4 mg  0.4 mg Sublingual Q5 Min PRN Zofia Esparza MD        nortriptyline (PAMELOR) capsule 30 mg  30 mg Oral Nightly Zofia Esparza MD   30 mg at 24    pantoprazole (PROTONIX) EC tablet 40 mg  40 mg Oral Q AM Zofia Esparza MD   40 mg at 24 06    pravastatin (PRAVACHOL) tablet 20 mg  20 mg Oral Daily Zofia Esparza MD   20 mg at 24 1035    sodium chloride 0.9 % flush 10 mL  10 mL Intravenous Q12H Zofia Esparza MD   10 mL at 24 1003    sodium chloride 0.9 % flush 10 mL  10 mL Intravenous PRN Zofia Esparza MD        sodium chloride 0.9 % infusion 40 mL  40 mL Intravenous PRN Zofia Esparza MD            Physician Progress Notes (last 24 hours)        Jagruti Polanco II, DO at 24 0853              [facility] Medicine Services  PROGRESS NOTE    Patient Name: Guicho Blanco  : 1940  MRN: [id number]    Date of Admission: 2024  Primary Care Physician: Mitchel Culver MD    Subjective   Subjective     CC: f/u weakness    HPI: Up in chair eating. Says he's very weak but overall doing well.      Objective   Objective     Vital Signs:   Temp:  [97.7 °F (36.5 °C)-97.8 °F (36.6 °C)] 97.8 °F (36.6 °C)  Heart Rate:  [81-99] 91  Resp:  [16-17] 17  BP: (109-137)/(66-90) 125/72     Physical Exam:  Constitutional: No acute distress, awake, alert, elderly male, up in chair  HENT: NCAT, mucous membranes moist  Respiratory: Clear to auscultation bilaterally, respiratory effort normal   Cardiovascular: RRR, no murmurs, rubs, or gallops  Gastrointestinal: Positive bowel sounds, soft, nontender, nondistended  Musculoskeletal: No bilateral  ankle edema  Psychiatric: Appropriate affect, cooperative  Neurologic: Oriented x 3, strength symmetric in all extremities, Cranial Nerves grossly intact to confrontation, speech clear  Skin: No rashes     Results Reviewed:  LAB RESULTS:      Lab 11/06/24  0617 11/05/24  0738   WBC 7.01 5.88   HEMOGLOBIN 10.5* 11.6*   HEMATOCRIT 33.6* 37.0*   PLATELETS 205 230   NEUTROS ABS  --  3.47   IMMATURE GRANS (ABS)  --  0.02   LYMPHS ABS  --  1.41   MONOS ABS  --  0.50   EOS ABS  --  0.43*   MCV 84.0 84.3   LACTATE  --  1.2         Lab 11/06/24  0617 11/05/24  0745 11/05/24  0738 11/05/24  0736   SODIUM 136  --  139  --    POTASSIUM 3.5  --  3.9  --    CHLORIDE 103  --  103  --    CO2 19.0*  --  23.0  --    ANION GAP 14.0  --  13.0  --    BUN 14  --  19  --    CREATININE 0.68* 0.90 0.86 0.90   EGFR 92.2  --  85.9  --    GLUCOSE 103*  --  99  --    CALCIUM 9.1  --  9.4  --          Lab 11/05/24  0738   TOTAL PROTEIN 6.4   ALBUMIN 3.6   GLOBULIN 2.8   ALT (SGPT) 16   AST (SGOT) 30   BILIRUBIN 0.5   ALK PHOS 132*                     Brief Urine Lab Results  (Last result in the past 365 days)        Color   Clarity   Blood   Leuk Est   Nitrite   Protein   CREAT   Urine HCG        11/05/24 0943 Dark Yellow   Clear   Negative   Moderate (2+)   Negative   30 mg/dL (1+)                   Microbiology Results Abnormal       None            CT Abdomen Pelvis With Contrast    Result Date: 11/5/2024  CT ABDOMEN PELVIS W CONTRAST Date of Exam: 11/5/2024 7:51 AM EST Indication: Weakness, fall, low abdomen pain. Comparison: 1/11/2016 Technique: Axial CT images were obtained of the abdomen and pelvis following the uneventful intravenous administration of 85 cc Isovue-300. Reconstructed coronal and sagittal images were also obtained. Automated exposure control and iterative construction methods were used. Findings: Lower Chest: Emphysema in the lung bases. Moderate hiatal hernia Organs: Liver, spleen, kidneys, adrenal glands unremarkable. 3  cm cystic lesion arising from the neck of the pancreas, with a thin wall and some internal dependent high density material. Gallbladder surgically absent Gastrointestinal: Possible rectal wall thickening. No intestinal distention. Colonic diverticula without inflammation. Pelvis: No abnormal fluid collection. Urinary bladder unremarkable Peritoneum/Retroperitoneum: No ascites or pneumoperitoneum. Normal caliber Bones/Soft Tissues: No acute bony abnormality. Lumbar scoliosis with diffuse degenerative disease     Impression: 1. Possible rectal wall thickening, correlate for proctitis 2. 3 cm cystic lesion of the neck of the pancreas. This was present on the comparison study from 2016 but has enlarged in the interval, previously approximately 2 cm 3. Hiatal hernia 4. Colonic diverticulosis without evidence of diverticulitis Electronically Signed: Jace Davila  11/5/2024 9:34 AM EST  Workstation ID: [id number]    XR Shoulder 2+ View Right    Result Date: 11/5/2024  XR SHOULDER 2+ VW RIGHT Date of Exam: 11/5/2024 7:27 AM EST Indication: Trauma Comparison: None available. Findings: No evidence of fracture or dislocation. Glenohumeral joint appears within normal limits. Irregularity and cystic change of the greater noted. AC joint osteoarthritis with caudally oriented osteophyte     Impression: Impression: 1. No evidence of fracture or dislocation 2. Appearance of the greater tuberosity could reflect chronic shoulder impingement 3. AC joint osteoarthritis Electronically Signed: Jace Davila  11/5/2024 7:30 AM EST  Workstation ID: [id number]     Results for orders placed during the hospital encounter of 09/13/24    Adult Transthoracic Echo Complete W/ Cont if Necessary Per Protocol    Interpretation Summary    Left ventricular systolic function is normal. Left ventricular ejection fraction appears to be 61 - 65%.    Left ventricular diastolic function was normal.      Current medications:  Scheduled Meds:aspirin, 325 mg,  Oral, Daily  cefuroxime, 500 mg, Oral, Q12H  cycloSPORINE, 1 drop, Both Eyes, Q12H  docusate sodium, 100 mg, Oral, BID  enoxaparin, 40 mg, Subcutaneous, Daily  flecainide, 100 mg, Oral, Q12H  isosorbide mononitrate, 30 mg, Oral, Daily  nortriptyline, 30 mg, Oral, Nightly  pantoprazole, 40 mg, Oral, Q AM  pravastatin, 20 mg, Oral, Daily  sodium chloride, 10 mL, Intravenous, Q12H      Continuous Infusions:   PRN Meds:.  acetaminophen    senna-docusate sodium **AND** polyethylene glycol **AND** bisacodyl **AND** bisacodyl    bisacodyl    bisacodyl    nitroglycerin    sodium chloride    sodium chloride    Assessment & Plan   Assessment & Plan     Active Hospital Problems    Diagnosis  POA    **Generalized weakness [R53.1]  Yes      Resolved Hospital Problems   No resolved problems to display.        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  Admitted for fall at home, and general failure to thrive.       Fall at home  - PT OT  - CM consult.  Wife having difficulty caring for him - currently has many home resources - unclear if wife looking for LTC? Hospice to meet with them at 1300 today.     Weakness  Poor intake, rumination  Chronic constipation  Frequent waking at night  - hospice consult per wife's request   - Boost TID  - nutrition consult      Pyuria, will treat empirically for now  - cefuroxime; watch culture     History of R knee replacement/cellulitis 8-9/2024  - no redness currently ; nl WBC, no fever      CAD, Afib, HTN  - continue home meds     Expected Discharge Location and Transportation:   Expected Discharge   Expected Discharge Date: 11/8/2024; Expected Discharge Time:      VTE Prophylaxis:  Pharmacologic VTE prophylaxis orders are present.         AM-PAC 6 Clicks Score (PT): 17 (11/05/24  )    CODE STATUS:   Code Status and Medical Interventions: No CPR (Do Not Attempt to Resuscitate); Limited Support; No intubation (DNI)   Ordered at: 24 1137     Medical Intervention Limits:    No intubation (DNI)     Level Of Support Discussed With:    Patient     Code Status (Patient has no pulse and is not breathing):    No CPR (Do Not Attempt to Resuscitate)     Medical Interventions (Patient has pulse or is breathing):    Limited Support       Jagruti Polanco II, DO  24       Electronically signed by Jagruti Polanco II, DO at 24 1120          Physical Therapy Notes (most recent note)        Cary Umana, PT at 24 1533  Version 1 of 1         Patient Name: Guicho Blanco  : 1940    MRN: [id number]                              Today's Date: 2024       Admit Date: 2024    Visit Dx:     ICD-10-CM ICD-9-CM   1. Generalized weakness  R53.1 780.79   2. Fall in home, initial encounter  W19.XXXA E888.9    Y92.009 E849.0   3. Anorexia  R63.0 783.0   4. Bacterial UTI  N39.0 599.0    A49.9 041.9     Patient Active Problem List   Diagnosis    Osteomyelitis of toe of right foot    Osteomyelitis of right foot    PAF (paroxysmal atrial fibrillation)    Coronary artery disease involving native coronary artery of native heart without angina pectoris    Gastroesophageal reflux disease without esophagitis    Benign non-nodular prostatic hyperplasia without lower urinary tract symptoms    S/P appy    Essential hypertension    Simple chronic bronchitis    Mixed hyperlipidemia    COPD (chronic obstructive pulmonary disease)    BPH (benign prostatic hypertrophy)    Hearing loss    Chest pain    Vertigo    Snoring    Overweight    PLMD (periodic limb movement disorder)    Neuropathy    Periodic headache syndrome, not intractable    Acquired absence of other right toe(s)    Tremor    Bilateral impacted cerumen    Bilateral sensorineural hearing loss    Chronic otitis externa     Deviated nasal septum    Disorder of joint of foot    ED (erectile dysfunction) of organic origin    Hydrocele    Hydrocele of testis    Impacted cerumen    Mononeuropathy    Swelling of testicle    Venous retinal branch occlusion    Vitreous degeneration    Benign prostatic hyperplasia with urinary obstruction    OA (osteoarthritis) of knee    Status post total right knee replacement    Septic arthritis    Cellulitis    Cellulitis of right knee    Failure to thrive in adult    Insomnia    Moderate malnutrition    Sepsis    Lactic acidosis    Serotonin syndrome    Dysphagia    Generalized weakness     Past Medical History:   Diagnosis Date    Arthritis     Atrial fibrillation     BPH (benign prostatic hypertrophy)     Cataract     Cellulitis     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     bilateral    Coronary artery disease     Full dentures     Gall stones     GERD (gastroesophageal reflux disease)     Hearing loss     Hiatal hernia     Hyperlipidemia     Hypertension     Peripheral neuropathy     Peripheral vascular disease     Seasonal allergies     Skin cancer     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    TIA (transient ischemic attack)     2 times    Wears glasses      Past Surgical History:   Procedure Laterality Date    ABLATION OF DYSRHYTHMIC FOCUS      APPENDECTOMY      CARDIAC CATHETERIZATION N/A 10/05/2017    Procedure: Left Heart Cath;  Surgeon: Hector Urrutia MD;  Location:  [facility];  Service:     CATARACT EXTRACTION Bilateral     CHOLECYSTECTOMY      Remote    COCHLEAR IMPLANT REVISION  05/2019    COLONOSCOPY      PROSTATE SURGERY      SHOULDER SURGERY Right     SKIN BIOPSY      TOE AMPUTATION Right     2nd toe - Right Foot    TONSILLECTOMY      TOTAL KNEE ARTHROPLASTY Right 8/7/2024    Procedure: TOTAL KNEE ARTHROPLASTY WITH ASHLEY ROBOT RIGHT;  Surgeon: Christian Schwartz MD;  Location:  [facility];  Service: Robotics - Ortho;  Laterality: Right;      General Information       Row Name 11/05/24  1558          Physical Therapy Time and Intention    Document Type evaluation  -     Mode of Treatment physical therapy  -       Row Name 11/05/24 1558          General Information    Patient Profile Reviewed yes  -     Prior Level of Function independent:;community mobility;gait;transfer;bed mobility;min assist:;ADL's;dressing;all household mobility  Pt has FWW, SPC, and w/c at home. Reports he mostly uses FWW at home however pt is poor historian  -     Existing Precautions/Restrictions fall  [place], cochlear implant  -     Barriers to Rehab medically complex;previous functional deficit;cognitive status;hearing deficit  -       Row Name 11/05/24 1558          Living Environment    People in Home spouse  -       Row Name 11/05/24 1558          Home Main Entrance    Number of Stairs, Main Entrance two  -AC     Stair Railings, Main Entrance railings on both sides of stairs  -       Row Name 11/05/24 1558          Stairs Within Home, Primary    Number of Stairs, Within Home, Primary one  one step into family room  -       Row Name 11/05/24 1558          Cognition    Orientation Status (Cognition) oriented x 3  -       Row Name 11/05/24 1558          Safety Issues/Impairments Affecting Functional Mobility    Safety Issues Affecting Function (Mobility) insight into deficits/self-awareness;safety precautions follow-through/compliance;awareness of need for assistance;safety precaution awareness;sequencing abilities;problem-solving  -     Impairments Affecting Function (Mobility) balance;cognition;endurance/activity tolerance;strength  -     Cognitive Impairments, Mobility Safety/Performance awareness, need for assistance;safety precaution awareness;insight into deficits/self-awareness;safety precaution follow-through;sequencing abilities;problem-solving/reasoning  -               User Key  (r) = Recorded By, (t) = Taken By, (c) = Cosigned By      Initials Name Provider Type    AC Cary Umana, PT  Physical Therapist                   Mobility       Row Name 11/05/24 1602          Bed Mobility    Bed Mobility supine-sit  -AC     Supine-Sit [place] (Bed Mobility) minimum assist (75% patient effort);1 person assist  -AC     Assistive Device (Bed Mobility) head of bed elevated  -AC     Comment, (Bed Mobility) Pt required extended time and minaX1 to transition to upright sitting. Pt did not have any c/o dizziness w/ sitting EOB  -AC       Row Name 11/05/24 1602          Bed-Chair Transfer    Bed-Chair [place] (Transfers) minimum assist (75% patient effort);1 person assist  -AC     Assistive Device (Bed-Chair Transfers) walker, front-wheeled  -AC     Comment, (Bed-Chair Transfer) Pt took short shuffling steps to bedside chair w/ FWW and minaX1  -AC       Row Name 11/05/24 1602          Sit-Stand Transfer    Sit-Stand [place] (Transfers) minimum assist (75% patient effort);1 person assist  -AC     Assistive Device (Sit-Stand Transfers) walker, front-wheeled  -AC     Comment, (Sit-Stand Transfer) Pt completed 2 STS transfers w/ VC for hand placement and minAx1  -AC               User Key  (r) = Recorded By, (t) = Taken By, (c) = Cosigned By      Initials Name Provider Type    AC Cary Umana PT Physical Therapist                   Obj/Interventions       Row Name 11/05/24 1603          Range of Motion Comprehensive    General Range of Motion bilateral lower extremity ROM WNL  -AC       Row Name 11/05/24 1603          Strength Comprehensive (MMT)    General Manual Muscle Testing (MMT) Assessment lower extremity strength deficits identified  -AC     Comment, General Manual Muscle Testing (MMT) Assessment BLE's grossly 3+/5  -AC       Row Name 11/05/24 1603          Balance    Balance Assessment sitting static balance;sitting dynamic balance;standing static balance;standing dynamic balance  -AC     Static Sitting Balance standby assist  -AC     Dynamic Sitting Balance contact guard  -AC     Position,  Sitting Balance sitting edge of bed;unsupported  -AC     Static Standing Balance contact guard  -AC     Dynamic Standing Balance minimal assist;1-person assist  -AC     Position/Device Used, Standing Balance supported;walker, front-wheeled  -AC     Balance Interventions sitting;standing;sit to stand;supported;static;dynamic  -       Row Name 11/05/24 1603          Sensory Assessment (Somatosensory)    Sensory Assessment (Somatosensory) LE sensation intact  -               User Key  (r) = Recorded By, (t) = Taken By, (c) = Cosigned By      Initials Name Provider Type    AC Cary Umana, PT Physical Therapist                   Goals/Plan       Row Name 11/05/24 1607          Bed Mobility Goal 1 (PT)    Activity/Assistive Device (Bed Mobility Goal 1, PT) sit to supine/supine to sit  -AC     Cloud Level/Cues Needed (Bed Mobility Goal 1, PT) standby assist  -AC     Time Frame (Bed Mobility Goal 1, PT) short term goal (STG);5 days  -       Row Name 11/05/24 1607          Transfer Goal 1 (PT)    Activity/Assistive Device (Transfer Goal 1, PT) bed-to-chair/chair-to-bed  -AC     Cloud Level/Cues Needed (Transfer Goal 1, PT) standby assist  -AC     Time Frame (Transfer Goal 1, PT) long term goal (LTG);10 days  -       Row Name 11/05/24 1607          Gait Training Goal 1 (PT)    Activity/Assistive Device (Gait Training Goal 1, PT) gait (walking locomotion);improve balance and speed;increase endurance/gait distance;increase energy conservation;decrease fall risk  -AC     Cloud Level (Gait Training Goal 1, PT) standby assist  -AC     Distance (Gait Training Goal 1, PT) 80  -AC     Time Frame (Gait Training Goal 1, PT) long term goal (LTG);10 days  -       Row Name 11/05/24 1607          Therapy Assessment/Plan (PT)    Planned Therapy Interventions (PT) balance training;bed mobility training;gait training;patient/family education;strengthening;transfer training  -               User Key  (r) =  Recorded By, (t) = Taken By, (c) = Cosigned By      Initials Name Provider Type    AC Cary Umana, PT Physical Therapist                   Clinical Impression       Row Name 11/05/24 1603          Pain    Pretreatment Pain Rating 0/10 - no pain  -AC     Posttreatment Pain Rating 0/10 - no pain  -AC       Row Name 11/05/24 1603          Plan of Care Review    Plan of Care Reviewed With patient  -AC     Progress no change  -AC     Outcome Evaluation PT initial evaluation complete. Pt presents with decreased activity tolerance, impaired cognition, and generalized weakness. Pt took short shuffling steps w/ FWW to bedside chair w/ minAx1. Pt would benefit from continued skilled therapy while hospitalized to maximize functional independence. D/c rec is SNF for best outcome.  -       Row Name 11/05/24 1603          Therapy Assessment/Plan (PT)    Rehab Potential (PT) good  -AC     Criteria for Skilled Interventions Met (PT) yes  -AC     Therapy Frequency (PT) daily  -AC     Predicted Duration of Therapy Intervention (PT) 10 days  -       Row Name 11/05/24 1603          Vital Signs    Pre Systolic BP Rehab 127  -AC     Pre Treatment Diastolic BP 88  -AC     Post Systolic BP Rehab 135  -AC     Post Treatment Diastolic BP 77  -AC     Pretreatment Heart Rate (beats/min) 88  -AC     Posttreatment Heart Rate (beats/min) 92  -AC     Pre SpO2 (%) 98  -AC     O2 Delivery Pre Treatment room air  -AC     O2 Delivery Intra Treatment room air  -AC     Post SpO2 (%) 100  -AC     O2 Delivery Post Treatment room air  -AC     Pre Patient Position Supine  -AC     Intra Patient Position Standing  -AC     Post Patient Position Sitting  -AC       Row Name 11/05/24 1603          Positioning and Restraints    Pre-Treatment Position in bed  -AC     Post Treatment Position chair  -AC     In Chair notified nsg;reclined;sitting;call light within reach;encouraged to call for assist;exit alarm on;waffle cushion;legs elevated  -AC                User Key  (r) = Recorded By, (t) = Taken By, (c) = Cosigned By      Initials Name Provider Type    AC Cary Umana, PT Physical Therapist                   Outcome Measures       Row Name 11/05/24 1607 11/05/24 1400       How much help from another person do you currently need...    Turning from your back to your side while in flat bed without using bedrails? 3  -AC 3  -JM    Moving from lying on back to sitting on the side of a flat bed without bedrails? 3  -AC 3  -JM    Moving to and from a bed to a chair (including a wheelchair)? 3  -AC 3  -JM    Standing up from a chair using your arms (e.g., wheelchair, bedside chair)? 3  -AC 3  -JM    Climbing 3-5 steps with a railing? 2  -AC 2  -JM    To walk in hospital room? 3  -AC 3  -JM    AM-PAC 6 Clicks Score (PT) 17  -AC 17  -JM    Highest Level of Mobility Goal 5 --> Static standing  -AC 5 --> Static standing  -JM      Row Name 11/05/24 1607          Functional Assessment    Outcome Measure Options AM-PAC 6 Clicks Basic Mobility (PT)  -AC               User Key  (r) = Recorded By, (t) = Taken By, (c) = Cosigned By      Initials Name Provider Type    Tran Cruz, RN Registered Nurse    Cary Daley, PT Physical Therapist                                 Physical Therapy Education       Title: PT OT SLP Therapies (In Progress)       Topic: Physical Therapy (In Progress)       Point: Mobility training (In Progress)       Learning Progress Summary            Patient Acceptance, E, NR by  at 11/5/2024 1608                      Point: Home exercise program (In Progress)       Learning Progress Summary            Patient Acceptance, E, NR by  at 11/5/2024 1608                      Point: Body mechanics (In Progress)       Learning Progress Summary            Patient Acceptance, E, NR by  at 11/5/2024 1608                      Point: Precautions (In Progress)       Learning Progress Summary            Patient Acceptance, E, NR by  at 11/5/2024 1608                                       User Key       Initials Effective Dates Name Provider Type Discipline    AC 07/11/24 -  Cary Umana PT Physical Therapist PT                  PT Recommendation and Plan  Planned Therapy Interventions (PT): balance training, bed mobility training, gait training, patient/family education, strengthening, transfer training  Progress: no change  Outcome Evaluation: PT initial evaluation complete. Pt presents with decreased activity tolerance, impaired cognition, and generalized weakness. Pt took short shuffling steps w/ FWW to bedside chair w/ minAx1. Pt would benefit from continued skilled therapy while hospitalized to maximize functional independence. D/c rec is SNF for best outcome.     Time Calculation:   PT Evaluation Complexity  History, PT Evaluation Complexity: 1-2 personal factors and/or comorbidities  Examination of Body Systems (PT Eval Complexity): total of 3 or more elements  Clinical Presentation (PT Evaluation Complexity): evolving  Clinical Decision Making (PT Evaluation Complexity): moderate complexity  Overall Complexity (PT Evaluation Complexity): moderate complexity     PT Charges       Row Name 11/05/24 1608             Time Calculation    Start Time 1533  -AC      PT Received On 11/05/24  -AC      PT Goal Re-Cert Due Date 11/15/24  -AC         Untimed Charges    PT Eval/Re-eval Minutes 50  -AC         Total Minutes    Untimed Charges Total Minutes 50  -AC       Total Minutes 50  -AC                User Key  (r) = Recorded By, (t) = Taken By, (c) = Cosigned By      Initials Name Provider Type    AC Cary Umana PT Physical Therapist                  Therapy Charges for Today       Code Description Service Date Service Provider Modifiers Qty    [id number] HC PT EVAL MOD COMPLEXITY 4 11/5/2024 Cary Umana, PT GP 1            PT G-Codes  Outcome Measure Options: AM-PAC 6 Clicks Basic Mobility (PT)  AM-PAC 6 Clicks Score (PT): 17  PT Discharge Summary  Anticipated Discharge  Disposition (PT): skilled nursing facility    Cary Umana, PT  11/5/2024      Electronically signed by Cary Umana, PT at 11/05/24 0500       Occupational Therapy Notes (most recent note)    No notes exist for this encounter.       Speech Language Pathology Notes (most recent note)    No notes exist for this encounter.

## 2024-11-06 NOTE — PLAN OF CARE
Goal Outcome Evaluation:  Plan of Care Reviewed With: (P) patient        Progress: (P) no change  Outcome Evaluation: (P) OT eval complete. Pt presenting below functional baseline w/ bed mobility, transfers, and ADL tasks d/t generalized weakness, decreased activity tolerance, and balance deficits. Pt was Min A x1 w/ FWW for bed > chair transfer this date. Continue IP OT to address current deficits. Rec SNF at d/c for best functional outcome.    Anticipated Discharge Disposition (OT): (P) skilled nursing facility

## 2024-11-06 NOTE — PROGRESS NOTES
Continued Stay Note  [facility]     Patient Name: Guicho Blanco  MRN: [id number]  Today's Date: 11/6/2024    Admit Date: 11/5/2024    Plan: To be determined   Discharge Plan       Row Name 11/06/24 1405       Plan    Plan To be determined    Plan Comments Hospice visit made to bedside, pt sitting in recliner and participated in conversation. Wife, Sandra present at bedside. GURWINDER Valverde was also present at meeting. Liaison explained Hospice Services to wife. At this time, wife does not feel she is able to care for the pt at home. Liaison Discussed that Hospice does not provide 24/7 in home care. Explained the options of long term care facility as private pay or medicaid pending. Also discussed in home care may be an option but would also be private pay. CM is to send out referrals to long term care facilities. Wife to discuss options with family and determine next steps after hearing back from CM. Wife asked for Hospice Liaison to follow up tomorrow or Friday via phonecall as she didn't know when she would be in the hospital each day. Sandra # [phone] (home) or [phone] (cell). Hospice Liaison will continue to follow as discharge plans are sought. Please call [phone] with any questions. Avani HOPKINS, RN Hospice Liaison      Row Name 11/06/24 1342       Plan    Plan     Patient/Family in Agreement with Plan     Plan Comments     Final Discharge Disposition Code                    Discharge Codes    No documentation.                 Expected Discharge Date and Time       Expected Discharge Date Expected Discharge Time    Nov 8, 2024               Amira Hernandez RN

## 2024-11-06 NOTE — CONSULTS
"          Clinical Nutrition Assessment     Patient Name: Guicho Blanco  YOB: 1940  MRN: [id number]  Date of Encounter: 11/06/24 17:00 EST  Admission date: 11/5/2024  Reason for Visit: MST score 2+, Consult, Unintentional weight loss, Reduced oral intake    Assessment   Nutrition Assessment   Admission Diagnosis:  Generalized weakness [R53.1]    Problem List:    Generalized weakness      PMH:   He  has a past medical history of Arthritis, Atrial fibrillation, BPH (benign prostatic hypertrophy), Cataract, Cellulitis, 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    PSH:  He  has a past surgical history that includes Ablation of dysrhythmic focus; Appendectomy; Shoulder surgery (Right); Prostate surgery; Cholecystectomy; Cataract extraction (Bilateral); Toe amputation (Right); Cardiac catheterization (N/A, 10/05/2017); Tonsillectomy; Cochlear implant revision (05/2019); Skin biopsy; Colonoscopy; and Total knee arthroplasty (Right, 8/7/2024).    Applicable Nutrition History:   Freq falls  Cochlear implants  9/15/24 Chronic Moderate Malnutrition    (11/6) SLP MBS: [facility] ground, thin liquids    Anthropometrics     Height: Height: 182.9 cm (72\")  Last Filed Weight: Weight: 69.1 kg (152 lb 4.8 oz) (11/05/24 1427)  Method: Weight Method: Stated  BMI: BMI (Calculated): 20.7    UBW:  220# per spouse   Weight      Weight (kg) Weight (lbs) Weight Method   8/8/2024 94.802 kg  209 lb  Bed scale       Bed scale    8/22/2024 95.255 kg  210 lb  Stated    8/30/2024 99.791 kg  220 lb  Stated    9/13/2024 95.255 kg  210 lb  Stated    9/16/2024 95.255 kg  210 lb     9/18/2024 92 kg  202 lb 13.2 oz  Bed scale    11/5/2024 69.083 kg  152 lb 4.8 oz  " Stated     69.083 kg  152 lb 4.8 oz      91.627 kg  202 lb        Weight change: weight loss of 57 lbs (27%) over 3 month(s)    Significant?  Yes    Nutrition Focused Physical Exam    Date:  11/06/24       Patient meets criteria for malnutrition diagnosis, see MSA note.     Subjective   Reported/Observed/Food/Nutrition Related History:   11/06/24  Spoke with wife via phone. She sound disheartened about patient's situation as she is his caregiver and she isn't sure she can continue. Said she met with hospice, but was disappointed they didn't evaluate him for services they only discussed what they offer. Stated she doesn't know what more she can do for him at this time. Discussed patient's poor appetite and PO intake at home. Wife said she has tried everything from modifying his foods to trying different foods and patient just doesn't want to eat. Has been able to get patient to drink ONS BID. Agreeable to receive while inpatient (likes vanilla or chocolate). SLP following. NKFA.    Current Nutrition Prescription   PO: Diet: Regular/House; No Straw, No Mixed Consistencies; Texture: Soft to Chew (NDD 3); Soft to Chew: Ground Meat; Fluid Consistency: Thin (IDDSI 0)  Oral Nutrition Supplement: n/a  Intake: Insufficient data    Assessment & Plan   Nutrition Diagnosis   Date:  11/06/24   Updated:  Problem Malnutrition, chronic severe   Etiology Dysphagia, FTT since knee replacement   Signs/Symptoms </= 75% of EEN x >/= 1 month and significant weight loss of >7.5% in 3mo   Status: New    Date:  11/06/24             Updated:    Problem Swallowing difficulty    Etiology Unknown etiology   Signs/Symptoms Need for modified diet   Status: New    Goal:   Nutrition to support treatment and Establish PO    Nutrition Intervention      Follow treatment progress, Care plan reviewed, Advise alternate selection, Advised available snacks, Interview for preferences, Menu provided, Encourage intake, Supplement provided    Patient meets  malnutrition criteria, see MSA for full assessment.    Nutrition POC  Ordering boost plus TID  Encourage adequate nutrition as able  May consider appetite stim, if medically appropriate and follows GOC    Monitoring/Evaluation:   Per protocol, PO intake, Supplement intake, Weight, Symptoms, POC/GOC, Swallow function    Gertrude Lowry MS,RD,LD  Time Spent: 35min

## 2024-11-07 ENCOUNTER — HOME CARE VISIT (OUTPATIENT)
Dept: HOME HEALTH SERVICES | Facility: HOME HEALTHCARE | Age: 84
End: 2024-11-07
Payer: COMMERCIAL

## 2024-11-07 PROBLEM — E43 SEVERE PROTEIN-CALORIE MALNUTRITION: Status: ACTIVE | Noted: 2024-11-07

## 2024-11-07 PROCEDURE — 25010000002 ENOXAPARIN PER 10 MG: Performed by: INTERNAL MEDICINE

## 2024-11-07 PROCEDURE — 93005 ELECTROCARDIOGRAM TRACING: CPT | Performed by: STUDENT IN AN ORGANIZED HEALTH CARE EDUCATION/TRAINING PROGRAM

## 2024-11-07 PROCEDURE — 93010 ELECTROCARDIOGRAM REPORT: CPT | Performed by: INTERNAL MEDICINE

## 2024-11-07 PROCEDURE — 99232 SBSQ HOSP IP/OBS MODERATE 35: CPT | Performed by: STUDENT IN AN ORGANIZED HEALTH CARE EDUCATION/TRAINING PROGRAM

## 2024-11-07 PROCEDURE — 92610 EVALUATE SWALLOWING FUNCTION: CPT

## 2024-11-07 PROCEDURE — G0378 HOSPITAL OBSERVATION PER HR: HCPCS

## 2024-11-07 PROCEDURE — 96372 THER/PROPH/DIAG INJ SC/IM: CPT

## 2024-11-07 RX ORDER — OLANZAPINE 5 MG/1
2.5 TABLET ORAL NIGHTLY
Status: DISCONTINUED | OUTPATIENT
Start: 2024-11-07 | End: 2024-11-13 | Stop reason: HOSPADM

## 2024-11-07 RX ORDER — SENNOSIDES A AND B 8.6 MG/1
1 TABLET, FILM COATED ORAL NIGHTLY
Status: DISCONTINUED | OUTPATIENT
Start: 2024-11-07 | End: 2024-11-09

## 2024-11-07 RX ADMIN — FLECAINIDE ACETATE 100 MG: 50 TABLET ORAL at 16:48

## 2024-11-07 RX ADMIN — CEFUROXIME AXETIL 500 MG: 250 TABLET, FILM COATED ORAL at 12:04

## 2024-11-07 RX ADMIN — PRAVASTATIN SODIUM 20 MG: 20 TABLET ORAL at 12:03

## 2024-11-07 RX ADMIN — CEFUROXIME AXETIL 500 MG: 250 TABLET, FILM COATED ORAL at 20:48

## 2024-11-07 RX ADMIN — DOCUSATE SODIUM 100 MG: 50 LIQUID ORAL at 12:04

## 2024-11-07 RX ADMIN — ASPIRIN 325 MG: 325 TABLET ORAL at 12:04

## 2024-11-07 RX ADMIN — ISOSORBIDE DINITRATE 10 MG: 10 TABLET ORAL at 14:39

## 2024-11-07 RX ADMIN — Medication 10 ML: at 20:50

## 2024-11-07 RX ADMIN — ENOXAPARIN SODIUM 40 MG: 40 INJECTION SUBCUTANEOUS at 12:03

## 2024-11-07 RX ADMIN — Medication 10 ML: at 12:05

## 2024-11-07 RX ADMIN — NORTRIPTYLINE HYDROCHLORIDE 30 MG: 10 CAPSULE ORAL at 20:48

## 2024-11-07 RX ADMIN — OLANZAPINE 2.5 MG: 5 TABLET, FILM COATED ORAL at 20:48

## 2024-11-07 RX ADMIN — ISOSORBIDE DINITRATE 10 MG: 10 TABLET ORAL at 18:45

## 2024-11-07 RX ADMIN — CYCLOSPORINE 1 DROP: 0.5 EMULSION OPHTHALMIC at 14:39

## 2024-11-07 RX ADMIN — SENNOSIDES 1 TABLET: 8.6 TABLET, FILM COATED ORAL at 20:48

## 2024-11-07 NOTE — PLAN OF CARE
Goal Outcome Evaluation:  Plan of Care Reviewed With: patient, spouse, child                Anticipated Discharge Disposition (SLP): home with home health          SLP Swallowing Diagnosis: moderate, oral dysphagia, suspected pharyngeal dysphagia (11/07/24 1015)         Resuming PO diet with adjustments made for medication administration. Pt will remain at increase risk aspiration r/t oral weakness as well as known cervical osteophyte contributing to difficult passage of bolus into esophagus. With that said, overly modifying diet will lead to poor PO intake. Trialing regular diet with menu selection for naturally softer foods to assist. Will monitor and adjust as needed.

## 2024-11-07 NOTE — PLAN OF CARE
Goal Outcome Evaluation:     Pt agitated / confused this morning. Pt reoriented and remained calm during the remainder of shift. Pt has been A/Ox4, on RA, and has presented as being tachy. EKG performed & placed in chart / sinus tachycardia confirmed MD notified.      Problem: Adult Inpatient Plan of Care  Goal: Patient-Specific Goal (Individualized)  Outcome: Progressing  Goal: Absence of Hospital-Acquired Illness or Injury  Outcome: Progressing  Intervention: Identify and Manage Fall Risk  Recent Flowsheet Documentation  Taken 11/7/2024 1600 by Shayan Choi RN  Safety Promotion/Fall Prevention:   activity supervised   assistive device/personal items within reach   clutter free environment maintained   nonskid shoes/slippers when out of bed   room organization consistent   safety round/check completed  Taken 11/7/2024 1400 by Shayna Choi RN  Safety Promotion/Fall Prevention:   activity supervised   assistive device/personal items within reach   clutter free environment maintained   nonskid shoes/slippers when out of bed   room organization consistent   safety round/check completed  Taken 11/7/2024 1200 by Shayna Choi RN  Safety Promotion/Fall Prevention:   activity supervised   assistive device/personal items within reach   clutter free environment maintained   nonskid shoes/slippers when out of bed   room organization consistent   safety round/check completed  Taken 11/7/2024 1000 by Shayna Choi RN  Safety Promotion/Fall Prevention:   activity supervised   assistive device/personal items within reach   clutter free environment maintained   room organization consistent   safety round/check completed   nonskid shoes/slippers when out of bed  Taken 11/7/2024 0800 by Shayna Choi RN  Safety Promotion/Fall Prevention:   activity supervised   assistive device/personal items within reach   clutter free environment maintained   safety round/check completed   room organization consistent    nonskid shoes/slippers when out of bed  Intervention: Prevent Skin Injury  Recent Flowsheet Documentation  Taken 11/7/2024 1600 by Shayna Choi RN  Body Position:   right   tilted  Skin Protection:   transparent dressing maintained   skin sealant/moisture barrier applied   silicone foam dressing in place  Taken 11/7/2024 1400 by Shayna Choi RN  Body Position:   left   tilted  Skin Protection:   silicone foam dressing in place   skin sealant/moisture barrier applied   transparent dressing maintained  Taken 11/7/2024 1200 by Shayna Choi RN  Body Position:   legs elevated   supine  Skin Protection:   transparent dressing maintained   skin sealant/moisture barrier applied   silicone foam dressing in place  Taken 11/7/2024 1000 by Shayna Choi RN  Body Position:   right   tilted  Skin Protection:   transparent dressing maintained   skin sealant/moisture barrier applied   silicone foam dressing in place  Taken 11/7/2024 0800 by Shayna Choi RN  Body Position:   left   side-lying  Skin Protection:   transparent dressing maintained   skin sealant/moisture barrier applied   silicone foam dressing in place  Intervention: Prevent and Manage VTE (Venous Thromboembolism) Risk  Recent Flowsheet Documentation  Taken 11/7/2024 1400 by Shayna Choi RN  VTE Prevention/Management: (see mar / lovenox) other (see comments)  Taken 11/7/2024 1200 by Shayna Choi RN  VTE Prevention/Management: (see mar / lovenox) other (see comments)  Taken 11/7/2024 1000 by Shayna Choi RN  VTE Prevention/Management: (see mar / lovenox) other (see comments)  Taken 11/7/2024 0800 by Shayna Choi RN  VTE Prevention/Management: (see mar / lovenox) other (see comments)  Intervention: Prevent Infection  Recent Flowsheet Documentation  Taken 11/7/2024 1600 by Shayna Choi RN  Infection Prevention:   environmental surveillance performed   hand hygiene promoted   equipment surfaces disinfected  Taken  11/7/2024 1400 by Shayna Choi RN  Infection Prevention:   environmental surveillance performed   hand hygiene promoted   equipment surfaces disinfected  Taken 11/7/2024 1200 by Shayna Choi RN  Infection Prevention:   environmental surveillance performed   equipment surfaces disinfected   hand hygiene promoted  Taken 11/7/2024 1000 by Shayna Choi RN  Infection Prevention:   environmental surveillance performed   equipment surfaces disinfected   hand hygiene promoted  Taken 11/7/2024 0800 by Shayna Choi RN  Infection Prevention:   environmental surveillance performed   equipment surfaces disinfected   hand hygiene promoted  Goal: Optimal Comfort and Wellbeing  Outcome: Progressing  Intervention: Monitor Pain and Promote Comfort  Recent Flowsheet Documentation  Taken 11/7/2024 0800 by Shayna Choi RN  Pain Management Interventions:   relaxation techniques promoted   care clustered   quiet environment facilitated  Intervention: Provide Person-Centered Care  Recent Flowsheet Documentation  Taken 11/7/2024 0800 by Shayna Choi RN  Trust Relationship/Rapport: care explained

## 2024-11-07 NOTE — THERAPY RE-EVALUATION
Acute Care - Speech Language Pathology   Swallow Re-Evaluation [facility]  Clinical Swallow Evaluation     Patient Name: Guicho Blanco  : 1940  MRN: [id number]  Today's Date: 2024               Admit Date: 2024    Visit Dx:     ICD-10-CM ICD-9-CM   1. Generalized weakness  R53.1 780.79   2. Fall in home, initial encounter  W19.XXXA E888.9    Y92.009 E849.0   3. Anorexia  R63.0 783.0   4. Bacterial UTI  N39.0 599.0    A49.9 041.9   5. Oropharyngeal dysphagia  R13.12 787.22     Patient Active Problem List   Diagnosis    Osteomyelitis of toe of right foot    Osteomyelitis of right foot    PAF (paroxysmal atrial fibrillation)    Coronary artery disease involving native coronary artery of native heart without angina pectoris    Gastroesophageal reflux disease without esophagitis    Benign non-nodular prostatic hyperplasia without lower urinary tract symptoms    S/P appy    Essential hypertension    Simple chronic bronchitis    Mixed hyperlipidemia    COPD (chronic obstructive pulmonary disease)    BPH (benign prostatic hypertrophy)    Hearing loss    Chest pain    Vertigo    Snoring    Overweight    PLMD (periodic limb movement disorder)    Neuropathy    Periodic headache syndrome, not intractable    Acquired absence of other right toe(s)    Tremor    Bilateral impacted cerumen    Bilateral sensorineural hearing loss    Chronic otitis externa    Deviated nasal septum    Disorder of joint of foot    ED (erectile dysfunction) of organic origin    Hydrocele    Hydrocele of testis    Impacted cerumen    Mononeuropathy    Swelling of testicle    Venous retinal branch occlusion    Vitreous degeneration    Benign prostatic hyperplasia with urinary obstruction    OA (osteoarthritis) of knee    Status post total right knee replacement    Septic arthritis    Cellulitis    Cellulitis of right knee    Failure to thrive in adult    Insomnia    Moderate malnutrition    Sepsis    Lactic acidosis    Serotonin  syndrome    Dysphagia    Generalized weakness    Severe protein-calorie malnutrition     Past Medical History:   Diagnosis Date    Arthritis     Atrial fibrillation     BPH (benign prostatic hypertrophy)     Cataract     Cellulitis     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     bilateral    Coronary artery disease     Full dentures     Gall stones     GERD (gastroesophageal reflux disease)     Hearing loss     Hiatal hernia     Hyperlipidemia     Hypertension     Peripheral neuropathy     Peripheral vascular disease     Seasonal allergies     Skin cancer     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    TIA (transient ischemic attack)     2 times    Wears glasses      Past Surgical History:   Procedure Laterality Date    ABLATION OF DYSRHYTHMIC FOCUS      APPENDECTOMY      CARDIAC CATHETERIZATION N/A 10/05/2017    Procedure: Left Heart Cath;  Surgeon: Hector Urrutia MD;  Location:  [facility];  Service:     CATARACT EXTRACTION Bilateral     CHOLECYSTECTOMY      Remote    COCHLEAR IMPLANT REVISION  05/2019    COLONOSCOPY      PROSTATE SURGERY      SHOULDER SURGERY Right     SKIN BIOPSY      TOE AMPUTATION Right     2nd toe - Right Foot    TONSILLECTOMY      TOTAL KNEE ARTHROPLASTY Right 8/7/2024    Procedure: TOTAL KNEE ARTHROPLASTY WITH ASHLEY ROBOT RIGHT;  Surgeon: Christian Schwartz MD;  Location:  [facility] OR;  Service: Robotics - Ortho;  Laterality: Right;       SLP Recommendation and Plan  SLP Swallowing Diagnosis: moderate, oral dysphagia, suspected pharyngeal dysphagia (11/07/24 1015)  SLP Diet Recommendation: regular textures, thin liquids, other (see comments) (menu select for naturally softer foods. SLP took preferences for next three meals and sent to kitchen.) (11/07/24 1015)  Recommended Precautions and Strategies: upright posture during/after eating, small bites of food and sips of liquid, general aspiration precautions, fatigue precautions, other (see comments) (pull items from trays for snacks between. Pt  finds full trays to be overwhelming) (11/07/24 1015)  SLP Rec. for Method of Medication Administration:  (trial cut in ice cream, adjust as needed, especially if needed to be crushed. Pt finds crushed to be difficult to manage orally though needs adjustment related to bone spur.) (11/07/24 1015)     Monitor for Signs of Aspiration: yes, notify SLP if any concerns (11/07/24 1015)     Swallow Criteria for Skilled Therapeutic Interventions Met: demonstrates skilled criteria (11/07/24 1015)  Anticipated Discharge Disposition (SLP): home with home health (11/07/24 1015)  Rehab Potential/Prognosis, Swallowing: re-evaluate goals as necessary (11/07/24 1015)  Therapy Frequency (Swallow): 5 days per week (11/07/24 1015)  Predicted Duration Therapy Intervention (Days): 1 week (11/07/24 1015)  Oral Care Recommendations: Oral Care BID/PRN, Toothbrush (11/07/24 1015)                                               SWALLOW EVALUATION (Last 72 Hours)       SLP Adult Swallow Evaluation       Row Name 11/07/24 1015 11/06/24 1415 11/06/24 0940             Rehab Evaluation    Document Type re-evaluation  - evaluation  -GA evaluation  -GA      Subjective Information no complaints  -SM no complaints  -GA no complaints  -GA      Patient Observations alert;cooperative  - alert;cooperative;agree to therapy  -GA alert;cooperative;agree to therapy  -GA      Patient/Family/Caregiver Comments/Observations -- none present  -GA brother present  -GA      Patient Effort good  -SM good  -GA good  -GA      Comment -- -- patient actively eating breakfast upon arrival  -GA      Symptoms Noted During/After Treatment -- none  -GA none  -GA      Oral Care -- -- other (see comments)  eating breakfast already  -GA         General Information    Patient Profile Reviewed -- yes  -GA yes  -GA      Pertinent History Of Current Problem Choking on pills last night. Made NPO.  -SM see previous hx  -GA 83 yr old man with history of afib, CAD, HTN, TIA, cochlear  implants, and R knee replacement in Aug 2024 with 2 readmissions for cellulitis and progressive weakness/frailty in recent months.  Admitted for fall at home, and general failure to thrive.  -GA      Current Method of Nutrition NPO  -SM soft to chew textures;ground;thin liquids  no straw  -GA regular textures;thin liquids  -GA      Precautions/Limitations, Vision -- WFL;for purposes of eval  -GA WFL;for purposes of eval  -GA      Precautions/Limitations, Hearing -- hearing impairment, bilaterally  -GA hearing impairment, bilaterally  -GA      Prior Level of Function-Communication -- unknown  -GA unknown  -GA      Prior Level of Function-Swallowing -- other (see comments)  MBS and tx complete during hospital stay 9/2024. MBS recommended puree/thin with medication crushed in puree. At risk for choking d/t pharyngeal residue. At d/c patient upgraded to [facility] ground/puree. Patient reports no longer crushing medication at home  -GA other (see comments)  MBS and tx complete during hospital stay 9/2024. MBS recommended puree/thin with medication crushed in puree. At risk for choking d/t pharyngeal residue. At d/c patient upgraded to [facility] ground/puree. Patient reports no longer crushing medication at home  -GA      Plans/Goals Discussed with patient;spouse/S.O.;family;agreed upon  -SM patient;agreed upon  -GA patient and family;agreed upon  -GA      Barriers to Rehab previous functional deficit;cognitive status;hearing deficit  -SM medically complex;previous functional deficit;hearing deficit  -GA medically complex;previous functional deficit;hearing deficit  -GA      Patient's Goals for Discharge return to regular diet  -SM patient did not state  -GA patient did not state  -GA      Family Goals for Discharge patient able to return to regular diet;patient able to eat/drink without coughing/choking  - -- family did not state  -GA         Pain    Pretreatment Pain Rating 0/10 - no pain  -SM 0/10 - no pain  -GA 0/10 - no pain   -GA      Posttreatment Pain Rating 0/10 - no pain  -SM 0/10 - no pain  -GA 0/10 - no pain  -GA         Oral Motor Structure and Function    Dentition Assessment -- -- upper dentures/partial in place;lower dentures/partial in place  -GA      Secretion Management -- -- WNL/WFL  -GA      Volitional Swallow -- -- WFL  -GA         Oral Musculature and Cranial Nerve Assessment    Oral Motor General Assessment generalized oral motor weakness  -SM -- generalized oral motor weakness  -GA         General Eating/Swallowing Observations    Respiratory Support Currently in Use room air  -SM -- room air  -GA      Eating/Swallowing Skills -- -- self-fed  -GA      Positioning During Eating -- -- upright 90 degree;upright in chair  -GA      Utensils Used -- -- spoon;cup;straw  -GA      Consistencies Trialed -- -- soft to chew textures;chopped;mechanical ground textures;thin liquids  -GA         Respiratory    Respiratory Status -- -- WFL;room air  -GA         Clinical Swallow Eval    Oral Prep Phase impaired  -SM -- impaired  -GA      Oral Transit impaired  -SM -- impaired  -GA      Oral Residue impaired  -SM -- impaired  -GA      Pharyngeal Phase suspected pharyngeal impairment  -SM -- suspected pharyngeal impairment  -GA      Esophageal Phase -- -- unremarkable  -GA      Clinical Swallow Evaluation Summary No acute change observed c/t MBS performed yesterday. Despite no aspiration observed on yesterday's exam, pt will remain increased risk r/t oral weakness and cervical osteophyte at the level of the UES. Makes it difficult for bolus to enter esophagus with ease. Feeding tube would not be an appropriate recommendation nor desired. Best option is adjusting PO diet to help balance dysphagia risk with nutrition (as decreased intake with modified PO diets per discussion with pt/family). Recommendations reflect GOC/POC discussion and preferences with pt, wife, and daughter. Will then adjust as needed pending tolerance and intake.  -  -- patient with overt s/sx with throat clear and multiple/audible swallows during straw sips of thin, large cup sips, and when taking pills with liquids. patient with significantly prolonged masticaiton of soft solids (i.e., eggs) for ~5 minutes. pocketing into chew and rumination. patient downgraded to medication whole in puree, small single sips of liquids via cup, and soft solids/ground meats  -GA         Oral Prep Concerns    Oral Prep Concerns -- -- oral holding;prolonged mastication;increased prep time  -GA      Oral Holding -- -- mechanical soft  -GA      Prolonged Mastication -- -- mechanical soft  -GA      Increased Prep Time -- -- mechanical soft  -GA         Oral Transit Concerns    Oral Transit Concerns -- -- increased oral transit time  -GA      Increased Oral Transit Time -- -- all consistencies  -GA         Oral Residue Concerns    Oral Residue Concerns -- -- sulci residue, bilateral  -GA      Sulci Residue, Bilateral -- -- regular consistencies;mechanical soft  -GA         Pharyngeal Phase Concerns    Pharyngeal Phase Concerns -- -- multiple swallows;throat clear  -GA      Multiple Swallows -- -- thin  -GA      Throat Clear -- -- thin  -GA         MBS/VFSS    Utensils Used -- spoon;cup;straw  -GA --      Consistencies Trialed -- barium pill;thin liquids;pureed  -GA --         MBS/VFSS Interpretation    Oral Prep Phase -- impaired oral phase of swallowing  -GA --      Oral Transit Phase -- impaired  -GA --      Oral Residue -- impaired  -GA --      VFSS Summary -- Patient with deep penetration d/t mixing puree solids and thin liquids because patient had difficulty trigger swallow of puree and using liquid wash to assist with triggering swallow. All other aspiration events with shallow or transient penetration that was able to be cleared upon secondary swallow or intermittent throat clear. moderate -significant valleculae, pyriform sinus, and diffused pharyngeal residue placing patient at increased  risk for choking/aspiration/penetration. able to paritially clear with multiple swallows and liquid washes. Due to prolonged oral phase and manipulation difficulties patient also at risk for malnutrition.  -GA --         Oral Preparatory Phase    Oral Preparatory Phase -- oral holding;prolonged manipulation;inadequate manipulation  -GA --      Oral Holding -- pudding/puree;secondary to reduced labial seal;secondary to reduced lingual strength;secondary to reduced lingual range of motion  -GA --      Prolonged Manipulation -- all consistencies tested;secondary to reduced labial seal;secondary to reduced lingual strength;secondary to reduced lingual range of motion  -GA --      Inadequate Manipulation -- all consistencies tested;secondary to reduced labial seal;secondary to reduced lingual strength;secondary to reduced lingual range of motion  -GA --         Oral Transit Phase    Impaired Oral Transit Phase -- delayed initiation of bolus transit;increased A-P transit time;tongue pumping;piecemeal oral transit;premature spillage of liquids into pharynx  -GA --      Delayed Initiation of bolus transit -- all consistencies tested;secondary to reduced lingual control;discoordination of lingual movement  -GA --      Increased A-P Transit Time -- all consistencies tested;secondary to reduced lingual control;discoordination of lingual movement  -GA --      Tongue Pumping -- all consistencies tested;secondary to reduced lingual control;discoordination of lingual movement  -GA --      Piecemeal Oral Transit -- all consistencies tested;secondary to reduced lingual control;discoordination of lingual movement  -GA --      Premature Spillage of Liquids into Pharynx -- all consistencies tested;secondary to reduced lingual control;discoordination of lingual movement  -GA --         Oral Residue    Impaired Oral Residue -- diffuse residue throughout oral cavity;lateral sulci residue  -GA --      Lateral Sulci Residue -- thin  liquids;secondary to reduced lingual strength;secondary to reduced lingual range of motion  -GA --      Diffuse Residue throughout Oral Cavity -- all consistencies tested;secondary to reduced lingual strength;secondary to reduced lingual range of motion  -GA --         Initiation of Pharyngeal Swallow    Initiation of Pharyngeal Swallow -- bolus in pyriform sinuses  -GA --      Pharyngeal Phase -- impaired pharyngeal phase of swallowing  -GA --      Anatomical abnormalities noted -- osteophyte/bone spur per radiology report  -GA --      Anatomical abnormalities functional impact -- functional impact on swallowing;other (see comments)  cuasing pharyngeal residue and immeidately under UES  -GA --      Penetration During the Swallow -- thin liquids;all consistencies tested;secondary to delayed swallow initiation or mistiming;secondary to reduced laryngeal elevation;secondary to reduced vestibular closure  -GA --      Depth of Penetration -- deep;shallow;transient;other (see comments)  deep penetration occuring when mixing liquids and solids. patient demonstrating difficulty triggering a swallow with puree consistenices. used liquid wash to clear causing deep penetration. All other thin  trials w/ no, shallow or transient penetration  -GA --      Response to Penetration -- Yes  -GA --      Responsed to penetration with -- throat clear;effective;non-effective;other (see comments)  mostly effective despite 1x with deep penetration with mixed consistecnies (puree and liquid wash)  -GA --      Rosenbek's Scale -- mixed:;thin:;5--->level 5;pudding/puree:;1--->level 1  5 with 1x trial of thin/puree mixed consistency to level of folds.  -GA --      Pharyngeal Residue -- thin liquids;laryngeal vestibule;pudding/puree;diffuse within pharynx;pyriform sinuses;valleculae;base of tongue;posterior pharyngeal wall;secondary to reduced base of tongue retraction;secondary to reduced posterior pharyngeal wall stripping;secondary to  reduced laryngeal elevation;secondary to reduced hyolaryngeal excursion  -GA --      Response to Residue -- partial residue clearance;cleared residue with liquid wash;cleared residue with spontaneous subsequent swallow;cleared residue with compensatory maneuver (see comments)  throat clear  -GA --      Attempted Compensatory Maneuvers -- bolus size;bolus presentation style;throat clear after swallow;additional subsequent swallow  -GA --      Response to Attempted Compensatory Maneuvers -- reduced residue;prevented penetration;did not prevent penetration  -GA --      Successful Compensatory Maneuver Competency -- patient able to;demonstrate compensations;with cues  -GA --         Esophageal Phase    Esophageal Phase -- see radiology report for further details  -GA --         SLP Evaluation Clinical Impression    SLP Swallowing Diagnosis moderate;oral dysphagia;suspected pharyngeal dysphagia  - mod-severe;oral dysphagia;moderate;pharyngeal dysphagia  -GA R/O pharyngeal dysphagia;moderate;oral dysphagia  -GA      Functional Impact risk of aspiration/pneumonia;risk of malnutrition  -SM risk of aspiration/pneumonia;risk of malnutrition;risk of dehydration  -GA risk of aspiration/pneumonia;risk of malnutrition;risk of dehydration  -GA      Rehab Potential/Prognosis, Swallowing re-evaluate goals as necessary  -SM good, to achieve stated therapy goals  -GA good, to achieve stated therapy goals  -GA      Swallow Criteria for Skilled Therapeutic Interventions Met demonstrates skilled criteria  -SM demonstrates skilled criteria  -GA demonstrates skilled criteria  -GA         Recommendations    Therapy Frequency (Swallow) 5 days per week  -SM 5 days per week  -GA --      Predicted Duration Therapy Intervention (Days) 1 week  -SM 1 week  -GA --      SLP Diet Recommendation regular textures;thin liquids;other (see comments)  menu select for naturally softer foods. SLP took preferences for next three meals and sent to kitchen.   - mechanical ground textures;thin liquids  -GA soft to chew textures;ground;thin liquids;other (see comments)  single sips via cup only  -GA      Recommended Diagnostics -- -- [place] [place]  [place]      Recommended Precautions and Strategies upright posture during/after eating;small bites of food and sips of liquid;general aspiration precautions;fatigue precautions;other (see comments)  pull items from trays for snacks between. Pt finds full trays to be overwhelming  - upright posture during/after eating;no straw;multiple swallows per bite of food;multiple swallows per sip of liquid;alternate between small bites of food and sips of liquid;volitional throat clear;general aspiration precautions  -GA upright posture during/after eating;no straw;multiple swallows per bite of food;general aspiration precautions  -GA      Oral Care Recommendations Oral Care BID/PRN;Toothbrush  - Oral Care BID/PRN;Toothbrush  -GA Oral Care BID/PRN;Suction toothbrush  -GA      SLP Rec. for Method of Medication Administration --  trial cut in ice cream, adjust as needed, especially if needed to be crushed. Pt finds crushed to be difficult to manage orally though needs adjustment related to bone spur.  - meds crushed;with puree;as tolerated  discussed with RN about ability to obtain liquid medication  -GA meds whole;meds crushed;with puree;as tolerated  -GA      Monitor for Signs of Aspiration yes;notify SLP if any concerns  - yes;notify SLP if any concerns  -GA yes;notify SLP if any concerns  -GA      Anticipated Discharge Disposition (SLP) home with home health  - home with home health  -GA home with home health  -GA                User Key  (r) = Recorded By, (t) = Taken By, (c) = Cosigned By      Initials Name Effective Dates    Tiffany Prieto MS CCC-SLP 02/03/23 -     Carrie Cooney MS CCC-SLP 09/14/23 -                     EDUCATION  The patient has been educated in the following areas:   Dysphagia (Swallowing  Impairment) Modified Diet Instruction.        SLP GOALS       Row Name 11/07/24 1015 11/06/24 1415          (LTG) Patient will demonstrate functional swallow for    Diet Texture (Demonstrate functional swallow) regular textures  -SM soft to chew (chopped) textures  -GA     Liquid viscosity (Demonstrate functional swallow) thin liquids  -SM thin liquids  -GA     [place] (Demonstrate functional swallow) with minimal cues (75-90% accuracy)  -SM with minimal cues (75-90% accuracy)  -GA     Time Frame (Demonstrate functional swallow) 1 week  -SM 1 week  -GA     Progress/Outcomes (Demonstrate functional swallow) goal revised this date  - new goal  -GA        (STG) Patient will tolerate trials of    Consistencies Trialed (Tolerate trials) regular textures;thin liquids  -SM soft to chew (ground) textures;thin liquids  -GA     Desired Outcome (Tolerate trials) with adequate oral prep/transit/clearance;without signs/symptoms of aspiration;with use of compensatory strategies (see comments)  menu select for softer foods, aspiration precautions, throat clear periodically  -SM without signs/symptoms of aspiration;with adequate oral prep/transit/clearance  -GA     [place] (Tolerate trials) with minimal cues (75-90% accuracy)  -SM with moderate cues (50-74% accuracy)  -GA     Time Frame (Tolerate trials) 1 week  -SM 1 week  -GA     Progress/Outcomes (Tolerate trials) goal revised this date  - new goal  -GA        (STG) Patient will tolerate therapeutic trials of    Consistencies Trialed (Tolerate therapeutic trials) soft to chew (chopped) textures  -SM soft to chew (chopped) textures  -GA     Desired Outcome (Tolerate therapeutic trials) without signs/symptoms of aspiration;with adequate oral prep/transit/clearance  -SM without signs/symptoms of aspiration;with adequate oral prep/transit/clearance  -GA     [place] (Tolerate therapeutic trials) with moderate cues (50-74% accuracy)  -SM with moderate cues (50-74%  accuracy)  -GA     Time Frame (Tolerate therapeutic trials) 1 week  -SM 1 week  -GA     Progress/Outcomes (Tolerate therapeutic trials) goal no longer appropriate  -SM new goal  -GA        (STG) Lingual Strengthening Goal 1 (SLP)    Activity (Lingual Strengthening Goal 1, SLP) -- increase lingual tone/sensation/control/coordination/movement;increase buccal tension or tone;increase tongue back strength  -GA     Increase Lingual Tone/Sensation/Control/Coordination/Movement -- lingual movement exercises;swallow trials;lingual resistance exercises  -GA     Increase Buccal Tension or Tone -- lingual movement exercises;swallow trials;lingual resistance exercises  -GA     Increase Tongue Back Strength -- lingual movement exercises;swallow trials;lingual resistance exercises  -GA     [place]/Accuracy (Lingual Strengthening Goal 1, SLP) -- with moderate cues (50-74% accuracy)  -GA     Time Frame (Lingual Strengthening Goal 1, SLP) -- 1 week  -GA     Progress/Outcomes (Lingual Strengthening Goal 1, SLP) -- new goal  -GA        (STG) Pharyngeal Strengthening Exercise Goal 1 (SLP)    Activity (Pharyngeal Strengthening Goal 1, SLP) -- increase timing;increase superior movement of the hyolaryngeal complex;increase anterior movement of the hyolaryngeal complex;increase closure at entrance to airway/closure of airway at glottis;increase squeeze/positive pressure generation;increase tongue base retraction  -GA     Increase Timing -- prepping - 3 second prep or suck swallow or 3-step swallow  -GA     Increase Superior Movement of the Hyolaryngeal Complex -- falsetto;Mendelsohn  -GA     Increase Anterior Movement of the Hyolaryngeal Complex -- hard effortful swallow  -GA     Increase Closure at Entrance to Airway/Closure of Airway at Glottis -- chin tuck against resistance (CTAR)  -GA     Increase Squeeze/Positive Pressure Generation -- hard effortful swallow  -GA     Increase Tongue Base Retraction -- gerry  -GA      [place]/Accuracy (Pharyngeal Strengthening Goal 1, SLP) -- with moderate cues (50-74% accuracy)  -GA     Time Frame (Pharyngeal Strengthening Goal 1, SLP) -- 1 week  -GA     Progress/Outcomes (Pharyngeal Strengthening Goal 1, SLP) -- new goal  -GA        (STG) Swallow Management Recall Goal 1 (SLP)    Activity (Swallow Management Recall Goal 1, SLP) aspiration precautions;oral care recommendations;safe diet/liquid level;compensatory swallow strategies/techniques;rationale for use of strategies/techniques  - --     [place]/Accuracy (Swallow Management Recall Goal 1, SLP) with minimal cues (75-90% accuracy)  -SM --     Time Frame (Swallow Management Recall Goal 1, SLP) 1 week  -SM --     Progress/Outcomes (Swallow Management Recall Goal 1, SLP) new goal  - --               User Key  (r) = Recorded By, (t) = Taken By, (c) = Cosigned By      Initials Name Provider Type    Tiffany Prieto, MS CCC-SLP Speech and Language Pathologist    Carrie Cooney MS CCC-SLP Speech and Language Pathologist                         Time Calculation:    Time Calculation- SLP       Row Name 11/07/24 1146 11/07/24 1139 11/07/24 1053       Time Calculation- SLP    SLP Start Time 1015  -SM -- --    SLP Received On 11/07/24  -SM -- --       Untimed Charges    39188-WA Eval Oral Pharyng Swallow Minutes 72  -SM -- --       Total Minutes    Untimed Charges Total Minutes 72  -SM -- --     Total Minutes 72  -SM -- --       PT/SLP KX Modifier    Exception criteria met to exceed therapy cap -- Apply KX Modifier  - Apply KX Modifier  -SM      Row Name 11/07/24 1022 11/07/24 0904          PT/SLP KX Modifier    Exception criteria met to exceed therapy cap Apply KX Modifier  -SM Apply KX Modifier  -SM               User Key  (r) = Recorded By, (t) = Taken By, (c) = Cosigned By      Initials Name Provider Type    Tiffany Prieto, MS CCC-SLP Speech and Language Pathologist    Sendy Tee, MS CCC-SLP Speech and  Language Pathologist                    Therapy Charges for Today       Code Description Service Date Service Provider Modifiers Qty    [id number]  ST EVAL ORAL PHARYNG SWALLOW 5 11/7/2024 Tiffany Fisher, MS CCC-SLP GN, KX 1                 Tiffany Fisher, MS CCC-SLP  11/7/2024

## 2024-11-07 NOTE — CASE MANAGEMENT/SOCIAL WORK
Continued Stay Note  [facility]     Patient Name: Guicho Blanco  MRN: [id number]  Today's Date: 11/7/2024    Admit Date: 11/5/2024    Plan: SNF to Transition to Longterm Care (private pay)   Discharge Plan       Row Name 11/07/24 1332       Plan    Plan SNF to Transition to Longterm Care (private pay)    Plan Comments Spoke with patient's wife at bedside.  Patient's plan is likely SNF to transition to longterm care (private pay).  Left voicemail with Hector to follow up on referral that was made to [facility] yesterday, 11/6.  Wife also mentioned taking patient home with 24/7 caregivers adn she has been in contact with [employer].  CM will continue to follow and update wife once I hear from [facility].    Final Discharge Disposition Code 03 - skilled nursing facility (SNF)                   Discharge Codes    No documentation.                 Expected Discharge Date and Time       Expected Discharge Date Expected Discharge Time    Nov 9, 2024               Patt Salomon RN

## 2024-11-07 NOTE — PROGRESS NOTES
[facility] Medicine Services  PROGRESS NOTE    Patient Name: Guicho Blanco  : 1940  MRN: [id number]    Date of Admission: 2024  Primary Care Physician: Mitchel Culver MD    Subjective   Subjective     CC: f/u weakness    HPI: Patient is to be put in soft restraints overnight due to agitation and swinging at staff.  Patient more agitated this morning however somewhat more redirectable and not aggressive.    Saw patient again this afternoon.  He is back to his mental status baseline.  Reports that he was seeing demons in the room and they were trying to fight him and tie him down.  He recognized staff from this morning.      Objective   Objective     Vital Signs:   Temp:  [97.6 °F (36.4 °C)-98.8 °F (37.1 °C)] 98 °F (36.7 °C)  Heart Rate:  [] 109  Resp:  [18] 18  BP: ()/(60-86) 94/66     Physical Exam  Constitutional:       General: He is not in acute distress.  Cardiovascular:      Rate and Rhythm: Normal rate and regular rhythm.      Heart sounds: Normal heart sounds.   Pulmonary:      Effort: Pulmonary effort is normal.      Breath sounds: Normal breath sounds.   Abdominal:      General: There is no distension.      Palpations: Abdomen is soft.      Tenderness: There is no abdominal tenderness.   Musculoskeletal:      Right lower leg: No edema.      Left lower leg: No edema.   Neurological:      General: No focal deficit present.      Mental Status: He is alert.          Results Reviewed:  LAB RESULTS:      Lab 24  0617 24  0738   WBC 7.01 5.88   HEMOGLOBIN 10.5* 11.6*   HEMATOCRIT 33.6* 37.0*   PLATELETS 205 230   NEUTROS ABS  --  3.47   IMMATURE GRANS (ABS)  --  0.02   LYMPHS ABS  --  1.41   MONOS ABS  --  0.50   EOS ABS  --  0.43*   MCV 84.0 84.3   LACTATE  --  1.2         Lab 24  0617 24  0745 24  0738 24  0736   SODIUM 136  --  139  --    POTASSIUM 3.5  --  3.9  --    CHLORIDE 103  --  103  --    CO2 19.0*   --  23.0  --    ANION GAP 14.0  --  13.0  --    BUN 14  --  19  --    CREATININE 0.68* 0.90 0.86 0.90   EGFR 92.2  --  85.9  --    GLUCOSE 103*  --  99  --    CALCIUM 9.1  --  9.4  --          Lab 11/05/24  0738   TOTAL PROTEIN 6.4   ALBUMIN 3.6   GLOBULIN 2.8   ALT (SGPT) 16   AST (SGOT) 30   BILIRUBIN 0.5   ALK PHOS 132*                     Brief Urine Lab Results  (Last result in the past 365 days)        Color   Clarity   Blood   Leuk Est   Nitrite   Protein   CREAT   Urine HCG        11/05/24 0943 Dark Yellow   Clear   Negative   Moderate (2+)   Negative   30 mg/dL (1+)                   Microbiology Results Abnormal       None            FL Video Swallow With Speech Single Contrast    Result Date: 11/6/2024  FL VIDEO SWALLOW W SPEECH SINGLE-CONTRAST Date of Exam: 11/6/2024 2:10 PM EST Indication: swallow concerns.   Comparison: None available. Technique:   The speech pathologist administered food and/or liquid mixed with barium to the patient with cine/video imaging.  Imaging assistance was provided to the speech pathologist and an image was saved. Fluoroscopic Time: 3 minutes and 30 seconds Number of Images: 12 associated fluoroscopic loops were saved Findings: Aspiration was seen during fluoroscopic guided modified barium swallowing series. Please see speech therapy report for full details and recommendations.     Impression: Impression: Fluoroscopy provided for a modified barium swallow. Aspiration was seen during swallowing evaluation. Please see speech therapy report for full details and recommendations. Report dictated by: Jess Sahu PA-c  I have personally reviewed this case and agree with the findings above: Electronically Signed: Roge Guillen MD  11/6/2024 5:02 PM EST  Workstation ID: [id number]     Results for orders placed during the hospital encounter of 09/13/24    Adult Transthoracic Echo Complete W/ Cont if Necessary Per Protocol    Interpretation Summary    Left ventricular systolic  function is normal. Left ventricular ejection fraction appears to be 61 - 65%.    Left ventricular diastolic function was normal.      Current medications:  Scheduled Meds:aspirin, 325 mg, Oral, Daily  cefuroxime, 500 mg, Oral, Q12H  cycloSPORINE, 1 drop, Both Eyes, Q12H  docusate sodium, 100 mg, Oral, BID  enoxaparin, 40 mg, Subcutaneous, Daily  flecainide, 100 mg, Oral, Q12H  isosorbide dinitrate, 10 mg, Oral, TID - Nitrates  nortriptyline, 30 mg, Oral, Nightly  pantoprazole, 40 mg, Intravenous, Q AM  pravastatin, 20 mg, Oral, Daily  sodium chloride, 10 mL, Intravenous, Q12H      Continuous Infusions:   PRN Meds:.  acetaminophen    senna-docusate sodium **AND** polyethylene glycol **AND** bisacodyl **AND** bisacodyl    nitroglycerin    sodium chloride    sodium chloride    Assessment & Plan   Assessment & Plan     Active Hospital Problems    Diagnosis  POA    **Generalized weakness [R53.1]  Yes    Severe protein-calorie malnutrition [E43]  Yes      Resolved Hospital Problems   No resolved problems to display.        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  Admitted for fall at home, and general failure to thrive.       Fall at home  - PT OT  - CM consult.  Wife having difficulty caring for him.  Had extensive conversation with her today.  She realizes is unsafe for him to go home.  She would like to put in the long-term care however this is a significant expense as they will be private pay.  - Hospice consulted, patient reports they were discussing hospice at possible facility but again this to be private pay     Weakness  Poor intake, rumination  Chronic constipation  Frequent waking at night  - hospice consult per wife's request   - Boost TID  - nutrition consult     Delirium, chronic  - Patient's wife  reports that he often gets delirious overnight and confused.  This is when he falls the most  - Patient has never been formally diagnosed with any sort of dementia or cognitive impairment  - Patient on flecainide QTc 460 today  -Given need for restraints overnight will start patient on Zyprexa 2.5 nightly to help with the delirium, can increase dose  - If as needed necessary would consider Versed first    Pyuria, will treat empirically for now  - cefuroxime; watch culture-negative so far     History of R knee replacement/cellulitis 8-9/2024  - no redness currently ; nl WBC, no fever      CAD, Afib, HTN  - continue home meds     Expected Discharge Location and Transportation: TBD  Expected Discharge   Expected Discharge Date: 11/9/2024; Expected Discharge Time:      VTE Prophylaxis:  Pharmacologic VTE prophylaxis orders are present.         AM-PAC 6 Clicks Score (PT): 16 (11/07/24 0800)    CODE STATUS:   Code Status and Medical Interventions: No CPR (Do Not Attempt to Resuscitate); Limited Support; No intubation (DNI)   Ordered at: 11/05/24 1137     Medical Intervention Limits:    No intubation (DNI)     Level Of Support Discussed With:    Patient     Code Status (Patient has no pulse and is not breathing):    No CPR (Do Not Attempt to Resuscitate)     Medical Interventions (Patient has pulse or is breathing):    Limited Support       Carolann Soler MD  11/07/24

## 2024-11-07 NOTE — NURSING NOTE
Patient got combative ,trying to hit staffs ,will not stay in bed ,pulled IV out and telebox.  VSS.talked to nurse practitioner patient put on soft restraints since he is NPO and that can not get IV meds since he has prolonged QT .

## 2024-11-07 NOTE — PLAN OF CARE
Problem: Adult Inpatient Plan of Care  Goal: Plan of Care Review  Outcome: Progressing  Goal: Patient-Specific Goal (Individualized)  Outcome: Progressing  Goal: Absence of Hospital-Acquired Illness or Injury  Outcome: Progressing  Intervention: Identify and Manage Fall Risk  Recent Flowsheet Documentation  Taken 11/7/2024 0400 by Nori Cruz RN  Safety Promotion/Fall Prevention:   activity supervised   assistive device/personal items within reach   clutter free environment maintained   fall prevention program maintained   lighting adjusted   nonskid shoes/slippers when out of bed   room organization consistent   safety round/check completed  Taken 11/7/2024 0200 by Nori Cruz RN  Safety Promotion/Fall Prevention:   activity supervised   assistive device/personal items within reach   clutter free environment maintained   fall prevention program maintained   lighting adjusted   nonskid shoes/slippers when out of bed   room organization consistent   safety round/check completed  Taken 11/7/2024 0000 by Nori Cruz RN  Safety Promotion/Fall Prevention:   activity supervised   assistive device/personal items within reach   clutter free environment maintained   fall prevention program maintained   lighting adjusted   nonskid shoes/slippers when out of bed   room organization consistent   safety round/check completed  Taken 11/6/2024 2200 by Nori Cruz RN  Safety Promotion/Fall Prevention:   activity supervised   assistive device/personal items within reach   clutter free environment maintained   fall prevention program maintained   lighting adjusted   nonskid shoes/slippers when out of bed   room organization consistent   safety round/check completed  Taken 11/6/2024 2000 by Nori Cruz RN  Safety Promotion/Fall Prevention:   activity supervised   assistive device/personal items within reach   clutter free environment maintained   fall prevention program maintained    lighting adjusted   nonskid shoes/slippers when out of bed   room organization consistent   safety round/check completed  Intervention: Prevent Skin Injury  Recent Flowsheet Documentation  Taken 11/7/2024 0400 by Nori Cruz RN  Body Position:   turned   left  Skin Protection:   incontinence pads utilized   skin sealant/moisture barrier applied   transparent dressing maintained  Taken 11/7/2024 0200 by Nori Cruz RN  Body Position:   supine   legs elevated  Skin Protection:   incontinence pads utilized   skin sealant/moisture barrier applied   transparent dressing maintained  Taken 11/7/2024 0000 by Nori Cruz RN  Body Position:   turned   left  Skin Protection:   incontinence pads utilized   skin sealant/moisture barrier applied   transparent dressing maintained  Taken 11/6/2024 2200 by Nori Cruz RN  Body Position: right  Skin Protection:   incontinence pads utilized   skin sealant/moisture barrier applied   transparent dressing maintained  Taken 11/6/2024 2000 by Nori Curz RN  Body Position:   supine   legs elevated  Skin Protection:   incontinence pads utilized   skin sealant/moisture barrier applied   transparent dressing maintained  Intervention: Prevent and Manage VTE (Venous Thromboembolism) Risk  Recent Flowsheet Documentation  Taken 11/6/2024 2000 by Nori Cruz RN  VTE Prevention/Management: (see MAR)   bilateral   SCDs (sequential compression devices) off   other (see comments)  Intervention: Prevent Infection  Recent Flowsheet Documentation  Taken 11/7/2024 0400 by Nori Cruz RN  Infection Prevention:   environmental surveillance performed   equipment surfaces disinfected   hand hygiene promoted   single patient room provided  Taken 11/7/2024 0200 by Nori Cruz RN  Infection Prevention:   environmental surveillance performed   equipment surfaces disinfected   hand hygiene promoted   single patient room provided  Taken  11/7/2024 0000 by Nori Cruz RN  Infection Prevention:   environmental surveillance performed   equipment surfaces disinfected   hand hygiene promoted   single patient room provided  Taken 11/6/2024 2200 by Nori Cruz RN  Infection Prevention:   environmental surveillance performed   equipment surfaces disinfected   hand hygiene promoted   single patient room provided  Taken 11/6/2024 2000 by Nori Cruz RN  Infection Prevention:   environmental surveillance performed   equipment surfaces disinfected   hand hygiene promoted   single patient room provided  Goal: Optimal Comfort and Wellbeing  Outcome: Progressing  Intervention: Provide Person-Centered Care  Recent Flowsheet Documentation  Taken 11/6/2024 2000 by Nori Cruz RN  Trust Relationship/Rapport:   care explained   questions answered   questions encouraged   thoughts/feelings acknowledged  Goal: Readiness for Transition of Care  Outcome: Progressing     Problem: Skin Injury Risk Increased  Goal: Skin Health and Integrity  Outcome: Progressing  Intervention: Optimize Skin Protection  Recent Flowsheet Documentation  Taken 11/7/2024 0400 by Nori Cruz RN  Activity Management: activity minimized  Pressure Reduction Techniques:   frequent weight shift encouraged   pressure points protected   weight shift assistance provided  Head of Bed (HOB) Positioning: \[facility]\"" elevated  Pressure Reduction Devices:   positioning supports utilized   pressure-redistributing mattress utilized   specialty bed utilized  Skin Protection:   incontinence pads utilized   skin sealant/moisture barrier applied   transparent dressing maintained  Taken 11/7/2024 0200 by Nori Cruz RN  Activity Management: patient refuses activity  Pressure Reduction Techniques:   frequent weight shift encouraged   pressure points protected   weight shift assistance provided  Head of Bed (HOB) Positioning: HOB elevated  Pressure Reduction Devices:    positioning supports utilized   pressure-redistributing mattress utilized   specialty bed utilized  Skin Protection:   incontinence pads utilized   skin sealant/moisture barrier applied   transparent dressing maintained  Taken 11/7/2024 0000 by Nori Cruz RN  Activity Management: bedrest  Pressure Reduction Techniques:   frequent weight shift encouraged   pressure points protected   weight shift assistance provided  Head of Bed (HOB) Positioning: [facility] elevated  Pressure Reduction Devices:   positioning supports utilized   pressure-redistributing mattress utilized   specialty bed utilized  Skin Protection:   incontinence pads utilized   skin sealant/moisture barrier applied   transparent dressing maintained  Taken 11/6/2024 2200 by Nori Cruz RN  Activity Management: activity encouraged  Pressure Reduction Techniques:   frequent weight shift encouraged   pressure points protected   weight shift assistance provided  Head of Bed ([facility]) Positioning: [facility] elevated  Pressure Reduction Devices:   positioning supports utilized   pressure-redistributing mattress utilized   specialty bed utilized  Skin Protection:   incontinence pads utilized   skin sealant/moisture barrier applied   transparent dressing maintained  Taken 11/6/2024 2000 by Nori Cruz RN  Activity Management: activity encouraged  Pressure Reduction Techniques:   frequent weight shift encouraged   pressure points protected   weight shift assistance provided  Head of Bed ([facility]) Positioning: [facility] elevated  Pressure Reduction Devices:   positioning supports utilized   pressure-redistributing mattress utilized   specialty bed utilized  Skin Protection:   incontinence pads utilized   skin sealant/moisture barrier applied   transparent dressing maintained     Problem: Restraint, Nonviolent  Goal: Absence of Harm or Injury  Outcome: Progressing  Intervention: Implement Least Restrictive Safety Strategies  Recent Flowsheet  Documentation  Taken 11/7/2024 0500 by Nori Cruz RN  Medical Device Protection:   tubing secured   IV pole/bag removed from visual field  Diversional Activities: television  Taken 11/7/2024 0400 by Nori Cruz RN  Medical Device Protection:   tubing secured   IV pole/bag removed from visual field  Taken 11/7/2024 0300 by Nori Cruz RN  Medical Device Protection:   tubing secured   IV pole/bag removed from visual field  Diversional Activities: television  Taken 11/7/2024 0106 by Nori Cruz RN  Medical Device Protection:   tubing secured   IV pole/bag removed from visual field  Diversional Activities: television  Taken 11/6/2024 2000 by Nori Cruz RN  Diversional Activities: television  Intervention: Protect Dignity, Rights and Personal Wellbeing  Recent Flowsheet Documentation  Taken 11/6/2024 2000 by Nori Cruz RN  Trust Relationship/Rapport:   care explained   questions answered   questions encouraged   thoughts/feelings acknowledged  Intervention: Protect Skin and Joint Integrity  Recent Flowsheet Documentation  Taken 11/7/2024 0400 by Nori Cruz RN  Body Position:   turned   left  Skin Protection:   incontinence pads utilized   skin sealant/moisture barrier applied   transparent dressing maintained  Taken 11/7/2024 0200 by Nori Cruz RN  Body Position:   supine   legs elevated  Skin Protection:   incontinence pads utilized   skin sealant/moisture barrier applied   transparent dressing maintained  Taken 11/7/2024 0000 by Nori Cruz RN  Body Position:   turned   left  Skin Protection:   incontinence pads utilized   skin sealant/moisture barrier applied   transparent dressing maintained  Taken 11/6/2024 2200 by Nori Cruz RN  Body Position: right  Skin Protection:   incontinence pads utilized   skin sealant/moisture barrier applied   transparent dressing maintained  Taken 11/6/2024 2000 by Nancy  AUSTIN Julio  Body Position:   supine   legs elevated  Skin Protection:   incontinence pads utilized   skin sealant/moisture barrier applied   transparent dressing maintained   Goal Outcome Evaluation:

## 2024-11-07 NOTE — CASE COMMUNICATION
[facility]    Reason: currently in observation at [facility]      For your records only.   Per CMS Guidance, MD must be notified of missed/cancelled visits; therefore the prescribed frequency was not met.

## 2024-11-08 LAB
ANION GAP SERPL CALCULATED.3IONS-SCNC: 11 MMOL/L (ref 5–15)
BUN SERPL-MCNC: 8 MG/DL (ref 8–23)
BUN/CREAT SERPL: 11 (ref 7–25)
CALCIUM SPEC-SCNC: 9.4 MG/DL (ref 8.6–10.5)
CHLORIDE SERPL-SCNC: 101 MMOL/L (ref 98–107)
CO2 SERPL-SCNC: 23 MMOL/L (ref 22–29)
CREAT SERPL-MCNC: 0.73 MG/DL (ref 0.76–1.27)
DEPRECATED RDW RBC AUTO: 44.4 FL (ref 37–54)
EGFRCR SERPLBLD CKD-EPI 2021: 90.3 ML/MIN/1.73
ERYTHROCYTE [DISTWIDTH] IN BLOOD BY AUTOMATED COUNT: 14.7 % (ref 12.3–15.4)
GLUCOSE SERPL-MCNC: 99 MG/DL (ref 65–99)
HCT VFR BLD AUTO: 37.2 % (ref 37.5–51)
HGB BLD-MCNC: 11.9 G/DL (ref 13–17.7)
MAGNESIUM SERPL-MCNC: 1.6 MG/DL (ref 1.6–2.4)
MCH RBC QN AUTO: 26.6 PG (ref 26.6–33)
MCHC RBC AUTO-ENTMCNC: 32 G/DL (ref 31.5–35.7)
MCV RBC AUTO: 83 FL (ref 79–97)
PLATELET # BLD AUTO: 215 10*3/MM3 (ref 140–450)
PMV BLD AUTO: 10.7 FL (ref 6–12)
POTASSIUM SERPL-SCNC: 3.5 MMOL/L (ref 3.5–5.2)
RBC # BLD AUTO: 4.48 10*6/MM3 (ref 4.14–5.8)
SODIUM SERPL-SCNC: 135 MMOL/L (ref 136–145)
WBC NRBC COR # BLD AUTO: 6.13 10*3/MM3 (ref 3.4–10.8)

## 2024-11-08 PROCEDURE — G0378 HOSPITAL OBSERVATION PER HR: HCPCS

## 2024-11-08 PROCEDURE — 97110 THERAPEUTIC EXERCISES: CPT

## 2024-11-08 PROCEDURE — 25010000002 ENOXAPARIN PER 10 MG: Performed by: INTERNAL MEDICINE

## 2024-11-08 PROCEDURE — 83735 ASSAY OF MAGNESIUM: CPT | Performed by: STUDENT IN AN ORGANIZED HEALTH CARE EDUCATION/TRAINING PROGRAM

## 2024-11-08 PROCEDURE — 97116 GAIT TRAINING THERAPY: CPT

## 2024-11-08 PROCEDURE — 99232 SBSQ HOSP IP/OBS MODERATE 35: CPT | Performed by: STUDENT IN AN ORGANIZED HEALTH CARE EDUCATION/TRAINING PROGRAM

## 2024-11-08 PROCEDURE — 85027 COMPLETE CBC AUTOMATED: CPT | Performed by: STUDENT IN AN ORGANIZED HEALTH CARE EDUCATION/TRAINING PROGRAM

## 2024-11-08 PROCEDURE — 96372 THER/PROPH/DIAG INJ SC/IM: CPT

## 2024-11-08 PROCEDURE — 96374 THER/PROPH/DIAG INJ IV PUSH: CPT

## 2024-11-08 PROCEDURE — 92526 ORAL FUNCTION THERAPY: CPT

## 2024-11-08 PROCEDURE — 80048 BASIC METABOLIC PNL TOTAL CA: CPT | Performed by: STUDENT IN AN ORGANIZED HEALTH CARE EDUCATION/TRAINING PROGRAM

## 2024-11-08 RX ADMIN — Medication 10 ML: at 09:46

## 2024-11-08 RX ADMIN — FLECAINIDE ACETATE 100 MG: 50 TABLET ORAL at 05:33

## 2024-11-08 RX ADMIN — CEFUROXIME AXETIL 500 MG: 250 TABLET, FILM COATED ORAL at 09:45

## 2024-11-08 RX ADMIN — NORTRIPTYLINE HYDROCHLORIDE 30 MG: 10 CAPSULE ORAL at 20:22

## 2024-11-08 RX ADMIN — SENNOSIDES 1 TABLET: 8.6 TABLET, FILM COATED ORAL at 20:21

## 2024-11-08 RX ADMIN — ASPIRIN 325 MG: 325 TABLET ORAL at 09:45

## 2024-11-08 RX ADMIN — ENOXAPARIN SODIUM 40 MG: 40 INJECTION SUBCUTANEOUS at 09:55

## 2024-11-08 RX ADMIN — FLECAINIDE ACETATE 100 MG: 50 TABLET ORAL at 16:10

## 2024-11-08 RX ADMIN — CYCLOSPORINE 1 DROP: 0.5 EMULSION OPHTHALMIC at 09:49

## 2024-11-08 RX ADMIN — Medication 10 ML: at 20:23

## 2024-11-08 RX ADMIN — BISACODYL 5 MG: 5 TABLET, COATED ORAL at 09:45

## 2024-11-08 RX ADMIN — PRAVASTATIN SODIUM 20 MG: 20 TABLET ORAL at 09:45

## 2024-11-08 RX ADMIN — ISOSORBIDE DINITRATE 10 MG: 10 TABLET ORAL at 09:46

## 2024-11-08 RX ADMIN — CYCLOSPORINE 1 DROP: 0.5 EMULSION OPHTHALMIC at 20:22

## 2024-11-08 RX ADMIN — OLANZAPINE 2.5 MG: 5 TABLET, FILM COATED ORAL at 20:21

## 2024-11-08 RX ADMIN — CEFUROXIME AXETIL 500 MG: 250 TABLET, FILM COATED ORAL at 20:21

## 2024-11-08 RX ADMIN — PANTOPRAZOLE SODIUM 40 MG: 40 INJECTION, POWDER, FOR SOLUTION INTRAVENOUS at 05:33

## 2024-11-08 NOTE — CASE MANAGEMENT/SOCIAL WORK
Continued Stay Note  [facility]     Patient Name: Guicho Blanco  MRN: [id number]  Today's Date: 11/8/2024    Admit Date: 11/5/2024    Plan: SNF to Transition to Longterm Care (private pay)   Discharge Plan       Row Name 11/08/24 1141       Plan    Plan SNF to Transition to Longterm Care (private pay)    Plan Comments Spoke with Hector at [facility].  They are not able to offer patient a bed at this time.  Updated wife in room.  She would like a referral to [facility].  Referral faxed.  Patient Choice list also provided to patient's wife.  CM will continue to follow.                   Discharge Codes    No documentation.                 Expected Discharge Date and Time       Expected Discharge Date Expected Discharge Time    Nov 11, 2024               Patt Salomon RN

## 2024-11-08 NOTE — DISCHARGE PLACEMENT REQUEST
"Kayley Blancohailey Mcdermott \"Rad\" (83 y.o. Male)     Patt Salomon, RN  [phone]    Looking for skilled to transition to longterm care (private pay)        Date of Birth   1940    Social Security Number       Address   [address]  [facility] [address]    Home Phone   [phone]    MRN   [id number]       [employer]   [employer]    Marital Status                               Admission Date   11/5/24    Admission Type   Emergency    Admitting Provider   Carolann Soler MD    Attending Provider   Carolann Soler MD    Department, Room/Bed   [facility] 3E, S341/1       Discharge Date       Discharge Disposition       Discharge Destination                                 Attending Provider: Carolann Soler MD    Allergies: Linezolid, Divalproex Sodium (Migraine) [Valproic Acid], Cymbalta [Duloxetine Hcl], Duloxetine, Metoprolol, Neurontin [Gabapentin]    Isolation: None   Infection: None   Code Status: No CPR    Ht: 182.9 cm (72\")   Wt: 69.1 kg (152 lb 4.8 oz)    Admission Cmt: None   Principal Problem: Generalized weakness [R53.1]                   Active Insurance as of 11/5/2024       Primary Coverage       Payor Plan Insurance Group Employer/Plan Group    ANTH MEDICARE REPLACEMENT ANTHEM MEDICARE ADVANTAGE [id number]       Payor Plan Address Payor Plan Phone Number Payor Plan Fax Number Effective Dates    [address] [phone]  1/1/2024 - None Entered    [facility] [address]         Subscriber Name Subscriber Birth Date Member ID       SULY BLANCO FRANCIS 1940 [id number]                     Emergency Contacts        (Rel.) Home Phone Work Phone Mobile Phone    Sandra Blanco (Spouse) -- -- [phone]    Sylwia Cee (Daughter) [phone] -- [phone]    Ashley Mejia (Grandchild) -- -- [phone]                 History & Physical        Mini, MD Zofia at 11/05/24 [facility]              [facility]" [facility] Medicine Services  HISTORY AND PHYSICAL    Patient Name: Guicho Blanco  : 1940  MRN: [id number]  Primary Care Physician: Mitchel Culver MD  Date of admission: 2024      Subjective  Subjective     Chief Complaint:  Falls at home     HPI:  Guicho Blanco is a 83 y.o. male, retired , comes from home by EMS after a fall.  It is his fourth fall in the past two weeks; he was alone in the bedroom, apparently got up on his own and was found on hte floor by his wife soon afterward.  He hit his R shoulder.  He currently denies pain.     He has progressive weakness and failure to thrive and has been declining since knee replacement in early 2024, complicated by readmissions  and  for R knee cellulitis with high fever and encephalopathy, also suspected serotonin syndrome.  He has been in rehab but is currently at home and his wife is having increasing trouble caring for him.      Issues include:    - Poor appetite/poor intake, with prolonged rumination.  His PCP is slowly increasing his nortryptilene for appetite stimulation (now at 30mg QHS) but no results thus far.    - slow weight loss x past 3 years  - Up every hour in the night, which his wife has to help with.  Sometimes this is due to nocturia   - Constipation:  better on aggressive bowel regimen     His wife requests a hospice consult in hopes of getting more help at home .,        Personal History     Past Medical History:   Diagnosis Date    Arthritis     Atrial fibrillation     BPH (benign prostatic hypertrophy)     Cataract     Cellulitis     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     bilateral    Coronary artery disease     Full dentures     Gall stones     GERD (gastroesophageal reflux disease)     Hearing loss     Hiatal hernia     Hyperlipidemia     Hypertension     Peripheral neuropathy     Peripheral vascular disease     Seasonal allergies     Skin cancer     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     TIA (transient ischemic attack)     2 times    Wears glasses            Past Surgical History:   Procedure Laterality Date    ABLATION OF DYSRHYTHMIC FOCUS      APPENDECTOMY      CARDIAC CATHETERIZATION N/A 10/05/2017    Procedure: Left Heart Cath;  Surgeon: Hecotr Urrutia MD;  Location:  [facility] CATH INVASIVE LOCATION;  Service:     CATARACT EXTRACTION Bilateral     CHOLECYSTECTOMY      Remote    COCHLEAR IMPLANT REVISION  05/2019    COLONOSCOPY      PROSTATE SURGERY      SHOULDER SURGERY Right     SKIN BIOPSY      TOE AMPUTATION Right     2nd toe - Right Foot    TONSILLECTOMY      TOTAL KNEE ARTHROPLASTY Right 8/7/2024    Procedure: TOTAL KNEE ARTHROPLASTY WITH ASHLEY ROBOT RIGHT;  Surgeon: Christian Schwartz MD;  Location:  [facility] OR;  Service: Robotics - Ortho;  Laterality: Right;       Family History: family history includes Atrial fibrillation in his mother; Dementia in his mother; Lung cancer in his father.     Social History:  reports that he has never smoked. He has been exposed to tobacco smoke. He has never used smokeless tobacco. He reports that he does not drink alcohol and does not use drugs.  Social History     Social History Narrative    Pt does not consume caffeine.        Medications:  Available home medication information reviewed.  Lactobacillus, acetaminophen, aspirin, bisacodyl, coenzyme Q10, cycloSPORINE, docusate sodium, flecainide, glucose, isosorbide mononitrate, nitroglycerin, omeprazole OTC, polyethylene glycol, pravastatin, vitamin D3, and vitamin b complex    Allergies   Allergen Reactions    Linezolid Other (See Comments)     Serotonin syndrome    Divalproex Sodium (Migraine) [Valproic Acid] Other (See Comments)     Weak, lethargy and got worse    Cymbalta [Duloxetine Hcl] Mental Status Change     Depression, thoughts of Suicide.     Duloxetine Unknown (See Comments)    Metoprolol Other (See Comments)     UNKNOWN. Pt does not remember this being allergy    Neurontin [Gabapentin] Other  (See Comments)     Lethargy & fatigue       Objective  Objective     Vital Signs:   Temp:  [97.4 °F (36.3 °C)] 97.4 °F (36.3 °C)  Heart Rate:  [82-97] 85  Resp:  [16] 16  BP: (123-130)/(65-75) 123/75       Physical Exam   Gen:  thin, flat affect but gives history, NAD.  Wife present   Neuro: alert and oriented, clear speech, follows commands, no tremor, no rigidity, flat affect.    HEENT:  NC/AT PERRL, OP benign  Neck:  Supple,  Heart RRR no murmur  Lungs not labored   Abd:  Soft, nontender, no rebound or guarding, pos BS  Extrem:  No c/c/e; R knee w healing incision, no redness       Result Review:  I have personally reviewed the results from the time of this admission to 11/5/2024 11:25 EST and agree with these findings:  [x]  Laboratory list / accordion  []  Microbiology  [x]  Radiology  []  EKG/Telemetry   []  Cardiology/Vascular   []  Pathology  [x]  Old records  []  Other:  Most notable findings include: shoulder no fx.   Cr nl.        LAB RESULTS:      Lab 11/05/24  0738   WBC 5.88   HEMOGLOBIN 11.6*   HEMATOCRIT 37.0*   PLATELETS 230   NEUTROS ABS 3.47   IMMATURE GRANS (ABS) 0.02   LYMPHS ABS 1.41   MONOS ABS 0.50   EOS ABS 0.43*   MCV 84.3   LACTATE 1.2         Lab 11/05/24  0745 11/05/24  0738 11/05/24  0736   SODIUM  --  139  --    POTASSIUM  --  3.9  --    CHLORIDE  --  103  --    CO2  --  23.0  --    ANION GAP  --  13.0  --    BUN  --  19  --    CREATININE 0.90 0.86 0.90   EGFR  --  85.9  --    GLUCOSE  --  99  --    CALCIUM  --  9.4  --          Lab 11/05/24  0738   TOTAL PROTEIN 6.4   ALBUMIN 3.6   GLOBULIN 2.8   ALT (SGPT) 16   AST (SGOT) 30   BILIRUBIN 0.5   ALK PHOS 132*                     UA          9/13/2024    13:08 9/16/2024    15:19 11/5/2024    09:43   Urinalysis   Squamous Epithelial Cells, UA 0-2  3-6  0-2    Specific Gravity, UA 1.027  1.021  1.041    Ketones, UA Trace  Trace  15 mg/dL (1+)    Blood, UA Negative  Small (1+)  Negative    Leukocytes, UA Trace  Negative  Moderate (2+)     Nitrite, UA Negative  Negative  Negative    RBC, UA 0-2  3-5  3-5    WBC, UA 3-5  0-2  Too Numerous to Count    Bacteria, UA None Seen  None Seen  None Seen        Microbiology Results (last 10 days)       ** No results found for the last 240 hours. **            CT Abdomen Pelvis With Contrast    Result Date: 11/5/2024  CT ABDOMEN PELVIS W CONTRAST Date of Exam: 11/5/2024 7:51 AM EST Indication: Weakness, fall, low abdomen pain. Comparison: 1/11/2016 Technique: Axial CT images were obtained of the abdomen and pelvis following the uneventful intravenous administration of 85 cc Isovue-300. Reconstructed coronal and sagittal images were also obtained. Automated exposure control and iterative construction methods were used. Findings: Lower Chest: Emphysema in the lung bases. Moderate hiatal hernia Organs: Liver, spleen, kidneys, adrenal glands unremarkable. 3 cm cystic lesion arising from the neck of the pancreas, with a thin wall and some internal dependent high density material. Gallbladder surgically absent Gastrointestinal: Possible rectal wall thickening. No intestinal distention. Colonic diverticula without inflammation. Pelvis: No abnormal fluid collection. Urinary bladder unremarkable Peritoneum/Retroperitoneum: No ascites or pneumoperitoneum. Normal caliber Bones/Soft Tissues: No acute bony abnormality. Lumbar scoliosis with diffuse degenerative disease     Impression: 1. Possible rectal wall thickening, correlate for proctitis 2. 3 cm cystic lesion of the neck of the pancreas. This was present on the comparison study from 2016 but has enlarged in the interval, previously approximately 2 cm 3. Hiatal hernia 4. Colonic diverticulosis without evidence of diverticulitis Electronically Signed: Jace Davila  11/5/2024 9:34 AM EST  Workstation ID: [id number]    XR Shoulder 2+ View Right    Result Date: 11/5/2024  XR SHOULDER 2+ VW RIGHT Date of Exam: 11/5/2024 7:27 AM EST Indication: Trauma Comparison: None  available. Findings: No evidence of fracture or dislocation. Glenohumeral joint appears within normal limits. Irregularity and cystic change of the greater noted. AC joint osteoarthritis with caudally oriented osteophyte     Impression: Impression: 1. No evidence of fracture or dislocation 2. Appearance of the greater tuberosity could reflect chronic shoulder impingement 3. AC joint osteoarthritis Electronically Signed: Jace Davila  11/5/2024 7:30 AM EST  Workstation ID: [id number]     Results for orders placed during the hospital encounter of 09/13/24    Adult Transthoracic Echo Complete W/ Cont if Necessary Per Protocol    Interpretation Summary    Left ventricular systolic function is normal. Left ventricular ejection fraction appears to be 61 - 65%.    Left ventricular diastolic function was normal.      Assessment & Plan  Assessment & Plan       Generalized weakness    83 yr old man with history of afib, CAD, HTN, TIA, cochlear implants, and R knee replacement in Aug 2024 with 2 readmissions for cellulitis and progressive weakness/frailty in recent months.  Admitted for fall at home, and general failure to thrive.      Fall at home  - PT OT  - CM consult.  Wife having difficulty caring for him  - r shoulder: no fracture    Weakness  Poor intake, rumination  Chronic constipation  Frequent waking at night  - hospice consult per wife's request   - Boost TID  - nutrition consult       Pyuria, will treat empirically for now  - cefuroxime; watch culture    History of R knee replacement/cellulitis 8-9/2024  - no redness currently ; nl WBC, no fever     CAD, Afib, HTN  - continue home meds     VTE Prophylaxis:  No VTE prophylaxis order currently exists.          CODE STATUS:    There are no questions and answers to display.       Expected Discharge   Expected discharge date/ time has not been documented.     Zofia Esparza MD  11/05/24      Electronically signed by Zofia Esparza MD at 11/05/24 9935       Current  Facility-Administered Medications   Medication Dose Route Frequency Provider Last Rate Last Admin    acetaminophen (TYLENOL) 160 MG/5ML oral solution 650 mg  650 mg Oral Q6H PRN Elza Henry, PharmD        aspirin tablet 325 mg  325 mg Oral Daily Zofia Esparza MD   325 mg at 11/08/24 0945    sennosides-docusate (PERICOLACE) 8.6-50 MG per tablet 2 tablet  2 tablet Oral BID PRN Zofia Esparza MD        And    polyethylene glycol (MIRALAX) packet 17 g  17 g Oral Daily PRN Zofia Esparza MD        And    bisacodyl (DULCOLAX) EC tablet 5 mg  5 mg Oral Daily PRN Zofia Esparza MD   5 mg at 11/08/24 0945    And    bisacodyl (DULCOLAX) suppository 10 mg  10 mg Rectal Daily PRN Zofia Esparza MD        cefuroxime (CEFTIN) tablet 500 mg  500 mg Oral Q12H Zofia Esparza MD   500 mg at 11/08/24 0945    cycloSPORINE (RESTASIS) 0.05 % ophthalmic emulsion 1 drop  1 drop Both Eyes Q12H Zofia Esparza MD   1 drop at 11/08/24 0949    Enoxaparin Sodium (LOVENOX) syringe 40 mg  40 mg Subcutaneous Daily Zofia Esparza MD   40 mg at 11/08/24 0955    flecainide (TAMBOCOR) tablet 100 mg  100 mg Oral Q12H Zofia Esparza MD   100 mg at 11/08/24 0533    isosorbide dinitrate (ISORDIL) tablet 10 mg  10 mg Oral TID - Nitrates Elza Henry, PharmD   10 mg at 11/08/24 0946    nitroglycerin (NITROSTAT) SL tablet 0.4 mg  0.4 mg Sublingual Q5 Min PRN Zofia Esparza MD        nortriptyline (PAMELOR) capsule 30 mg  30 mg Oral Nightly Zofia Esparza MD   30 mg at 11/07/24 2048    OLANZapine (zyPREXA) tablet 2.5 mg  2.5 mg Oral Nightly Carolann Soler MD   2.5 mg at 11/07/24 2048    pantoprazole (PROTONIX) injection 40 mg  40 mg Intravenous Q AM Elza Henry, PharmD   40 mg at 11/08/24 0533    pravastatin (PRAVACHOL) tablet 20 mg  20 mg Oral Daily Zofia Esparza MD   20 mg at 11/08/24 0945    senna (SENOKOT) tablet 1 tablet  1 tablet Oral Nightly Carolann Soler MD   1 tablet at 11/07/24 2048    sodium  chloride 0.9 % flush 10 mL  10 mL Intravenous Q12H Zofia Esparza MD   10 mL at 24 0946    sodium chloride 0.9 % flush 10 mL  10 mL Intravenous PRN Zofia Esparza MD        sodium chloride 0.9 % infusion 40 mL  40 mL Intravenous PRN Zofia Esparza MD            Physician Progress Notes (most recent note)        Carolann Soler MD at 24 1451              [facility] Medicine Services  PROGRESS NOTE    Patient Name: Guicho Blanco  : 1940  MRN: [id number]    Date of Admission: 2024  Primary Care Physician: Mitchel Culver MD    Subjective   Subjective     CC: f/u weakness    HPI: Patient is to be put in soft restraints overnight due to agitation and swinging at staff.  Patient more agitated this morning however somewhat more redirectable and not aggressive.    Saw patient again this afternoon.  He is back to his mental status baseline.  Reports that he was seeing demons in the room and they were trying to fight him and tie him down.  He recognized staff from this morning.      Objective   Objective     Vital Signs:   Temp:  [97.6 °F (36.4 °C)-98.8 °F (37.1 °C)] 98 °F (36.7 °C)  Heart Rate:  [] 109  Resp:  [18] 18  BP: ()/(60-86) 94/66     Physical Exam  Constitutional:       General: He is not in acute distress.  Cardiovascular:      Rate and Rhythm: Normal rate and regular rhythm.      Heart sounds: Normal heart sounds.   Pulmonary:      Effort: Pulmonary effort is normal.      Breath sounds: Normal breath sounds.   Abdominal:      General: There is no distension.      Palpations: Abdomen is soft.      Tenderness: There is no abdominal tenderness.   Musculoskeletal:      Right lower leg: No edema.      Left lower leg: No edema.   Neurological:      General: No focal deficit present.      Mental Status: He is alert.          Results Reviewed:  LAB RESULTS:      Lab 24  0617 24  0738   WBC 7.01 5.88   HEMOGLOBIN 10.5*  11.6*   HEMATOCRIT 33.6* 37.0*   PLATELETS 205 230   NEUTROS ABS  --  3.47   IMMATURE GRANS (ABS)  --  0.02   LYMPHS ABS  --  1.41   MONOS ABS  --  0.50   EOS ABS  --  0.43*   MCV 84.0 84.3   LACTATE  --  1.2         Lab 11/06/24  0617 11/05/24  0745 11/05/24  0738 11/05/24  0736   SODIUM 136  --  139  --    POTASSIUM 3.5  --  3.9  --    CHLORIDE 103  --  103  --    CO2 19.0*  --  23.0  --    ANION GAP 14.0  --  13.0  --    BUN 14  --  19  --    CREATININE 0.68* 0.90 0.86 0.90   EGFR 92.2  --  85.9  --    GLUCOSE 103*  --  99  --    CALCIUM 9.1  --  9.4  --          Lab 11/05/24  0738   TOTAL PROTEIN 6.4   ALBUMIN 3.6   GLOBULIN 2.8   ALT (SGPT) 16   AST (SGOT) 30   BILIRUBIN 0.5   ALK PHOS 132*                     Brief Urine Lab Results  (Last result in the past 365 days)        Color   Clarity   Blood   Leuk Est   Nitrite   Protein   CREAT   Urine HCG        11/05/24 0943 Dark Yellow   Clear   Negative   Moderate (2+)   Negative   30 mg/dL (1+)                   Microbiology Results Abnormal       None            FL Video Swallow With Speech Single Contrast    Result Date: 11/6/2024  FL VIDEO SWALLOW W SPEECH SINGLE-CONTRAST Date of Exam: 11/6/2024 2:10 PM EST Indication: swallow concerns.   Comparison: None available. Technique:   The speech pathologist administered food and/or liquid mixed with barium to the patient with cine/video imaging.  Imaging assistance was provided to the speech pathologist and an image was saved. Fluoroscopic Time: 3 minutes and 30 seconds Number of Images: 12 associated fluoroscopic loops were saved Findings: Aspiration was seen during fluoroscopic guided modified barium swallowing series. Please see speech therapy report for full details and recommendations.     Impression: Impression: Fluoroscopy provided for a modified barium swallow. Aspiration was seen during swallowing evaluation. Please see speech therapy report for full details and recommendations. Report dictated by: Jess  James Sahu  I have personally reviewed this case and agree with the findings above: Electronically Signed: Roge Guillen MD  11/6/2024 5:02 PM EST  Workstation ID: [id number]     Results for orders placed during the hospital encounter of 09/13/24    Adult Transthoracic Echo Complete W/ Cont if Necessary Per Protocol    Interpretation Summary    Left ventricular systolic function is normal. Left ventricular ejection fraction appears to be 61 - 65%.    Left ventricular diastolic function was normal.      Current medications:  Scheduled Meds:aspirin, 325 mg, Oral, Daily  cefuroxime, 500 mg, Oral, Q12H  cycloSPORINE, 1 drop, Both Eyes, Q12H  docusate sodium, 100 mg, Oral, BID  enoxaparin, 40 mg, Subcutaneous, Daily  flecainide, 100 mg, Oral, Q12H  isosorbide dinitrate, 10 mg, Oral, TID - Nitrates  nortriptyline, 30 mg, Oral, Nightly  pantoprazole, 40 mg, Intravenous, Q AM  pravastatin, 20 mg, Oral, Daily  sodium chloride, 10 mL, Intravenous, Q12H      Continuous Infusions:   PRN Meds:.  acetaminophen    senna-docusate sodium **AND** polyethylene glycol **AND** bisacodyl **AND** bisacodyl    nitroglycerin    sodium chloride    sodium chloride    Assessment & Plan   Assessment & Plan     Active Hospital Problems    Diagnosis  POA    **Generalized weakness [R53.1]  Yes    Severe protein-calorie malnutrition [E43]  Yes      Resolved Hospital Problems   No resolved problems to display.        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  Admitted for fall at home, and general failure to thrive.       Fall at home  - PT OT  - CM consult.  Wife having difficulty caring for him.  Had extensive conversation with her today.  She realizes is unsafe for him to go home.  She would like to put in the long-term care however this is a  significant expense as they will be private pay.  - Hospice consulted, patient reports they were discussing hospice at possible facility but again this to be private pay     Weakness  Poor intake, rumination  Chronic constipation  Frequent waking at night  - hospice consult per wife's request   - Boost TID  - nutrition consult     Delirium, chronic  - Patient's wife reports that he often gets delirious overnight and confused.  This is when he falls the most  - Patient has never been formally diagnosed with any sort of dementia or cognitive impairment  - Patient on flecainide QTc 460 today  -Given need for restraints overnight will start patient on Zyprexa 2.5 nightly to help with the delirium, can increase dose  - If as needed necessary would consider Versed first    Pyuria, will treat empirically for now  - cefuroxime; watch culture-negative so far     History of R knee replacement/cellulitis 8-9/2024  - no redness currently ; nl WBC, no fever      CAD, Afib, HTN  - continue home meds     Expected Discharge Location and Transportation: TBD  Expected Discharge   Expected Discharge Date: 11/9/2024; Expected Discharge Time:      VTE Prophylaxis:  Pharmacologic VTE prophylaxis orders are present.         AM-PAC 6 Clicks Score (PT): 16 (11/07/24 0800)    CODE STATUS:   Code Status and Medical Interventions: No CPR (Do Not Attempt to Resuscitate); Limited Support; No intubation (DNI)   Ordered at: 11/05/24 1137     Medical Intervention Limits:    No intubation (DNI)     Level Of Support Discussed With:    Patient     Code Status (Patient has no pulse and is not breathing):    No CPR (Do Not Attempt to Resuscitate)     Medical Interventions (Patient has pulse or is breathing):    Limited Support       Carolann Soler MD  11/07/24       Electronically signed by aCrolann Soler MD at 11/07/24 5157          Physical Therapy Notes (most recent note)        Ebony Loera, PT at 11/06/24 1501   Version 1 of          Patient Name: Guicho Blanco  : 1940    MRN: [id number]                              Today's Date: 2024       Admit Date: 2024    Visit Dx:     ICD-10-CM ICD-9-CM   1. Generalized weakness  R53.1 780.79   2. Fall in home, initial encounter  W19.XXXA E888.9    Y92.009 E849.0   3. Anorexia  R63.0 783.0   4. Bacterial UTI  N39.0 599.0    A49.9 041.9   5. Dysphagia, unspecified type  R13.10 787.20     Patient Active Problem List   Diagnosis    Osteomyelitis of toe of right foot    Osteomyelitis of right foot    PAF (paroxysmal atrial fibrillation)    Coronary artery disease involving native coronary artery of native heart without angina pectoris    Gastroesophageal reflux disease without esophagitis    Benign non-nodular prostatic hyperplasia without lower urinary tract symptoms    S/P appy    Essential hypertension    Simple chronic bronchitis    Mixed hyperlipidemia    COPD (chronic obstructive pulmonary disease)    BPH (benign prostatic hypertrophy)    Hearing loss    Chest pain    Vertigo    Snoring    Overweight    PLMD (periodic limb movement disorder)    Neuropathy    Periodic headache syndrome, not intractable    Acquired absence of other right toe(s)    Tremor    Bilateral impacted cerumen    Bilateral sensorineural hearing loss    Chronic otitis externa    Deviated nasal septum    Disorder of joint of foot    ED (erectile dysfunction) of organic origin    Hydrocele    Hydrocele of testis    Impacted cerumen    Mononeuropathy    Swelling of testicle    Venous retinal branch occlusion    Vitreous degeneration    Benign prostatic hyperplasia with urinary obstruction    OA (osteoarthritis) of knee    Status post total right knee replacement    Septic arthritis    Cellulitis    Cellulitis of right knee    Failure to thrive in adult    Insomnia    Moderate malnutrition    Sepsis    Lactic acidosis    Serotonin syndrome    Dysphagia    Generalized weakness     Past  Medical History:   Diagnosis Date    Arthritis     Atrial fibrillation     BPH (benign prostatic hypertrophy)     Cataract     Cellulitis     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     bilateral    Coronary artery disease     Full dentures     Gall stones     GERD (gastroesophageal reflux disease)     Hearing loss     Hiatal hernia     Hyperlipidemia     Hypertension     Peripheral neuropathy     Peripheral vascular disease     Seasonal allergies     Skin cancer     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    TIA (transient ischemic attack)     2 times    Wears glasses      Past Surgical History:   Procedure Laterality Date    ABLATION OF DYSRHYTHMIC FOCUS      APPENDECTOMY      CARDIAC CATHETERIZATION N/A 10/05/2017    Procedure: Left Heart Cath;  Surgeon: Hector Urrutia MD;  Location:  [facility] CATH INVASIVE LOCATION;  Service:     CATARACT EXTRACTION Bilateral     CHOLECYSTECTOMY      Remote    COCHLEAR IMPLANT REVISION  05/2019    COLONOSCOPY      PROSTATE SURGERY      SHOULDER SURGERY Right     SKIN BIOPSY      TOE AMPUTATION Right     2nd toe - Right Foot    TONSILLECTOMY      TOTAL KNEE ARTHROPLASTY Right 8/7/2024    Procedure: TOTAL KNEE ARTHROPLASTY WITH ASHLEY ROBOT RIGHT;  Surgeon: Christian Schwartz MD;  Location:  [facility];  Service: Robotics - Ortho;  Laterality: Right;      General Information       Row Name 11/06/24 1542          Physical Therapy Time and Intention    Document Type therapy note (daily note)  -AB     Mode of Treatment physical therapy  -AB       Row Name 11/06/24 1542          General Information    Patient Profile Reviewed yes  -AB     Existing Precautions/Restrictions fall  [place], cochlear implant  -AB     Barriers to Rehab medically complex;previous functional deficit;cognitive status  -AB       Row Name 11/06/24 1542          Cognition    Orientation Status (Cognition) oriented to;person;place;disoriented to;time  -AB       Row Name 11/06/24 1542          Safety Issues/Impairments Affecting Functional  Mobility    Safety Issues Affecting Function (Mobility) awareness of need for assistance;insight into deficits/self-awareness;safety precaution awareness;safety precautions follow-through/compliance;sequencing abilities;problem-solving;judgment  -AB     Impairments Affecting Function (Mobility) balance;cognition;endurance/activity tolerance;strength  -AB     Cognitive Impairments, Mobility Safety/Performance awareness, need for assistance;insight into deficits/self-awareness;problem-solving/reasoning;judgment;safety precaution awareness;safety precaution follow-through;sequencing abilities  -AB               User Key  (r) = Recorded By, (t) = Taken By, (c) = Cosigned By      Initials Name Provider Type    AB Ebony Loera, PT Physical Therapist                   Mobility       Row Name 11/06/24 1543          Bed Mobility    Comment, (Bed Mobility) received and left UIC  -AB       Row Name 11/06/24 1543          Transfers    Comment, (Transfers) Cues for hand placement and sequencing. Pt with decreased insight into deficits.  -AB       Row Name 11/06/24 1543          Sit-Stand Transfer    Sit-Stand [place] (Transfers) minimum assist (75% patient effort);1 person assist;verbal cues;nonverbal cues (demo/gesture)  -AB     Assistive Device (Sit-Stand Transfers) walker, front-wheeled  -AB     Comment, (Sit-Stand Transfer) Cues to push up from chair. Boost to stand.  -AB       Row Name 11/06/24 1543          Gait/Stairs (Locomotion)    [place] Level (Gait) minimum assist (75% patient effort);1 person assist;verbal cues;nonverbal cues (demo/gesture)  -AB     Assistive Device (Gait) walker, front-wheeled  -AB     Patient was able to Ambulate yes  -AB     Distance in Feet (Gait) 60  -AB     Deviations/Abnormal Patterns (Gait) bilateral deviations;harleen decreased;gait speed decreased;stride length decreased;base of support, narrow  -AB     Bilateral Gait Deviations forward flexed posture;heel strike decreased   -AB     Comment, (Gait/Stairs) Pt ambulated with step through gait pattern, decreased foot clearance, and forward flexed posture. Cues for upright posture, keeping walker closer to body, and increased knee flexion during swing phase. Min A to steady. No overt LOB or knee buckling. Gait mechanics deteriorated with fatigue. Further activity limited by weakness and fatigue.  -AB               User Key  (r) = Recorded By, (t) = Taken By, (c) = Cosigned By      Initials Name Provider Type    AB Ebony Loera PT Physical Therapist                   Obj/Interventions       Row Name 11/06/24 1547          Motor Skills    Therapeutic Exercise hip;knee;ankle  -AB       Row Name 11/06/24 1547          Hip (Therapeutic Exercise)    Hip (Therapeutic Exercise) isometric exercises;strengthening exercise  -AB     Hip Isometrics (Therapeutic Exercise) bilateral;gluteal sets;10 repetitions  -AB     Hip Strengthening (Therapeutic Exercise) bilateral;marching while standing;10 repetitions;3 sets  -AB       Row Name 11/06/24 1547          Knee (Therapeutic Exercise)    Knee (Therapeutic Exercise) isometric exercises;strengthening exercise  -AB     Knee Isometrics (Therapeutic Exercise) bilateral;quad sets;10 repetitions  -AB     Knee Strengthening (Therapeutic Exercise) bilateral;LAQ (long arc quad);SLR (straight leg raise);10 repetitions  -AB       Row Name 11/06/24 1547          Ankle (Therapeutic Exercise)    Ankle (Therapeutic Exercise) AROM (active range of motion)  -AB     Ankle AROM (Therapeutic Exercise) bilateral;dorsiflexion;plantarflexion;10 repetitions  -AB       Row Name 11/06/24 1547          Balance    Balance Assessment sitting static balance;sitting dynamic balance;standing static balance;standing dynamic balance  -AB     Static Sitting Balance standby assist  -AB     Dynamic Sitting Balance contact guard  -AB     Position, Sitting Balance unsupported;sitting in chair  -AB     Static Standing Balance minimal assist   -AB     Dynamic Standing Balance minimal assist;1-person assist;verbal cues;non-verbal cues (demo/gesture)  -AB     Position/Device Used, Standing Balance supported;walker, front-wheeled  -AB     Balance Interventions sitting;standing;sit to stand;supported;static;dynamic;occupation based/functional task  -AB     Comment, Balance No overt LOB or knee buckling.  -AB               User Key  (r) = Recorded By, (t) = Taken By, (c) = Cosigned By      Initials Name Provider Type    AB Ebony Loera, PT Physical Therapist                   Goals/Plan    No documentation.                  Clinical Impression       Row Name 11/06/24 1549          Pain    Pretreatment Pain Rating 0/10 - no pain  -AB     Posttreatment Pain Rating 0/10 - no pain  -AB       Row Name 11/06/24 1549          Plan of Care Review    Plan of Care Reviewed With patient  -AB     Progress no change  -AB     Outcome Evaluation Pt continues to present below baseline with generalized weakness, impaired balance, and decreased endurance. Pt ambulated 60' with min Ax2 and RW. Further IPPT is warrented. PT will progress as able per POC.  -AB       Row Name 11/06/24 1549          Vital Signs    Pre Systolic BP Rehab 107  -AB     Pre Treatment Diastolic BP 58  -AB     Pretreatment Heart Rate (beats/min) 99  -AB     Posttreatment Heart Rate (beats/min) 100  -AB     Pre SpO2 (%) 100  -AB     O2 Delivery Pre Treatment room air  -AB     O2 Delivery Intra Treatment room air  -AB     Post SpO2 (%) 100  -AB     O2 Delivery Post Treatment room air  -AB     Pre Patient Position Sitting  -AB     Intra Patient Position Standing  -AB     Post Patient Position Sitting  -AB       Row Name 11/06/24 1549          Positioning and Restraints    Pre-Treatment Position sitting in chair/recliner  -AB     Post Treatment Position chair  -AB     In Chair notified nsg;reclined;sitting;call light within reach;encouraged to call for assist;exit alarm on;with family/caregiver;legs  elevated;waffle cushion  -AB               User Key  (r) = Recorded By, (t) = Taken By, (c) = Cosigned By      Initials Name Provider Type    Ebony Lee, PT Physical Therapist                   Outcome Measures       Row Name 11/06/24 1550 11/06/24 0800       How much help from another person do you currently need...    Turning from your back to your side while in flat bed without using bedrails? 3  -AB 3  -HK    Moving from lying on back to sitting on the side of a flat bed without bedrails? 3  -AB 3  -HK    Moving to and from a bed to a chair (including a wheelchair)? 3  -AB 2  -HK    Standing up from a chair using your arms (e.g., wheelchair, bedside chair)? 3  -AB 2  -HK    Climbing 3-5 steps with a railing? 2  -AB 1  -HK    To walk in hospital room? 3  -AB 3  -HK    AM-PAC 6 Clicks Score (PT) 17  -AB 14  -HK    Highest Level of Mobility Goal 5 --> Static standing  -AB 4 --> Transfer to chair/commode  -HK      Row Name 11/06/24 1550 11/06/24 0900       Functional Assessment    Outcome Measure Options AM-PAC 6 Clicks Basic Mobility (PT)  -AB AM-PAC 6 Clicks Daily Activity (OT)  -MR (r) BH (t) MR (c)              User Key  (r) = Recorded By, (t) = Taken By, (c) = Cosigned By      Initials Name Provider Type    Shayna Newsome, RN Registered Nurse    Joy Bynum, OT Occupational Therapist    Ebony Lee, PT Physical Therapist     Jeny Harrison, OT Student OT Student                                 Physical Therapy Education       Title: PT OT SLP Therapies (In Progress)       Topic: Physical Therapy (Done)       Point: Mobility training (Done)       Learning Progress Summary            Patient Acceptance, E,D, VU,NR by AB at 11/6/2024 1551    Acceptance, E, NR by  at 11/5/2024 1608                      Point: Home exercise program (Done)       Learning Progress Summary            Patient Acceptance, E,D, VU,NR by AB at 11/6/2024 1551    Acceptance, E, NR by AC at 11/5/2024 160                       Point: Body mechanics (Done)       Learning Progress Summary            Patient Acceptance, E,D, VU,NR by AB at 11/6/2024 1551    Acceptance, E, NR by  at 11/5/2024 1608                      Point: Precautions (Done)       Learning Progress Summary            Patient Acceptance, E,D, VU,NR by AB at 11/6/2024 1551    Acceptance, E, NR by  at 11/5/2024 1608                                      User Key       Initials Effective Dates Name Provider Type Discipline    AB 09/22/22 -  Ebony Loera, PT Physical Therapist PT    AC 07/11/24 -  Cary Umana PT Physical Therapist PT                  PT Recommendation and Plan     Progress: no change  Outcome Evaluation: Pt continues to present below baseline with generalized weakness, impaired balance, and decreased endurance. Pt ambulated 60' with min Ax2 and RW. Further IPPT is warrented. PT will progress as able per POC.     Time Calculation:         PT Charges       Row Name 11/06/24 1551             Time Calculation    Start Time 1502  -AB      PT Received On 11/06/24  -AB         Timed Charges    77415 - PT Therapeutic Exercise Minutes 10  -AB      94289 - Gait Training Minutes  8  -AB      72594 - PT Therapeutic Activity Minutes 5  -AB         Total Minutes    Timed Charges Total Minutes 23  -AB       Total Minutes 23  -AB                User Key  (r) = Recorded By, (t) = Taken By, (c) = Cosigned By      Initials Name Provider Type    AB Ebony Loera PT Physical Therapist                  Therapy Charges for Today       Code Description Service Date Service Provider Modifiers Qty    60852620033 HC PT THER PROC EA 15 MIN 11/6/2024 Ebony Loera, PT GP 1    08454632592 HC GAIT TRAINING EA 15 MIN 11/6/2024 Ebony Loera, PT GP 1            PT G-Codes  Outcome Measure Options: AM-PAC 6 Clicks Basic Mobility (PT)  AM-PAC 6 Clicks Score (PT): 17  AM-PAC 6 Clicks Score (OT): 16  PT Discharge Summary  Anticipated Discharge Disposition (PT):  skilled nursing facility    Ebony Loera, PT  2024      Electronically signed by Ebony Loera, PT at 24 0963       Occupational Therapy Notes (most recent note)    No notes exist for this encounter.          Speech Language Pathology Notes (most recent note)        Tiffany Fisher MS CCC-SLP at 24 1146          Acute Care - Speech Language Pathology   Swallow Re-Evaluation [facility]  Clinical Swallow Evaluation     Patient Name: Guicho Blanco  : 1940  MRN: [id number]  Today's Date: 2024               Admit Date: 2024    Visit Dx:     ICD-10-CM ICD-9-CM   1. Generalized weakness  R53.1 780.79   2. Fall in home, initial encounter  W19.XXXA E888.9    Y92.009 E849.0   3. Anorexia  R63.0 783.0   4. Bacterial UTI  N39.0 599.0    A49.9 041.9   5. Oropharyngeal dysphagia  R13.12 787.22     Patient Active Problem List   Diagnosis    Osteomyelitis of toe of right foot    Osteomyelitis of right foot    PAF (paroxysmal atrial fibrillation)    Coronary artery disease involving native coronary artery of native heart without angina pectoris    Gastroesophageal reflux disease without esophagitis    Benign non-nodular prostatic hyperplasia without lower urinary tract symptoms    S/P appy    Essential hypertension    Simple chronic bronchitis    Mixed hyperlipidemia    COPD (chronic obstructive pulmonary disease)    BPH (benign prostatic hypertrophy)    Hearing loss    Chest pain    Vertigo    Snoring    Overweight    PLMD (periodic limb movement disorder)    Neuropathy    Periodic headache syndrome, not intractable    Acquired absence of other right toe(s)    Tremor    Bilateral impacted cerumen    Bilateral sensorineural hearing loss    Chronic otitis externa    Deviated nasal septum    Disorder of joint of foot    ED (erectile dysfunction) of organic origin    Hydrocele    Hydrocele of testis    Impacted cerumen    Mononeuropathy    Swelling of testicle    Venous retinal  branch occlusion    Vitreous degeneration    Benign prostatic hyperplasia with urinary obstruction    OA (osteoarthritis) of knee    Status post total right knee replacement    Septic arthritis    Cellulitis    Cellulitis of right knee    Failure to thrive in adult    Insomnia    Moderate malnutrition    Sepsis    Lactic acidosis    Serotonin syndrome    Dysphagia    Generalized weakness    Severe protein-calorie malnutrition     Past Medical History:   Diagnosis Date    Arthritis     Atrial fibrillation     BPH (benign prostatic hypertrophy)     Cataract     Cellulitis     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     bilateral    Coronary artery disease     Full dentures     Gall stones     GERD (gastroesophageal reflux disease)     Hearing loss     Hiatal hernia     Hyperlipidemia     Hypertension     Peripheral neuropathy     Peripheral vascular disease     Seasonal allergies     Skin cancer     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    TIA (transient ischemic attack)     2 times    Wears glasses      Past Surgical History:   Procedure Laterality Date    ABLATION OF DYSRHYTHMIC FOCUS      APPENDECTOMY      CARDIAC CATHETERIZATION N/A 10/05/2017    Procedure: Left Heart Cath;  Surgeon: Hector Urrutia MD;  Location:  [facility];  Service:     CATARACT EXTRACTION Bilateral     CHOLECYSTECTOMY      Remote    COCHLEAR IMPLANT REVISION  05/2019    COLONOSCOPY      PROSTATE SURGERY      SHOULDER SURGERY Right     SKIN BIOPSY      TOE AMPUTATION Right     2nd toe - Right Foot    TONSILLECTOMY      TOTAL KNEE ARTHROPLASTY Right 8/7/2024    Procedure: TOTAL KNEE ARTHROPLASTY WITH ASHLEY ROBOT RIGHT;  Surgeon: Christian Schwartz MD;  Location:  [facility];  Service: Robotics - Ortho;  Laterality: Right;       SLP Recommendation and Plan  SLP Swallowing Diagnosis: moderate, oral dysphagia, suspected pharyngeal dysphagia (11/07/24 1015)  SLP Diet Recommendation: regular textures, thin liquids, other (see comments) (menu select  for naturally softer foods. SLP took preferences for next three meals and sent to kitchen.) (11/07/24 1015)  Recommended Precautions and Strategies: upright posture during/after eating, small bites of food and sips of liquid, general aspiration precautions, fatigue precautions, other (see comments) (pull items from trays for snacks between. Pt finds full trays to be overwhelming) (11/07/24 1015)  SLP Rec. for Method of Medication Administration:  (trial cut in ice cream, adjust as needed, especially if needed to be crushed. Pt finds crushed to be difficult to manage orally though needs adjustment related to bone spur.) (11/07/24 1015)     Monitor for Signs of Aspiration: yes, notify SLP if any concerns (11/07/24 1015)     Swallow Criteria for Skilled Therapeutic Interventions Met: demonstrates skilled criteria (11/07/24 1015)  Anticipated Discharge Disposition (SLP): home with home health (11/07/24 1015)  Rehab Potential/Prognosis, Swallowing: re-evaluate goals as necessary (11/07/24 1015)  Therapy Frequency (Swallow): 5 days per week (11/07/24 1015)  Predicted Duration Therapy Intervention (Days): 1 week (11/07/24 1015)  Oral Care Recommendations: Oral Care BID/PRN, Toothbrush (11/07/24 1015)                                               SWALLOW EVALUATION (Last 72 Hours)       SLP Adult Swallow Evaluation       Row Name 11/07/24 1015 11/06/24 1415 11/06/24 0940             Rehab Evaluation    Document Type re-evaluation  -SM evaluation  -GA evaluation  -GA      Subjective Information no complaints  -SM no complaints  -GA no complaints  -GA      Patient Observations alert;cooperative  -SM alert;cooperative;agree to therapy  -GA alert;cooperative;agree to therapy  -GA      Patient/Family/Caregiver Comments/Observations -- none present  -GA brother present  -GA      Patient Effort good  -SM good  -GA good  -GA      Comment -- -- patient actively eating breakfast upon arrival  -GA      Symptoms Noted During/After  Treatment -- none  -GA none  -GA      Oral Care -- -- other (see comments)  eating breakfast already  -GA         General Information    Patient Profile Reviewed -- yes  -GA yes  -GA      Pertinent History Of Current Problem Choking on pills last night. Made NPO.  -SM see previous hx  -GA 83 yr old man with history of afib, CAD, HTN, TIA, cochlear implants, and R knee replacement in Aug 2024 with 2 readmissions for cellulitis and progressive weakness/frailty in recent months.  Admitted for fall at home, and general failure to thrive.  -GA      Current Method of Nutrition NPO  -SM soft to chew textures;ground;thin liquids  no straw  -GA regular textures;thin liquids  -GA      Precautions/Limitations, Vision -- WFL;for purposes of eval  -GA WFL;for purposes of eval  -GA      Precautions/Limitations, Hearing -- hearing impairment, bilaterally  -GA hearing impairment, bilaterally  -GA      Prior Level of Function-Communication -- unknown  -GA unknown  -GA      Prior Level of Function-Swallowing -- other (see comments)  MBS and tx complete during hospital stay 9/2024. MBS recommended puree/thin with medication crushed in puree. At risk for choking d/t pharyngeal residue. At d/c patient upgraded to [facility] ground/puree. Patient reports no longer crushing medication at home  -GA other (see comments)  MBS and tx complete during hospital stay 9/2024. MBS recommended puree/thin with medication crushed in puree. At risk for choking d/t pharyngeal residue. At d/c patient upgraded to [facility] ground/puree. Patient reports no longer crushing medication at home  -GA      Plans/Goals Discussed with patient;spouse/S.O.;family;agreed upon  -SM patient;agreed upon  -GA patient and family;agreed upon  -GA      Barriers to Rehab previous functional deficit;cognitive status;hearing deficit  -SM medically complex;previous functional deficit;hearing deficit  -GA medically complex;previous functional deficit;hearing deficit  -GA      Patient's  Goals for Discharge return to regular diet  - patient did not state  -GA patient did not state  -GA      Family Goals for Discharge patient able to return to regular diet;patient able to eat/drink without coughing/choking  - -- family did not state  -GA         Pain    Pretreatment Pain Rating 0/10 - no pain  -SM 0/10 - no pain  -GA 0/10 - no pain  -GA      Posttreatment Pain Rating 0/10 - no pain  - 0/10 - no pain  -GA 0/10 - no pain  -GA         Oral Motor Structure and Function    Dentition Assessment -- -- upper dentures/partial in place;lower dentures/partial in place  -GA      Secretion Management -- -- WNL/WFL  -GA      Volitional Swallow -- -- WFL  -GA         Oral Musculature and Cranial Nerve Assessment    Oral Motor General Assessment generalized oral motor weakness  -SM -- generalized oral motor weakness  -GA         General Eating/Swallowing Observations    Respiratory Support Currently in Use room air  - -- room air  -GA      Eating/Swallowing Skills -- -- self-fed  -GA      Positioning During Eating -- -- upright 90 degree;upright in chair  -GA      Utensils Used -- -- spoon;cup;straw  -GA      Consistencies Trialed -- -- soft to chew textures;chopped;mechanical ground textures;thin liquids  -GA         Respiratory    Respiratory Status -- -- WFL;room air  -GA         Clinical Swallow Eval    Oral Prep Phase impaired  -SM -- impaired  -GA      Oral Transit impaired  -SM -- impaired  -GA      Oral Residue impaired  -SM -- impaired  -GA      Pharyngeal Phase suspected pharyngeal impairment  -SM -- suspected pharyngeal impairment  -GA      Esophageal Phase -- -- unremarkable  -GA      Clinical Swallow Evaluation Summary No acute change observed c/t MBS performed yesterday. Despite no aspiration observed on yesterday's exam, pt will remain increased risk r/t oral weakness and cervical osteophyte at the level of the UES. Makes it difficult for bolus to enter esophagus with ease. Feeding tube would  not be an appropriate recommendation nor desired. Best option is adjusting PO diet to help balance dysphagia risk with nutrition (as decreased intake with modified PO diets per discussion with pt/family). Recommendations reflect GOC/POC discussion and preferences with pt, wife, and daughter. Will then adjust as needed pending tolerance and intake.  -SM -- patient with overt s/sx with throat clear and multiple/audible swallows during straw sips of thin, large cup sips, and when taking pills with liquids. patient with significantly prolonged masticaiton of soft solids (i.e., eggs) for ~5 minutes. pocketing into chew and rumination. patient downgraded to medication whole in puree, small single sips of liquids via cup, and soft solids/ground meats  -GA         Oral Prep Concerns    Oral Prep Concerns -- -- oral holding;prolonged mastication;increased prep time  -GA      Oral Holding -- -- mechanical soft  -GA      Prolonged Mastication -- -- mechanical soft  -GA      Increased Prep Time -- -- mechanical soft  -GA         Oral Transit Concerns    Oral Transit Concerns -- -- increased oral transit time  -GA      Increased Oral Transit Time -- -- all consistencies  -GA         Oral Residue Concerns    Oral Residue Concerns -- -- sulci residue, bilateral  -GA      Sulci Residue, Bilateral -- -- regular consistencies;mechanical soft  -GA         Pharyngeal Phase Concerns    Pharyngeal Phase Concerns -- -- multiple swallows;throat clear  -GA      Multiple Swallows -- -- thin  -GA      Throat Clear -- -- thin  -GA         MBS/VFSS    Utensils Used -- spoon;cup;straw  -GA --      Consistencies Trialed -- barium pill;thin liquids;pureed  -GA --         MBS/VFSS Interpretation    Oral Prep Phase -- impaired oral phase of swallowing  -GA --      Oral Transit Phase -- impaired  -GA --      Oral Residue -- impaired  -GA --      VFSS Summary -- Patient with deep penetration d/t mixing puree solids and thin liquids because patient had  difficulty trigger swallow of puree and using liquid wash to assist with triggering swallow. All other aspiration events with shallow or transient penetration that was able to be cleared upon secondary swallow or intermittent throat clear. moderate -significant valleculae, pyriform sinus, and diffused pharyngeal residue placing patient at increased risk for choking/aspiration/penetration. able to paritially clear with multiple swallows and liquid washes. Due to prolonged oral phase and manipulation difficulties patient also at risk for malnutrition.  -GA --         Oral Preparatory Phase    Oral Preparatory Phase -- oral holding;prolonged manipulation;inadequate manipulation  -GA --      Oral Holding -- pudding/puree;secondary to reduced labial seal;secondary to reduced lingual strength;secondary to reduced lingual range of motion  -GA --      Prolonged Manipulation -- all consistencies tested;secondary to reduced labial seal;secondary to reduced lingual strength;secondary to reduced lingual range of motion  -GA --      Inadequate Manipulation -- all consistencies tested;secondary to reduced labial seal;secondary to reduced lingual strength;secondary to reduced lingual range of motion  -GA --         Oral Transit Phase    Impaired Oral Transit Phase -- delayed initiation of bolus transit;increased A-P transit time;tongue pumping;piecemeal oral transit;premature spillage of liquids into pharynx  -GA --      Delayed Initiation of bolus transit -- all consistencies tested;secondary to reduced lingual control;discoordination of lingual movement  -GA --      Increased A-P Transit Time -- all consistencies tested;secondary to reduced lingual control;discoordination of lingual movement  -GA --      Tongue Pumping -- all consistencies tested;secondary to reduced lingual control;discoordination of lingual movement  -GA --      Piecemeal Oral Transit -- all consistencies tested;secondary to reduced lingual  control;discoordination of lingual movement  -GA --      Premature Spillage of Liquids into Pharynx -- all consistencies tested;secondary to reduced lingual control;discoordination of lingual movement  -GA --         Oral Residue    Impaired Oral Residue -- diffuse residue throughout oral cavity;lateral sulci residue  -GA --      Lateral Sulci Residue -- thin liquids;secondary to reduced lingual strength;secondary to reduced lingual range of motion  -GA --      Diffuse Residue throughout Oral Cavity -- all consistencies tested;secondary to reduced lingual strength;secondary to reduced lingual range of motion  -GA --         Initiation of Pharyngeal Swallow    Initiation of Pharyngeal Swallow -- bolus in pyriform sinuses  -GA --      Pharyngeal Phase -- impaired pharyngeal phase of swallowing  -GA --      Anatomical abnormalities noted -- osteophyte/bone spur per radiology report  -GA --      Anatomical abnormalities functional impact -- functional impact on swallowing;other (see comments)  cuasing pharyngeal residue and immeidately under UES  -GA --      Penetration During the Swallow -- thin liquids;all consistencies tested;secondary to delayed swallow initiation or mistiming;secondary to reduced laryngeal elevation;secondary to reduced vestibular closure  -GA --      Depth of Penetration -- deep;shallow;transient;other (see comments)  deep penetration occuring when mixing liquids and solids. patient demonstrating difficulty triggering a swallow with puree consistenices. used liquid wash to clear causing deep penetration. All other thin  trials w/ no, shallow or transient penetration  -GA --      Response to Penetration -- Yes  -GA --      Responsed to penetration with -- throat clear;effective;non-effective;other (see comments)  mostly effective despite 1x with deep penetration with mixed consistecnies (puree and liquid wash)  -GA --      Rosenbek's Scale -- mixed:;thin:;5--->level 5;pudding/puree:;1--->level 1  5  with 1x trial of thin/puree mixed consistency to level of folds.  -GA --      Pharyngeal Residue -- thin liquids;laryngeal vestibule;pudding/puree;diffuse within pharynx;pyriform sinuses;valleculae;base of tongue;posterior pharyngeal wall;secondary to reduced base of tongue retraction;secondary to reduced posterior pharyngeal wall stripping;secondary to reduced laryngeal elevation;secondary to reduced hyolaryngeal excursion  -GA --      Response to Residue -- partial residue clearance;cleared residue with liquid wash;cleared residue with spontaneous subsequent swallow;cleared residue with compensatory maneuver (see comments)  throat clear  -GA --      Attempted Compensatory Maneuvers -- bolus size;bolus presentation style;throat clear after swallow;additional subsequent swallow  -GA --      Response to Attempted Compensatory Maneuvers -- reduced residue;prevented penetration;did not prevent penetration  -GA --      Successful Compensatory Maneuver Competency -- patient able to;demonstrate compensations;with cues  -GA --         Esophageal Phase    Esophageal Phase -- see radiology report for further details  -GA --         SLP Evaluation Clinical Impression    SLP Swallowing Diagnosis moderate;oral dysphagia;suspected pharyngeal dysphagia  -SM mod-severe;oral dysphagia;moderate;pharyngeal dysphagia  -GA R/O pharyngeal dysphagia;moderate;oral dysphagia  -GA      Functional Impact risk of aspiration/pneumonia;risk of malnutrition  -SM risk of aspiration/pneumonia;risk of malnutrition;risk of dehydration  -GA risk of aspiration/pneumonia;risk of malnutrition;risk of dehydration  -GA      Rehab Potential/Prognosis, Swallowing re-evaluate goals as necessary  -SM good, to achieve stated therapy goals  -GA good, to achieve stated therapy goals  -GA      Swallow Criteria for Skilled Therapeutic Interventions Met demonstrates skilled criteria  -SM demonstrates skilled criteria  -GA demonstrates skilled criteria  -GA          Recommendations    Therapy Frequency (Swallow) 5 days per week  - 5 days per week  -GA --      Predicted Duration Therapy Intervention (Days) 1 week  - 1 week  -GA --      SLP Diet Recommendation regular textures;thin liquids;other (see comments)  menu select for naturally softer foods. SLP took preferences for next three meals and sent to kitchen.  - mechanical ground textures;thin liquids  -GA soft to chew textures;ground;thin liquids;other (see comments)  single sips via cup only  -GA      Recommended Diagnostics -- -- VFSS (MBS)  -GA      Recommended Precautions and Strategies upright posture during/after eating;small bites of food and sips of liquid;general aspiration precautions;fatigue precautions;other (see comments)  pull items from trays for snacks between. Pt finds full trays to be overwhelming  - upright posture during/after eating;no straw;multiple swallows per bite of food;multiple swallows per sip of liquid;alternate between small bites of food and sips of liquid;volitional throat clear;general aspiration precautions  -GA upright posture during/after eating;no straw;multiple swallows per bite of food;general aspiration precautions  -GA      Oral Care Recommendations Oral Care BID/PRN;Toothbrush  - Oral Care BID/PRN;Toothbrush  -GA Oral Care BID/PRN;Suction toothbrush  -GA      SLP Rec. for Method of Medication Administration --  trial cut in ice cream, adjust as needed, especially if needed to be crushed. Pt finds crushed to be difficult to manage orally though needs adjustment related to bone spur.  - meds crushed;with puree;as tolerated  discussed with RN about ability to obtain liquid medication  -GA meds whole;meds crushed;with puree;as tolerated  -GA      Monitor for Signs of Aspiration yes;notify SLP if any concerns  - yes;notify SLP if any concerns  -GA yes;notify SLP if any concerns  -GA      Anticipated Discharge Disposition (SLP) home with home health  - home with home health   -GA home with home health  -GA                User Key  (r) = Recorded By, (t) = Taken By, (c) = Cosigned By      Initials Name Effective Dates     Tiffany Fisher, MS CCC-SLP 02/03/23 -     GA Carrie Wise MS CCC-SLP 09/14/23 -                     EDUCATION  The patient has been educated in the following areas:   Dysphagia (Swallowing Impairment) Modified Diet Instruction.        SLP GOALS       Row Name 11/07/24 1015 11/06/24 1415          (LTG) Patient will demonstrate functional swallow for    Diet Texture (Demonstrate functional swallow) regular textures  -SM soft to chew (chopped) textures  -GA     Liquid viscosity (Demonstrate functional swallow) thin liquids  -SM thin liquids  -GA     [place] (Demonstrate functional swallow) with minimal cues (75-90% accuracy)  -SM with minimal cues (75-90% accuracy)  -GA     Time Frame (Demonstrate functional swallow) 1 week  -SM 1 week  -GA     Progress/Outcomes (Demonstrate functional swallow) goal revised this date  - new goal  -GA        (STG) Patient will tolerate trials of    Consistencies Trialed (Tolerate trials) regular textures;thin liquids  -SM soft to chew (ground) textures;thin liquids  -GA     Desired Outcome (Tolerate trials) with adequate oral prep/transit/clearance;without signs/symptoms of aspiration;with use of compensatory strategies (see comments)  menu select for softer foods, aspiration precautions, throat clear periodically  -SM without signs/symptoms of aspiration;with adequate oral prep/transit/clearance  -GA     [place] (Tolerate trials) with minimal cues (75-90% accuracy)  -SM with moderate cues (50-74% accuracy)  -GA     Time Frame (Tolerate trials) 1 week  -SM 1 week  -GA     Progress/Outcomes (Tolerate trials) goal revised this date  - new goal  -GA        (STG) Patient will tolerate therapeutic trials of    Consistencies Trialed (Tolerate therapeutic trials) soft to chew (chopped) textures  -SM soft to chew  (chopped) textures  -GA     Desired Outcome (Tolerate therapeutic trials) without signs/symptoms of aspiration;with adequate oral prep/transit/clearance  -SM without signs/symptoms of aspiration;with adequate oral prep/transit/clearance  -GA     [place] (Tolerate therapeutic trials) with moderate cues (50-74% accuracy)  -SM with moderate cues (50-74% accuracy)  -GA     Time Frame (Tolerate therapeutic trials) 1 week  -SM 1 week  -GA     Progress/Outcomes (Tolerate therapeutic trials) goal no longer appropriate  -SM new goal  -GA        (STG) Lingual Strengthening Goal 1 (SLP)    Activity (Lingual Strengthening Goal 1, SLP) -- increase lingual tone/sensation/control/coordination/movement;increase buccal tension or tone;increase tongue back strength  -GA     Increase Lingual Tone/Sensation/Control/Coordination/Movement -- lingual movement exercises;swallow trials;lingual resistance exercises  -GA     Increase Buccal Tension or Tone -- lingual movement exercises;swallow trials;lingual resistance exercises  -GA     Increase Tongue Back Strength -- lingual movement exercises;swallow trials;lingual resistance exercises  -GA     [place]/Accuracy (Lingual Strengthening Goal 1, SLP) -- with moderate cues (50-74% accuracy)  -GA     Time Frame (Lingual Strengthening Goal 1, SLP) -- 1 week  -GA     Progress/Outcomes (Lingual Strengthening Goal 1, SLP) -- new goal  -GA        (STG) Pharyngeal Strengthening Exercise Goal 1 (SLP)    Activity (Pharyngeal Strengthening Goal 1, SLP) -- increase timing;increase superior movement of the hyolaryngeal complex;increase anterior movement of the hyolaryngeal complex;increase closure at entrance to airway/closure of airway at glottis;increase squeeze/positive pressure generation;increase tongue base retraction  -GA     Increase Timing -- prepping - 3 second prep or suck swallow or 3-step swallow  -GA     Increase Superior Movement of the Hyolaryngeal Complex -- falsetto;Mendelsohn   -GA     Increase Anterior Movement of the Hyolaryngeal Complex -- hard effortful swallow  -GA     Increase Closure at Entrance to Airway/Closure of Airway at Glottis -- chin tuck against resistance (CTAR)  -GA     Increase Squeeze/Positive Pressure Generation -- hard effortful swallow  -GA     Increase Tongue Base Retraction -- gerry  -GA     [place]/Accuracy (Pharyngeal Strengthening Goal 1, SLP) -- with moderate cues (50-74% accuracy)  -GA     Time Frame (Pharyngeal Strengthening Goal 1, SLP) -- 1 week  -GA     Progress/Outcomes (Pharyngeal Strengthening Goal 1, SLP) -- new goal  -GA        (STG) Swallow Management Recall Goal 1 (SLP)    Activity (Swallow Management Recall Goal 1, SLP) aspiration precautions;oral care recommendations;safe diet/liquid level;compensatory swallow strategies/techniques;rationale for use of strategies/techniques  -SM --     [place]/Accuracy (Swallow Management Recall Goal 1, SLP) with minimal cues (75-90% accuracy)  -SM --     Time Frame (Swallow Management Recall Goal 1, SLP) 1 week  -SM --     Progress/Outcomes (Swallow Management Recall Goal 1, SLP) new goal  - --               User Key  (r) = Recorded By, (t) = Taken By, (c) = Cosigned By      Initials Name Provider Type     Tiffany Fisher, MS CCC-SLP Speech and Language Pathologist    Carrie Cooney, MS CCC-SLP Speech and Language Pathologist                         Time Calculation:    Time Calculation- SLP       Row Name 11/07/24 1146 11/07/24 1139 11/07/24 1053       Time Calculation- [facility]    SLP Start Time 1015  -SM -- --    SLP Received On 11/07/24  - -- --       Untimed Charges    [id number] Eval Oral Pharyng Swallow Minutes 72  -SM -- --       Total Minutes    Untimed Charges Total Minutes 72  -SM -- --     Total Minutes 72  -SM -- --       PT/SLP KX Modifier    Exception criteria met to exceed therapy cap -- Apply KX Modifier  -AC Apply KX Modifier  -      Row Name 11/07/24 1022 11/07/24 0904           PT/SLP KX Modifier    Exception criteria met to exceed therapy cap Apply KX Modifier  -SM Apply KX Modifier  -SM               User Key  (r) = Recorded By, (t) = Taken By, (c) = Cosigned By      Initials Name Provider Type    Tiffany Prieto, MS CCC-SLP Speech and Language Pathologist    Sendy Tee MS CCC-SLP Speech and Language Pathologist                    Therapy Charges for Today       Code Description Service Date Service Provider Modifiers Qty    [id number]  ST EVAL ORAL PHARYNG SWALLOW 5 11/7/2024 Tiffany Fisher MS CCC-SLP GN, KX 1                 Tiffany Fisher MS CCC-SLP  11/7/2024    Electronically signed by Tiffany Fisher MS CCC-SLP at 11/07/24 1145

## 2024-11-08 NOTE — PROGRESS NOTES
Continued Stay Note  [facility]     Patient Name: Guicho Blanco  MRN: [id number]  Today's Date: 11/8/2024    Admit Date: 11/5/2024    Plan: To be determined   Discharge Plan       Row Name 11/08/24 1521       Plan    Plan To be determined    Plan Comments Chart reviewed for acute overnight events. None noted per chart and bedside RN.     Per CM, continuing to seek placement for patient - plan is to discharge to SNF and transition to LTC. Pending response from [facility].     Call to pt spouse Sandra Blanco - at this time, plan remains placement versus home with hospice. Pt spouse requested liaison follow up early next week once response received on patient placement. Advised liaison will continue to monitor through discharge and provided contact information for liaison office and [facility]'s 24h number.     Please contact ext [phone] with any questions.                   Discharge Codes    No documentation.                 Expected Discharge Date and Time       Expected Discharge Date Expected Discharge Time    Nov 11, 2024               Nevin Schultz

## 2024-11-08 NOTE — THERAPY TREATMENT NOTE
Patient Name: Guicho Blanco  : 1940    MRN: [id number]                              Today's Date: 2024       Admit Date: 2024    Visit Dx:     ICD-10-CM ICD-9-CM   1. Generalized weakness  R53.1 780.79   2. Fall in home, initial encounter  W19.XXXA E888.9    Y92.009 E849.0   3. Anorexia  R63.0 783.0   4. Bacterial UTI  N39.0 599.0    A49.9 041.9   5. Oropharyngeal dysphagia  R13.12 787.22     Patient Active Problem List   Diagnosis    Osteomyelitis of toe of right foot    Osteomyelitis of right foot    PAF (paroxysmal atrial fibrillation)    Coronary artery disease involving native coronary artery of native heart without angina pectoris    Gastroesophageal reflux disease without esophagitis    Benign non-nodular prostatic hyperplasia without lower urinary tract symptoms    S/P appy    Essential hypertension    Simple chronic bronchitis    Mixed hyperlipidemia    COPD (chronic obstructive pulmonary disease)    BPH (benign prostatic hypertrophy)    Hearing loss    Chest pain    Vertigo    Snoring    Overweight    PLMD (periodic limb movement disorder)    Neuropathy    Periodic headache syndrome, not intractable    Acquired absence of other right toe(s)    Tremor    Bilateral impacted cerumen    Bilateral sensorineural hearing loss    Chronic otitis externa    Deviated nasal septum    Disorder of joint of foot    ED (erectile dysfunction) of organic origin    Hydrocele    Hydrocele of testis    Impacted cerumen    Mononeuropathy    Swelling of testicle    Venous retinal branch occlusion    Vitreous degeneration    Benign prostatic hyperplasia with urinary obstruction    OA (osteoarthritis) of knee    Status post total right knee replacement    Septic arthritis    Cellulitis    Cellulitis of right knee    Failure to thrive in adult    Insomnia    Moderate malnutrition    Sepsis    Lactic acidosis    Serotonin syndrome    Dysphagia    Generalized weakness    Severe protein-calorie malnutrition      Past Medical History:   Diagnosis Date    Arthritis     Atrial fibrillation     BPH (benign prostatic hypertrophy)     Cataract     Cellulitis     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     bilateral    Coronary artery disease     Full dentures     Gall stones     GERD (gastroesophageal reflux disease)     Hearing loss     Hiatal hernia     Hyperlipidemia     Hypertension     Peripheral neuropathy     Peripheral vascular disease     Seasonal allergies     Skin cancer     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    TIA (transient ischemic attack)     2 times    Wears glasses      Past Surgical History:   Procedure Laterality Date    ABLATION OF DYSRHYTHMIC FOCUS      APPENDECTOMY      CARDIAC CATHETERIZATION N/A 10/05/2017    Procedure: Left Heart Cath;  Surgeon: Hector Urrutia MD;  Location:  [facility];  Service:     CATARACT EXTRACTION Bilateral     CHOLECYSTECTOMY      Remote    COCHLEAR IMPLANT REVISION  05/2019    COLONOSCOPY      PROSTATE SURGERY      SHOULDER SURGERY Right     SKIN BIOPSY      TOE AMPUTATION Right     2nd toe - Right Foot    TONSILLECTOMY      TOTAL KNEE ARTHROPLASTY Right 8/7/2024    Procedure: TOTAL KNEE ARTHROPLASTY WITH ASHLEY ROBOT RIGHT;  Surgeon: Christian Schwartz MD;  Location:  [facility];  Service: Robotics - Ortho;  Laterality: Right;      General Information       Row Name 11/08/24 1559 11/08/24 1451       Physical Therapy Time and Intention    Document Type therapy note (daily note)  -SC --  -SC    Mode of Treatment physical therapy  -SC --  -SC      Row Name 11/08/24 1559 11/08/24 1451       General Information    Patient Profile Reviewed yes  -SC yes  -SC    Existing Precautions/Restrictions fall  cochlear implant  -SC --  -SC      Row Name 11/08/24 1559 11/08/24 1451       Cognition    Orientation Status (Cognition) oriented to;person;place;verbal cues/prompts needed for orientation;time  -SC oriented to;person  -SC      Row Name 11/08/24 1559 11/08/24 1451       Safety  Issues/Impairments Affecting Functional Mobility    Impairments Affecting Function (Mobility) balance;cognition;endurance/activity tolerance;strength  -SC balance;cognition;endurance/activity tolerance;strength  -SC    Comment, Safety Issues/Impairments (Mobility) alert, following commands  -SC alert following commands  -SC              User Key  (r) = Recorded By, (t) = Taken By, (c) = Cosigned By      Initials Name Provider Type    SC Shamir Malik PT Physical Therapist                   Mobility       Row Name 11/08/24 1600          Bed Mobility    Bed Mobility supine-sit  -SC     Supine-Sit Amelia (Bed Mobility) modified independence;verbal cues  -SC     Assistive Device (Bed Mobility) head of bed elevated;bed rails  -SC     Comment, (Bed Mobility) extra time and cues needed to get to edge of bed  -SC       Row Name 11/08/24 1600          Transfers    Comment, (Transfers) Cues to use hands on chair rail. Patient not following directions and demonstrated poor control down and up. Some posterior lean on standing requireing PT assistance  -SC       Row Name 11/08/24 1600          Bed-Chair Transfer    Bed-Chair Amelia (Transfers) minimum assist (75% patient effort);1 person assist  -SC     Assistive Device (Bed-Chair Transfers) walker, front-wheeled  -SC       Row Name 11/08/24 1600          Sit-Stand Transfer    Sit-Stand Amelia (Transfers) minimum assist (75% patient effort);1 person assist;verbal cues;nonverbal cues (demo/gesture)  -SC     Assistive Device (Sit-Stand Transfers) walker, front-wheeled  -SC     Comment, (Sit-Stand Transfer) 120  -SC       Row Name 11/08/24 1600          Gait/Stairs (Locomotion)    Amelia Level (Gait) 1 person assist;verbal cues;nonverbal cues (demo/gesture);moderate assist (50% patient effort)  -SC     Assistive Device (Gait) walker, front-wheeled  -SC     Distance in Feet (Gait) 120  -SC     Deviations/Abnormal Patterns (Gait) stride length  decreased;weight shifting decreased;base of support, narrow;gait speed decreased;festinating/shuffling  -SC     Bilateral Gait Deviations forward flexed posture  -SC     Comment, (Gait/Stairs) Patient requried x2 standing rest break. Gt training focused on controling walker . Encouraged staying closer and longer steps. Initially shuffling, but later smooter gait. Some loss of balace noted on turns requiring PT assistance to right.  -SC               User Key  (r) = Recorded By, (t) = Taken By, (c) = Cosigned By      Initials Name Provider Type    SC Shamir Malik, PT Physical Therapist                   Obj/Interventions       Row Name 11/08/24 1603          Motor Skills    Therapeutic Exercise knee  -SC       Row Name 11/08/24 1603          Knee (Therapeutic Exercise)    Knee Isometrics (Therapeutic Exercise) right;quad sets;10 repetitions;3 second hold  -SC     Knee Strengthening (Therapeutic Exercise) right;flexion;SAQ (short arc quad);extension;LAQ (long arc quad);10 repetitions  -SC       Row Name 11/08/24 1603          Balance    Balance Assessment standing dynamic balance  -SC     Dynamic Standing Balance moderate assist;1-person assist;verbal cues  -SC     Position/Device Used, Standing Balance supported  -SC     Comment, Balance unsteady with LOB require PT to assist  -SC               User Key  (r) = Recorded By, (t) = Taken By, (c) = Cosigned By      Initials Name Provider Type    SC Shamir Malik, PT Physical Therapist                   Goals/Plan    No documentation.                  Clinical Impression       Row Name 11/08/24 1604          Pain    Pretreatment Pain Rating 0/10 - no pain  -SC     Posttreatment Pain Rating 0/10 - no pain  -[facility] Name 11/08/24 1604          Plan of Care Review    Plan of Care Reviewed With patient  -SC     Progress improving  -SC     Outcome Evaluation Patient very willing to participate in therapeutic activities. Overall he is very unsteady, requireing  assistance at time for balance and safety directions. Recommend continued skilled PT  -SC       Row Name 11/08/24 1604          Therapy Assessment/Plan (PT)    Patient/Family Therapy Goals Statement (PT) walk  -SC     Rehab Potential (PT) good  -SC     Criteria for Skilled Interventions Met (PT) yes  -SC       Row Name 11/08/24 1604          Positioning and Restraints    Pre-Treatment Position in bed  -SC     Post Treatment Position chair  -SC     In Chair notified nsg;reclined;sitting;call light within reach;encouraged to call for assist;exit alarm on  -SC               User Key  (r) = Recorded By, (t) = Taken By, (c) = Cosigned By      Initials Name Provider Type    SC Shamir Malik, PT Physical Therapist                   Outcome Measures       Row Name 11/08/24 1606          How much help from another person do you currently need...    Turning from your back to your side while in flat bed without using bedrails? 3  -SC     Moving from lying on back to sitting on the side of a flat bed without bedrails? 3  -SC     Moving to and from a bed to a chair (including a wheelchair)? 3  -SC     Standing up from a chair using your arms (e.g., wheelchair, bedside chair)? 3  -SC     Climbing 3-5 steps with a railing? 2  -SC     To walk in hospital room? 2  -SC     AM-PAC 6 Clicks Score (PT) 16  -SC     Highest Level of Mobility Goal 5 --> Static standing  -SC       Row Name 11/08/24 1606          Functional Assessment    Outcome Measure Options AM-PAC 6 Clicks Basic Mobility (PT)  -SC               User Key  (r) = Recorded By, (t) = Taken By, (c) = Cosigned By      Initials Name Provider Type    SC Shamir Malik, PT Physical Therapist                                 Physical Therapy Education       Title: PT OT SLP Therapies (Done)       Topic: Physical Therapy (Done)       Point: Mobility training (Done)       Learning Progress Summary            Patient REEN Hay VU by SC at 11/8/2024 1606    Comment: reviewed benefits  of activity    Acceptance, E,TB, VU by PK at 11/7/2024 0554    Acceptance, E,D, VU,NR by AB at 11/6/2024 1551    Acceptance, E, NR by  at 11/5/2024 1608                      Point: Home exercise program (Done)       Learning Progress Summary            Patient Eager, E, VU by SC at 11/8/2024 1606    Comment: reviewed benefits of activity    Acceptance, E,TB, VU by PK at 11/7/2024 0554    Acceptance, E,D, VU,NR by AB at 11/6/2024 1551    Acceptance, E, NR by AC at 11/5/2024 1608                      Point: Body mechanics (Done)       Learning Progress Summary            Patient Eager, E, VU by SC at 11/8/2024 1606    Comment: reviewed benefits of activity    Acceptance, E,TB, VU by PK at 11/7/2024 0554    Acceptance, E,D, VU,NR by AB at 11/6/2024 1551    Acceptance, E, NR by  at 11/5/2024 1608                      Point: Precautions (Done)       Learning Progress Summary            Patient Eager, E, VU by SC at 11/8/2024 1606    Comment: reviewed benefits of activity    Acceptance, E,TB, VU by PK at 11/7/2024 0554    Acceptance, E,D, VU,NR by AB at 11/6/2024 1551    Acceptance, E, NR by  at 11/5/2024 1608                                      User Key       Initials Effective Dates Name Provider Type Discipline    SC 02/03/23 -  Shamir Malik, PT Physical Therapist PT    AB 09/22/22 -  Ebony Loera, PT Physical Therapist PT    AC 07/11/24 -  Cary Umana, PT Physical Therapist PT     07/11/24 -  Nori Cruz RN Registered Nurse Nurse                  PT Recommendation and Plan     Progress: improving  Outcome Evaluation: Patient very willing to participate in therapeutic activities. Overall he is very unsteady, requireing assistance at time for balance and safety directions. Recommend continued skilled PT     Time Calculation:         PT Charges       Row Name 11/08/24 1517             Time Calculation    Start Time 1517  -SC      PT Received On 11/08/24  -SC      PT Goal Re-Cert Due Date 11/15/24   -SC         Timed Charges    19302 - PT Therapeutic Exercise Minutes 5  -SC      21362 - Gait Training Minutes  15  -SC      64580 - PT Therapeutic Activity Minutes 5  -SC         Total Minutes    Timed Charges Total Minutes 25  -SC       Total Minutes 25  -SC                User Key  (r) = Recorded By, (t) = Taken By, (c) = Cosigned By      Initials Name Provider Type    SC Shamir Malik, PT Physical Therapist                  Therapy Charges for Today       Code Description Service Date Service Provider Modifiers Qty    09415938739  PT THER PROC EA 15 MIN 11/8/2024 Shamir Malik, PT GP, KX 1    [id number] HC GAIT TRAINING EA 15 MIN 11/8/2024 Shamir Malik, PT GP, KX 1            PT G-Codes  Outcome Measure Options: AM-PAC 6 Clicks Basic Mobility (PT)  AM-PAC 6 Clicks Score (PT): 16  AM-PAC 6 Clicks Score (OT): 16  PT Discharge Summary  Anticipated Discharge Disposition (PT): skilled nursing facility    Shamir Malik PT  11/8/2024

## 2024-11-08 NOTE — PLAN OF CARE
Problem: Adult Inpatient Plan of Care  Goal: Plan of Care Review  Outcome: Progressing  Goal: Patient-Specific Goal (Individualized)  Outcome: Progressing  Goal: Absence of Hospital-Acquired Illness or Injury  Outcome: Progressing  Intervention: Identify and Manage Fall Risk  Recent Flowsheet Documentation  Taken 11/8/2024 0600 by Nori Cruz RN  Safety Promotion/Fall Prevention:   activity supervised   assistive device/personal items within reach   clutter free environment maintained   fall prevention program maintained   lighting adjusted   nonskid shoes/slippers when out of bed   room organization consistent   safety round/check completed  Taken 11/8/2024 0400 by Nori Cruz RN  Safety Promotion/Fall Prevention:   activity supervised   assistive device/personal items within reach   clutter free environment maintained   fall prevention program maintained   lighting adjusted   nonskid shoes/slippers when out of bed   room organization consistent   safety round/check completed  Taken 11/8/2024 0203 by Nori Cruz RN  Safety Promotion/Fall Prevention:   activity supervised   assistive device/personal items within reach   clutter free environment maintained   fall prevention program maintained   lighting adjusted   nonskid shoes/slippers when out of bed   room organization consistent   safety round/check completed  Taken 11/8/2024 0000 by Nori Cruz RN  Safety Promotion/Fall Prevention:   activity supervised   assistive device/personal items within reach   clutter free environment maintained   fall prevention program maintained   lighting adjusted   nonskid shoes/slippers when out of bed   room organization consistent   safety round/check completed  Taken 11/7/2024 2200 by Nori Cruz RN  Safety Promotion/Fall Prevention:   activity supervised   assistive device/personal items within reach   clutter free environment maintained   fall prevention program maintained    lighting adjusted   nonskid shoes/slippers when out of bed   room organization consistent   safety round/check completed  Taken 11/7/2024 2000 by Nori Cruz RN  Safety Promotion/Fall Prevention:   activity supervised   assistive device/personal items within reach   clutter free environment maintained   fall prevention program maintained   lighting adjusted   nonskid shoes/slippers when out of bed   room organization consistent   safety round/check completed  Intervention: Prevent Skin Injury  Recent Flowsheet Documentation  Taken 11/8/2024 0600 by Nori Cruz RN  Body Position:   turned   legs elevated  Skin Protection:   drying agents applied   incontinence pads utilized   skin sealant/moisture barrier applied   transparent dressing maintained  Taken 11/8/2024 0400 by Nori Cruz RN  Body Position:   supine   legs elevated  Skin Protection:   incontinence pads utilized   skin sealant/moisture barrier applied   transparent dressing maintained  Taken 11/8/2024 0203 by Nori Cruz RN  Body Position:   supine   legs elevated  Skin Protection:   drying agents applied   incontinence pads utilized   skin sealant/moisture barrier applied   transparent dressing maintained  Taken 11/8/2024 0000 by Nori Cruz RN  Body Position: right  Skin Protection:   drying agents applied   incontinence pads utilized   skin sealant/moisture barrier applied   transparent dressing maintained  Taken 11/7/2024 2200 by Nori Cruz RN  Body Position: left  Skin Protection:   drying agents applied   incontinence pads utilized   skin sealant/moisture barrier applied   transparent dressing maintained  Taken 11/7/2024 2000 by Nori Cruz RN  Body Position:   supine   legs elevated  Skin Protection:   incontinence pads utilized   skin sealant/moisture barrier applied   transparent dressing maintained  Intervention: Prevent and Manage VTE (Venous Thromboembolism) Risk  Recent  Flowsheet Documentation  Taken 11/7/2024 2000 by Nori Cruz RN  VTE Prevention/Management: (see MAR)   bilateral   SCDs (sequential compression devices) off   patient refused intervention   other (see comments)  Intervention: Prevent Infection  Recent Flowsheet Documentation  Taken 11/8/2024 0600 by Nori Cruz RN  Infection Prevention:   environmental surveillance performed   equipment surfaces disinfected   hand hygiene promoted   single patient room provided  Taken 11/8/2024 0400 by Nori Cruz RN  Infection Prevention:   environmental surveillance performed   equipment surfaces disinfected   hand hygiene promoted  Taken 11/8/2024 0203 by Nori Cruz RN  Infection Prevention:   environmental surveillance performed   equipment surfaces disinfected   hand hygiene promoted  Taken 11/8/2024 0000 by Nori Cruz RN  Infection Prevention:   environmental surveillance performed   equipment surfaces disinfected   hand hygiene promoted   single patient room provided  Taken 11/7/2024 2200 by Nori Cruz RN  Infection Prevention:   environmental surveillance performed   equipment surfaces disinfected   hand hygiene promoted   single patient room provided  Taken 11/7/2024 2000 by Nori Cruz RN  Infection Prevention:   environmental surveillance performed   single patient room provided   equipment surfaces disinfected   hand hygiene promoted  Goal: Optimal Comfort and Wellbeing  Outcome: Progressing  Intervention: Provide Person-Centered Care  Recent Flowsheet Documentation  Taken 11/7/2024 2000 by Nori Cruz RN  Trust Relationship/Rapport:   care explained   questions encouraged   reassurance provided   thoughts/feelings acknowledged  Goal: Readiness for Transition of Care  Outcome: Progressing     Problem: Skin Injury Risk Increased  Goal: Skin Health and Integrity  Outcome: Progressing  Intervention: Optimize Skin Protection  Recent Flowsheet  Documentation  Taken 11/8/2024 0600 by Nori Cruz RN  Activity Management: activity encouraged  Pressure Reduction Techniques:   frequent weight shift encouraged   pressure points protected   weight shift assistance provided  Head of Bed (HOB) Positioning: [facility] elevated  Pressure Reduction Devices:   positioning supports utilized   pressure-redistributing mattress utilized   specialty bed utilized  Skin Protection:   drying agents applied   incontinence pads utilized   skin sealant/moisture barrier applied   transparent dressing maintained  Taken 11/8/2024 0400 by Nori Curz RN  Activity Management: activity encouraged  Pressure Reduction Techniques:   frequent weight shift encouraged   pressure points protected   weight shift assistance provided  Head of Bed ([facility]) Positioning: [facility] elevated  Pressure Reduction Devices:   positioning supports utilized   specialty bed utilized   pressure-redistributing mattress utilized  Skin Protection:   incontinence pads utilized   skin sealant/moisture barrier applied   transparent dressing maintained  Taken 11/8/2024 0203 by Nori Cruz RN  Activity Management: activity encouraged  Pressure Reduction Techniques:   frequent weight shift encouraged   pressure points protected   weight shift assistance provided  Head of Bed ([facility]) Positioning: [facility] elevated  Pressure Reduction Devices:   positioning supports utilized   pressure-redistributing mattress utilized   specialty bed utilized  Skin Protection:   drying agents applied   incontinence pads utilized   skin sealant/moisture barrier applied   transparent dressing maintained  Taken 11/8/2024 0000 by Nori Cruz RN  Activity Management: activity encouraged  Pressure Reduction Techniques:   frequent weight shift encouraged   pressure points protected   weight shift assistance provided  Head of Bed (HOB) Positioning: [facility] elevated  Pressure Reduction Devices:   positioning supports utilized    pressure-redistributing mattress utilized   specialty bed utilized  Skin Protection:   drying agents applied   incontinence pads utilized   skin sealant/moisture barrier applied   transparent dressing maintained  Taken 11/7/2024 2200 by Nori Cruz RN  Activity Management: activity encouraged  Pressure Reduction Techniques:   frequent weight shift encouraged   pressure points protected   weight shift assistance provided  Head of Bed (HOB) Positioning: \[facility]\"" elevated  Pressure Reduction Devices:   positioning supports utilized   pressure-redistributing mattress utilized   specialty bed utilized  Skin Protection:   drying agents applied   incontinence pads utilized   skin sealant/moisture barrier applied   transparent dressing maintained  Taken 11/7/2024 2000 by Nori Cruz RN  Activity Management: back to bed  Pressure Reduction Techniques:   frequent weight shift encouraged   pressure points protected   weight shift assistance provided  Head of Bed (HOB) Positioning: HOB elevated  Pressure Reduction Devices:   positioning supports utilized   pressure-redistributing mattress utilized   specialty bed utilized  Skin Protection:   incontinence pads utilized   skin sealant/moisture barrier applied   transparent dressing maintained     Problem: Restraint, Nonviolent  Goal: Absence of Harm or Injury  Outcome: Progressing  Intervention: Implement Least Restrictive Safety Strategies  Recent Flowsheet Documentation  Taken 11/8/2024 0600 by Nori Cruz RN  Medical Device Protection: tubing secured  Taken 11/8/2024 0400 by Nori Cruz RN  Medical Device Protection: tubing secured  Taken 11/8/2024 0203 by Nori Cruz RN  Medical Device Protection: tubing secured  Taken 11/8/2024 0000 by Nori Cruz RN  Medical Device Protection: tubing secured  Taken 11/7/2024 2200 by Nori Cruz RN  Medical Device Protection: tubing secured  Taken 11/7/2024 2000 by Nancy  AUSTIN Julio  Diversional Activities: television  Intervention: Protect Dignity, Rights and Personal Wellbeing  Recent Flowsheet Documentation  Taken 11/7/2024 2000 by Nori Cruz RN  Trust Relationship/Rapport:   care explained   questions encouraged   reassurance provided   thoughts/feelings acknowledged  Intervention: Protect Skin and Joint Integrity  Recent Flowsheet Documentation  Taken 11/8/2024 0600 by Nori Cruz RN  Body Position:   turned   legs elevated  Skin Protection:   drying agents applied   incontinence pads utilized   skin sealant/moisture barrier applied   transparent dressing maintained  Taken 11/8/2024 0400 by Nori Cruz RN  Body Position:   supine   legs elevated  Skin Protection:   incontinence pads utilized   skin sealant/moisture barrier applied   transparent dressing maintained  Taken 11/8/2024 0203 by Nori Cruz RN  Body Position:   supine   legs elevated  Skin Protection:   drying agents applied   incontinence pads utilized   skin sealant/moisture barrier applied   transparent dressing maintained  Taken 11/8/2024 0000 by Nori Cruz RN  Body Position: right  Skin Protection:   drying agents applied   incontinence pads utilized   skin sealant/moisture barrier applied   transparent dressing maintained  Taken 11/7/2024 2200 by Nori Cruz RN  Body Position: left  Skin Protection:   drying agents applied   incontinence pads utilized   skin sealant/moisture barrier applied   transparent dressing maintained  Taken 11/7/2024 2000 by Nori Cruz RN  Body Position:   supine   legs elevated  Skin Protection:   incontinence pads utilized   skin sealant/moisture barrier applied   transparent dressing maintained   Goal Outcome Evaluation:

## 2024-11-08 NOTE — PLAN OF CARE
Goal Outcome Evaluation:  Plan of Care Reviewed With: patient, spouse                Anticipated Discharge Disposition (SLP): home with home health             Treatment Assessment (SLP): continued, oral dysphagia, suspected, pharyngeal dysphagia (11/08/24 1130)     Plan for Continued Treatment (SLP): continue treatment per plan of care (11/08/24 1130)

## 2024-11-08 NOTE — PLAN OF CARE
Goal Outcome Evaluation:  Plan of Care Reviewed With: patient        Progress: improving  Outcome Evaluation: Patient very willing to participate in therapeutic activities. Overall he is very unsteady, requireing assistance at time for balance and safety directions. Recommend continued skilled PT    Anticipated Discharge Disposition (PT): skilled nursing facility

## 2024-11-08 NOTE — PROGRESS NOTES
[facility] Medicine Services  PROGRESS NOTE    Patient Name: Guicho Blanco  : 1940  MRN: [id number]    Date of Admission: 2024  Primary Care Physician: Mitchel Culver MD    Subjective   Subjective     CC: f/u weakness    HPI: Seen and examined this morning.  He is alert and oriented and appears at his baseline mental status.  Reports he had a good night overall compared to the prior night.        Objective   Objective     Vital Signs:   Temp:  [96.5 °F (35.8 °C)-98.1 °F (36.7 °C)] 96.5 °F (35.8 °C)  Heart Rate:  [] 96  Resp:  [16-18] 18  BP: (103-139)/(55-81) 121/67     Physical Exam  Constitutional:       General: He is not in acute distress.  Cardiovascular:      Rate and Rhythm: Normal rate and regular rhythm.      Heart sounds: Normal heart sounds.   Pulmonary:      Effort: Pulmonary effort is normal.      Breath sounds: Normal breath sounds.   Abdominal:      General: There is no distension.      Palpations: Abdomen is soft.      Tenderness: There is no abdominal tenderness.   Musculoskeletal:      Right lower leg: No edema.      Left lower leg: No edema.   Neurological:      General: No focal deficit present.      Mental Status: He is alert.          Results Reviewed:  LAB RESULTS:      Lab 24  0821 24  0617 24  0738   WBC 6.13 7.01 5.88   HEMOGLOBIN 11.9* 10.5* 11.6*   HEMATOCRIT 37.2* 33.6* 37.0*   PLATELETS 215 205 230   NEUTROS ABS  --   --  3.47   IMMATURE GRANS (ABS)  --   --  0.02   LYMPHS ABS  --   --  1.41   MONOS ABS  --   --  0.50   EOS ABS  --   --  0.43*   MCV 83.0 84.0 84.3   LACTATE  --   --  1.2         Lab 24  0821 24  0617 24  0745 24  0738 24  0736   SODIUM 135* 136  --  139  --    POTASSIUM 3.5 3.5  --  3.9  --    CHLORIDE 101 103  --  103  --    CO2 23.0 19.0*  --  23.0  --    ANION GAP 11.0 14.0  --  13.0  --    BUN 8 14  --  19  --    CREATININE 0.73* 0.68* 0.90 0.86 0.90   EGFR  90.3 92.2  --  85.9  --    GLUCOSE 99 103*  --  99  --    CALCIUM 9.4 9.1  --  9.4  --    MAGNESIUM 1.6  --   --   --   --          Lab 11/05/24  0738   TOTAL PROTEIN 6.4   ALBUMIN 3.6   GLOBULIN 2.8   ALT (SGPT) 16   AST (SGOT) 30   BILIRUBIN 0.5   ALK PHOS 132*                     Brief Urine Lab Results  (Last result in the past 365 days)        Color   Clarity   Blood   Leuk Est   Nitrite   Protein   CREAT   Urine HCG        11/05/24 0943 Dark Yellow   Clear   Negative   Moderate (2+)   Negative   30 mg/dL (1+)                   Microbiology Results Abnormal       None            No radiology results from the last 24 hrs    Results for orders placed during the hospital encounter of 09/13/24    Adult Transthoracic Echo Complete W/ Cont if Necessary Per Protocol    Interpretation Summary    Left ventricular systolic function is normal. Left ventricular ejection fraction appears to be 61 - 65%.    Left ventricular diastolic function was normal.      Current medications:  Scheduled Meds:aspirin, 325 mg, Oral, Daily  cefuroxime, 500 mg, Oral, Q12H  cycloSPORINE, 1 drop, Both Eyes, Q12H  enoxaparin, 40 mg, Subcutaneous, Daily  flecainide, 100 mg, Oral, Q12H  isosorbide dinitrate, 10 mg, Oral, TID - Nitrates  nortriptyline, 30 mg, Oral, Nightly  OLANZapine, 2.5 mg, Oral, Nightly  pantoprazole, 40 mg, Intravenous, Q AM  pravastatin, 20 mg, Oral, Daily  senna, 1 tablet, Oral, Nightly  sodium chloride, 10 mL, Intravenous, Q12H      Continuous Infusions:   PRN Meds:.  acetaminophen    senna-docusate sodium **AND** polyethylene glycol **AND** bisacodyl **AND** bisacodyl    nitroglycerin    sodium chloride    sodium chloride    Assessment & Plan   Assessment & Plan     Active Hospital Problems    Diagnosis  POA    **Generalized weakness [R53.1]  Yes    Severe protein-calorie malnutrition [E43]  Yes      Resolved Hospital Problems   No resolved problems to display.        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  Admitted for fall at home, and general failure to thrive.       Fall at home  - PT OT  - CM consult.    -Plan currently is SNF with plan to transition to long-term care  - Hospice following     Weakness  Poor intake, rumination  Chronic constipation  Frequent waking at night  - hospice consult per wife's request   - Boost TID  - nutrition consult     Delirium, chronic  - Patient has never been formally diagnosed with any sort of dementia or cognitive impairment however has significant delirium at home at night  - Patient on flecainide QTc 460   - Started on Zyprexa 2.5 nightly which seem to help, will continue, adjust dose as necessary  - If PRN necessary would consider Versed first    Pyuria, will treat empirically for now  - cefuroxime; watch culture-negative so far     History of R knee replacement/cellulitis 8-9/2024  - no redness currently ; nl WBC, no fever      CAD, Afib, HTN  - continue home meds     Expected Discharge Location and Transportation: TBD  Expected Discharge   Expected Discharge Date: 11/11/2024; Expected Discharge Time:      VTE Prophylaxis:  Pharmacologic VTE prophylaxis orders are present.         AM-PAC 6 Clicks Score (PT): 16 (11/08/24 4666)    CODE STATUS:   Code Status and Medical Interventions: No CPR (Do Not Attempt to Resuscitate); Limited Support; No intubation (DNI)   Ordered at: 11/05/24 0741     Medical Intervention Limits:    No intubation (DNI)     Level Of Support Discussed With:    Patient     Code Status (Patient has no pulse and is not breathing):    No CPR (Do Not Attempt to Resuscitate)     Medical Interventions (Patient has pulse or is breathing):    Limited Support       Carolann Soler MD  11/08/24

## 2024-11-08 NOTE — THERAPY TREATMENT NOTE
Acute Care - Speech Language Pathology   Swallow Treatment Note [facility]     Patient Name: Guicho Blanco  : 1940  MRN: [id number]  Today's Date: 2024               Admit Date: 2024    Visit Dx:     ICD-10-CM ICD-9-CM   1. Generalized weakness  R53.1 780.79   2. Fall in home, initial encounter  W19.XXXA E888.9    Y92.009 E849.0   3. Anorexia  R63.0 783.0   4. Bacterial UTI  N39.0 599.0    A49.9 041.9   5. Oropharyngeal dysphagia  R13.12 787.22     Patient Active Problem List   Diagnosis    Osteomyelitis of toe of right foot    Osteomyelitis of right foot    PAF (paroxysmal atrial fibrillation)    Coronary artery disease involving native coronary artery of native heart without angina pectoris    Gastroesophageal reflux disease without esophagitis    Benign non-nodular prostatic hyperplasia without lower urinary tract symptoms    S/P appy    Essential hypertension    Simple chronic bronchitis    Mixed hyperlipidemia    COPD (chronic obstructive pulmonary disease)    BPH (benign prostatic hypertrophy)    Hearing loss    Chest pain    Vertigo    Snoring    Overweight    PLMD (periodic limb movement disorder)    Neuropathy    Periodic headache syndrome, not intractable    Acquired absence of other right toe(s)    Tremor    Bilateral impacted cerumen    Bilateral sensorineural hearing loss    Chronic otitis externa    Deviated nasal septum    Disorder of joint of foot    ED (erectile dysfunction) of organic origin    Hydrocele    Hydrocele of testis    Impacted cerumen    Mononeuropathy    Swelling of testicle    Venous retinal branch occlusion    Vitreous degeneration    Benign prostatic hyperplasia with urinary obstruction    OA (osteoarthritis) of knee    Status post total right knee replacement    Septic arthritis    Cellulitis    Cellulitis of right knee    Failure to thrive in adult    Insomnia    Moderate malnutrition    Sepsis    Lactic acidosis    Serotonin syndrome    Dysphagia     Generalized weakness    Severe protein-calorie malnutrition     Past Medical History:   Diagnosis Date    Arthritis     Atrial fibrillation     BPH (benign prostatic hypertrophy)     Cataract     Cellulitis     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     bilateral    Coronary artery disease     Full dentures     Gall stones     GERD (gastroesophageal reflux disease)     Hearing loss     Hiatal hernia     Hyperlipidemia     Hypertension     Peripheral neuropathy     Peripheral vascular disease     Seasonal allergies     Skin cancer     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    TIA (transient ischemic attack)     2 times    Wears glasses      Past Surgical History:   Procedure Laterality Date    ABLATION OF DYSRHYTHMIC FOCUS      APPENDECTOMY      CARDIAC CATHETERIZATION N/A 10/05/2017    Procedure: Left Heart Cath;  Surgeon: Hector Urrutia MD;  Location:  [facility] CATH INVASIVE LOCATION;  Service:     CATARACT EXTRACTION Bilateral     CHOLECYSTECTOMY      Remote    COCHLEAR IMPLANT REVISION  05/2019    COLONOSCOPY      PROSTATE SURGERY      SHOULDER SURGERY Right     SKIN BIOPSY      TOE AMPUTATION Right     2nd toe - Right Foot    TONSILLECTOMY      TOTAL KNEE ARTHROPLASTY Right 8/7/2024    Procedure: TOTAL KNEE ARTHROPLASTY WITH ASHLEY ROBOT RIGHT;  Surgeon: Christian Schwartz MD;  Location:  [facility];  Service: Robotics - Ortho;  Laterality: Right;       SLP Recommendation and Plan                                               Daily Summary of Progress (SLP): progress toward functional goals as expected (11/08/24 1130)               Treatment Assessment (SLP): continued, oral dysphagia, suspected, pharyngeal dysphagia (11/08/24 1130)     Plan for Continued Treatment (SLP): continue treatment per plan of care (11/08/24 1130)                SWALLOW EVALUATION (Last 72 Hours)       SLP Adult Swallow Evaluation       Row Name 11/08/24 1130 11/07/24 1015 11/06/24 1415 11/06/24 0940          Rehab Evaluation    Document Type therapy note  (daily note)  -SM re-evaluation  - evaluation  -GA evaluation  -GA     Subjective Information no complaints  - no complaints  - no complaints  -GA no complaints  -GA     Patient Observations alert;cooperative  - alert;cooperative  -SM alert;cooperative;agree to therapy  -GA alert;cooperative;agree to therapy  -GA     Patient/Family/Caregiver Comments/Observations -- -- none present  -GA brother present  -GA     Patient Effort good  -SM good  -SM good  -GA good  -GA     Comment -- -- -- patient actively eating breakfast upon arrival  -GA     Symptoms Noted During/After Treatment -- -- none  -GA none  -GA     Oral Care -- -- -- other (see comments)  eating breakfast already  -GA        General Information    Patient Profile Reviewed -- -- yes  -GA yes  -GA     Pertinent History Of Current Problem -- Choking on pills last night. Made NPO.  -SM see previous hx  -GA 83 yr old man with history of afib, CAD, HTN, TIA, cochlear implants, and R knee replacement in Aug 2024 with 2 readmissions for cellulitis and progressive weakness/frailty in recent months.  Admitted for fall at home, and general failure to thrive.  -GA     Current Method of Nutrition regular textures;thin liquids  - NPO  - soft to chew textures;ground;thin liquids  no straw  -GA regular textures;thin liquids  -GA     Precautions/Limitations, Vision -- -- WFL;for purposes of eval  -GA WFL;for purposes of eval  -GA     Precautions/Limitations, Hearing -- -- hearing impairment, bilaterally  -GA hearing impairment, bilaterally  -GA     Prior Level of Function-Communication -- -- unknown  -GA unknown  -GA     Prior Level of Function-Swallowing -- -- other (see comments)  MBS and tx complete during hospital stay 9/2024. MBS recommended puree/thin with medication crushed in puree. At risk for choking d/t pharyngeal residue. At d/c patient upgraded to [facility] ground/puree. Patient reports no longer crushing medication at home  -GA other (see comments)  EUNICE  and tx complete during hospital stay 9/2024. [facility] recommended puree/thin with medication crushed in puree. At risk for choking d/t pharyngeal residue. At d/c patient upgraded to [facility] ground/puree. Patient reports no longer crushing medication at home  -GA     Plans/Goals Discussed with -- patient;spouse/S.O.;family;agreed upon  - patient;agreed upon  -GA patient and family;agreed upon  -GA     Barriers to Rehab -- previous functional deficit;cognitive status;hearing deficit  - medically complex;previous functional deficit;hearing deficit  -GA medically complex;previous functional deficit;hearing deficit  -GA     Patient's Goals for Discharge -- return to regular diet  - patient did not state  -GA patient did not state  -GA     Family Goals for Discharge -- patient able to return to regular diet;patient able to eat/drink without coughing/choking  - -- family did not state  -GA        Pain    Pretreatment Pain Rating 0/10 - no pain  - 0/10 - no pain  - 0/10 - no pain  -GA 0/10 - no pain  -GA     Posttreatment Pain Rating 0/10 - no pain  - 0/10 - no pain  - 0/10 - no pain  -GA 0/10 - no pain  -GA        Oral Motor Structure and Function    Dentition Assessment -- -- -- upper dentures/partial in place;lower dentures/partial in place  -GA     Secretion Management -- -- -- WNL/WFL  -GA     Volitional Swallow -- -- -- WFL  -GA        Oral Musculature and Cranial Nerve Assessment    Oral Motor General Assessment -- generalized oral motor weakness  - -- generalized oral motor weakness  -GA        General Eating/Swallowing Observations    Respiratory Support Currently in Use -- room air  - -- room air  -GA     Eating/Swallowing Skills -- -- -- self-fed  -GA     Positioning During Eating -- -- -- upright 90 degree;upright in chair  -GA     Utensils Used -- -- -- spoon;cup;straw  -GA     Consistencies Trialed -- -- -- soft to chew textures;chopped;mechanical ground textures;thin liquids  -GA         Respiratory    Respiratory Status -- -- -- WFL;room air  -GA        Clinical Swallow Eval    Oral Prep Phase -- impaired  -SM -- impaired  -GA     Oral Transit -- impaired  -SM -- impaired  -GA     Oral Residue -- impaired  -SM -- impaired  -GA     Pharyngeal Phase -- suspected pharyngeal impairment  - -- suspected pharyngeal impairment  -GA     Esophageal Phase -- -- -- unremarkable  -GA     Clinical Swallow Evaluation Summary -- No acute change observed c/t MBS performed yesterday. Despite no aspiration observed on yesterday's exam, pt will remain increased risk r/t oral weakness and cervical osteophyte at the level of the UES. Makes it difficult for bolus to enter esophagus with ease. Feeding tube would not be an appropriate recommendation nor desired. Best option is adjusting PO diet to help balance dysphagia risk with nutrition (as decreased intake with modified PO diets per discussion with pt/family). Recommendations reflect GOC/POC discussion and preferences with pt, wife, and daughter. Will then adjust as needed pending tolerance and intake.  -SM -- patient with overt s/sx with throat clear and multiple/audible swallows during straw sips of thin, large cup sips, and when taking pills with liquids. patient with significantly prolonged masticaiton of soft solids (i.e., eggs) for ~5 minutes. pocketing into chew and rumination. patient downgraded to medication whole in puree, small single sips of liquids via cup, and soft solids/ground meats  -GA        Oral Prep Concerns    Oral Prep Concerns -- -- -- oral holding;prolonged mastication;increased prep time  -GA     Oral Holding -- -- -- mechanical soft  -GA     Prolonged Mastication -- -- -- mechanical soft  -GA     Increased Prep Time -- -- -- mechanical soft  -GA        Oral Transit Concerns    Oral Transit Concerns -- -- -- increased oral transit time  -GA     Increased Oral Transit Time -- -- -- all consistencies  -GA        Oral Residue Concerns    Oral  Residue Concerns -- -- -- sulci residue, bilateral  -GA     Sulci Residue, Bilateral -- -- -- regular consistencies;mechanical soft  -GA        Pharyngeal Phase Concerns    Pharyngeal Phase Concerns -- -- -- multiple swallows;throat clear  -GA     Multiple Swallows -- -- -- thin  -GA     Throat Clear -- -- -- thin  -GA        MBS/VFSS    Utensils Used -- -- spoon;cup;straw  -GA --     Consistencies Trialed -- -- barium pill;thin liquids;pureed  -GA --        MBS/VFSS Interpretation    Oral Prep Phase -- -- impaired oral phase of swallowing  -GA --     Oral Transit Phase -- -- impaired  -GA --     Oral Residue -- -- impaired  -GA --     VFSS Summary -- -- Patient with deep penetration d/t mixing puree solids and thin liquids because patient had difficulty trigger swallow of puree and using liquid wash to assist with triggering swallow. All other aspiration events with shallow or transient penetration that was able to be cleared upon secondary swallow or intermittent throat clear. moderate -significant valleculae, pyriform sinus, and diffused pharyngeal residue placing patient at increased risk for choking/aspiration/penetration. able to paritially clear with multiple swallows and liquid washes. Due to prolonged oral phase and manipulation difficulties patient also at risk for malnutrition.  -GA --        Oral Preparatory Phase    Oral Preparatory Phase -- -- oral holding;prolonged manipulation;inadequate manipulation  -GA --     Oral Holding -- -- pudding/puree;secondary to reduced labial seal;secondary to reduced lingual strength;secondary to reduced lingual range of motion  -GA --     Prolonged Manipulation -- -- all consistencies tested;secondary to reduced labial seal;secondary to reduced lingual strength;secondary to reduced lingual range of motion  -GA --     Inadequate Manipulation -- -- all consistencies tested;secondary to reduced labial seal;secondary to reduced lingual strength;secondary to reduced lingual  range of motion  -GA --        Oral Transit Phase    Impaired Oral Transit Phase -- -- delayed initiation of bolus transit;increased A-P transit time;tongue pumping;piecemeal oral transit;premature spillage of liquids into pharynx  -GA --     Delayed Initiation of bolus transit -- -- all consistencies tested;secondary to reduced lingual control;discoordination of lingual movement  -GA --     Increased A-P Transit Time -- -- all consistencies tested;secondary to reduced lingual control;discoordination of lingual movement  -GA --     Tongue Pumping -- -- all consistencies tested;secondary to reduced lingual control;discoordination of lingual movement  -GA --     Piecemeal Oral Transit -- -- all consistencies tested;secondary to reduced lingual control;discoordination of lingual movement  -GA --     Premature Spillage of Liquids into Pharynx -- -- all consistencies tested;secondary to reduced lingual control;discoordination of lingual movement  -GA --        Oral Residue    Impaired Oral Residue -- -- diffuse residue throughout oral cavity;lateral sulci residue  -GA --     Lateral Sulci Residue -- -- thin liquids;secondary to reduced lingual strength;secondary to reduced lingual range of motion  -GA --     Diffuse Residue throughout Oral Cavity -- -- all consistencies tested;secondary to reduced lingual strength;secondary to reduced lingual range of motion  -GA --        Initiation of Pharyngeal Swallow    Initiation of Pharyngeal Swallow -- -- bolus in pyriform sinuses  -GA --     Pharyngeal Phase -- -- impaired pharyngeal phase of swallowing  -GA --     Anatomical abnormalities noted -- -- osteophyte/bone spur per radiology report  -GA --     Anatomical abnormalities functional impact -- -- functional impact on swallowing;other (see comments)  cuasing pharyngeal residue and immeidately under UES  -GA --     Penetration During the Swallow -- -- thin liquids;all consistencies tested;secondary to delayed swallow  initiation or mistiming;secondary to reduced laryngeal elevation;secondary to reduced vestibular closure  -GA --     Depth of Penetration -- -- deep;shallow;transient;other (see comments)  deep penetration occuring when mixing liquids and solids. patient demonstrating difficulty triggering a swallow with puree consistenices. used liquid wash to clear causing deep penetration. All other thin  trials w/ no, shallow or transient penetration  -GA --     Response to Penetration -- -- Yes  -GA --     Responsed to penetration with -- -- throat clear;effective;non-effective;other (see comments)  mostly effective despite 1x with deep penetration with mixed consistecnies (puree and liquid wash)  -GA --     Rosenbek's Scale -- -- mixed:;thin:;5--->level 5;pudding/puree:;1--->level 1  5 with 1x trial of thin/puree mixed consistency to level of folds.  -GA --     Pharyngeal Residue -- -- thin liquids;laryngeal vestibule;pudding/puree;diffuse within pharynx;pyriform sinuses;valleculae;base of tongue;posterior pharyngeal wall;secondary to reduced base of tongue retraction;secondary to reduced posterior pharyngeal wall stripping;secondary to reduced laryngeal elevation;secondary to reduced hyolaryngeal excursion  -GA --     Response to Residue -- -- partial residue clearance;cleared residue with liquid wash;cleared residue with spontaneous subsequent swallow;cleared residue with compensatory maneuver (see comments)  throat clear  -GA --     Attempted Compensatory Maneuvers -- -- bolus size;bolus presentation style;throat clear after swallow;additional subsequent swallow  -GA --     Response to Attempted Compensatory Maneuvers -- -- reduced residue;prevented penetration;did not prevent penetration  -GA --     Successful Compensatory Maneuver Competency -- -- patient able to;demonstrate compensations;with cues  -GA --        Esophageal Phase    Esophageal Phase -- -- see radiology report for further details  -GA --        SLP  Evaluation Clinical Impression    SLP Swallowing Diagnosis -- moderate;oral dysphagia;suspected pharyngeal dysphagia  - mod-severe;oral dysphagia;moderate;pharyngeal dysphagia  -GA R/O pharyngeal dysphagia;moderate;oral dysphagia  -GA     Functional Impact -- risk of aspiration/pneumonia;risk of malnutrition  - risk of aspiration/pneumonia;risk of malnutrition;risk of dehydration  -GA risk of aspiration/pneumonia;risk of malnutrition;risk of dehydration  -GA     Rehab Potential/Prognosis, Swallowing -- re-evaluate goals as necessary  -SM good, to achieve stated therapy goals  -GA good, to achieve stated therapy goals  -GA     Swallow Criteria for Skilled Therapeutic Interventions Met -- demonstrates skilled criteria  -SM demonstrates skilled criteria  -GA demonstrates skilled criteria  -GA        SLP Treatment Clinical Impressions    Treatment Assessment (SLP) continued;oral dysphagia;suspected;pharyngeal dysphagia  - -- -- --     Daily Summary of Progress (SLP) progress toward functional goals as expected  - -- -- --     Plan for Continued Treatment (SLP) continue treatment per plan of care  - -- -- --     Care Plan Review evaluation/treatment results reviewed;care plan/treatment goals reviewed;risks/benefits reviewed;current/potential barriers reviewed;patient/other agree to care plan  - -- -- --     Care Plan Review, Other Participant(s) significant other  - -- -- --        Recommendations    Therapy Frequency (Swallow) -- 5 days per week  - 5 days per week  -GA --     Predicted Duration Therapy Intervention (Days) -- 1 week  - 1 week  -GA --     SLP Diet Recommendation -- regular textures;thin liquids;other (see comments)  menu select for naturally softer foods. SLP took preferences for next three meals and sent to kitchen.  - mechanical ground textures;thin liquids  -GA soft to chew textures;ground;thin liquids;other (see comments)  single sips via cup only  -GA     Recommended Diagnostics  -- -- -- [place]  -GA     Recommended Precautions and Strategies -- upright posture during/after eating;small bites of food and sips of liquid;general aspiration precautions;fatigue precautions;other (see comments)  pull items from trays for snacks between. Pt finds full trays to be overwhelming  - upright posture during/after eating;no straw;multiple swallows per bite of food;multiple swallows per sip of liquid;alternate between small bites of food and sips of liquid;volitional throat clear;general aspiration precautions  -GA upright posture during/after eating;no straw;multiple swallows per bite of food;general aspiration precautions  -GA     Oral Care Recommendations -- Oral Care BID/PRN;Toothbrush  - Oral Care BID/PRN;Toothbrush  -GA Oral Care BID/PRN;Suction toothbrush  -GA     SLP Rec. for Method of Medication Administration -- --  trial cut in ice cream, adjust as needed, especially if needed to be crushed. Pt finds crushed to be difficult to manage orally though needs adjustment related to bone spur.  - meds crushed;with puree;as tolerated  discussed with RN about ability to obtain liquid medication  -GA meds whole;meds crushed;with puree;as tolerated  -GA     Monitor for Signs of Aspiration -- yes;notify SLP if any concerns  - yes;notify SLP if any concerns  -GA yes;notify SLP if any concerns  -GA     Anticipated Discharge Disposition (SLP) -- home with home health  - home with home health  -GA home with home health  -GA               User Key  (r) = Recorded By, (t) = Taken By, (c) = Cosigned By      Initials Name Effective Dates     Tiffany Fisher MS CCC-SLP 02/03/23 -     GA Carrie Wise MS CCC-SLP 09/14/23 -                     EDUCATION  The patient has been educated in the following areas:   Dysphagia (Swallowing Impairment) Oral Care/Hydration Modified Diet Instruction.        SLP GOALS       Row Name 11/08/24 1130 11/07/24 1015 11/06/24 3949       (LTG) Patient will  demonstrate functional swallow for    Diet Texture (Demonstrate functional swallow) regular textures  -SM regular textures  -SM soft to chew (chopped) textures  -GA    Liquid viscosity (Demonstrate functional swallow) thin liquids  -SM thin liquids  -SM thin liquids  -GA    Arthur (Demonstrate functional swallow) with minimal cues (75-90% accuracy)  -SM with minimal cues (75-90% accuracy)  -SM with minimal cues (75-90% accuracy)  -GA    Time Frame (Demonstrate functional swallow) 1 week  -SM 1 week  -SM 1 week  -GA    Progress/Outcomes (Demonstrate functional swallow) continuing progress toward goal  -SM goal revised this date  -SM new goal  -GA       (STG) Patient will tolerate trials of    Consistencies Trialed (Tolerate trials) regular textures;thin liquids  -SM regular textures;thin liquids  -SM soft to chew (ground) textures;thin liquids  -GA    Desired Outcome (Tolerate trials) with adequate oral prep/transit/clearance;without signs/symptoms of aspiration;with use of compensatory strategies (see comments)  menu select for softer foods, aspiration precautions, throat clear periodically  -SM with adequate oral prep/transit/clearance;without signs/symptoms of aspiration;with use of compensatory strategies (see comments)  menu select for softer foods, aspiration precautions, throat clear periodically  -SM without signs/symptoms of aspiration;with adequate oral prep/transit/clearance  -GA    Arthur (Tolerate trials) with minimal cues (75-90% accuracy)  -SM with minimal cues (75-90% accuracy)  -SM with moderate cues (50-74% accuracy)  -GA    Time Frame (Tolerate trials) 1 week  -SM 1 week  -SM 1 week  -GA    Progress/Outcomes (Tolerate trials) continuing progress toward goal  -SM goal revised this date  -SM new goal  -GA    Comment (Tolerate trials) Oral difficulties and continued intermittent coughing. Though no changes are suspected to improve safety or enjoyment. Wife in agreement, continue as is.  Took meal preferences for next two meals  -SM -- --       (STG) Patient will tolerate therapeutic trials of    Consistencies Trialed (Tolerate therapeutic trials) -- soft to chew (chopped) textures  -SM soft to chew (chopped) textures  -GA    Desired Outcome (Tolerate therapeutic trials) -- without signs/symptoms of aspiration;with adequate oral prep/transit/clearance  -SM without signs/symptoms of aspiration;with adequate oral prep/transit/clearance  -GA    Cass (Tolerate therapeutic trials) -- with moderate cues (50-74% accuracy)  -SM with moderate cues (50-74% accuracy)  -GA    Time Frame (Tolerate therapeutic trials) -- 1 week  -SM 1 week  -GA    Progress/Outcomes (Tolerate therapeutic trials) -- goal no longer appropriate  - new goal  -GA       ([facility]) Lingual Strengthening Goal 1 (SLP)    Activity (Lingual Strengthening Goal 1, SLP) -- -- increase lingual tone/sensation/control/coordination/movement;increase buccal tension or tone;increase tongue back strength  -GA    Increase Lingual Tone/Sensation/Control/Coordination/Movement -- -- lingual movement exercises;swallow trials;lingual resistance exercises  -GA    Increase Buccal Tension or Tone -- -- lingual movement exercises;swallow trials;lingual resistance exercises  -GA    Increase Tongue Back Strength -- -- lingual movement exercises;swallow trials;lingual resistance exercises  -GA    Cass/Accuracy (Lingual Strengthening Goal 1, SLP) -- -- with moderate cues (50-74% accuracy)  -GA    Time Frame (Lingual Strengthening Goal 1, SLP) -- -- 1 week  -GA    Progress/Outcomes (Lingual Strengthening Goal 1, SLP) -- -- new goal  -GA       ([facility]) Pharyngeal Strengthening Exercise Goal 1 (SLP)    Activity (Pharyngeal Strengthening Goal 1, SLP) -- -- increase timing;increase superior movement of the hyolaryngeal complex;increase anterior movement of the hyolaryngeal complex;increase closure at entrance to airway/closure of airway at glottis;increase  squeeze/positive pressure generation;increase tongue base retraction  -GA    Increase Timing -- -- prepping - 3 second prep or suck swallow or 3-step swallow  -GA    Increase Superior Movement of the Hyolaryngeal Complex -- -- falsetto;Mendelsohn  -GA    Increase Anterior Movement of the Hyolaryngeal Complex -- -- hard effortful swallow  -GA    Increase Closure at Entrance to Airway/Closure of Airway at Glottis -- -- chin tuck against resistance (CTAR)  -GA    Increase Squeeze/Positive Pressure Generation -- -- hard effortful swallow  -GA    Increase Tongue Base Retraction -- -- gerry  -GA    [place]/Accuracy (Pharyngeal Strengthening Goal 1, SLP) -- -- with moderate cues (50-74% accuracy)  -GA    Time Frame (Pharyngeal Strengthening Goal 1, SLP) -- -- 1 week  -GA    Progress/Outcomes (Pharyngeal Strengthening Goal 1, SLP) -- -- new goal  -GA       ([facility]) Swallow Management Recall Goal 1 (SLP)    Activity (Swallow Management Recall Goal 1, SLP) -- aspiration precautions;oral care recommendations;safe diet/liquid level;compensatory swallow strategies/techniques;rationale for use of strategies/techniques  - --    [place]/Accuracy (Swallow Management Recall Goal 1, SLP) -- with minimal cues (75-90% accuracy)  - --    Time Frame (Swallow Management Recall Goal 1, SLP) -- 1 week  - --    Progress/Outcomes (Swallow Management Recall Goal 1, SLP) -- new goal  - --              User Key  (r) = Recorded By, (t) = Taken By, (c) = Cosigned By      Initials Name Provider Type    Tiffany rPieto, MS CCC-SLP Speech and Language Pathologist    Carrie Cooney MS CCC-SLP Speech and Language Pathologist                         Time Calculation:    Time Calculation- SLP       Row Name 11/08/24 1332             Time Calculation- SLP    SLP Start Time 1130  -SM      SLP Received On 11/08/24  -         Untimed Charges    73531-QG Treatment Swallow Minutes 39  -SM         Total Minutes    Untimed Charges  Total Minutes 39  -SM       Total Minutes 39  -SM                User Key  (r) = Recorded By, (t) = Taken By, (c) = Cosigned By      Initials Name Provider Type    Tiffany Prieto MS CCC-SLP Speech and Language Pathologist                    Therapy Charges for Today       Code Description Service Date Service Provider Modifiers Qty    84270720421 HC ST EVAL ORAL PHARYNG SWALLOW 5 11/7/2024 Tiffany Fisher MS CCC-SLP GN, KX 1    45689900956 HC ST TREATMENT SWALLOW 3 11/8/2024 Tiffany Fisher MS CCC-SLP GN, KX 1                 MS RYAN Morillo  11/8/2024

## 2024-11-09 LAB — QT INTERVAL: 330 MS

## 2024-11-09 PROCEDURE — 25810000003 LACTATED RINGERS PER 1000 ML: Performed by: STUDENT IN AN ORGANIZED HEALTH CARE EDUCATION/TRAINING PROGRAM

## 2024-11-09 PROCEDURE — G0378 HOSPITAL OBSERVATION PER HR: HCPCS

## 2024-11-09 PROCEDURE — 96361 HYDRATE IV INFUSION ADD-ON: CPT

## 2024-11-09 PROCEDURE — 97530 THERAPEUTIC ACTIVITIES: CPT

## 2024-11-09 PROCEDURE — 25010000002 ENOXAPARIN PER 10 MG: Performed by: INTERNAL MEDICINE

## 2024-11-09 PROCEDURE — 99232 SBSQ HOSP IP/OBS MODERATE 35: CPT | Performed by: STUDENT IN AN ORGANIZED HEALTH CARE EDUCATION/TRAINING PROGRAM

## 2024-11-09 PROCEDURE — 96372 THER/PROPH/DIAG INJ SC/IM: CPT

## 2024-11-09 PROCEDURE — 97535 SELF CARE MNGMENT TRAINING: CPT

## 2024-11-09 RX ORDER — BISACODYL 5 MG/1
15 TABLET, DELAYED RELEASE ORAL ONCE AS NEEDED
Status: COMPLETED | OUTPATIENT
Start: 2024-11-09 | End: 2024-11-09

## 2024-11-09 RX ORDER — BISACODYL 10 MG
10 SUPPOSITORY, RECTAL RECTAL DAILY PRN
Status: DISCONTINUED | OUTPATIENT
Start: 2024-11-09 | End: 2024-11-13 | Stop reason: HOSPADM

## 2024-11-09 RX ORDER — BISACODYL 5 MG/1
5 TABLET, DELAYED RELEASE ORAL DAILY PRN
Status: DISCONTINUED | OUTPATIENT
Start: 2024-11-09 | End: 2024-11-13 | Stop reason: HOSPADM

## 2024-11-09 RX ORDER — AMOXICILLIN 250 MG
2 CAPSULE ORAL 2 TIMES DAILY
Status: DISCONTINUED | OUTPATIENT
Start: 2024-11-09 | End: 2024-11-13 | Stop reason: HOSPADM

## 2024-11-09 RX ORDER — SODIUM CHLORIDE, SODIUM LACTATE, POTASSIUM CHLORIDE, CALCIUM CHLORIDE 600; 310; 30; 20 MG/100ML; MG/100ML; MG/100ML; MG/100ML
100 INJECTION, SOLUTION INTRAVENOUS CONTINUOUS
Status: ACTIVE | OUTPATIENT
Start: 2024-11-09 | End: 2024-11-09

## 2024-11-09 RX ORDER — BISACODYL 5 MG/1
15 TABLET, DELAYED RELEASE ORAL ONCE AS NEEDED
Status: DISCONTINUED | OUTPATIENT
Start: 2024-11-09 | End: 2024-11-09 | Stop reason: ALTCHOICE

## 2024-11-09 RX ORDER — POLYETHYLENE GLYCOL 3350 17 G/17G
17 POWDER, FOR SOLUTION ORAL DAILY
Status: DISCONTINUED | OUTPATIENT
Start: 2024-11-09 | End: 2024-11-13 | Stop reason: HOSPADM

## 2024-11-09 RX ADMIN — ENOXAPARIN SODIUM 40 MG: 40 INJECTION SUBCUTANEOUS at 10:02

## 2024-11-09 RX ADMIN — SODIUM CHLORIDE, SODIUM LACTATE, POTASSIUM CHLORIDE, CALCIUM CHLORIDE 100 ML/HR: 20; 30; 600; 310 INJECTION, SOLUTION INTRAVENOUS at 14:35

## 2024-11-09 RX ADMIN — FLECAINIDE ACETATE 100 MG: 50 TABLET ORAL at 16:36

## 2024-11-09 RX ADMIN — Medication 10 ML: at 09:17

## 2024-11-09 RX ADMIN — BISACODYL 15 MG: 5 TABLET, COATED ORAL at 18:09

## 2024-11-09 RX ADMIN — NORTRIPTYLINE HYDROCHLORIDE 30 MG: 10 CAPSULE ORAL at 20:04

## 2024-11-09 RX ADMIN — POLYETHYLENE GLYCOL 3350 17 G: 17 POWDER, FOR SOLUTION ORAL at 14:35

## 2024-11-09 RX ADMIN — CYCLOSPORINE 1 DROP: 0.5 EMULSION OPHTHALMIC at 20:03

## 2024-11-09 RX ADMIN — Medication 10 ML: at 20:08

## 2024-11-09 RX ADMIN — ISOSORBIDE DINITRATE 10 MG: 10 TABLET ORAL at 12:13

## 2024-11-09 RX ADMIN — CYCLOSPORINE 1 DROP: 0.5 EMULSION OPHTHALMIC at 09:19

## 2024-11-09 RX ADMIN — ASPIRIN 325 MG: 325 TABLET ORAL at 12:14

## 2024-11-09 RX ADMIN — ISOSORBIDE DINITRATE 10 MG: 10 TABLET ORAL at 18:09

## 2024-11-09 RX ADMIN — SENNOSIDES AND DOCUSATE SODIUM 2 TABLET: 50; 8.6 TABLET ORAL at 20:03

## 2024-11-09 RX ADMIN — PRAVASTATIN SODIUM 20 MG: 20 TABLET ORAL at 12:13

## 2024-11-09 RX ADMIN — CEFUROXIME AXETIL 500 MG: 250 TABLET, FILM COATED ORAL at 12:14

## 2024-11-09 RX ADMIN — CEFUROXIME AXETIL 500 MG: 250 TABLET, FILM COATED ORAL at 20:03

## 2024-11-09 RX ADMIN — OLANZAPINE 2.5 MG: 5 TABLET, FILM COATED ORAL at 20:07

## 2024-11-09 NOTE — PLAN OF CARE
Problem: Adult Inpatient Plan of Care  Goal: Plan of Care Review  11/9/2024 0443 by Nori Cruz RN  Outcome: Progressing  11/9/2024 0443 by Nori Cruz RN  Outcome: Progressing  Goal: Patient-Specific Goal (Individualized)  11/9/2024 0443 by Nori Cruz RN  Outcome: Progressing  11/9/2024 0443 by Nori Cruz RN  Outcome: Progressing  Goal: Absence of Hospital-Acquired Illness or Injury  11/9/2024 0443 by Nori Cruz RN  Outcome: Progressing  11/9/2024 0443 by Nori Cruz RN  Outcome: Progressing  Intervention: Identify and Manage Fall Risk  Recent Flowsheet Documentation  Taken 11/9/2024 0400 by Nori Cruz RN  Safety Promotion/Fall Prevention:   activity supervised   assistive device/personal items within reach   clutter free environment maintained   fall prevention program maintained   lighting adjusted   nonskid shoes/slippers when out of bed   room organization consistent   safety round/check completed  Taken 11/9/2024 0200 by Nori Cruz RN  Safety Promotion/Fall Prevention:   activity supervised   assistive device/personal items within reach   clutter free environment maintained   fall prevention program maintained   lighting adjusted   nonskid shoes/slippers when out of bed   room organization consistent   safety round/check completed  Taken 11/9/2024 0000 by Nori Cruz RN  Safety Promotion/Fall Prevention:   activity supervised   assistive device/personal items within reach   clutter free environment maintained   fall prevention program maintained   lighting adjusted   nonskid shoes/slippers when out of bed   room organization consistent   safety round/check completed  Taken 11/8/2024 2200 by Nori Cruz RN  Safety Promotion/Fall Prevention:   activity supervised   assistive device/personal items within reach   clutter free environment maintained   fall prevention program maintained   lighting adjusted    nonskid shoes/slippers when out of bed   room organization consistent   safety round/check completed  Taken 11/8/2024 2000 by Nori Cruz RN  Safety Promotion/Fall Prevention:   activity supervised   assistive device/personal items within reach   clutter free environment maintained   fall prevention program maintained   lighting adjusted   nonskid shoes/slippers when out of bed   room organization consistent   safety round/check completed  Intervention: Prevent Skin Injury  Recent Flowsheet Documentation  Taken 11/9/2024 0400 by Nori Cruz RN  Body Position: right  Skin Protection:   drying agents applied   incontinence pads utilized   skin sealant/moisture barrier applied   transparent dressing maintained  Taken 11/9/2024 0200 by Nori Cruz RN  Body Position:   turned   left  Skin Protection:   drying agents applied   incontinence pads utilized   skin sealant/moisture barrier applied   transparent dressing maintained  Taken 11/9/2024 0000 by Nori Cruz RN  Body Position:   supine   legs elevated  Skin Protection:   incontinence pads utilized   skin sealant/moisture barrier applied   transparent dressing maintained  Taken 11/8/2024 2200 by Nori Cruz RN  Body Position: right  Skin Protection:   incontinence pads utilized   skin sealant/moisture barrier applied   transparent dressing maintained  Taken 11/8/2024 2000 by Nori Cruz RN  Body Position: turned  Skin Protection:   incontinence pads utilized   skin sealant/moisture barrier applied   transparent dressing maintained  Intervention: Prevent and Manage VTE (Venous Thromboembolism) Risk  Recent Flowsheet Documentation  Taken 11/8/2024 2000 by Nori Cruz RN  VTE Prevention/Management:   bilateral   SCDs (sequential compression devices) off   patient refused intervention  Intervention: Prevent Infection  Recent Flowsheet Documentation  Taken 11/9/2024 0400 by Nori Cruz  RN  Infection Prevention:   environmental surveillance performed   equipment surfaces disinfected   hand hygiene promoted   single patient room provided  Taken 11/9/2024 0200 by Nori Cruz RN  Infection Prevention:   environmental surveillance performed   equipment surfaces disinfected   hand hygiene promoted  Taken 11/9/2024 0000 by Nori Cruz RN  Infection Prevention:   environmental surveillance performed   equipment surfaces disinfected   hand hygiene promoted   single patient room provided  Taken 11/8/2024 2200 by Nori Cruz RN  Infection Prevention:   environmental surveillance performed   equipment surfaces disinfected   hand hygiene promoted  Taken 11/8/2024 2000 by Nori Cruz RN  Infection Prevention:   environmental surveillance performed   equipment surfaces disinfected   hand hygiene promoted   single patient room provided  Goal: Optimal Comfort and Wellbeing  11/9/2024 0443 by Nori Cruz RN  Outcome: Progressing  11/9/2024 0443 by Nori Cruz RN  Outcome: Progressing  Intervention: Provide Person-Centered Care  Recent Flowsheet Documentation  Taken 11/8/2024 2000 by Nori Cruz RN  Trust Relationship/Rapport:   care explained   questions answered   reassurance provided  Goal: Readiness for Transition of Care  11/9/2024 0443 by Nori Cruz RN  Outcome: Progressing  11/9/2024 0443 by Nori Cruz RN  Outcome: Progressing     Problem: Skin Injury Risk Increased  Goal: Skin Health and Integrity  11/9/2024 0443 by Nori Cruz RN  Outcome: Progressing  11/9/2024 0443 by Nori Cruz RN  Outcome: Progressing  Intervention: Optimize Skin Protection  Recent Flowsheet Documentation  Taken 11/9/2024 0400 by Nori Cruz RN  Activity Management: activity encouraged  Pressure Reduction Techniques:   frequent weight shift encouraged   pressure points protected   weight shift assistance  provided  Head of Bed ([facility]) Positioning: [facility] elevated  Pressure Reduction Devices:   positioning supports utilized   pressure-redistributing mattress utilized   specialty bed utilized  Skin Protection:   drying agents applied   incontinence pads utilized   skin sealant/moisture barrier applied   transparent dressing maintained  Taken 11/9/2024 0200 by Nori Cruz RN  Activity Management: activity encouraged  Pressure Reduction Techniques:   frequent weight shift encouraged   pressure points protected   weight shift assistance provided  Head of Bed ([facility]) Positioning: [facility] elevated  Pressure Reduction Devices:   positioning supports utilized   pressure-redistributing mattress utilized   specialty bed utilized  Skin Protection:   drying agents applied   incontinence pads utilized   skin sealant/moisture barrier applied   transparent dressing maintained  Taken 11/9/2024 0000 by Nori Cruz RN  Activity Management: activity encouraged  Pressure Reduction Techniques:   frequent weight shift encouraged   pressure points protected   weight shift assistance provided  Head of Bed ([facility]) Positioning: [facility] elevated  Pressure Reduction Devices:   positioning supports utilized   pressure-redistributing mattress utilized   specialty bed utilized  Skin Protection:   incontinence pads utilized   skin sealant/moisture barrier applied   transparent dressing maintained  Taken 11/8/2024 2200 by Nori Cruz RN  Activity Management: activity encouraged  Pressure Reduction Techniques:   frequent weight shift encouraged   pressure points protected   weight shift assistance provided  Head of Bed ([facility]) Positioning: [facility] elevated  Pressure Reduction Devices:   positioning supports utilized   pressure-redistributing mattress utilized   specialty bed utilized  Skin Protection:   incontinence pads utilized   skin sealant/moisture barrier applied   transparent dressing maintained  Taken 11/8/2024 2000 by Nancy  AUSTIN Julio  Activity Management: activity encouraged  Pressure Reduction Techniques:   frequent weight shift encouraged   pressure points protected   weight shift assistance provided  Head of Bed ([facility]) Positioning: [facility] elevated  Pressure Reduction Devices:   positioning supports utilized   pressure-redistributing mattress utilized   specialty bed utilized  Skin Protection:   incontinence pads utilized   skin sealant/moisture barrier applied   transparent dressing maintained     Problem: Restraint, Nonviolent  Goal: Absence of Harm or Injury  11/9/2024 0443 by Nori Cruz RN  Outcome: Progressing  11/9/2024 0443 by Nori Cruz RN  Outcome: Progressing  Intervention: Implement Least Restrictive Safety Strategies  Recent Flowsheet Documentation  Taken 11/9/2024 0400 by Nori Cruz RN  Medical Device Protection: tubing secured  Taken 11/9/2024 0200 by Nori Cruz RN  Medical Device Protection: tubing secured  Taken 11/9/2024 0000 by Nori Cruz RN  Medical Device Protection: tubing secured  Taken 11/8/2024 2200 by Nori Cruz RN  Medical Device Protection: tubing secured  Taken 11/8/2024 2000 by Nori Cruz RN  Medical Device Protection: tubing secured  Diversional Activities:   television   smartphone  Intervention: Protect Dignity, Rights and Personal Wellbeing  Recent Flowsheet Documentation  Taken 11/8/2024 2000 by Nori Cruz RN  Trust Relationship/Rapport:   care explained   questions answered   reassurance provided  Intervention: Protect Skin and Joint Integrity  Recent Flowsheet Documentation  Taken 11/9/2024 0400 by Nori Cruz RN  Body Position: right  Skin Protection:   drying agents applied   incontinence pads utilized   skin sealant/moisture barrier applied   transparent dressing maintained  Taken 11/9/2024 0200 by Nori Cruz RN  Body Position:   turned   left  Skin Protection:   drying agents applied    incontinence pads utilized   skin sealant/moisture barrier applied   transparent dressing maintained  Taken 11/9/2024 0000 by Nori Cruz RN  Body Position:   supine   legs elevated  Skin Protection:   incontinence pads utilized   skin sealant/moisture barrier applied   transparent dressing maintained  Taken 11/8/2024 2200 by Nori Cruz RN  Body Position: right  Skin Protection:   incontinence pads utilized   skin sealant/moisture barrier applied   transparent dressing maintained  Taken 11/8/2024 2000 by Nori Cruz RN  Body Position: turned  Skin Protection:   incontinence pads utilized   skin sealant/moisture barrier applied   transparent dressing maintained  Range of Motion:   active ROM (range of motion) encouraged   ROM (range of motion) performed   Goal Outcome Evaluation:

## 2024-11-09 NOTE — PROGRESS NOTES
[facility] Medicine Services  PROGRESS NOTE    Patient Name: Guicho Blanco  : 1940  MRN: [id number]    Date of Admission: 2024  Primary Care Physician: Mitchel Culver MD    Subjective   Subjective     CC: f/u weakness    HPI: Seen and examined this morning.  Patient confused again overnight although less combative.  He still confused this morning however returns to baseline as today progresses.      Objective   Objective     Vital Signs:   Temp:  [97.1 °F (36.2 °C)-98.5 °F (36.9 °C)] 97.9 °F (36.6 °C)  Heart Rate:  [] 103  Resp:  [18] 18  BP: ()/(60-83) 103/60     Physical Exam  Constitutional:       General: He is not in acute distress.  Cardiovascular:      Rate and Rhythm: Normal rate and regular rhythm.      Heart sounds: Normal heart sounds.   Pulmonary:      Effort: Pulmonary effort is normal.      Breath sounds: Normal breath sounds.   Abdominal:      General: There is no distension.      Palpations: Abdomen is soft.      Tenderness: There is no abdominal tenderness.   Musculoskeletal:      Right lower leg: No edema.      Left lower leg: No edema.   Neurological:      General: No focal deficit present.      Mental Status: He is alert.          Results Reviewed:  LAB RESULTS:      Lab 24  0821 24  0617 24  0738   WBC 6.13 7.01 5.88   HEMOGLOBIN 11.9* 10.5* 11.6*   HEMATOCRIT 37.2* 33.6* 37.0*   PLATELETS 215 205 230   NEUTROS ABS  --   --  3.47   IMMATURE GRANS (ABS)  --   --  0.02   LYMPHS ABS  --   --  1.41   MONOS ABS  --   --  0.50   EOS ABS  --   --  0.43*   MCV 83.0 84.0 84.3   LACTATE  --   --  1.2         Lab 24  0821 24  0617 24  0745 24  0738 24  0736   SODIUM 135* 136  --  139  --    POTASSIUM 3.5 3.5  --  3.9  --    CHLORIDE 101 103  --  103  --    CO2 23.0 19.0*  --  23.0  --    ANION GAP 11.0 14.0  --  13.0  --    BUN 8 14  --  19  --    CREATININE 0.73* 0.68* 0.90 0.86 0.90   EGFR  90.3 92.2  --  85.9  --    GLUCOSE 99 103*  --  99  --    CALCIUM 9.4 9.1  --  9.4  --    MAGNESIUM 1.6  --   --   --   --          Lab 11/05/24  0738   TOTAL PROTEIN 6.4   ALBUMIN 3.6   GLOBULIN 2.8   ALT (SGPT) 16   AST (SGOT) 30   BILIRUBIN 0.5   ALK PHOS 132*                     Brief Urine Lab Results  (Last result in the past 365 days)        Color   Clarity   Blood   Leuk Est   Nitrite   Protein   CREAT   Urine HCG        11/05/24 0943 Dark Yellow   Clear   Negative   Moderate (2+)   Negative   30 mg/dL (1+)                   Microbiology Results Abnormal       None            No radiology results from the last 24 hrs    Results for orders placed during the hospital encounter of 09/13/24    Adult Transthoracic Echo Complete W/ Cont if Necessary Per Protocol    Interpretation Summary    Left ventricular systolic function is normal. Left ventricular ejection fraction appears to be 61 - 65%.    Left ventricular diastolic function was normal.      Current medications:  Scheduled Meds:aspirin, 325 mg, Oral, Daily  cefuroxime, 500 mg, Oral, Q12H  cycloSPORINE, 1 drop, Both Eyes, Q12H  enoxaparin, 40 mg, Subcutaneous, Daily  flecainide, 100 mg, Oral, Q12H  isosorbide dinitrate, 10 mg, Oral, TID - Nitrates  nortriptyline, 30 mg, Oral, Nightly  OLANZapine, 2.5 mg, Oral, Nightly  pantoprazole, 40 mg, Intravenous, Q AM  senna-docusate sodium, 2 tablet, Oral, BID   And  polyethylene glycol, 17 g, Oral, Daily  pravastatin, 20 mg, Oral, Daily  sodium chloride, 10 mL, Intravenous, Q12H      Continuous Infusions:lactated ringers, 100 mL/hr, Last Rate: 100 mL/hr (11/09/24 1435)      PRN Meds:.  acetaminophen    senna-docusate sodium **AND** polyethylene glycol **AND** bisacodyl **AND** bisacodyl    nitroglycerin    sodium chloride    sodium chloride    Assessment & Plan   Assessment & Plan     Active Hospital Problems    Diagnosis  POA    **Generalized weakness [R53.1]  Yes    Severe protein-calorie malnutrition [E43]  Yes       Resolved Hospital Problems   No resolved problems to display.        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  Admitted for fall at home, and general failure to thrive.       Fall at home  - PT OT  - CM consult.    -Plan currently is SNF with plan to transition to long-term care  - Hospice following     Weakness  Poor intake, rumination  Chronic constipation  Frequent waking at night  - hospice consult per wife's request   - Boost TID  - nutrition consult  - Given poor p.o. intake and with positive orthostatics today will give small amount of IV fluid    Delirium, chronic  - Patient has never been formally diagnosed with any sort of dementia or cognitive impairment however has significant delirium at home at night  - Patient on flecainide QTc 460   - Continue Zyprexa 2.5 nightly which seem to help, will continue, adjust dose as necessary  - If PRN necessary would consider Versed first    Pyuria, will treat empirically for now  - cefuroxime; watch culture-negative so far     History of R knee replacement/cellulitis 8-9/2024  - no redness currently ; nl WBC, no fever      CAD, Afib, HTN  - continue home meds     Expected Discharge Location and Transportation: TBD  Expected Discharge   Expected Discharge Date: 11/11/2024; Expected Discharge Time:      VTE Prophylaxis:  Pharmacologic VTE prophylaxis orders are present.         AM-PAC 6 Clicks Score (PT): 16 (11/09/24 0800)    CODE STATUS:   Code Status and Medical Interventions: No CPR (Do Not Attempt to Resuscitate); Limited Support; No intubation (DNI)   Ordered at: 11/05/24 1137     Medical Intervention Limits:    No intubation (DNI)     Level Of Support Discussed With:    Patient     Code Status (Patient has no pulse and is not breathing):    No CPR (Do Not  Attempt to Resuscitate)     Medical Interventions (Patient has pulse or is breathing):    Limited Support       Carolann Soler MD  11/09/24

## 2024-11-09 NOTE — THERAPY TREATMENT NOTE
tPatient Name: Guicho Blanco  : 1940    MRN: [id number]                              Today's Date: 2024       Admit Date: 2024    Visit Dx:     ICD-10-CM ICD-9-CM   1. Generalized weakness  R53.1 780.79   2. Fall in home, initial encounter  W19.XXXA E888.9    Y92.009 E849.0   3. Anorexia  R63.0 783.0   4. Bacterial UTI  N39.0 599.0    A49.9 041.9   5. Oropharyngeal dysphagia  R13.12 787.22     Patient Active Problem List   Diagnosis    Osteomyelitis of toe of right foot    Osteomyelitis of right foot    PAF (paroxysmal atrial fibrillation)    Coronary artery disease involving native coronary artery of native heart without angina pectoris    Gastroesophageal reflux disease without esophagitis    Benign non-nodular prostatic hyperplasia without lower urinary tract symptoms    S/P appy    Essential hypertension    Simple chronic bronchitis    Mixed hyperlipidemia    COPD (chronic obstructive pulmonary disease)    BPH (benign prostatic hypertrophy)    Hearing loss    Chest pain    Vertigo    Snoring    Overweight    PLMD (periodic limb movement disorder)    Neuropathy    Periodic headache syndrome, not intractable    Acquired absence of other right toe(s)    Tremor    Bilateral impacted cerumen    Bilateral sensorineural hearing loss    Chronic otitis externa    Deviated nasal septum    Disorder of joint of foot    ED (erectile dysfunction) of organic origin    Hydrocele    Hydrocele of testis    Impacted cerumen    Mononeuropathy    Swelling of testicle    Venous retinal branch occlusion    Vitreous degeneration    Benign prostatic hyperplasia with urinary obstruction    OA (osteoarthritis) of knee    Status post total right knee replacement    Septic arthritis    Cellulitis    Cellulitis of right knee    Failure to thrive in adult    Insomnia    Moderate malnutrition    Sepsis    Lactic acidosis    Serotonin syndrome    Dysphagia    Generalized weakness    Severe protein-calorie malnutrition      Past Medical History:   Diagnosis Date    Arthritis     Atrial fibrillation     BPH (benign prostatic hypertrophy)     Cataract     Cellulitis     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     bilateral    Coronary artery disease     Full dentures     Gall stones     GERD (gastroesophageal reflux disease)     Hearing loss     Hiatal hernia     Hyperlipidemia     Hypertension     Peripheral neuropathy     Peripheral vascular disease     Seasonal allergies     Skin cancer     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    TIA (transient ischemic attack)     2 times    Wears glasses      Past Surgical History:   Procedure Laterality Date    ABLATION OF DYSRHYTHMIC FOCUS      APPENDECTOMY      CARDIAC CATHETERIZATION N/A 10/05/2017    Procedure: Left Heart Cath;  Surgeon: Hector Urrutia MD;  Location:  [facility];  Service:     CATARACT EXTRACTION Bilateral     CHOLECYSTECTOMY      Remote    COCHLEAR IMPLANT REVISION  05/2019    COLONOSCOPY      PROSTATE SURGERY      SHOULDER SURGERY Right     SKIN BIOPSY      TOE AMPUTATION Right     2nd toe - Right Foot    TONSILLECTOMY      TOTAL KNEE ARTHROPLASTY Right 8/7/2024    Procedure: TOTAL KNEE ARTHROPLASTY WITH ASHLEY ROBOT RIGHT;  Surgeon: Christian Schwartz MD;  Location:  [facility];  Service: Robotics - Ortho;  Laterality: Right;      General Information       Row Name 11/09/24 1352          OT Time and Intention    Document Type therapy note (daily note)  -MR     Mode of Treatment occupational therapy  -MR     Patient Effort good  -MR       Row Name 11/09/24 2313          General Information    Patient Profile Reviewed yes  -MR     Existing Precautions/Restrictions fall;other (see comments)  cochlear implant  -MR     Barriers to Rehab medically complex;previous functional deficit;cognitive status  -MR       Row Name 11/09/24 4813          Cognition    Orientation Status (Cognition) oriented to;person;place;verbal cues/prompts needed for orientation;time  -MR       Row Name  11/09/24 1352          Safety Issues/Impairments Affecting Functional Mobility    Safety Issues Affecting Function (Mobility) awareness of need for assistance;insight into deficits/self-awareness;safety precaution awareness;safety precautions follow-through/compliance;sequencing abilities;problem-solving;judgment  -MR     Impairments Affecting Function (Mobility) balance;cognition;endurance/activity tolerance;strength  -MR     Cognitive Impairments, Mobility Safety/Performance awareness, need for assistance;insight into deficits/self-awareness;problem-solving/reasoning;judgment;safety precaution awareness;safety precaution follow-through;sequencing abilities  -MR               User Key  (r) = Recorded By, (t) = Taken By, (c) = Cosigned By      Initials Name Provider Type     Joy Ayoub, OT Occupational Therapist                     Mobility/ADL's       Row Name 11/09/24 [facility]          Bed Mobility    Bed Mobility supine-sit  -MR     Supine-Sit [place] (Bed Mobility) contact guard;verbal cues  -MR     Assistive Device (Bed Mobility) head of bed elevated;bed rails  -MR     Comment, (Bed Mobility) Increased time and effort to advance to EOB.  -MR       Row Name 11/09/24 [facility]          Transfers    Transfers bed-chair transfer  -MR       Row Name 11/09/24 [facility]          Bed-Chair Transfer    Bed-Chair [place] (Transfers) minimum assist (75% patient effort);1 person assist  -MR     Assistive Device (Bed-Chair Transfers) walker, front-wheeled  -MR       Row Name 11/09/24 [facility]          Activities of Daily Living    BADL Assessment/Intervention lower body dressing  -MR       Row Name 11/09/24 1353          Lower Body Dressing Assessment/Training    [place] Level (Lower Body Dressing) don;socks;maximum assist (25% patient effort)  -MR     Position (Lower Body Dressing) supine  -MR               User Key  (r) = Recorded By, (t) = Taken By, (c) = Cosigned By      Initials Name Provider Type     Mega  MICA Hamilton Occupational Therapist                   Obj/Interventions       Row Name 11/09/24 1353          Motor Skills    Therapeutic Exercise other (see comments)  OT facilitated pt in seated EOB ther ex as preparatory activity for transfers. OT engagee pt in ankle DF/PF x 20. LAQ x 10  -MR       Row Name 11/09/24 1350          Balance    Balance Assessment sitting static balance;sitting dynamic balance;standing dynamic balance;standing static balance  -MR     Static Sitting Balance standby assist  -MR     Dynamic Sitting Balance contact guard  -MR     Position, Sitting Balance unsupported;sitting edge of bed  -MR     Static Standing Balance minimal assist  -MR     Dynamic Standing Balance moderate assist  -MR     Balance Interventions sitting;standing;sit to stand;supported;static;dynamic;occupation based/functional task  -MR               User Key  (r) = Recorded By, (t) = Taken By, (c) = Cosigned By      Initials Name Provider Type     Joy Ayoub OT Occupational Therapist                   Goals/Plan    No documentation.                  Clinical Impression       Row Name 11/09/24 1354          Pain Assessment    Pretreatment Pain Rating 0/10 - no pain  -MR     Posttreatment Pain Rating 0/10 - no pain  -MR       Row Name 11/09/24 9483          Plan of Care Review    Plan of Care Reviewed With patient;spouse  -MR     Progress no change  -MR     Outcome Evaluation Patient continues to be below his functional baseline. Good effort noted w/ bed mobility and transfer. Pt limited by dizziness w/ BP dropping to 85/64, improved to 89/65 with ther ex. Pt reporting dizziness improved after ther ex. Once in recliner /63. RN notified of pt's BP's.  -MR       Row Name 11/09/24 7486          Vital Signs    Pre Systolic BP Rehab 102  -MR     Pre Treatment Diastolic BP 59  -MR     Intra Systolic BP Rehab 85  -MR     Intra Treatment Diastolic BP 64  -MR     Post Systolic BP Rehab 89  -MR     Post Treatment  Diastolic BP 65  107/63 once in recliner  -MR     Pretreatment Heart Rate (beats/min) 109  -MR     Posttreatment Heart Rate (beats/min) 107  -MR     O2 Delivery Pre Treatment room air  -MR     O2 Delivery Intra Treatment room air  -MR     O2 Delivery Post Treatment room air  -MR     Pre Patient Position Supine  -MR     Intra Patient Position Standing  -MR     Post Patient Position Sitting  -MR       Row Name 11/09/24 1355          Positioning and Restraints    Pre-Treatment Position in bed  -MR     Post Treatment Position chair  -MR     In Chair notified nsg;reclined;sitting;call light within reach;encouraged to call for assist;exit alarm on;with family/caregiver;waffle cushion;legs elevated  -MR               User Key  (r) = Recorded By, (t) = Taken By, (c) = Cosigned By      Initials Name Provider Type    MR Joy Ayoub, OT Occupational Therapist                   Outcome Measures       Row Name 11/09/24 1513          How much help from another is currently needed...    Putting on and taking off regular lower body clothing? 2  -MR     Bathing (including washing, rinsing, and drying) 2  -MR     Toileting (which includes using toilet bed pan or urinal) 2  -MR     Putting on and taking off regular upper body clothing 3  -MR     Taking care of personal grooming (such as brushing teeth) 3  -MR     Eating meals 3  -MR     AM-PAC 6 Clicks Score (OT) 15  -MR       Row Name 11/09/24 0800          How much help from another person do you currently need...    Turning from your back to your side while in flat bed without using bedrails? 3  -HJ     Moving from lying on back to sitting on the side of a flat bed without bedrails? 3  -HJ     Moving to and from a bed to a chair (including a wheelchair)? 3  -HJ     Standing up from a chair using your arms (e.g., wheelchair, bedside chair)? 3  -HJ     Climbing 3-5 steps with a railing? 2  -HJ     To walk in hospital room? 2  -HJ     AM-PAC 6 Clicks Score (PT) 16  -HJ     Highest  Level of Mobility Goal 5 --> Static standing  -       Row Name 11/09/24 1513          Functional Assessment    Outcome Measure Options AM-PAC 6 Clicks Daily Activity (OT)  -MR               User Key  (r) = Recorded By, (t) = Taken By, (c) = Cosigned By      Initials Name Provider Type    Mary Cassidy, RN Registered Nurse    MR Ayoub Joy, OT Occupational Therapist                    Occupational Therapy Education       Title: PT OT SLP Therapies (Done)       Topic: Occupational Therapy (Done)       Point: ADL training (Done)       Description:   Instruct learner(s) on proper safety adaptation and remediation techniques during self care or transfers.   Instruct in proper use of assistive devices.                  Learning Progress Summary            Patient Acceptance, E, VU by MR at 11/9/2024 1513    Acceptance, E,TB, VU by  at 11/7/2024 0554    Acceptance, E, VU by  at 11/6/2024 0900                      Point: Home exercise program (Done)       Description:   Instruct learner(s) on appropriate technique for monitoring, assisting and/or progressing therapeutic exercises/activities.                  Learning Progress Summary            Patient Acceptance, E, VU by MR at 11/9/2024 1513    Acceptance, E,TB, VU by PK at 11/7/2024 0554                      Point: Precautions (Done)       Description:   Instruct learner(s) on prescribed precautions during self-care and functional transfers.                  Learning Progress Summary            Patient Acceptance, E, VU by MR at 11/9/2024 1513    Acceptance, E,TB, VU by PK at 11/7/2024 0554    Acceptance, E, VU by  at 11/6/2024 0900                      Point: Body mechanics (Done)       Description:   Instruct learner(s) on proper positioning and spine alignment during self-care, functional mobility activities and/or exercises.                  Learning Progress Summary            Patient Acceptance, E, VU by MR at 11/9/2024 1513    Acceptance, E,TB, VU  by FRIDA at 11/7/2024 0554    Acceptance, E, VU by  at 11/6/2024 0900                                      User Key       Initials Effective Dates Name Provider Type Discipline    MR 09/22/22 -  Joy Ayoub OT Occupational Therapist OT    FRIDA 07/11/24 -  Nori Cruz RN Registered Nurse Nurse     08/08/24 -  Jeny Harrison OT Student OT Student OT                  OT Recommendation and Plan     Plan of Care Review  Plan of Care Reviewed With: patient, spouse  Progress: no change  Outcome Evaluation: Patient continues to be below his functional baseline. Good effort noted w/ bed mobility and transfer. Pt limited by dizziness w/ BP dropping to 85/64, improved to 89/65 with ther ex. Pt reporting dizziness improved after ther ex. Once in recliner /63. RN notified of pt's BP's.     Time Calculation:         Time Calculation- OT       Row Name 11/09/24 1515             Time Calculation- OT    OT Start Time 1305  -MR      OT Received On 11/09/24  -MR         Timed Charges    92119 - OT Therapeutic Activity Minutes 24  -MR      34277 - OT Self Care/Mgmt Minutes 10  -MR         Total Minutes    Timed Charges Total Minutes 34  -MR       Total Minutes 34  -MR                User Key  (r) = Recorded By, (t) = Taken By, (c) = Cosigned By      Initials Name Provider Type    MR Mega MICA Hamilton Occupational Therapist                  Therapy Charges for Today       Code Description Service Date Service Provider Modifiers Qty    68023662932 HC OT THERAPEUTIC ACT EA 15 MIN 11/9/2024 Joy Ayoub OT GO, KX 1    11403410799 HC OT SELF CARE/MGMT/TRAIN EA 15 MIN 11/9/2024 Joy Ayoub OT GO, KX 1                 Joy Ayoub OT  11/9/2024

## 2024-11-09 NOTE — PLAN OF CARE
Problem: Adult Inpatient Plan of Care  Goal: Plan of Care Review  Outcome: Progressing  Goal: Patient-Specific Goal (Individualized)  Outcome: Progressing  Goal: Absence of Hospital-Acquired Illness or Injury  Outcome: Progressing  Intervention: Identify and Manage Fall Risk  Recent Flowsheet Documentation  Taken 11/9/2024 0400 by Nori Cruz RN  Safety Promotion/Fall Prevention:   activity supervised   assistive device/personal items within reach   clutter free environment maintained   fall prevention program maintained   lighting adjusted   nonskid shoes/slippers when out of bed   room organization consistent   safety round/check completed  Taken 11/9/2024 0200 by Nori Cruz RN  Safety Promotion/Fall Prevention:   activity supervised   assistive device/personal items within reach   clutter free environment maintained   fall prevention program maintained   lighting adjusted   nonskid shoes/slippers when out of bed   room organization consistent   safety round/check completed  Taken 11/9/2024 0000 by Nori Crzu RN  Safety Promotion/Fall Prevention:   activity supervised   assistive device/personal items within reach   clutter free environment maintained   fall prevention program maintained   lighting adjusted   nonskid shoes/slippers when out of bed   room organization consistent   safety round/check completed  Taken 11/8/2024 2200 by Nori Cruz RN  Safety Promotion/Fall Prevention:   activity supervised   assistive device/personal items within reach   clutter free environment maintained   fall prevention program maintained   lighting adjusted   nonskid shoes/slippers when out of bed   room organization consistent   safety round/check completed  Taken 11/8/2024 2000 by Nori Cruz RN  Safety Promotion/Fall Prevention:   activity supervised   assistive device/personal items within reach   clutter free environment maintained   fall prevention program maintained    lighting adjusted   nonskid shoes/slippers when out of bed   room organization consistent   safety round/check completed  Intervention: Prevent Skin Injury  Recent Flowsheet Documentation  Taken 11/9/2024 0400 by Nori Cruz RN  Body Position: right  Skin Protection:   drying agents applied   incontinence pads utilized   skin sealant/moisture barrier applied   transparent dressing maintained  Taken 11/9/2024 0200 by Nori Cruz RN  Body Position:   turned   left  Skin Protection:   drying agents applied   incontinence pads utilized   skin sealant/moisture barrier applied   transparent dressing maintained  Taken 11/9/2024 0000 by Nori Cruz RN  Body Position:   supine   legs elevated  Skin Protection:   incontinence pads utilized   skin sealant/moisture barrier applied   transparent dressing maintained  Taken 11/8/2024 2200 by Nori Cruz RN  Body Position: right  Skin Protection:   incontinence pads utilized   skin sealant/moisture barrier applied   transparent dressing maintained  Taken 11/8/2024 2000 by Nori Cruz RN  Body Position: turned  Skin Protection:   incontinence pads utilized   skin sealant/moisture barrier applied   transparent dressing maintained  Intervention: Prevent and Manage VTE (Venous Thromboembolism) Risk  Recent Flowsheet Documentation  Taken 11/8/2024 2000 by Nori Cruz RN  VTE Prevention/Management:   bilateral   SCDs (sequential compression devices) off   patient refused intervention  Intervention: Prevent Infection  Recent Flowsheet Documentation  Taken 11/9/2024 0400 by Nori Cruz RN  Infection Prevention:   environmental surveillance performed   equipment surfaces disinfected   hand hygiene promoted   single patient room provided  Taken 11/9/2024 0200 by Nori Cruz RN  Infection Prevention:   environmental surveillance performed   equipment surfaces disinfected   hand hygiene promoted  Taken 11/9/2024  0000 by Nori Cruz RN  Infection Prevention:   environmental surveillance performed   equipment surfaces disinfected   hand hygiene promoted   single patient room provided  Taken 11/8/2024 2200 by Nori Cruz RN  Infection Prevention:   environmental surveillance performed   equipment surfaces disinfected   hand hygiene promoted  Taken 11/8/2024 2000 by Nori Cruz RN  Infection Prevention:   environmental surveillance performed   equipment surfaces disinfected   hand hygiene promoted   single patient room provided  Goal: Optimal Comfort and Wellbeing  Outcome: Progressing  Intervention: Provide Person-Centered Care  Recent Flowsheet Documentation  Taken 11/8/2024 2000 by Nori Cruz RN  Trust Relationship/Rapport:   care explained   questions answered   reassurance provided  Goal: Readiness for Transition of Care  Outcome: Progressing     Problem: Skin Injury Risk Increased  Goal: Skin Health and Integrity  Outcome: Progressing  Intervention: Optimize Skin Protection  Recent Flowsheet Documentation  Taken 11/9/2024 0400 by Nori Cruz RN  Activity Management: activity encouraged  Pressure Reduction Techniques:   frequent weight shift encouraged   pressure points protected   weight shift assistance provided  Head of Bed (HOB) Positioning: HOB elevated  Pressure Reduction Devices:   positioning supports utilized   pressure-redistributing mattress utilized   specialty bed utilized  Skin Protection:   drying agents applied   incontinence pads utilized   skin sealant/moisture barrier applied   transparent dressing maintained  Taken 11/9/2024 0200 by Nori Cruz RN  Activity Management: activity encouraged  Pressure Reduction Techniques:   frequent weight shift encouraged   pressure points protected   weight shift assistance provided  Head of Bed (HOB) Positioning: HOB elevated  Pressure Reduction Devices:   positioning supports utilized    pressure-redistributing mattress utilized   specialty bed utilized  Skin Protection:   drying agents applied   incontinence pads utilized   skin sealant/moisture barrier applied   transparent dressing maintained  Taken 11/9/2024 0000 by Nroi Cruz RN  Activity Management: activity encouraged  Pressure Reduction Techniques:   frequent weight shift encouraged   pressure points protected   weight shift assistance provided  Head of Bed (HOB) Positioning: [facility] elevated  Pressure Reduction Devices:   positioning supports utilized   pressure-redistributing mattress utilized   specialty bed utilized  Skin Protection:   incontinence pads utilized   skin sealant/moisture barrier applied   transparent dressing maintained  Taken 11/8/2024 2200 by Nori Cruz RN  Activity Management: activity encouraged  Pressure Reduction Techniques:   frequent weight shift encouraged   pressure points protected   weight shift assistance provided  Head of Bed (HOB) Positioning: [facility] elevated  Pressure Reduction Devices:   positioning supports utilized   pressure-redistributing mattress utilized   specialty bed utilized  Skin Protection:   incontinence pads utilized   skin sealant/moisture barrier applied   transparent dressing maintained  Taken 11/8/2024 2000 by Nori Cruz RN  Activity Management: activity encouraged  Pressure Reduction Techniques:   frequent weight shift encouraged   pressure points protected   weight shift assistance provided  Head of Bed (HOB) Positioning: [facility] elevated  Pressure Reduction Devices:   positioning supports utilized   pressure-redistributing mattress utilized   specialty bed utilized  Skin Protection:   incontinence pads utilized   skin sealant/moisture barrier applied   transparent dressing maintained     Problem: Restraint, Nonviolent  Goal: Absence of Harm or Injury  Outcome: Progressing  Intervention: Implement Least Restrictive Safety Strategies  Recent Flowsheet  Documentation  Taken 11/9/2024 0400 by Nori Cruz RN  Medical Device Protection: tubing secured  Taken 11/9/2024 0200 by Nori Cruz RN  Medical Device Protection: tubing secured  Taken 11/9/2024 0000 by Nori Cruz RN  Medical Device Protection: tubing secured  Taken 11/8/2024 2200 by Nori Cruz RN  Medical Device Protection: tubing secured  Taken 11/8/2024 2000 by Nori Cruz RN  Medical Device Protection: tubing secured  Diversional Activities:   television   smartphone  Intervention: Protect Dignity, Rights and Personal Wellbeing  Recent Flowsheet Documentation  Taken 11/8/2024 2000 by Nori Cruz RN  Trust Relationship/Rapport:   care explained   questions answered   reassurance provided  Intervention: Protect Skin and Joint Integrity  Recent Flowsheet Documentation  Taken 11/9/2024 0400 by Nori Cruz RN  Body Position: right  Skin Protection:   drying agents applied   incontinence pads utilized   skin sealant/moisture barrier applied   transparent dressing maintained  Taken 11/9/2024 0200 by Nori Cruz RN  Body Position:   turned   left  Skin Protection:   drying agents applied   incontinence pads utilized   skin sealant/moisture barrier applied   transparent dressing maintained  Taken 11/9/2024 0000 by Nori Cruz RN  Body Position:   supine   legs elevated  Skin Protection:   incontinence pads utilized   skin sealant/moisture barrier applied   transparent dressing maintained  Taken 11/8/2024 2200 by Nori Cruz RN  Body Position: right  Skin Protection:   incontinence pads utilized   skin sealant/moisture barrier applied   transparent dressing maintained  Taken 11/8/2024 2000 by Nori Cruz RN  Body Position: turned  Skin Protection:   incontinence pads utilized   skin sealant/moisture barrier applied   transparent dressing maintained  Range of Motion:   active ROM (range of motion) encouraged    ROM (range of motion) performed   Goal Outcome Evaluation:

## 2024-11-09 NOTE — PLAN OF CARE
Goal Outcome Evaluation:  Plan of Care Reviewed With: patient, spouse        Progress: no change  Outcome Evaluation: Patient continues to be below his functional baseline. Good effort noted w/ bed mobility and transfer. Pt limited by dizziness w/ BP dropping to 85/64, improved to 89/65 with ther ex. Pt reporting dizziness improved after ther ex. Once in recliner /63. RN notified of pt's BP's.

## 2024-11-10 LAB
ANION GAP SERPL CALCULATED.3IONS-SCNC: 12 MMOL/L (ref 5–15)
BACTERIA SPEC AEROBE CULT: NORMAL
BACTERIA SPEC AEROBE CULT: NORMAL
BUN SERPL-MCNC: 11 MG/DL (ref 8–23)
BUN/CREAT SERPL: 16.4 (ref 7–25)
CALCIUM SPEC-SCNC: 9.2 MG/DL (ref 8.6–10.5)
CHLORIDE SERPL-SCNC: 104 MMOL/L (ref 98–107)
CO2 SERPL-SCNC: 23 MMOL/L (ref 22–29)
CREAT SERPL-MCNC: 0.67 MG/DL (ref 0.76–1.27)
EGFRCR SERPLBLD CKD-EPI 2021: 92.6 ML/MIN/1.73
GLUCOSE SERPL-MCNC: 87 MG/DL (ref 65–99)
POTASSIUM SERPL-SCNC: 4.2 MMOL/L (ref 3.5–5.2)
SODIUM SERPL-SCNC: 139 MMOL/L (ref 136–145)

## 2024-11-10 PROCEDURE — 96372 THER/PROPH/DIAG INJ SC/IM: CPT

## 2024-11-10 PROCEDURE — 25810000003 LACTATED RINGERS PER 1000 ML: Performed by: STUDENT IN AN ORGANIZED HEALTH CARE EDUCATION/TRAINING PROGRAM

## 2024-11-10 PROCEDURE — G0378 HOSPITAL OBSERVATION PER HR: HCPCS

## 2024-11-10 PROCEDURE — 25010000002 ENOXAPARIN PER 10 MG: Performed by: INTERNAL MEDICINE

## 2024-11-10 PROCEDURE — 80048 BASIC METABOLIC PNL TOTAL CA: CPT | Performed by: STUDENT IN AN ORGANIZED HEALTH CARE EDUCATION/TRAINING PROGRAM

## 2024-11-10 PROCEDURE — 96361 HYDRATE IV INFUSION ADD-ON: CPT

## 2024-11-10 PROCEDURE — 96376 TX/PRO/DX INJ SAME DRUG ADON: CPT

## 2024-11-10 PROCEDURE — 99232 SBSQ HOSP IP/OBS MODERATE 35: CPT | Performed by: STUDENT IN AN ORGANIZED HEALTH CARE EDUCATION/TRAINING PROGRAM

## 2024-11-10 RX ORDER — SODIUM CHLORIDE, SODIUM LACTATE, POTASSIUM CHLORIDE, CALCIUM CHLORIDE 600; 310; 30; 20 MG/100ML; MG/100ML; MG/100ML; MG/100ML
100 INJECTION, SOLUTION INTRAVENOUS CONTINUOUS
Status: ACTIVE | OUTPATIENT
Start: 2024-11-10 | End: 2024-11-11

## 2024-11-10 RX ADMIN — Medication 10 ML: at 22:14

## 2024-11-10 RX ADMIN — ENOXAPARIN SODIUM 40 MG: 40 INJECTION SUBCUTANEOUS at 10:42

## 2024-11-10 RX ADMIN — FLECAINIDE ACETATE 100 MG: 50 TABLET ORAL at 03:32

## 2024-11-10 RX ADMIN — CYCLOSPORINE 1 DROP: 0.5 EMULSION OPHTHALMIC at 22:15

## 2024-11-10 RX ADMIN — OLANZAPINE 2.5 MG: 5 TABLET, FILM COATED ORAL at 22:18

## 2024-11-10 RX ADMIN — ASPIRIN 325 MG: 325 TABLET ORAL at 10:42

## 2024-11-10 RX ADMIN — CYCLOSPORINE 1 DROP: 0.5 EMULSION OPHTHALMIC at 10:43

## 2024-11-10 RX ADMIN — NORTRIPTYLINE HYDROCHLORIDE 30 MG: 10 CAPSULE ORAL at 22:15

## 2024-11-10 RX ADMIN — SODIUM CHLORIDE, SODIUM LACTATE, POTASSIUM CHLORIDE, CALCIUM CHLORIDE 100 ML/HR: 20; 30; 600; 310 INJECTION, SOLUTION INTRAVENOUS at 18:34

## 2024-11-10 RX ADMIN — ISOSORBIDE DINITRATE 10 MG: 10 TABLET ORAL at 10:42

## 2024-11-10 RX ADMIN — PRAVASTATIN SODIUM 20 MG: 20 TABLET ORAL at 10:42

## 2024-11-10 RX ADMIN — PANTOPRAZOLE SODIUM 40 MG: 40 INJECTION, POWDER, FOR SOLUTION INTRAVENOUS at 05:08

## 2024-11-10 NOTE — PLAN OF CARE
Problem: Adult Inpatient Plan of Care  Goal: Plan of Care Review  Outcome: Progressing  Goal: Patient-Specific Goal (Individualized)  Outcome: Progressing  Goal: Absence of Hospital-Acquired Illness or Injury  Outcome: Progressing  Intervention: Identify and Manage Fall Risk  Recent Flowsheet Documentation  Taken 11/10/2024 0400 by Nori Cruz RN  Safety Promotion/Fall Prevention:   activity supervised   assistive device/personal items within reach   clutter free environment maintained   fall prevention program maintained   lighting adjusted   nonskid shoes/slippers when out of bed   room organization consistent   safety round/check completed  Taken 11/10/2024 0200 by Nori Cruz RN  Safety Promotion/Fall Prevention:   activity supervised   assistive device/personal items within reach   clutter free environment maintained   fall prevention program maintained   lighting adjusted   nonskid shoes/slippers when out of bed   room organization consistent   safety round/check completed  Taken 11/10/2024 0000 by Nori Cruz RN  Safety Promotion/Fall Prevention:   activity supervised   assistive device/personal items within reach   clutter free environment maintained   fall prevention program maintained   lighting adjusted   nonskid shoes/slippers when out of bed   room organization consistent   safety round/check completed  Taken 11/9/2024 2200 by Nori Cruz RN  Safety Promotion/Fall Prevention:   activity supervised   assistive device/personal items within reach   clutter free environment maintained   fall prevention program maintained   lighting adjusted   nonskid shoes/slippers when out of bed   room organization consistent   safety round/check completed  Taken 11/9/2024 2000 by Nori Cruz RN  Safety Promotion/Fall Prevention:   activity supervised   assistive device/personal items within reach   clutter free environment maintained   fall prevention program  maintained   lighting adjusted   nonskid shoes/slippers when out of bed   room organization consistent   safety round/check completed  Intervention: Prevent Skin Injury  Recent Flowsheet Documentation  Taken 11/10/2024 0400 by Nori Cruz RN  Body Position:   turned   left  Skin Protection:   drying agents applied   incontinence pads utilized   skin sealant/moisture barrier applied   transparent dressing maintained  Taken 11/10/2024 0200 by Nori Cruz RN  Body Position:   turned   right  Skin Protection:   drying agents applied   incontinence pads utilized   skin sealant/moisture barrier applied   transparent dressing maintained  Taken 11/10/2024 0000 by Nori Cruz RN  Body Position:   turned   left  Skin Protection:   drying agents applied   incontinence pads utilized   skin sealant/moisture barrier applied   transparent dressing maintained  Taken 11/9/2024 2200 by Nori Cruz RN  Body Position:   turned   right  Skin Protection:   drying agents applied   incontinence pads utilized   skin sealant/moisture barrier applied   transparent dressing maintained  Taken 11/9/2024 2000 by Nori Cruz RN  Body Position: (sitting up in chair) legs elevated  Skin Protection:   drying agents applied   incontinence pads utilized   skin sealant/moisture barrier applied   silicone foam dressing in place   transparent dressing maintained  Intervention: Prevent and Manage VTE (Venous Thromboembolism) Risk  Recent Flowsheet Documentation  Taken 11/9/2024 2000 by Nori Cruz RN  VTE Prevention/Management: (see MAR)   bilateral   SCDs (sequential compression devices) off   patient refused intervention  Intervention: Prevent Infection  Recent Flowsheet Documentation  Taken 11/10/2024 0400 by Nori Cruz RN  Infection Prevention:   environmental surveillance performed   equipment surfaces disinfected   hand hygiene promoted  Taken 11/10/2024 0200 by Nancy  AUSTIN Julio  Infection Prevention:   environmental surveillance performed   equipment surfaces disinfected   hand hygiene promoted   rest/sleep promoted  Taken 11/10/2024 0000 by Nori Cruz RN  Infection Prevention:   environmental surveillance performed   equipment surfaces disinfected   hand hygiene promoted   single patient room provided  Taken 11/9/2024 2200 by Nori Cruz RN  Infection Prevention:   environmental surveillance performed   equipment surfaces disinfected   hand hygiene promoted   single patient room provided  Taken 11/9/2024 2000 by Nori rCuz RN  Infection Prevention:   environmental surveillance performed   equipment surfaces disinfected   hand hygiene promoted   single patient room provided  Goal: Optimal Comfort and Wellbeing  Outcome: Progressing  Intervention: Provide Person-Centered Care  Recent Flowsheet Documentation  Taken 11/9/2024 2000 by Nori Cruz RN  Trust Relationship/Rapport: care explained  Goal: Readiness for Transition of Care  Outcome: Progressing     Problem: Skin Injury Risk Increased  Goal: Skin Health and Integrity  Outcome: Progressing  Intervention: Optimize Skin Protection  Recent Flowsheet Documentation  Taken 11/10/2024 0400 by Nori Cruz RN  Activity Management: activity encouraged  Pressure Reduction Techniques:   frequent weight shift encouraged   pressure points protected   weight shift assistance provided  Head of Bed (HOB) Positioning: HOB elevated  Pressure Reduction Devices:   positioning supports utilized   pressure-redistributing mattress utilized   specialty bed utilized  Skin Protection:   drying agents applied   incontinence pads utilized   skin sealant/moisture barrier applied   transparent dressing maintained  Taken 11/10/2024 0200 by Nori Cruz RN  Activity Management: activity encouraged  Pressure Reduction Techniques:   frequent weight shift encouraged   pressure points protected    weight shift assistance provided  Head of Bed (HOB) Positioning: [facility] elevated  Pressure Reduction Devices:   positioning supports utilized   pressure-redistributing mattress utilized   specialty bed utilized  Skin Protection:   drying agents applied   incontinence pads utilized   skin sealant/moisture barrier applied   transparent dressing maintained  Taken 11/10/2024 0000 by Nori Cruz RN  Activity Management: activity minimized  Pressure Reduction Techniques:   frequent weight shift encouraged   pressure points protected   weight shift assistance provided  Head of Bed (HOB) Positioning: [facility] elevated  Pressure Reduction Devices:   positioning supports utilized   pressure-redistributing mattress utilized   specialty bed utilized  Skin Protection:   drying agents applied   incontinence pads utilized   skin sealant/moisture barrier applied   transparent dressing maintained  Taken 11/9/2024 2200 by Nori Cruz RN  Activity Management: back to bed  Pressure Reduction Techniques:   frequent weight shift encouraged   pressure points protected   weight shift assistance provided  Head of Bed ([facility]) Positioning: [facility] elevated  Pressure Reduction Devices:   positioning supports utilized   pressure-redistributing mattress utilized   specialty bed utilized  Skin Protection:   drying agents applied   incontinence pads utilized   skin sealant/moisture barrier applied   transparent dressing maintained  Taken 11/9/2024 2000 by Nori Cruz RN  Activity Management: up in chair  Pressure Reduction Techniques:   frequent weight shift encouraged   pressure points protected   weight shift assistance provided  Head of Bed ([facility]) Positioning: (up the chair) other (see comments)  Pressure Reduction Devices:   positioning supports utilized   pressure-redistributing mattress utilized   specialty bed utilized  Skin Protection:   drying agents applied   incontinence pads utilized   skin sealant/moisture barrier  applied   silicone foam dressing in place   transparent dressing maintained     Problem: Restraint, Nonviolent  Goal: Absence of Harm or Injury  Outcome: Progressing  Intervention: Implement Least Restrictive Safety Strategies  Recent Flowsheet Documentation  Taken 11/10/2024 0400 by Nori Cruz RN  Medical Device Protection: tubing secured  Taken 11/10/2024 0200 by Nori Cruz RN  Medical Device Protection: tubing secured  Taken 11/10/2024 0000 by Nori Cruz RN  Medical Device Protection: tubing secured  Taken 11/9/2024 2200 by Nori Cruz RN  Medical Device Protection: tubing secured  Taken 11/9/2024 2000 by Nori Cruz RN  Medical Device Protection: tubing secured  Diversional Activities:   television   smartphone  Intervention: Protect Dignity, Rights and Personal Wellbeing  Recent Flowsheet Documentation  Taken 11/9/2024 2000 by oNri Cruz RN  Trust Relationship/Rapport: care explained  Intervention: Protect Skin and Joint Integrity  Recent Flowsheet Documentation  Taken 11/10/2024 0400 by Nori Cruz RN  Body Position:   turned   left  Skin Protection:   drying agents applied   incontinence pads utilized   skin sealant/moisture barrier applied   transparent dressing maintained  Taken 11/10/2024 0200 by Nori Cruz RN  Body Position:   turned   right  Skin Protection:   drying agents applied   incontinence pads utilized   skin sealant/moisture barrier applied   transparent dressing maintained  Taken 11/10/2024 0000 by Nori Cruz RN  Body Position:   turned   left  Skin Protection:   drying agents applied   incontinence pads utilized   skin sealant/moisture barrier applied   transparent dressing maintained  Taken 11/9/2024 2200 by Nori Cruz RN  Body Position:   turned   right  Skin Protection:   drying agents applied   incontinence pads utilized   skin sealant/moisture barrier applied   transparent dressing  maintained  Taken 11/9/2024 2000 by Nori Cruz, RN  Body Position: (sitting up in chair) legs elevated  Skin Protection:   drying agents applied   incontinence pads utilized   skin sealant/moisture barrier applied   silicone foam dressing in place   transparent dressing maintained  Range of Motion:   active ROM (range of motion) encouraged   ROM (range of motion) performed   Goal Outcome Evaluation:

## 2024-11-10 NOTE — PROGRESS NOTES
[facility] Medicine Services  PROGRESS NOTE    Patient Name: Guicho Blanco  : 1940  MRN: [id number]    Date of Admission: 2024  Primary Care Physician: Mitchel Culver MD    Subjective   Subjective     CC: f/u weakness    HPI: No significant events overnight.  Patient awake alert this morning.  Denies any acute complaints.      Objective   Objective     Vital Signs:   Temp:  [97.2 °F (36.2 °C)-98.7 °F (37.1 °C)] 97.5 °F (36.4 °C)  Heart Rate:  [] 107  Resp:  [16-20] 18  BP: (103-122)/(60-72) 114/65     Physical Exam  Constitutional:       General: He is not in acute distress.  Cardiovascular:      Rate and Rhythm: Normal rate and regular rhythm.      Heart sounds: Normal heart sounds.   Pulmonary:      Effort: Pulmonary effort is normal.      Breath sounds: Normal breath sounds.   Abdominal:      General: There is no distension.      Palpations: Abdomen is soft.      Tenderness: There is no abdominal tenderness.   Musculoskeletal:      Right lower leg: No edema.      Left lower leg: No edema.   Neurological:      General: No focal deficit present.      Mental Status: He is alert.          Results Reviewed:  LAB RESULTS:      Lab 24  0821 24  0617 24  0738   WBC 6.13 7.01 5.88   HEMOGLOBIN 11.9* 10.5* 11.6*   HEMATOCRIT 37.2* 33.6* 37.0*   PLATELETS 215 205 230   NEUTROS ABS  --   --  3.47   IMMATURE GRANS (ABS)  --   --  0.02   LYMPHS ABS  --   --  1.41   MONOS ABS  --   --  0.50   EOS ABS  --   --  0.43*   MCV 83.0 84.0 84.3   LACTATE  --   --  1.2         Lab 24  0821 24  0617 24  0745 24  0738 24  0736   SODIUM 135* 136  --  139  --    POTASSIUM 3.5 3.5  --  3.9  --    CHLORIDE 101 103  --  103  --    CO2 23.0 19.0*  --  23.0  --    ANION GAP 11.0 14.0  --  13.0  --    BUN 8 14  --  19  --    CREATININE 0.73* 0.68* 0.90 0.86 0.90   EGFR 90.3 92.2  --  85.9  --    GLUCOSE 99 103*  --  99  --    CALCIUM 9.4  9.1  --  9.4  --    MAGNESIUM 1.6  --   --   --   --          Lab 11/05/24  0738   TOTAL PROTEIN 6.4   ALBUMIN 3.6   GLOBULIN 2.8   ALT (SGPT) 16   AST (SGOT) 30   BILIRUBIN 0.5   ALK PHOS 132*                     Brief Urine Lab Results  (Last result in the past 365 days)        Color   Clarity   Blood   Leuk Est   Nitrite   Protein   CREAT   Urine HCG        11/05/24 0943 Dark Yellow   Clear   Negative   Moderate (2+)   Negative   30 mg/dL (1+)                   Microbiology Results Abnormal       None            No radiology results from the last 24 hrs    Results for orders placed during the hospital encounter of 09/13/24    Adult Transthoracic Echo Complete W/ Cont if Necessary Per Protocol    Interpretation Summary    Left ventricular systolic function is normal. Left ventricular ejection fraction appears to be 61 - 65%.    Left ventricular diastolic function was normal.      Current medications:  Scheduled Meds:aspirin, 325 mg, Oral, Daily  cycloSPORINE, 1 drop, Both Eyes, Q12H  enoxaparin, 40 mg, Subcutaneous, Daily  flecainide, 100 mg, Oral, Q12H  isosorbide dinitrate, 10 mg, Oral, TID - Nitrates  nortriptyline, 30 mg, Oral, Nightly  OLANZapine, 2.5 mg, Oral, Nightly  pantoprazole, 40 mg, Intravenous, Q AM  senna-docusate sodium, 2 tablet, Oral, BID   And  polyethylene glycol, 17 g, Oral, Daily  pravastatin, 20 mg, Oral, Daily  sodium chloride, 10 mL, Intravenous, Q12H      Continuous Infusions:     PRN Meds:.  acetaminophen    senna-docusate sodium **AND** polyethylene glycol **AND** bisacodyl **AND** bisacodyl    nitroglycerin    sodium chloride    sodium chloride    Assessment & Plan   Assessment & Plan     Active Hospital Problems    Diagnosis  POA    **Generalized weakness [R53.1]  Yes    Severe protein-calorie malnutrition [E43]  Yes      Resolved Hospital Problems   No resolved problems to display.        Brief Hospital Course to date:  Guicho Blanco is a 83 yr old man with history of afib,  CAD, HTN, TIA, cochlear implants, and R knee replacement in Aug 2024 with 2 readmissions for cellulitis and progressive weakness/frailty in recent months.  Admitted for fall at home, and general failure to thrive.       Fall at home  - PT OT  - CM consult.    -Plan currently is SNF with transition to long-term care  - Hospice following     Weakness  Poor intake, rumination  Chronic constipation  Frequent waking at night  - hospice consult per wife's request, not sure if she is going to go with hospice   - Boost TID  - nutrition consult    Intermittent Delirium, chronic  - Patient has never been formally diagnosed with any sort of dementia or cognitive impairment however has significant delirium at home at night  - Patient on flecainide QTc 460   - Continue Zyprexa 2.5 nightly which seems to help, will continue, adjust dose as necessary  - If PRN necessary would consider Versed first    Pyuria, will treat empirically for now  - s/p 5 days cefuroxime  -urine culture negative      History of R knee replacement/cellulitis 8-9/2024  - no redness currently ; nl WBC, no fever      CAD, Afib, HTN  - continue home meds     Expected Discharge Location and Transportation: TBD  Expected Discharge   Expected Discharge Date: 11/11/2024; Expected Discharge Time:      VTE Prophylaxis:  Pharmacologic VTE prophylaxis orders are present.         AM-PAC 6 Clicks Score (PT): 16 (11/09/24 2000)    CODE STATUS:   Code Status and Medical Interventions: No CPR (Do Not Attempt to Resuscitate); Limited Support; No intubation (DNI)   Ordered at: 11/05/24 0627     Medical Intervention Limits:    No intubation (DNI)     Level Of Support Discussed With:    Patient     Code Status (Patient has no pulse and is not breathing):    No CPR (Do Not Attempt to Resuscitate)     Medical Interventions (Patient has pulse or is breathing):    Limited Support       Carolann Soler MD  11/10/24

## 2024-11-11 ENCOUNTER — HOME CARE VISIT (OUTPATIENT)
Dept: HOME HEALTH SERVICES | Facility: HOME HEALTHCARE | Age: 84
End: 2024-11-11
Payer: COMMERCIAL

## 2024-11-11 PROCEDURE — 96372 THER/PROPH/DIAG INJ SC/IM: CPT

## 2024-11-11 PROCEDURE — 97530 THERAPEUTIC ACTIVITIES: CPT

## 2024-11-11 PROCEDURE — 96361 HYDRATE IV INFUSION ADD-ON: CPT

## 2024-11-11 PROCEDURE — 96376 TX/PRO/DX INJ SAME DRUG ADON: CPT

## 2024-11-11 PROCEDURE — 92610 EVALUATE SWALLOWING FUNCTION: CPT

## 2024-11-11 PROCEDURE — 97110 THERAPEUTIC EXERCISES: CPT

## 2024-11-11 PROCEDURE — 25010000002 ENOXAPARIN PER 10 MG: Performed by: INTERNAL MEDICINE

## 2024-11-11 PROCEDURE — 99232 SBSQ HOSP IP/OBS MODERATE 35: CPT | Performed by: INTERNAL MEDICINE

## 2024-11-11 PROCEDURE — G0378 HOSPITAL OBSERVATION PER HR: HCPCS

## 2024-11-11 RX ORDER — MIDODRINE HYDROCHLORIDE 5 MG/1
5 TABLET ORAL
Status: DISCONTINUED | OUTPATIENT
Start: 2024-11-11 | End: 2024-11-12

## 2024-11-11 RX ORDER — PANTOPRAZOLE SODIUM 40 MG/1
40 TABLET, DELAYED RELEASE ORAL
Status: DISCONTINUED | OUTPATIENT
Start: 2024-11-12 | End: 2024-11-13 | Stop reason: HOSPADM

## 2024-11-11 RX ADMIN — PANTOPRAZOLE SODIUM 40 MG: 40 INJECTION, POWDER, FOR SOLUTION INTRAVENOUS at 05:08

## 2024-11-11 RX ADMIN — POLYETHYLENE GLYCOL 3350 17 G: 17 POWDER, FOR SOLUTION ORAL at 08:44

## 2024-11-11 RX ADMIN — ASPIRIN 325 MG: 325 TABLET ORAL at 08:44

## 2024-11-11 RX ADMIN — SENNOSIDES AND DOCUSATE SODIUM 2 TABLET: 50; 8.6 TABLET ORAL at 08:44

## 2024-11-11 RX ADMIN — FLECAINIDE ACETATE 100 MG: 50 TABLET ORAL at 18:10

## 2024-11-11 RX ADMIN — ENOXAPARIN SODIUM 40 MG: 40 INJECTION SUBCUTANEOUS at 08:44

## 2024-11-11 RX ADMIN — Medication 10 ML: at 08:45

## 2024-11-11 RX ADMIN — NORTRIPTYLINE HYDROCHLORIDE 30 MG: 10 CAPSULE ORAL at 19:28

## 2024-11-11 RX ADMIN — SENNOSIDES AND DOCUSATE SODIUM 2 TABLET: 50; 8.6 TABLET ORAL at 19:27

## 2024-11-11 RX ADMIN — PRAVASTATIN SODIUM 20 MG: 20 TABLET ORAL at 08:44

## 2024-11-11 RX ADMIN — ISOSORBIDE DINITRATE 10 MG: 10 TABLET ORAL at 08:44

## 2024-11-11 RX ADMIN — CYCLOSPORINE 1 DROP: 0.5 EMULSION OPHTHALMIC at 19:29

## 2024-11-11 RX ADMIN — CYCLOSPORINE 1 DROP: 0.5 EMULSION OPHTHALMIC at 08:45

## 2024-11-11 RX ADMIN — ISOSORBIDE DINITRATE 10 MG: 10 TABLET ORAL at 12:41

## 2024-11-11 RX ADMIN — OLANZAPINE 2.5 MG: 5 TABLET, FILM COATED ORAL at 22:21

## 2024-11-11 RX ADMIN — Medication 10 ML: at 19:31

## 2024-11-11 RX ADMIN — MIDODRINE HYDROCHLORIDE 5 MG: 5 TABLET ORAL at 18:10

## 2024-11-11 NOTE — CASE MANAGEMENT/SOCIAL WORK
Continued Stay Note  [facility]     Patient Name: Guicho Blanco  MRN: [id number]  Today's Date: 11/11/2024    Admit Date: 11/5/2024    Plan: [facility]   Discharge Plan       Row Name 11/11/24 1406       Plan    Plan [facility]    Plan Comments Spoke with Marian at [facility].  She should have a bed available for patient in the next few days.  Updated patient's family at bedside.  CM will continue to follow.    Final Discharge Disposition Code 03 - skilled nursing facility (SNF)      Row Name 11/11/24 1339                                    Discharge Codes    No documentation.                 Expected Discharge Date and Time       Expected Discharge Date Expected Discharge Time    Nov 11, 2024               Patt Salomon RN

## 2024-11-11 NOTE — PROGRESS NOTES
[facility] Medicine Services  PROGRESS NOTE    Patient Name: Guicho Blanco  : 1940  MRN: [id number]    Date of Admission: 2024  Primary Care Physician: Mitchel Culver MD    Subjective   Subjective     CC: failure to thrive,     HPI:  Upset this morning with logistics of his morning including breakfast tray taken early. No specific complaints    D/w wife who is working on plans for long-term care for him. Teary    We discuss results of swallowing studies - all in agreement not to pursue feeding tube    Objective   Objective     Vital Signs:   Temp:  [97.4 °F (36.3 °C)-98.1 °F (36.7 °C)] 97.4 °F (36.3 °C)  Heart Rate:  [] 111  Resp:  [15-18] 18  BP: ()/(52-75) 111/71     Physical Exam:  Constitutional: No acute distress, awake, alert tall thin male sitting up in recliner.   HENT: cochlear implants in place bilaterally  Respiratory: Clear to auscultation bilaterally, respiratory effort normal   Cardiovascular: RRR, no murmurs, rubs, or gallops  Gastrointestinal: Soft, nontender, nondistended  Musculoskeletal: Muscle tone decreased throughout, no joint effusions appreciated  Psychiatric: Appropriate affect, cooperative  Neurologic: Alert and oriented to self but not to situation, facial movements symmetric and spontaneous movement of all 4 extremities grossly equal bilaterally, speech clear  Skin: No rashes    Results Reviewed:  LAB RESULTS:      Lab 24  0821 24  0617 24  0738   WBC 6.13 7.01 5.88   HEMOGLOBIN 11.9* 10.5* 11.6*   HEMATOCRIT 37.2* 33.6* 37.0*   PLATELETS 215 205 230   NEUTROS ABS  --   --  3.47   IMMATURE GRANS (ABS)  --   --  0.02   LYMPHS ABS  --   --  1.41   MONOS ABS  --   --  0.50   EOS ABS  --   --  0.43*   MCV 83.0 84.0 84.3   LACTATE  --   --  1.2         Lab 11/10/24  1746 24  0821 24  0617 24  0745 24  0738   SODIUM 139 135* 136  --  139   POTASSIUM 4.2 3.5 3.5  --  3.9    CHLORIDE 104 101 103  --  103   CO2 23.0 23.0 19.0*  --  23.0   ANION GAP 12.0 11.0 14.0  --  13.0   BUN 11 8 14  --  19   CREATININE 0.67* 0.73* 0.68* 0.90 0.86   EGFR 92.6 90.3 92.2  --  85.9   GLUCOSE 87 99 103*  --  99   CALCIUM 9.2 9.4 9.1  --  9.4   MAGNESIUM  --  1.6  --   --   --          Lab 11/05/24  0738   TOTAL PROTEIN 6.4   ALBUMIN 3.6   GLOBULIN 2.8   ALT (SGPT) 16   AST (SGOT) 30   BILIRUBIN 0.5   ALK PHOS 132*                     Brief Urine Lab Results  (Last result in the past 365 days)        Color   Clarity   Blood   Leuk Est   Nitrite   Protein   CREAT   Urine HCG        11/05/24 0943 Dark Yellow   Clear   Negative   Moderate (2+)   Negative   30 mg/dL (1+)                   Microbiology Results Abnormal       None            No radiology results from the last 24 hrs    Results for orders placed during the hospital encounter of 09/13/24    Adult Transthoracic Echo Complete W/ Cont if Necessary Per Protocol    Interpretation Summary    Left ventricular systolic function is normal. Left ventricular ejection fraction appears to be 61 - 65%.    Left ventricular diastolic function was normal.      Current medications:  Scheduled Meds:aspirin, 325 mg, Oral, Daily  cycloSPORINE, 1 drop, Both Eyes, Q12H  enoxaparin, 40 mg, Subcutaneous, Daily  flecainide, 100 mg, Oral, Q12H  midodrine, 5 mg, Oral, TID AC  nortriptyline, 30 mg, Oral, Nightly  OLANZapine, 2.5 mg, Oral, Nightly  [START ON 11/12/2024] pantoprazole, 40 mg, Oral, Q AM  senna-docusate sodium, 2 tablet, Oral, BID   And  polyethylene glycol, 17 g, Oral, Daily  pravastatin, 20 mg, Oral, Daily  sodium chloride, 10 mL, Intravenous, Q12H      Continuous Infusions:   PRN Meds:.  acetaminophen    senna-docusate sodium **AND** polyethylene glycol **AND** bisacodyl **AND** bisacodyl    nitroglycerin    sodium chloride    sodium chloride    Assessment & Plan   Assessment & Plan     Active Hospital Problems    Diagnosis  POA    **Generalized weakness  [R53.1]  Yes    Severe protein-calorie malnutrition [E43]  Yes      Resolved Hospital Problems   No resolved problems to display.        Brief Hospital Course to date:  Guicho Blanco is a 83 y.o. male w h/o TIA, Afib on anticoagulation, right knee replacement (8/2024) with falls from home and general failure to thrive. This appears longstanding and progressive with significant weight loss, lack of appetite, overall failure to thrive.    Orthostatic hypotension   - tart midodrine, abdominal binder before activity. Inhibiting activity  -repeat in AM w abdominal binder 10 min before    Failure to thrive: weight loss, frequent falls, loss of appetite, progressive oropharyngeal dysphagia  -comfort care goals after d/w family  -d/w them swallowing results: no feeding tube, comfort diet. All in agreement  -open to hospice management as OP    Cognitive impairment w sundowning behavior -zyprexa qhs w assistance in this    Paroxysmal Afib s/p ablation - on home flecainide, aspirin 325 mg per cardiology instead of anticoagulation  Pyuria - s/p cefuroxime  CAD - aspirin, hold imdur given above orthostasis    Expected Discharge Location and Transportation: long-term placement  Expected Discharge 11/12 will be med ready  Expected Discharge Date: 11/12/2024; Expected Discharge Time:      VTE Prophylaxis:  Pharmacologic VTE prophylaxis orders are present.         AM-PAC 6 Clicks Score (PT): 9 (11/10/24 2000)    CODE STATUS:   Code Status and Medical Interventions: No CPR (Do Not Attempt to Resuscitate); Limited Support; No intubation (DNI)   Ordered at: 11/05/24 1217     Medical Intervention Limits:    No intubation (DNI)     Level Of Support Discussed With:    Patient     Code Status (Patient has no pulse and is not breathing):    No CPR (Do Not Attempt to Resuscitate)     Medical Interventions (Patient has pulse or is breathing):    Limited Support       Dayna Martínez MD  11/11/24

## 2024-11-11 NOTE — THERAPY TREATMENT NOTE
Patient Name: Guicho Blanco  : 1940    MRN: [id number]                              Today's Date: 2024       Admit Date: 2024    Visit Dx:     ICD-10-CM ICD-9-CM   1. Generalized weakness  R53.1 780.79   2. Fall in home, initial encounter  W19.XXXA E888.9    Y92.009 E849.0   3. Anorexia  R63.0 783.0   4. Bacterial UTI  N39.0 599.0    A49.9 041.9   5. Oropharyngeal dysphagia  R13.12 787.22     Patient Active Problem List   Diagnosis    Osteomyelitis of toe of right foot    Osteomyelitis of right foot    PAF (paroxysmal atrial fibrillation)    Coronary artery disease involving native coronary artery of native heart without angina pectoris    Gastroesophageal reflux disease without esophagitis    Benign non-nodular prostatic hyperplasia without lower urinary tract symptoms    S/P appy    Essential hypertension    Simple chronic bronchitis    Mixed hyperlipidemia    COPD (chronic obstructive pulmonary disease)    BPH (benign prostatic hypertrophy)    Hearing loss    Chest pain    Vertigo    Snoring    Overweight    PLMD (periodic limb movement disorder)    Neuropathy    Periodic headache syndrome, not intractable    Acquired absence of other right toe(s)    Tremor    Bilateral impacted cerumen    Bilateral sensorineural hearing loss    Chronic otitis externa    Deviated nasal septum    Disorder of joint of foot    ED (erectile dysfunction) of organic origin    Hydrocele    Hydrocele of testis    Impacted cerumen    Mononeuropathy    Swelling of testicle    Venous retinal branch occlusion    Vitreous degeneration    Benign prostatic hyperplasia with urinary obstruction    OA (osteoarthritis) of knee    Status post total right knee replacement    Septic arthritis    Cellulitis    Cellulitis of right knee    Failure to thrive in adult    Insomnia    Moderate malnutrition    Sepsis    Lactic acidosis    Serotonin syndrome    Dysphagia    Generalized weakness    Severe protein-calorie malnutrition      Past Medical History:   Diagnosis Date    Arthritis     Atrial fibrillation     BPH (benign prostatic hypertrophy)     Cataract     Cellulitis     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     bilateral    Coronary artery disease     Full dentures     Gall stones     GERD (gastroesophageal reflux disease)     Hearing loss     Hiatal hernia     Hyperlipidemia     Hypertension     Peripheral neuropathy     Peripheral vascular disease     Seasonal allergies     Skin cancer     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    TIA (transient ischemic attack)     2 times    Wears glasses      Past Surgical History:   Procedure Laterality Date    ABLATION OF DYSRHYTHMIC FOCUS      APPENDECTOMY      CARDIAC CATHETERIZATION N/A 10/05/2017    Procedure: Left Heart Cath;  Surgeon: Hector Urrutia MD;  Location:  [facility];  Service:     CATARACT EXTRACTION Bilateral     CHOLECYSTECTOMY      Remote    COCHLEAR IMPLANT REVISION  05/2019    COLONOSCOPY      PROSTATE SURGERY      SHOULDER SURGERY Right     SKIN BIOPSY      TOE AMPUTATION Right     2nd toe - Right Foot    TONSILLECTOMY      TOTAL KNEE ARTHROPLASTY Right 8/7/2024    Procedure: TOTAL KNEE ARTHROPLASTY WITH ASHLEY ROBOT RIGHT;  Surgeon: Christian Schwartz MD;  Location: [facility];  Service: Robotics - Ortho;  Laterality: Right;      General Information       Row Name 11/11/24 1526          Physical Therapy Time and Intention    Document Type therapy note (daily note)  -AC     Mode of Treatment physical therapy  -AC       Row Name 11/11/24 1523          General Information    Patient Profile Reviewed yes  -AC     Existing Precautions/Restrictions fall;other (see comments)  cochlear implant  -AC     Barriers to Rehab medically complex;previous functional deficit;cognitive status  -AC       Row Name 11/11/24 1525          Cognition    Orientation Status (Cognition) oriented to;person;place;verbal cues/prompts needed for orientation;time  -AC       Row Name 11/11/24 1524           Safety Issues/Impairments Affecting Functional Mobility    Safety Issues Affecting Function (Mobility) awareness of need for assistance;insight into deficits/self-awareness;safety precaution awareness;safety precautions follow-through/compliance;sequencing abilities;problem-solving;judgment  -     Impairments Affecting Function (Mobility) balance;cognition;endurance/activity tolerance;strength  -AC     Cognitive Impairments, Mobility Safety/Performance awareness, need for assistance;insight into deficits/self-awareness;problem-solving/reasoning;judgment;safety precaution awareness;safety precaution follow-through;sequencing abilities  -               User Key  (r) = Recorded By, (t) = Taken By, (c) = Cosigned By      Initials Name Provider Type    AC Cary Umana, PT Physical Therapist                   Mobility       Row Name 11/11/24 1527          Bed Mobility    Bed Mobility sit-supine  -     Sit-Supine [place] (Bed Mobility) maximum assist (25% patient effort);2 person assist  -AC     Comment, (Bed Mobility) Pt UIC upon arrival however however returned to supine w/ maxAx2 at end of session  -       Row Name 11/11/24 1527          Bed-Chair Transfer    Bed-Chair [place] (Transfers) moderate assist (50% patient effort);2 person assist  -AC     Assistive Device (Bed-Chair Transfers) walker, front-wheeled  -AC     Comment, (Bed-Chair Transfer) Pt transitioned to sitting EOB w/ modAx2 and VC for sequencing. Pt took 3 lateral steps however quickly was transitioned to supine in bed due to significant drop in BP  -       Row Name 11/11/24 1527          Sit-Stand Transfer    Sit-Stand [place] (Transfers) moderate assist (50% patient effort);2 person assist  -AC     Assistive Device (Sit-Stand Transfers) walker, front-wheeled  -AC     Comment, (Sit-Stand Transfer) Pt transitioned to standing w/ modAx2 and VC for anterior weight shift. In addition pt required manual assistance for proper hand  placement on FWW  -AC       Row Name 11/11/24 1527          Gait/Stairs (Locomotion)    Patient was able to Ambulate no, other medical factors prevent ambulation  -     Reason Patient was unable to Ambulate Hypotension  -AC               User Key  (r) = Recorded By, (t) = Taken By, (c) = Cosigned By      Initials Name Provider Type     Cary Umana, PT Physical Therapist                   Obj/Interventions       [facility] Name 11/11/24 1535          Motor Skills    Motor Skills functional endurance  -     Functional Endurance below baseline, significant orthostatic hypotension  -     Therapeutic Exercise hip;knee  -AC       Row Name 11/11/24 1535          Hip (Therapeutic Exercise)    Hip Strengthening (Therapeutic Exercise) bilateral;heel slides;aBduction;aDduction;10 repetitions  -[facility] Name 11/11/24 1535          Knee (Therapeutic Exercise)    Knee Strengthening (Therapeutic Exercise) SAQ (short arc quad);LAQ (long arc quad);bilateral;10 repetitions;SLR (straight leg raise)  -[facility] Name 11/11/24 1535          Balance    Balance Assessment sitting static balance;sitting dynamic balance;standing static balance;standing dynamic balance  -     Static Sitting Balance standby assist  -AC     Dynamic Sitting Balance contact guard  -AC     Position, Sitting Balance supported;sitting in chair  -AC     Static Standing Balance moderate assist;2-person assist  -AC     Dynamic Standing Balance moderate assist;2-person assist  -AC     Position/Device Used, Standing Balance supported;walker, front-wheeled  -     Balance Interventions sitting;standing;sit to stand;supported;static;dynamic  -               User Key  (r) = Recorded By, (t) = Taken By, (c) = Cosigned By      Initials Name Provider Type     Cary Umana, EL Physical Therapist                   Goals/Plan    No documentation.                  Clinical Impression       [facility] Name 11/11/24 1538          Pain    Pretreatment Pain Rating 0/10 - no  pain  -AC     Posttreatment Pain Rating 0/10 - no pain  -AC       Row Name 11/11/24 1538          Plan of Care Review    Plan of Care Reviewed With patient;spouse  -AC     Progress no change  -AC     Outcome Evaluation Pt continues to participate in therapy sessions. Pt was able to completed therex in recliner chair w/ minimal VC/TC for proper form. Pt then stood from bedside chair w/ modAx2 demonstrating a significant posterior lean. In standing pt's BP dropped from 107/64 to 66/43. Pt was quickly transitioned to supine in bed w/ modAx2. After laying supine pt's BP increased to 132/72. Pt did not c/o any dizziness/lightheadedness throughout session. RN notified of BP changes. D/c rec remains SNF for best outcome  -       Row Name 11/11/24 1538          Therapy Assessment/Plan (PT)    Rehab Potential (PT) good  -AC     Criteria for Skilled Interventions Met (PT) yes  -AC     Therapy Frequency (PT) daily  -AC     Predicted Duration of Therapy Intervention (PT) 10 days  -       Row Name 11/11/24 1538          Vital Signs    Pre Systolic BP Rehab 107  sitting in chair  -AC     Pre Treatment Diastolic BP 64  -AC     Intra Systolic BP Rehab 66  standing  -AC     Intra Treatment Diastolic BP 43  -AC     Post Systolic BP Rehab 132  supine  -AC     Post Treatment Diastolic BP 72  -AC     O2 Delivery Pre Treatment room air  -AC     O2 Delivery Intra Treatment room air  -AC     O2 Delivery Post Treatment room air  -AC     Pre Patient Position Sitting  -AC     Intra Patient Position Standing  -AC     Post Patient Position Supine  -AC       Row Name 11/11/24 1538          Positioning and Restraints    Pre-Treatment Position sitting in chair/recliner  -AC     Post Treatment Position bed  -AC     In Bed notified nsg;supine;call light within reach;encouraged to call for assist;exit alarm on;side rails up x3;with family/caregiver  -AC               User Key  (r) = Recorded By, (t) = Taken By, (c) = Cosigned By      Initials  Name Provider Type    Cary Daley PT Physical Therapist                   Outcome Measures       Row Name 11/11/24 1542          How much help from another person do you currently need...    Turning from your back to your side while in flat bed without using bedrails? 3  -AC     Moving from lying on back to sitting on the side of a flat bed without bedrails? 2  -AC     Moving to and from a bed to a chair (including a wheelchair)? 1  -AC     Standing up from a chair using your arms (e.g., wheelchair, bedside chair)? 2  -AC     Climbing 3-5 steps with a railing? 1  -AC     To walk in hospital room? 1  -AC     AM-PAC 6 Clicks Score (PT) 10  -AC     Highest Level of Mobility Goal 4 --> Transfer to chair/commode  -AC       Row Name 11/11/24 1542          Functional Assessment    Outcome Measure Options AM-PAC 6 Clicks Basic Mobility (PT)  -AC               User Key  (r) = Recorded By, (t) = Taken By, (c) = Cosigned By      Initials Name Provider Type    AC Cary Umana PT Physical Therapist                                 Physical Therapy Education       Title: PT OT SLP Therapies (In Progress)       Topic: Physical Therapy (In Progress)       Point: Mobility training (In Progress)       Learning Progress Summary            Patient Acceptance, E, NR by  at 11/11/2024 1542    Eager, E, VU by SC at 11/8/2024 1606    Comment: reviewed benefits of activity    Acceptance, E,TB, VU by FRIDA at 11/7/2024 0554    Acceptance, E,D, VU,NR by AB at 11/6/2024 1551    Acceptance, E, NR by  at 11/5/2024 1608                      Point: Home exercise program (Done)       Learning Progress Summary            Patient Eager, E, VU by SC at 11/8/2024 1606    Comment: reviewed benefits of activity    Acceptance, E,TB, VU by FRIDA at 11/7/2024 0554    Acceptance, E,D, VU,NR by AB at 11/6/2024 1551    Acceptance, E, NR by  at 11/5/2024 1608                      Point: Body mechanics (In Progress)       Learning Progress Summary             Patient Acceptance, E, NR by  at 11/11/2024 1542    Eager, E, VU by SC at 11/8/2024 1606    Comment: reviewed benefits of activity    Acceptance, E,TB, VU by PK at 11/7/2024 0554    Acceptance, E,D, VU,NR by AB at 11/6/2024 1551    Acceptance, E, NR by  at 11/5/2024 1608                      Point: Precautions (In Progress)       Learning Progress Summary            Patient Acceptance, E, NR by  at 11/11/2024 1542    Eager, E, VU by SC at 11/8/2024 1606    Comment: reviewed benefits of activity    Acceptance, E,TB, VU by PK at 11/7/2024 0554    Acceptance, E,D, VU,NR by AB at 11/6/2024 1551    Acceptance, E, NR by  at 11/5/2024 1608                                      User Key       Initials Effective Dates Name Provider Type Discipline    SC 02/03/23 -  Shamir Malik, PT Physical Therapist PT    AB 09/22/22 -  Ebony Loera, PT Physical Therapist PT     07/11/24 -  Cary Umana, PT Physical Therapist PT     07/11/24 -  Nori Cruz, RN Registered Nurse Nurse                  PT Recommendation and Plan  Planned Therapy Interventions (PT): balance training, bed mobility training, gait training, patient/family education, strengthening, transfer training  Progress: no change  Outcome Evaluation: Pt continues to participate in therapy sessions. Pt was able to completed therex in recliner chair w/ minimal VC/TC for proper form. Pt then stood from bedside chair w/ modAx2 demonstrating a significant posterior lean. In standing pt's BP dropped from 107/64 to 66/43. Pt was quickly transitioned to supine in bed w/ modAx2. After laying supine pt's BP increased to 132/72. Pt did not c/o any dizziness/lightheadedness throughout session. RN notified of BP changes. D/c rec remains SNF for best outcome     Time Calculation:   PT Evaluation Complexity  History, PT Evaluation Complexity: 1-2 personal factors and/or comorbidities  Examination of Body Systems (PT Eval Complexity): total of 3 or more  elements  Clinical Presentation (PT Evaluation Complexity): evolving  Clinical Decision Making (PT Evaluation Complexity): moderate complexity  Overall Complexity (PT Evaluation Complexity): moderate complexity     PT Charges       Row Name 11/11/24 1543 11/11/24 1212          Time Calculation    Start Time 1443  -AC --     PT Received On 11/11/24  -AC --     PT Goal Re-Cert Due Date 11/15/24  -AC --        Time Calculation- PT    Total Timed Code Minutes- PT 23 minute(s)  -AC --        Timed Charges    83391 - PT Therapeutic Exercise Minutes 13  -AC --     80917 - PT Therapeutic Activity Minutes 10  -AC --        Total Minutes    Timed Charges Total Minutes 23  -AC --      Total Minutes 23  -AC --        PT/SLP KX Modifier    Exception criteria met to exceed therapy cap -- Apply KX Modifier  -CRISTY               User Key  (r) = Recorded By, (t) = Taken By, (c) = Cosigned By      Initials Name Provider Type    Sendy Vivar, MS CCC-SLP Speech and Language Pathologist    Cary Daley, PT Physical Therapist                  Therapy Charges for Today       Code Description Service Date Service Provider Modifiers Qty    42005675970 HC PT THER PROC EA 15 MIN 11/11/2024 Cary Umana, PT GP, KX 1    38202467150 HC PT THERAPEUTIC ACT EA 15 MIN 11/11/2024 Cary Umana, PT GP, KX 1    74835122296 HC PT THER SUPP EA 15 MIN 11/11/2024 Cary Umana, PT GP 2            PT G-Codes  Outcome Measure Options: AM-PAC 6 Clicks Basic Mobility (PT)  AM-PAC 6 Clicks Score (PT): 10  AM-PAC 6 Clicks Score (OT): 15  PT Discharge Summary  Anticipated Discharge Disposition (PT): skilled nursing facility    Cary Umana PT  11/11/2024

## 2024-11-11 NOTE — CONSULTS
Visited patient, his wife, stepdaughter, and son-in-law this afternoon.     Used active listening in hearing the stories of the patient's life and career from those present. Patient is a  and appreciates prayer.     Prayed with patient and family before leaving the room.

## 2024-11-11 NOTE — THERAPY EVALUATION
Acute Care - Speech Language Pathology   Swallow Re-Evaluation [facility]  Clinical Swallow Evaluation     Patient Name: Guicho Blanco  : 1940  MRN: [id number]  Today's Date: 2024               Admit Date: 2024    Visit Dx:     ICD-10-CM ICD-9-CM   1. Generalized weakness  R53.1 780.79   2. Fall in home, initial encounter  W19.XXXA E888.9    Y92.009 E849.0   3. Anorexia  R63.0 783.0   4. Bacterial UTI  N39.0 599.0    A49.9 041.9   5. Oropharyngeal dysphagia  R13.12 787.22     Patient Active Problem List   Diagnosis    Osteomyelitis of toe of right foot    Osteomyelitis of right foot    PAF (paroxysmal atrial fibrillation)    Coronary artery disease involving native coronary artery of native heart without angina pectoris    Gastroesophageal reflux disease without esophagitis    Benign non-nodular prostatic hyperplasia without lower urinary tract symptoms    S/P appy    Essential hypertension    Simple chronic bronchitis    Mixed hyperlipidemia    COPD (chronic obstructive pulmonary disease)    BPH (benign prostatic hypertrophy)    Hearing loss    Chest pain    Vertigo    Snoring    Overweight    PLMD (periodic limb movement disorder)    Neuropathy    Periodic headache syndrome, not intractable    Acquired absence of other right toe(s)    Tremor    Bilateral impacted cerumen    Bilateral sensorineural hearing loss    Chronic otitis externa    Deviated nasal septum    Disorder of joint of foot    ED (erectile dysfunction) of organic origin    Hydrocele    Hydrocele of testis    Impacted cerumen    Mononeuropathy    Swelling of testicle    Venous retinal branch occlusion    Vitreous degeneration    Benign prostatic hyperplasia with urinary obstruction    OA (osteoarthritis) of knee    Status post total right knee replacement    Septic arthritis    Cellulitis    Cellulitis of right knee    Failure to thrive in adult    Insomnia    Moderate malnutrition    Sepsis    Lactic acidosis    Serotonin  syndrome    Dysphagia    Generalized weakness    Severe protein-calorie malnutrition     Past Medical History:   Diagnosis Date    Arthritis     Atrial fibrillation     BPH (benign prostatic hypertrophy)     Cataract     Cellulitis     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     bilateral    Coronary artery disease     Full dentures     Gall stones     GERD (gastroesophageal reflux disease)     Hearing loss     Hiatal hernia     Hyperlipidemia     Hypertension     Peripheral neuropathy     Peripheral vascular disease     Seasonal allergies     Skin cancer     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    TIA (transient ischemic attack)     2 times    Wears glasses      Past Surgical History:   Procedure Laterality Date    ABLATION OF DYSRHYTHMIC FOCUS      APPENDECTOMY      CARDIAC CATHETERIZATION N/A 10/05/2017    Procedure: Left Heart Cath;  Surgeon: Hector Urrutia MD;  Location:  [facility];  Service:     CATARACT EXTRACTION Bilateral     CHOLECYSTECTOMY      Remote    COCHLEAR IMPLANT REVISION  05/2019    COLONOSCOPY      PROSTATE SURGERY      SHOULDER SURGERY Right     SKIN BIOPSY      TOE AMPUTATION Right     2nd toe - Right Foot    TONSILLECTOMY      TOTAL KNEE ARTHROPLASTY Right 8/7/2024    Procedure: TOTAL KNEE ARTHROPLASTY WITH ASHLEY ROBOT RIGHT;  Surgeon: Christian Schwartz MD;  Location:  [facility] OR;  Service: Robotics - Ortho;  Laterality: Right;       SLP Recommendation and Plan  SLP Swallowing Diagnosis: mild-moderate, oral dysphagia, suspect acute-on-chronic, mod-severe, pharyngeal dysphagia (11/11/24 1115) High risk for aspiration w/ all consistencies.   SLP Diet Recommendation: comfort diet, per physician discretion, regular textures, thin liquids, other (see comments) (w/ pt/spouse to select soft items from menu + extra sauce/gravy for solids--if aligns w/ GOC per MD discretion) (11/11/24 1115)  Recommended Precautions and Strategies: upright posture during/after eating, small bites of food and sips of  liquid, general aspiration precautions, fatigue precautions, other (see comments) (slow pace, frequent cued coughing to clear airway) (11/11/24 1115)  SLP Rec. for Method of Medication Administration: meds crushed, with puree, meds via alternate route, as tolerated (pending GO decisions) (11/11/24 1115)           Swallow Criteria for Skilled Therapeutic Interventions Met: demonstrates skilled criteria (11/11/24 1115)  Anticipated Discharge Disposition (SLP): home with home health (11/11/24 1115)  Rehab Potential/Prognosis, Swallowing: re-evaluate goals as necessary (11/11/24 1115)  Therapy Frequency (Swallow): 5 days per week (pending GOC decisions) (11/11/24 1115)  Predicted Duration Therapy Intervention (Days): 1 week (11/11/24 1115)  Oral Care Recommendations: Oral Care BID/PRN, Toothbrush (11/11/24 1115)                                        Progress: no change      SWALLOW EVALUATION (Last 72 Hours)       SLP Adult Swallow Evaluation       Row Name 11/11/24 1115                   Rehab Evaluation    Document Type re-evaluation  -AC        Subjective Information no complaints  -AC        Patient Observations alert;cooperative  -AC        Patient/Family/Caregiver Comments/Observations Spouse present.  -AC        Patient Effort adequate  -AC           General Information    Patient Profile Reviewed yes  -AC        Pertinent History Of Current Problem See previous eval. RN/MD requested re-eval due to pt exhibiting difficulty w/ PO intake this morning.  -AC        Current Method of Nutrition regular textures;thin liquids  -AC        Precautions/Limitations, Hearing hearing impairment, bilaterally  bilateral cochlear implants  -AC        Plans/Goals Discussed with patient;spouse/S.O.;agreed upon  -AC        Barriers to Rehab previous functional deficit;medically complex  -AC        Patient's Goals for Discharge patient did not state  -        Family Goals for Discharge patient able to eat/drink without  coughing/choking  -AC           Pain    Additional Documentation Pain Scale: FACES Pre/Post-Treatment (Group)  -AC           Pain Scale: FACES Pre/Post-Treatment    Pain: FACES Scale, Pretreatment 2-->hurts little bit  -AC        Posttreatment Pain Rating 0-->no hurt  -AC        Pre/Posttreatment Pain Comment Neck pain/discomfort. Repositioned & provided additional pillow support.  -AC           Oral Motor Structure and Function    Secretion Management wet vocal quality  -AC        Volitional Cough weak  -AC           General Eating/Swallowing Observations    Respiratory Support Currently in Use room air  -AC        Eating/Swallowing Skills fed by SLP;self-fed;needed assist  -AC        Positioning During Eating upright 90 degree;upright in chair  -AC        Utensils Used spoon;cup;straw  -AC        Consistencies Trialed thin liquids;nectar/syrup-thick liquids;pureed;regular textures  -AC           Clinical Swallow Eval    Oral Prep Phase impaired  -AC        Oral Transit impaired  -AC        Oral Residue impaired  -AC        Pharyngeal Phase suspected pharyngeal impairment  -AC        Esophageal Phase unremarkable  -AC           Oral Prep Concerns    Oral Prep Concerns increased prep time  -AC        Increased Prep Time regular consistencies  -AC           Oral Transit Concerns    Oral Transit Concerns increased oral transit time  -AC        Increased Oral Transit Time regular consistencies  -AC           Oral Residue Concerns    Oral Residue Concerns diffuse residue throughout oral cavity  -AC        Diffuse Residue Throughout Oral Cavity regular consistencies  -AC        Oral Residue Concerns, Comment Mild--cleared w/ puree/liquid washes.  -AC           Pharyngeal Phase Concerns    Pharyngeal Phase Concerns multiple swallows;wet vocal quality;acute vocal quality changes;throat clear;cough  -AC        Wet Vocal Quality all consistencies  -AC        Acute Vocal Quality Changes other (see comments)  weaker/hoarse  voice lately per spouse  -AC        Multiple Swallows all consistencies  several, audible  -AC        Cough all consistencies  -AC        Throat Clear all consistencies  -AC        Pharyngeal Phase Concerns, Comment Reviewed results of prior MBSs, including most recent on 11/6. Suspect acute-on-chronic dysphagia. Anterior cervical osteophyte resulting in diffuse pharyngeal residue and eventual penetration/aspiration. Most recent MBS revealed worsened dysphagia. Suspect exacerbated by acute weakness. Spouse reported pt has not fully recovered since knee replacement sx in August. She also reported noting pt exhibiting weaker/hoarse vocal quality. Discussed that likely high risk for aspiration w/ all consistencies. Notified MD. May consider comfort diet, given chronic nature of dysphagia + advanced age.  -AC           Swallowing Quality of Life Assessment    Education and counseling provided Signs of aspiration;Risks of aspiration;Comfort diet options;Alternate nutrition/hydration options, risks, and benefits  -           SLP Evaluation Clinical Impression    SLP Swallowing Diagnosis mild-moderate;oral dysphagia;suspect acute-on-chronic;mod-severe;pharyngeal dysphagia  -AC        Functional Impact risk of aspiration/pneumonia;risk of malnutrition;risk of dehydration  -AC        Rehab Potential/Prognosis, Swallowing re-evaluate goals as necessary  -        Swallow Criteria for Skilled Therapeutic Interventions Met demonstrates skilled criteria  -           Recommendations    Therapy Frequency (Swallow) 5 days per week  pending GOC decisions  -        Predicted Duration Therapy Intervention (Days) 1 week  -AC        SLP Diet Recommendation comfort diet, per physician discretion;regular textures;thin liquids;other (see comments)  w/ pt/spouse to select soft items from menu + extra sauce/gravy for solids--if aligns w/ GOC per MD discretion  -        Recommended Precautions and Strategies upright posture  during/after eating;small bites of food and sips of liquid;general aspiration precautions;fatigue precautions;other (see comments)  slow pace, frequent cued coughing to clear airway  -        Oral Care Recommendations Oral Care BID/PRN;Toothbrush  -        SLP Rec. for Method of Medication Administration meds crushed;with puree;meds via alternate route;as tolerated  pending GOC decisions  -        Anticipated Discharge Disposition (SLP) home with home health  -                  User Key  (r) = Recorded By, (t) = Taken By, (c) = Cosigned By      Initials Name Effective Dates    Sendy Tee MS CCC-SLP 02/03/23 -                     EDUCATION  The patient has been educated in the following areas:   Dysphagia (Swallowing Impairment) Modified Diet Instruction.                Time Calculation:    Time Calculation- SLP       Row Name 11/11/24 1248 11/11/24 1212          Time Calculation- [facility]    SLP Start Time 1115  -AC --     SLP Received On 11/11/24  - --        Untimed Charges    [id number] Eval Oral Pharyng Swallow Minutes 75  -AC --        Total Minutes    Untimed Charges Total Minutes 75  -AC --      Total Minutes 75  -AC --        PT/SLP KX Modifier    Exception criteria met to exceed therapy cap -- Apply KX Modifier  -               User Key  (r) = Recorded By, (t) = Taken By, (c) = Cosigned By      Initials Name Provider Type    Sendy Tee MS CCC-SLP Speech and Language Pathologist                    Therapy Charges for Today       Code Description Service Date Service Provider Modifiers Qty    [id number] HC ST EVAL ORAL PHARYNG SWALLOW 5 11/11/2024 Sendy Ryan MS CCC-SLP GN, KX 1                 Sendy Ryan MS CCC-YOLA  11/11/2024

## 2024-11-11 NOTE — PLAN OF CARE
Goal Outcome Evaluation:  Plan of Care Reviewed With: patient, spouse        Progress: no change       Anticipated Discharge Disposition (SLP): home with home health          SLP Swallowing Diagnosis: mild-moderate, oral dysphagia, suspect acute-on-chronic, mod-severe, pharyngeal dysphagia (11/11/24 2217)

## 2024-11-11 NOTE — CASE COMMUNICATION
Patient missed a AIDE visit from [facility] on 11-11-24.     Reason: Current Inpatient Admission.       For your records only.   Per CMS Guidance, MD must be notified of missed/cancelled visits; therefore the prescribed frequency was not met.

## 2024-11-11 NOTE — PROGRESS NOTES
"          Clinical Nutrition Assessment     Patient Name: Guicho Blanco  YOB: 1940  MRN: [id number]  Date of Encounter: 11/11/24 11:08 EST  Admission date: 11/5/2024  Reason for Visit: Follow-up protocol    Assessment   Nutrition Assessment   Admission Diagnosis:  Generalized weakness [R53.1]    Problem List:    Generalized weakness    Severe protein-calorie malnutrition      PMH:   He  has a past medical history of Arthritis, Atrial fibrillation, BPH (benign prostatic hypertrophy), Cataract, Cellulitis, 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    PSH:  He  has a past surgical history that includes Ablation of dysrhythmic focus; Appendectomy; Shoulder surgery (Right); Prostate surgery; Cholecystectomy; Cataract extraction (Bilateral); Toe amputation (Right); Cardiac catheterization (N/A, 10/05/2017); Tonsillectomy; Cochlear implant revision (05/2019); Skin biopsy; Colonoscopy; and Total knee arthroplasty (Right, 8/7/2024).    Applicable Nutrition History:   Freq falls  Cochlear implants  Chronic Moderate Malnutrition (9/15/24)    (11/06) SLP MBS: [facility] ground, thin liquids  (11/07) SLP: regular textures, thin liquids, other (see comments) (menu select for naturally softer foods. SLP took preferences for next three meals and sent to kitchen.   (11/11) SLP: comfort diet, per physician discretion, regular textures, thin liquids, other (see comments) (w/ pt/spouse to select soft items from menu + extra sauce/gravy for solids--if aligns w/ GOC per MD discretion)     Anthropometrics     Height: Height: 182.9 cm (72\")  Last Filed Weight: Weight: 69.1 kg (152 lb 4.8 oz) (11/05/24 1427)  Method: Weight Method: Stated  BMI: BMI " (Calculated): 20.7    UBW:  220# per spouse   Weight      Weight (kg) Weight (lbs) Weight Method   8/8/2024 94.802 kg  209 lb  Bed scale       Bed scale    8/22/2024 95.255 kg  210 lb  Stated    8/30/2024 99.791 kg  220 lb  Stated    9/13/2024 95.255 kg  210 lb  Stated    9/16/2024 95.255 kg  210 lb     9/18/2024 92 kg  202 lb 13.2 oz  Bed scale    11/5/2024 69.083 kg  152 lb 4.8 oz  Stated     69.083 kg  152 lb 4.8 oz      91.627 kg  202 lb        Weight change: weight loss of 57 lbs (27%) over 3 month(s)    Significant?  Yes    Nutrition Focused Physical Exam    Date:  11/06/24       Patient meets criteria for malnutrition diagnosis, see MSA note.     Subjective   Reported/Observed/Food/Nutrition Related History:   11/11/24  Patient on comfort diet. Spoke with assigned RN, patient, and spouse @ bedside. All reported patient with good appetite and PO intake. Likes his ONS and would like to continue to receive it as ordered.    11/06/24  Spoke with wife via phone. She sound disheartened about patient's situation as she is his caregiver and she isn't sure she can continue. Said she met with hospice, but was disappointed they didn't evaluate him for services they only discussed what they offer. Stated she doesn't know what more she can do for him at this time. Discussed patient's poor appetite and PO intake at home. Wife said she has tried everything from modifying his foods to trying different foods and patient just doesn't want to eat. Has been able to get patient to drink ONS BID. Agreeable to receive while inpatient (likes vanilla or chocolate). SLP following. NKFA.    Current Nutrition Prescription   PO: Diet: Regular/House; Texture: Regular (IDDSI 7); Fluid Consistency: Thin (IDDSI 0)  Oral Nutrition Supplement: n/a  Intake: Only two meals documented: 11/7 L 75%; 11/8 D 50,25%; 11/10 Sn 25%    Assessment & Plan   Nutrition Diagnosis   Date:  11/06/24   Updated:  Problem Malnutrition, chronic severe   Etiology  Dysphagia, FTT since knee replacement   Signs/Symptoms </= 75% of EEN x >/= 1 month and significant weight loss of >7.5% in 3mo   Status: New    Date:  11/06/24             Updated:    Problem Swallowing difficulty    Etiology Unknown etiology   Signs/Symptoms Need for modified diet   Status: New    Goal:   Nutrition to support treatment and Establish PO, Increase intake    Nutrition Intervention      Follow treatment progress, Care plan reviewed, Interview for preferences, Encourage intake, Supplement provided    Nutrition POC  Encourage adequate nutrition as able  May consider appetite stim, if medically appropriate and follows GOC    Monitoring/Evaluation:   Per protocol, PO intake, Supplement intake, Weight, Symptoms, POC/GOC, Swallow function    Gertrude Lowry, MS,RD,LD  Time Spent: 35min

## 2024-11-11 NOTE — PROGRESS NOTES
Continued Stay Note   [facility]     Patient Name: Guicho Blanco  MRN: [id number]  Today's Date: 11/11/2024    Admit Date: 11/5/2024    Plan: To be determined   Discharge Plan       Row Name 11/11/24 1339       Plan    Plan To be determined    Plan Comments Chart reviewed for acute events since last note - none found. Per chart, pt vitals stable. Per CM, continuing to seek placement for patient - plan is to discharge to SNF and transition to LTC. Status: 11/11/24 - pending response from [facility]. Liaison will continue to follow. Please contact ext [phone] with any questions.                   Discharge Codes    No documentation.                 Expected Discharge Date and Time       Expected Discharge Date Expected Discharge Time    Nov 11, 2024               Nevin Schultz

## 2024-11-11 NOTE — PLAN OF CARE
Goal Outcome Evaluation:  Plan of Care Reviewed With: patient, spouse        Progress: no change  Outcome Evaluation: Pt continues to participate in therapy sessions. Pt was able to completed therex in recliner chair w/ minimal VC/TC for proper form. Pt then stood from bedside chair w/ modAx2 demonstrating a significant posterior lean. In standing pt's BP dropped from 107/64 to 66/43. Pt was quickly transitioned to supine in bed w/ modAx2. After laying supine pt's BP increased to 132/72. Pt did not c/o any dizziness/lightheadedness throughout session. RN notified of BP changes. D/c rec remains SNF for best outcome    Anticipated Discharge Disposition (PT): skilled nursing facility

## 2024-11-11 NOTE — PLAN OF CARE
Problem: Adult Inpatient Plan of Care  Goal: Plan of Care Review  Outcome: Not Progressing  Flowsheets (Taken 11/11/2024 0617)  Plan of Care Reviewed With: patient  Goal: Absence of Hospital-Acquired Illness or Injury  Outcome: Progressing  Intervention: Identify and Manage Fall Risk  Recent Flowsheet Documentation  Taken 11/11/2024 0600 by Leena Sy RN  Safety Promotion/Fall Prevention:   assistive device/personal items within reach   safety round/check completed  Taken 11/11/2024 0400 by Leena Sy RN  Safety Promotion/Fall Prevention: fall prevention program maintained  Taken 11/11/2024 0200 by Leena Sy RN  Safety Promotion/Fall Prevention:   fall prevention program maintained   safety round/check completed  Taken 11/11/2024 0000 by Leena Sy RN  Safety Promotion/Fall Prevention:   lighting adjusted   fall prevention program maintained   safety round/check completed  Taken 11/10/2024 2200 by Leena Sy RN  Safety Promotion/Fall Prevention:   fall prevention program maintained   lighting adjusted   safety round/check completed  Taken 11/10/2024 2000 by Leena Sy RN  Safety Promotion/Fall Prevention:   activity supervised   assistive device/personal items within reach   fall prevention program maintained   safety round/check completed  Intervention: Prevent Skin Injury  Recent Flowsheet Documentation  Taken 11/11/2024 0200 by Leena Sy RN  Body Position:   right   turned  Intervention: Prevent and Manage VTE (Venous Thromboembolism) Risk  Recent Flowsheet Documentation  Taken 11/10/2024 2000 by Leena Sy RN  VTE Prevention/Management:   SCDs (sequential compression devices) off   patient refused intervention  Intervention: Prevent Infection  Recent Flowsheet Documentation  Taken 11/11/2024 0600 by Leena Sy RN  Infection Prevention: environmental surveillance performed  Taken 11/11/2024 0400 by Leena Sy RN  Infection Prevention: environmental surveillance performed  Taken 11/11/2024  0200 by Leena Sy, RN  Infection Prevention: environmental surveillance performed  Taken 11/11/2024 0000 by Leena Sy RN  Infection Prevention: environmental surveillance performed  Taken 11/10/2024 2200 by Leena Sy RN  Infection Prevention: environmental surveillance performed  Taken 11/10/2024 2000 by Leena Sy RN  Infection Prevention: environmental surveillance performed  Goal: Optimal Comfort and Wellbeing  Outcome: Not Progressing  Intervention: Provide Person-Centered Care  Recent Flowsheet Documentation  Taken 11/10/2024 2000 by Leena Sy RN  Trust Relationship/Rapport: care explained  Goal: Readiness for Transition of Care  Outcome: Not Progressing   Goal Outcome Evaluation:  Plan of Care Reviewed With: patient

## 2024-11-12 ENCOUNTER — HOME CARE VISIT (OUTPATIENT)
Dept: HOME HEALTH SERVICES | Facility: HOME HEALTHCARE | Age: 84
End: 2024-11-12
Payer: COMMERCIAL

## 2024-11-12 LAB — GLUCOSE BLDC GLUCOMTR-MCNC: 85 MG/DL (ref 70–130)

## 2024-11-12 PROCEDURE — 82948 REAGENT STRIP/BLOOD GLUCOSE: CPT

## 2024-11-12 PROCEDURE — 93010 ELECTROCARDIOGRAM REPORT: CPT | Performed by: INTERNAL MEDICINE

## 2024-11-12 PROCEDURE — 93005 ELECTROCARDIOGRAM TRACING: CPT | Performed by: INTERNAL MEDICINE

## 2024-11-12 PROCEDURE — G0378 HOSPITAL OBSERVATION PER HR: HCPCS

## 2024-11-12 PROCEDURE — 99232 SBSQ HOSP IP/OBS MODERATE 35: CPT | Performed by: INTERNAL MEDICINE

## 2024-11-12 PROCEDURE — 96372 THER/PROPH/DIAG INJ SC/IM: CPT

## 2024-11-12 PROCEDURE — 97530 THERAPEUTIC ACTIVITIES: CPT

## 2024-11-12 PROCEDURE — 25010000002 ENOXAPARIN PER 10 MG: Performed by: INTERNAL MEDICINE

## 2024-11-12 RX ORDER — MIDODRINE HYDROCHLORIDE 10 MG/1
10 TABLET ORAL
Status: DISCONTINUED | OUTPATIENT
Start: 2024-11-13 | End: 2024-11-13 | Stop reason: HOSPADM

## 2024-11-12 RX ADMIN — SENNOSIDES AND DOCUSATE SODIUM 2 TABLET: 50; 8.6 TABLET ORAL at 21:00

## 2024-11-12 RX ADMIN — PANTOPRAZOLE SODIUM 40 MG: 40 TABLET, DELAYED RELEASE ORAL at 08:37

## 2024-11-12 RX ADMIN — CYCLOSPORINE 1 DROP: 0.5 EMULSION OPHTHALMIC at 21:00

## 2024-11-12 RX ADMIN — NORTRIPTYLINE HYDROCHLORIDE 30 MG: 10 CAPSULE ORAL at 21:00

## 2024-11-12 RX ADMIN — CYCLOSPORINE 1 DROP: 0.5 EMULSION OPHTHALMIC at 08:26

## 2024-11-12 RX ADMIN — MIDODRINE HYDROCHLORIDE 5 MG: 5 TABLET ORAL at 11:57

## 2024-11-12 RX ADMIN — POLYETHYLENE GLYCOL 3350 17 G: 17 POWDER, FOR SOLUTION ORAL at 08:27

## 2024-11-12 RX ADMIN — ASPIRIN 325 MG: 325 TABLET ORAL at 08:27

## 2024-11-12 RX ADMIN — SENNOSIDES AND DOCUSATE SODIUM 2 TABLET: 50; 8.6 TABLET ORAL at 19:50

## 2024-11-12 RX ADMIN — ENOXAPARIN SODIUM 40 MG: 40 INJECTION SUBCUTANEOUS at 08:27

## 2024-11-12 RX ADMIN — Medication 10 ML: at 08:28

## 2024-11-12 RX ADMIN — MIDODRINE HYDROCHLORIDE 5 MG: 5 TABLET ORAL at 08:27

## 2024-11-12 RX ADMIN — ACETAMINOPHEN 650 MG: 650 SOLUTION ORAL at 19:50

## 2024-11-12 RX ADMIN — PRAVASTATIN SODIUM 20 MG: 20 TABLET ORAL at 08:27

## 2024-11-12 RX ADMIN — FLECAINIDE ACETATE 100 MG: 50 TABLET ORAL at 08:37

## 2024-11-12 RX ADMIN — FLECAINIDE ACETATE 100 MG: 50 TABLET ORAL at 16:23

## 2024-11-12 RX ADMIN — Medication 10 ML: at 21:00

## 2024-11-12 RX ADMIN — CYCLOSPORINE 1 DROP: 0.5 EMULSION OPHTHALMIC at 19:51

## 2024-11-12 RX ADMIN — MIDODRINE HYDROCHLORIDE 5 MG: 5 TABLET ORAL at 18:08

## 2024-11-12 RX ADMIN — NORTRIPTYLINE HYDROCHLORIDE 30 MG: 10 CAPSULE ORAL at 19:50

## 2024-11-12 NOTE — PLAN OF CARE
Goal Outcome Evaluation:  Plan of Care Reviewed With: patient        Progress: declining  Outcome Evaluation: Pt limited by severe lethargy and weakness this session. Max-Dep for sitting this session. Unable to progress due to current change in status. RN made aware of change in status. Cont POC.    Anticipated Discharge Disposition (OT): skilled nursing facility

## 2024-11-12 NOTE — THERAPY TREATMENT NOTE
Patient Name: Guicho Blanco  : 1940    MRN: [id number]                              Today's Date: 2024       Admit Date: 2024    Visit Dx:     ICD-10-CM ICD-9-CM   1. Generalized weakness  R53.1 780.79   2. Fall in home, initial encounter  W19.XXXA E888.9    Y92.009 E849.0   3. Anorexia  R63.0 783.0   4. Bacterial UTI  N39.0 599.0    A49.9 041.9   5. Oropharyngeal dysphagia  R13.12 787.22     Patient Active Problem List   Diagnosis    Osteomyelitis of toe of right foot    Osteomyelitis of right foot    PAF (paroxysmal atrial fibrillation)    Coronary artery disease involving native coronary artery of native heart without angina pectoris    Gastroesophageal reflux disease without esophagitis    Benign non-nodular prostatic hyperplasia without lower urinary tract symptoms    S/P appy    Essential hypertension    Simple chronic bronchitis    Mixed hyperlipidemia    COPD (chronic obstructive pulmonary disease)    BPH (benign prostatic hypertrophy)    Hearing loss    Chest pain    Vertigo    Snoring    Overweight    PLMD (periodic limb movement disorder)    Neuropathy    Periodic headache syndrome, not intractable    Acquired absence of other right toe(s)    Tremor    Bilateral impacted cerumen    Bilateral sensorineural hearing loss    Chronic otitis externa    Deviated nasal septum    Disorder of joint of foot    ED (erectile dysfunction) of organic origin    Hydrocele    Hydrocele of testis    Impacted cerumen    Mononeuropathy    Swelling of testicle    Venous retinal branch occlusion    Vitreous degeneration    Benign prostatic hyperplasia with urinary obstruction    OA (osteoarthritis) of knee    Status post total right knee replacement    Septic arthritis    Cellulitis    Cellulitis of right knee    Failure to thrive in adult    Insomnia    Moderate malnutrition    Sepsis    Lactic acidosis    Serotonin syndrome    Dysphagia    Generalized weakness    Severe protein-calorie malnutrition      Past Medical History:   Diagnosis Date    Arthritis     Atrial fibrillation     BPH (benign prostatic hypertrophy)     Cataract     Cellulitis     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     bilateral    Coronary artery disease     Full dentures     Gall stones     GERD (gastroesophageal reflux disease)     Hearing loss     Hiatal hernia     Hyperlipidemia     Hypertension     Peripheral neuropathy     Peripheral vascular disease     Seasonal allergies     Skin cancer     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    TIA (transient ischemic attack)     2 times    Wears glasses      Past Surgical History:   Procedure Laterality Date    ABLATION OF DYSRHYTHMIC FOCUS      APPENDECTOMY      CARDIAC CATHETERIZATION N/A 10/05/2017    Procedure: Left Heart Cath;  Surgeon: Hector Urrutia MD;  Location:  [facility] CATH INVASIVE LOCATION;  Service:     CATARACT EXTRACTION Bilateral     CHOLECYSTECTOMY      Remote    COCHLEAR IMPLANT REVISION  05/2019    COLONOSCOPY      PROSTATE SURGERY      SHOULDER SURGERY Right     SKIN BIOPSY      TOE AMPUTATION Right     2nd toe - Right Foot    TONSILLECTOMY      TOTAL KNEE ARTHROPLASTY Right 8/7/2024    Procedure: TOTAL KNEE ARTHROPLASTY WITH ASHLEY ROBOT RIGHT;  Surgeon: Christian Schwartz MD;  Location:  [facility];  Service: Robotics - Ortho;  Laterality: Right;      General Information       Row Name 11/12/24 1403          OT Time and Intention    Document Type therapy note (daily note)  -AN     Mode of Treatment occupational therapy  -AN       Row Name 11/12/24 1403          General Information    Patient Profile Reviewed yes  -AN     Existing Precautions/Restrictions fall;other (see comments)  cochlear implant  -AN     Barriers to Rehab medically complex;previous functional deficit;cognitive status  -AN       Row Name 11/12/24 1403          Cognition    Orientation Status (Cognition) unable/difficult to assess  -AN       Row Name 11/12/24 1403          Safety Issues/Impairments Affecting Functional  Mobility    Safety Issues Affecting Function (Mobility) safety precaution awareness;safety precautions follow-through/compliance;insight into deficits/self-awareness;sequencing abilities;awareness of need for assistance;problem-solving;judgment  -AN     Impairments Affecting Function (Mobility) balance;cognition;endurance/activity tolerance;strength  -AN     Cognitive Impairments, Mobility Safety/Performance awareness, need for assistance;safety precaution awareness;safety precaution follow-through;insight into deficits/self-awareness;sequencing abilities;judgment;problem-solving/reasoning  -AN               User Key  (r) = Recorded By, (t) = Taken By, (c) = Cosigned By      Initials Name Provider Type    Johana Phillips OT Occupational Therapist                     Mobility/ADL's       Row Name 11/12/24 1405          Bed Mobility    Comment, (Bed Mobility) UIC upon arrival  -AN       Row Name 11/12/24 1405          Transfers    Comment, (Transfers) Unable to initiate functional transfers this session due to lethargy  -AN       Row Name 11/12/24 1405          Activities of Daily Living    BADL Assessment/Intervention lower body dressing  -AN       Row Name 11/12/24 1405          Lower Body Dressing Assessment/Training    [place] Level (Lower Body Dressing) don;socks;dependent (less than 25% patient effort)  -AN     Position (Lower Body Dressing) supported sitting  -AN               User Key  (r) = Recorded By, (t) = Taken By, (c) = Cosigned By      Initials Name Provider Type    Johana Phillips OT Occupational Therapist                   Obj/Interventions       Row Name 11/12/24 1418          Balance    Balance Assessment sitting static balance;sitting dynamic balance  -AN     Static Sitting Balance maximum assist  -AN     Dynamic Sitting Balance dependent  -AN     Position, Sitting Balance sitting in chair  -AN     Balance Interventions sitting;supported;static;dynamic;highly challenging;occupation  based/functional task  -AN               User Key  (r) = Recorded By, (t) = Taken By, (c) = Cosigned By      Initials Name Provider Type    Johana Phillips OT Occupational Therapist                   Goals/Plan    No documentation.                  Clinical Impression       Row Name 11/12/24 1415          Pain Assessment    Additional Documentation Pain Scale: FACES Pre/Post-Treatment (Group)  -AN       Row Name 11/12/24 1415          Pain Scale: FACES Pre/Post-Treatment    Pain: FACES Scale, Pretreatment 0-->no hurt  -AN     Posttreatment Pain Rating 0-->no hurt  -AN       Row Name 11/12/24 1415          Plan of Care Review    Plan of Care Reviewed With patient  -AN     Progress declining  -AN     Outcome Evaluation Pt limited by severe lethargy and weakness this session. Max-Dep for sitting this session. Unable to progress due to current change in status. RN made aware of change in status. Cont POC.  -AN       Row Name 11/12/24 1415          Therapy Plan Review/Discharge Plan (OT)    Anticipated Discharge Disposition (OT) skilled nursing facility  -AN       Row Name 11/12/24 1415          Vital Signs    Pre Patient Position Sitting  -AN     Intra Patient Position Sitting  -AN     Post Patient Position Sitting  -AN       Row Name 11/12/24 1415          Positioning and Restraints    Pre-Treatment Position sitting in chair/recliner  -AN     Post Treatment Position chair  -AN     In Chair notified nsg;reclined;call light within reach;encouraged to call for assist;exit alarm on;with family/caregiver;LUE elevated;RUE elevated;legs elevated;waffle cushion  -AN               User Key  (r) = Recorded By, (t) = Taken By, (c) = Cosigned By      Initials Name Provider Type    Johana Phillips OT Occupational Therapist                   Outcome Measures       Row Name 11/12/24 1419          How much help from another is currently needed...    Putting on and taking off regular lower body clothing? 1  -AN     Bathing  (including washing, rinsing, and drying) 1  -AN     Toileting (which includes using toilet bed pan or urinal) 1  -AN     Putting on and taking off regular upper body clothing 1  -AN     Taking care of personal grooming (such as brushing teeth) 1  -AN     Eating meals 1  -AN     AM-PAC 6 Clicks Score (OT) 6  -AN       Row Name 11/12/24 0800          How much help from another person do you currently need...    Turning from your back to your side while in flat bed without using bedrails? 2  -HK     Moving from lying on back to sitting on the side of a flat bed without bedrails? 2  -HK     Moving to and from a bed to a chair (including a wheelchair)? 2  -HK     Standing up from a chair using your arms (e.g., wheelchair, bedside chair)? 2  -HK     Climbing 3-5 steps with a railing? 1  -HK     To walk in hospital room? 1  -HK     AM-PAC 6 Clicks Score (PT) 10  -HK     Highest Level of Mobility Goal 4 --> Transfer to chair/commode  -HK       Row Name 11/12/24 1419          Functional Assessment    Outcome Measure Options AM-PAC 6 Clicks Daily Activity (OT)  -AN               User Key  (r) = Recorded By, (t) = Taken By, (c) = Cosigned By      Initials Name Provider Type    Shayna Newsome RN Registered Nurse    Johana Phillips OT Occupational Therapist                    Occupational Therapy Education       Title: PT OT SLP Therapies (In Progress)       Topic: Occupational Therapy (In Progress)       Point: ADL training (In Progress)       Description:   Instruct learner(s) on proper safety adaptation and remediation techniques during self care or transfers.   Instruct in proper use of assistive devices.                  Learning Progress Summary            Patient Acceptance, E, NL by LUIS at 11/12/2024 1419    Acceptance, E, VU by MR at 11/9/2024 1513    Acceptance, E,TB, VU by PK at 11/7/2024 0554    Acceptance, E, VU by BH at 11/6/2024 0900   Family Acceptance, E, NL by LUIS at 11/12/2024 1419   Significant  Other Acceptance, E, NL by AN at 11/12/2024 1419                      Point: Home exercise program (Done)       Description:   Instruct learner(s) on appropriate technique for monitoring, assisting and/or progressing therapeutic exercises/activities.                  Learning Progress Summary            Patient Acceptance, E, VU by MR at 11/9/2024 1513    Acceptance, E,TB, VU by PK at 11/7/2024 0554                      Point: Precautions (In Progress)       Description:   Instruct learner(s) on prescribed precautions during self-care and functional transfers.                  Learning Progress Summary            Patient Acceptance, E, NL by AN at 11/12/2024 1419    Acceptance, E, VU by MR at 11/9/2024 1513    Acceptance, E,TB, VU by PK at 11/7/2024 0554    Acceptance, E, VU by  at 11/6/2024 0900   Family Acceptance, E, NL by AN at 11/12/2024 1419   Significant Other Acceptance, E, NL by AN at 11/12/2024 1419                      Point: Body mechanics (In Progress)       Description:   Instruct learner(s) on proper positioning and spine alignment during self-care, functional mobility activities and/or exercises.                  Learning Progress Summary            Patient Acceptance, E, NL by AN at 11/12/2024 1419    Acceptance, E, VU by MR at 11/9/2024 1513    Acceptance, E,TB, VU by  at 11/7/2024 0554    Acceptance, E, VU by  at 11/6/2024 0900   Family Acceptance, E, NL by AN at 11/12/2024 1419   Significant Other Acceptance, E, NL by AN at 11/12/2024 1419                                      User Key       Initials Effective Dates Name Provider Type Discipline    AN 09/21/21 -  Johana Bourgeois OT Occupational Therapist OT    MR 09/22/22 -  Joy Ayoub OT Occupational Therapist OT    PK 07/11/24 -  Nori Cruz RN Registered Nurse Nurse     08/08/24 -  Jeny Harrison OT Student OT Student OT                  OT Recommendation and Plan     Plan of Care Review  Plan of Care Reviewed  With: patient  Progress: declining  Outcome Evaluation: Pt limited by severe lethargy and weakness this session. Max-Dep for sitting this session. Unable to progress due to current change in status. RN made aware of change in status. Cont POC.     Time Calculation:         Time Calculation- OT       Row Name 11/12/24 1420             Time Calculation- OT    OT Start Time 1340  -AN      OT Received On 11/12/24  -AN         Timed Charges    39024 - OT Therapeutic Activity Minutes 10  -AN         Total Minutes    Timed Charges Total Minutes 10  -AN       Total Minutes 10  -AN                User Key  (r) = Recorded By, (t) = Taken By, (c) = Cosigned By      Initials Name Provider Type    AN Johana Bourgeois, OT Occupational Therapist                  Therapy Charges for Today       Code Description Service Date Service Provider Modifiers Qty    93618995198 HC OT THERAPEUTIC ACT EA 15 MIN 11/12/2024 Netasia Johana, OT GO, KX 1    18992668322 HC OT THER SUPP EA 15 MIN 11/12/2024 Netasia Johana, OT GO 1                 Johana Nedeljko, OT  11/12/2024

## 2024-11-12 NOTE — DISCHARGE PLACEMENT REQUEST
"Guicho Blanco \"Rad\" (83 y.o. Male)     Patt Salomon RN  [phone]        Date of Birth   1940    Social Security Number       [place]   [address] [facility] [address]    Home Phone   [phone]    MRN   [id number]       [employer]    Marital Status                               Admission Date   11/5/24    Admission Type   Emergency    Admitting Provider   Dayna Martínez MD    Attending Provider   Dayna Martínez MD    Department, Room/Bed   [facility] 3E, S341/1       Discharge Date       Discharge Disposition       Discharge Destination                                 Attending Provider: Dayna Martínez MD    Allergies: Linezolid, Divalproex Sodium (Migraine) [Valproic Acid], Cymbalta [Duloxetine Hcl], Duloxetine, Metoprolol, Neurontin [Gabapentin]    Isolation: None   Infection: None   Code Status: No CPR    Ht: 182.9 cm (72\")   Wt: 69.1 kg (152 lb 4.8 oz)    Admission Cmt: None   Principal Problem: Generalized weakness [R53.1]                   Active Insurance as of 11/5/2024       Primary Coverage       Payor Plan Insurance Group Employer/Plan Group    ANTHEM MEDICARE REPLACEMENT ANTHEM MEDICARE ADVANTAGE [id number]       Payor Plan Address Payor Plan Phone Number Payor Plan Fax Number Effective Dates    [address] [phone]  1/1/2024 - None Entered    [facility] [id number]         Subscriber Name Subscriber Birth Date Member ID       GUICHO BLANCO 1940 [id number]                     Emergency Contacts        (Rel.) Home Phone Work Phone Mobile Phone    BellaSandra (Spouse) -- -- [phone]    JayeSylwia peterson (Daughter) [phone] -- [phone]    Ashley Mejia (Grandchild) -- -- [phone]                 Physician Progress Notes (most recent note)        Dayna Martínez MD at 11/11/24 0801              [facility] Medicine Services  PROGRESS NOTE    Patient Name: Guicho Mcdermott " Bella  : 1940  MRN: [id number]    Date of Admission: 2024  Primary Care Physician: Mitchel Culver MD    Subjective   Subjective     CC: failure to thrive,     HPI:  Upset this morning with logistics of his morning including breakfast tray taken early. No specific complaints    D/w wife who is working on plans for long-term care for him. Teary    We discuss results of swallowing studies - all in agreement not to pursue feeding tube    Objective   Objective     Vital Signs:   Temp:  [97.4 °F (36.3 °C)-98.1 °F (36.7 °C)] 97.4 °F (36.3 °C)  Heart Rate:  [] 111  Resp:  [15-18] 18  BP: ()/(52-75) 111/71     Physical Exam:  Constitutional: No acute distress, awake, alert tall thin male sitting up in recliner.   HENT: cochlear implants in place bilaterally  Respiratory: Clear to auscultation bilaterally, respiratory effort normal   Cardiovascular: RRR, no murmurs, rubs, or gallops  Gastrointestinal: Soft, nontender, nondistended  Musculoskeletal: Muscle tone decreased throughout, no joint effusions appreciated  Psychiatric: Appropriate affect, cooperative  Neurologic: Alert and oriented to self but not to situation, facial movements symmetric and spontaneous movement of all 4 extremities grossly equal bilaterally, speech clear  Skin: No rashes    Results Reviewed:  LAB RESULTS:      Lab 24  0824  0617 24  0738   WBC 6.13 7.01 5.88   HEMOGLOBIN 11.9* 10.5* 11.6*   HEMATOCRIT 37.2* 33.6* 37.0*   PLATELETS 215 205 230   NEUTROS ABS  --   --  3.47   IMMATURE GRANS (ABS)  --   --  0.02   LYMPHS ABS  --   --  1.41   MONOS ABS  --   --  0.50   EOS ABS  --   --  0.43*   MCV 83.0 84.0 84.3   LACTATE  --   --  1.2         Lab 11/10/24  1746 24  0821 24  0617 24  0745 24  0738   SODIUM 139 135* 136  --  139   POTASSIUM 4.2 3.5 3.5  --  3.9   CHLORIDE 104 101 103  --  103   CO2 23.0 23.0 19.0*  --  23.0   ANION GAP 12.0 11.0 14.0  --  13.0   BUN  11 8 14  --  19   CREATININE 0.67* 0.73* 0.68* 0.90 0.86   EGFR 92.6 90.3 92.2  --  85.9   GLUCOSE 87 99 103*  --  99   CALCIUM 9.2 9.4 9.1  --  9.4   MAGNESIUM  --  1.6  --   --   --          Lab 11/05/24  0738   TOTAL PROTEIN 6.4   ALBUMIN 3.6   GLOBULIN 2.8   ALT (SGPT) 16   AST (SGOT) 30   BILIRUBIN 0.5   ALK PHOS 132*                     Brief Urine Lab Results  (Last result in the past 365 days)        Color   Clarity   Blood   Leuk Est   Nitrite   Protein   CREAT   Urine HCG        11/05/24 0943 Dark Yellow   Clear   Negative   Moderate (2+)   Negative   30 mg/dL (1+)                   Microbiology Results Abnormal       None            No radiology results from the last 24 hrs    Results for orders placed during the hospital encounter of 09/13/24    Adult Transthoracic Echo Complete W/ Cont if Necessary Per Protocol    Interpretation Summary    Left ventricular systolic function is normal. Left ventricular ejection fraction appears to be 61 - 65%.    Left ventricular diastolic function was normal.      Current medications:  Scheduled Meds:aspirin, 325 mg, Oral, Daily  cycloSPORINE, 1 drop, Both Eyes, Q12H  enoxaparin, 40 mg, Subcutaneous, Daily  flecainide, 100 mg, Oral, Q12H  midodrine, 5 mg, Oral, TID AC  nortriptyline, 30 mg, Oral, Nightly  OLANZapine, 2.5 mg, Oral, Nightly  [START ON 11/12/2024] pantoprazole, 40 mg, Oral, Q AM  senna-docusate sodium, 2 tablet, Oral, BID   And  polyethylene glycol, 17 g, Oral, Daily  pravastatin, 20 mg, Oral, Daily  sodium chloride, 10 mL, Intravenous, Q12H      Continuous Infusions:   PRN Meds:.  acetaminophen    senna-docusate sodium **AND** polyethylene glycol **AND** bisacodyl **AND** bisacodyl    nitroglycerin    sodium chloride    sodium chloride    Assessment & Plan   Assessment & Plan     Active Hospital Problems    Diagnosis  POA    **Generalized weakness [R53.1]  Yes    Severe protein-calorie malnutrition [E43]  Yes      Resolved Hospital Problems   No resolved  problems to display.        Brief Hospital Course to date:  Guicho Blanco is a 83 y.o. male w h/o TIA, Afib on anticoagulation, right knee replacement (8/2024) with falls from home and general failure to thrive. This appears longstanding and progressive with significant weight loss, lack of appetite, overall failure to thrive.    Orthostatic hypotension   - tart midodrine, abdominal binder before activity. Inhibiting activity  -repeat in AM w abdominal binder 10 min before    Failure to thrive: weight loss, frequent falls, loss of appetite, progressive oropharyngeal dysphagia  -comfort care goals after d/w family  -d/w them swallowing results: no feeding tube, comfort diet. All in agreement  -open to hospice management as OP    Cognitive impairment w sundowning behavior -zyprexa qhs w assistance in this    Paroxysmal Afib s/p ablation - on home flecainide, aspirin 325 mg per cardiology instead of anticoagulation  Pyuria - s/p cefuroxime  CAD - aspirin, hold imdur given above orthostasis    Expected Discharge Location and Transportation: long-term placement  Expected Discharge 11/12 will be med ready  Expected Discharge Date: 11/12/2024; Expected Discharge Time:      VTE Prophylaxis:  Pharmacologic VTE prophylaxis orders are present.         AM-PAC 6 Clicks Score (PT): 9 (11/10/24 2000)    CODE STATUS:   Code Status and Medical Interventions: No CPR (Do Not Attempt to Resuscitate); Limited Support; No intubation (DNI)   Ordered at: 11/05/24 1137     Medical Intervention Limits:    No intubation (DNI)     Level Of Support Discussed With:    Patient     Code Status (Patient has no pulse and is not breathing):    No CPR (Do Not Attempt to Resuscitate)     Medical Interventions (Patient has pulse or is breathing):    Limited Support       Dayna Martínez MD  11/11/24        Electronically signed by Dayna Martínez MD at 11/11/24 5716          Physical Therapy Notes (most recent note)        Cary Umana, PT at 11/11/24  1443  Version 1 of          Patient Name: Guicho Blanco  : 1940    MRN: [id number]                              Today's Date: 2024       Admit Date: 2024    Visit Dx:     ICD-10-CM ICD-9-CM   1. Generalized weakness  R53.1 780.79   2. Fall in home, initial encounter  W19.XXXA E888.9    Y92.009 E849.0   3. Anorexia  R63.0 783.0   4. Bacterial UTI  N39.0 599.0    A49.9 041.9   5. Oropharyngeal dysphagia  R13.12 787.22     Patient Active Problem List   Diagnosis    Osteomyelitis of toe of right foot    Osteomyelitis of right foot    PAF (paroxysmal atrial fibrillation)    Coronary artery disease involving native coronary artery of native heart without angina pectoris    Gastroesophageal reflux disease without esophagitis    Benign non-nodular prostatic hyperplasia without lower urinary tract symptoms    S/P appy    Essential hypertension    Simple chronic bronchitis    Mixed hyperlipidemia    COPD (chronic obstructive pulmonary disease)    BPH (benign prostatic hypertrophy)    Hearing loss    Chest pain    Vertigo    Snoring    Overweight    PLMD (periodic limb movement disorder)    Neuropathy    Periodic headache syndrome, not intractable    Acquired absence of other right toe(s)    Tremor    Bilateral impacted cerumen    Bilateral sensorineural hearing loss    Chronic otitis externa    Deviated nasal septum    Disorder of joint of foot    ED (erectile dysfunction) of organic origin    Hydrocele    Hydrocele of testis    Impacted cerumen    Mononeuropathy    Swelling of testicle    Venous retinal branch occlusion    Vitreous degeneration    Benign prostatic hyperplasia with urinary obstruction    OA (osteoarthritis) of knee    Status post total right knee replacement    Septic arthritis    Cellulitis    Cellulitis of right knee    Failure to thrive in adult    Insomnia    Moderate malnutrition    Sepsis    Lactic acidosis    Serotonin syndrome    Dysphagia    Generalized weakness    Severe  protein-calorie malnutrition     Past Medical History:   Diagnosis Date    Arthritis     Atrial fibrillation     BPH (benign prostatic hypertrophy)     Cataract     Cellulitis     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     bilateral    Coronary artery disease     Full dentures     Gall stones     GERD (gastroesophageal reflux disease)     Hearing loss     Hiatal hernia     Hyperlipidemia     Hypertension     Peripheral neuropathy     Peripheral vascular disease     Seasonal allergies     Skin cancer     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    TIA (transient ischemic attack)     2 times    Wears glasses      Past Surgical History:   Procedure Laterality Date    ABLATION OF DYSRHYTHMIC FOCUS      APPENDECTOMY      CARDIAC CATHETERIZATION N/A 10/05/2017    Procedure: Left Heart Cath;  Surgeon: Hector Urrutia MD;  Location:  [facility];  Service:     CATARACT EXTRACTION Bilateral     CHOLECYSTECTOMY      Remote    COCHLEAR IMPLANT REVISION  05/2019    COLONOSCOPY      PROSTATE SURGERY      SHOULDER SURGERY Right     SKIN BIOPSY      TOE AMPUTATION Right     2nd toe - Right Foot    TONSILLECTOMY      TOTAL KNEE ARTHROPLASTY Right 8/7/2024    Procedure: TOTAL KNEE ARTHROPLASTY WITH ASHLEY ROBOT RIGHT;  Surgeon: Christian Schwartz MD;  Location:  [facility] OR;  Service: Robotics - Ortho;  Laterality: Right;      General Information       Row Name 11/11/24 1526          Physical Therapy Time and Intention    Document Type therapy note (daily note)  -AC     Mode of Treatment physical therapy  -AC       Row Name 11/11/24 1526          General Information    Patient Profile Reviewed yes  -AC     Existing Precautions/Restrictions fall;other (see comments)  cochlear implant  -AC     Barriers to Rehab medically complex;previous functional deficit;cognitive status  -AC       Row Name 11/11/24 1526          Cognition    Orientation Status (Cognition) oriented to;person;place;verbal cues/prompts needed for orientation;time  -AC        Row Name 11/11/24 1526          Safety Issues/Impairments Affecting Functional Mobility    Safety Issues Affecting Function (Mobility) awareness of need for assistance;insight into deficits/self-awareness;safety precaution awareness;safety precautions follow-through/compliance;sequencing abilities;problem-solving;judgment  -     Impairments Affecting Function (Mobility) balance;cognition;endurance/activity tolerance;strength  -     Cognitive Impairments, Mobility Safety/Performance awareness, need for assistance;insight into deficits/self-awareness;problem-solving/reasoning;judgment;safety precaution awareness;safety precaution follow-through;sequencing abilities  -               User Key  (r) = Recorded By, (t) = Taken By, (c) = Cosigned By      Initials Name Provider Type     Cary Umana, PT Physical Therapist                   Mobility       Row Name 11/11/24 1527          Bed Mobility    Bed Mobility sit-supine  -     Sit-Supine [place] (Bed Mobility) maximum assist (25% patient effort);2 person assist  -AC     Comment, (Bed Mobility) Pt UIC upon arrival however however returned to supine w/ maxAx2 at end of session  -       Row Name 11/11/24 1527          Bed-Chair Transfer    Bed-Chair [place] (Transfers) moderate assist (50% patient effort);2 person assist  -AC     Assistive Device (Bed-Chair Transfers) walker, front-wheeled  -AC     Comment, (Bed-Chair Transfer) Pt transitioned to sitting EOB w/ modAx2 and VC for sequencing. Pt took 3 lateral steps however quickly was transitioned to supine in bed due to significant drop in BP  -       Row Name 11/11/24 1527          Sit-Stand Transfer    Sit-Stand [place] (Transfers) moderate assist (50% patient effort);2 person assist  -AC     Assistive Device (Sit-Stand Transfers) walker, front-wheeled  -AC     Comment, (Sit-Stand Transfer) Pt transitioned to standing w/ modAx2 and VC for anterior weight shift. In addition pt required manual  assistance for proper hand placement on FWW  -AC       Row Name 11/11/24 1527          Gait/Stairs (Locomotion)    Patient was able to Ambulate no, other medical factors prevent ambulation  -     Reason Patient was unable to Ambulate Hypotension  -AC               User Key  (r) = Recorded By, (t) = Taken By, (c) = Cosigned By      Initials Name Provider Type     Cary Umana, EL Physical Therapist                   Obj/Interventions       Row Name 11/11/24 1535          Motor Skills    Motor Skills functional endurance  -     Functional Endurance below baseline, significant orthostatic hypotension  -     Therapeutic Exercise hip;knee  -AC       Row Name 11/11/24 1535          Hip (Therapeutic Exercise)    Hip Strengthening (Therapeutic Exercise) bilateral;heel slides;aBduction;aDduction;10 repetitions  -[facility] Name 11/11/24 1535          Knee (Therapeutic Exercise)    Knee Strengthening (Therapeutic Exercise) SAQ (short arc quad);LAQ (long arc quad);bilateral;10 repetitions;SLR (straight leg raise)  -[facility] Name 11/11/24 1535          Balance    Balance Assessment sitting static balance;sitting dynamic balance;standing static balance;standing dynamic balance  -     Static Sitting Balance standby assist  -AC     Dynamic Sitting Balance contact guard  -AC     Position, Sitting Balance supported;sitting in chair  -AC     Static Standing Balance moderate assist;2-person assist  -AC     Dynamic Standing Balance moderate assist;2-person assist  -AC     Position/Device Used, Standing Balance supported;walker, front-wheeled  -     Balance Interventions sitting;standing;sit to stand;supported;static;dynamic  -               User Key  (r) = Recorded By, (t) = Taken By, (c) = Cosigned By      Initials Name Provider Type     Cary Umana, EL Physical Therapist                   Goals/Plan    No documentation.                  Clinical Impression       Row Name 11/11/24 1538          Pain     Pretreatment Pain Rating 0/10 - no pain  -AC     Posttreatment Pain Rating 0/10 - no pain  -AC       Row Name 11/11/24 1538          Plan of Care Review    Plan of Care Reviewed With patient;spouse  -AC     Progress no change  -AC     Outcome Evaluation Pt continues to participate in therapy sessions. Pt was able to completed therex in recliner chair w/ minimal VC/TC for proper form. Pt then stood from bedside chair w/ modAx2 demonstrating a significant posterior lean. In standing pt's BP dropped from 107/64 to 66/43. Pt was quickly transitioned to supine in bed w/ modAx2. After laying supine pt's BP increased to 132/72. Pt did not c/o any dizziness/lightheadedness throughout session. RN notified of BP changes. D/c rec remains SNF for best outcome  -       Row Name 11/11/24 1538          Therapy Assessment/Plan (PT)    Rehab Potential (PT) good  -AC     Criteria for Skilled Interventions Met (PT) yes  -AC     Therapy Frequency (PT) daily  -AC     Predicted Duration of Therapy Intervention (PT) 10 days  -AC       Row Name 11/11/24 1538          Vital Signs    Pre Systolic BP Rehab 107  sitting in chair  -AC     Pre Treatment Diastolic BP 64  -AC     Intra Systolic BP Rehab 66  standing  -AC     Intra Treatment Diastolic BP 43  -AC     Post Systolic BP Rehab 132  supine  -AC     Post Treatment Diastolic BP 72  -AC     O2 Delivery Pre Treatment room air  -AC     O2 Delivery Intra Treatment room air  -AC     O2 Delivery Post Treatment room air  -AC     Pre Patient Position Sitting  -AC     Intra Patient Position Standing  -AC     Post Patient Position Supine  -AC       Row Name 11/11/24 1538          Positioning and Restraints    Pre-Treatment Position sitting in chair/recliner  -AC     Post Treatment Position bed  -AC     In Bed notified nsg;supine;call light within reach;encouraged to call for assist;exit alarm on;side rails up x3;with family/caregiver  -AC               User Key  (r) = Recorded By, (t) = Taken By,  (c) = Cosigned By      Initials Name Provider Type    AC Cary Umana, PT Physical Therapist                   Outcome Measures       Row Name 11/11/24 1542          How much help from another person do you currently need...    Turning from your back to your side while in flat bed without using bedrails? 3  -AC     Moving from lying on back to sitting on the side of a flat bed without bedrails? 2  -AC     Moving to and from a bed to a chair (including a wheelchair)? 1  -AC     Standing up from a chair using your arms (e.g., wheelchair, bedside chair)? 2  -AC     Climbing 3-5 steps with a railing? 1  -AC     To walk in hospital room? 1  -AC     AM-PAC 6 Clicks Score (PT) 10  -AC     Highest Level of Mobility Goal 4 --> Transfer to chair/commode  -AC       Row Name 11/11/24 1542          Functional Assessment    Outcome Measure Options AM-PAC 6 Clicks Basic Mobility (PT)  -AC               User Key  (r) = Recorded By, (t) = Taken By, (c) = Cosigned By      Initials Name Provider Type    Cary Daley PT Physical Therapist                                 Physical Therapy Education       Title: PT OT SLP Therapies (In Progress)       Topic: Physical Therapy (In Progress)       Point: Mobility training (In Progress)       Learning Progress Summary            Patient Acceptance, E, NR by  at 11/11/2024 1542    Eager, E, VU by SC at 11/8/2024 1606    Comment: reviewed benefits of activity    Acceptance, E,TB, VU by FRIAD at 11/7/2024 0554    Acceptance, E,D, VU,NR by AB at 11/6/2024 1551    Acceptance, E, NR by  at 11/5/2024 1608                      Point: Home exercise program (Done)       Learning Progress Summary            Patient Eager, E, VU by SC at 11/8/2024 1606    Comment: reviewed benefits of activity    Acceptance, E,TB, VU by FRIDA at 11/7/2024 0554    Acceptance, E,D, VU,NR by AB at 11/6/2024 1551    Acceptance, E, NR by  at 11/5/2024 1608                      Point: Body mechanics (In Progress)        Learning Progress Summary            Patient Acceptance, E, NR by  at 11/11/2024 1542    Eager, E, VU by SC at 11/8/2024 1606    Comment: reviewed benefits of activity    Acceptance, E,TB, VU by PK at 11/7/2024 0554    Acceptance, E,D, VU,NR by AB at 11/6/2024 1551    Acceptance, E, NR by  at 11/5/2024 1608                      Point: Precautions (In Progress)       Learning Progress Summary            Patient Acceptance, E, NR by  at 11/11/2024 1542    Eager, E, VU by SC at 11/8/2024 1606    Comment: reviewed benefits of activity    Acceptance, E,TB, VU by PK at 11/7/2024 0554    Acceptance, E,D, VU,NR by AB at 11/6/2024 1551    Acceptance, E, NR by  at 11/5/2024 1608                                      User Key       Initials Effective Dates Name Provider Type Discipline    SC 02/03/23 -  Shamir Malik, PT Physical Therapist PT    AB 09/22/22 -  Ebony Loera, PT Physical Therapist PT     07/11/24 -  Cary Umana PT Physical Therapist PT     07/11/24 -  Nori Cruz, RN Registered Nurse Nurse                  PT Recommendation and Plan  Planned Therapy Interventions (PT): balance training, bed mobility training, gait training, patient/family education, strengthening, transfer training  Progress: no change  Outcome Evaluation: Pt continues to participate in therapy sessions. Pt was able to completed therex in recliner chair w/ minimal VC/TC for proper form. Pt then stood from bedside chair w/ modAx2 demonstrating a significant posterior lean. In standing pt's BP dropped from 107/64 to 66/43. Pt was quickly transitioned to supine in bed w/ modAx2. After laying supine pt's BP increased to 132/72. Pt did not c/o any dizziness/lightheadedness throughout session. RN notified of BP changes. D/c rec remains SNF for best outcome     Time Calculation:   PT Evaluation Complexity  History, PT Evaluation Complexity: 1-2 personal factors and/or comorbidities  Examination of Body Systems (PT Eval  Complexity): total of 3 or more elements  Clinical Presentation (PT Evaluation Complexity): evolving  Clinical Decision Making (PT Evaluation Complexity): moderate complexity  Overall Complexity (PT Evaluation Complexity): moderate complexity     PT Charges       Row Name 24 1543 24 1212          Time Calculation    Start Time 1443  -AC --     PT Received On 24  -AC --     PT Goal Re-Cert Due Date 11/15/24  -AC --        Time Calculation- PT    Total Timed Code Minutes- PT 23 minute(s)  -AC --        Timed Charges    66931 - PT Therapeutic Exercise Minutes 13  -AC --     11187 - PT Therapeutic Activity Minutes 10  -AC --        Total Minutes    Timed Charges Total Minutes 23  -AC --      Total Minutes 23  -AC --        PT/SLP KX Modifier    Exception criteria met to exceed therapy cap -- Apply KX Modifier  -CRISTY               User Key  (r) = Recorded By, (t) = Taken By, (c) = Cosigned By      Initials Name Provider Type    CRISTY Sendy Ryan MS CCC-SLP Speech and Language Pathologist    AC Cary Umana, PT Physical Therapist                  Therapy Charges for Today       Code Description Service Date Service Provider Modifiers Qty    95423535609 HC PT THER PROC EA 15 MIN 2024 Cary Umana, PT GP, KX 1    35261099202 HC PT THERAPEUTIC ACT EA 15 MIN 2024 Cary Umana, PT GP, KX 1    88302086013 HC PT THER SUPP EA 15 MIN 2024 Cary Umana, PT GP 2            PT G-Codes  Outcome Measure Options: AM-PAC 6 Clicks Basic Mobility (PT)  AM-PAC 6 Clicks Score (PT): 10  AM-PAC 6 Clicks Score (OT): 15  PT Discharge Summary  Anticipated Discharge Disposition (PT): skilled nursing facility    Cary Umana PT  2024      Electronically signed by Cary Umana, PT at 24 1545          Occupational Therapy Notes (most recent note)        Johana Bourgeois, OT at 24 1340          Patient Name: Guicho Blanco  : 1940    MRN: [id number]                              Today's  Date: 11/12/2024       Admit Date: 11/5/2024    Visit Dx:     ICD-10-CM ICD-9-CM   1. Generalized weakness  R53.1 780.79   2. Fall in home, initial encounter  W19.XXXA E888.9    Y92.009 E849.0   3. Anorexia  R63.0 783.0   4. Bacterial UTI  N39.0 599.0    A49.9 041.9   5. Oropharyngeal dysphagia  R13.12 787.22     Patient Active Problem List   Diagnosis    Osteomyelitis of toe of right foot    Osteomyelitis of right foot    PAF (paroxysmal atrial fibrillation)    Coronary artery disease involving native coronary artery of native heart without angina pectoris    Gastroesophageal reflux disease without esophagitis    Benign non-nodular prostatic hyperplasia without lower urinary tract symptoms    S/P appy    Essential hypertension    Simple chronic bronchitis    Mixed hyperlipidemia    COPD (chronic obstructive pulmonary disease)    BPH (benign prostatic hypertrophy)    Hearing loss    Chest pain    Vertigo    Snoring    Overweight    PLMD (periodic limb movement disorder)    Neuropathy    Periodic headache syndrome, not intractable    Acquired absence of other right toe(s)    Tremor    Bilateral impacted cerumen    Bilateral sensorineural hearing loss    Chronic otitis externa    Deviated nasal septum    Disorder of joint of foot    ED (erectile dysfunction) of organic origin    Hydrocele    Hydrocele of testis    Impacted cerumen    Mononeuropathy    Swelling of testicle    Venous retinal branch occlusion    Vitreous degeneration    Benign prostatic hyperplasia with urinary obstruction    OA (osteoarthritis) of knee    Status post total right knee replacement    Septic arthritis    Cellulitis    Cellulitis of right knee    Failure to thrive in adult    Insomnia    Moderate malnutrition    Sepsis    Lactic acidosis    Serotonin syndrome    Dysphagia    Generalized weakness    Severe protein-calorie malnutrition     Past Medical History:   Diagnosis Date    Arthritis     Atrial fibrillation     BPH (benign prostatic  hypertrophy)     Cataract     Cellulitis     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     bilateral    Coronary artery disease     Full dentures     Gall stones     GERD (gastroesophageal reflux disease)     Hearing loss     Hiatal hernia     Hyperlipidemia     Hypertension     Peripheral neuropathy     Peripheral vascular disease     Seasonal allergies     Skin cancer     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    TIA (transient ischemic attack)     2 times    Wears glasses      Past Surgical History:   Procedure Laterality Date    ABLATION OF DYSRHYTHMIC FOCUS      APPENDECTOMY      CARDIAC CATHETERIZATION N/A 10/05/2017    Procedure: Left Heart Cath;  Surgeon: Hector Urrutia MD;  Location:  [facility];  Service:     CATARACT EXTRACTION Bilateral     CHOLECYSTECTOMY      Remote    COCHLEAR IMPLANT REVISION  05/2019    COLONOSCOPY      PROSTATE SURGERY      SHOULDER SURGERY Right     SKIN BIOPSY      TOE AMPUTATION Right     2nd toe - Right Foot    TONSILLECTOMY      TOTAL KNEE ARTHROPLASTY Right 8/7/2024    Procedure: TOTAL KNEE ARTHROPLASTY WITH ASHLEY ROBOT RIGHT;  Surgeon: Christian Schwartz MD;  Location:  [facility];  Service: Robotics - Ortho;  Laterality: Right;      General Information       Row Name 11/12/24 1403          OT Time and Intention    Document Type therapy note (daily note)  -AN     Mode of Treatment occupational therapy  -AN       Row Name 11/12/24 1403          General Information    Patient Profile Reviewed yes  -AN     Existing Precautions/Restrictions fall;other (see comments)  cochlear implant  -AN     Barriers to Rehab medically complex;previous functional deficit;cognitive status  -AN       Row Name 11/12/24 1403          Cognition    Orientation Status (Cognition) unable/difficult to assess  -AN       Row Name 11/12/24 1403          Safety Issues/Impairments Affecting Functional Mobility    Safety Issues Affecting Function (Mobility) safety precaution awareness;safety precautions  follow-through/compliance;insight into deficits/self-awareness;sequencing abilities;awareness of need for assistance;problem-solving;judgment  -AN     Impairments Affecting Function (Mobility) balance;cognition;endurance/activity tolerance;strength  -AN     Cognitive Impairments, Mobility Safety/Performance awareness, need for assistance;safety precaution awareness;safety precaution follow-through;insight into deficits/self-awareness;sequencing abilities;judgment;problem-solving/reasoning  -AN               User Key  (r) = Recorded By, (t) = Taken By, (c) = Cosigned By      Initials Name Provider Type    Johana Phillips OT Occupational Therapist                     Mobility/ADL's       Row Name 11/12/24 1405          Bed Mobility    Comment, (Bed Mobility) UIC upon arrival  -AN       Row Name 11/12/24 1405          Transfers    Comment, (Transfers) Unable to initiate functional transfers this session due to lethargy  -AN       Row Name 11/12/24 1405          Activities of Daily Living    BADL Assessment/Intervention lower body dressing  -AN       Row Name 11/12/24 1405          Lower Body Dressing Assessment/Training    [place] Level (Lower Body Dressing) don;socks;dependent (less than 25% patient effort)  -AN     Position (Lower Body Dressing) supported sitting  -AN               User Key  (r) = Recorded By, (t) = Taken By, (c) = Cosigned By      Initials Name Provider Type    Johana Phillips OT Occupational Therapist                   Obj/Interventions       Row Name 11/12/24 1415          Balance    Balance Assessment sitting static balance;sitting dynamic balance  -AN     Static Sitting Balance maximum assist  -AN     Dynamic Sitting Balance dependent  -AN     Position, Sitting Balance sitting in chair  -AN     Balance Interventions sitting;supported;static;dynamic;highly challenging;occupation based/functional task  -AN               User Key  (r) = Recorded By, (t) = Taken By, (c) = Cosigned  By      Initials Name Provider Type    Johana Phillips OT Occupational Therapist                   Goals/Plan    No documentation.                  Clinical Impression       Row Name 11/12/24 1415          Pain Assessment    Additional Documentation Pain Scale: FACES Pre/Post-Treatment (Group)  -AN       Row Name 11/12/24 1415          Pain Scale: FACES Pre/Post-Treatment    Pain: FACES Scale, Pretreatment 0-->no hurt  -AN     Posttreatment Pain Rating 0-->no hurt  -AN       Row Name 11/12/24 1415          Plan of Care Review    Plan of Care Reviewed With patient  -AN     Progress declining  -AN     Outcome Evaluation Pt limited by severe lethargy and weakness this session. Max-Dep for sitting this session. Unable to progress due to current change in status. RN made aware of change in status. Cont POC.  -AN       Row Name 11/12/24 1415          Therapy Plan Review/Discharge Plan (OT)    Anticipated Discharge Disposition (OT) skilled nursing facility  -AN       Row Name 11/12/24 1415          Vital Signs    Pre Patient Position Sitting  -AN     Intra Patient Position Sitting  -AN     Post Patient Position Sitting  -AN       Row Name 11/12/24 1415          Positioning and Restraints    Pre-Treatment Position sitting in chair/recliner  -AN     Post Treatment Position chair  -AN     In Chair notified nsg;reclined;call light within reach;encouraged to call for assist;exit alarm on;with family/caregiver;LUE elevated;RUE elevated;legs elevated;waffle cushion  -AN               User Key  (r) = Recorded By, (t) = Taken By, (c) = Cosigned By      Initials Name Provider Type    Johana Phillips OT Occupational Therapist                   Outcome Measures       Row Name 11/12/24 1414          How much help from another is currently needed...    Putting on and taking off regular lower body clothing? 1  -AN     Bathing (including washing, rinsing, and drying) 1  -AN     Toileting (which includes using toilet bed pan or  urinal) 1  -AN     Putting on and taking off regular upper body clothing 1  -AN     Taking care of personal grooming (such as brushing teeth) 1  -AN     Eating meals 1  -AN     AM-PAC 6 Clicks Score (OT) 6  -AN       Row Name 11/12/24 0800          How much help from another person do you currently need...    Turning from your back to your side while in flat bed without using bedrails? 2  -HK     Moving from lying on back to sitting on the side of a flat bed without bedrails? 2  -HK     Moving to and from a bed to a chair (including a wheelchair)? 2  -HK     Standing up from a chair using your arms (e.g., wheelchair, bedside chair)? 2  -HK     Climbing 3-5 steps with a railing? 1  -HK     To walk in hospital room? 1  -HK     AM-PAC 6 Clicks Score (PT) 10  -HK     Highest Level of Mobility Goal 4 --> Transfer to chair/commode  -HK       Row Name 11/12/24 1419          Functional Assessment    Outcome Measure Options AM-PAC 6 Clicks Daily Activity (OT)  -AN               User Key  (r) = Recorded By, (t) = Taken By, (c) = Cosigned By      Initials Name Provider Type    Shayna Newsome RN Registered Nurse    Johana Phillips OT Occupational Therapist                    Occupational Therapy Education       Title: PT OT SLP Therapies (In Progress)       Topic: Occupational Therapy (In Progress)       Point: ADL training (In Progress)       Description:   Instruct learner(s) on proper safety adaptation and remediation techniques during self care or transfers.   Instruct in proper use of assistive devices.                  Learning Progress Summary            Patient Acceptance, E, NL by AN at 11/12/2024 1419    Acceptance, E, VU by MR at 11/9/2024 1513    Acceptance, E,TB, VU by PK at 11/7/2024 0554    Acceptance, E, VU by BH at 11/6/2024 0900   Family Acceptance, E, NL by AN at 11/12/2024 1419   Significant Other Acceptance, E, NL by AN at 11/12/2024 1419                      Point: Home exercise program (Done)        Description:   Instruct learner(s) on appropriate technique for monitoring, assisting and/or progressing therapeutic exercises/activities.                  Learning Progress Summary            Patient Acceptance, E, VU by MR at 11/9/2024 1513    Acceptance, E,TB, VU by  at 11/7/2024 0554                      Point: Precautions (In Progress)       Description:   Instruct learner(s) on prescribed precautions during self-care and functional transfers.                  Learning Progress Summary            Patient Acceptance, E, NL by AN at 11/12/2024 1419    Acceptance, E, VU by MR at 11/9/2024 1513    Acceptance, E,TB, VU by PK at 11/7/2024 0554    Acceptance, E, VU by  at 11/6/2024 0900   Family Acceptance, E, NL by AN at 11/12/2024 1419   Significant Other Acceptance, E, NL by AN at 11/12/2024 1419                      Point: Body mechanics (In Progress)       Description:   Instruct learner(s) on proper positioning and spine alignment during self-care, functional mobility activities and/or exercises.                  Learning Progress Summary            Patient Acceptance, E, NL by AN at 11/12/2024 1419    Acceptance, E, VU by MR at 11/9/2024 1513    Acceptance, E,TB, VU by  at 11/7/2024 0554    Acceptance, E, VU by  at 11/6/2024 0900   Family Acceptance, E, NL by AN at 11/12/2024 1419   Significant Other Acceptance, E, NL by AN at 11/12/2024 1419                                      User Key       Initials Effective Dates Name Provider Type Discipline    AN 09/21/21 -  Johana Bourgeois OT Occupational Therapist OT    MR 09/22/22 -  Joy Aoyub OT Occupational Therapist OT     07/11/24 -  Nori Cruz RN Registered Nurse Nurse     08/08/24 -  Jeny Harrison OT Student OT Student OT                  OT Recommendation and Plan     Plan of Care Review  Plan of Care Reviewed With: patient  Progress: declining  Outcome Evaluation: Pt limited by severe lethargy and weakness this  session. Max-Dep for sitting this session. Unable to progress due to current change in status. RN made aware of change in status. Cont POC.     Time Calculation:         Time Calculation- OT       Row Name 24 1420             Time Calculation- OT    OT Start Time 1340  -AN      OT Received On 24  -AN         Timed Charges    45016 - OT Therapeutic Activity Minutes 10  -AN         Total Minutes    Timed Charges Total Minutes 10  -AN       Total Minutes 10  -AN                User Key  (r) = Recorded By, (t) = Taken By, (c) = Cosigned By      Initials Name Provider Type    AN Johana Bourgeois OT Occupational Therapist                  Therapy Charges for Today       Code Description Service Date Service Provider Modifiers Qty    77420879731 HC OT THERAPEUTIC ACT EA 15 MIN 2024 Johana Bourgeois OT GO, KX 1    52558092725 HC OT THER SUPP EA 15 MIN 2024 Johana Bourgeois OT GO 1                 Johana Bk OT  2024    Electronically signed by Johana Bourgeois OT at 24 1420          Speech Language Pathology Notes (most recent note)        Sendy Ryan MS CCC-SLP at 24 1248          Acute Care - Speech Language Pathology   Swallow Re-Evaluation [facility]  Clinical Swallow Evaluation     Patient Name: Guicho Blanco  : 1940  MRN: [id number]  Today's Date: 2024               Admit Date: 2024    Visit Dx:     ICD-10-CM ICD-9-CM   1. Generalized weakness  R53.1 780.79   2. Fall in home, initial encounter  W19.XXXA E888.9    Y92.009 E849.0   3. Anorexia  R63.0 783.0   4. Bacterial UTI  N39.0 599.0    A49.9 041.9   5. Oropharyngeal dysphagia  R13.12 787.22     Patient Active Problem List   Diagnosis    Osteomyelitis of toe of right foot    Osteomyelitis of right foot    PAF (paroxysmal atrial fibrillation)    Coronary artery disease involving native coronary artery of native heart without angina pectoris    Gastroesophageal reflux disease  without esophagitis    Benign non-nodular prostatic hyperplasia without lower urinary tract symptoms    S/P appy    Essential hypertension    Simple chronic bronchitis    Mixed hyperlipidemia    COPD (chronic obstructive pulmonary disease)    BPH (benign prostatic hypertrophy)    Hearing loss    Chest pain    Vertigo    Snoring    Overweight    PLMD (periodic limb movement disorder)    Neuropathy    Periodic headache syndrome, not intractable    Acquired absence of other right toe(s)    Tremor    Bilateral impacted cerumen    Bilateral sensorineural hearing loss    Chronic otitis externa    Deviated nasal septum    Disorder of joint of foot    ED (erectile dysfunction) of organic origin    Hydrocele    Hydrocele of testis    Impacted cerumen    Mononeuropathy    Swelling of testicle    Venous retinal branch occlusion    Vitreous degeneration    Benign prostatic hyperplasia with urinary obstruction    OA (osteoarthritis) of knee    Status post total right knee replacement    Septic arthritis    Cellulitis    Cellulitis of right knee    Failure to thrive in adult    Insomnia    Moderate malnutrition    Sepsis    Lactic acidosis    Serotonin syndrome    Dysphagia    Generalized weakness    Severe protein-calorie malnutrition     Past Medical History:   Diagnosis Date    Arthritis     Atrial fibrillation     BPH (benign prostatic hypertrophy)     Cataract     Cellulitis     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     bilateral    Coronary artery disease     Full dentures     Gall stones     GERD (gastroesophageal reflux disease)     Hearing loss     Hiatal hernia     Hyperlipidemia     Hypertension     Peripheral neuropathy     Peripheral vascular disease     Seasonal allergies     Skin cancer     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    TIA (transient ischemic attack)     2 times    Wears glasses      Past Surgical History:   Procedure Laterality Date    ABLATION OF DYSRHYTHMIC FOCUS      APPENDECTOMY      CARDIAC CATHETERIZATION N/A  10/05/2017    Procedure: Left Heart Cath;  Surgeon: Hector Urrutia MD;  Location:  [facility];  Service:     CATARACT EXTRACTION Bilateral     CHOLECYSTECTOMY      Remote    COCHLEAR IMPLANT REVISION  05/2019    COLONOSCOPY      PROSTATE SURGERY      SHOULDER SURGERY Right     SKIN BIOPSY      TOE AMPUTATION Right     2nd toe - Right Foot    TONSILLECTOMY      TOTAL KNEE ARTHROPLASTY Right 8/7/2024    Procedure: TOTAL KNEE ARTHROPLASTY WITH ASHLEY ROBOT RIGHT;  Surgeon: Christian Schwartz MD;  Location:  [facility];  Service: Robotics - Ortho;  Laterality: Right;       SLP Recommendation and Plan  SLP Swallowing Diagnosis: mild-moderate, oral dysphagia, suspect acute-on-chronic, mod-severe, pharyngeal dysphagia (11/11/24 1115) High risk for aspiration w/ all consistencies.   SLP Diet Recommendation: comfort diet, per physician discretion, regular textures, thin liquids, other (see comments) (w/ pt/spouse to select soft items from menu + extra sauce/gravy for solids--if aligns w/ GOC per MD discretion) (11/11/24 1115)  Recommended Precautions and Strategies: upright posture during/after eating, small bites of food and sips of liquid, general aspiration precautions, fatigue precautions, other (see comments) (slow pace, frequent cued coughing to clear airway) (11/11/24 1115)  SLP Rec. for Method of Medication Administration: meds crushed, with puree, meds via alternate route, as tolerated (pending GOC decisions) (11/11/24 1115)           Swallow Criteria for Skilled Therapeutic Interventions Met: demonstrates skilled criteria (11/11/24 1115)  Anticipated Discharge Disposition (SLP): home with home health (11/11/24 1115)  Rehab Potential/Prognosis, Swallowing: re-evaluate goals as necessary (11/11/24 1115)  Therapy Frequency (Swallow): 5 days per week (pending GOC decisions) (11/11/24 1115)  Predicted Duration Therapy Intervention (Days): 1 week (11/11/24 1115)  Oral Care Recommendations: Oral Care  BID/PRN, Toothbrush (11/11/24 1115)                                        Progress: no change      SWALLOW EVALUATION (Last 72 Hours)       SLP Adult Swallow Evaluation       Row Name 11/11/24 1115                   Rehab Evaluation    Document Type re-evaluation  -AC        Subjective Information no complaints  -AC        Patient Observations alert;cooperative  -AC        Patient/Family/Caregiver Comments/Observations Spouse present.  -AC        Patient Effort adequate  -AC           General Information    Patient Profile Reviewed yes  -AC        Pertinent History Of Current Problem See previous eval. RN/MD requested re-eval due to pt exhibiting difficulty w/ PO intake this morning.  -AC        Current Method of Nutrition regular textures;thin liquids  -AC        Precautions/Limitations, Hearing hearing impairment, bilaterally  bilateral cochlear implants  -AC        Plans/Goals Discussed with patient;spouse/S.O.;agreed upon  -AC        Barriers to Rehab previous functional deficit;medically complex  -AC        Patient's Goals for Discharge patient did not state  -        Family Goals for Discharge patient able to eat/drink without coughing/choking  -AC           Pain    Additional Documentation Pain Scale: FACES Pre/Post-Treatment (Group)  -AC           Pain Scale: FACES Pre/Post-Treatment    Pain: FACES Scale, Pretreatment 2-->hurts little bit  -AC        Posttreatment Pain Rating 0-->no hurt  -AC        Pre/Posttreatment Pain Comment Neck pain/discomfort. Repositioned & provided additional pillow support.  -AC           Oral Motor Structure and Function    Secretion Management wet vocal quality  -AC        Volitional Cough weak  -AC           General Eating/Swallowing Observations    Respiratory Support Currently in Use room air  -AC        Eating/Swallowing Skills fed by SLP;self-fed;needed assist  -AC        Positioning During Eating upright 90 degree;upright in chair  -AC        Utensils Used  spoon;cup;straw  -AC        Consistencies Trialed thin liquids;nectar/syrup-thick liquids;pureed;regular textures  -AC           Clinical Swallow Eval    Oral Prep Phase impaired  -AC        Oral Transit impaired  -AC        Oral Residue impaired  -AC        Pharyngeal Phase suspected pharyngeal impairment  -AC        Esophageal Phase unremarkable  -AC           Oral Prep Concerns    Oral Prep Concerns increased prep time  -AC        Increased Prep Time regular consistencies  -AC           Oral Transit Concerns    Oral Transit Concerns increased oral transit time  -AC        Increased Oral Transit Time regular consistencies  -AC           Oral Residue Concerns    Oral Residue Concerns diffuse residue throughout oral cavity  -AC        Diffuse Residue Throughout Oral Cavity regular consistencies  -AC        Oral Residue Concerns, Comment Mild--cleared w/ puree/liquid washes.  -AC           Pharyngeal Phase Concerns    Pharyngeal Phase Concerns multiple swallows;wet vocal quality;acute vocal quality changes;throat clear;cough  -AC        Wet Vocal Quality all consistencies  -AC        Acute Vocal Quality Changes other (see comments)  weaker/hoarse voice lately per spouse  -AC        Multiple Swallows all consistencies  several, audible  -AC        Cough all consistencies  -AC        Throat Clear all consistencies  -AC        Pharyngeal Phase Concerns, Comment Reviewed results of prior MBSs, including most recent on 11/6. Suspect acute-on-chronic dysphagia. Anterior cervical osteophyte resulting in diffuse pharyngeal residue and eventual penetration/aspiration. Most recent MBS revealed worsened dysphagia. Suspect exacerbated by acute weakness. Spouse reported pt has not fully recovered since knee replacement sx in August. She also reported noting pt exhibiting weaker/hoarse vocal quality. Discussed that likely high risk for aspiration w/ all consistencies. Notified MD. May consider comfort diet, given chronic nature of  dysphagia + advanced age.  -           Swallowing Quality of Life Assessment    Education and counseling provided Signs of aspiration;Risks of aspiration;Comfort diet options;Alternate nutrition/hydration options, risks, and benefits  -           SLP Evaluation Clinical Impression    SLP Swallowing Diagnosis mild-moderate;oral dysphagia;suspect acute-on-chronic;mod-severe;pharyngeal dysphagia  -        Functional Impact risk of aspiration/pneumonia;risk of malnutrition;risk of dehydration  -        Rehab Potential/Prognosis, Swallowing re-evaluate goals as necessary  -        Swallow Criteria for Skilled Therapeutic Interventions Met demonstrates skilled criteria  -           Recommendations    Therapy Frequency (Swallow) 5 days per week  pending GOC decisions  -        Predicted Duration Therapy Intervention (Days) 1 week  -        SLP Diet Recommendation comfort diet, per physician discretion;regular textures;thin liquids;other (see comments)  w/ pt/spouse to select soft items from menu + extra sauce/gravy for solids--if aligns w/ GOC per MD discretion  -        Recommended Precautions and Strategies upright posture during/after eating;small bites of food and sips of liquid;general aspiration precautions;fatigue precautions;other (see comments)  slow pace, frequent cued coughing to clear airway  -        Oral Care Recommendations Oral Care BID/PRN;Toothbrush  -        SLP Rec. for Method of Medication Administration meds crushed;with puree;meds via alternate route;as tolerated  pending GOC decisions  -        Anticipated Discharge Disposition (SLP) home with home health  -                  User Key  (r) = Recorded By, (t) = Taken By, (c) = Cosigned By      Initials Name Effective Dates     Sendy Ryan MS [facility]-SLP 02/03/23 -                     EDUCATION  The patient has been educated in the following areas:   Dysphagia (Swallowing Impairment) Modified Diet Instruction.                 Time Calculation:    Time Calculation- SLP       Row Name 11/11/24 1248 11/11/24 1212          Time Calculation- SLP    SLP Start Time 1115  -AC --     SLP Received On 11/11/24  -AC --        Untimed Charges    29532-WM Eval Oral Pharyng Swallow Minutes 75  -AC --        Total Minutes    Untimed Charges Total Minutes 75  -AC --      Total Minutes 75  -AC --        PT/SLP KX Modifier    Exception criteria met to exceed therapy cap -- Apply KX Modifier  -AC               User Key  (r) = Recorded By, (t) = Taken By, (c) = Cosigned By      Initials Name Provider Type    AC Sendy Ryan, MS CCC-SLP Speech and Language Pathologist                    Therapy Charges for Today       Code Description Service Date Service Provider Modifiers Qty    [id number]  ST EVAL ORAL PHARYNG SWALLOW 5 11/11/2024 Sendy Ryan MS CCC-SLP GN, KX 1                 Sendy Ryan MS CCC-SLP  11/11/2024    Electronically signed by Sendy Ryan MS CCC-SLP at 11/11/24 1249

## 2024-11-12 NOTE — CASE COMMUNICATION
route to Dr. Culver    Patient missed a SN visit from [facility] on 11/12/14.     Reason: HOSPITALIZED AT [facility].       For your records only.   Per CMS Guidance, MD must be notified of missed/cancelled visits; therefore the prescribed frequency was not met.

## 2024-11-12 NOTE — CASE MANAGEMENT/SOCIAL WORK
Continued Stay Note   [place]     Patient Name: Guicho Blanco  MRN: [id number]  Today's Date: 11/12/2024    Admit Date: 11/5/2024    Plan: [facility]   Discharge Plan       Row Name 11/12/24 1243       Plan    Plan [facility] [place]    Plan Comments Spoke with Marian at [facility].  She will have a bed for patient tomorrow afternoon.  CM will fax updated therapy notes once they are available to her so that she may initiate precert with patient's insurance.  [facility] ambulance TENTATIVELY scheduled for tomorrow, 11/13 at 1445.  CM will continue to follow.                   Discharge Codes    No documentation.                 Expected Discharge Date and Time       Expected Discharge Date Expected Discharge Time    Nov 13, 2024               Patt Salomon RN

## 2024-11-12 NOTE — PLAN OF CARE
Problem: Adult Inpatient Plan of Care  Goal: Plan of Care Review  Outcome: Progressing  Goal: Patient-Specific Goal (Individualized)  Outcome: Progressing  Goal: Absence of Hospital-Acquired Illness or Injury  Outcome: Progressing  Intervention: Identify and Manage Fall Risk  Recent Flowsheet Documentation  Taken 11/12/2024 0400 by Leena Sy RN  Safety Promotion/Fall Prevention: safety round/check completed  Taken 11/12/2024 0200 by Leena Sy RN  Safety Promotion/Fall Prevention: safety round/check completed  Taken 11/12/2024 0000 by Leena Sy RN  Safety Promotion/Fall Prevention: safety round/check completed  Taken 11/11/2024 2200 by Leena Sy RN  Safety Promotion/Fall Prevention: safety round/check completed  Taken 11/11/2024 2000 by Leena Sy RN  Safety Promotion/Fall Prevention: safety round/check completed  Intervention: Prevent Skin Injury  Recent Flowsheet Documentation  Taken 11/12/2024 0000 by Leena Sy RN  Body Position:   patient/family refused   sitting up in bed  Taken 11/11/2024 2000 by Leena Sy RN  Body Position: (up in chair) other (see comments)  Skin Protection: incontinence pads utilized  Intervention: Prevent and Manage VTE (Venous Thromboembolism) Risk  Recent Flowsheet Documentation  Taken 11/11/2024 2000 by Leena Sy RN  VTE Prevention/Management: (on lovenox)   SCDs (sequential compression devices) off   patient refused intervention  Intervention: Prevent Infection  Recent Flowsheet Documentation  Taken 11/12/2024 0400 by Leena Sy RN  Infection Prevention: environmental surveillance performed  Taken 11/12/2024 0200 by Leena Sy RN  Infection Prevention: environmental surveillance performed  Taken 11/12/2024 0000 by Leena Sy RN  Infection Prevention: environmental surveillance performed  Taken 11/11/2024 2200 by Leena Sy RN  Infection Prevention: environmental surveillance performed  Taken 11/11/2024 2000 by Leena Sy RN  Infection Prevention:  environmental surveillance performed  Goal: Optimal Comfort and Wellbeing  Outcome: Progressing  Intervention: Monitor Pain and Promote Comfort  Recent Flowsheet Documentation  Taken 11/11/2024 2000 by Leena Sy, RN  Pain Management Interventions: no interventions per patient request  Goal: Readiness for Transition of Care  Outcome: Progressing     Problem: Skin Injury Risk Increased  Goal: Skin Health and Integrity  Outcome: Progressing  Intervention: Optimize Skin Protection  Recent Flowsheet Documentation  Taken 11/12/2024 0000 by Leena Sy, RN  Activity Management: back to bed  Taken 11/11/2024 2200 by Leena Sy, RN  Activity Management: up in chair  Taken 11/11/2024 2000 by Leena Sy, RN  Activity Management:   sitting, edge of bed   up in chair  Pressure Reduction Techniques: heels elevated off bed  Pressure Reduction Devices: pressure-redistributing mattress utilized  Skin Protection: incontinence pads utilized  Intervention: Promote and Optimize Oral Intake  Recent Flowsheet Documentation  Taken 11/11/2024 2000 by Leena Sy, RN  Oral Nutrition Promotion: rest periods promoted   Goal Outcome Evaluation:  Plan of Care Reviewed With: patient

## 2024-11-12 NOTE — CASE COMMUNICATION
Please Fax to   Dr. Mitchel Culver    Patient missed a home health PT visit from [facility] on 11/12/2024.     Reason: Patient at [facility] in observation status       For your records only.   Per CMS Guidance, MD must be notified of missed/cancelled visits; therefore the prescribed frequency was not met.

## 2024-11-13 ENCOUNTER — APPOINTMENT (OUTPATIENT)
Dept: OTHER | Facility: HOSPITAL | Age: 84
End: 2024-11-13
Payer: MEDICARE

## 2024-11-13 ENCOUNTER — HOME CARE VISIT (OUTPATIENT)
Dept: HOME HEALTH SERVICES | Facility: HOME HEALTHCARE | Age: 84
End: 2024-11-13
Payer: COMMERCIAL

## 2024-11-13 VITALS
HEIGHT: 72 IN | DIASTOLIC BLOOD PRESSURE: 56 MMHG | TEMPERATURE: 97.1 F | RESPIRATION RATE: 18 BRPM | WEIGHT: 161 LBS | SYSTOLIC BLOOD PRESSURE: 90 MMHG | OXYGEN SATURATION: 100 % | BODY MASS INDEX: 21.81 KG/M2 | HEART RATE: 86 BPM

## 2024-11-13 PROBLEM — F05 SUNDOWNING: Status: ACTIVE | Noted: 2024-11-13

## 2024-11-13 PROBLEM — I95.1 ORTHOSTATIC HYPOTENSION: Status: ACTIVE | Noted: 2024-11-13

## 2024-11-13 PROCEDURE — G0378 HOSPITAL OBSERVATION PER HR: HCPCS

## 2024-11-13 PROCEDURE — 92526 ORAL FUNCTION THERAPY: CPT

## 2024-11-13 PROCEDURE — 96372 THER/PROPH/DIAG INJ SC/IM: CPT

## 2024-11-13 PROCEDURE — 99239 HOSP IP/OBS DSCHRG MGMT >30: CPT | Performed by: INTERNAL MEDICINE

## 2024-11-13 PROCEDURE — 25010000002 ENOXAPARIN PER 10 MG: Performed by: INTERNAL MEDICINE

## 2024-11-13 RX ORDER — NORTRIPTYLINE HYDROCHLORIDE 10 MG/1
20 CAPSULE ORAL NIGHTLY
Status: DISCONTINUED | OUTPATIENT
Start: 2024-11-13 | End: 2024-11-13 | Stop reason: HOSPADM

## 2024-11-13 RX ORDER — NORTRIPTYLINE HYDROCHLORIDE 10 MG/1
20 CAPSULE ORAL NIGHTLY
Start: 2024-11-13 | End: 2024-11-13

## 2024-11-13 RX ORDER — MIDODRINE HYDROCHLORIDE 10 MG/1
10 TABLET ORAL
Start: 2024-11-13 | End: 2024-12-13

## 2024-11-13 RX ORDER — OLANZAPINE 2.5 MG/1
2.5 TABLET, FILM COATED ORAL NIGHTLY
Start: 2024-11-13

## 2024-11-13 RX ORDER — NORTRIPTYLINE HYDROCHLORIDE 10 MG/1
CAPSULE ORAL
Start: 2024-11-13 | End: 2025-02-25

## 2024-11-13 RX ADMIN — MIDODRINE HYDROCHLORIDE 10 MG: 10 TABLET ORAL at 12:35

## 2024-11-13 RX ADMIN — ENOXAPARIN SODIUM 40 MG: 40 INJECTION SUBCUTANEOUS at 09:27

## 2024-11-13 RX ADMIN — PANTOPRAZOLE SODIUM 40 MG: 40 TABLET, DELAYED RELEASE ORAL at 05:57

## 2024-11-13 RX ADMIN — FLECAINIDE ACETATE 100 MG: 50 TABLET ORAL at 05:57

## 2024-11-13 RX ADMIN — CYCLOSPORINE 1 DROP: 0.5 EMULSION OPHTHALMIC at 09:26

## 2024-11-13 RX ADMIN — PRAVASTATIN SODIUM 20 MG: 20 TABLET ORAL at 09:26

## 2024-11-13 RX ADMIN — ASPIRIN 325 MG: 325 TABLET ORAL at 09:27

## 2024-11-13 RX ADMIN — Medication 10 ML: at 09:27

## 2024-11-13 RX ADMIN — POLYETHYLENE GLYCOL 3350 17 G: 17 POWDER, FOR SOLUTION ORAL at 09:27

## 2024-11-13 RX ADMIN — MIDODRINE HYDROCHLORIDE 10 MG: 10 TABLET ORAL at 09:26

## 2024-11-13 NOTE — PROGRESS NOTES
"    [facility] Services  PROGRESS NOTE    Patient Name: Guicho Blanco  : 1940  MRN: [id number]    Date of Admission: 2024  Primary Care Physician: Mitchel Culver MD    Subjective   Subjective     CC: failure to thrive,     HPI:  Awake overnight per nursing staff though no significant issues otherwise    Episode of staring in afternoon prompting stat page from nursing and urgent evaluation by myself. On my arrival, responds to pain in all 4 extremities and then reports \"leave me alone!\" Wife reports staring spells like this in past. Shortly after is up eating meal. I d/w wife who agrees with not pursuing further evaluation (EEG, CT head) given goals of care. Likely 2/2 sleep or hypoactive delirium    Objective   Objective     Vital Signs:   Temp:  [96.8 °F (36 °C)-98.2 °F (36.8 °C)] 96.8 °F (36 °C)  Heart Rate:  [] 99  Resp:  [16-18] 16  BP: ()/(47-76) 110/66     Physical Exam:  Constitutional: No acute distress, awake, alert tall thin male sitting up in recliner.   HENT: cochlear implants in place bilaterally  Respiratory: Clear to auscultation bilaterally, respiratory effort normal   Cardiovascular: RRR, no murmurs, rubs, or gallops  Gastrointestinal: Soft, nontender, nondistended  Musculoskeletal: Muscle tone decreased throughout, no joint effusions appreciated  Psychiatric: Appropriate affect, cooperative  Neurologic: Alert and oriented to self but not to situation, facial movements symmetric and spontaneous movement of all 4 extremities grossly equal bilaterally, speech soft. Some right-sided mouth droop which corrects w smiling  Skin: No rashes    Results Reviewed:  LAB RESULTS:      Lab 24  0821 24  0617   WBC 6.13 7.01   HEMOGLOBIN 11.9* 10.5*   HEMATOCRIT 37.2* 33.6*   PLATELETS 215 205   MCV 83.0 84.0         Lab 11/10/24  1746 24  0821 24  0617   SODIUM 139 135* 136   POTASSIUM 4.2 3.5 3.5   CHLORIDE " 104 101 103   CO2 23.0 23.0 19.0*   ANION GAP 12.0 11.0 14.0   BUN 11 8 14   CREATININE 0.67* 0.73* 0.68*   EGFR 92.6 90.3 92.2   GLUCOSE 87 99 103*   CALCIUM 9.2 9.4 9.1   MAGNESIUM  --  1.6  --                            Brief Urine Lab Results  (Last result in the past 365 days)        Color   Clarity   Blood   Leuk Est   Nitrite   Protein   CREAT   Urine HCG        11/05/24 0943 Dark Yellow   Clear   Negative   Moderate (2+)   Negative   30 mg/dL (1+)                   Microbiology Results Abnormal       None            No radiology results from the last 24 hrs    Results for orders placed during the hospital encounter of 09/13/24    Adult Transthoracic Echo Complete W/ Cont if Necessary Per Protocol    Interpretation Summary    Left ventricular systolic function is normal. Left ventricular ejection fraction appears to be 61 - 65%.    Left ventricular diastolic function was normal.      Current medications:  Scheduled Meds:aspirin, 325 mg, Oral, Daily  cycloSPORINE, 1 drop, Both Eyes, Q12H  enoxaparin, 40 mg, Subcutaneous, Daily  flecainide, 100 mg, Oral, Q12H  [START ON 11/13/2024] midodrine, 10 mg, Oral, TID AC  nortriptyline, 30 mg, Oral, Nightly  OLANZapine, 2.5 mg, Oral, Nightly  pantoprazole, 40 mg, Oral, Q AM  senna-docusate sodium, 2 tablet, Oral, BID   And  polyethylene glycol, 17 g, Oral, Daily  pravastatin, 20 mg, Oral, Daily  sodium chloride, 10 mL, Intravenous, Q12H      Continuous Infusions:   PRN Meds:.  acetaminophen    senna-docusate sodium **AND** polyethylene glycol **AND** bisacodyl **AND** bisacodyl    nitroglycerin    sodium chloride    sodium chloride    Assessment & Plan   Assessment & Plan     Active Hospital Problems    Diagnosis  POA    **Generalized weakness [R53.1]  Yes    Severe protein-calorie malnutrition [E43]  Yes      Resolved Hospital Problems   No resolved problems to display.        Brief Hospital Course to date:  Guicho Blanco is a 83 y.o. male w h/o TIA, Afib on  anticoagulation, right knee replacement (8/2024) with falls from home and general failure to thrive. This appears longstanding and progressive with significant weight loss, lack of appetite, overall failure to thrive.    Orthostatic hypotension - mild improvement w meds  -started midodrine w abdominal binder w some improvement: increase to midodrine 10 mg TID, abdominal binder w repeat again in AM    Failure to thrive: weight loss, frequent falls, loss of appetite, progressive oropharyngeal dysphagia  -comfort care goals after d/w family  -d/w them swallowing results: no feeding tube, comfort diet. All in agreement  -open to hospice management as OP    Cognitive impairment w sundowning behavior -zyprexa qhs w assistance in this. Titrate up as needed    Paroxysmal Afib s/p ablation - on home flecainide, aspirin 325 mg per cardiology instead of anticoagulation  Pyuria - s/p cefuroxime  CAD - aspirin, hold imdur given above orthostasis    Expected Discharge Location and Transportation: SNF ([facility] bed poss 11/13)  Expected Discharge poss 11/13  Expected Discharge Date: 11/13/2024; Expected Discharge Time:      VTE Prophylaxis:  Pharmacologic VTE prophylaxis orders are present.         AM-PAC 6 Clicks Score (PT): 10 (11/12/24 0800)    CODE STATUS:   Code Status and Medical Interventions: No CPR (Do Not Attempt to Resuscitate); Limited Support; No intubation (DNI)   Ordered at: 11/05/24 1137     Medical Intervention Limits:    No intubation (DNI)     Level Of Support Discussed With:    Patient     Code Status (Patient has no pulse and is not breathing):    No CPR (Do Not Attempt to Resuscitate)     Medical Interventions (Patient has pulse or is breathing):    Limited Support       Dayna Martínez MD  11/12/24

## 2024-11-13 NOTE — PLAN OF CARE
Goal Outcome Evaluation:                   Anticipated Discharge Disposition (SLP): home with home health          SLP Swallowing Diagnosis: mild-moderate, oral dysphagia, suspect acute-on-chronic, mod-severe, pharyngeal dysphagia (11/13/24 0940)  Treatment Assessment (SLP): continued, oral dysphagia, pharyngeal dysphagia (11/13/24 0940)     Plan for Continued Treatment (SLP): continue treatment per plan of care (11/13/24 0940)

## 2024-11-13 NOTE — CASE MANAGEMENT/SOCIAL WORK
Case Management Discharge Note      Final Note: Patient's plan is a skilled bed at [facility] today, 11/13.  [facility] ambulance will transport at 1445.  PCS faxed to [facility].  Nurse to call report to [phone], [phone].  CM will fax discharge summary when available to [phone].         Selected Continued Care - Admitted Since 11/5/2024       Destination    No services have been selected for the patient.                Durable Medical Equipment    No services have been selected for the patient.                Dialysis/Infusion    No services have been selected for the patient.                Home Medical Care    No services have been selected for the patient.                Therapy    No services have been selected for the patient.                Community Resources    No services have been selected for the patient.                Community & DME    No services have been selected for the patient.                    Selected Continued Care - Prior Encounters Includes continued care and service providers with selected services from prior encounters from 8/7/2024 to 11/13/2024      Discharged on 9/23/2024 Admission date: 9/13/2024 - Discharge disposition: Skilled Nursing Facility (DC - External)      Destination       Service Provider Services Address Phone Fax Patient Preferred    [facility] [address] [facility] [address] [phone] --                      Discharged on 9/5/2024 Admission date: 8/30/2024 - Discharge disposition: Home-Health Care c      Home Medical Care       Service Provider Services Address Phone Fax Patient Preferred    [facility] [address] [id number] [phone] --                      Discharged on 8/10/2024 Admission date: 8/7/2024 - Discharge disposition: Rehab Facility or Unit (DC - External)      Destination       Service Provider Services  Address Phone Fax Patient Preferred    [facility] [address] [address] [phone] --                          Transportation Services  Ambulance: [facility]    Final Discharge Disposition Code: 03 - skilled nursing facility (SNF)

## 2024-11-13 NOTE — THERAPY TREATMENT NOTE
Acute Care - Speech Language Pathology   Swallow Treatment Note [facility]     Patient Name: Guicho Blanoc  : 1940  MRN: [id number]  Today's Date: 2024               Admit Date: 2024    Visit Dx:     ICD-10-CM ICD-9-CM   1. Generalized weakness  R53.1 780.79   2. Fall in home, initial encounter  W19.XXXA E888.9    Y92.009 E849.0   3. Anorexia  R63.0 783.0   4. Bacterial UTI  N39.0 599.0    A49.9 041.9   5. Oropharyngeal dysphagia  R13.12 787.22     Patient Active Problem List   Diagnosis    Osteomyelitis of toe of right foot    Osteomyelitis of right foot    PAF (paroxysmal atrial fibrillation)    Coronary artery disease involving native coronary artery of native heart without angina pectoris    Gastroesophageal reflux disease without esophagitis    Benign non-nodular prostatic hyperplasia without lower urinary tract symptoms    S/P appy    Essential hypertension    Simple chronic bronchitis    Mixed hyperlipidemia    COPD (chronic obstructive pulmonary disease)    BPH (benign prostatic hypertrophy)    Hearing loss    Chest pain    Vertigo    Snoring    Overweight    PLMD (periodic limb movement disorder)    Neuropathy    Periodic headache syndrome, not intractable    Acquired absence of other right toe(s)    Tremor    Bilateral impacted cerumen    Bilateral sensorineural hearing loss    Chronic otitis externa    Deviated nasal septum    Disorder of joint of foot    ED (erectile dysfunction) of organic origin    Hydrocele    Hydrocele of testis    Impacted cerumen    Mononeuropathy    Swelling of testicle    Venous retinal branch occlusion    Vitreous degeneration    Benign prostatic hyperplasia with urinary obstruction    OA (osteoarthritis) of knee    Status post total right knee replacement    Septic arthritis    Cellulitis    Cellulitis of right knee    Failure to thrive in adult    Insomnia    Moderate malnutrition    Sepsis    Lactic acidosis    Serotonin syndrome    Dysphagia     Generalized weakness    Severe protein-calorie malnutrition    Sundowning    Orthostatic hypotension     Past Medical History:   Diagnosis Date    Arthritis     Atrial fibrillation     BPH (benign prostatic hypertrophy)     Cataract     Cellulitis     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     bilateral    Coronary artery disease     Full dentures     Gall stones     GERD (gastroesophageal reflux disease)     Hearing loss     Hiatal hernia     Hyperlipidemia     Hypertension     Peripheral neuropathy     Peripheral vascular disease     Seasonal allergies     Skin cancer     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    TIA (transient ischemic attack)     2 times    Wears glasses      Past Surgical History:   Procedure Laterality Date    ABLATION OF DYSRHYTHMIC FOCUS      APPENDECTOMY      CARDIAC CATHETERIZATION N/A 10/05/2017    Procedure: Left Heart Cath;  Surgeon: Hector Urrutia MD;  Location:  [facility];  Service:     CATARACT EXTRACTION Bilateral     CHOLECYSTECTOMY      Remote    COCHLEAR IMPLANT REVISION  05/2019    COLONOSCOPY      PROSTATE SURGERY      SHOULDER SURGERY Right     SKIN BIOPSY      TOE AMPUTATION Right     2nd toe - Right Foot    TONSILLECTOMY      TOTAL KNEE ARTHROPLASTY Right 8/7/2024    Procedure: TOTAL KNEE ARTHROPLASTY WITH ASHLEY ROBOT RIGHT;  Surgeon: Christian Schwartz MD;  Location:  [facility] OR;  Service: Robotics - Ortho;  Laterality: Right;       SLP Recommendation and Plan  SLP Swallowing Diagnosis: mild-moderate, oral dysphagia, suspect acute-on-chronic, mod-severe, pharyngeal dysphagia (11/13/24 0940)  SLP Diet Recommendation: comfort diet, per physician discretion, regular textures, thin liquids, other (see comments) (w/ pt/spouse to select soft items from menu + extra sauce/gravy for solids--if aligns w/ GOC per MD discretion) (11/13/24 0940)  Recommended Precautions and Strategies: upright posture during/after eating, small bites of food and sips of liquid, general aspiration  precautions, fatigue precautions, other (see comments) (slow pace, frequent cued coughing to clear airway) (11/13/24 0940)  SLP Rec. for Method of Medication Administration: meds crushed, with puree, meds via alternate route, as tolerated (pending [facility] decisions) (11/13/24 0940)     Monitor for Signs of Aspiration: yes, notify SLP if any concerns (11/13/24 0940)     Swallow Criteria for Skilled Therapeutic Interventions Met: demonstrates skilled criteria (11/13/24 0940)  Anticipated Discharge Disposition (SLP): home with home health (11/13/24 0940)  Rehab Potential/Prognosis, Swallowing: re-evaluate goals as necessary (11/13/24 0940)  Therapy Frequency (Swallow): 5 days per week (11/13/24 0940)  Predicted Duration Therapy Intervention (Days): 1 week (11/13/24 0940)  Oral Care Recommendations: Oral Care BID/PRN, Toothbrush (11/13/24 0940)        Daily Summary of Progress (SLP): progress toward functional goals as expected (11/13/24 0940)               Treatment Assessment (SLP): continued, oral dysphagia, pharyngeal dysphagia (11/13/24 0940)     Plan for Continued Treatment (SLP): continue treatment per plan of care (11/13/24 0940)                SWALLOW EVALUATION (Last 72 Hours)       SLP Adult Swallow Evaluation       Row Name 11/13/24 0940 11/11/24 1112                Rehab Evaluation    Document Type -- re-evaluation  -       Subjective Information -- no complaints  -       Patient Observations -- alert;cooperative  -       Patient/Family/Caregiver Comments/Observations -- Spouse present.  -AC       Patient Effort -- adequate  -          General Information    Patient Profile Reviewed -- yes  -AC       Pertinent History Of Current Problem -- See previous eval. RN/MD requested re-eval due to pt exhibiting difficulty w/ PO intake this morning.  -AC       Current Method of Nutrition -- regular textures;thin liquids  -AC       Precautions/Limitations, Hearing -- hearing impairment, bilaterally  bilateral  cochlear implants  -       Plans/Goals Discussed with -- patient;spouse/S.O.;agreed upon  -       Barriers to Rehab -- previous functional deficit;medically complex  -       Patient's Goals for Discharge -- patient did not state  -       Family Goals for Discharge -- patient able to eat/drink without coughing/choking  -          Pain    Additional Documentation -- Pain Scale: FACES Pre/Post-Treatment (Group)  -          Pain Scale: FACES Pre/Post-Treatment    Pain: FACES Scale, Pretreatment -- 2-->hurts little bit  -AC       Posttreatment Pain Rating -- 0-->no hurt  -AC       Pre/Posttreatment Pain Comment -- Neck pain/discomfort. Repositioned & provided additional pillow support.  -          Oral Motor Structure and Function    Secretion Management -- wet vocal quality  -       Volitional Cough -- weak  -AC          General Eating/Swallowing Observations    Respiratory Support Currently in Use -- room air  -       Eating/Swallowing Skills -- fed by SLP;self-fed;needed assist  -AC       Positioning During Eating -- upright 90 degree;upright in chair  -       Utensils Used -- spoon;cup;straw  -       Consistencies Trialed -- thin liquids;nectar/syrup-thick liquids;pureed;regular textures  -          Clinical Swallow Eval    Oral Prep Phase -- impaired  -AC       Oral Transit -- impaired  -AC       Oral Residue -- impaired  -AC       Pharyngeal Phase -- suspected pharyngeal impairment  -AC       Esophageal Phase -- unremarkable  -          Oral Prep Concerns    Oral Prep Concerns -- increased prep time  -       Increased Prep Time -- regular consistencies  -          Oral Transit Concerns    Oral Transit Concerns -- increased oral transit time  -       Increased Oral Transit Time -- regular consistencies  -          Oral Residue Concerns    Oral Residue Concerns -- diffuse residue throughout oral cavity  -       Diffuse Residue Throughout Oral Cavity -- regular consistencies  -AC        Oral Residue Concerns, Comment -- Mild--cleared w/ puree/liquid washes.  -AC          Pharyngeal Phase Concerns    Pharyngeal Phase Concerns -- multiple swallows;wet vocal quality;acute vocal quality changes;throat clear;cough  -       Wet Vocal Quality -- all consistencies  -       Acute Vocal Quality Changes -- other (see comments)  weaker/hoarse voice lately per spouse  -       Multiple Swallows -- all consistencies  several, audible  -       Cough -- all consistencies  -AC       Throat Clear -- all consistencies  -AC       Pharyngeal Phase Concerns, Comment -- Reviewed results of prior MBSs, including most recent on 11/6. Suspect acute-on-chronic dysphagia. Anterior cervical osteophyte resulting in diffuse pharyngeal residue and eventual penetration/aspiration. Most recent MBS revealed worsened dysphagia. Suspect exacerbated by acute weakness. Spouse reported pt has not fully recovered since knee replacement sx in August. She also reported noting pt exhibiting weaker/hoarse vocal quality. Discussed that likely high risk for aspiration w/ all consistencies. Notified MD. May consider comfort diet, given chronic nature of dysphagia + advanced age.  -          Swallowing Quality of Life Assessment    Education and counseling provided -- Signs of aspiration;Risks of aspiration;Comfort diet options;Alternate nutrition/hydration options, risks, and benefits  -          SLP Evaluation Clinical Impression    SLP Swallowing Diagnosis mild-moderate;oral dysphagia;suspect acute-on-chronic;mod-severe;pharyngeal dysphagia  - mild-moderate;oral dysphagia;suspect acute-on-chronic;mod-severe;pharyngeal dysphagia  -       Functional Impact risk of aspiration/pneumonia;risk of malnutrition;risk of dehydration  - risk of aspiration/pneumonia;risk of malnutrition;risk of dehydration  -       Rehab Potential/Prognosis, Swallowing re-evaluate goals as necessary  - re-evaluate goals as necessary  -        Swallow Criteria for Skilled Therapeutic Interventions Met demonstrates skilled criteria  - demonstrates skilled criteria  -AC          SLP Treatment Clinical Impressions    Treatment Assessment (SLP) continued;oral dysphagia;pharyngeal dysphagia  - --       Daily Summary of Progress (SLP) progress toward functional goals as expected  - --       Plan for Continued Treatment (SLP) continue treatment per plan of care  - --       Care Plan Review evaluation/treatment results reviewed;care plan/treatment goals reviewed  - --       Care Plan Review, Other Participant(s) family  - --          Recommendations    Therapy Frequency (Swallow) 5 days per week  - 5 days per week  pending GOC decisions  -       Predicted Duration Therapy Intervention (Days) 1 week  - 1 week  -       SLP Diet Recommendation comfort diet, per physician discretion;regular textures;thin liquids;other (see comments)  w/ pt/spouse to select soft items from menu + extra sauce/gravy for solids--if aligns w/ GOC per MD discretion  - comfort diet, per physician discretion;regular textures;thin liquids;other (see comments)  w/ pt/spouse to select soft items from menu + extra sauce/gravy for solids--if aligns w/ GOC per MD discretion  -       Recommended Precautions and Strategies upright posture during/after eating;small bites of food and sips of liquid;general aspiration precautions;fatigue precautions;other (see comments)  slow pace, frequent cued coughing to clear airway  - upright posture during/after eating;small bites of food and sips of liquid;general aspiration precautions;fatigue precautions;other (see comments)  slow pace, frequent cued coughing to clear airway  -       Oral Care Recommendations Oral Care BID/PRN;Toothbrush  - Oral Care BID/PRN;Toothbrush  -       SLP Rec. for Method of Medication Administration meds crushed;with puree;meds via alternate route;as tolerated  pending GOC decisions  - meds  crushed;with puree;meds via alternate route;as tolerated  pending GOC decisions  -AC       Monitor for Signs of Aspiration yes;notify SLP if any concerns  -CH --       Anticipated Discharge Disposition (SLP) home with home health  - home with home health  -                 User Key  (r) = Recorded By, (t) = Taken By, (c) = Cosigned By      Initials Name Effective Dates    AC Connor Sendy S, MS CCC-SLP 02/03/23 -     CH Sanjana Johnson MS CCC-SLP 06/16/21 -                     EDUCATION  The patient has been educated in the following areas:   Home Exercise Program (HEP) Dysphagia (Swallowing Impairment) Oral Care/Hydration Modified Diet Instruction.        SLP GOALS       Row Name 11/13/24 0940             (LTG) Patient will demonstrate functional swallow for    Diet Texture (Demonstrate functional swallow) regular textures  -CH      Liquid viscosity (Demonstrate functional swallow) thin liquids  -CH      [place] (Demonstrate functional swallow) with minimal cues (75-90% accuracy)  -CH      Time Frame (Demonstrate functional swallow) 1 week  -CH      Progress/Outcomes (Demonstrate functional swallow) continuing progress toward goal  -CH         (STG) Patient will tolerate trials of    Consistencies Trialed (Tolerate trials) regular textures;thin liquids  -CH      Desired Outcome (Tolerate trials) with adequate oral prep/transit/clearance;without signs/symptoms of aspiration;with use of compensatory strategies (see comments)  -CH      [place] (Tolerate trials) with minimal cues (75-90% accuracy)  -CH      Time Frame (Tolerate trials) 1 week  -CH      Progress/Outcomes (Tolerate trials) continuing progress toward goal  -CH      Comment (Tolerate trials) Intermittant cough with thin liquids. Continued oral phase dysphagia.  -CH         (STG) Patient will tolerate therapeutic trials of    Consistencies Trialed (Tolerate therapeutic trials) soft to chew (chopped) textures  -CH      Desired Outcome  (Tolerate therapeutic trials) without signs/symptoms of aspiration;with adequate oral prep/transit/clearance  -CH      [place] (Tolerate therapeutic trials) with moderate cues (50-74% accuracy)  -CH      Time Frame (Tolerate therapeutic trials) 1 week  -CH      Progress/Outcomes (Tolerate therapeutic trials) goal no longer appropriate  -CH         (STG) Lingual Strengthening Goal 1 (SLP)    Activity (Lingual Strengthening Goal 1, SLP) increase lingual tone/sensation/control/coordination/movement;increase buccal tension or tone;increase tongue back strength  -CH      Increase Lingual Tone/Sensation/Control/Coordination/Movement lingual movement exercises;swallow trials;lingual resistance exercises  -CH      Increase Buccal Tension or Tone lingual movement exercises;swallow trials;lingual resistance exercises  -CH      Increase Tongue Back Strength lingual movement exercises;swallow trials;lingual resistance exercises  -CH      [place]/Accuracy (Lingual Strengthening Goal 1, SLP) with moderate cues (50-74% accuracy)  -CH      Time Frame (Lingual Strengthening Goal 1, SLP) 1 week  -CH      Progress/Outcomes (Lingual Strengthening Goal 1, SLP) continuing progress toward goal  -CH      Comment (Lingual Strengthening Goal 1, SLP) Reviewed but not addressed as patient eating am meal  -CH         (STG) Pharyngeal Strengthening Exercise Goal 1 (SLP)    Activity (Pharyngeal Strengthening Goal 1, SLP) increase timing;increase superior movement of the hyolaryngeal complex;increase anterior movement of the hyolaryngeal complex;increase closure at entrance to airway/closure of airway at glottis;increase squeeze/positive pressure generation;increase tongue base retraction  -CH      Increase Timing prepping - 3 second prep or suck swallow or 3-step swallow  -CH      Increase Superior Movement of the Hyolaryngeal Complex falsetto;Mendelsohn  -CH      Increase Anterior Movement of the Hyolaryngeal Complex hard effortful  swallow  -CH      Increase Closure at Entrance to Airway/Closure of Airway at Glottis chin tuck against resistance (CTAR)  -CH      Increase Squeeze/Positive Pressure Generation hard effortful swallow  -CH      Increase Tongue Base Retraction gerry  -CH      [place]/Accuracy (Pharyngeal Strengthening Goal 1, SLP) with moderate cues (50-74% accuracy)  -CH      Time Frame (Pharyngeal Strengthening Goal 1, SLP) 1 week  -CH      Progress/Outcomes (Pharyngeal Strengthening Goal 1, SLP) goal ongoing  -CH         (STG) Swallow Management Recall Goal 1 (SLP)    Activity (Swallow Management Recall Goal 1, SLP) aspiration precautions;oral care recommendations;safe diet/liquid level;compensatory swallow strategies/techniques;rationale for use of strategies/techniques  -CH      [place]/Accuracy (Swallow Management Recall Goal 1, SLP) with minimal cues (75-90% accuracy)  -CH      Time Frame (Swallow Management Recall Goal 1, SLP) 1 week  -CH      Progress/Outcomes (Swallow Management Recall Goal 1, SLP) continuing progress toward goal  -CH      Comment (Swallow Management Recall Goal 1, SLP) Reviewed with patient and family. Pt able to return demonstrate  -CH                User Key  (r) = Recorded By, (t) = Taken By, (c) = Cosigned By      Initials Name Provider Type    CH Sanjana Johnson MS CCC-SLP Speech and Language Pathologist                         Time Calculation:    Time Calculation- SLP       Row Name 11/13/24 1332 11/13/24 1237 11/13/24 0809       Time Calculation- SLP    SLP Start Time 0940  -CH -- --    SLP Received On 11/13/24  -CH -- --       Untimed Charges    85766-HD Treatment Swallow Minutes 53  -CH -- --       Total Minutes    Untimed Charges Total Minutes 53  -CH -- --     Total Minutes 53  -CH -- --       PT/SLP KX Modifier    Exception criteria met to exceed therapy cap -- Apply KX Modifier  -CH Apply KX Modifier  -CH      Row Name 11/13/24 0801             PT/SLP KX Modifier    Exception  criteria met to exceed therapy cap Apply KX Modifier  -                User Key  (r) = Recorded By, (t) = Taken By, (c) = Cosigned By      Initials Name Provider Type    Sanjana Smith MS CCC-SLP Speech and Language Pathologist                    Therapy Charges for Today       Code Description Service Date Service Provider Modifiers Qty    [phone]  ST TREATMENT SWALLOW 4 11/13/2024 Sanjana Johnson MS CCC-SLP GN, KX 1                 MS RYAN Beal  11/13/2024

## 2024-11-13 NOTE — DISCHARGE SUMMARY
[facility]  DISCHARGE SUMMARY    Patient Name: Guicho Blanco  : 1940  MRN: [id number]    Date of Admission: 2024  6:48 AM  Date of Discharge:  2024  Primary Care Physician: Mitchel Culver MD    Consults       No orders found from 10/7/2024 to 2024.            Hospital Course     Presenting Problem: sundowning, unable to care for at home    Active Hospital Problems    Diagnosis  POA    **Sundowning [F05]  Yes    Orthostatic hypotension [I95.1]  Yes    Severe protein-calorie malnutrition [E43]  Yes    Generalized weakness [R53.1]  Yes      Resolved Hospital Problems   No resolved problems to display.          Hospital Course:  Guicho Blanco is a 83 y.o. male w h/o TIA, Afib on anticoagulation, right knee replacement (2024) with falls from home and general failure to thrive c/b sundowning at night. This appears longstanding and progressive with significant weight loss, lack of appetite, overall failure to thrive.   Family would like to pursue a comfort-based approach to care given his decline without aggressive interventions. Unable to care for at home and presented here for placement.    Patient's stay c/b significant night-time hallucinations including of demons, etc requiring restraints. This improved w olanzapine 2.5 mg nightly. Of note, patient with h/o high fever last admission with possible diagnosis of serotonin syndrome in setting of remeron, celexa, linezolid use with nortriptyline. Upon return home, nortryptiline was restarted. Will decrease dose of this, continue olanzapine at low dose given tolerance to now without s/s of serotonin like syndrome. Would avoid any other serotonin stim medications (including tramadol, etc)    Orthostatic hypotension - hopefully will improve w held imdur and decrease nortryptiline dose. Started midodrine - monitor on this. Abdominal binder in place before all activity    Staring spells  normal appearance ,  for some time at home - episode here x1. Unclear if hypoactive delirium, behavioral or absence sz. Given family's goals of not aggressive intervention.     Oropharyngeal dysphagia - modified diet as specified in orders. Comfort diet approach after d/w family, would not want feeding tube placement    Big picture - family hopeful for short-term rehab and then hospice long-term care    Discharge Follow Up Recommendations for outpatient labs/diagnostics:   F/u PCP 2 weeks    Day of Discharge     HPI:   Doing well today, slept well overnight      Vital Signs:   Temp:  [96.8 °F (36 °C)-98.1 °F (36.7 °C)] 97.1 °F (36.2 °C)  Heart Rate:  [80-99] 86  Resp:  [16-18] 18  BP: ()/(54-77) 112/66      Physical Exam:  Constitutional: No acute distress, awake, alert tall thin male sitting up in recliner.   HENT: cochlear implants in place bilaterally  Respiratory: Clear to auscultation bilaterally, respiratory effort normal   Cardiovascular: RRR, no murmurs, rubs, or gallops  Gastrointestinal: Soft, nontender, nondistended  Musculoskeletal: Muscle tone decreased throughout, no joint effusions appreciated  Psychiatric: Appropriate affect, cooperative  Neurologic: Alert and oriented to self location but not to situation, facial movements symmetric and spontaneous movement of all 4 extremities grossly equal bilaterally, speech soft. Some right-sided mouth droop which corrects w smiling  Skin: No rashes    Pertinent  and/or Most Recent Results     LAB RESULTS:      Lab 11/08/24  0821   WBC 6.13   HEMOGLOBIN 11.9*   HEMATOCRIT 37.2*   PLATELETS 215   MCV 83.0         Lab 11/10/24  1746 11/08/24  0821   SODIUM 139 135*   POTASSIUM 4.2 3.5   CHLORIDE 104 101   CO2 23.0 23.0   ANION GAP 12.0 11.0   BUN 11 8   CREATININE 0.67* 0.73*   EGFR 92.6 90.3   GLUCOSE 87 99   CALCIUM 9.2 9.4   MAGNESIUM  --  1.6                         Brief Urine Lab Results  (Last result in the past 365 days)        Color   Clarity   Blood   Leuk Est    Nitrite   Protein   CREAT   Urine HCG        11/05/24 0943 Dark Yellow   Clear   Negative   Moderate (2+)   Negative   30 mg/dL (1+)                 Microbiology Results (last 10 days)       Procedure Component Value - Date/Time    Blood Culture - Blood, Arm, Left [[id number]]  (Normal) Collected: 11/05/24 1124    Lab Status: Final result Specimen: Blood from Arm, Left Updated: 11/10/24 1145     Blood Culture No growth at 5 days    Narrative:      Less than seven (7) mL's of blood was collected.  Insufficient quantity may yield false negative results.    Blood Culture - Blood, Arm, Right [[id number]]  (Normal) Collected: 11/05/24 1124    Lab Status: Final result Specimen: Blood from Arm, Right Updated: 11/10/24 1145     Blood Culture No growth at 5 days    Narrative:      Less than seven (7) mL's of blood was collected.  Insufficient quantity may yield false negative results.    Urine Culture - Urine, Straight Cath [[id number]]  (Normal) Collected: 11/05/24 0943    Lab Status: Final result Specimen: Urine from Straight Cath Updated: 11/06/24 1047     Urine Culture No growth            FL Video Swallow With Speech Single Contrast    Result Date: 11/6/2024  FL VIDEO SWALLOW W SPEECH SINGLE-CONTRAST Date of Exam: 11/6/2024 2:10 PM EST Indication: swallow concerns.   Comparison: None available. Technique:   The speech pathologist administered food and/or liquid mixed with barium to the patient with cine/video imaging.  Imaging assistance was provided to the speech pathologist and an image was saved. Fluoroscopic Time: 3 minutes and 30 seconds Number of Images: 12 associated fluoroscopic loops were saved Findings: Aspiration was seen during fluoroscopic guided modified barium swallowing series. Please see speech therapy report for full details and recommendations.     Impression: Fluoroscopy provided for a modified barium swallow. Aspiration was seen during swallowing evaluation. Please see speech therapy report for full  details and recommendations. Report dictated by: Jess Sahu PA-c  I have personally reviewed this case and agree with the findings above: Electronically Signed: Roge Guillen MD  11/6/2024 5:02 PM EST  Workstation ID: [id number]    CT Abdomen Pelvis With Contrast    Result Date: 11/5/2024  CT ABDOMEN PELVIS W CONTRAST Date of Exam: 11/5/2024 7:51 AM EST Indication: Weakness, fall, low abdomen pain. Comparison: 1/11/2016 Technique: Axial CT images were obtained of the abdomen and pelvis following the uneventful intravenous administration of 85 cc Isovue-300. Reconstructed coronal and sagittal images were also obtained. Automated exposure control and iterative construction methods were used. Findings: Lower Chest: Emphysema in the lung bases. Moderate hiatal hernia Organs: Liver, spleen, kidneys, adrenal glands unremarkable. 3 cm cystic lesion arising from the neck of the pancreas, with a thin wall and some internal dependent high density material. Gallbladder surgically absent Gastrointestinal: Possible rectal wall thickening. No intestinal distention. Colonic diverticula without inflammation. Pelvis: No abnormal fluid collection. Urinary bladder unremarkable Peritoneum/Retroperitoneum: No ascites or pneumoperitoneum. Normal caliber Bones/Soft Tissues: No acute bony abnormality. Lumbar scoliosis with diffuse degenerative disease     1. Possible rectal wall thickening, correlate for proctitis 2. 3 cm cystic lesion of the neck of the pancreas. This was present on the comparison study from 2016 but has enlarged in the interval, previously approximately 2 cm 3. Hiatal hernia 4. Colonic diverticulosis without evidence of diverticulitis Electronically Signed: Jace Davila  11/5/2024 9:34 AM EST  Workstation ID: [id number]    XR Shoulder 2+ View Right    Result Date: 11/5/2024  XR SHOULDER 2+ VW RIGHT Date of Exam: 11/5/2024 7:27 AM EST Indication: Trauma Comparison: None available. Findings: No evidence of fracture or  dislocation. Glenohumeral joint appears within normal limits. Irregularity and cystic change of the greater noted. 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 No evidence of fracture or dislocation 2. Appearance of the greater tuberosity could reflect chronic shoulder impingement 3. AC joint osteoarthritis Electronically Signed: Jace Davila  11/5/2024 7:30 AM EST  Workstation ID: [id number]     Results for orders placed during the hospital encounter of 03/02/18    Duplex Lower Extremity Art / Grafts - Bilateral CAR    Interpretation Summary  · No significant stenosis detected bilaterally.  · CHRISTINE not obtained due to non compressible arteries.      Results for orders placed during the hospital encounter of 03/02/18    Duplex Lower Extremity Art / Grafts - Bilateral CAR    Interpretation Summary  · No significant stenosis detected bilaterally.  · CHRISTINE not obtained due to non compressible arteries.      Results for orders placed during the hospital encounter of 09/13/24    Adult Transthoracic Echo Complete W/ Cont if Necessary Per Protocol    Interpretation Summary    Left ventricular systolic function is normal. Left ventricular ejection fraction appears to be 61 - 65%.    Left ventricular diastolic function was normal.      Plan for Follow-up of Pending Labs/Results:     Discharge Details        Discharge Medications        New Medications        Instructions Start Date   midodrine 10 MG tablet  Commonly known as: PROAMATINE   10 mg, Oral, 3 Times Daily Before Meals      OLANZapine 2.5 MG tablet  Commonly known as: zyPREXA   2.5 mg, Oral, Nightly             Changes to Medications        Instructions Start Date   nortriptyline 10 MG capsule  Commonly known as: PAMELOR  What changed:   medication strength  See the new instructions.   Take 2 capsules by mouth Every Night for 14 days, THEN 1 capsule Every Night for 90 days. Indications: Peripheral Nerve Disease   Start Date: November 13,  2024            Continue These Medications        Instructions Start Date   aspirin 325 MG tablet   1 tablet, Daily      bisacodyl 5 MG EC tablet  Commonly known as: DULCOLAX   5 mg, Oral, Daily PRN      coenzyme Q10 100 MG capsule   1 capsule, Daily      docusate sodium 100 MG capsule  Commonly known as: Colace   100 mg, Oral, 2 Times Daily PRN      flecainide 100 MG tablet  Commonly known as: TAMBOCOR   100 mg, Oral, Every 12 Hours      glucose 4 GM chewable tablet  Commonly known as: DEX4   16 g, Oral, As Needed      isosorbide mononitrate 30 MG 24 hr tablet  Commonly known as: IMDUR   30 mg, Oral, Daily      nitroglycerin 0.4 MG SL tablet  Commonly known as: NITROSTAT   1 under the tongue as needed for angina, may repeat q5mins for up three doses      polyethylene glycol 17 g packet  Commonly known as: MIRALAX   17 g, Oral, Daily PRN      pravastatin 20 MG tablet  Commonly known as: PRAVACHOL   20 mg, Oral, Daily      PrilOSEC OTC 20 MG EC tablet  Generic drug: omeprazole OTC   1 tablet, 2 Times Daily      PROBIOTIC (LACTOBACILLUS) PO   1 tablet, Daily      Restasis 0.05 % ophthalmic emulsion  Generic drug: cycloSPORINE   Administer 1 drop to both eyes Every 12 (Twelve) Hours. Morning and evening  Indications: Drying and Inflammation of Cornea and Conjunctiva of Eyes      TYLENOL 500 MG tablet  Generic drug: acetaminophen   500 mg, Every 6 Hours PRN      vitamin b complex capsule capsule   1 capsule, Daily      vitamin D3 125 MCG (5000 UT) capsule capsule   1 capsule, Daily               Allergies   Allergen Reactions    Linezolid Other (See Comments)     Serotonin syndrome    Divalproex Sodium (Migraine) [Valproic Acid] Other (See Comments)     Weak, lethargy and got worse    Cymbalta [Duloxetine Hcl] Mental Status Change     Depression, thoughts of Suicide.     Duloxetine Unknown (See Comments)    Metoprolol Other (See Comments)     UNKNOWN. Pt does not remember this being allergy    Neurontin [Gabapentin]  Other (See Comments)     Lethargy & fatigue         Discharge Disposition:  Rehab Facility or Unit (DC - External)    Diet:  Hospital:  Diet Order   Procedures    Diet: Regular/House; No Straw; Texture: Mechanical Ground (NDD 2); Fluid Consistency: Thin (IDDSI 0)            Activity:      Restrictions or Other Recommendations:         CODE STATUS:    Code Status and Medical Interventions: No CPR (Do Not Attempt to Resuscitate); Limited Support; No artificial nutrition   Ordered at: 11/12/24 1921     Medical Intervention Limits:    No artificial nutrition     Code Status (Patient has no pulse and is not breathing):    No CPR (Do Not Attempt to Resuscitate)     Medical Interventions (Patient has pulse or is breathing):    Limited Support       Future Appointments   Date Time Provider Department Center   11/13/2024  2:45 PM MED 8  [facility]   11/14/2024 To Be Determined GasAshlie chiu, SLP [facility] None   11/15/2024 To Be Determined Zhou-Kourtney Crystal, PTA [facility] None   11/18/2024 To Be Determined Ebony Hayes, OT [facility] None   11/19/2024 To Be Determined Zhou-Lowscott, Crystal, PTA [facility] None   11/21/2024 To Be Determined GassetAshlie villanueva, SLP [facility] None   11/21/2024 To Be Determined Indigo Avalos, PCT [facility] None   11/22/2024 To Be Determined Percio, Nicole, PT [facility] None   11/22/2024 To Be Determined Percsantana, Nicole, PT [facility] None   11/25/2024 To Be Determined Ebony Hayes, OT [facility] None   11/27/2024 To Be Determined GassettAshlie, SLP [facility] None   12/5/2024 To Be Determined GassetAshlie villanueva, SLP [facility] None   12/10/2024  1:30 PM Christian Schwartz MD [facility]   12/12/2024 To Be Determined GassettAshlie, SLP [facility] None   12/18/2024 To Be Determined GassetAshlie villanueva, SLP [facility] None   12/27/2024  9:00 AM Emeterio Abel MD [facility]   9/15/2025 10:30 AM Hector Urrutia MD [facility]       Additional Instructions for the  Follow-ups that You Need to Schedule       Discharge Follow-up with PCP   As directed       Currently Documented PCP:    Mitchel Culver MD    PCP Phone Number:    [phone]     Follow Up Details: 1 week from rehab dispo or 2 weeks        Discharge Follow-up with Specified Provider: [facility] - first available   As directed      To:  neurology - first available                      Dayna Martínez MD  11/13/24      Time Spent on Discharge:  I spent  55  minutes on this discharge activity which included: face-to-face encounter with the patient, reviewing the data in the system, coordination of the care with the nursing staff as well as consultants, documentation, and entering orders.

## 2024-11-13 NOTE — DISCHARGE PLACEMENT REQUEST
"Guicho Blanco \"Rad\" (83 y.o. Male)     Patt Salomon, RN  [phone]        Date of Birth   1940    Social Security Number       Address   [address]  [facility] [address]    Home Phone   [phone]    MRN   [id number]       Sabianist   Worship    Marital Status                               Admission Date   24    Admission Type   Emergency    Admitting Provider   Dayna Mratínez MD    Attending Provider   Dayna Martínez MD    Department, Room/Bed   [facility] 3E, S341/1       Discharge Date       Discharge Disposition   Rehab Facility or Unit (DC - External)    Discharge Destination                                 Attending Provider: Dayna Martínez MD    Allergies: Linezolid, Divalproex Sodium (Migraine) [Valproic Acid], Cymbalta [Duloxetine Hcl], Duloxetine, Metoprolol, Neurontin [Gabapentin]    Isolation: None   Infection: None   Code Status: No CPR    Ht: 182.9 cm (72\")   Wt: 73 kg (161 lb)    Admission Cmt: None   Principal Problem: Sundowning [F05]                   Active Insurance as of 2024       Primary Coverage       Payor Plan Insurance Group Employer/Plan Group    ANTHEM MEDICARE REPLACEMENT ANTHEM MEDICARE ADVANTAGE [id number]       Payor Plan Address Payor Plan Phone Number Payor Plan Fax Number Effective Dates    [address] [phone]  2024 - None Entered    [facility] [address]         Subscriber Name Subscriber Birth Date Member ID       GUICHO BLANCO 1940 [id number]                     Emergency Contacts        (Rel.) Home Phone Work Phone Mobile Phone    BellaSandra (Spouse) -- -- [phone]    JayeSylwia peterson (Daughter) [phone] -- [phone]    Ashley Mejia (Grandchild) -- -- [phone]                 Discharge Summary        Dayna Martínez MD at 24 1341              [facility] Medicine Services  DISCHARGE SUMMARY    Patient Name: Guicho Blanco  : " 1940  MRN: [id number]    Date of Admission: 11/5/2024  6:48 AM  Date of Discharge:  11/13/2024  Primary Care Physician: Mitchel Culver MD    Consults       No orders found from 10/7/2024 to 11/6/2024.            Hospital Course     Presenting Problem: sundowning, unable to care for at home    Active Hospital Problems    Diagnosis  POA    **Sundowning [F05]  Yes    Orthostatic hypotension [I95.1]  Yes    Severe protein-calorie malnutrition [E43]  Yes    Generalized weakness [R53.1]  Yes      Resolved Hospital Problems   No resolved problems to display.          Hospital Course:  Guicho Blanco is a 83 y.o. male w h/o TIA, Afib on anticoagulation, right knee replacement (8/2024) with falls from home and general failure to thrive c/b sundowning at night. This appears longstanding and progressive with significant weight loss, lack of appetite, overall failure to thrive.   Family would like to pursue a comfort-based approach to care given his decline without aggressive interventions. Unable to care for at home and presented here for placement.    Patient's stay c/b significant night-time hallucinations including of demons, etc requiring restraints. This improved w olanzapine 2.5 mg nightly. Of note, patient with h/o high fever last admission with possible diagnosis of serotonin syndrome in setting of remeron, celexa, linezolid use with nortriptyline. Upon return home, nortryptiline was restarted. Will decrease dose of this, continue olanzapine at low dose given tolerance to now without s/s of serotonin like syndrome. Would avoid any other serotonin stim medications (including tramadol, etc)    Orthostatic hypotension - hopefully will improve w held imdur and decrease nortryptiline dose. Started midodrine - monitor on this. Abdominal binder in place before all activity    Staring spells for some time at home - episode here x1. Unclear if hypoactive delirium, behavioral or absence sz. Given family's  goals of not aggressive intervention.     Oropharyngeal dysphagia - modified diet as specified in orders. Comfort diet approach after d/w family, would not want feeding tube placement    Big picture - family hopeful for short-term rehab and then hospice long-term care    Discharge Follow Up Recommendations for outpatient labs/diagnostics:   F/u PCP 2 weeks    Day of Discharge     HPI:   Doing well today, slept well overnight      Vital Signs:   Temp:  [96.8 °F (36 °C)-98.1 °F (36.7 °C)] 97.1 °F (36.2 °C)  Heart Rate:  [80-99] 86  Resp:  [16-18] 18  BP: ()/(54-77) 112/66      Physical Exam:  Constitutional: No acute distress, awake, alert tall thin male sitting up in recliner.   HENT: cochlear implants in place bilaterally  Respiratory: Clear to auscultation bilaterally, respiratory effort normal   Cardiovascular: RRR, no murmurs, rubs, or gallops  Gastrointestinal: Soft, nontender, nondistended  Musculoskeletal: Muscle tone decreased throughout, no joint effusions appreciated  Psychiatric: Appropriate affect, cooperative  Neurologic: Alert and oriented to self location but not to situation, facial movements symmetric and spontaneous movement of all 4 extremities grossly equal bilaterally, speech soft. Some right-sided mouth droop which corrects w smiling  Skin: No rashes    Pertinent  and/or Most Recent Results     LAB RESULTS:      Lab 11/08/24  0821   WBC 6.13   HEMOGLOBIN 11.9*   HEMATOCRIT 37.2*   PLATELETS 215   MCV 83.0         Lab 11/10/24  1746 11/08/24  0821   SODIUM 139 135*   POTASSIUM 4.2 3.5   CHLORIDE 104 101   CO2 23.0 23.0   ANION GAP 12.0 11.0   BUN 11 8   CREATININE 0.67* 0.73*   EGFR 92.6 90.3   GLUCOSE 87 99   CALCIUM 9.2 9.4   MAGNESIUM  --  1.6                         Brief Urine Lab Results  (Last result in the past 365 days)        Color   Clarity   Blood   Leuk Est   Nitrite   Protein   CREAT   Urine HCG        11/05/24 0943 Dark Yellow   Clear   Negative   Moderate (2+)   Negative    30 mg/dL (1+)                 Microbiology Results (last 10 days)       Procedure Component Value - Date/Time    Blood Culture - Blood, Arm, Left [[id number]]  (Normal) Collected: 11/05/24 1124    Lab Status: Final result Specimen: Blood from Arm, Left Updated: 11/10/24 1145     Blood Culture No growth at 5 days    Narrative:      Less than seven (7) mL's of blood was collected.  Insufficient quantity may yield false negative results.    Blood Culture - Blood, Arm, Right [[id number]]  (Normal) Collected: 11/05/24 1124    Lab Status: Final result Specimen: Blood from Arm, Right Updated: 11/10/24 1145     Blood Culture No growth at 5 days    Narrative:      Less than seven (7) mL's of blood was collected.  Insufficient quantity may yield false negative results.    Urine Culture - Urine, Straight Cath [[id number]]  (Normal) Collected: 11/05/24 0943    Lab Status: Final result Specimen: Urine from Straight Cath Updated: 11/06/24 1047     Urine Culture No growth            FL Video Swallow With Speech Single Contrast    Result Date: 11/6/2024  FL VIDEO SWALLOW W SPEECH SINGLE-CONTRAST Date of Exam: 11/6/2024 2:10 PM EST Indication: swallow concerns.   Comparison: None available. Technique:   The speech pathologist administered food and/or liquid mixed with barium to the patient with cine/video imaging.  Imaging assistance was provided to the speech pathologist and an image was saved. Fluoroscopic Time: 3 minutes and 30 seconds Number of Images: 12 associated fluoroscopic loops were saved Findings: Aspiration was seen during fluoroscopic guided modified barium swallowing series. Please see speech therapy report for full details and recommendations.     Impression: Fluoroscopy provided for a modified barium swallow. Aspiration was seen during swallowing evaluation. Please see speech therapy report for full details and recommendations. Report dictated by: Jess Sahu PA-c  I have personally reviewed this case and agree with  the findings above: Electronically Signed: Roge Guillen MD  11/6/2024 5:02 PM EST  Workstation ID: [id number]    CT Abdomen Pelvis With Contrast    Result Date: 11/5/2024  CT ABDOMEN PELVIS W CONTRAST Date of Exam: 11/5/2024 7:51 AM EST Indication: Weakness, fall, low abdomen pain. Comparison: 1/11/2016 Technique: Axial CT images were obtained of the abdomen and pelvis following the uneventful intravenous administration of 85 cc Isovue-300. Reconstructed coronal and sagittal images were also obtained. Automated exposure control and iterative construction methods were used. Findings: Lower Chest: Emphysema in the lung bases. Moderate hiatal hernia Organs: Liver, spleen, kidneys, adrenal glands unremarkable. 3 cm cystic lesion arising from the neck of the pancreas, with a thin wall and some internal dependent high density material. Gallbladder surgically absent Gastrointestinal: Possible rectal wall thickening. No intestinal distention. Colonic diverticula without inflammation. Pelvis: No abnormal fluid collection. Urinary bladder unremarkable Peritoneum/Retroperitoneum: No ascites or pneumoperitoneum. Normal caliber Bones/Soft Tissues: No acute bony abnormality. Lumbar scoliosis with diffuse degenerative disease     1. Possible rectal wall thickening, correlate for proctitis 2. 3 cm cystic lesion of the neck of the pancreas. This was present on the comparison study from 2016 but has enlarged in the interval, previously approximately 2 cm 3. Hiatal hernia 4. Colonic diverticulosis without evidence of diverticulitis Electronically Signed: Jace Davila  11/5/2024 9:34 AM EST  Workstation ID: [id number]    XR Shoulder 2+ View Right    Result Date: 11/5/2024  XR SHOULDER 2+ VW RIGHT Date of Exam: 11/5/2024 7:27 AM EST Indication: Trauma Comparison: None available. Findings: No evidence of fracture or dislocation. Glenohumeral joint appears within normal limits. Irregularity and cystic change of the greater noted. 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 No evidence of fracture or dislocation 2. Appearance of the greater tuberosity could reflect chronic shoulder impingement 3. AC joint osteoarthritis Electronically Signed: Jace Davila  11/5/2024 7:30 AM EST  Workstation ID: [id number]     Results for orders placed during the hospital encounter of 03/02/18    Duplex Lower Extremity Art / Grafts - Bilateral CAR    Interpretation Summary  · No significant stenosis detected bilaterally.  · CHRISTINE not obtained due to non compressible arteries.      Results for orders placed during the hospital encounter of 03/02/18    Duplex Lower Extremity Art / Grafts - Bilateral CAR    Interpretation Summary  · No significant stenosis detected bilaterally.  · CHRISTINE not obtained due to non compressible arteries.      Results for orders placed during the hospital encounter of 09/13/24    Adult Transthoracic Echo Complete W/ Cont if Necessary Per Protocol    Interpretation Summary    Left ventricular systolic function is normal. Left ventricular ejection fraction appears to be 61 - 65%.    Left ventricular diastolic function was normal.      Plan for Follow-up of Pending Labs/Results:     Discharge Details        Discharge Medications        New Medications        Instructions Start Date   midodrine 10 MG tablet  Commonly known as: PROAMATINE   10 mg, Oral, 3 Times Daily Before Meals      OLANZapine 2.5 MG tablet  Commonly known as: zyPREXA   2.5 mg, Oral, Nightly             Changes to Medications        Instructions Start Date   nortriptyline 10 MG capsule  Commonly known as: PAMELOR  What changed:   medication strength  See the new instructions.   Take 2 capsules by mouth Every Night for 14 days, THEN 1 capsule Every Night for 90 days. Indications: Peripheral Nerve Disease   Start Date: November 13, 2024            Continue These Medications        Instructions Start Date   aspirin 325 MG tablet   1 tablet, Daily       bisacodyl 5 MG EC tablet  Commonly known as: DULCOLAX   5 mg, Oral, Daily PRN      coenzyme Q10 100 MG capsule   1 capsule, Daily      docusate sodium 100 MG capsule  Commonly known as: Colace   100 mg, Oral, 2 Times Daily PRN      flecainide 100 MG tablet  Commonly known as: TAMBOCOR   100 mg, Oral, Every 12 Hours      glucose 4 GM chewable tablet  Commonly known as: DEX4   16 g, Oral, As Needed      isosorbide mononitrate 30 MG 24 hr tablet  Commonly known as: IMDUR   30 mg, Oral, Daily      nitroglycerin 0.4 MG SL tablet  Commonly known as: NITROSTAT   1 under the tongue as needed for angina, may repeat q5mins for up three doses      polyethylene glycol 17 g packet  Commonly known as: MIRALAX   17 g, Oral, Daily PRN      pravastatin 20 MG tablet  Commonly known as: PRAVACHOL   20 mg, Oral, Daily      PrilOSEC OTC 20 MG EC tablet  Generic drug: omeprazole OTC   1 tablet, 2 Times Daily      PROBIOTIC (LACTOBACILLUS) PO   1 tablet, Daily      Restasis 0.05 % ophthalmic emulsion  Generic drug: cycloSPORINE   Administer 1 drop to both eyes Every 12 (Twelve) Hours. Morning and evening  Indications: Drying and Inflammation of Cornea and Conjunctiva of Eyes      TYLENOL 500 MG tablet  Generic drug: acetaminophen   500 mg, Every 6 Hours PRN      vitamin b complex capsule capsule   1 capsule, Daily      vitamin D3 125 MCG (5000 UT) capsule capsule   1 capsule, Daily               Allergies   Allergen Reactions    Linezolid Other (See Comments)     Serotonin syndrome    Divalproex Sodium (Migraine) [Valproic Acid] Other (See Comments)     Weak, lethargy and got worse    Cymbalta [Duloxetine Hcl] Mental Status Change     Depression, thoughts of Suicide.     Duloxetine Unknown (See Comments)    Metoprolol Other (See Comments)     UNKNOWN. Pt does not remember this being allergy    Neurontin [Gabapentin] Other (See Comments)     Lethargy & fatigue         Discharge Disposition:  Rehab Facility or Unit (DC -  External)    Diet:  Hospital:  Diet Order   Procedures    Diet: Regular/House; No Straw; Texture: Mechanical Ground (NDD 2); Fluid Consistency: Thin (IDDSI 0)            Activity:      Restrictions or Other Recommendations:         CODE STATUS:    Code Status and Medical Interventions: No CPR (Do Not Attempt to Resuscitate); Limited Support; No artificial nutrition   Ordered at: 11/12/24 1921     Medical Intervention Limits:    No artificial nutrition     Code Status (Patient has no pulse and is not breathing):    No CPR (Do Not Attempt to Resuscitate)     Medical Interventions (Patient has pulse or is breathing):    Limited Support       Future Appointments   Date Time Provider Department Center   11/13/2024  2:45 PM MED 8  [facility]   11/14/2024 To Be Determined Ashlie Tapia, SLP [facility] None   11/15/2024 To Be Determined Lila Beltran, PTA [facility] None   11/18/2024 To Be Determined Ebony Hayes, OT [facility] None   11/19/2024 To Be Determined Lila Beltran, PTA [facility] None   11/21/2024 To Be Determined Ashlie Tapia, SLP [facility] None   11/21/2024 To Be Determined Indigo Avalos, PCT [facility] None   11/22/2024 To Be Determined Alicia Charlese, PT [facility] None   11/22/2024 To Be Determined Percsantana Nicole, PT [facility] None   11/25/2024 To Be Determined Ebony Hayes, OT [facility] None   11/27/2024 To Be Determined Ashlie Tapia, SLP [facility] None   12/5/2024 To Be Determined GasAshlie chiu, SLP [facility] None   12/10/2024  1:30 PM Christian Schwartz MD [facility]   12/12/2024 To Be Determined Ashlie Tapia, SLP [facility] None   12/18/2024 To Be Determined GassetAshlie villanueva, SLP [facility] None   12/27/2024  9:00 AM Emeterio Abel MD [facility]   9/15/2025 10:30 AM Hector Urrutia MD [facility]       Additional Instructions for the Follow-ups that You Need to Schedule       Discharge Follow-up with PCP   As directed       Currently Documented  PCP:    Mitchel Culver MD    PCP Phone Number:    [phone]     Follow Up Details: 1 week from rehab dispo or 2 weeks        Discharge Follow-up with Specified Provider: [facility] - first available   As directed      To: [facility] - first available                      Dayna Martínez MD  11/13/24      Time Spent on Discharge:  I spent  55  minutes on this discharge activity which included: face-to-face encounter with the patient, reviewing the data in the system, coordination of the care with the nursing staff as well as consultants, documentation, and entering orders.            Electronically signed by Dayna Martínez MD at 11/13/24 2658

## 2024-11-13 NOTE — DISCHARGE PLACEMENT REQUEST
"Guicho Blanco \"Rad\" (83 y.o. Male)    Discharge Summary   Date of Birth   1940    Social Security Number       Address   [address] [place] [address] [facility] [address]    Home Phone   [phone]    MRN   [id number]       Episcopalian   Taoism    Marital Status                               Admission Date   24    Admission Type   Emergency    Admitting Provider   Dayna Martínez MD    Attending Provider       Department, Room/Bed   [facility] 3E, S341/1       Discharge Date   2024    Discharge Disposition   Rehab Facility or Unit (DC - External)    Discharge Destination                                 Attending Provider: (none)   Allergies: Linezolid, Divalproex Sodium (Migraine) [Valproic Acid], Cymbalta [Duloxetine Hcl], Duloxetine, Metoprolol, Neurontin [Gabapentin]    Isolation: None   Infection: None   Code Status: No CPR    Ht: 182.9 cm (72\")   Wt: 73 kg (161 lb)    Admission Cmt: None   Principal Problem: Sundowning [F05]                   Active Insurance as of 2024       Primary Coverage       Payor Plan Insurance Group Employer/Plan Group    ANTHEM MEDICARE REPLACEMENT ANTHEM MEDICARE ADVANTAGE [id number]       Payor Plan Address Payor Plan Phone Number Payor Plan Fax Number Effective Dates    [id number] [phone]  2024 - None Entered    [facility] [id number]         Subscriber Name Subscriber Birth Date Member ID       GUICHO BLANCO 1940 [id number]                     Emergency Contacts        (Rel.) Home Phone Work Phone Mobile Phone    Sandra Blanco (Spouse) -- -- [phone]    JayeSylwia peterson (Daughter) [phone] -- [phone]    Ashley Mejia (Grandchild) -- -- [phone]                 Discharge Summary        Dayna Martínez MD at 24 1341              [facility] Medicine Services  DISCHARGE SUMMARY    Patient Name: Guicho Blanco  : 1940  MRN: [id number]    Date of " Admission: 11/5/2024  6:48 AM  Date of Discharge:  11/13/2024  Primary Care Physician: Mitchel Culver MD    Consults       No orders found from 10/7/2024 to 11/6/2024.            Hospital Course     Presenting Problem: sundowning, unable to care for at home    Active Hospital Problems    Diagnosis  POA    **Sundowning [F05]  Yes    Orthostatic hypotension [I95.1]  Yes    Severe protein-calorie malnutrition [E43]  Yes    Generalized weakness [R53.1]  Yes      Resolved Hospital Problems   No resolved problems to display.          Hospital Course:  Guicho Blanco is a 83 y.o. male w h/o TIA, Afib on anticoagulation, right knee replacement (8/2024) with falls from home and general failure to thrive c/b sundowning at night. This appears longstanding and progressive with significant weight loss, lack of appetite, overall failure to thrive.   Family would like to pursue a comfort-based approach to care given his decline without aggressive interventions. Unable to care for at home and presented here for placement.    Patient's stay c/b significant night-time hallucinations including of demons, etc requiring restraints. This improved w olanzapine 2.5 mg nightly. Of note, patient with h/o high fever last admission with possible diagnosis of serotonin syndrome in setting of remeron, celexa, linezolid use with nortriptyline. Upon return home, nortryptiline was restarted. Will decrease dose of this, continue olanzapine at low dose given tolerance to now without s/s of serotonin like syndrome. Would avoid any other serotonin stim medications (including tramadol, etc)    Orthostatic hypotension - hopefully will improve w held imdur and decrease nortryptiline dose. Started midodrine - monitor on this. Abdominal binder in place before all activity    Staring spells for some time at home - episode here x1. Unclear if hypoactive delirium, behavioral or absence sz. Given family's goals of not aggressive intervention.      Oropharyngeal dysphagia - modified diet as specified in orders. Comfort diet approach after d/w family, would not want feeding tube placement    Big picture - family hopeful for short-term rehab and then hospice long-term care    Discharge Follow Up Recommendations for outpatient labs/diagnostics:   F/u PCP 2 weeks    Day of Discharge     HPI:   Doing well today, slept well overnight      Vital Signs:   Temp:  [96.8 °F (36 °C)-98.1 °F (36.7 °C)] 97.1 °F (36.2 °C)  Heart Rate:  [80-99] 86  Resp:  [16-18] 18  BP: ()/(54-77) 112/66      Physical Exam:  Constitutional: No acute distress, awake, alert tall thin male sitting up in recliner.   HENT: cochlear implants in place bilaterally  Respiratory: Clear to auscultation bilaterally, respiratory effort normal   Cardiovascular: RRR, no murmurs, rubs, or gallops  Gastrointestinal: Soft, nontender, nondistended  Musculoskeletal: Muscle tone decreased throughout, no joint effusions appreciated  Psychiatric: Appropriate affect, cooperative  Neurologic: Alert and oriented to self location but not to situation, facial movements symmetric and spontaneous movement of all 4 extremities grossly equal bilaterally, speech soft. Some right-sided mouth droop which corrects w smiling  Skin: No rashes    Pertinent  and/or Most Recent Results     LAB RESULTS:      Lab 11/08/24  0821   WBC 6.13   HEMOGLOBIN 11.9*   HEMATOCRIT 37.2*   PLATELETS 215   MCV 83.0         Lab 11/10/24  1746 11/08/24  0821   SODIUM 139 135*   POTASSIUM 4.2 3.5   CHLORIDE 104 101   CO2 23.0 23.0   ANION GAP 12.0 11.0   BUN 11 8   CREATININE 0.67* 0.73*   EGFR 92.6 90.3   GLUCOSE 87 99   CALCIUM 9.2 9.4   MAGNESIUM  --  1.6                         Brief Urine Lab Results  (Last result in the past 365 days)        Color   Clarity   Blood   Leuk Est   Nitrite   Protein   CREAT   Urine HCG        11/05/24 0943 Dark Yellow   Clear   Negative   Moderate (2+)   Negative   30 mg/dL (1+)                  Microbiology Results (last 10 days)       Procedure Component Value - Date/Time    Blood Culture - Blood, Arm, Left [[id number]]  (Normal) Collected: 11/05/24 1124    Lab Status: Final result Specimen: Blood from Arm, Left Updated: 11/10/24 1145     Blood Culture No growth at 5 days    Narrative:      Less than seven (7) mL's of blood was collected.  Insufficient quantity may yield false negative results.    Blood Culture - Blood, Arm, Right [[id number]]  (Normal) Collected: 11/05/24 1124    Lab Status: Final result Specimen: Blood from Arm, Right Updated: 11/10/24 1145     Blood Culture No growth at 5 days    Narrative:      Less than seven (7) mL's of blood was collected.  Insufficient quantity may yield false negative results.    Urine Culture - Urine, Straight Cath [[id number]]  (Normal) Collected: 11/05/24 0943    Lab Status: Final result Specimen: Urine from Straight Cath Updated: 11/06/24 1047     Urine Culture No growth            FL Video Swallow With Speech Single Contrast    Result Date: 11/6/2024  FL VIDEO SWALLOW W SPEECH SINGLE-CONTRAST Date of Exam: 11/6/2024 2:10 PM EST Indication: swallow concerns.   Comparison: None available. Technique:   The speech pathologist administered food and/or liquid mixed with barium to the patient with cine/video imaging.  Imaging assistance was provided to the speech pathologist and an image was saved. Fluoroscopic Time: 3 minutes and 30 seconds Number of Images: 12 associated fluoroscopic loops were saved Findings: Aspiration was seen during fluoroscopic guided modified barium swallowing series. Please see speech therapy report for full details and recommendations.     Impression: Fluoroscopy provided for a modified barium swallow. Aspiration was seen during swallowing evaluation. Please see speech therapy report for full details and recommendations. Report dictated by: Jess Sahu PA-c  I have personally reviewed this case and agree with the findings above:  Electronically Signed: Roge Guillen MD  11/6/2024 5:02 PM EST  Workstation ID: [id number]    CT Abdomen Pelvis With Contrast    Result Date: 11/5/2024  CT ABDOMEN PELVIS W CONTRAST Date of Exam: 11/5/2024 7:51 AM EST Indication: Weakness, fall, low abdomen pain. Comparison: 1/11/2016 Technique: Axial CT images were obtained of the abdomen and pelvis following the uneventful intravenous administration of 85 cc Isovue-300. Reconstructed coronal and sagittal images were also obtained. Automated exposure control and iterative construction methods were used. Findings: Lower Chest: Emphysema in the lung bases. Moderate hiatal hernia Organs: Liver, spleen, kidneys, adrenal glands unremarkable. 3 cm cystic lesion arising from the neck of the pancreas, with a thin wall and some internal dependent high density material. Gallbladder surgically absent Gastrointestinal: Possible rectal wall thickening. No intestinal distention. Colonic diverticula without inflammation. Pelvis: No abnormal fluid collection. Urinary bladder unremarkable Peritoneum/Retroperitoneum: No ascites or pneumoperitoneum. Normal caliber Bones/Soft Tissues: No acute bony abnormality. Lumbar scoliosis with diffuse degenerative disease     1. Possible rectal wall thickening, correlate for proctitis 2. 3 cm cystic lesion of the neck of the pancreas. This was present on the comparison study from 2016 but has enlarged in the interval, previously approximately 2 cm 3. Hiatal hernia 4. Colonic diverticulosis without evidence of diverticulitis Electronically Signed: Jace Davila  11/5/2024 9:34 AM EST  Workstation ID: [id number]    XR Shoulder 2+ View Right    Result Date: 11/5/2024  XR SHOULDER 2+ VW RIGHT Date of Exam: 11/5/2024 7:27 AM EST Indication: Trauma Comparison: None available. Findings: No evidence of fracture or dislocation. Glenohumeral joint appears within normal limits. Irregularity and cystic change of the greater noted. 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 No evidence of fracture or dislocation 2. Appearance of the greater tuberosity could reflect chronic shoulder impingement 3. AC joint osteoarthritis Electronically Signed: Jace Davila  11/5/2024 7:30 AM EST  Workstation ID: [id number]     Results for orders placed during the hospital encounter of 03/02/18    Duplex Lower Extremity Art / Grafts - Bilateral CAR    Interpretation Summary  · No significant stenosis detected bilaterally.  · CHRISTINE not obtained due to non compressible arteries.      Results for orders placed during the hospital encounter of 03/02/18    Duplex Lower Extremity Art / Grafts - Bilateral CAR    Interpretation Summary  · No significant stenosis detected bilaterally.  · CHRISTINE not obtained due to non compressible arteries.      Results for orders placed during the hospital encounter of 09/13/24    Adult Transthoracic Echo Complete W/ Cont if Necessary Per Protocol    Interpretation Summary    Left ventricular systolic function is normal. Left ventricular ejection fraction appears to be 61 - 65%.    Left ventricular diastolic function was normal.      Plan for Follow-up of Pending Labs/Results:     Discharge Details        Discharge Medications        New Medications        Instructions Start Date   midodrine 10 MG tablet  Commonly known as: PROAMATINE   10 mg, Oral, 3 Times Daily Before Meals      OLANZapine 2.5 MG tablet  Commonly known as: zyPREXA   2.5 mg, Oral, Nightly             Changes to Medications        Instructions Start Date   nortriptyline 10 MG capsule  Commonly known as: PAMELOR  What changed:   medication strength  See the new instructions.   Take 2 capsules by mouth Every Night for 14 days, THEN 1 capsule Every Night for 90 days. Indications: Peripheral Nerve Disease   Start Date: November 13, 2024            Continue These Medications        Instructions Start Date   aspirin 325 MG tablet   1 tablet, Daily      bisacodyl 5 MG EC  tablet  Commonly known as: DULCOLAX   5 mg, Oral, Daily PRN      coenzyme Q10 100 MG capsule   1 capsule, Daily      docusate sodium 100 MG capsule  Commonly known as: Colace   100 mg, Oral, 2 Times Daily PRN      flecainide 100 MG tablet  Commonly known as: TAMBOCOR   100 mg, Oral, Every 12 Hours      glucose 4 GM chewable tablet  Commonly known as: DEX4   16 g, Oral, As Needed      isosorbide mononitrate 30 MG 24 hr tablet  Commonly known as: IMDUR   30 mg, Oral, Daily      nitroglycerin 0.4 MG SL tablet  Commonly known as: NITROSTAT   1 under the tongue as needed for angina, may repeat q5mins for up three doses      polyethylene glycol 17 g packet  Commonly known as: MIRALAX   17 g, Oral, Daily PRN      pravastatin 20 MG tablet  Commonly known as: PRAVACHOL   20 mg, Oral, Daily      PrilOSEC OTC 20 MG EC tablet  Generic drug: omeprazole OTC   1 tablet, 2 Times Daily      PROBIOTIC (LACTOBACILLUS) PO   1 tablet, Daily      Restasis 0.05 % ophthalmic emulsion  Generic drug: cycloSPORINE   Administer 1 drop to both eyes Every 12 (Twelve) Hours. Morning and evening  Indications: Drying and Inflammation of Cornea and Conjunctiva of Eyes      TYLENOL 500 MG tablet  Generic drug: acetaminophen   500 mg, Every 6 Hours PRN      vitamin b complex capsule capsule   1 capsule, Daily      vitamin D3 125 MCG (5000 UT) capsule capsule   1 capsule, Daily               Allergies   Allergen Reactions    Linezolid Other (See Comments)     Serotonin syndrome    Divalproex Sodium (Migraine) [Valproic Acid] Other (See Comments)     Weak, lethargy and got worse    Cymbalta [Duloxetine Hcl] Mental Status Change     Depression, thoughts of Suicide.     Duloxetine Unknown (See Comments)    Metoprolol Other (See Comments)     UNKNOWN. Pt does not remember this being allergy    Neurontin [Gabapentin] Other (See Comments)     Lethargy & fatigue         Discharge Disposition:  Rehab Facility or Unit (DC - External)    Diet:  Hospital:  Diet  Order   Procedures    Diet: Regular/House; No Straw; Texture: Mechanical Ground (NDD 2); Fluid Consistency: Thin (IDDSI 0)            Activity:      Restrictions or Other Recommendations:         CODE STATUS:    Code Status and Medical Interventions: No CPR (Do Not Attempt to Resuscitate); Limited Support; No artificial nutrition   Ordered at: 11/12/24 1921     Medical Intervention Limits:    No artificial nutrition     Code Status (Patient has no pulse and is not breathing):    No CPR (Do Not Attempt to Resuscitate)     Medical Interventions (Patient has pulse or is breathing):    Limited Support       Future Appointments   Date Time Provider Department Center   11/13/2024  2:45 PM MED 8  [facility]   11/14/2024 To Be Determined Ashlie Tapia, SLP [facility] None   11/15/2024 To Be Determined Lila Beltran, PTA [facility] None   11/18/2024 To Be Determined Ebony Hayes, OT [facility] None   11/19/2024 To Be Determined Lila Beltran, PTA [facility] None   11/21/2024 To Be Determined Ashlie Tapia, SLP [facility] None   11/21/2024 To Be Determined Indigo Avalos, PCT [facility] None   11/22/2024 To Be Determined Percsantana, Nicole, PT [facility] None   11/22/2024 To Be Determined Percsantana, Nicole, PT [facility] None   11/25/2024 To Be Determined Ebony Hayes, OT [facility] None   11/27/2024 To Be Determined Ashlie Tapia, SLP [facility] None   12/5/2024 To Be Determined GasAshlie chiu, SLP [facility] None   12/10/2024  1:30 PM Christian Schwartz MD [facility]   12/12/2024 To Be Determined Ashlie Tapia SLP [facility] None   12/18/2024 To Be Determined GasAshlie chiu, SLP [facility] None   12/27/2024  9:00 AM Emeterio Abel MD [facility]   9/15/2025 10:30 AM Hector Urrutia MD [facility]       Additional Instructions for the Follow-ups that You Need to Schedule       Discharge Follow-up with PCP   As directed       Currently Documented PCP:    Mitchel Culver MD     PCP Phone Number:    [phone]     Follow Up Details: 1 week from rehab dispo or 2 weeks        Discharge Follow-up with Specified Provider: [facility] - first available   As directed      To: [facility] - first available                      Dayna Martínez MD  11/13/24      Time Spent on Discharge:  I spent  55  minutes on this discharge activity which included: face-to-face encounter with the patient, reviewing the data in the system, coordination of the care with the nursing staff as well as consultants, documentation, and entering orders.            Electronically signed by Dayna Martínez MD at 11/13/24 2585

## 2024-11-16 LAB
QT INTERVAL: 398 MS
QTC INTERVAL: 478 MS

## 2024-12-19 ENCOUNTER — TELEPHONE (OUTPATIENT)
Dept: NEUROLOGY | Facility: CLINIC | Age: 84
End: 2024-12-19
Payer: MEDICARE

## 2024-12-19 NOTE — TELEPHONE ENCOUNTER
"Relay     \"LVM with patient     Advised that unfortunately we will need to reschedule his appt on the 23rd as Dr. Abel will not be in the clinic that day and unfortunately none of our other providers have openings that day. Apologized for the short notice and inconvenience.     I let him know I rescheduled for Feb 19th however if they want something sooner, to please call back to let me know and provide any days of the week/times they are UNABLE to make so I can look for something.      We also have had quite a bit of cancellations recently so we can always keep him on the waitlist.      DEVIN Rowley \"                "

## 2025-03-13 ENCOUNTER — TELEPHONE (OUTPATIENT)
Dept: CARDIOLOGY | Facility: CLINIC | Age: 85
End: 2025-03-13

## 2025-03-13 NOTE — TELEPHONE ENCOUNTER
"  “Please be informed that patient has passed. Patient has been marked  in the system. The date of death is: 2025\".    Caller: Sandra Blanco    Relationship: Emergency Contact    Best call back number: [phone]      "

## (undated) DEVICE — PENCL SMOKE/EVAC MEGADYNE TELESCP 10FT

## (undated) DEVICE — SYR LUERLOK 30CC

## (undated) DEVICE — SKN PREP SPNG STKS PVP PNT STR: Brand: MEDLINE INDUSTRIES, INC.

## (undated) DEVICE — DRP ROBOTIC ROSA BX/20

## (undated) DEVICE — SCRW HEX PERSONA FML 2.5X25MM PK/2
Type: IMPLANTABLE DEVICE | Site: KNEE | Status: NON-FUNCTIONAL
Removed: 2024-08-07

## (undated) DEVICE — INTENDED FOR TISSUE SEPARATION, AND OTHER PROCEDURES THAT REQUIRE A SHARP SURGICAL BLADE TO PUNCTURE OR CUT.: Brand: BARD-PARKER ® CARBON RIB-BACK BLADES

## (undated) DEVICE — NEEDLE, QUINCKE, 18GX3.5": Brand: MEDLINE

## (undated) DEVICE — PATIENT RETURN ELECTRODE, SINGLE-USE, CONTACT QUALITY MONITORING, ADULT, WITH 9FT CORD, FOR PATIENTS WEIGING OVER 33LBS. (15KG): Brand: MEGADYNE

## (undated) DEVICE — GW EXCHG TSCF .035 260CM 3MM

## (undated) DEVICE — GLV SURG PREMIERPRO MIC LTX PF SZ8 BRN

## (undated) DEVICE — SYS CLS SKIN PREMIERPRO EXOFINFUSION 22CM

## (undated) DEVICE — GLIDESHEATH SLENDER STAINLESS STEEL KIT: Brand: GLIDESHEATH SLENDER

## (undated) DEVICE — STRYKER PERFORMANCE SERIES SAGITTAL BLADE: Brand: STRYKER PERFORMANCE SERIES

## (undated) DEVICE — MODEL BT2000 P/N 700287-012KIT CONTENTS: MANIFOLD WITH SALINE AND CONTRAST PORTS, SALINE TUBING WITH SPIKE AND HAND SYRINGE, TRANSDUCER: Brand: BT2000 AUTOMATED MANIFOLD KIT

## (undated) DEVICE — 450 ML BOTTLE OF 0.05% CHLORHEXIDINE GLUCONATE IN 99.95% STERILE WATER FOR IRRIGATION, USP AND APPLICATOR.: Brand: IRRISEPT ANTIMICROBIAL WOUND LAVAGE

## (undated) DEVICE — DRSNG PAD ABD 8X10IN STRL

## (undated) DEVICE — DELFI MATCHING LIMB PROTECTION SLEEVES (MLPS) HELP PROTECT THE PATIENT’S LIMB FROM POSSIBLE WRINKLING, PINCHING AND SHEARING OF SKIN AND SOFT TISSUES OF THE LIMB.EACH DELFI MATCHING LIMB PROTECTION SLEEVE IS INTENDED FOR USE WITH A SPECIFIC DELFI TOURNIQUET CUFF. THIS SLEEVE IS SPECIFICALLY FOR THIGH.: Brand: MATCHING LIMB PROTECTION SLEEVES - VARI-FIT

## (undated) DEVICE — SUT MONOCRYL PLS ANTIB UND 3/0  PS1 27IN

## (undated) DEVICE — ANTIBACTERIAL UNDYED BRAIDED (POLYGLACTIN 910), SYNTHETIC ABSORBABLE SUTURE: Brand: COATED VICRYL

## (undated) DEVICE — CANNULA,ADULT,SOFT-TOUCH,7'TUBE,UC: Brand: PENDING

## (undated) DEVICE — CATH DIAG EXPO .045 FL3.5 5F 100CM

## (undated) DEVICE — PK CATH CARD 10

## (undated) DEVICE — PK KN TOTL 10

## (undated) DEVICE — CONTAINER,SPECIMEN,OR STERILE,4OZ: Brand: MEDLINE

## (undated) DEVICE — DRSNG SURESITE WNDW 4X4.5

## (undated) DEVICE — BLANKT WARM UPPR/BDY ARM/OUT 57X196CM

## (undated) DEVICE — UNDERCAST PADDING: Brand: DEROYAL

## (undated) DEVICE — PAD,ARMBOARD,CONV,FOAM,2X8X20",12PR/CS: Brand: MEDLINE

## (undated) DEVICE — KT PUMP INFUBLOCK MDL 2100 PMKITSOLIS

## (undated) DEVICE — CATH DIAG EXPO M/ PK 5F FL4/FR4 PIG

## (undated) DEVICE — BNDG ELAS W/CLIP 6IN 10YD LF STRL

## (undated) DEVICE — MODEL AT P54, P/N 700608-035KIT CONTENTS: HAND CONTROLLER, 3-WAY HIGH-PRESSURE STOPCOCK WITH ROTATING END AND PREMIUM HIGH-PRESSURE TUBING: Brand: ANGIOTOUCH® KIT

## (undated) DEVICE — DEV COMP RAD PRELUDESYNC 24CM

## (undated) DEVICE — TRAP FLD MINIVAC MEGADYNE 100ML

## (undated) DEVICE — Device